# Patient Record
Sex: FEMALE | Race: BLACK OR AFRICAN AMERICAN | Employment: OTHER | URBAN - METROPOLITAN AREA
[De-identification: names, ages, dates, MRNs, and addresses within clinical notes are randomized per-mention and may not be internally consistent; named-entity substitution may affect disease eponyms.]

---

## 2017-02-20 ENCOUNTER — APPOINTMENT (OUTPATIENT)
Dept: GENERAL RADIOLOGY | Age: 77
End: 2017-02-20
Attending: EMERGENCY MEDICINE
Payer: MEDICARE

## 2017-02-20 ENCOUNTER — HOSPITAL ENCOUNTER (EMERGENCY)
Age: 77
Discharge: HOME | End: 2017-02-20
Attending: EMERGENCY MEDICINE
Payer: MEDICARE

## 2017-02-20 VITALS
WEIGHT: 293 LBS | RESPIRATION RATE: 20 BRPM | TEMPERATURE: 97.3 F | BODY MASS INDEX: 43.4 KG/M2 | SYSTOLIC BLOOD PRESSURE: 114 MMHG | OXYGEN SATURATION: 99 % | HEIGHT: 69 IN | HEART RATE: 88 BPM | DIASTOLIC BLOOD PRESSURE: 70 MMHG

## 2017-02-20 DIAGNOSIS — R06.02 SOB (SHORTNESS OF BREATH): Primary | ICD-10-CM

## 2017-02-20 LAB
ALBUMIN SERPL BCP-MCNC: 3.3 G/DL (ref 3.5–5)
ALBUMIN/GLOB SERPL: 0.8 {RATIO} (ref 1.1–2.2)
ALP SERPL-CCNC: 86 U/L (ref 45–117)
ALT SERPL-CCNC: 12 U/L (ref 12–78)
ANION GAP BLD CALC-SCNC: 9 MMOL/L (ref 5–15)
AST SERPL W P-5'-P-CCNC: 10 U/L (ref 15–37)
BASOPHILS # BLD AUTO: 0 K/UL (ref 0–0.1)
BASOPHILS # BLD: 0 % (ref 0–1)
BILIRUB SERPL-MCNC: 1 MG/DL (ref 0.2–1)
BNP SERPL-MCNC: 158 PG/ML (ref 0–100)
BUN SERPL-MCNC: 15 MG/DL (ref 6–20)
BUN/CREAT SERPL: 15 (ref 12–20)
CALCIUM SERPL-MCNC: 8.8 MG/DL (ref 8.5–10.1)
CHLORIDE SERPL-SCNC: 105 MMOL/L (ref 97–108)
CK MB CFR SERPL CALC: 1.4 % (ref 0–2.5)
CK MB SERPL-MCNC: 1.6 NG/ML (ref 5–25)
CK SERPL-CCNC: 116 U/L (ref 26–192)
CO2 SERPL-SCNC: 27 MMOL/L (ref 21–32)
CREAT SERPL-MCNC: 1.03 MG/DL (ref 0.55–1.02)
EOSINOPHIL # BLD: 0.1 K/UL (ref 0–0.4)
EOSINOPHIL NFR BLD: 1 % (ref 0–7)
ERYTHROCYTE [DISTWIDTH] IN BLOOD BY AUTOMATED COUNT: 14.9 % (ref 11.5–14.5)
GLOBULIN SER CALC-MCNC: 4 G/DL (ref 2–4)
GLUCOSE SERPL-MCNC: 89 MG/DL (ref 65–100)
HCT VFR BLD AUTO: 42.7 % (ref 35–47)
HGB BLD-MCNC: 14 G/DL (ref 11.5–16)
LYMPHOCYTES # BLD AUTO: 29 % (ref 12–49)
LYMPHOCYTES # BLD: 1.6 K/UL (ref 0.8–3.5)
MCH RBC QN AUTO: 27.3 PG (ref 26–34)
MCHC RBC AUTO-ENTMCNC: 32.8 G/DL (ref 30–36.5)
MCV RBC AUTO: 83.4 FL (ref 80–99)
MONOCYTES # BLD: 0.7 K/UL (ref 0–1)
MONOCYTES NFR BLD AUTO: 12 % (ref 5–13)
NEUTS SEG # BLD: 3.3 K/UL (ref 1.8–8)
NEUTS SEG NFR BLD AUTO: 58 % (ref 32–75)
PLATELET # BLD AUTO: 221 K/UL (ref 150–400)
POTASSIUM SERPL-SCNC: 4.2 MMOL/L (ref 3.5–5.1)
PROT SERPL-MCNC: 7.3 G/DL (ref 6.4–8.2)
RBC # BLD AUTO: 5.12 M/UL (ref 3.8–5.2)
SODIUM SERPL-SCNC: 141 MMOL/L (ref 136–145)
TROPONIN I SERPL-MCNC: <0.04 NG/ML
WBC # BLD AUTO: 5.7 K/UL (ref 3.6–11)

## 2017-02-20 PROCEDURE — 71020 XR CHEST PA LAT: CPT

## 2017-02-20 PROCEDURE — 99283 EMERGENCY DEPT VISIT LOW MDM: CPT

## 2017-02-20 PROCEDURE — 36415 COLL VENOUS BLD VENIPUNCTURE: CPT | Performed by: EMERGENCY MEDICINE

## 2017-02-20 PROCEDURE — 82550 ASSAY OF CK (CPK): CPT | Performed by: EMERGENCY MEDICINE

## 2017-02-20 PROCEDURE — 80053 COMPREHEN METABOLIC PANEL: CPT | Performed by: EMERGENCY MEDICINE

## 2017-02-20 PROCEDURE — 84484 ASSAY OF TROPONIN QUANT: CPT | Performed by: EMERGENCY MEDICINE

## 2017-02-20 PROCEDURE — 93005 ELECTROCARDIOGRAM TRACING: CPT

## 2017-02-20 PROCEDURE — 85025 COMPLETE CBC W/AUTO DIFF WBC: CPT | Performed by: EMERGENCY MEDICINE

## 2017-02-20 PROCEDURE — 83880 ASSAY OF NATRIURETIC PEPTIDE: CPT | Performed by: EMERGENCY MEDICINE

## 2017-02-20 RX ORDER — FUROSEMIDE 20 MG/1
TABLET ORAL
Qty: 30 TAB | Refills: 0 | Status: SHIPPED | OUTPATIENT
Start: 2017-02-20 | End: 2018-03-27

## 2017-02-20 NOTE — ED TRIAGE NOTES
Two weeks of swelling in her feet and ankles. She has shortness of breath but it has gotten worse in the last 2 weeks. She called her doctor and was told to come to ED.

## 2017-02-21 LAB
ATRIAL RATE: 375 BPM
CALCULATED T AXIS, ECG11: 42 DEGREES
DIAGNOSIS, 93000: NORMAL
Q-T INTERVAL, ECG07: 356 MS
QRS DURATION, ECG06: 84 MS
QTC CALCULATION (BEZET), ECG08: 428 MS
VENTRICULAR RATE, ECG03: 87 BPM

## 2017-02-21 NOTE — DISCHARGE INSTRUCTIONS
Shortness of Breath: Care Instructions  Your Care Instructions  Shortness of breath has many causes. Sometimes conditions such as anxiety can lead to shortness of breath. Some people get mild shortness of breath when they exercise. Trouble breathing also can be a symptom of a serious problem, such as asthma, lung disease, emphysema, heart problems, and pneumonia. If your shortness of breath continues, you may need tests and treatment. Watch for any changes in your breathing and other symptoms. Follow-up care is a key part of your treatment and safety. Be sure to make and go to all appointments, and call your doctor if you are having problems. Its also a good idea to know your test results and keep a list of the medicines you take. How can you care for yourself at home? · Do not smoke or allow others to smoke around you. If you need help quitting, talk to your doctor about stop-smoking programs and medicines. These can increase your chances of quitting for good. · Get plenty of rest and sleep. · Take your medicines exactly as prescribed. Call your doctor if you think you are having a problem with your medicine. · Find healthy ways to deal with stress. ¨ Exercise daily. ¨ Get plenty of sleep. ¨ Eat regularly and well. When should you call for help? Call 911 anytime you think you may need emergency care. For example, call if:  · You have severe shortness of breath. · You have symptoms of a heart attack. These may include:  ¨ Chest pain or pressure, or a strange feeling in the chest.  ¨ Sweating. ¨ Shortness of breath. ¨ Nausea or vomiting. ¨ Pain, pressure, or a strange feeling in the back, neck, jaw, or upper belly or in one or both shoulders or arms. ¨ Lightheadedness or sudden weakness. ¨ A fast or irregular heartbeat. After you call 911, the  may tell you to chew 1 adult-strength or 2 to 4 low-dose aspirin. Wait for an ambulance. Do not try to drive yourself.   Call your doctor now or seek immediate medical care if:  · Your shortness of breath gets worse or you start to wheeze. Wheezing is a high-pitched sound when you breathe. · You wake up at night out of breath or have to prop your head up on several pillows to breathe. · You are short of breath after only light activity or while at rest.  Watch closely for changes in your health, and be sure to contact your doctor if:  · You do not get better over the next 1 to 2 days. Where can you learn more? Go to http://chela-montse.info/. Enter S780 in the search box to learn more about \"Shortness of Breath: Care Instructions. \"  Current as of: May 23, 2016  Content Version: 11.1  © 1048-5515 [employer]. Care instructions adapted under license by [employer] (which disclaims liability or warranty for this information). If you have questions about a medical condition or this instruction, always ask your healthcare professional. [place] [address] any warranty or liability for your use of this information. We hope that we have addressed all of your medical concerns. The examination and treatment you received in the Emergency Department were for an emergent problem and were not intended as complete care. It is important that you follow up with your healthcare provider(s) for ongoing care. If your symptoms worsen or do not improve as expected, and you are unable to reach your usual health care provider(s), you should return to the Emergency Department. Today's healthcare is undergoing tremendous change, and patient satisfaction surveys are one of the many tools to assess the quality of medical care. You may receive a survey from the [employer] organization regarding your experience in the Emergency Department. I hope that your experience has been completely positive, particularly the medical care that I provided.   As such, please participate in the survey; anything less than excellent does not meet my expectations or intentions. [facility] and [address] participate in nationally recognized quality of care measures. If your blood pressure is greater than 120/80, as reported below, we urge that you seek medical care to address the potential of high blood pressure, commonly known as hypertension. Hypertension can be hereditary or can be caused by certain medical conditions, pain, stress, or \"white coat syndrome. \"       Please make an appointment with your health care provider(s) for follow up of your Emergency Department visit. VITALS:   Patient Vitals for the past 8 hrs:   Temp Pulse Resp BP SpO2   02/20/17 2029 - - 20 131/49 98 %   02/20/17 1806 97.3 °F (36.3 °C) 88 22 118/69 95 %          Thank you for allowing us to provide you with medical care today. We realize that you have many choices for your emergency care needs. Please choose us in the future for any continued health care needs. Jose Granado MD    [facility].   Office: [phone]            Recent Results (from the past 24 hour(s))   EKG, 12 LEAD, INITIAL    Collection Time: 02/20/17  6:23 PM   Result Value Ref Range    Ventricular Rate 87 BPM    Atrial Rate 375 BPM    QRS Duration 84 ms    Q-T Interval 356 ms    QTC Calculation (Bezet) 428 ms    Calculated T Axis 42 degrees    Diagnosis       Atrial fibrillation with premature ventricular or aberrantly conducted   complexes  When compared with ECG of 31-JUL-2016 11:36,  Atrial fibrillation has replaced Sinus rhythm  Vent.  rate has increased BY  42 BPM  QT has lengthened     CBC WITH AUTOMATED DIFF    Collection Time: 02/20/17  7:14 PM   Result Value Ref Range    WBC 5.7 3.6 - 11.0 K/uL    RBC 5.12 3.80 - 5.20 M/uL    HGB 14.0 11.5 - 16.0 g/dL    HCT 42.7 35.0 - 47.0 %    MCV 83.4 80.0 - 99.0 FL    MCH 27.3 26.0 - 34.0 PG    MCHC 32.8 30.0 - 36.5 g/dL    RDW 14.9 (H) 11.5 - 14.5 %    PLATELET 811 383 - 602 K/uL    NEUTROPHILS 58 32 - 75 %    LYMPHOCYTES 29 12 - 49 %    MONOCYTES 12 5 - 13 %    EOSINOPHILS 1 0 - 7 %    BASOPHILS 0 0 - 1 %    ABS. NEUTROPHILS 3.3 1.8 - 8.0 K/UL    ABS. LYMPHOCYTES 1.6 0.8 - 3.5 K/UL    ABS. MONOCYTES 0.7 0.0 - 1.0 K/UL    ABS. EOSINOPHILS 0.1 0.0 - 0.4 K/UL    ABS. BASOPHILS 0.0 0.0 - 0.1 K/UL   METABOLIC PANEL, COMPREHENSIVE    Collection Time: 02/20/17  7:14 PM   Result Value Ref Range    Sodium 141 136 - 145 mmol/L    Potassium 4.2 3.5 - 5.1 mmol/L    Chloride 105 97 - 108 mmol/L    CO2 27 21 - 32 mmol/L    Anion gap 9 5 - 15 mmol/L    Glucose 89 65 - 100 mg/dL    BUN 15 6 - 20 MG/DL    Creatinine 1.03 (H) 0.55 - 1.02 MG/DL    BUN/Creatinine ratio 15 12 - 20      GFR est AA >60 >60 ml/min/1.73m2    GFR est non-AA 52 (L) >60 ml/min/1.73m2    Calcium 8.8 8.5 - 10.1 MG/DL    Bilirubin, total 1.0 0.2 - 1.0 MG/DL    ALT (SGPT) 12 12 - 78 U/L    AST (SGOT) 10 (L) 15 - 37 U/L    Alk. phosphatase 86 45 - 117 U/L    Protein, total 7.3 6.4 - 8.2 g/dL    Albumin 3.3 (L) 3.5 - 5.0 g/dL    Globulin 4.0 2.0 - 4.0 g/dL    A-G Ratio 0.8 (L) 1.1 - 2.2     BNP    Collection Time: 02/20/17  7:14 PM   Result Value Ref Range     (H) 0 - 100 pg/mL   TROPONIN I    Collection Time: 02/20/17  7:14 PM   Result Value Ref Range    Troponin-I, Qt. <0.04 <0.05 ng/mL   CK W/ CKMB & INDEX    Collection Time: 02/20/17  7:14 PM   Result Value Ref Range     26 - 192 U/L    CK - MB 1.6 <3.6 NG/ML    CK-MB Index 1.4 0 - 2.5         Xr Chest Pa Lat    Result Date: 2/20/2017  Indication:  increasing shortness of breath Exam: AP upright and lateral views of the chest. Direct comparison is made to prior CXR dated July 31, 2016. Findings: Cardiomediastinal silhouette is stable. Examination is limited due to patient body habitus and underpenetration. Lungs are grossly clear. No pleural fluid is visualized. There is no pneumothorax.      IMPRESSION: Limited exam. Grossly clear lungs.

## 2017-02-21 NOTE — ED PROVIDER NOTES
HPI Comments: 69 y.o. female with past medical history significant for arthritis, GERD, atrial fibrillation, obesity, dyspnea on exertion who presents from home with chief complaint of leg swelling. Patient reports worsening bilateral leg swelling over the past 2 weeks with accompanying toe tingling. Patient also complains of worsening shortness of breath over the past week. Patient mentions she is typically short of breath upon exertion and it only able to walk 1 block. Patient admits she is now unable to walk \"anywhere\" without getting short of breath. Patient admits she has sleep apnea, but does not use her cPAP machine at night. Patient reports she stays up at night because of her sleep apnea. Patient also complains of having a running nose suspected from having allergies. Patient reports having a cough every night with accompanying phlegm \"every night. \" Patient denies being on any diuretics. Patient denies having fever. There are no other acute medical concerns at this time. Social hx: Tobacco Use: No, Alcohol Use: No    PCP: Sam Smith MD    Note written by India Domínguez, as dictated by Tavon Smiley MD 8:59 PM      The history is provided by the patient. Past Medical History:   Diagnosis Date    Arthritis     Atrial fibrillation (HCC)     WHITING (dyspnea on exertion)     GERD (gastroesophageal reflux disease)     Obesity     Other ill-defined conditions(929.89)      \"fluid in the lung\"       Past Surgical History:   Procedure Laterality Date    Hx hernia repair      Hx cholecystectomy      Hx other surgical       kidney stone surgery    Hx other surgical       both eyes cataract    Hx orthopaedic       right and left total knee replacement    Hx orthopaedic       right shoulder         No family history on file.     Social History     Social History    Marital status: SINGLE     Spouse name: N/A    Number of children: N/A    Years of education: N/A     Occupational History    Not on file. Social History Main Topics    Smoking status: Former Smoker     Packs/day: 1.00    Smokeless tobacco: Never Used      Comment: quit at age 42,smoked for 16 years    Alcohol use No    Drug use: No    Sexual activity: Not on file     Other Topics Concern    Not on file     Social History Narrative         ALLERGIES: Penicillins    Review of Systems   Constitutional: Negative for appetite change, chills and fever. HENT: Positive for congestion. Negative for rhinorrhea, sore throat and trouble swallowing. Eyes: Negative for photophobia. Respiratory: Positive for cough and shortness of breath. Cardiovascular: Positive for leg swelling. Negative for chest pain and palpitations. Gastrointestinal: Negative for abdominal pain, nausea and vomiting. Genitourinary: Negative for dysuria, frequency and hematuria. Musculoskeletal: Negative for arthralgias. Neurological: Negative for dizziness, syncope and weakness. Psychiatric/Behavioral: Negative for behavioral problems. The patient is not nervous/anxious. All other systems reviewed and are negative. Vitals:    02/20/17 1806 02/20/17 2029   BP: 118/69 131/49   Pulse: 88    Resp: 22 20   Temp: 97.3 °F (36.3 °C)    SpO2: 95% 98%   Weight: 154.2 kg (340 lb)    Height: 5' 9\" (1.753 m)             Physical Exam   Constitutional: She appears well-developed and well-nourished. HENT:   Head: Normocephalic and atraumatic. Mouth/Throat: Oropharynx is clear and moist.   Eyes: EOM are normal. Pupils are equal, round, and reactive to light. Neck: Normal range of motion. Neck supple. Cardiovascular: Normal rate, normal heart sounds and intact distal pulses. An irregular rhythm present. Exam reveals no gallop and no friction rub. No murmur heard. Patient has a pulse of 80 that is irregular   Pulmonary/Chest: Effort normal. No respiratory distress. She has no wheezes. She has no rales. Abdominal: Soft.  There is no tenderness. There is no rebound. Musculoskeletal: Normal range of motion. She exhibits edema. She exhibits no tenderness. Patient has 2/4 pitting edema   Neurological: She is alert. No cranial nerve deficit. Motor; symmetric   Skin: No erythema. Psychiatric: She has a normal mood and affect. Her behavior is normal.   Nursing note and vitals reviewed.        [facility]  ED Course       Procedures

## 2017-02-21 NOTE — ED NOTES
Discharge instructions given to pt by MD.  All questions answered and pt verbalized understanding. V/S stable @ time of discharge.

## 2017-02-23 NOTE — CALL BACK NOTE
[facility] Emergency Department Follow Up Call Record    Discharged to : Home/Family Home/Home Health/Skilled Facility/Rehab/Assisted Living/Other____home___  1) Did you receive your discharge instructions? Yes        2) Do you understand them? Yes         3) Are you able to follow them? No  Carla Patton is reluctant  to follow up with her current PCP as she stated \"he doesn't do anything for me\". Offered to send other PCP providers list to her address. Encouraged that she contact Dr. Dennie Rising in the interim as she does need immediate follow up. If NO, what can I clarify for you? 4) Do you understand your diagnosis? Yes Discussed her DX and problem of sleep apnea as well and need to use the CPAP machine at night. She stated she \"does not like the mask\". She wondered if there is another type of nasal apparatus she could use instead of the mask. This writer encouraged her to call her PCP and make appointment to also discuss her concerns in this matter. 5) Do you know which symptoms should prompt you to call the doctor? Yes      6) Were you able to fill and  any medications that were prescribed? Yes . She is currently taking the furosemide and reports improvement in her legs. She is trying to walk with her walker and had a [employer] member come to her house yesterday to walk with her. Carla states she still experiences SOB with ambulation. 7) You were prescribed __furosemide_________for __fluid retention in legs. __________________. Common side effects of this medication are_ rash___________________. This is not a complete list so please review the forms given from the pharmacy for a complete list.      8) Are there any questions about your medications? No             This writer also discussed that after lasix prescription used she may need further medication treatment. Emphasized need for PCP follow up . Parisa Elba cannot remember who treated her for her sleep apnea.  She denies any other doctors that she has seen lately. Have you scheduled any recommended doctors appointments (specialty, PCP) NO  If NO, what barriers are you encountering (transportation/lost contact info/cost/  didnt think necessary/no PCP   (does not feel her current PCP is attentive to her) see note above. 9) If discharged with Home Health, has the agency contacted you to schedule visit? Not applicable  10) Is there anyone available to help you at home (meals, errands, transportation    monitoring) (adult children, neighbors, private duty companions) Yes Indicates she has a son living in the area. Is in contact with him. 11) Are you on a special diet? Not applicable         If YES, do you understand the requirements for this diet? Education provided? 12) If presented with cough, bronchitis, COPD, asthma, is it ok to ask that the   respiratory disease management educator call you? Not applicable  Hx of sleep apnea. 13)  A) If presented with fall, were you issued an assistive device in the ED    Are you using? Yes   Uses own walker. B) If given RX for device, have you obtained? Not applicable       If NO, barriers? C) Therapist recommended: NO   Are you able to implement the suggestions? Not applicable        If NO, barriers to implementation? D) Are you having any difficulties with mobility inside your home?     (steps, bed, tub)YES. Is able to ambulate with walker. Ambulates outside with person to assist.   If YES, ask if the [facility] PT can contact patient and good time and number?  14)  At the end of your discharge instructions, there is information about accessing [facility], have you had a chance to review those? Refused         Do you have any questions about signing up for this service? NO   We encourage our patients to be active participants in their healthcare and this site is one of the ways to do that.   It will allow you to access parts of your medical record, email your doctors office, schedule appointments, and request medications refills . 15) Are there any other questions that I can answer for you regarding    your Emergency department visit?   YES (see notes above)            Estimated Call Time:___11:46 AM  ________________ Date/Time:_______________

## 2017-03-30 ENCOUNTER — ANESTHESIA EVENT (OUTPATIENT)
Dept: ENDOSCOPY | Age: 77
End: 2017-03-30
Payer: MEDICARE

## 2017-03-30 ENCOUNTER — ANESTHESIA (OUTPATIENT)
Dept: ENDOSCOPY | Age: 77
End: 2017-03-30
Payer: MEDICARE

## 2017-03-30 ENCOUNTER — HOSPITAL ENCOUNTER (OUTPATIENT)
Age: 77
Setting detail: OUTPATIENT SURGERY
Discharge: HOME | End: 2017-03-30
Attending: INTERNAL MEDICINE | Admitting: INTERNAL MEDICINE
Payer: MEDICARE

## 2017-03-30 VITALS
DIASTOLIC BLOOD PRESSURE: 69 MMHG | HEART RATE: 84 BPM | OXYGEN SATURATION: 99 % | SYSTOLIC BLOOD PRESSURE: 134 MMHG | RESPIRATION RATE: 24 BRPM | TEMPERATURE: 97.7 F

## 2017-03-30 PROCEDURE — 76040000019: Performed by: INTERNAL MEDICINE

## 2017-03-30 PROCEDURE — 74011250636 HC RX REV CODE- 250/636

## 2017-03-30 PROCEDURE — 77030027957 HC TBNG IRR ENDOGTR BUSS -B: Performed by: INTERNAL MEDICINE

## 2017-03-30 PROCEDURE — 74011000250 HC RX REV CODE- 250

## 2017-03-30 PROCEDURE — 76060000031 HC ANESTHESIA FIRST 0.5 HR: Performed by: INTERNAL MEDICINE

## 2017-03-30 RX ORDER — LIDOCAINE HYDROCHLORIDE 20 MG/ML
INJECTION, SOLUTION EPIDURAL; INFILTRATION; INTRACAUDAL; PERINEURAL AS NEEDED
Status: DISCONTINUED | OUTPATIENT
Start: 2017-03-30 | End: 2017-03-30 | Stop reason: HOSPADM

## 2017-03-30 RX ORDER — SODIUM CHLORIDE 0.9 % (FLUSH) 0.9 %
5-10 SYRINGE (ML) INJECTION AS NEEDED
Status: DISCONTINUED | OUTPATIENT
Start: 2017-03-30 | End: 2017-03-30 | Stop reason: HOSPADM

## 2017-03-30 RX ORDER — NALOXONE HYDROCHLORIDE 0.4 MG/ML
0.4 INJECTION, SOLUTION INTRAMUSCULAR; INTRAVENOUS; SUBCUTANEOUS
Status: DISCONTINUED | OUTPATIENT
Start: 2017-03-30 | End: 2017-03-30 | Stop reason: HOSPADM

## 2017-03-30 RX ORDER — EPINEPHRINE 0.1 MG/ML
1 INJECTION INTRACARDIAC; INTRAVENOUS
Status: DISCONTINUED | OUTPATIENT
Start: 2017-03-30 | End: 2017-03-30 | Stop reason: HOSPADM

## 2017-03-30 RX ORDER — PROPOFOL 10 MG/ML
INJECTION, EMULSION INTRAVENOUS AS NEEDED
Status: DISCONTINUED | OUTPATIENT
Start: 2017-03-30 | End: 2017-03-30 | Stop reason: HOSPADM

## 2017-03-30 RX ORDER — FLUMAZENIL 0.1 MG/ML
0.2 INJECTION INTRAVENOUS
Status: DISCONTINUED | OUTPATIENT
Start: 2017-03-30 | End: 2017-03-30 | Stop reason: HOSPADM

## 2017-03-30 RX ORDER — MIDAZOLAM HYDROCHLORIDE 1 MG/ML
.25-1 INJECTION, SOLUTION INTRAMUSCULAR; INTRAVENOUS
Status: DISCONTINUED | OUTPATIENT
Start: 2017-03-30 | End: 2017-03-30 | Stop reason: HOSPADM

## 2017-03-30 RX ORDER — FENTANYL CITRATE 50 UG/ML
200 INJECTION, SOLUTION INTRAMUSCULAR; INTRAVENOUS
Status: DISCONTINUED | OUTPATIENT
Start: 2017-03-30 | End: 2017-03-30 | Stop reason: HOSPADM

## 2017-03-30 RX ORDER — ATROPINE SULFATE 0.1 MG/ML
0.5 INJECTION INTRAVENOUS
Status: DISCONTINUED | OUTPATIENT
Start: 2017-03-30 | End: 2017-03-30 | Stop reason: HOSPADM

## 2017-03-30 RX ORDER — SODIUM CHLORIDE 0.9 % (FLUSH) 0.9 %
5-10 SYRINGE (ML) INJECTION EVERY 8 HOURS
Status: DISCONTINUED | OUTPATIENT
Start: 2017-03-30 | End: 2017-03-30 | Stop reason: HOSPADM

## 2017-03-30 RX ORDER — DEXTROMETHORPHAN/PSEUDOEPHED 2.5-7.5/.8
1.2 DROPS ORAL
Status: DISCONTINUED | OUTPATIENT
Start: 2017-03-30 | End: 2017-03-30 | Stop reason: HOSPADM

## 2017-03-30 RX ORDER — SODIUM CHLORIDE 9 MG/ML
INJECTION, SOLUTION INTRAVENOUS
Status: DISCONTINUED | OUTPATIENT
Start: 2017-03-30 | End: 2017-03-30 | Stop reason: HOSPADM

## 2017-03-30 RX ORDER — SODIUM CHLORIDE 9 MG/ML
50 INJECTION, SOLUTION INTRAVENOUS CONTINUOUS
Status: DISCONTINUED | OUTPATIENT
Start: 2017-03-30 | End: 2017-03-30 | Stop reason: HOSPADM

## 2017-03-30 RX ADMIN — PROPOFOL 40 MG: 10 INJECTION, EMULSION INTRAVENOUS at 10:47

## 2017-03-30 RX ADMIN — LIDOCAINE HYDROCHLORIDE 40 MG: 20 INJECTION, SOLUTION EPIDURAL; INFILTRATION; INTRACAUDAL; PERINEURAL at 10:47

## 2017-03-30 RX ADMIN — PROPOFOL 30 MG: 10 INJECTION, EMULSION INTRAVENOUS at 10:49

## 2017-03-30 RX ADMIN — PROPOFOL 10 MG: 10 INJECTION, EMULSION INTRAVENOUS at 10:52

## 2017-03-30 RX ADMIN — PROPOFOL 20 MG: 10 INJECTION, EMULSION INTRAVENOUS at 10:57

## 2017-03-30 RX ADMIN — PROPOFOL 20 MG: 10 INJECTION, EMULSION INTRAVENOUS at 10:51

## 2017-03-30 RX ADMIN — PROPOFOL 30 MG: 10 INJECTION, EMULSION INTRAVENOUS at 11:01

## 2017-03-30 RX ADMIN — SODIUM CHLORIDE: 9 INJECTION, SOLUTION INTRAVENOUS at 10:44

## 2017-03-30 RX ADMIN — PROPOFOL 20 MG: 10 INJECTION, EMULSION INTRAVENOUS at 10:55

## 2017-03-30 RX ADMIN — PROPOFOL 20 MG: 10 INJECTION, EMULSION INTRAVENOUS at 10:56

## 2017-03-30 RX ADMIN — PROPOFOL 20 MG: 10 INJECTION, EMULSION INTRAVENOUS at 10:53

## 2017-03-30 NOTE — ANESTHESIA PREPROCEDURE EVALUATION
Anesthetic History   No history of anesthetic complications            Review of Systems / Medical History  Patient summary reviewed, nursing notes reviewed and pertinent labs reviewed    Pulmonary  Within defined limits                 Neuro/Psych   Within defined limits           Cardiovascular          CHF  Dysrhythmias : atrial fibrillation      Exercise tolerance: <4 METS  Comments: Left ventricle: The ventricle was mildly dilated. Systolic function was  normal. No obvious wall motion abnormalities identified in the views  obtained. Wall thickness was mildly increased. Atrial fibrillation with frequent premature ventricular complexes     When compared with ECG of 31-JUL-2016 11:36,   Atrial fibrillation has replaced Sinus rhythm   Vent.  rate has increased BY  42 BPM          GI/Hepatic/Renal     GERD           Endo/Other        Morbid obesity and arthritis     Other Findings            Physical Exam    Airway  Mallampati: II  TM Distance: > 6 cm  Neck ROM: normal range of motion   Mouth opening: Normal     Cardiovascular    Rhythm: regular  Rate: normal         Dental    Dentition: Full upper dentures     Pulmonary  Breath sounds clear to auscultation               Abdominal  Abdominal exam normal       Other Findings            Anesthetic Plan    ASA: 3  Anesthesia type: MAC          Induction: Intravenous  Anesthetic plan and risks discussed with: Patient

## 2017-03-30 NOTE — DISCHARGE INSTRUCTIONS
Dr Howard Azevedo of [address] [place] [address]. [address], [address] [facility]  [phone]    Kristina Monk  [id number]  1940    COLONOSCOPY DISCHARGE INSTRUCTIONS  Discomfort:  Redness at IV site- apply warm compress to area; if redness or soreness persist- contact your physician  There may be a slight amount of blood passed from the rectum  Gaseous discomfort- walking, belching will help relieve any discomfort  You may not operate a vehicle for 12 hours  You may not engage in an occupation involving machinery or appliances for rest of today  You may not drink alcoholic beverages for at least 12 hours  Avoid making any critical decisions for at least 24 hour  DIET:   Regular diet. - however -  remember your colon is empty and a heavy meal will produce gas. Avoid these foods:  vegetables, fried / greasy foods, carbonated drinks for today         ACTIVITY:  You may resume your normal daily activities it is recommended that you spend the remainder of the day resting -  avoid any strenuous activity. CALL M.D. ANY SIGN OF:   Increasing pain, nausea, vomiting  Abdominal distension (swelling)  New increased bleeding (oral or rectal)  Fever (chills)  Pain in chest area  Bloody discharge from nose or mouth  Shortness of breath     Follow-up Instructions:   Call Dr. Chasity Calderon for any questions or problems. Telephone # [phone]  Biopsy results will be available in  5 to 7 days  Should have a repeat colonoscopy in 10 years    Impression:  Diverticulosis,pan             Diverticulosis: Care Instructions  Your Care Instructions  In diverticulosis, pouches called diverticula form in the wall of the large intestine (colon). The pouches do not cause any pain or other symptoms. Most people who have diverticulosis do not know they have it. But the pouches sometimes bleed, and if they become infected, they can cause pain and other symptoms.  When this happens, it is called diverticulitis. Diverticula form when pressure pushes the wall of the colon outward at certain weak points. A diet that is too low in fiber can cause diverticula. Follow-up care is a key part of your treatment and safety. Be sure to make and go to all appointments, and call your doctor if you are having problems. It's also a good idea to know your test results and keep a list of the medicines you take. How can you care for yourself at home? · Include fruits, leafy green vegetables, beans, and whole grains in your diet each day. These foods are high in fiber. · Take a fiber supplement, such as Citrucel or Metamucil, every day if needed. Read and follow all instructions on the label. · Drink plenty of fluids, enough so that your urine is light yellow or clear like water. If you have kidney, heart, or liver disease and have to limit fluids, talk with your doctor before you increase the amount of fluids you drink. · Get at least 30 minutes of exercise on most days of the week. Walking is a good choice. You also may want to do other activities, such as running, swimming, cycling, or playing tennis or team sports. · Cut out foods that cause gas, pain, or other symptoms. When should you call for help? Call your doctor now or seek immediate medical care if:  · You have belly pain. · You pass maroon or very bloody stools. · You have a fever. · You have nausea and vomiting. · You have unusual changes in your bowel movements or abdominal swelling. · You have burning pain when you urinate. · You have abnormal vaginal discharge. · You have shoulder pain. · You have cramping pain that does not get better when you have a bowel movement or pass gas. · You pass gas or stool from your urethra while urinating. Watch closely for changes in your health, and be sure to contact your doctor if you have any problems. Where can you learn more? Go to http://chela-montse.info/.   Enter W613 in the search box to learn more about \"Diverticulosis: Care Instructions. \"  Current as of: August 9, 2016  Content Version: 11.2  © 8769-9518 [employer]. Care instructions adapted under license by [employer] (which disclaims liability or warranty for this information). If you have questions about a medical condition or this instruction, always ask your healthcare professional. [place] [address] any warranty or liability for your use of this information.

## 2017-03-30 NOTE — PERIOP NOTES
Patient has been evaluated by anesthesia pre-procedure. Patient alert and oriented. Vital signs will not be charted by the Endoscopy nurse. All vitals, airway, and loc are monitored by anesthesia staff throughout procedure. Pt's son wheeled her to our endo area pre procedure, and was with us for part of the assessment.

## 2017-03-30 NOTE — ANESTHESIA POSTPROCEDURE EVALUATION
Post-Anesthesia Evaluation and Assessment    Patient: Quinn Littlejohn MRN: [id number]  SSN: [id number]    YOB: 1940  Age: 69 y.o. Sex: female       Cardiovascular Function/Vital Signs  Visit Vitals    /69    Pulse 84    Temp 36.5 °C (97.7 °F)    Resp 24    SpO2 99%       Patient is status post MAC anesthesia for Procedure(s):  COLONOSCOPY. Nausea/Vomiting: None    Postoperative hydration reviewed and adequate. Pain:  Pain Scale 1: Numeric (0 - 10) (03/30/17 1128)  Pain Intensity 1: 0 (03/30/17 1128)   Managed    Neurological Status: At baseline    Mental Status and Level of Consciousness: Arousable    Pulmonary Status:   O2 Device: Room air (03/30/17 1128)   Adequate oxygenation and airway patent    Complications related to anesthesia: None    Post-anesthesia assessment completed.  No concerns    Signed By: Isaac Cerda MD     March 30, 2017

## 2017-03-30 NOTE — IP AVS SNAPSHOT
[address] 
[phone] Patient: Melchor Harrison MRN: [id number] NSE:3/34/1198 You are allergic to the following Allergen Reactions Penicillins Hives Recent Documentation OB Status Smoking Status Postmenopausal Former Smoker Emergency Contacts Name Discharge Info Relation Home Work Mobile Marcel Daugherty N/A  AT THIS TIME [6] Child [2] [phone] Quincy Sue CAREGIVER [3] Child [2] [phone] About your hospitalization You were admitted on:  March 30, 2017 You last received care in the:  [facility] ENDOSCOPY You were discharged on:  March 30, 2017 Unit phone number:  [phone] Why you were hospitalized Your primary diagnosis was:  Not on File Providers Seen During Your Hospitalizations Provider Role Specialty Primary office phone Leila Madrigal MD Attending Provider Gastroenterology [phone] Your Primary Care Physician (PCP) Primary Care Physician Office Phone Office Fax Sugey Martinez, [address] [place] [address] Drive [phone] Follow-up Information Follow up With Details Comments Contact Info Jose Marte MD   [address] [facility] [address] Jose Marte MD 
[facility] 7 [address] [phone] Current Discharge Medication List  
  
CONTINUE these medications which have NOT CHANGED Dose & Instructions Dispensing Information Comments Morning Noon Evening Bedtime  
 aspirin 81 mg chewable tablet Your last dose was: Your next dose is:    
   
   
 Dose:  81 mg Take 1 Tab by mouth daily. Quantity:  30 Tab Refills:  6  
     
   
   
   
  
 furosemide 20 mg tablet Commonly known as:  LASIX Your last dose was: Your next dose is: One po q day Quantity:  30 Tab Refills:  0  
     
   
   
   
  
 lidocaine 5 % Commonly known as:  Krista Wagner Your last dose was: Your next dose is:    
   
   
 Apply patch to lower back over pain as needed for pain Quantity:  1 Package Refills:  0  
     
   
   
   
  
 ondansetron 8 mg disintegrating tablet Commonly known as:  ZOFRAN ODT Your last dose was: Your next dose is:    
   
   
 Dose:  8 mg Take 1 Tab by mouth every eight (8) hours as needed for Nausea. Quantity:  15 Tab Refills:  0  
     
   
   
   
  
 polyethylene glycol 17 gram/dose powder Commonly known as:  Laneyleobardo Darling Your last dose was: Your next dose is:    
   
   
 Dose:  17 g Take 17 g by mouth two (2) times a day. Quantity:  289 g Refills:  0  
     
   
   
   
  
 sotalol 80 mg tablet Commonly known as:  Loral Maw Your last dose was: Your next dose is:    
   
   
 Dose:  40 mg Take 0.5 tablets by mouth two (2) times a day. Quantity:  15 tablet Refills:  0  
     
   
   
   
  
 traMADol 50 mg tablet Commonly known as:  ULTRAM  
   
Your last dose was: Your next dose is:    
   
   
 Dose:  50 mg Take 1 tablet by mouth every eight (8) hours as needed for Pain. Quantity:  10 tablet Refills:  0 Discharge Instructions Dr Mohini Sanchez [facility] [address] 
[phone] Anita Gaffney [phone] 
1940 COLONOSCOPY DISCHARGE INSTRUCTIONS Discomfort: 
Redness at IV site- apply warm compress to area; if redness or soreness persist- contact your physician There may be a slight amount of blood passed from the rectum Gaseous discomfort- walking, belching will help relieve any discomfort You may not operate a vehicle for 12 hours You may not engage in an occupation involving machinery or appliances for rest of today You may not drink alcoholic beverages for at least 12 hours Avoid making any critical decisions for at least 24 hour DIET: 
 Regular diet.  however -  remember your colon is empty and a heavy meal will produce gas. Avoid these foods:  vegetables, fried / greasy foods, carbonated drinks for today ACTIVITY: 
You may resume your normal daily activities it is recommended that you spend the remainder of the day resting -  avoid any strenuous activity. CALL M.D. ANY SIGN OF: Increasing pain, nausea, vomiting Abdominal distension (swelling) New increased bleeding (oral or rectal) Fever (chills) Pain in chest area Bloody discharge from nose or mouth Shortness of breath Follow-up Instructions: 
 Call Dr. Juan Manuel Cam for any questions or problems. Telephone # [phone] Biopsy results will be available in  5 to 7 days Should have a repeat colonoscopy in 10 years Impression:  Toni Zhang Diverticulosis: Care Instructions Your Care Instructions In diverticulosis, pouches called diverticula form in the wall of the large intestine (colon). The pouches do not cause any pain or other symptoms. Most people who have diverticulosis do not know they have it. But the pouches sometimes bleed, and if they become infected, they can cause pain and other symptoms. When this happens, it is called diverticulitis. Diverticula form when pressure pushes the wall of the colon outward at certain weak points. A diet that is too low in fiber can cause diverticula. Follow-up care is a key part of your treatment and safety. Be sure to make and go to all appointments, and call your doctor if you are having problems. It's also a good idea to know your test results and keep a list of the medicines you take. How can you care for yourself at home? · Include fruits, leafy green vegetables, beans, and whole grains in your diet each day. These foods are high in fiber.  
· Take a fiber supplement, such as Citrucel or Metamucil, every day if needed. Read and follow all instructions on the label. · Drink plenty of fluids, enough so that your urine is light yellow or clear like water. If you have kidney, heart, or liver disease and have to limit fluids, talk with your doctor before you increase the amount of fluids you drink. · Get at least 30 minutes of exercise on most days of the week. Walking is a good choice. You also may want to do other activities, such as running, swimming, cycling, or playing tennis or team sports. · Cut out foods that cause gas, pain, or other symptoms. When should you call for help? Call your doctor now or seek immediate medical care if: 
· You have belly pain. · You pass maroon or very bloody stools. · You have a fever. · You have nausea and vomiting. · You have unusual changes in your bowel movements or abdominal swelling. · You have burning pain when you urinate. · You have abnormal vaginal discharge. · You have shoulder pain. · You have cramping pain that does not get better when you have a bowel movement or pass gas. · You pass gas or stool from your urethra while urinating. Watch closely for changes in your health, and be sure to contact your doctor if you have any problems. Where can you learn more? Go to http://chela-montse.info/. Enter C892 in the search box to learn more about \"Diverticulosis: Care Instructions. \" Current as of: August 9, 2016 Content Version: 11.2 © 9452-1486 [employer]. Care instructions adapted under license by [employer] (which disclaims liability or warranty for this information). If you have questions about a medical condition or this instruction, always ask your healthcare professional. [place] [address] any warranty or liability for your use of this information. Discharge Orders None Introducing [facility]!    
 [employer] introduces [employer] patient portal. Now you can access parts of your medical record, email your doctor's office, and request medication refills online. 1. In your internet browser, go to https://[employer] 2. Click on the First Time User? Click Here link in the Sign In box. You will see the New Member Sign Up page. 3. Enter your [facility] Access Code exactly as it appears below. You will not need to use this code after youve completed the sign-up process. If you do not sign up before the expiration date, you must request a new code. · [facility] Access Code: [email or web address] Expires: 5/21/2017  7:29 PM 
 
4. Enter the last four digits of your Social Security Number (xxxx) and Date of Birth (mm/dd/yyyy) as indicated and click Submit. You will be taken to the next sign-up page. 5. Create a [facility] ID. This will be your [facility] login ID and cannot be changed, so think of one that is secure and easy to remember. 6. Create a [facility] password. You can change your password at any time. 7. Enter your Password Reset Question and Answer. This can be used at a later time if you forget your password. 8. Enter your e-mail address. You will receive e-mail notification when new information is available in [address] 9. Click Sign Up. You can now view and download portions of your medical record. 10. Click the Download Summary menu link to download a portable copy of your medical information. If you have questions, please visit the Frequently Asked Questions section of the [facility] website. Remember, [facility] is NOT to be used for urgent needs. For medical emergencies, dial 911. Now available from your iPhone and Android! General Information Please provide this summary of care documentation to your next provider. Patient Signature:  ____________________________________________________________ Date:  ____________________________________________________________  
  
Bo End  Provider Signature: ____________________________________________________________ Date:  ____________________________________________________________

## 2017-03-30 NOTE — PROGRESS NOTES
Blake Zellwood  1940  [id number]    Situation:  Verbal report received from: [facility] RN  Procedure: Procedure(s):  COLONOSCOPY    Background:    Preoperative diagnosis: PREVIOUS POLYPECTOMY  Postoperative diagnosis: Diverticulosis,pan    :  Dr. Mavis Nettles  Assistant(s): Endoscopy Technician-1: Manjula Dawson  Endoscopy RN-1: Ena Jordan RN    Specimens: * No specimens in log *  H. Pylori  no    Assessment:  Intra-procedure medications     Anesthesia gave intra-procedure sedation and medications, see anesthesia flow sheet yes    Intravenous fluids: NS@ KVO     Vital signs stable YES    Abdominal assessment: round and soft YES    Recommendation:  Discharge patient per MD order YES.   Return to floor N/A  Family or Friend YES  Permission to share finding with family or friend yes

## 2017-03-30 NOTE — PROCEDURES
Dr Uri Nino [address]. [address] [place], [address] [facility]  [phone]      Yareli Stanford  [id number]  1940    Colonoscopy Operative Report    Procedure Type:   Colonoscopy --screening     Indications:  Screening     Pre-operative Diagnosis: see indication above    Post-operative Diagnosis:  See findings below    :  Robert Weiner MD    Referring Provider: Megan To MD      Sedation:  MAC anesthesia Propofol    Procedure Details:  After informed consent was obtained with all risks and benefits of procedure explained and preoperative exam completed, the patient was taken to the endoscopy suite and placed in the left lateral decubitus position. Upon sequential sedation as per above, a digital rectal exam was performed demonstrating no hemorrhoids. The Olympus videocolonoscope  was inserted in the rectum and carefully advanced to the cecum, which was identified by the ileocecal valve. The cecum was identified by the ileocecal valve and appendiceal orifice. The quality of preparation was excellent. The colonoscope was slowly withdrawn with careful evaluation between folds. Retroflexion in the rectum was completed demonstrating no hemorrhoids. Findings:   Rectum: normal  Sigmoid: Pandiverticulosis  Descending Colon: normal  Transverse Colon: normal  Ascending Colon: normal  Cecum: normal  Terminal Ileum: not seen      Specimen Removed:  none    Complications: None. EBL:  None. Impression:    Diverticulosis of the colon    Recommendations: --Follow up with me. Regular diet. Resume normal medication(s). Discharge Disposition:  Home in the company of a  when able to ambulate.

## 2018-03-27 ENCOUNTER — HOSPITAL ENCOUNTER (INPATIENT)
Age: 78
LOS: 3 days | Discharge: HOME | DRG: 308 | End: 2018-03-30
Attending: EMERGENCY MEDICINE | Admitting: FAMILY MEDICINE
Payer: MEDICARE

## 2018-03-27 ENCOUNTER — APPOINTMENT (OUTPATIENT)
Dept: CT IMAGING | Age: 78
DRG: 308 | End: 2018-03-27
Attending: FAMILY MEDICINE
Payer: MEDICARE

## 2018-03-27 ENCOUNTER — APPOINTMENT (OUTPATIENT)
Dept: GENERAL RADIOLOGY | Age: 78
DRG: 308 | End: 2018-03-27
Attending: PHYSICIAN ASSISTANT
Payer: MEDICARE

## 2018-03-27 DIAGNOSIS — I50.9 ACUTE DECOMPENSATED HEART FAILURE (HCC): Primary | ICD-10-CM

## 2018-03-27 LAB
ALBUMIN SERPL-MCNC: 2.9 G/DL (ref 3.5–5)
ALBUMIN/GLOB SERPL: 0.7 {RATIO} (ref 1.1–2.2)
ALP SERPL-CCNC: 80 U/L (ref 45–117)
ALT SERPL-CCNC: 7 U/L (ref 12–78)
ANION GAP SERPL CALC-SCNC: 9 MMOL/L (ref 5–15)
APPEARANCE UR: ABNORMAL
AST SERPL-CCNC: 10 U/L (ref 15–37)
ATRIAL RATE: 192 BPM
BACTERIA URNS QL MICRO: ABNORMAL /HPF
BASOPHILS # BLD: 0 K/UL (ref 0–0.1)
BASOPHILS NFR BLD: 0 % (ref 0–1)
BILIRUB SERPL-MCNC: 1.1 MG/DL (ref 0.2–1)
BILIRUB UR QL: NEGATIVE
BNP SERPL-MCNC: 1183 PG/ML (ref 0–450)
BUN SERPL-MCNC: 5 MG/DL (ref 6–20)
BUN/CREAT SERPL: 5 (ref 12–20)
CALCIUM SERPL-MCNC: 8.8 MG/DL (ref 8.5–10.1)
CALCULATED R AXIS, ECG10: 16 DEGREES
CALCULATED T AXIS, ECG11: 38 DEGREES
CHLORIDE SERPL-SCNC: 108 MMOL/L (ref 97–108)
CHOLEST SERPL-MCNC: 168 MG/DL
CO2 SERPL-SCNC: 28 MMOL/L (ref 21–32)
COLOR UR: ABNORMAL
CREAT SERPL-MCNC: 0.92 MG/DL (ref 0.55–1.02)
DIAGNOSIS, 93000: NORMAL
DIFFERENTIAL METHOD BLD: ABNORMAL
EOSINOPHIL # BLD: 0 K/UL (ref 0–0.4)
EOSINOPHIL NFR BLD: 0 % (ref 0–7)
EPITH CASTS URNS QL MICRO: ABNORMAL /LPF
ERYTHROCYTE [DISTWIDTH] IN BLOOD BY AUTOMATED COUNT: 15.2 % (ref 11.5–14.5)
GLOBULIN SER CALC-MCNC: 4.1 G/DL (ref 2–4)
GLUCOSE SERPL-MCNC: 89 MG/DL (ref 65–100)
GLUCOSE UR STRIP.AUTO-MCNC: NEGATIVE MG/DL
HCT VFR BLD AUTO: 39.9 % (ref 35–47)
HDLC SERPL-MCNC: 61 MG/DL
HDLC SERPL: 2.8 {RATIO} (ref 0–5)
HGB BLD-MCNC: 12.7 G/DL (ref 11.5–16)
HGB UR QL STRIP: NEGATIVE
HYALINE CASTS URNS QL MICRO: ABNORMAL /LPF (ref 0–5)
IMM GRANULOCYTES # BLD: 0.1 K/UL (ref 0–0.04)
IMM GRANULOCYTES NFR BLD AUTO: 1 % (ref 0–0.5)
KETONES UR QL STRIP.AUTO: NEGATIVE MG/DL
LDLC SERPL CALC-MCNC: 85.8 MG/DL (ref 0–100)
LEUKOCYTE ESTERASE UR QL STRIP.AUTO: NEGATIVE
LIPID PROFILE,FLP: NORMAL
LYMPHOCYTES # BLD: 1.3 K/UL (ref 0.8–3.5)
LYMPHOCYTES NFR BLD: 18 % (ref 12–49)
MAGNESIUM SERPL-MCNC: 1.7 MG/DL (ref 1.6–2.4)
MCH RBC QN AUTO: 28 PG (ref 26–34)
MCHC RBC AUTO-ENTMCNC: 31.8 G/DL (ref 30–36.5)
MCV RBC AUTO: 87.9 FL (ref 80–99)
MONOCYTES # BLD: 1 K/UL (ref 0–1)
MONOCYTES NFR BLD: 14 % (ref 5–13)
NEUTS SEG # BLD: 4.7 K/UL (ref 1.8–8)
NEUTS SEG NFR BLD: 67 % (ref 32–75)
NITRITE UR QL STRIP.AUTO: NEGATIVE
NRBC # BLD: 0 K/UL (ref 0–0.01)
NRBC BLD-RTO: 0 PER 100 WBC
PH UR STRIP: 6 [PH] (ref 5–8)
PLATELET # BLD AUTO: 195 K/UL (ref 150–400)
PMV BLD AUTO: 10.4 FL (ref 8.9–12.9)
POTASSIUM SERPL-SCNC: 3.3 MMOL/L (ref 3.5–5.1)
PROT SERPL-MCNC: 7 G/DL (ref 6.4–8.2)
PROT UR STRIP-MCNC: NEGATIVE MG/DL
Q-T INTERVAL, ECG07: 296 MS
QRS DURATION, ECG06: 82 MS
QTC CALCULATION (BEZET), ECG08: 400 MS
RBC # BLD AUTO: 4.54 M/UL (ref 3.8–5.2)
RBC #/AREA URNS HPF: ABNORMAL /HPF (ref 0–5)
SODIUM SERPL-SCNC: 145 MMOL/L (ref 136–145)
SP GR UR REFRACTOMETRY: 1.01 (ref 1–1.03)
TRIGL SERPL-MCNC: 106 MG/DL (ref ?–150)
TROPONIN I SERPL-MCNC: <0.04 NG/ML
TROPONIN I SERPL-MCNC: <0.04 NG/ML
TSH SERPL DL<=0.05 MIU/L-ACNC: 0.67 UIU/ML (ref 0.36–3.74)
UR CULT HOLD, URHOLD: NORMAL
UROBILINOGEN UR QL STRIP.AUTO: 0.2 EU/DL (ref 0.2–1)
VENTRICULAR RATE, ECG03: 110 BPM
VLDLC SERPL CALC-MCNC: 21.2 MG/DL
WBC # BLD AUTO: 7.1 K/UL (ref 3.6–11)
WBC URNS QL MICRO: ABNORMAL /HPF (ref 0–4)

## 2018-03-27 PROCEDURE — 65660000000 HC RM CCU STEPDOWN

## 2018-03-27 PROCEDURE — 74011000250 HC RX REV CODE- 250: Performed by: FAMILY MEDICINE

## 2018-03-27 PROCEDURE — 81001 URINALYSIS AUTO W/SCOPE: CPT | Performed by: PHYSICIAN ASSISTANT

## 2018-03-27 PROCEDURE — 74011250636 HC RX REV CODE- 250/636: Performed by: EMERGENCY MEDICINE

## 2018-03-27 PROCEDURE — 94761 N-INVAS EAR/PLS OXIMETRY MLT: CPT

## 2018-03-27 PROCEDURE — 93005 ELECTROCARDIOGRAM TRACING: CPT

## 2018-03-27 PROCEDURE — 36415 COLL VENOUS BLD VENIPUNCTURE: CPT | Performed by: FAMILY MEDICINE

## 2018-03-27 PROCEDURE — 84443 ASSAY THYROID STIM HORMONE: CPT | Performed by: EMERGENCY MEDICINE

## 2018-03-27 PROCEDURE — 80061 LIPID PANEL: CPT | Performed by: EMERGENCY MEDICINE

## 2018-03-27 PROCEDURE — 77030029684 HC NEB SM VOL KT MONA -A

## 2018-03-27 PROCEDURE — 74011250637 HC RX REV CODE- 250/637: Performed by: EMERGENCY MEDICINE

## 2018-03-27 PROCEDURE — 83880 ASSAY OF NATRIURETIC PEPTIDE: CPT | Performed by: PHYSICIAN ASSISTANT

## 2018-03-27 PROCEDURE — 80053 COMPREHEN METABOLIC PANEL: CPT | Performed by: PHYSICIAN ASSISTANT

## 2018-03-27 PROCEDURE — 71275 CT ANGIOGRAPHY CHEST: CPT

## 2018-03-27 PROCEDURE — 83735 ASSAY OF MAGNESIUM: CPT | Performed by: EMERGENCY MEDICINE

## 2018-03-27 PROCEDURE — 85025 COMPLETE CBC W/AUTO DIFF WBC: CPT | Performed by: PHYSICIAN ASSISTANT

## 2018-03-27 PROCEDURE — 84484 ASSAY OF TROPONIN QUANT: CPT | Performed by: PHYSICIAN ASSISTANT

## 2018-03-27 PROCEDURE — 71046 X-RAY EXAM CHEST 2 VIEWS: CPT

## 2018-03-27 PROCEDURE — 99285 EMERGENCY DEPT VISIT HI MDM: CPT

## 2018-03-27 PROCEDURE — 74011250637 HC RX REV CODE- 250/637: Performed by: FAMILY MEDICINE

## 2018-03-27 PROCEDURE — 96374 THER/PROPH/DIAG INJ IV PUSH: CPT

## 2018-03-27 PROCEDURE — 74011000258 HC RX REV CODE- 258: Performed by: EMERGENCY MEDICINE

## 2018-03-27 PROCEDURE — 74011636320 HC RX REV CODE- 636/320: Performed by: EMERGENCY MEDICINE

## 2018-03-27 PROCEDURE — 94640 AIRWAY INHALATION TREATMENT: CPT

## 2018-03-27 RX ORDER — ASPIRIN 81 MG/1
81 TABLET ORAL DAILY
Status: DISCONTINUED | OUTPATIENT
Start: 2018-03-28 | End: 2018-03-30 | Stop reason: HOSPADM

## 2018-03-27 RX ORDER — DILTIAZEM HYDROCHLORIDE 30 MG/1
60 TABLET, FILM COATED ORAL
Status: COMPLETED | OUTPATIENT
Start: 2018-03-27 | End: 2018-03-27

## 2018-03-27 RX ORDER — DILTIAZEM HYDROCHLORIDE 30 MG/1
30 TABLET, FILM COATED ORAL EVERY 6 HOURS
Status: DISCONTINUED | OUTPATIENT
Start: 2018-03-27 | End: 2018-03-30

## 2018-03-27 RX ORDER — IPRATROPIUM BROMIDE AND ALBUTEROL SULFATE 2.5; .5 MG/3ML; MG/3ML
3 SOLUTION RESPIRATORY (INHALATION)
Status: DISCONTINUED | OUTPATIENT
Start: 2018-03-27 | End: 2018-03-29

## 2018-03-27 RX ORDER — FUROSEMIDE 10 MG/ML
40 INJECTION INTRAMUSCULAR; INTRAVENOUS 2 TIMES DAILY
Status: DISCONTINUED | OUTPATIENT
Start: 2018-03-27 | End: 2018-03-30

## 2018-03-27 RX ORDER — FUROSEMIDE 10 MG/ML
80 INJECTION INTRAMUSCULAR; INTRAVENOUS
Status: COMPLETED | OUTPATIENT
Start: 2018-03-27 | End: 2018-03-27

## 2018-03-27 RX ORDER — ACETAMINOPHEN 500 MG
1000 TABLET ORAL
COMMUNITY
End: 2019-07-02 | Stop reason: SDUPTHER

## 2018-03-27 RX ORDER — POTASSIUM CHLORIDE 750 MG/1
20 TABLET, FILM COATED, EXTENDED RELEASE ORAL
Status: COMPLETED | OUTPATIENT
Start: 2018-03-27 | End: 2018-03-27

## 2018-03-27 RX ORDER — NITROGLYCERIN 0.4 MG/1
0.4 TABLET SUBLINGUAL
Status: DISCONTINUED | OUTPATIENT
Start: 2018-03-27 | End: 2018-03-30 | Stop reason: HOSPADM

## 2018-03-27 RX ORDER — ONDANSETRON 2 MG/ML
4 INJECTION INTRAMUSCULAR; INTRAVENOUS
Status: DISCONTINUED | OUTPATIENT
Start: 2018-03-27 | End: 2018-03-30 | Stop reason: HOSPADM

## 2018-03-27 RX ORDER — SODIUM CHLORIDE 0.9 % (FLUSH) 0.9 %
10 SYRINGE (ML) INJECTION
Status: COMPLETED | OUTPATIENT
Start: 2018-03-27 | End: 2018-03-27

## 2018-03-27 RX ORDER — SODIUM CHLORIDE 0.9 % (FLUSH) 0.9 %
5-10 SYRINGE (ML) INJECTION AS NEEDED
Status: DISCONTINUED | OUTPATIENT
Start: 2018-03-27 | End: 2018-03-30 | Stop reason: HOSPADM

## 2018-03-27 RX ORDER — ACETAMINOPHEN 325 MG/1
650 TABLET ORAL
Status: DISCONTINUED | OUTPATIENT
Start: 2018-03-27 | End: 2018-03-30 | Stop reason: HOSPADM

## 2018-03-27 RX ADMIN — DILTIAZEM HYDROCHLORIDE 30 MG: 30 TABLET, FILM COATED ORAL at 23:34

## 2018-03-27 RX ADMIN — POTASSIUM CHLORIDE 20 MEQ: 750 TABLET, EXTENDED RELEASE ORAL at 21:00

## 2018-03-27 RX ADMIN — DILTIAZEM HYDROCHLORIDE 60 MG: 30 TABLET, FILM COATED ORAL at 17:18

## 2018-03-27 RX ADMIN — Medication 10 ML: at 19:54

## 2018-03-27 RX ADMIN — SODIUM CHLORIDE 100 ML: 900 INJECTION, SOLUTION INTRAVENOUS at 19:55

## 2018-03-27 RX ADMIN — IOPAMIDOL 80 ML: 755 INJECTION, SOLUTION INTRAVENOUS at 19:54

## 2018-03-27 RX ADMIN — IPRATROPIUM BROMIDE AND ALBUTEROL SULFATE 3 ML: .5; 3 SOLUTION RESPIRATORY (INHALATION) at 20:05

## 2018-03-27 RX ADMIN — FUROSEMIDE 80 MG: 10 INJECTION, SOLUTION INTRAMUSCULAR; INTRAVENOUS at 17:18

## 2018-03-27 NOTE — ED TRIAGE NOTES
C/o SOB x weeks, worsened over fast few days. +WHITING. +bilateral leg swelling x 1 week. Denies CP. \"I haven't been taking my heart medication. \"

## 2018-03-27 NOTE — IP AVS SNAPSHOT
[address] 
[phone] Patient: Dolores Wilkes MRN: [id number] PLW:3/87/7867 A check lakisha indicates which time of day the medication should be taken. My Medications START taking these medications Instructions Each Dose to Equal  
 Morning Noon Evening Bedtime  
 aspirin delayed-release 81 mg tablet Start taking on:  3/31/2018 Your last dose was: Your next dose is: Take 1 Tab by mouth daily. 81 mg  
    
   
   
   
  
 dilTIAZem  mg ER capsule Commonly known as:  CARDIZEM CD Your last dose was: Your next dose is: Take 1 Cap by mouth daily. 120 mg  
    
   
   
   
  
 furosemide 40 mg tablet Commonly known as:  LASIX Your last dose was: Your next dose is: Take 1 Tab by mouth daily. 40 mg  
    
   
   
   
  
 lisinopril 10 mg tablet Commonly known as:  Jan Gonzales Your last dose was: Your next dose is: Take 1 Tab by mouth nightly. 10 mg  
    
   
   
   
  
 potassium chloride SR 20 mEq tablet Commonly known as:  K-TAB Your last dose was: Your next dose is: Take 1 Tab by mouth two (2) times a day. 20 mEq  
    
   
   
   
  
 rivaroxaban 20 mg Tab tablet Commonly known as:  Beto Rolon Your last dose was: Your next dose is: Take 1 Tab by mouth daily (with lunch). Indications: PREVENT THROMBOEMBOLISM IN CHRONIC ATRIAL FIBRILLATION  
 20 mg CONTINUE taking these medications Instructions Each Dose to Equal  
 Morning Noon Evening Bedtime  
 acetaminophen 500 mg tablet Commonly known as:  TYLENOL Your last dose was: Your next dose is: Take 1,000 mg by mouth daily as needed for Pain. 1000 mg Where to Get Your Medications Information on where to get these meds will be given to you by the nurse or doctor. ! Ask your nurse or doctor about these medications  
  aspirin delayed-release 81 mg tablet  
 dilTIAZem  mg ER capsule  
 furosemide 40 mg tablet  
 lisinopril 10 mg tablet  
 potassium chloride SR 20 mEq tablet  
 rivaroxaban 20 mg Tab tablet

## 2018-03-27 NOTE — ED NOTES

## 2018-03-27 NOTE — ED PROVIDER NOTES
HPI Comments: 69 y.o. female with past medical history significant for arthritis, GERD, a-fib, and obesity who presents from home with chief complaint of SOB. Pt states for the past two days she has had SOB exacerbated with exertion and lying flat. Pt reports having chronic bilateral leg swelling which has worsened over the past week. Pt also has a productive cough (yellow sputum) which she states is not new. Pt notes she has been noncompliant with her medications because she ran out of them about one month ago. Pt states her son called her PCP today and he recommended she visit the ED. Pt notes her cardiologist is Dr. Betsy Hamilton, but she has not seen Dr. Meliza Romeo in months. Pt denies having a fever or chest pain. There are no other acute medical concerns at this time. 4:53 PM  Radha Ontiveros DO reviewed electronic medical record system for any past medical records that were available that may contribute to the patients current condition. Last echo was done April 8159 - systolic function normal; no EF given. Pt was admitted to [facility] from 9/26/14 to 10/4/14 for back pain and asymptomatic bradycardia. Pt's cardiologist at that time was Dr. Evy Calvert. Social hx: Former Smoker    PCP: Joel Weaver MD    Cardiologist: Dr. Cabrera Delgado    Note written by Tyesha Bhatt, as dictated by Radha Ontiveros DO 4:53 PM    The history is provided by the patient.         Past Medical History:   Diagnosis Date    Arthritis     Atrial fibrillation (HCC)     WHITING (dyspnea on exertion)     GERD (gastroesophageal reflux disease)     Obesity     Other ill-defined conditions(799.89)     \"fluid in the lung\"       Past Surgical History:   Procedure Laterality Date    COLONOSCOPY N/A 3/30/2017    COLONOSCOPY performed by Silver Armstrong MD at [facility] ENDOSCOPY    HX CHOLECYSTECTOMY      HX HERNIA REPAIR      HX ORTHOPAEDIC      right and left total knee replacement    HX ORTHOPAEDIC      right shoulder  HX OTHER SURGICAL      kidney stone surgery    HX OTHER SURGICAL      both eyes cataract         History reviewed. No pertinent family history. Social History     Social History    Marital status: SINGLE     Spouse name: N/A    Number of children: N/A    Years of education: N/A     Occupational History    Not on file. Social History Main Topics    Smoking status: Former Smoker     Packs/day: 1.00    Smokeless tobacco: Never Used      Comment: quit at age 42,smoked for 16 years    Alcohol use No    Drug use: No    Sexual activity: Not on file     Other Topics Concern    Not on file     Social History Narrative         ALLERGIES: Penicillins    Review of Systems   Constitutional: Negative for fever. Respiratory: Positive for cough (productive) and shortness of breath. Cardiovascular: Positive for leg swelling. Negative for chest pain. All other systems reviewed and are negative. Vitals:    03/27/18 1249 03/27/18 1638   BP: 128/85 133/63   Pulse: (!) 104 (!) 106   Resp: 18 22   Temp: 98.4 °F (36.9 °C) 97.5 °F (36.4 °C)   SpO2: 95% 96%            Physical Exam   Nursing note and vitals reviewed. Constitutional: Pt is awake and alert. Pt appears well-developed and well-nourished. HENT:   Head: Normocephalic and atraumatic. Nose: Nose normal.   Mouth/Throat: Oropharynx is clear and moist. No oropharyngeal exudate. Eyes: Conjunctivae and extraocular motions are normal. Pupils are equal, round, and reactive to light. Right eye exhibits no discharge. Left eye exhibits no discharge. No scleral icterus. Neck: No tracheal deviation present. Supple neck. Cardiovascular: Tachycardic, irregularly irregular, normal heart sounds and intact distal pulses. Exam reveals no gallop and no friction rub. No murmur heard. Pulmonary/Chest: Mild dyspnea. Pt  has no wheezes. Bibasilar crackles   Abdominal: Soft. Pt  exhibits no distension and no mass. No tenderness.   Pt  has no rebound and no guarding. Musculoskeletal:  Pt  exhibits no edema and no tenderness. Ext: Normal ROM in all four extremities; not tender to palpation; distal pulses are normal, pitting edema to knees. Neurological:  Pt is alert. nonfocal neuro exam.  Skin: Skin is warm and dry. Pt  is not diaphoretic. Psychiatric:  Pt  has a normal mood and affect. Behavior is normal.     Note written by Ann Negron. Rosalinda Hernandez, as dictated by Bernice Gama DO 4:53 PM     MDM  Number of Diagnoses or Management Options  Acute decompensated heart failure [facility]):   Critical Care  Total time providing critical care: 30-74 minutes    30 minutes    ED Course       Procedures    PROGRESS NOTE:  4:59 PM  Pt got up to bedside commode and was very dyspneic with minimal exertion. ED EKG interpretation:  Rhythm: atrial fibrillation; and irregular. Rate (approx.): 110 bpm; Axis: normal; Normal Intervals. No acute ST/T wave changes noted. Note written by Ann Negron. Rosalinda Hernandez, as dictated by Bernice Gama DO 5:16 PM    CONSULT NOTE:  5:19 PM Bernice Gama DO spoke with Dr. Lynda Mock, Consult for Hospitalist.  Discussed available diagnostic tests and clinical findings. He will see and admit. Needs a little better a fib rate control  Reviewed cxr  Gets very dyspneic with a small amount of exertion  Iv diuretics  Admit.

## 2018-03-27 NOTE — IP AVS SNAPSHOT
67 [facility] [address] [place] 
[phone] Patient: Maggy Cooper MRN: [id number] About your hospitalization You were admitted on:  2018 You last received care in the:  [facility] 4 SURG/BARIATRICS You were discharged on:  2018 Why you were hospitalized Your primary diagnosis was:  Chf Exacerbation (Hcc) Your diagnoses also included:  A-Fib (Hcc), Hypokalemia, Hypertension Follow-up Information Follow up With Details Comments Contact [facility] Office on [facility] [place] [address] 
[phone] Zay Diane MD  PCP requesting patient to schedule f/u appointment. 59 [facility] [address] Zay Diane MD 
[facility] 7 [address] 
[phone] César Aguilar MD  follow in one week [address] [place] [address] [address] 
[phone] Discharge Orders None A check lakisha indicates which time of day the medication should be taken. My Medications START taking these medications Instructions Each Dose to Equal  
 Morning Noon Evening Bedtime  
 aspirin delayed-release 81 mg tablet Start taking on:  3/31/2018 Your last dose was: Your next dose is: Take 1 Tab by mouth daily. 81 mg  
    
   
   
   
  
 dilTIAZem  mg ER capsule Commonly known as:  CARDIZEM CD Your last dose was: Your next dose is: Take 1 Cap by mouth daily. 120 mg  
    
   
   
   
  
 furosemide 40 mg tablet Commonly known as:  LASIX Your last dose was: Your next dose is: Take 1 Tab by mouth daily. 40 mg  
    
   
   
   
  
 lisinopril 10 mg tablet Commonly known as:  Dipak Little Your last dose was: Your next dose is: Take 1 Tab by mouth nightly.   
 10 mg  
    
   
   
   
  
 potassium chloride SR 20 mEq tablet Commonly known as:  K-TAB Your last dose was: Your next dose is: Take 1 Tab by mouth two (2) times a day. 20 mEq  
    
   
   
   
  
 rivaroxaban 20 mg Tab tablet Commonly known as:  Francisca Benes Your last dose was: Your next dose is: Take 1 Tab by mouth daily (with lunch). Indications: PREVENT THROMBOEMBOLISM IN CHRONIC ATRIAL FIBRILLATION  
 20 mg CONTINUE taking these medications Instructions Each Dose to Equal  
 Morning Noon Evening Bedtime  
 acetaminophen 500 mg tablet Commonly known as:  TYLENOL Your last dose was: Your next dose is: Take 1,000 mg by mouth daily as needed for Pain. 1000 mg Where to Get Your Medications Information on where to get these meds will be given to you by the nurse or doctor. ! Ask your nurse or doctor about these medications  
  aspirin delayed-release 81 mg tablet  
 dilTIAZem  mg ER capsule  
 furosemide 40 mg tablet  
 lisinopril 10 mg tablet  
 potassium chloride SR 20 mEq tablet  
 rivaroxaban 20 mg Tab tablet Discharge Instructions Discharge Instructions PATIENT ID: Richad Mortimer MRN: [id number] YOB: 1940 DATE OF ADMISSION: 3/27/2018  4:21 PM   
DATE OF DISCHARGE: 3/30/2018 PRIMARY CARE PROVIDER: Dwaine Tim MD  
 
ATTENDING PHYSICIAN: Sai Barber MD 
DISCHARGING PROVIDER: Ellen Gomez NP To contact this individual call [phone] and ask the  to page. If unavailable ask to be transferred the Adult Hospitalist Department. DISCHARGE DIAGNOSES: Heart Failure, Atrial Fibrillation, Hypertension CONSULTATIONS: IP CONSULT TO CARDIOLOGY PROCEDURES/SURGERIES: * No surgery found * PENDING TEST RESULTS:  
At the time of discharge the following test results are still pending: none FOLLOW UP APPOINTMENTS:  
Follow-up Information Follow up With Details Comments Contact [facility] Office on [place] [address] 
[phone] Orion Montalvo MD  PCP requesting patient to schedule f/u appointment. 59 [facility] [address] Orion Montalvo MD 
[place] [address] [address] 
[phone] Guillermina Funes MD  follow in one week [facility] [address] [facility] [address] 
[phone] ADDITIONAL CARE RECOMMENDATIONS:  
You were admitted with heart failure which was triggered by your atrial fibrillation (uneven heartbeat) and high blood pressure. MEDICATIONS Lisinopril is for your blood pressure Xarelto is a blood thinner to prevent stroke from your uneven heartbeat. Furosemide is water pill, this will pull fluid off and help you with your breathing Potassium replacement, this is to replace your potassium as the water pill will make it go down Diltiazem is to control your heart rate and will also control your blood pressure. You will need to follow up with Dr. Tani Lau in one week to adjust your medications. It is really important that you take your medications and do not stop them. Weigh yourself daily, if you gain 3-5 lbs in a day call Dr. Tani Lau so she can adjust your medication. DIET: 2 gram sodium diet ACTIVITY: Activity as tolerated WOUND CARE: none EQUIPMENT needed: none DISCHARGE MEDICATIONS: 
 See Medication Reconciliation Form · It is important that you take the medication exactly as they are prescribed. · Keep your medication in the bottles provided by the pharmacist and keep a list of the medication names, dosages, and times to be taken in your wallet. · Do not take other medications without consulting your doctor. NOTIFY YOUR PHYSICIAN FOR ANY OF THE FOLLOWING:  
Fever over 101 degrees for 24 hours.   
Chest pain, shortness of breath, fever, chills, nausea, vomiting, diarrhea, change in mentation, falling, weakness, bleeding. Severe pain or pain not relieved by medications. Or, any other signs or symptoms that you may have questions about. DISPOSITION: 
xx  Home With: 
 OT  PT  [facility]  RN  
  
 SNF/Inpatient Rehab/LTAC Independent/assisted living Hospice Other: CDMP Checked:  
Yes xx PROBLEM LIST Updated: 
Yes xx Signed:  
Therese Linda NP 
3/30/2018 
8:43 AM 
 
 
Rivaroxaban (By mouth) Rivaroxaban (lar-i-TRS-a-ban) Treats and prevents blood clots, which lowers the risk of stroke, deep vein thrombosis (DVT), pulmonary embolism (PE), and similar conditions. This medicine is a blood thinner. Brand Name(s): Xarelto, Xarelto Starter Pack There may be other brand names for this medicine. When This Medicine Should Not Be Used: This medicine is not right for everyone. Do not use it if you had an allergic reaction to rivaroxaban, or you have severe bleeding. How to Use This Medicine:  
Tablet · Take this medicine as directed, and take it at the same time each day. · 10-milligram (mg) tablet: Take with or without food. · 15-mg or 20-mg tablet: Take with food. · If you cannot swallow the tablets, you may crush the tablet and mix it with applesauce. Eat some food after you swallow the mixture. · Tube feeding: You may crush the tablet and mix the medicine in 50 milliliters (mL) of water before giving it via the tube. This must be followed by a feeding. · This medicine should come with a Medication Guide. Ask your pharmacist for a copy if you do not have one. · Missed dose: ¨ Ask your doctor or pharmacist if you are not sure what to do if you miss a dose. ¨ Once-daily dose: If you miss a dose or forget to use your medicine, use it as soon as you can on the same day. Do not use extra medicine to make up for a missed dose. ¨ Twice-daily dose to treat a blood clot (15-mg tablet):  If you miss a dose or forget to use your medicine, use it as soon as you can on the same day. You may take 2 doses at the same time to make up for the missed dose. This is only for people who take a total of 30 mg per day. · Store the medicine in a closed container at room temperature, away from heat, moisture, and direct light. Drugs and Foods to Avoid: Ask your doctor or pharmacist before using any other medicine, including over-the-counter medicines, vitamins, and herbal products. · Some foods and medicines can affect how rivaroxaban works. Tell your doctor if you are using any of the following: ¨ NSAID medicine (including aspirin, celecoxib, diclofenac, ibuprofen, naproxen) ¨ Ketoconazole, itraconazole, lopinavir, ritonavir, indinavir, conivaptan, carbamazepine, phenytoin, rifampin, Ramiro's wort ¨ Another blood thinner (including clopidogrel, enoxaparin, heparin, warfarin) Warnings While Using This Medicine: · Tell your doctor if you are pregnant or breastfeeding, or if you have kidney disease, liver disease, bleeding problems, or an artificial heart valve. · This medicine may increase your risk of bleeding. Be careful to avoid injuries that could cause bleeding. Stay away from rough sports or other situations where you could be bruised, cut, or hurt. Brush and floss your teeth gently. Be careful when using sharp objects, including razors and fingernail clippers. Avoid picking your nose. If you need to blow your nose, blow it gently. · This medicine may cause nerve damage if you have a medical procedure done to your back, including anesthesia or a spinal puncture. This is more likely to happen if you have a history of back injury, back surgery, problems with your spine, or procedures or punctures to your back. Tell your doctor if you are also taking another blood thinner, because this also increases the risk. · Do not stop using this medicine suddenly without asking your doctor.  You might have a higher risk of stroke for a short time after you stop using this medicine. · Tell any doctor or dentist who treats you that you are using this medicine. · Your doctor will do lab tests at regular visits to check on the effects of this medicine. Keep all appointments. · Keep all medicine out of the reach of children. Never share your medicine with anyone. Possible Side Effects While Using This Medicine:  
Call your doctor right away if you notice any of these side effects: · Allergic reaction: Itching or hives, swelling in your face or hands, swelling or tingling in your mouth or throat, chest tightness, trouble breathing · Blistering, peeling, or red skin rash · Decrease in how much or how often you urinate · Heavy menstrual bleeding, or vaginal bleeding · Red or brown urine, bloody or black, tarry stools · Unusual bleeding or bruising, including frequent nosebleeds · Vomiting blood or material that looks like coffee grounds If you notice other side effects that you think are caused by this medicine, tell your doctor. Call your doctor for medical advice about side effects. You may report side effects to FDA at 2-017-FDA-1207 © 2017 [address] Information is for End User's use only and may not be sold, redistributed or otherwise used for commercial purposes. The above information is an  only. It is not intended as medical advice for individual conditions or treatments. Talk to your doctor, nurse or pharmacist before following any medical regimen to see if it is safe and effective for you. Furosemide (By mouth) Furosemide (ppjk-VF-rh-mide) Treats fluid retention (edema) and high blood pressure. This medicine is a diuretic (water pill). Brand Name(s): Active-Medicated Specimen Collection Kit, Diuscreen Multi-Drug Medicated Test Kit, Lasix, RX-Specimen Collection Kit, Specimen Collection Kit There may be other brand names for this medicine. When This Medicine Should Not Be Used: This medicine is not right for everyone. Do not use it if you had an allergic reaction to furosemide. How to Use This Medicine:  
Liquid, Tablet · Take your medicine as directed. Your dose may need to be changed several times to find what works best for you. · You may take this medicine with food if it upsets your stomach. · Oral liquid: Measure the oral liquid medicine with a marked measuring spoon, oral syringe, or medicine cup. · Tablet: Swallow the tablet whole. Do not crush, break, or chew it. · Missed dose: Take a dose as soon as you remember. If it is almost time for your next dose, wait until then and take a regular dose. Do not take extra medicine to make up for a missed dose. · Store the medicine in a closed container at room temperature, away from heat, moisture, and direct light. Drugs and Foods to Avoid: Ask your doctor or pharmacist before using any other medicine, including over-the-counter medicines, vitamins, and herbal products. · Some medicines can affect how furosemide works. Tell your doctor if you are also using any of the following: ¨ Cisplatin, cyclosporine, digoxin, ethacrynic acid, licorice, lithium, methotrexate, or phenytoin ¨ Adrenocorticotropic hormone (ACTH) ¨ Laxative ¨ Medicine to treat an infection ¨ NSAID pain or arthritis medicine (including aspirin, diclofenac, ibuprofen, indomethacin, naproxen) ¨ Other blood pressure medicines ¨ Steroid medicine (including dexamethasone, hydrocortisone, methylprednisolone, prednisolone, prednisone) ¨ Thyroid medicine · If you also take sucralfate, allow at least 2 hours between the time you take furosemide and the time you take sucralfate. · Alcohol, narcotic pain medicine, or sleeping pills may cause you to feel more lightheaded, dizzy, or faint when used with this medicine. Warnings While Using This Medicine: · Tell your doctor if you are pregnant or breastfeeding, or if you have kidney disease, liver disease (including cirrhosis), diabetes, gout, low blood pressure, lupus, an enlarged prostate, trouble urinating, or an allergy to sulfa drugs. Tell your doctor if you are on a low-salt diet. · This medicine may cause the following problems:  
¨ Low levels of minerals in your blood, such as potassium and sodium ¨ Blood sugar level changes ¨ Hearing problems · Make sure any doctor or dentist who treats you knows that you are using this medicine. · This medicine could lower your blood pressure too much, especially when you first use it or if you are dehydrated. Stand or sit up slowly if you feel lightheaded or dizzy. · This medicine may make your skin more sensitive to sunlight. Wear sunscreen. Do not use sunlamps or tanning beds. · Your doctor will do lab tests at regular visits to check on the effects of this medicine. Keep all appointments. · Keep all medicine out of the reach of children. Never share your medicine with anyone. Possible Side Effects While Using This Medicine:  
Call your doctor right away if you notice any of these side effects: · Allergic reaction: Itching or hives, swelling in your face or hands, swelling or tingling in your mouth or throat, chest tightness, trouble breathing · Blistering, peeling, red skin rash · Confusion, weakness, muscle twitching · Dry mouth, increased thirst, muscle cramps, uneven heartbeat · Sudden and severe stomach pain, nausea, vomiting, fever, lightheadedness · Hearing loss, ringing in the ears · Lightheadedness, dizziness, fainting · Severe diarrhea · Unusual bleeding or bruising · Yellow skin or eyes If you notice these less serious side effects, talk with your doctor: · Loss of appetite, stomach cramps If you notice other side effects that you think are caused by this medicine, tell your doctor. Call your doctor for medical advice about side effects. You may report side effects to FDA at 9-906-QIP-1508 © 2017 [address]  Information is for End User's use only and may not be sold, redistributed or otherwise used for commercial purposes. The above information is an  only. It is not intended as medical advice for individual conditions or treatments. Talk to your doctor, nurse or pharmacist before following any medical regimen to see if it is safe and effective for you. Lisinopril (By mouth) Lisinopril (lye-SIN-oh-pril) Treats high blood pressure and heart failure. Also given to reduce the risk of death after a heart attack. This medicine is an ACE inhibitor. Brand Name(s): Prinivil, Qbrelis, Zestril There may be other brand names for this medicine. When This Medicine Should Not Be Used: This medicine is not right for everyone. Do not use it if you had an allergic reaction to lisinopril or another ACE inhibitor, or if you are pregnant. How to Use This Medicine:  
Liquid, Tablet · Take your medicine as directed. Your dose may need to be changed several times to find what works best for you. · Oral liquid: Measure the oral liquid medicine with a marked measuring spoon, oral syringe, or medicine cup. · Missed dose: Take a dose as soon as you remember. If it is almost time for your next dose, wait until then and take a regular dose. Do not take extra medicine to make up for a missed dose. · Store the medicine in a closed container at room temperature, away from heat, moisture, and direct light. Drugs and Foods to Avoid: Ask your doctor or pharmacist before using any other medicine, including over-the-counter medicines, vitamins, and herbal products. · Do not use this medicine together with aliskiren if you have diabetes. · Some foods and medicines may affect how lisinopril works. Tell your doctor if you are using any of the following: ¨ Aliskiren, everolimus, lithium, sirolimus, temsirolimus ¨ Another blood pressure medicine, including an angiotensin receptor blocker (ARB) ¨ Diuretic (water pill, including amiloride, spironolactone, triamterene) ¨ Insulin or diabetes medicine ¨ NSAID pain or arthritis medicine (including aspirin, celecoxib, diclofenac, ibuprofen, naproxen) · Ask your doctor before you use any medicine, supplement, or salt substitute that contains potassium. Warnings While Using This Medicine: · It is not safe to take this medicine during pregnancy. It could harm an unborn baby. Tell your doctor right away if you become pregnant. · Tell your doctor if you are breastfeeding, or if you have kidney disease, liver disease, diabetes, or heart or blood vessel disease. · This medicine may cause the following problems: ¨ Angioedema (severe swelling) ¨ Kidney problems ¨ Serious liver problems · This medicine could lower your blood pressure too much, especially when you first use it or if you are dehydrated. Stand or sit up slowly if you feel lightheaded or dizzy. · Do not stop using this medicine without asking your doctor, even if you feel well. This medicine will not cure your high blood pressure, but it will help keep it in a normal range. You may have to take blood pressure medicine for the rest of your life. · Tell any doctor or dentist who treats you that you are using this medicine. · Your doctor will do lab tests at regular visits to check on the effects of this medicine. Keep all appointments. · Keep all medicine out of the reach of children. Never share your medicine with anyone. Possible Side Effects While Using This Medicine:  
Call your doctor right away if you notice any of these side effects: · Allergic reaction: Itching or hives, swelling in your face or hands, swelling or tingling in your mouth or throat, chest tightness, trouble breathing · Blistering, peeling, or red skin rash · Change in how much or how often you urinate · Confusion, weakness, uneven heartbeat, trouble breathing, numbness or tingling in your hands, feet, or lips · Dark urine or pale stools, nausea, vomiting, loss of appetite, stomach pain, yellow skin or eyes · Fever, chills, sore throat, body aches · Lightheadedness, dizziness, fainting · Severe stomach pain (with or without nausea or vomiting) If you notice these less serious side effects, talk with your doctor: · Dry cough If you notice other side effects that you think are caused by this medicine, tell your doctor. Call your doctor for medical advice about side effects. You may report side effects to FDA at 9-489-AOM-5908 © 2017 [facility] Information is for End User's use only and may not be sold, redistributed or otherwise used for commercial purposes. The above information is an  only. It is not intended as medical advice for individual conditions or treatments. Talk to your doctor, nurse or pharmacist before following any medical regimen to see if it is safe and effective for you. Aspirin (By mouth) Aspirin (AS-pir-in) Treats pain, fever, and inflammation. May lower risk of heart attack and stroke. Brand Name(s): Ascriptin Regular Strength, Aspergum, Aspir Low, Aspirin Adult Low Dose, Aspirin Low Dose, Thi Aspirin Children's, Thi Aspirin Regimen, Thi Extra Strength, Thi Genuine Aspirin, Thi Low Dose, Bufferin, Bufferin Low Dose, Durlaza, Ecotrin, Ecpirin There may be other brand names for this medicine. When This Medicine Should Not Be Used: This medicine is not right for everyone. Do not use it if you had an allergic reaction to aspirin or other NSAIDs, or if you have a history of asthma with nasal polyps and rhinitis. How to Use This Medicine:  
Delayed Release Capsule, Long Acting Capsule, Gum, Tablet, Chewable Tablet, Fizzy Tablet, Coated Tablet, Long Acting Tablet, 24 Hour Capsule · Your doctor will tell you how much medicine to use. Do not use more than directed. · It is best to take this medicine with food or milk. · Capsule, tablet, or coated tablet: Swallow whole. Do not crush, break, or chew it. · Chewable tablet: You may chew it completely or swallow it whole. · Gum: Chew completely to make sure you get as much medicine as possible. Drink a full glass (8 ounces) of water after chewing the gum. · Swallow the extended-release capsule whole. Do not crush, break, or chew it. Take the capsule with a full glass of water at the same time each day. · Follow the instructions on the medicine label if you are using this medicine without a prescription. · Missed dose: If you miss a dose of Durlaza, skip the missed dose and go back to your regular dosing schedule. Do not take extra medicine to make up for a missed dose. · Store the medicine in a closed container at room temperature, away from heat, moisture, and direct light. Drugs and Foods to Avoid: Ask your doctor or pharmacist before using any other medicine, including over-the-counter medicines, vitamins, and herbal products. · Some foods and medicines can affect how aspirin works. Tell your doctor if you are using any of the following: ¨ Dipyridamole, methotrexate, probenecid, sulfinpyrazone, ticlopidine ¨ Blood thinner (including clopidogrel, prasugrel, ticagrelor, warfarin) ¨ Blood pressure medicine ¨ Medicine to treat seizures (including phenytoin, valproic acid) ¨ NSAID pain or arthritis medicine (including celecoxib, diclofenac, ibuprofen, naproxen) ¨ Steroid medicine (including dexamethasone, hydrocortisone, methylprednisolone, prednisolone, prednisone) · Do not take Durlaza 2 hours before or 1 hour after you drink alcohol or take medicines that contain alcohol. Warnings While Using This Medicine: · Tell your doctor if you are pregnant or breastfeeding. Do not use this medicine during the later part of a pregnancy unless your doctor tells you to. · Tell your doctor if you have kidney disease, liver disease, high blood pressure, heart disease, or a history of stomach bleeding or ulcers. · This medicine may increase your risk for bleeding, including stomach ulcers. · Do not give aspirin to a child or teenager who has chickenpox or flu symptoms, unless the doctor says it is okay. Aspirin can cause a life-threatening reaction called Reye syndrome. · Tell any doctor or dentist who treats you that you are using this medicine. This medicine may affect certain medical test results. · Keep all medicine out of the reach of children. Never share your medicine with anyone. Possible Side Effects While Using This Medicine:  
Call your doctor right away if you notice any of these side effects: · Allergic reaction: Itching or hives, swelling in your face or hands, swelling or tingling in your mouth or throat, chest tightness, trouble breathing · Bloody or black stools, bloody vomit or vomit that looks like coffee grounds · Chest tightness, wheezing · Ringing in the ears · Severe stomach pain · Unusual bleeding, bruising, or weakness If you notice other side effects that you think are caused by this medicine, tell your doctor. Call your doctor for medical advice about side effects. You may report side effects to FDA at 4-788-FDA-2240 © 2017 [facility] Information is for End User's use only and may not be sold, redistributed or otherwise used for commercial purposes. The above information is an  only. It is not intended as medical advice for individual conditions or treatments. Talk to your doctor, nurse or pharmacist before following any medical regimen to see if it is safe and effective for you. Diltiazem (By mouth) Diltiazem (duo-JNA-l-zem) Treats high blood pressure and angina (chest pain). This medicine is a calcium channel blocker.   
Brand Name(s): Cardizem, Cardizem CD, Cardizem LA, Cartia XT, Dilt-XR, Matzim LA, Taztia XT, Tiazac There may be other brand names for this medicine. When This Medicine Should Not Be Used: This medicine is not right for everyone. Do not use it if you had an allergic reaction to diltiazem or similar medicines. How to Use This Medicine:  
Long Acting Capsule, 12 Hour Capsule, 24 Hour Capsule, Tablet, Long Acting Tablet · Take your medicine as directed. Your dose may need to be changed several times to find what works best for you. · It is best to take this medicine on an empty stomach. · Swallow this medicine whole. Do not crush, break, chew, or open the capsule or tablet. · Missed dose: Take a dose as soon as you remember. If it is almost time for your next dose, wait until then and take a regular dose. Do not take extra medicine to make up for a missed dose. · Store the medicine in a closed container at room temperature, away from heat, moisture, and direct light. Drugs and Foods to Avoid: Ask your doctor or pharmacist before using any other medicine, including over-the-counter medicines, vitamins, and herbal products. · Some medicines can affect how diltiazem works. Tell your doctor if you are using the following: 
¨ Buspirone, carbamazepine, cimetidine, clonidine, cyclosporine, digoxin, ivabradine, lovastatin, midazolam, quinidine, rifampin, simvastatin, triazolam 
¨ Other blood pressure medicine, including a beta-blocker Lavada Saucer Warnings While Using This Medicine: · Tell your doctor if you are pregnant or breastfeeding, or if you have kidney disease, liver disease, or digestion problems. Tell your doctor about all heart problems that you have, including heart failure or rhythm problems. · This medicine may cause the following problems: ¨ Slow heartbeat ¨ Worsening of heart failure ¨ Serious skin reactions · This medicine could lower your blood pressure too much, especially when you first use it or if you are dehydrated.  Stand or sit up slowly if you feel lightheaded or dizzy. Do not drive or do anything else that could be dangerous until you know how this medicine affects you. · Do not stop using this medicine without asking your doctor, even if you feel well. This medicine will not cure high blood pressure, but it will help keep it in normal range. You may have to take blood pressure medicine for the rest of your life. · Your doctor will do lab tests at regular visits to check on the effects of this medicine. Keep all appointments. · Keep all medicine out of the reach of children. Never share your medicine with anyone. Possible Side Effects While Using This Medicine:  
Call your doctor right away if you notice any of these side effects: · Allergic reaction: Itching or hives, swelling in your face or hands, swelling or tingling in your mouth or throat, chest tightness, trouble breathing · Blistering, peeling, red skin rash · Dark urine or pale stools, nausea, vomiting, loss of appetite, stomach pain, yellow skin or eyes · Fast, slow, uneven, or pounding heartbeat · Rapid weight gain, swelling in your hands, ankles, or feet If you notice other side effects that you think are caused by this medicine, tell your doctor. Call your doctor for medical advice about side effects. You may report side effects to FDA at 7-501-FDA-0110 © 2017 [address] Information is for End User's use only and may not be sold, redistributed or otherwise used for commercial purposes. The above information is an  only. It is not intended as medical advice for individual conditions or treatments. Talk to your doctor, nurse or pharmacist before following any medical regimen to see if it is safe and effective for you. Potassium Chloride (K-Dur, K-Sol, K-Tab, Rocael Mur) - (By mouth) Why this medicine is used:  
Prevents and treats low potassium levels in the blood. Contact a nurse or doctor right away if you have: · Uneven heartbeat, trouble breathing · Confusion, weakness, numbness in your hands, feet, or lips · Bloody or black, tarry stools Common side effects: · Mild nausea, diarrhea, gas © 2017 [facility] Information is for End User's use only and may not be sold, redistributed or otherwise used for commercial purposes. Mapori Announcement We are excited to announce that we are making your provider's discharge notes available to you in Mapori. You will see these notes when they are completed and signed by the physician that discharged you from your recent hospital stay. If you have any questions or concerns about any information you see in Mapori, please call the Health Information Department where you were seen or reach out to your Primary Care Provider for more information about your plan of care. Introducing [facility]! [facility] introduces Mapori patient portal. Now you can access parts of your medical record, email your doctor's office, and request medication refills online. 1. In your internet browser, go to [email or web address] 2. Click on the First Time User? Click Here link in the Sign In box. You will see the New Member Sign Up page. 3. Enter your Mapori Access Code exactly as it appears below. You will not need to use this code after youve completed the sign-up process. If you do not sign up before the expiration date, you must request a new code. · Mapori Access Code: [email or web address] Expires: 6/26/2018  2:43 PM 
 
4. Enter the last four digits of your Social Security Number (xxxx) and Date of Birth (mm/dd/yyyy) as indicated and click Submit. You will be taken to the next sign-up page. 5. Create a Mapori ID. This will be your Mapori login ID and cannot be changed, so think of one that is secure and easy to remember. 6. Create a [employer] password. You can change your password at any time. 7. Enter your Password Reset Question and Answer. This can be used at a later time if you forget your password. 8. Enter your e-mail address. You will receive e-mail notification when new information is available in [address] 9. Click Sign Up. You can now view and download portions of your medical record. 10. Click the Download Summary menu link to download a portable copy of your medical information. If you have questions, please visit the Frequently Asked Questions section of the Vitasol website. Remember, Vitasol is NOT to be used for urgent needs. For medical emergencies, dial 911. Now available from your iPhone and Android! Introducing Ji Serna As a [employer] patient, I wanted to make you aware of our electronic visit tool called Ji Serna. [employer] 24/7 allows you to connect within minutes with a medical provider 24 hours a day, seven days a week via a mobile device or tablet or logging into a secure website from your computer. You can access Ji Serna from anywhere in the [place]. A virtual visit might be right for you when you have a simple condition and feel like you just dont want to get out of bed, or cant get away from work for an appointment, when your regular [employer] provider is not available (evenings, weekends or holidays), or when youre out of town and need minor care. Electronic visits cost only $49 and if the [employer] 24/7 provider determines a prescription is needed to treat your condition, one can be electronically transmitted to a nearby pharmacy*. Please take a moment to enroll today if you have not already done so. The enrollment process is free and takes just a few minutes. To enroll, please download the [employer] 24/7 jessa to your tablet or phone, or visit [email or web address] to enroll on your computer.    
And, as an [address] [place] [address] patient with a [employer] account, the results of your visits will be scanned into your electronic medical record and your primary care provider will be able to view the scanned results. We urge you to continue to see your regular [facility] provider for your ongoing medical care. And while your primary care provider may not be the one available when you seek a Ji Serna virtual visit, the peace of mind you get from getting a real diagnosis real time can be priceless. For more information on Ji Krishnamurthyfin, view our Frequently Asked Questions (FAQs) at [email or web address]. Sincerely, 
 
Donna Spencer MD 
Chief Medical Officer [facility] Moya *:  certain medications cannot be prescribed via [employer] Providers Seen During Your Hospitalization Provider Specialty Primary office phone Dee Link DO Emergency Medicine [phone] Anai Fleming MD Hospitalist [phone] Opal Archer MD Internal Medicine [phone] Your Primary Care Physician (PCP) Primary Care Physician Office Phone Office Fax Liam Hu, [address] [phone] You are allergic to the following Allergen Reactions Penicillins Hives Recent Documentation Weight BMI OB Status Smoking Status  
  
  
 149.9 kg 48.8 kg/m2 Postmenopausal Former Smoker Emergency Contacts Name Discharge Info Relation Home Work Mobile Marcel Daugherty N/A  AT THIS TIME [6] Child [2] [phone] Zechariah Garcia CAREGIVER [3] Child [2] [phone] Patient Belongings The following personal items are in your possession at time of discharge: 
  Dental Appliances: None  Visual Aid: None      Home Medications: None   Jewelry: Ring  Clothing: At bedside    Other Valuables: Cell Phone Discharge Instructions Attachments/References HEART FAILURE: AVOIDING TRIGGERS (ENGLISH) ATRIAL FIBRILLATION (ENGLISH) HEART FAILURE: SELF-CARE : GENERAL INFO (ENGLISH) HEART FAILURE : GENERAL INFO (ENGLISH) HYPERTENSION (ENGLISH) LOW SODIUM DIET (ENGLISH) LOW SODIUM: READING A FOOD LABEL (ENGLISH) ASPIRIN (BY MOUTH) (ENGLISH) MEFS - DILTIAZEM (CARDIZEM, CARDIZEM CD, CARDIZEM LA, CARDIZEM SR) - (BY MOUTH) (ENGLISH) MEFS - FUROSEMIDE (LASIX) - (BY MOUTH) (ENGLISH) MEFS - LISINOPRIL (PRINIVIL, ZESTRIL) - (BY MOUTH) (ENGLISH) MEFS - POTASSIUM CHLORIDE (K-DUR, K-SOL, K-TAB, GALE MUR) - (BY MOUTH) (ENGLISH) MEFS - RIVAROXABAN (XARELTO, XARELTO STARTER PACK) - (BY MOUTH) (ENGLISH) Patient Handouts Avoiding Triggers With Heart Failure: Care Instructions Your Care Instructions Triggers are anything that make your heart failure flare up. A flare-up is also called \"sudden heart failure\" or \"acute heart failure. \" When you have a flare-up, fluid builds up in your lungs, and you have problems breathing. You might need to go to the hospital. By watching for changes in your condition and avoiding triggers, you can prevent heart failure flare-ups. Follow-up care is a key part of your treatment and safety. Be sure to make and go to all appointments, and call your doctor if you are having problems. It's also a good idea to know your test results and keep a list of the medicines you take. How can you care for yourself at home? Watch for changes in your weight and condition · Weigh yourself without clothing at the same time each day. Record your weight. Call your doctor if you have sudden weight gain, such as more than 2 to 3 pounds in a day or 5 pounds in a week. (Your doctor may suggest a different range of weight gain.) A sudden weight gain may mean that your heart failure is getting worse. · Keep a daily record of your symptoms. Write down any changes in how you feel, such as new shortness of breath, cough, or problems eating.  Also record if your ankles are more swollen than usual and if you feel more tired than usual. Note anything that you ate or did that could have triggered these changes. Limit sodium Sodium causes your body to hold on to extra water. This may cause your heart failure symptoms to get worse. People get most of their sodium from processed foods. Fast food and restaurant meals also tend to be very high in sodium. · Your doctor may suggest that you limit sodium to 2,000 milligrams (mg) a day or less. That is less than 1 teaspoon of salt a day, including all the salt you eat in cooking or in packaged foods. · Read food labels on cans and food packages. They tell you how much sodium you get in one serving. Check the serving size. If you eat more than one serving, you are getting more sodium. · Be aware that sodium can come in forms other than salt, including monosodium glutamate (MSG), sodium citrate, and sodium bicarbonate (baking soda). MSG is often added to Asian food. You can sometimes ask for food without MSG or salt. · Slowly reducing salt will help you adjust to the taste. Take the salt shaker off the table. · Flavor your food with garlic, lemon juice, onion, vinegar, herbs, and spices instead of salt. Do not use soy sauce, steak sauce, onion salt, garlic salt, mustard, or ketchup on your food, unless it is labeled \"low-sodium\" or \"low-salt. \" 
· Make your own salad dressings, sauces, and ketchup without adding salt. · Use fresh or frozen ingredients, instead of canned ones, whenever you can. Choose low-sodium canned goods. · Eat less processed food and food from restaurants, including fast food. Exercise as directed Moderate, regular exercise is very good for your heart. It improves your blood flow and helps control your weight. But too much exercise can stress your heart and cause a heart failure flare-up. · Check with your doctor before you start an exercise program. 
· Walking is an easy way to get exercise. Start out slowly.  Gradually increase the length and pace of your walk. Swimming, riding a bike, and using a treadmill are also good forms of exercise. · When you exercise, watch for signs that your heart is working too hard. You are pushing yourself too hard if you cannot talk while you are exercising. If you become short of breath or dizzy or have chest pain, stop, sit down, and rest. 
· Do not exercise when you do not feel well. Take medicines correctly · Take your medicines exactly as prescribed. Call your doctor if you think you are having a problem with your medicine. · Make a list of all the medicines you take. Include those prescribed to you by other doctors and any over-the-counter medicines, vitamins, or supplements you take. Take this list with you when you go to any doctor. · Take your medicines at the same time every day. It may help you to post a list of all the medicines you take every day and what time of day you take them. · Make taking your medicine as simple as you can. Plan times to take your medicines when you are doing other things, such as eating a meal or getting ready for bed. This will make it easier to remember to take your medicines. · Get organized. Use helpful tools, such as daily or weekly pill containers. When should you call for help? Call 911 if you have symptoms of sudden heart failure such as: 
? · You have severe trouble breathing. ? · You cough up pink, foamy mucus. ? · You have a new irregular or rapid heartbeat. ?Call your doctor now or seek immediate medical care if: 
? · You have new or increased shortness of breath. ? · You are dizzy or lightheaded, or you feel like you may faint. ? · You have sudden weight gain, such as more than 2 to 3 pounds in a day or 5 pounds in a week. (Your doctor may suggest a different range of weight gain.) ? · You have increased swelling in your legs, ankles, or feet. ? · You are suddenly so tired or weak that you cannot do your usual activities. ?Watch closely for changes in your health, and be sure to contact your doctor if you develop new symptoms. Where can you learn more? Go to http://chela-montse.info/. Enter W369 in the search box to learn more about \"Avoiding Triggers With Heart Failure: Care Instructions. \" Current as of: September 21, 2016 Content Version: 11.4 © 9102-7555 [employer]. Care instructions adapted under license by [employer] (which disclaims liability or warranty for this information). If you have questions about a medical condition or this instruction, always ask your healthcare professional. [place] [address] any warranty or liability for your use of this information. Atrial Fibrillation: Care Instructions Your Care Instructions Atrial fibrillation is an irregular and often fast heartbeat. Treating this condition is important for several reasons. It can cause blood clots, which can travel from your heart to your brain and cause a stroke. If you have a fast heartbeat, you may feel lightheaded, dizzy, and weak. An irregular heartbeat can also increase your risk for heart failure. Atrial fibrillation is often the result of another heart condition, such as high blood pressure or coronary artery disease. Making changes to improve your heart condition will help you stay healthy and active. Follow-up care is a key part of your treatment and safety. Be sure to make and go to all appointments, and call your doctor if you are having problems. It's also a good idea to know your test results and keep a list of the medicines you take. How can you care for yourself at home? Medicines ? · Take your medicines exactly as prescribed. Call your doctor if you think you are having a problem with your medicine. You will get more details on the specific medicines your doctor prescribes.   
? · If your doctor has given you a blood thinner to prevent a stroke, be sure you get instructions about how to take your medicine safely. Blood thinners can cause serious bleeding problems. ? · Do not take any vitamins, over-the-counter drugs, or herbal products without talking to your doctor first. ? Lifestyle changes ? · Do not smoke. Smoking can increase your chance of a stroke and heart attack. If you need help quitting, talk to your doctor about stop-smoking programs and medicines. These can increase your chances of quitting for good. ? · Eat a heart-healthy diet. ? · Stay at a healthy weight. Lose weight if you need to.  
? · Limit alcohol to 2 drinks a day for men and 1 drink a day for women. Too much alcohol can cause health problems. ? · Avoid colds and flu. Get a pneumococcal vaccine shot. If you have had one before, ask your doctor whether you need another dose. Get a flu shot every year. If you must be around people with colds or flu, wash your hands often. Activity ? · If your doctor recommends it, get more exercise. Walking is a good choice. Bit by bit, increase the amount you walk every day. Try for at least 30 minutes on most days of the week. You also may want to swim, bike, or do other activities. Your doctor may suggest that you join a cardiac rehabilitation program so that you can have help increasing your physical activity safely. ? · Start light exercise if your doctor says it is okay. Even a small amount will help you get stronger, have more energy, and manage stress. Walking is an easy way to get exercise. Start out by walking a little more than you did in the hospital. Gradually increase the amount you walk. ? · When you exercise, watch for signs that your heart is working too hard. You are pushing too hard if you cannot talk while you are exercising. If you become short of breath or dizzy or have chest pain, sit down and rest immediately. ? · Check your pulse regularly.  Place two fingers on the artery at the palm side of your wrist, in line with your thumb. If your heartbeat seems uneven or fast, talk to your doctor. When should you call for help? Call 911 anytime you think you may need emergency care. For example, call if: 
? · You have symptoms of a heart attack. These may include: ¨ Chest pain or pressure, or a strange feeling in the chest. 
¨ Sweating. ¨ Shortness of breath. ¨ Nausea or vomiting. ¨ Pain, pressure, or a strange feeling in the back, neck, jaw, or upper belly or in one or both shoulders or arms. ¨ Lightheadedness or sudden weakness. ¨ A fast or irregular heartbeat. After you call 911, the  may tell you to chew 1 adult-strength or 2 to 4 low-dose aspirin. Wait for an ambulance. Do not try to drive yourself. ? · You have symptoms of a stroke. These may include: 
¨ Sudden numbness, tingling, weakness, or loss of movement in your face, arm, or leg, especially on only one side of your body. ¨ Sudden vision changes. ¨ Sudden trouble speaking. ¨ Sudden confusion or trouble understanding simple statements. ¨ Sudden problems with walking or balance. ¨ A sudden, severe headache that is different from past headaches. ? · You passed out (lost consciousness). ?Call your doctor now or seek immediate medical care if: 
? · You have new or increased shortness of breath. ? · You feel dizzy or lightheaded, or you feel like you may faint. ? · Your heart rate becomes irregular. ? · You can feel your heart flutter in your chest or skip heartbeats. Tell your doctor if these symptoms are new or worse. ? Watch closely for changes in your health, and be sure to contact your doctor if you have any problems. Where can you learn more? Go to http://chela-montse.info/. Enter U020 in the search box to learn more about \"Atrial Fibrillation: Care Instructions. \" Current as of: September 21, 2016 Content Version: 11.4 © 3471-1329 [employer]wise, [employer]. Care instructions adapted under license by [employer] (which disclaims liability or warranty for this information). If you have questions about a medical condition or this instruction, always ask your healthcare professional. [address] any warranty or liability for your use of this information. Learning About Self-Care for Heart Failure What is self-care for heart failure? Heart failure usually gets worse over time. But there are many things you can do to feel better, stay healthy longer, and avoid the hospital. 
Self-care means managing your health by doing certain things every day, like weighing yourself. It's about knowing which symptoms to watch for so you can avoid getting worse. When you practice good self-care, you know when it's time to call your doctor and when your heart failure has turned into an emergency. The lists below can help. Top 5 self-care tips for every day 1. Take your medicines as prescribed. This gives them the best chance of helping you. 2. Watch for signs that you're getting worse. Weighing yourself every day is one of the best ways to do this. Weight gain may be a sign that your body is holding on to too much fluid. Weigh yourself at the same time each day, using the same scale, on a hard, flat surface. The best time is in the morning after you go to the bathroom and before you eat or drink anything. 3. Find out what your triggers are, and learn to avoid them. Triggers are things that make your heart failure worse, often suddenly. A trigger may be eating too much salt, missing a dose of your medicine, or exercising too hard. 4. Limit sodium. This helps keep fluid from building up and may help you feel better. Your doctor may suggest that you limit sodium to 2,000 milligrams (mg) a day or less. That's less than 1 teaspoon.  You can stay under this amount by limiting the salt you eat at home and by watching for \"hidden\" sodium when you eat out or shop for food. 5. Try to exercise throughout the week. Exercise makes your heart stronger and can help you avoid symptoms. Walking is a great way to get exercise. If your doctor says it's safe, start out with some short walks, and then slowly build up to longer ones. When should you act? Try to become familiar with signs that mean your heart failure is getting worse. If you need help, talk with your doctor about making a personal plan. Here are some things to watch for as you practice your daily self-care. Call your doctor if: 
· You have sudden weight gain, such as more than 2 to 3 pounds in a day or 5 pounds in a week. (Your doctor may suggest a different range of weight gain.) · You have new or worse swelling in your feet, ankles, or legs. · Your breathing gets worse. Activities that did not make you short of breath before are hard for you now. · Your breathing when you lie down is worse than usual, or you wake up at night needing to catch your breath. Be sure to make and go to all of your follow-up appointments. And it's always a good idea to call your doctor anytime you have a sudden change in symptoms. When is it an emergency? Sometimes the symptoms get worse very quickly. This is called sudden heart failure. It causes fluid to build up in your lungs. Sudden heart failure is an emergency. If you have any of these symptoms, you need care right away. Call 911 if: 
· You have severe shortness of breath. · You have an irregular or fast heartbeat. · You cough up foamy, pink mucus. What else can you do to stay healthy? There are other things you can do to take care of your body and your heart. These things will help you feel better. And they will also reduce your risk of heart attack and stroke. · Try to stay at a healthy weight.  Eat a healthy diet with lots of fresh fruit, vegetables, and whole grains. · If you smoke, quit. · Limit the amount of alcohol you drink. · Keep high blood pressure and diabetes under control. If you need help, talk with your doctor. If your doctor has not set you up with a cardiac rehabilitation (rehab) program, talk to him or her about whether that is right for you. Cardiac rehab includes exercise, help with diet and lifestyle changes, and emotional support. Also let your doctor know if: 
· You're having trouble sleeping. Sleep is important to your well-being. It also helps your heart work the way it's supposed to. Your doctor can help you decide if you need treatment for sleep problems. · You're feeling sad or hopeless much of the time, or you are worried and anxious. Heart failure can be hard on your emotions. Treatment with counseling and medicine can help. And when you feel better, you're more likely to take care of yourself. Follow-up care is a key part of your treatment and safety. Be sure to make and go to all appointments, and call your doctor if you are having problems. It's also a good idea to know your test results and keep a list of the medicines you take. Where can you learn more? Go to http://chela-montse.info/. Enter A870 in the search box to learn more about \"Learning About Self-Care for Heart Failure. \" Current as of: September 21, 2016 Content Version: 11.4 © 3210-4932 [employer]. Care instructions adapted under license by [employer] (which disclaims liability or warranty for this information). If you have questions about a medical condition or this instruction, always ask your healthcare professional. [address] any warranty or liability for your use of this information. Learning About Heart Failure What is heart failure?  
 
Heart failure means that your heart muscle does not pump as much blood as your body needs. Failure does not mean that your heart has stopped. It means that your heart is not pumping as well as it should. Your body has an amazing ability to make up for heart failure. It may do such a good job that you don't know you have a disease. But at some point, your heart and body will no longer be able to keep up. Then fluid starts to build up in your lungs and other parts of your body. What can you expect when you have heart failure? Heart failure is a lifelong (chronic) disease. Treatment may be able to slow the disease and help you feel better. But heart failure tends to get worse over time. Despite this, there are many steps you can take to feel better and stay healthy longer. Early on, your symptoms may not be too bad. As heart failure gets worse, symptoms typically get worse, and you may need to limit your activities. Heart failure can also get worse suddenly. If this happens, you need emergency care. Then, after treatment, your symptoms may go back to being stable (which means they stay the same) for a long time. Heart failure can lead to other health problems, such as heart rhythm problems. Over time, your treatment options may change, especially as your symptoms get worse. As heart failure gets worse, palliative care can help improve the quality of your life. You can do advance care planning todecide what kind of care you want at the end of your life. What are the symptoms? Symptoms of heart failure start to happen when your heart can't pump enough blood to the rest of your body. In the early stages of heart failure, you may: · Feel tired easily. · Be short of breath when you exert yourself. · Feel like your heart is pounding or racing (palpitations). · Feel weak or dizzy. As heart failure gets worse, fluid starts to build up in your lungs and other parts of your body. This may cause you to: · Feel short of breath even at rest. 
 · Have swelling (edema), especially in your legs, ankles, and feet. · Gain weight. This may happen over just a day or two, or more slowly. · Cough or wheeze, especially when you lie down. How is heart failure treated? · You'll probably take several medicines. · You might attend cardiac rehabilitation (rehab) to get education and support that help you make lifestyle changes and stay as healthy as possible. · You may get a heart device. A pacemaker helps your heart pump blood. An ICD can stop abnormal heart rhythms. How can you care for yourself? There are many steps you can take to feel better and stay healthy longer. These steps are an important part of treatment. They can help you stay active and enjoy life. · Take your medicine the right way. Avoid medicines that can make your symptoms worse. · Check your weight and symptoms every day. Know what to do if your symptoms get worse. · Limit sodium. This helps keep fluid from building up. It may help you feel better. · Be active. Exercise regularly, but don't exercise too hard. · Be heart-healthy. Eat healthy foods, stay at a healthy weight, limit alcohol, and don't smoke. · Stay as healthy as possible. Avoid colds and flu, get help for depression and anxiety, and manage stress. Follow-up care is a key part of your treatment and safety. Be sure to make and go to all appointments, and call your doctor if you are having problems. It's also a good idea to know your test results and keep a list of the medicines you take. Where can you learn more? Go to http://chela-montse.info/. Enter J918 in the search box to learn more about \"Learning About Heart Failure. \" Current as of: September 21, 2016 Content Version: 11.4 © 2626-4058 [employer]. Care instructions adapted under license by [employer] (which disclaims liability or warranty for this information).  If you have questions about a medical condition or this instruction, always ask your healthcare professional. [address] any warranty or liability for your use of this information. High Blood Pressure: Care Instructions Your Care Instructions If your blood pressure is usually above 140/90, you have high blood pressure, or hypertension. That means the top number is 140 or higher or the bottom number is 90 or higher, or both. Despite what a lot of people think, high blood pressure usually doesn't cause headaches or make you feel dizzy or lightheaded. It usually has no symptoms. But it does increase your risk for heart attack, stroke, and kidney or eye damage. The higher your blood pressure, the more your risk increases. Your doctor will give you a goal for your blood pressure. Your goal will be based on your health and your age. An example of a goal is to keep your blood pressure below 140/90. Lifestyle changes, such as eating healthy and being active, are always important to help lower blood pressure. You might also take medicine to reach your blood pressure goal. 
Follow-up care is a key part of your treatment and safety. Be sure to make and go to all appointments, and call your doctor if you are having problems. It's also a good idea to know your test results and keep a list of the medicines you take. How can you care for yourself at home? Medical treatment · If you stop taking your medicine, your blood pressure will go back up. You may take one or more types of medicine to lower your blood pressure. Be safe with medicines. Take your medicine exactly as prescribed. Call your doctor if you think you are having a problem with your medicine. · Talk to your doctor before you start taking aspirin every day. Aspirin can help certain people lower their risk of a heart attack or stroke. But taking aspirin isn't right for everyone, because it can cause serious bleeding. · See your doctor regularly. You may need to see the doctor more often at first or until your blood pressure comes down. · If you are taking blood pressure medicine, talk to your doctor before you take decongestants or anti-inflammatory medicine, such as ibuprofen. Some of these medicines can raise blood pressure. · Learn how to check your blood pressure at home. Lifestyle changes · Stay at a healthy weight. This is especially important if you put on weight around the waist. Losing even 10 pounds can help you lower your blood pressure. · If your doctor recommends it, get more exercise. Walking is a good choice. Bit by bit, increase the amount you walk every day. Try for at least 30 minutes on most days of the week. You also may want to swim, bike, or do other activities. · Avoid or limit alcohol. Talk to your doctor about whether you can drink any alcohol. · Try to limit how much sodium you eat to less than 2,300 milligrams (mg) a day. Your doctor may ask you to try to eat less than 1,500 mg a day. · Eat plenty of fruits (such as bananas and oranges), vegetables, legumes, whole grains, and low-fat dairy products. · Lower the amount of saturated fat in your diet. Saturated fat is found in animal products such as milk, cheese, and meat. Limiting these foods may help you lose weight and also lower your risk for heart disease. · Do not smoke. Smoking increases your risk for heart attack and stroke. If you need help quitting, talk to your doctor about stop-smoking programs and medicines. These can increase your chances of quitting for good. When should you call for help? Call 911 anytime you think you may need emergency care. This may mean having symptoms that suggest that your blood pressure is causing a serious heart or blood vessel problem. Your blood pressure may be over 180/110. ? For example, call 911 if: 
? · You have symptoms of a heart attack. These may include: ¨ Chest pain or pressure, or a strange feeling in the chest. 
¨ Sweating. ¨ Shortness of breath. ¨ Nausea or vomiting. ¨ Pain, pressure, or a strange feeling in the back, neck, jaw, or upper belly or in one or both shoulders or arms. ¨ Lightheadedness or sudden weakness. ¨ A fast or irregular heartbeat. ? · You have symptoms of a stroke. These may include: 
¨ Sudden numbness, tingling, weakness, or loss of movement in your face, arm, or leg, especially on only one side of your body. ¨ Sudden vision changes. ¨ Sudden trouble speaking. ¨ Sudden confusion or trouble understanding simple statements. ¨ Sudden problems with walking or balance. ¨ A sudden, severe headache that is different from past headaches. ? · You have severe back or belly pain. ?Do not wait until your blood pressure comes down on its own. Get help right away. ?Call your doctor now or seek immediate care if: 
? · Your blood pressure is much higher than normal (such as 180/110 or higher), but you don't have symptoms. ? · You think high blood pressure is causing symptoms, such as: ¨ Severe headache. ¨ Blurry vision. ? Watch closely for changes in your health, and be sure to contact your doctor if: 
? · Your blood pressure measures 140/90 or higher at least 2 times. That means the top number is 140 or higher or the bottom number is 90 or higher, or both. ? · You think you may be having side effects from your blood pressure medicine. ? · Your blood pressure is usually normal, but it goes above normal at least 2 times. Where can you learn more? Go to http://chela-montse.info/. Enter F723 in the search box to learn more about \"High Blood Pressure: Care Instructions. \" Current as of: September 21, 2016 Content Version: 11.4 © 5499-4302 [email or web address].  Care instructions adapted under license by [employer] (which disclaims liability or warranty for this information). If you have questions about a medical condition or this instruction, always ask your healthcare professional. [address] any warranty or liability for your use of this information. Low Sodium Diet (2,000 Milligram): Care Instructions Your Care Instructions Too much sodium causes your body to hold on to extra water. This can raise your blood pressure and force your heart and kidneys to work harder. In very serious cases, this could cause you to be put in the hospital. It might even be life-threatening. By limiting sodium, you will feel better and lower your risk of serious problems. The most common source of sodium is salt. People get most of the salt in their diet from canned, prepared, and packaged foods. Fast food and restaurant meals also are very high in sodium. Your doctor will probably limit your sodium to less than 2,000 milligrams (mg) a day. This limit counts all the sodium in prepared and packaged foods and any salt you add to your food. Follow-up care is a key part of your treatment and safety. Be sure to make and go to all appointments, and call your doctor if you are having problems. It's also a good idea to know your test results and keep a list of the medicines you take. How can you care for yourself at home? Read food labels · Read labels on cans and food packages. The labels tell you how much sodium is in each serving. Make sure that you look at the serving size. If you eat more than the serving size, you have eaten more sodium. · Food labels also tell you the Percent Daily Value for sodium. Choose products with low Percent Daily Values for sodium. · Be aware that sodium can come in forms other than salt, including monosodium glutamate (MSG), sodium citrate, and sodium bicarbonate (baking soda). MSG is often added to Asian food. When you eat out, you can sometimes ask for food without MSG or added salt. Buy low-sodium foods · Buy foods that are labeled \"unsalted\" (no salt added), \"sodium-free\" (less than 5 mg of sodium per serving), or \"low-sodium\" (less than 140 mg of sodium per serving). Foods labeled \"reduced-sodium\" and \"light sodium\" may still have too much sodium. Be sure to read the label to see how much sodium you are getting. · Buy fresh vegetables, or frozen vegetables without added sauces. Buy low-sodium versions of canned vegetables, soups, and other canned goods. Prepare low-sodium meals · Cut back on the amount of salt you use in cooking. This will help you adjust to the taste. Do not add salt after cooking. One teaspoon of salt has about 2,300 mg of sodium. · Take the salt shaker off the table. · Flavor your food with garlic, lemon juice, onion, vinegar, herbs, and spices. Do not use soy sauce, lite soy sauce, steak sauce, onion salt, garlic salt, celery salt, mustard, or ketchup on your food. · Use low-sodium salad dressings, sauces, and ketchup. Or make your own salad dressings and sauces without adding salt. · Use less salt (or none) when recipes call for it. You can often use half the salt a recipe calls for without losing flavor. Other foods such as rice, pasta, and grains do not need added salt. · Rinse canned vegetables, and cook them in fresh water. This removes some-but not all-of the salt. · Avoid water that is naturally high in sodium or that has been treated with water softeners, which add sodium. Call your local water company to find out the sodium content of your water supply. If you buy bottled water, read the label and choose a sodium-free brand. Avoid high-sodium foods · Avoid eating: ¨ Smoked, cured, salted, and canned meat, fish, and poultry. ¨ Ham, bowser, hot dogs, and luncheon meats. ¨ Regular, hard, and processed cheese and regular peanut butter. ¨ Crackers with salted tops, and other salted snack foods such as pretzels, chips, and salted popcorn. ¨ Frozen prepared meals, unless labeled low-sodium. ¨ Canned and dried soups, broths, and bouillon, unless labeled sodium-free or low-sodium. ¨ Canned vegetables, unless labeled sodium-free or low-sodium. ¨ [place] fries, pizza, tacos, and other fast foods. ¨ Pickles, olives, ketchup, and other condiments, especially soy sauce, unless labeled sodium-free or low-sodium. Where can you learn more? Go to http://chela-montse.info/. Enter V103 in the search box to learn more about \"Low Sodium Diet (2,000 Milligram): Care Instructions. \" Current as of: May 12, 2017 Content Version: 11.4 © 3898-5522 [employer]. Care instructions adapted under license by [employer] (which disclaims liability or warranty for this information). If you have questions about a medical condition or this instruction, always ask your healthcare professional. [place] [address] any warranty or liability for your use of this information. How to Read a Food Label to Limit Sodium: Care Instructions Your Care Instructions Sodium causes your body to hold on to extra water. This can raise your blood pressure and force your heart and kidneys to work harder. In very serious cases, this could cause you to be put in the hospital. It might even be life-threatening. By limiting sodium, you will feel better and lower your risk of serious problems. Processed foods, fast food, and restaurant foods are the major sources of dietary sodium. The most common name for sodium is salt. Try to limit how much sodium you eat to less than 2,300 milligrams (mg) a day. If you limit your sodium to 1,500 mg a day, you can lower your blood pressure even more. This limit counts all the salt that you eat in foods you cook or in packaged foods. Keep a list of everything you eat and drink. Follow-up care is a key part of your treatment and safety.  Be sure to make and go to all appointments, and call your doctor if you are having problems. It's also a good idea to know your test results and keep a list of the medicines you take. How can you care for yourself at home? Read ingredient lists on food labels · Read the list of ingredients on food labels to help you find how much sodium is in a food. The label lists the ingredients in a food in descending order (from the most to the least). If salt or sodium is high on the list, there may be a lot of sodium in the food. · Know that sodium has different names. Sodium is also called monosodium glutamate (MSG, common in [facility] food), sodium citrate, sodium alginate, sodium hydroxide, and sodium phosphate. Read Nutrition Facts labels · On most foods, there is a Nutrition Facts label. This will tell you how much sodium is in one serving of food. Look at both the serving size and the sodium amount. The serving size is located at the top of the label, usually right under the \"Nutrition Facts\" title. The amount of sodium is given in the list under the title. It is given in milligrams (mg). ¨ Check the serving size carefully. A single serving is often very small, and you may eat more than one serving. If this is the case, you will eat more sodium than listed on the label. For example, if the serving size for a canned soup is 1 cup and the sodium amount is 470 mg, if you have 2 cups you will eat 940 mg of sodium. · The nutrition facts for fresh fruits and vegetables are not listed on the food. They may be listed somewhere in the store. These foods usually have no sodium or low sodium. · The Nutrition Facts label also gives you the Percent Daily Value for sodium. This is how much of the recommended amount of sodium a serving contains. The daily value for sodium is less than 2,300 mg. So if the Percent Daily Value says 50%, this means one serving is giving you half of this, or 1,150 mg. Buy low-sodium foods · Look for foods that are made with less sodium. Watch for the following words on the label. ¨ \"Unsalted\" means there is no sodium added to the food. But there may be sodium already in the food naturally. ¨ \"Sodium-free\" means a serving has less than 5 mg of sodium. ¨ \"Very low sodium\" means a serving has 35 mg or less of sodium. ¨ \"Low-sodium\" means a serving has 140 mg or less of sodium. · \"Reduced-sodium\" means that there is 25% less sodium than what the food normally has. This is still usually too much sodium. Try not to buy foods with this on the label. · Buy fresh vegetables, or frozen vegetables without added sauces. Buy low-sodium versions of canned vegetables, soups, and other canned goods. Where can you learn more? Go to http://chelaGRAVIDImontse.info/. Enter 26 330268 in the search box to learn more about \"How to Read a Food Label to Limit Sodium: Care Instructions. \" Current as of: May 12, 2017 Content Version: 11.4 © 0231-5409 [employer]. Care instructions adapted under license by [employer] (which disclaims liability or warranty for this information). If you have questions about a medical condition or this instruction, always ask your healthcare professional. [place] [address] any warranty or liability for your use of this information. Aspirin (By mouth) Aspirin (AS-pir-in) Treats pain, fever, and inflammation. May lower risk of heart attack and stroke. Brand Name(s): Ascriptin Regular Strength, Aspergum, Aspir Low, Aspirin Adult Low Dose, Aspirin Low Dose, Thi Aspirin Children's, Thi Aspirin Regimen, Thi Extra Strength, Thi Genuine Aspirin, Thi Low Dose, Bufferin, Bufferin Low Dose, Durlaza, Ecotrin, Ecpirin There may be other brand names for this medicine. When This Medicine Should Not Be Used: This medicine is not right for everyone.  Do not use it if you had an allergic reaction to aspirin or other NSAIDs, or if you have a history of asthma with nasal polyps and rhinitis. How to Use This Medicine:  
Delayed Release Capsule, Long Acting Capsule, Gum, Tablet, Chewable Tablet, Fizzy Tablet, Coated Tablet, Long Acting Tablet, 24 Hour Capsule · Your doctor will tell you how much medicine to use. Do not use more than directed. · It is best to take this medicine with food or milk. · Capsule, tablet, or coated tablet: Swallow whole. Do not crush, break, or chew it. · Chewable tablet: You may chew it completely or swallow it whole. · Gum: Chew completely to make sure you get as much medicine as possible. Drink a full glass (8 ounces) of water after chewing the gum. · Swallow the extended-release capsule whole. Do not crush, break, or chew it. Take the capsule with a full glass of water at the same time each day. · Follow the instructions on the medicine label if you are using this medicine without a prescription. · Missed dose: If you miss a dose of Durlaza, skip the missed dose and go back to your regular dosing schedule. Do not take extra medicine to make up for a missed dose. · Store the medicine in a closed container at room temperature, away from heat, moisture, and direct light. Drugs and Foods to Avoid: Ask your doctor or pharmacist before using any other medicine, including over-the-counter medicines, vitamins, and herbal products. · Some foods and medicines can affect how aspirin works. Tell your doctor if you are using any of the following: ¨ Dipyridamole, methotrexate, probenecid, sulfinpyrazone, ticlopidine ¨ Blood thinner (including clopidogrel, prasugrel, ticagrelor, warfarin) ¨ Blood pressure medicine ¨ Medicine to treat seizures (including phenytoin, valproic acid) ¨ NSAID pain or arthritis medicine (including celecoxib, diclofenac, ibuprofen, naproxen) ¨ Steroid medicine (including dexamethasone, hydrocortisone, methylprednisolone, prednisolone, prednisone) · Do not take Durlaza 2 hours before or 1 hour after you drink alcohol or take medicines that contain alcohol. Warnings While Using This Medicine: · Tell your doctor if you are pregnant or breastfeeding. Do not use this medicine during the later part of a pregnancy unless your doctor tells you to. · Tell your doctor if you have kidney disease, liver disease, high blood pressure, heart disease, or a history of stomach bleeding or ulcers. · This medicine may increase your risk for bleeding, including stomach ulcers. · Do not give aspirin to a child or teenager who has chickenpox or flu symptoms, unless the doctor says it is okay. Aspirin can cause a life-threatening reaction called Reye syndrome. · Tell any doctor or dentist who treats you that you are using this medicine. This medicine may affect certain medical test results. · Keep all medicine out of the reach of children. Never share your medicine with anyone. Possible Side Effects While Using This Medicine:  
Call your doctor right away if you notice any of these side effects: · Allergic reaction: Itching or hives, swelling in your face or hands, swelling or tingling in your mouth or throat, chest tightness, trouble breathing · Bloody or black stools, bloody vomit or vomit that looks like coffee grounds · Chest tightness, wheezing · Ringing in the ears · Severe stomach pain · Unusual bleeding, bruising, or weakness If you notice other side effects that you think are caused by this medicine, tell your doctor. Call your doctor for medical advice about side effects. You may report side effects to FDA at 4-059-UWB-6277 © 2017 [facility] Information is for End User's use only and may not be sold, redistributed or otherwise used for commercial purposes. The above information is an  only.  It is not intended as medical advice for individual conditions or treatments. Talk to your doctor, nurse or pharmacist before following any medical regimen to see if it is safe and effective for you. Diltiazem (Cardizem, Cardizem CD, Cardizem LA, Cardizem SR) - (By mouth) Why this medicine is used:  
Treats high blood pressure and angina (chest pain). Contact a nurse or doctor right away if you have: 
· Fast, slow, uneven, or pounding heartbeat · Rapid weight gain; swelling in your hands, ankles, or feet · Dark urine or pale stools · Nausea, vomiting, loss of appetite, stomach pain, · Yellow skin or eyes Common side effects: 
· Dizziness · Tiredness © 2017 [employer] [facility] Information is for End User's use only and may not be sold, redistributed or otherwise used for commercial purposes. Furosemide (Lasix) - (By mouth) Why this medicine is used:  
Treats fluid retention and high blood pressure. Contact a nurse or doctor right away if you have: · Blistering, peeling, red skin rash · Lightheadedness, dizziness, fainting · Dry mouth, increased thirst, muscle cramps, nausea or vomiting · Uneven heartbeat · Hearing loss, ringing in the ears Common side effects: 
· Loss of appetite, stomach cramps © 2017 [employer] [facility] Information is for End User's use only and may not be sold, redistributed or otherwise used for commercial purposes. Lisinopril (Prinivil, Zestril) - (By mouth) Why this medicine is used:  
Treats high blood pressure and heart failure. Contact a nurse or doctor right away if you have: · Blistering, peeling, red skin rash · Change in how much or how often you urinate · Confusion, weakness, uneven heartbeat, trouble breathing · Lightheadedness, dizziness, fainting · Numbness or tingling in your hands, feet, or lips Common side effects: · Dry cough © 2017 [facility] [facility] Information is for End User's use only and may not be sold, redistributed or otherwise used for commercial purposes. Potassium Chloride (K-Dur, K-Sol, K-Tab, Rocael Mur) - (By mouth) Why this medicine is used:  
Prevents and treats low potassium levels in the blood. Contact a nurse or doctor right away if you have: · Uneven heartbeat, trouble breathing · Confusion, weakness, numbness in your hands, feet, or lips · Bloody or black, tarry stools Common side effects: · Mild nausea, diarrhea, gas © 2017 [address] Information is for End User's use only and may not be sold, redistributed or otherwise used for commercial purposes. Rivaroxaban (Xarelto, Xarelto Starter Pack) - (By mouth) Why this medicine is used:  
Treats and prevents blood clots. Contact a nurse or doctor right away if you have: 
· Sudden or severe headache · Back pain, numbness, tingling, weakness in your legs or feet · Loss of bladder or bowel control · Bloody vomit or vomit that looks like coffee grounds; bloody or black, tarry stools · Bleeding that does not stop or bruises that do not heal  
 
Common side effects: · Minor bleeding or bruising © 2017 [address] Information is for End User's use only and may not be sold, redistributed or otherwise used for commercial purposes. Please provide this summary of care documentation to your next provider. Signatures-by signing, you are acknowledging that this After Visit Summary has been reviewed with you and you have received a copy. Patient Signature:  ____________________________________________________________ Date:  ____________________________________________________________  
  
Elana Lee Provider Signature:  ____________________________________________________________ Date:  ____________________________________________________________

## 2018-03-27 NOTE — H&P
[address] [facility]  ACUTE CARE HISTORY AND PHYSICAL    Richy Morgan  MR#: [id number]  : 1940  ACCOUNT #: [de-identified]   DATE OF SERVICE: 2018    CHIEF COMPLAINT:  Shortness of breath. HISTORY OF PRESENT ILLNESS:  The patient is a 31-year-old female with past medical history of atrial fibrillation, history of asymptomatic bradycardia, chronic low back pain, morbid obesity and history of GI bleed, who presented to the hospital with the above-mentioned symptoms. The patient reports she stopped taking her medications \"many months back as they were not doing anything. \"  The patient reports that for about a week or so, she has been having some shortness of breath, especially when she lies down flat, has been having some wheezing associated with that. She reports that her shortness of breath got worse today and she got concerned and decided to come to the hospital.  Patient reports that she is having increased exertional dyspnea for the past few days. Even walking a short amount of distance is causing her to be short of breath and she has to sit down or has to take take rests. She reports that she has been getting increased swelling in her feet and also feels that her belly is swollen. The patient reports that she also has some yellow sputum off and on associated with cough and reports that has been going on for many months. Patient reports that she called her PCP today and she was told to come to the hospital for further management and evaluation. Patient reports that she follows up with Dr. Vinicius Friedman but has been seeing Dr. Vinicius Friedman for a while. The patient denies any fever or chills associated with her symptoms. Denies any headache, blurry vision, sore throat, trouble swallowing, trouble with speech. Does admit to mild chest pain, but denies any exertional component, denies any palpitations, diaphoresis.   Denies any lightheadedness, dizziness, abdominal pain, constipation, diarrhea, urinary symptoms, focal or generalized neurological weakness, recent travel, sick contacts, falls, injuries, hematemesis, melena, hemoptysis or any concerns or problems. PAST MEDICAL HISTORY:  See above. HOME MEDICATIONS:  Currently, the patient is on Tylenol as needed. SOCIAL HISTORY:  Patient is a former smoker. Denies alcohol use. Denies IV drug abuse. Lives at home. ALLERGIES:  PENICILLIN. FAMILY HISTORY:  Was discussed, was found to be noncontributory. REVIEW OF SYSTEMS:  All systems are reviewed and were found to be essentially negative except for the symptoms mentioned above. PHYSICAL EXAMINATION:  VITAL SIGNS:  Temperature 97.5, pulse 128, respiratory rate 26, blood pressure, pulse ox 100% on room air. GENERAL:  Alert x3, awake, mildly distressed, pleasant female, appears to be stated age. HEENT:  Pupils equal and reactive to light. Dry mucous membranes. Tympanic membranes clear. NECK:  Supple. CHEST:  Decreased basal breath sounds with scattered crackles. CORONARY:  Irregularly, irregular. Tachycardic. ABDOMEN:  Soft, nontender, nondistended. Bowel sounds are physiological.  EXTREMITIES:  No clubbing, no cyanosis, 1+ pitting edema bilateral lower extremities. NEUROPSYCHIATRIC:  Pleasant mood and affect. Cranial nerves II-XII grossly intact. Sensory grossly within normal limits. DTR 2+. Strength 5/5. SKIN:  Warm. LABORATORY DATA:  White count 7.1, hemoglobin 12.7, hematocrit 39.9, platelets 465. Urine shows no signs of infection. Sodium 145, potassium 3.3, chloride 108, bicarb 28, anion gap 9, glucose 89, BUN 5, creatinine 0.92, calcium 8.8, bilirubin total is 1.1, ALT 7, AST 10, alk phos 80. Troponin less than 0.04. BNP 1183. X-ray of the chest shows finding of mild pulmonary edema. EKG shows AFib with PVC. ASSESSMENT AND PLAN:  1. Congestive heart failure exacerbation.   The patient appears to be congestive heart failure exacerbation New [place] Heart Association  class IV. Will admit the patient on a telemetry bed. Start patient on aggressive IV diuresis, strict I's and O's, daily weights. Will get troponins, echocardiogram, Cardiology consult and closely monitor. Further intervention will be per hospital course. Will continue to monitor. Reassess as needed. Await Cardiology input. Start patient on aspirin and get a lipid panel. 2.  Atrial fibrillation with rapid ventricular response. Heart rate is around one-teens to 120s. I spoke with Pharmacy. There is no IV diltiazem available. Start patient on p.o. diltiazem and give one dose of Lovenox. Closely monitor heart rate. Echocardiogram has been ordered. Cardiology consult has been requested. Aspirin has been initiated and further intervention will be per hospital course. Continue to monitor. Await Cardiology input. Reassess as needed. 3.  Hypokalemia. Will replace potassium. 4.  Gastrointestinal and deep venous thrombosis prophylaxis. Patient is on Lovenox.       MD Abby Alba / Gay  D: 03/27/2018 19:05     T: 03/27/2018 19:39  JOB #: [id number]

## 2018-03-27 NOTE — PROGRESS NOTES
Admission Medication Reconciliation:    Information obtained from: Patient    Significant PMH/Disease States:   Past Medical History:   Diagnosis Date    Arthritis     Atrial fibrillation ([address]     WHITING (dyspnea on exertion)     GERD (gastroesophageal reflux disease)     Obesity     Other ill-defined conditions(799.89)     \"fluid in the lung\"       Chief Complaint for this Admission:  Shortness of breath, ankle swelling    Allergies:  Penicillins    Prior to Admission Medications:   Prior to Admission Medications   Prescriptions Last Dose Informant Patient Reported? Taking?   acetaminophen (TYLENOL) 500 mg tablet 3/26/2018  Yes Yes   Sig: Take 1,000 mg by mouth daily as needed for Pain. Facility-Administered Medications: None         Comments/Recommendations: Completed medication reconciliation interview with patient. She states that she has not been taking any medications for over a month, with the exception of acetaminophen PRN pain the past few days. Discontinued the following medications from her PTA med list:    Aspirin 81 mg PO daily  Furosemide 20 mg PO daily  Lidocaine 5% patch - Apply to lower back as needed for pain. Ondansetron ODT 8 mg Q8H PRN nausea  Polyethylene glycol 17 gm PO BID  Sotalol 40 mg PO BID  Tramadol 50 mg PO Q8H PRN pain    The patient also stated she was supposed to be taking another medication for her blood pressure, but she hasn't been taking it for over a month. She was unsure of the name.

## 2018-03-28 ENCOUNTER — HOME HEALTH ADMISSION (OUTPATIENT)
Dept: HOME HEALTH SERVICES | Facility: HOME HEALTH | Age: 78
End: 2018-03-28

## 2018-03-28 LAB
ALBUMIN SERPL-MCNC: 2.7 G/DL (ref 3.5–5)
ALBUMIN/GLOB SERPL: 0.7 {RATIO} (ref 1.1–2.2)
ALP SERPL-CCNC: 75 U/L (ref 45–117)
ALT SERPL-CCNC: <6 U/L (ref 12–78)
ANION GAP SERPL CALC-SCNC: 4 MMOL/L (ref 5–15)
AST SERPL-CCNC: 7 U/L (ref 15–37)
BASOPHILS # BLD: 0 K/UL (ref 0–0.1)
BASOPHILS NFR BLD: 0 % (ref 0–1)
BILIRUB SERPL-MCNC: 0.9 MG/DL (ref 0.2–1)
BNP SERPL-MCNC: 830 PG/ML (ref 0–450)
BUN SERPL-MCNC: 6 MG/DL (ref 6–20)
BUN/CREAT SERPL: 7 (ref 12–20)
CALCIUM SERPL-MCNC: 8.6 MG/DL (ref 8.5–10.1)
CHLORIDE SERPL-SCNC: 106 MMOL/L (ref 97–108)
CO2 SERPL-SCNC: 33 MMOL/L (ref 21–32)
CREAT SERPL-MCNC: 0.92 MG/DL (ref 0.55–1.02)
DIFFERENTIAL METHOD BLD: ABNORMAL
EOSINOPHIL # BLD: 0 K/UL (ref 0–0.4)
EOSINOPHIL NFR BLD: 0 % (ref 0–7)
ERYTHROCYTE [DISTWIDTH] IN BLOOD BY AUTOMATED COUNT: 15.2 % (ref 11.5–14.5)
GLOBULIN SER CALC-MCNC: 3.9 G/DL (ref 2–4)
GLUCOSE SERPL-MCNC: 112 MG/DL (ref 65–100)
HCT VFR BLD AUTO: 36.8 % (ref 35–47)
HGB BLD-MCNC: 11.7 G/DL (ref 11.5–16)
IMM GRANULOCYTES # BLD: 0 K/UL (ref 0–0.04)
IMM GRANULOCYTES NFR BLD AUTO: 1 % (ref 0–0.5)
LYMPHOCYTES # BLD: 1.3 K/UL (ref 0.8–3.5)
LYMPHOCYTES NFR BLD: 19 % (ref 12–49)
MCH RBC QN AUTO: 27.7 PG (ref 26–34)
MCHC RBC AUTO-ENTMCNC: 31.8 G/DL (ref 30–36.5)
MCV RBC AUTO: 87 FL (ref 80–99)
MONOCYTES # BLD: 0.9 K/UL (ref 0–1)
MONOCYTES NFR BLD: 13 % (ref 5–13)
NEUTS SEG # BLD: 4.6 K/UL (ref 1.8–8)
NEUTS SEG NFR BLD: 67 % (ref 32–75)
NRBC # BLD: 0 K/UL (ref 0–0.01)
NRBC BLD-RTO: 0 PER 100 WBC
PLATELET # BLD AUTO: 193 K/UL (ref 150–400)
PMV BLD AUTO: 10.7 FL (ref 8.9–12.9)
POTASSIUM SERPL-SCNC: 3.4 MMOL/L (ref 3.5–5.1)
PROT SERPL-MCNC: 6.6 G/DL (ref 6.4–8.2)
RBC # BLD AUTO: 4.23 M/UL (ref 3.8–5.2)
SODIUM SERPL-SCNC: 143 MMOL/L (ref 136–145)
TROPONIN I SERPL-MCNC: <0.04 NG/ML
WBC # BLD AUTO: 7 K/UL (ref 3.6–11)

## 2018-03-28 PROCEDURE — 94640 AIRWAY INHALATION TREATMENT: CPT

## 2018-03-28 PROCEDURE — 74011250636 HC RX REV CODE- 250/636: Performed by: NURSE PRACTITIONER

## 2018-03-28 PROCEDURE — 84484 ASSAY OF TROPONIN QUANT: CPT | Performed by: FAMILY MEDICINE

## 2018-03-28 PROCEDURE — 74011000250 HC RX REV CODE- 250: Performed by: FAMILY MEDICINE

## 2018-03-28 PROCEDURE — 65660000000 HC RM CCU STEPDOWN

## 2018-03-28 PROCEDURE — 36415 COLL VENOUS BLD VENIPUNCTURE: CPT | Performed by: FAMILY MEDICINE

## 2018-03-28 PROCEDURE — 74011250636 HC RX REV CODE- 250/636: Performed by: FAMILY MEDICINE

## 2018-03-28 PROCEDURE — 74011250636 HC RX REV CODE- 250/636

## 2018-03-28 PROCEDURE — 85025 COMPLETE CBC W/AUTO DIFF WBC: CPT | Performed by: FAMILY MEDICINE

## 2018-03-28 PROCEDURE — 83880 ASSAY OF NATRIURETIC PEPTIDE: CPT | Performed by: HOSPITALIST

## 2018-03-28 PROCEDURE — 74011250637 HC RX REV CODE- 250/637: Performed by: FAMILY MEDICINE

## 2018-03-28 PROCEDURE — C8929 TTE W OR WO FOL WCON,DOPPLER: HCPCS

## 2018-03-28 PROCEDURE — 80053 COMPREHEN METABOLIC PANEL: CPT | Performed by: FAMILY MEDICINE

## 2018-03-28 PROCEDURE — 74011250637 HC RX REV CODE- 250/637: Performed by: NURSE PRACTITIONER

## 2018-03-28 PROCEDURE — 74011250637 HC RX REV CODE- 250/637: Performed by: INTERNAL MEDICINE

## 2018-03-28 RX ORDER — DIGOXIN 250 MCG
0.25 TABLET ORAL 2 TIMES DAILY
Status: COMPLETED | OUTPATIENT
Start: 2018-03-28 | End: 2018-03-29

## 2018-03-28 RX ORDER — SODIUM CHLORIDE 0.9 % (FLUSH) 0.9 %
SYRINGE (ML) INJECTION
Status: DISPENSED
Start: 2018-03-28 | End: 2018-03-28

## 2018-03-28 RX ORDER — POTASSIUM CHLORIDE 750 MG/1
40 TABLET, FILM COATED, EXTENDED RELEASE ORAL
Status: COMPLETED | OUTPATIENT
Start: 2018-03-28 | End: 2018-03-28

## 2018-03-28 RX ORDER — SODIUM CHLORIDE 0.9 % (FLUSH) 0.9 %
20 SYRINGE (ML) INJECTION
Status: ACTIVE | OUTPATIENT
Start: 2018-03-28 | End: 2018-03-28

## 2018-03-28 RX ORDER — ENOXAPARIN SODIUM 100 MG/ML
40 INJECTION SUBCUTANEOUS EVERY 24 HOURS
Status: DISCONTINUED | OUTPATIENT
Start: 2018-03-28 | End: 2018-03-28

## 2018-03-28 RX ORDER — LISINOPRIL 10 MG/1
10 TABLET ORAL DAILY
Status: DISCONTINUED | OUTPATIENT
Start: 2018-03-28 | End: 2018-03-30

## 2018-03-28 RX ADMIN — LISINOPRIL 10 MG: 10 TABLET ORAL at 13:06

## 2018-03-28 RX ADMIN — FUROSEMIDE 40 MG: 10 INJECTION, SOLUTION INTRAMUSCULAR; INTRAVENOUS at 19:19

## 2018-03-28 RX ADMIN — ENOXAPARIN SODIUM 40 MG: 40 INJECTION SUBCUTANEOUS at 11:43

## 2018-03-28 RX ADMIN — Medication 10 ML: at 07:02

## 2018-03-28 RX ADMIN — ASPIRIN 81 MG: 81 TABLET, COATED ORAL at 09:03

## 2018-03-28 RX ADMIN — IPRATROPIUM BROMIDE AND ALBUTEROL SULFATE 3 ML: .5; 3 SOLUTION RESPIRATORY (INHALATION) at 19:50

## 2018-03-28 RX ADMIN — POTASSIUM CHLORIDE 40 MEQ: 750 TABLET, EXTENDED RELEASE ORAL at 11:43

## 2018-03-28 RX ADMIN — IPRATROPIUM BROMIDE AND ALBUTEROL SULFATE 3 ML: .5; 3 SOLUTION RESPIRATORY (INHALATION) at 12:45

## 2018-03-28 RX ADMIN — DILTIAZEM HYDROCHLORIDE 30 MG: 30 TABLET, FILM COATED ORAL at 07:02

## 2018-03-28 RX ADMIN — IPRATROPIUM BROMIDE AND ALBUTEROL SULFATE 3 ML: .5; 3 SOLUTION RESPIRATORY (INHALATION) at 00:33

## 2018-03-28 RX ADMIN — DIGOXIN 0.25 MG: 0.25 TABLET ORAL at 22:08

## 2018-03-28 RX ADMIN — DILTIAZEM HYDROCHLORIDE 30 MG: 30 TABLET, FILM COATED ORAL at 11:43

## 2018-03-28 RX ADMIN — IPRATROPIUM BROMIDE AND ALBUTEROL SULFATE 3 ML: .5; 3 SOLUTION RESPIRATORY (INHALATION) at 15:29

## 2018-03-28 RX ADMIN — FUROSEMIDE 40 MG: 10 INJECTION, SOLUTION INTRAMUSCULAR; INTRAVENOUS at 07:02

## 2018-03-28 RX ADMIN — IPRATROPIUM BROMIDE AND ALBUTEROL SULFATE 3 ML: .5; 3 SOLUTION RESPIRATORY (INHALATION) at 08:18

## 2018-03-28 RX ADMIN — PERFLUTREN 1.5 ML: 6.52 INJECTION, SUSPENSION INTRAVENOUS at 09:39

## 2018-03-28 RX ADMIN — DIGOXIN 0.25 MG: 0.25 TABLET ORAL at 13:06

## 2018-03-28 RX ADMIN — DILTIAZEM HYDROCHLORIDE 30 MG: 30 TABLET, FILM COATED ORAL at 19:19

## 2018-03-28 RX ADMIN — RIVAROXABAN 20 MG: 20 TABLET, FILM COATED ORAL at 13:06

## 2018-03-28 NOTE — PROGRESS NOTES
TRANSFER - OUT REPORT:    Verbal report given to Tejal(name) on Skylar Flores  being transferred to [facility](unit) for routine progression of care       Report consisted of patients Situation, Background, Assessment and   Recommendations(SBAR). Information from the following report(s) SBAR, Kardex, ED Summary, Intake/Output, MAR, Accordion, Recent Results, Med Rec Status and Cardiac Rhythm Afib was reviewed with the receiving nurse. Lines:   Peripheral IV 03/27/18 Left; Inner Antecubital (Active)   Site Assessment Clean, dry, & intact 3/27/2018  8:15 PM   Phlebitis Assessment 0 3/27/2018  8:15 PM   Infiltration Assessment 0 3/27/2018  8:15 PM   Dressing Status Clean, dry, & intact 3/27/2018  8:15 PM   Dressing Type Transparent 3/27/2018  8:15 PM   Hub Color/Line Status Pink;Flushed;Capped 3/27/2018  8:15 PM   Action Taken Open ports on tubing capped 3/27/2018  8:15 PM   Alcohol Cap Used Yes 3/27/2018  8:15 PM        Opportunity for questions and clarification was provided.       Patient transported with:   Monitor  O2 @ 2 liters  Registered Nurse  [facility]

## 2018-03-28 NOTE — ED NOTES
Verbal shift change report given to Amara Segovia (oncoming nurse) by Hayley Sherwood (offgoing nurse). Report included the following information ED Summary and Recent Results.

## 2018-03-28 NOTE — PROGRESS NOTES
Bedside shift change report given to Sandra (oncoming nurse) by Deidra Toledo (offgoing nurse). Report included the following information SBAR, ED Summary, Intake/Output, Recent Results and Cardiac Rhythm A flutter.

## 2018-03-28 NOTE — PROGRESS NOTES
03/28/18 0317   Vitals   Temp 97.9 °F (36.6 °C)   Temp Source Oral   Pulse (Heart Rate) (!) 106   Heart Rate Source Monitor   Resp Rate 22   O2 Sat (%) 100 %   Level of Consciousness Alert   /48   MAP (Calculated) 71   BP 1 Location Right arm   BP 1 Method Automatic   BP Patient Position Post activity   Cardiac Rhythm A Fib   MEWS Score 3   Alarms Set and Audible Cardiac alarms   Box Number Flex 27   Electrodes Replaced Yes     Just received patient from ED. Mews score as been a three. Pt has a hx of afib and is in a -fib. Will continue to monitor.

## 2018-03-28 NOTE — PROGRESS NOTES
Hospitalist Progress Note  Lokesh Bennett, NP  Answering service: [phone] from in house phone  Cell: [phone]      Date of Service:  3/28/2018  NAME:  Maggy Cooper  :  1940  MRN:  [id number]      Admission Summary:   Ms. Elizabeth Carr is a 70 y/o AA female who presented to the ED on 3/27 with sob. Has been non compliant with her medications for months now because she felt they were not helping her. She reports her sob has been ongoing for a couple of months now but worse over the last week, worse when flat, has noted some wheezing with sob. Sob has become worse with exertion. + pedal/abd swelling per patient. + cough with intermittent yellow sputum. PMH of a-fib, asymptomatic bradycardia, chronic low back pain, morbid obesity and history of GI bleed. Interval history / Subjective:      Pt is lethargic, didn't sleep well. Reports she has sleep apnea but is non compliant with this as her mask is too bothersome. Pt appears comfortable on RA, no resp distress. Reports her breathing is better. Pt diuresed well, UOP 4150. Assessment & Plan:     Congestive heart failure exacerbation, NYHA IV on admission  - pending echo  - cxr => mild pulm edema  - CTA chest = > no PE or consolidation, small pleural effusions   - c/w diuretics, strict I/o;s, daily weight  - troponin's negative  - cardiology consulted, follows with Dr. Aditya Garber  - lipid panel reviewed  - will need H2H on discharge and 6 MWT    A-fib with RVR (POA)  - no cardizem drip in hospital, started on po cardizem  - pending echo  - cardiology consulted  - c/w asa  - Ggays4Gtvr score of 4 (female, age, htn).   Pt has a previous hx of GIB which is probably why she is not on any OAC    Hypokalemia  - repleted, monitor    Hx of HTN      Code status: Full  DVT prophylaxis: Lovenox  Care Plan discussed with: patient, attending MD  Disposition: TBD, lives at home has two sons that live her Hospital Problems  Date Reviewed: 3/27/2018          Codes Class Noted POA    * (Principal)CHF exacerbation ([facility] [address] ICD-10-CM: I50.9  ICD-9-CM: 428.0  3/27/2018 Unknown                Review of Systems:   Denied  any fever or chills associated with her symptoms. Denies any headache, blurry vision, sore throat, trouble swallowing, trouble with speech. Denied chest pain, denies any palpitations, diaphoresis. Denies any lightheadedness, dizziness, abdominal pain, constipation, diarrhea, urinary symptoms, focal or generalized neurological weakness, recent travel, sick contacts, falls, injuries, hematemesis, melena, hemoptysis or any concerns or problems. Vital Signs:    Last 24hrs VS reviewed since prior progress note. Most recent are:  Visit Vitals    /84 (BP 1 Location: Right arm, BP Patient Position: Sitting)    Pulse (!) 124    Temp 97.7 °F (36.5 °C)    Resp 24    Wt 150.9 kg (332 lb 10.8 oz)    SpO2 97%    BMI 49.13 kg/m2         Intake/Output Summary (Last 24 hours) at 03/28/18 0946  Last data filed at 03/28/18 9611   Gross per 24 hour   Intake              700 ml   Output             4875 ml   Net            -4175 ml        Physical Examination:             Constitutional:  No acute distress, cooperative, pleasant    ENT:  Oral mucous moist, oropharynx benign. Neck supple   Resp:  CTA bilaterally. Bibasilar crackles posteriorly. No accessory muscle use   CV:  Irregular rhythm, tachy rate, no murmurs, gallops, rubs. A-fib on tele    GI:  Soft, non distended, non tender. normoactive bowel sounds, no hepatosplenomegaly     Musculoskeletal:  +1ble edema, warm, 2+ pulses throughout    Neurologic:  Moves all extremities. AAOx3, CN II-XII reviewed     Psych:  Good insight, Not anxious nor agitated.                            Skin:  Good turgor, no rashes or ulcers       Data Review:    Review and/or order of clinical lab test  Review and/or order of tests in the radiology section of CPT  Review and/or order of tests in the medicine section of [facility]      Labs:     Recent Labs      03/28/18   0341  03/27/18   1303   WBC  7.0  7.1   HGB  11.7  12.7   HCT  36.8  39.9   PLT  193  195     Recent Labs      03/28/18   0341  03/27/18   1303   NA  143  145   K  3.4*  3.3*   CL  106  108   CO2  33*  28   BUN  6  5*   CREA  0.92  0.92   GLU  112*  89   CA  8.6  8.8   MG   --   1.7     Recent Labs      03/28/18 0341 03/27/18   1303   SGOT  7*  10*   ALT  <6*  7*   AP  75  80   TBILI  0.9  1.1*   TP  6.6  7.0   ALB  2.7*  2.9*   GLOB  3.9  4.1*     No results for input(s): INR, PTP, APTT in the last 72 hours. No lab exists for component: INREXT   No results for input(s): FE, TIBC, PSAT, FERR in the last 72 hours. No results found for: FOL, RBCF   No results for input(s): PH, PCO2, PO2 in the last 72 hours.   Recent Labs      03/28/18 0341 03/27/18 2017 03/27/18   1303   TROIQ  <0.04  <0.04  <0.04     Lab Results   Component Value Date/Time    Cholesterol, total 168 03/27/2018 01:03 PM    HDL Cholesterol 61 03/27/2018 01:03 PM    LDL, calculated 85.8 03/27/2018 01:03 PM    Triglyceride 106 03/27/2018 01:03 PM    CHOL/HDL Ratio 2.8 03/27/2018 01:03 PM     Lab Results   Component Value Date/Time    Glucose (POC) 108 (H) 10/03/2014 06:32 AM     Lab Results   Component Value Date/Time    Color YELLOW/STRAW 03/27/2018 04:57 PM    Appearance CLOUDY (A) 03/27/2018 04:57 PM    Specific gravity 1.014 03/27/2018 04:57 PM    pH (UA) 6.0 03/27/2018 04:57 PM    Protein NEGATIVE  03/27/2018 04:57 PM    Glucose NEGATIVE  03/27/2018 04:57 PM    Ketone NEGATIVE  03/27/2018 04:57 PM    Bilirubin NEGATIVE  03/27/2018 04:57 PM    Urobilinogen 0.2 03/27/2018 04:57 PM    Nitrites NEGATIVE  03/27/2018 04:57 PM    Leukocyte Esterase NEGATIVE  03/27/2018 04:57 PM    Epithelial cells FEW 03/27/2018 04:57 PM    Bacteria 1+ (A) 03/27/2018 04:57 PM    WBC 0-4 03/27/2018 04:57 PM    RBC 0-5 03/27/2018 04:57 PM         Medications Reviewed: Current Facility-Administered Medications   Medication Dose Route Frequency    perflutren lipid microspheres (DEFINITY) in NS bolus IV  1.5 mL IntraVENous RAD ONCE    saline peripheral flush soln 20 mL  20 mL InterCATHeter RAD ONCE    sodium chloride (NS) flush        sodium chloride (NS) flush 5-10 mL  5-10 mL IntraVENous PRN    furosemide (LASIX) injection 40 mg  40 mg IntraVENous BID    nitroglycerin (NITROSTAT) tablet 0.4 mg  0.4 mg SubLINGual Q5MIN PRN    aspirin delayed-release tablet 81 mg  81 mg Oral DAILY    acetaminophen (TYLENOL) tablet 650 mg  650 mg Oral Q4H PRN    ondansetron (ZOFRAN) injection 4 mg  4 mg IntraVENous Q4H PRN    dilTIAZem (CARDIZEM) IR tablet 30 mg  30 mg Oral Q6H    albuterol-ipratropium (DUO-NEB) 2.5 MG-0.5 MG/3 ML  3 mL Nebulization Q4H RT     ______________________________________________________________________  EXPECTED LENGTH OF STAY: - - -  ACTUAL LENGTH OF STAY:          1                 Josefa Renteria V NP

## 2018-03-28 NOTE — CONSULTS
Cardiology Consult Note      Pt seen and examined and chart reviewed. Imp:  Heart failure likely related to recurrent afib. Last EF was normal 55% (1/14)  Afib-had been controlled in NSR on sotalol as of last time she was seen ( 5/17/16!)  HTN  Sleep apnea  Morbid obesity  DJD  Hx GI bleed remote-nothing recent    Rec:  Lisinopril for HTN  Lasix   Rate control with cardizem and dig pending EF  Anticoagulation for short term unless PT thinks she is too unstable. History:  Last seen in 2016, maintaining sinus rhythm on sotalol. BP well controlled on lisinopril. She decided to stop her meds. Began with edema and increased shortness of breath a few weeks ago. She denies palpitations or chest pain. She has had no recent GI bleeds, falls and recently had a screening colonoscopy which was normal. Previous cardiolite stress tests have been normal.     Meds PTA: none  All: PCN  SH: lives with 2 sons; walks with walker; no tob or etoh  FH: NC  ROS: negative except as above and: still awakens short of breath (apnea)    Exam:  Blood pressure (!) 156/111, pulse (!) 109, temperature 98.2 °F (36.8 °C), resp. rate 23, weight 150.9 kg (332 lb 10.8 oz), SpO2 96 %.   Lungs clear  No JVD  Cor irreg with soft DERRICK  Abd soft; slightly tender over bennie scar; NABS  Ext with mild bilateral edema; distal pulses are intact  Neuro-nonfocal; alert an orientedX3    EKG: afib with rapid rate  Labs reviewed: K 3.4; BNP 1183; troponin neg; Cr 0.92  Echo: low normal EF 50%; mild pulm HTN    Gerhardt Hess, MD

## 2018-03-28 NOTE — CDMP QUERY
Based on the need for increased specificity in documentation for the new ICD 10 coding system, please include the following components in your documentation regarding:  CHF     Documentation for heart failure must:   Specify Acuity :   Acute, Chronic, acute on chronic  Identify Type:    Systolic, Diastolic, Combined systolic and diastolic failure   List the relationship of HTN to Heart Failure  Identify the underlying cause     Please clarify and document your clinical opinion in the progress notes and discharge summary including the definitive and/or presumptive diagnosis, (suspected or probable), related to the above clinical findings. Please include clinical findings supporting your diagnosis.     Thank you  Emili Kelly  [facility]  [phone]

## 2018-03-28 NOTE — PROGRESS NOTES
Pt unsure of the names and dosages of her medications from home.  Pt will call her son to give her an updated list.

## 2018-03-28 NOTE — NURSE NAVIGATOR
Chart reviewed by Heart Failure Nurse Navigator. Heart Failure database completed. EF:  Pending     ACEi/ARB: not currently on. Echo Pending    BB: not currently on. Echo Pending    Aldosterone Antagonist: not currently on. Echo Pending    CRT not indicated at this time. NYHA Functional Class IV on admission. Heart Failure Teach Back in Patient Education. Heart Failure Avoiding Triggers on Discharge Instructions.       Cardiologist: Dr. Shira Gomez

## 2018-03-28 NOTE — PROGRESS NOTES
CM reviewed chart. Patient admitted from the ER with CHF exacerbation. Patient has a past medical history of atrial fibrillation, history of asymptomatic bradycardia, chronic low back pain, morbid obesity and history of GI bleed. CM met with patient, explained role and discussed discharge planning. Patient lives with 2 of her 6 sons in her private residence. She is independent without any assistive devices. CM asked patient why she stopped taking her BP and heart medications, patient stated that she did not stop, she just did not refill them. Patient also said \"I will not do that again\" CM confirmed that she did not stop refilling her medications due to a financial issue. Patient had not seen her PCP in 9 months and uses her local [facility]arm[facility] for prescriptions. Patient's son will provide transportation home and she did not voice any discharge barriers. CM discuss the [facility] visit with patient, she agreed to the 1 x visit. Referral sent via [facility] to [facility] IN[facility]. Huber Armando MSA, RN, CRM. Care Management Interventions  PCP Verified by CM: Yes  Mode of Transport at Discharge: Other (see comment) (Private car)  Transition of Care Consult (CM Consult): Discharge Planning, [address]: Yes  MyChart Signup: No  Discharge Durable Medical Equipment: No  Health Maintenance Reviewed: Yes  Physical Therapy Consult: Yes  Occupational Therapy Consult: No  Speech Therapy Consult: No  Current Support Network:  Other (Two of her sons live with her.)  Confirm Follow Up Transport: Family  Plan discussed with Pt/Family/Caregiver: Yes  Freedom of Choice Offered: Yes  Discharge Location  Discharge Placement: Home with home health

## 2018-03-28 NOTE — CDMP QUERY
Thank you for documenting the diagnosis of A-fib for this patient. Please clarify if this diagnosis could be further specified as:     =>Paroxysmal  =>Persistent  =>Permanent  =>Other explanations of clinical findings  =>Unable to Determine    Please document your response indicating your clinical opinion in your progress notes and discharge summary. -------------------------------------------------------------------------------------------------  Paroxysmal:  begins suddenly and stops on its own. Symptoms can be mild or severe. They stop within about a week, but usually in less than 24 hours. Persistent: continues for more than a week. It may stop on its own, or it can be stopped with treatment.     Permanent: cannot be restored with treatment    Thank you  Nayana Burrell  [facility]  [phone]

## 2018-03-28 NOTE — PROGRESS NOTES
TRANSFER - IN REPORT:    Verbal report received from Jake Gomez (name) on Phil Padilla  being received from Emergency Department(unit) for routine progression of care      Report consisted of patients Situation, Background, Assessment and   Recommendations(SBAR). Information from the following report(s) SBAR, ED Summary, Intake/Output, MAR, Recent Results and Cardiac Rhythm A fib was reviewed with the receiving nurse. Opportunity for questions and clarification was provided. Assessment completed upon patients arrival to unit and care assumed. Spoke with MALCOLM Gomez about Cardiology consult.

## 2018-03-29 LAB
ANION GAP SERPL CALC-SCNC: 6 MMOL/L (ref 5–15)
BUN SERPL-MCNC: 5 MG/DL (ref 6–20)
BUN/CREAT SERPL: 6 (ref 12–20)
CALCIUM SERPL-MCNC: 8.5 MG/DL (ref 8.5–10.1)
CHLORIDE SERPL-SCNC: 104 MMOL/L (ref 97–108)
CO2 SERPL-SCNC: 32 MMOL/L (ref 21–32)
CREAT SERPL-MCNC: 0.88 MG/DL (ref 0.55–1.02)
GLUCOSE SERPL-MCNC: 98 MG/DL (ref 65–100)
POTASSIUM SERPL-SCNC: 3.2 MMOL/L (ref 3.5–5.1)
SODIUM SERPL-SCNC: 142 MMOL/L (ref 136–145)

## 2018-03-29 PROCEDURE — 74011250637 HC RX REV CODE- 250/637: Performed by: INTERNAL MEDICINE

## 2018-03-29 PROCEDURE — 74011250636 HC RX REV CODE- 250/636: Performed by: FAMILY MEDICINE

## 2018-03-29 PROCEDURE — 74011250637 HC RX REV CODE- 250/637: Performed by: FAMILY MEDICINE

## 2018-03-29 PROCEDURE — 65270000029 HC RM PRIVATE

## 2018-03-29 PROCEDURE — G8979 MOBILITY GOAL STATUS: HCPCS

## 2018-03-29 PROCEDURE — 36415 COLL VENOUS BLD VENIPUNCTURE: CPT

## 2018-03-29 PROCEDURE — 74011250637 HC RX REV CODE- 250/637: Performed by: NURSE PRACTITIONER

## 2018-03-29 PROCEDURE — 80048 BASIC METABOLIC PNL TOTAL CA: CPT

## 2018-03-29 PROCEDURE — G8978 MOBILITY CURRENT STATUS: HCPCS

## 2018-03-29 PROCEDURE — 97161 PT EVAL LOW COMPLEX 20 MIN: CPT

## 2018-03-29 RX ORDER — POTASSIUM CHLORIDE 750 MG/1
20 TABLET, FILM COATED, EXTENDED RELEASE ORAL 2 TIMES DAILY
Status: DISCONTINUED | OUTPATIENT
Start: 2018-03-29 | End: 2018-03-30 | Stop reason: HOSPADM

## 2018-03-29 RX ORDER — IPRATROPIUM BROMIDE AND ALBUTEROL SULFATE 2.5; .5 MG/3ML; MG/3ML
3 SOLUTION RESPIRATORY (INHALATION)
Status: DISCONTINUED | OUTPATIENT
Start: 2018-03-29 | End: 2018-03-30 | Stop reason: HOSPADM

## 2018-03-29 RX ORDER — POTASSIUM CHLORIDE 750 MG/1
40 TABLET, FILM COATED, EXTENDED RELEASE ORAL
Status: COMPLETED | OUTPATIENT
Start: 2018-03-29 | End: 2018-03-29

## 2018-03-29 RX ADMIN — POTASSIUM CHLORIDE 40 MEQ: 750 TABLET, EXTENDED RELEASE ORAL at 09:41

## 2018-03-29 RX ADMIN — DILTIAZEM HYDROCHLORIDE 30 MG: 30 TABLET, FILM COATED ORAL at 17:13

## 2018-03-29 RX ADMIN — FUROSEMIDE 40 MG: 10 INJECTION, SOLUTION INTRAMUSCULAR; INTRAVENOUS at 06:35

## 2018-03-29 RX ADMIN — POTASSIUM CHLORIDE 20 MEQ: 750 TABLET, EXTENDED RELEASE ORAL at 12:16

## 2018-03-29 RX ADMIN — LISINOPRIL 10 MG: 10 TABLET ORAL at 10:13

## 2018-03-29 RX ADMIN — POTASSIUM CHLORIDE 20 MEQ: 750 TABLET, EXTENDED RELEASE ORAL at 17:13

## 2018-03-29 RX ADMIN — ASPIRIN 81 MG: 81 TABLET, COATED ORAL at 09:41

## 2018-03-29 RX ADMIN — DILTIAZEM HYDROCHLORIDE 30 MG: 30 TABLET, FILM COATED ORAL at 06:35

## 2018-03-29 RX ADMIN — FUROSEMIDE 40 MG: 10 INJECTION, SOLUTION INTRAMUSCULAR; INTRAVENOUS at 18:56

## 2018-03-29 RX ADMIN — DIGOXIN 0.25 MG: 0.25 TABLET ORAL at 09:41

## 2018-03-29 RX ADMIN — DILTIAZEM HYDROCHLORIDE 30 MG: 30 TABLET, FILM COATED ORAL at 12:16

## 2018-03-29 RX ADMIN — RIVAROXABAN 20 MG: 20 TABLET, FILM COATED ORAL at 12:16

## 2018-03-29 RX ADMIN — DILTIAZEM HYDROCHLORIDE 30 MG: 30 TABLET, FILM COATED ORAL at 01:19

## 2018-03-29 RX ADMIN — DIGOXIN 0.25 MG: 0.25 TABLET ORAL at 20:41

## 2018-03-29 NOTE — PROGRESS NOTES
Patient unable to complete six minute walk; Could not walk more than 49 seconds before needing to sit down. Results below. 03/29/18 1400   Six Minute Walk Report (PRE)   Heart Rate 76   O2 Saturation 96   Six Minute Walk Report (POST)   Heart Rate 88   O2 Saturation 93   Distance Walked in Feet (ft) 36 ft.    Distance in Meters 10.97 meters

## 2018-03-29 NOTE — PROGRESS NOTES
Problem: Heart Failure: Day 2  Goal: *Demonstrates progressive activity  Outcome: Progressing Towards Goal  Pt walks to the bathroom with walker

## 2018-03-29 NOTE — PROGRESS NOTES
Hospitalist Progress Note  Sav Earl NP  Answering service: [phone] from in house phone  Cell: [phone]      Date of Service:  3/29/2018  NAME:  Shilo Schuler  :  1940  MRN:  [id number]      Admission Summary:   Ms. Melissa Ramirez is a 70 y/o AA female who presented to the ED on 3/27 with sob. Has been non compliant with her medications for months now because she felt they were not helping her. She reports her sob has been ongoing for a couple of months now but worse over the last week, worse when flat, has noted some wheezing with sob. Sob has become worse with exertion. + pedal/abd swelling per patient. + cough with intermittent yellow sputum. PMH of a-fib, asymptomatic bradycardia, chronic low back pain, morbid obesity and history of GI bleed. Interval history / Subjective:      Pt is more awake today sitting in chair just finished walking for her 6 mwt  No home oxygen needed  99% on RA  UOP 2150 in last 24 hours, c/w diuresis  Probable d/c tomorrow     Assessment & Plan:     Diastolic Congestive heart failure exacerbation, NYHA IV on admission  - pending echo  - cxr => mild pulm edema  - CTA chest = > no PE or consolidation, small pleural effusions   - c/w diuretics, strict I/o;s, daily weight  - troponin's negative  - cardiology consulted, follows with Dr. Jose Antonio Razo  - lipid panel reviewed  - will need H2H on discharge and 6 MWT    A-fib with RVR (POA)  - no cardizem drip in hospital, started on po cardizem  - pending echo  - cardiology consulted  - c/w asa  - Skldv3Gwbh score of 4 (female, age, htn).   Pt has a previous hx of GIB which is probably why she is not on any OAC  - c/w digoxin, ditiazem    Hypokalemia  - repleted, monitor    Hx of HTN  - better controlled, c/w lisinopril    Code status: Full  DVT prophylaxis: Lovenox  Care Plan discussed with: patient, attending MD  Disposition: TBD, lives at home has two sons that live her     Hospital Problems  Date Reviewed: 3/27/2018          Codes Class Noted POA    * (Principal)CHF exacerbation ([address] ICD-10-CM: I50.9  ICD-9-CM: 428.0  3/27/2018 Unknown                Review of Systems:   Denied  any fever or chills associated with her symptoms. Denies any headache, blurry vision, sore throat, trouble swallowing, trouble with speech. Denied chest pain, denies any palpitations, diaphoresis. Denies any lightheadedness, dizziness, abdominal pain, constipation, diarrhea, urinary symptoms, focal or generalized neurological weakness, recent travel, sick contacts, falls, injuries, hematemesis, melena, hemoptysis or any concerns or problems. Vital Signs:    Last 24hrs VS reviewed since prior progress note. Most recent are:  Visit Vitals    BP (!) 136/92 (BP 1 Location: Right arm, BP Patient Position: Sitting)    Pulse (!) 114    Temp 97.9 °F (36.6 °C)    Resp 18    Wt 149.9 kg (330 lb 7.5 oz)    SpO2 99%    BMI 48.8 kg/m2         Intake/Output Summary (Last 24 hours) at 03/29/18 1419  Last data filed at 03/29/18 1015   Gross per 24 hour   Intake              720 ml   Output             1675 ml   Net             -955 ml        Physical Examination:             Constitutional:  No acute distress, cooperative, pleasant    ENT:  Oral mucous moist, oropharynx benign. Neck supple   Resp:  CTA bilaterally. No accessory muscle use   CV:  Irregular rhythm, tachy rate, no murmurs, gallops, rubs. A-fib on tele    GI:  Soft, non distended, non tender. normoactive bowel sounds, no hepatosplenomegaly     Musculoskeletal:  +1ble edema, L >R, warm, 2+ pulses throughout    Neurologic:  Moves all extremities. AAOx3, CN II-XII reviewed     Psych:  Good insight, Not anxious nor agitated.                            Skin:  Good turgor, no rashes or ulcers       Data Review:    Review and/or order of clinical lab test  Review and/or order of tests in the radiology section of CPT  Review and/or order of tests in the medicine section of [facility]      Labs:     Recent Labs      03/28/18   0341  03/27/18   1303   WBC  7.0  7.1   HGB  11.7  12.7   HCT  36.8  39.9   PLT  193  195     Recent Labs      03/29/18   0336  03/28/18   0341  03/27/18   1303   NA  142  143  145   K  3.2*  3.4*  3.3*   CL  104  106  108   CO2  32  33*  28   BUN  5*  6  5*   CREA  0.88  0.92  0.92   GLU  98  112*  89   CA  8.5  8.6  8.8   MG   --    --   1.7     Recent Labs      03/28/18   0341  03/27/18   1303   SGOT  7*  10*   ALT  <6*  7*   AP  75  80   TBILI  0.9  1.1*   TP  6.6  7.0   ALB  2.7*  2.9*   GLOB  3.9  4.1*     No results for input(s): INR, PTP, APTT in the last 72 hours. No lab exists for component: INREXT, INREXT   No results for input(s): FE, TIBC, PSAT, FERR in the last 72 hours. No results found for: FOL, RBCF   No results for input(s): PH, PCO2, PO2 in the last 72 hours.   Recent Labs      03/28/18   0341 03/27/18 2017 03/27/18   1303   TROIQ  <0.04  <0.04  <0.04     Lab Results   Component Value Date/Time    Cholesterol, total 168 03/27/2018 01:03 PM    HDL Cholesterol 61 03/27/2018 01:03 PM    LDL, calculated 85.8 03/27/2018 01:03 PM    Triglyceride 106 03/27/2018 01:03 PM    CHOL/HDL Ratio 2.8 03/27/2018 01:03 PM     Lab Results   Component Value Date/Time    Glucose (POC) 108 (H) 10/03/2014 06:32 AM     Lab Results   Component Value Date/Time    Color YELLOW/STRAW 03/27/2018 04:57 PM    Appearance CLOUDY (A) 03/27/2018 04:57 PM    Specific gravity 1.014 03/27/2018 04:57 PM    pH (UA) 6.0 03/27/2018 04:57 PM    Protein NEGATIVE  03/27/2018 04:57 PM    Glucose NEGATIVE  03/27/2018 04:57 PM    Ketone NEGATIVE  03/27/2018 04:57 PM    Bilirubin NEGATIVE  03/27/2018 04:57 PM    Urobilinogen 0.2 03/27/2018 04:57 PM    Nitrites NEGATIVE  03/27/2018 04:57 PM    Leukocyte Esterase NEGATIVE  03/27/2018 04:57 PM    Epithelial cells FEW 03/27/2018 04:57 PM    Bacteria 1+ (A) 03/27/2018 04:57 PM    WBC 0-4 03/27/2018 04:57 PM    RBC 0-5 03/27/2018 04:57 PM         Medications Reviewed:     Current Facility-Administered Medications   Medication Dose Route Frequency    albuterol-ipratropium (DUO-NEB) 2.5 MG-0.5 MG/3 ML  3 mL Nebulization Q4H PRN    potassium chloride SR (KLOR-CON 10) tablet 20 mEq  20 mEq Oral BID    digoxin (LANOXIN) tablet 0.25 mg  0.25 mg Oral BID    rivaroxaban (XARELTO) tablet 20 mg  20 mg Oral DAILY WITH LUNCH    lisinopril (PRINIVIL, ZESTRIL) tablet 10 mg  10 mg Oral DAILY    sodium chloride (NS) flush 5-10 mL  5-10 mL IntraVENous PRN    furosemide (LASIX) injection 40 mg  40 mg IntraVENous BID    nitroglycerin (NITROSTAT) tablet 0.4 mg  0.4 mg SubLINGual Q5MIN PRN    aspirin delayed-release tablet 81 mg  81 mg Oral DAILY    acetaminophen (TYLENOL) tablet 650 mg  650 mg Oral Q4H PRN    ondansetron (ZOFRAN) injection 4 mg  4 mg IntraVENous Q4H PRN    dilTIAZem (CARDIZEM) IR tablet 30 mg  30 mg Oral Q6H     ______________________________________________________________________  EXPECTED LENGTH OF STAY: 2d 12h  ACTUAL LENGTH OF STAY:          2                 Josefa Renteria V NP

## 2018-03-29 NOTE — PROGRESS NOTES
Attempted to schedule primary care transitional care appointment. [facility] is requesting patients to contact the office to schedule their appointments due to numerous no shows. Pending patient discharge.   Shahla Edwarsd, Care Management Specialist.

## 2018-03-29 NOTE — PROGRESS NOTES
Spiritual Care Partner Volunteer visited patient in Rm 439 on 3/29/18.   Documented by:  Chaplain Salinas MDiv, MS, [employer]  [address] [place] [address] [phone]

## 2018-03-29 NOTE — PROGRESS NOTES
Bedside shift change report given to Theirry Sethi RN and [facility], RN (oncoming nurse) by Kyung Carr RN (offgoing nurse). Report included the following information SBAR, Kardex, Intake/Output, MAR, Recent Results and Cardiac Rhythm A fib.

## 2018-03-29 NOTE — PROGRESS NOTES
Cardiology Progresst Note    She reports she is breathing better    Imp:  Heart failure likely related to recurrent afib and HTN-rate improved and BP lower  Afib-had been controlled in NSR on sotalol as of last time she was seen ( 5/17/16!)  HTN  Sleep apnea  Morbid obesity  DJD  Hx GI bleed remote-nothing recent    Rec:  Con't diuresis  PT to evaluate today  Will continue low dose cardizem and convert to long acting tomorrow if stable. Exam:  Blood pressure 126/82, pulse 100, temperature 98.7 °F (37.1 °C), resp. rate 16, weight 149.9 kg (330 lb 7.5 oz), SpO2 100 %.   Lungs clear  Cor irreg   Ext 2+edema    Labs reviewed: K 3.2    Lena Lopez MD

## 2018-03-29 NOTE — PROGRESS NOTES
Problem: Falls - Risk of  Goal: *Absence of Falls  Document No Fall Risk and appropriate interventions in the flowsheet.    Outcome: Progressing Towards Goal  Fall Risk Interventions:  Mobility Interventions: Patient to call before getting OOB         Medication Interventions: Patient to call before getting OOB

## 2018-03-29 NOTE — ROUTINE PROCESS
Patient has been on continuous pulse ox all night and her oxygen saturation has been 95% - 100% on room air. No patient complaints at this time.

## 2018-03-29 NOTE — NURSE NAVIGATOR
Post discharge followup appt scheduled with Dr. Harley/ATA 4/2/18 @ 1015AM.  Appt placed on AVS.  Roberto Carlos Cho RN-CHFN/HFNN

## 2018-03-29 NOTE — PROGRESS NOTES
Problem: Mobility Impaired (Adult and Pediatric)  Goal: *Acute Goals and Plan of Care (Insert Text)  Physical Therapy Goals  Initiated 03/29/18   1. Patient will move from supine to sit and sit to supine , scoot up and down and roll side to side in bed with modified independence within 7 day(s). 2.  Patient will transfer from bed to chair and chair to bed with modified independence using the least restrictive device within 7 day(s). 3.  Patient will perform sit to stand with modified independence within 7 day(s). 4.  Patient will ambulate with modified independence for 75 feet with the least restrictive device within 7 day(s). 5.  Patient will ascend/descend 3 stairs with one handrail(s) with modified independence within 7 day(s). physical Therapy EVALUATION  Patient: Richad Mortimer (89 y.o. female)  Date: 3/29/2018  Primary Diagnosis: CHF exacerbation ([facility] [address]        Precautions: Fall       ASSESSMENT :  Based on the objective data described below, the patient presents with decreased functional independence compared to baseline with associated decreased strength, impaired standing balance, decreased tolerance to activity, and gait deviations. RN cleared patient for mobility and was received sitting at EOB with son present. Patient stood CGA and ambulated with significant forward trunk lean, shuffled gait, and decreased pace. Per patient and son this is near baseline. Patient transferred to chair at end of session with all needs placed within reach and RN notified of patient's status. PT educated patient on the importance of mobility and ambulating in order to progress enough to return home. Patient is near baseline however may benefit from skilled services in order to be successful at home. .    Patient will benefit from skilled intervention to address the above impairments.   Patients rehabilitation potential is considered to be Good  Factors which may influence rehabilitation potential include:   [x] None noted  []         Mental ability/status  []         Medical condition  []         Home/family situation and support systems  []         Safety awareness  []         Pain tolerance/management  []         Other:      PLAN :  Recommendations and Planned Interventions:  [x]           Bed Mobility Training             [x]    Neuromuscular Re-Education  [x]           Transfer Training                   []    Orthotic/Prosthetic Training  [x]           Gait Training                         []    Modalities  [x]           Therapeutic Exercises           []    Edema Management/Control  [x]           Therapeutic Activities            [x]    Patient and Family Training/Education  []           Other (comment):    Frequency/Duration: Patient will be followed by physical therapy  5 times a week to address goals. Discharge Recommendations: Home Health  Further Equipment Recommendations for Discharge: TBD     SUBJECTIVE:   Patient stated I usually walk like that, and all I need to do is go down three steps and the walkway to get out of the house.     OBJECTIVE DATA SUMMARY:   HISTORY:    Past Medical History:   Diagnosis Date    Arthritis     Atrial fibrillation ([address]     WHITING (dyspnea on exertion)     GERD (gastroesophageal reflux disease)     Obesity     Other ill-defined conditions(799.89)     \"fluid in the lung\"     Past Surgical History:   Procedure Laterality Date    COLONOSCOPY N/A 3/30/2017    [facility] Nik [facility] ENDOSCOPY    HX CHOLECYSTECTOMY      HX HERNIA REPAIR      HX ORTHOPAEDIC      right and left total knee replacement    HX ORTHOPAEDIC      right shoulder    HX OTHER SURGICAL      kidney stone surgery    HX OTHER SURGICAL      both eyes cataract     Prior Level of Function/Home Situation: independent  Personal factors and/or comorbidities impacting plan of care:     Home Situation  Home Environment: Private residence  One/Two Story Residence: One story  Living Alone: No  Support Systems: Child(hansel), Family member(s)  Patient Expects to be Discharged to[de-identified] Private residence  Current DME Used/Available at Home: None, CPAP (pt should have CPAP but never bought it)    EXAMINATION/PRESENTATION/DECISION MAKING:   Critical Behavior:  Neurologic State: Alert  Orientation Level: Oriented X4  Cognition: Follows commands     Hearing: Auditory  Auditory Impairment: Hearing aid(s)  Skin:    Edema:   Range Of Motion:  AROM: Generally decreased, functional                       Strength:    Strength: Generally decreased, functional                    Tone & Sensation:                                  Coordination:  Coordination: Within functional limits  Vision:      Functional Mobility:  Bed Mobility:     Supine to Sit: Contact guard assistance  Sit to Supine: Contact guard assistance     Transfers:  Sit to Stand: Contact guard assistance  Stand to Sit: Contact guard assistance                       Balance:   Sitting: Intact  Standing: Intact; With support  Ambulation/Gait Training:  Distance (ft): 30 Feet (ft)  Assistive Device: Gait belt;Walker, rolling  Ambulation - Level of Assistance: Contact guard assistance        Gait Abnormalities: Decreased step clearance;Trunk sway increased (forward bend)        Base of Support: Widened     Speed/Samia: Pace decreased (<100 feet/min); Slow;Shuffled  Step Length: Left shortened;Right shortened                     Stairs:               Therapeutic Exercises:       Functional Measure:  Tinetti test:    Sitting Balance: 1  Arises: 1  Attempts to Rise: 1  Immediate Standing Balance: 1  Standing Balance: 1  Nudged: 1  Eyes Closed: 1  Turn 360 Degrees - Continuous/Discontinuous: 1  Turn 360 Degrees - Steady/Unsteady: 1  Sitting Down: 1  Balance Score: 10  Indication of Gait: 1  R Step Length/Height: 1  L Step Length/Height: 1  R Foot Clearance: 1  L Foot Clearance: 1  Step Symmetry: 1  Step Continuity: 1  Path: 1  Trunk: 0  Walking Time: 0  Gait Score: 8  Total Score: 18       Tinetti Test and G-code impairment scale:  Percentage of Impairment CH    0%   CI    1-19% CJ    20-39% CK    40-59% CL    60-79% CM    80-99% CN     100%   Tinetti  Score 0-28 28 23-27 17-22 12-16 6-11 1-5 0       Tinetti Tool Score Risk of Falls  <19 = High Fall Risk  19-24 = Moderate Fall Risk  25-28 = Low Fall Risk  Tinetti ME. Performance-Oriented Assessment of Mobility Problems in Elderly Patients. [facility]; [id number]. (Scoring Description: PT Bulletin Feb. 10, 1993)    Older adults: Jannette Banks et al, 2009; n = 1000 [facility] elderly evaluated with ABC, BUCK, ADL, and IADL)  · Mean BUCK score for males aged 69-68 years = 26.21(3.40)  · Mean BUCK score for females age 69-68 years = 25.16(4.30)  · Mean BUCK score for males over 80 years = 23.29(6.02)  · Mean BUCK score for females over 80 years = 17.20(8.32)         G codes: In compliance with CMSs Claims Based Outcome Reporting, the following G-code set was chosen for this patient based on their primary functional limitation being treated: The outcome measure chosen to determine the severity of the functional limitation was the Tinetti with a score of 18/28 which was correlated with the impairment scale.     ? Mobility - Walking and Moving Around:     - CURRENT STATUS: CJ - 20%-39% impaired, limited or restricted    - GOAL STATUS: CI - 1%-19% impaired, limited or restricted    - D/C STATUS:  ---------------To be determined---------------      Physical Therapy Evaluation Charge Determination   History Examination Presentation Decision-Making   MEDIUM  Complexity : 1-2 comorbidities / personal factors will impact the outcome/ POC  LOW Complexity : 1-2 Standardized tests and measures addressing body structure, function, activity limitation and / or participation in recreation  LOW Complexity : Stable, uncomplicated  Other outcome measures Tinetti  LOW       Based on the above components, the patient evaluation is determined to be of the following complexity level: LOW     Pain:  Pain Scale 1: Numeric (0 - 10)  Pain Intensity 1: 0              Activity Tolerance:   Good, limited by activity tolerance  Please refer to the flowsheet for vital signs taken during this treatment. After treatment:   [x]         Patient left in no apparent distress sitting up in chair  []         Patient left in no apparent distress in bed  [x]         Call bell left within reach  [x]         Nursing notified  [x]         Caregiver present  []         Bed alarm activated    COMMUNICATION/EDUCATION:   The patients plan of care was discussed with: Registered Nurse. [x]         Fall prevention education was provided and the patient/caregiver indicated understanding. [x]         Patient/family have participated as able in goal setting and plan of care. [x]         Patient/family agree to work toward stated goals and plan of care. []         Patient understands intent and goals of therapy, but is neutral about his/her participation. []         Patient is unable to participate in goal setting and plan of care.     Thank you for this referral.  Justino Eriwn PT, DPT   Time Calculation: 15 mins

## 2018-03-30 VITALS
SYSTOLIC BLOOD PRESSURE: 120 MMHG | DIASTOLIC BLOOD PRESSURE: 39 MMHG | BODY MASS INDEX: 48.8 KG/M2 | HEART RATE: 80 BPM | OXYGEN SATURATION: 96 % | WEIGHT: 293 LBS | TEMPERATURE: 98.1 F | RESPIRATION RATE: 18 BRPM

## 2018-03-30 PROBLEM — I10 HYPERTENSION: Status: ACTIVE | Noted: 2018-03-30

## 2018-03-30 PROBLEM — I48.91 A-FIB (HCC): Status: ACTIVE | Noted: 2018-03-30

## 2018-03-30 PROBLEM — I50.9 CHF EXACERBATION (HCC): Status: RESOLVED | Noted: 2018-03-27 | Resolved: 2018-03-30

## 2018-03-30 PROBLEM — E87.6 HYPOKALEMIA: Status: ACTIVE | Noted: 2018-03-30

## 2018-03-30 PROBLEM — E87.6 HYPOKALEMIA: Status: RESOLVED | Noted: 2018-03-30 | Resolved: 2018-03-30

## 2018-03-30 LAB
ANION GAP SERPL CALC-SCNC: 8 MMOL/L (ref 5–15)
BUN SERPL-MCNC: 7 MG/DL (ref 6–20)
BUN/CREAT SERPL: 6 (ref 12–20)
CALCIUM SERPL-MCNC: 8.6 MG/DL (ref 8.5–10.1)
CHLORIDE SERPL-SCNC: 104 MMOL/L (ref 97–108)
CO2 SERPL-SCNC: 30 MMOL/L (ref 21–32)
CREAT SERPL-MCNC: 1.16 MG/DL (ref 0.55–1.02)
GLUCOSE SERPL-MCNC: 133 MG/DL (ref 65–100)
POTASSIUM SERPL-SCNC: 3.6 MMOL/L (ref 3.5–5.1)
SODIUM SERPL-SCNC: 142 MMOL/L (ref 136–145)

## 2018-03-30 PROCEDURE — 74011250636 HC RX REV CODE- 250/636: Performed by: FAMILY MEDICINE

## 2018-03-30 PROCEDURE — 36415 COLL VENOUS BLD VENIPUNCTURE: CPT | Performed by: INTERNAL MEDICINE

## 2018-03-30 PROCEDURE — 80048 BASIC METABOLIC PNL TOTAL CA: CPT | Performed by: INTERNAL MEDICINE

## 2018-03-30 PROCEDURE — 74011250637 HC RX REV CODE- 250/637: Performed by: INTERNAL MEDICINE

## 2018-03-30 PROCEDURE — 74011250637 HC RX REV CODE- 250/637: Performed by: FAMILY MEDICINE

## 2018-03-30 PROCEDURE — 74011250637 HC RX REV CODE- 250/637: Performed by: NURSE PRACTITIONER

## 2018-03-30 RX ORDER — LISINOPRIL 10 MG/1
10 TABLET ORAL
Status: DISCONTINUED | OUTPATIENT
Start: 2018-03-30 | End: 2018-03-30 | Stop reason: HOSPADM

## 2018-03-30 RX ORDER — FUROSEMIDE 40 MG/1
40 TABLET ORAL
Status: DISCONTINUED | OUTPATIENT
Start: 2018-03-30 | End: 2018-03-30 | Stop reason: HOSPADM

## 2018-03-30 RX ORDER — LISINOPRIL 10 MG/1
10 TABLET ORAL
Qty: 30 TAB | Refills: 1 | Status: SHIPPED | OUTPATIENT
Start: 2018-03-30 | End: 2018-06-11

## 2018-03-30 RX ORDER — DILTIAZEM HYDROCHLORIDE 120 MG/1
120 CAPSULE, COATED, EXTENDED RELEASE ORAL DAILY
Status: DISCONTINUED | OUTPATIENT
Start: 2018-03-30 | End: 2018-03-30 | Stop reason: HOSPADM

## 2018-03-30 RX ORDER — FUROSEMIDE 40 MG/1
40 TABLET ORAL DAILY
Qty: 60 TAB | Refills: 0 | Status: ON HOLD | OUTPATIENT
Start: 2018-03-30 | End: 2019-10-09

## 2018-03-30 RX ORDER — DILTIAZEM HYDROCHLORIDE 120 MG/1
120 CAPSULE, COATED, EXTENDED RELEASE ORAL DAILY
Qty: 30 CAP | Refills: 2 | Status: SHIPPED | OUTPATIENT
Start: 2018-03-30 | End: 2020-02-18 | Stop reason: DRUGHIGH

## 2018-03-30 RX ORDER — ASPIRIN 81 MG/1
81 TABLET ORAL DAILY
Qty: 30 TAB | Refills: 0 | Status: SHIPPED | OUTPATIENT
Start: 2018-03-31

## 2018-03-30 RX ORDER — POTASSIUM CHLORIDE 1500 MG/1
20 TABLET, FILM COATED, EXTENDED RELEASE ORAL 2 TIMES DAILY
Qty: 60 TAB | Refills: 0 | Status: SHIPPED | OUTPATIENT
Start: 2018-03-30 | End: 2020-02-18 | Stop reason: DRUGHIGH

## 2018-03-30 RX ADMIN — POTASSIUM CHLORIDE 20 MEQ: 750 TABLET, EXTENDED RELEASE ORAL at 08:38

## 2018-03-30 RX ADMIN — ASPIRIN 81 MG: 81 TABLET, COATED ORAL at 08:37

## 2018-03-30 RX ADMIN — RIVAROXABAN 20 MG: 20 TABLET, FILM COATED ORAL at 11:15

## 2018-03-30 RX ADMIN — Medication 10 ML: at 06:55

## 2018-03-30 RX ADMIN — DILTIAZEM HYDROCHLORIDE 30 MG: 30 TABLET, FILM COATED ORAL at 00:01

## 2018-03-30 RX ADMIN — DILTIAZEM HYDROCHLORIDE 120 MG: 120 CAPSULE, COATED, EXTENDED RELEASE ORAL at 09:59

## 2018-03-30 RX ADMIN — DILTIAZEM HYDROCHLORIDE 30 MG: 30 TABLET, FILM COATED ORAL at 06:20

## 2018-03-30 RX ADMIN — FUROSEMIDE 40 MG: 10 INJECTION, SOLUTION INTRAMUSCULAR; INTRAVENOUS at 06:55

## 2018-03-30 NOTE — DISCHARGE INSTRUCTIONS
Discharge Instructions       PATIENT ID: Venessa Sargent  MRN: [id number]   YOB: 1940    DATE OF ADMISSION: 3/27/2018  4:21 PM    DATE OF DISCHARGE: 3/30/2018    PRIMARY CARE PROVIDER: Tian Gamble MD     ATTENDING PHYSICIAN: Liz Ortega MD  DISCHARGING PROVIDER: Pérez Acevedo NP    To contact this individual call [phone] and ask the  to page. If unavailable ask to be transferred the Adult Hospitalist Department. DISCHARGE DIAGNOSES: Heart Failure, Atrial Fibrillation, Hypertension    CONSULTATIONS: IP CONSULT TO CARDIOLOGY    PROCEDURES/SURGERIES: * No surgery found *    PENDING TEST RESULTS:   At the time of discharge the following test results are still pending: none    FOLLOW UP APPOINTMENTS:   Follow-up Information     Follow up With Details Comments Contact [facility]    [employer] [facility] Area Office on [address] [place]    Tian Gamble MD  PCP requesting patient to schedule f/u appointment. [address] [facility]  [phone]      Lyndon Kaminski MD  follow in one week [place] [address]  [phone]             ADDITIONAL CARE RECOMMENDATIONS:   You were admitted with heart failure which was triggered by your atrial fibrillation (uneven heartbeat) and high blood pressure. MEDICATIONS  Lisinopril is for your blood pressure  Xarelto is a blood thinner to prevent stroke from your uneven heartbeat. Furosemide is water pill, this will pull fluid off and help you with your breathing  Potassium replacement, this is to replace your potassium as the water pill will make it go down  Diltiazem is to control your heart rate and will also control your blood pressure. You will need to follow up with Dr. Kelly Greenberg in one week to adjust your medications. It is really important that you take your medications and do not stop them.      Weigh yourself daily, if you gain 3-5 lbs in a day call Dr. Aaron Lagos so she can adjust your medication. DIET: 2 gram sodium diet      ACTIVITY: Activity as tolerated    WOUND CARE: none    EQUIPMENT needed: none      DISCHARGE MEDICATIONS:   See Medication Reconciliation Form    · It is important that you take the medication exactly as they are prescribed. · Keep your medication in the bottles provided by the pharmacist and keep a list of the medication names, dosages, and times to be taken in your wallet. · Do not take other medications without consulting your doctor. NOTIFY YOUR PHYSICIAN FOR ANY OF THE FOLLOWING:   Fever over 101 degrees for 24 hours. Chest pain, shortness of breath, fever, chills, nausea, vomiting, diarrhea, change in mentation, falling, weakness, bleeding. Severe pain or pain not relieved by medications. Or, any other signs or symptoms that you may have questions about. DISPOSITION:  xx  Home With:   OT  PT  HH  RN       SNF/Inpatient Rehab/LTAC    Independent/assisted living    Hospice    Other:     CDMP Checked:   Yes xx     PROBLEM LIST Updated:  Yes xx       Signed:   Maki MCKEON NP  3/30/2018  8:43 AM      Rivaroxaban (By mouth)   Rivaroxaban (cbf-s-PSG-a-ban)  Treats and prevents blood clots, which lowers the risk of stroke, deep vein thrombosis (DVT), pulmonary embolism (PE), and similar conditions. This medicine is a blood thinner. Brand Name(s): Xarelto, Xarelto Starter Pack   There may be other brand names for this medicine. When This Medicine Should Not Be Used: This medicine is not right for everyone. Do not use it if you had an allergic reaction to rivaroxaban, or you have severe bleeding. How to Use This Medicine:   Tablet  · Take this medicine as directed, and take it at the same time each day. · 10-milligram (mg) tablet: Take with or without food. · 15-mg or 20-mg tablet: Take with food.   · If you cannot swallow the tablets, you may crush the tablet and mix it with applesauce. Eat some food after you swallow the mixture. · Tube feeding: You may crush the tablet and mix the medicine in 50 milliliters (mL) of water before giving it via the tube. This must be followed by a feeding. · This medicine should come with a Medication Guide. Ask your pharmacist for a copy if you do not have one. · Missed dose:   ¨ Ask your doctor or pharmacist if you are not sure what to do if you miss a dose. ¨ Once-daily dose: If you miss a dose or forget to use your medicine, use it as soon as you can on the same day. Do not use extra medicine to make up for a missed dose. ¨ Twice-daily dose to treat a blood clot (15-mg tablet): If you miss a dose or forget to use your medicine, use it as soon as you can on the same day. You may take 2 doses at the same time to make up for the missed dose. This is only for people who take a total of 30 mg per day. · Store the medicine in a closed container at room temperature, away from heat, moisture, and direct light. Drugs and Foods to Avoid:   Ask your doctor or pharmacist before using any other medicine, including over-the-counter medicines, vitamins, and herbal products. · Some foods and medicines can affect how rivaroxaban works. Tell your doctor if you are using any of the following:  ¨ NSAID medicine (including aspirin, celecoxib, diclofenac, ibuprofen, naproxen)  ¨ Ketoconazole, itraconazole, lopinavir, ritonavir, indinavir, conivaptan, carbamazepine, phenytoin, rifampin, Ramiro's wort  ¨ Another blood thinner (including clopidogrel, enoxaparin, heparin, warfarin)  Warnings While Using This Medicine:   · Tell your doctor if you are pregnant or breastfeeding, or if you have kidney disease, liver disease, bleeding problems, or an artificial heart valve. · This medicine may increase your risk of bleeding. Be careful to avoid injuries that could cause bleeding. Stay away from rough sports or other situations where you could be bruised, cut, or hurt. Brush and floss your teeth gently. Be careful when using sharp objects, including razors and fingernail clippers. Avoid picking your nose. If you need to blow your nose, blow it gently. · This medicine may cause nerve damage if you have a medical procedure done to your back, including anesthesia or a spinal puncture. This is more likely to happen if you have a history of back injury, back surgery, problems with your spine, or procedures or punctures to your back. Tell your doctor if you are also taking another blood thinner, because this also increases the risk. · Do not stop using this medicine suddenly without asking your doctor. You might have a higher risk of stroke for a short time after you stop using this medicine. · Tell any doctor or dentist who treats you that you are using this medicine. · Your doctor will do lab tests at regular visits to check on the effects of this medicine. Keep all appointments. · Keep all medicine out of the reach of children. Never share your medicine with anyone. Possible Side Effects While Using This Medicine:   Call your doctor right away if you notice any of these side effects:  · Allergic reaction: Itching or hives, swelling in your face or hands, swelling or tingling in your mouth or throat, chest tightness, trouble breathing  · Blistering, peeling, or red skin rash  · Decrease in how much or how often you urinate  · Heavy menstrual bleeding, or vaginal bleeding  · Red or brown urine, bloody or black, tarry stools  · Unusual bleeding or bruising, including frequent nosebleeds  · Vomiting blood or material that looks like coffee grounds  If you notice other side effects that you think are caused by this medicine, tell your doctor. Call your doctor for medical advice about side effects.  You may report side effects to FDA at 9-357-FDA-7963  © 2017 [address] Information is for End User's use only and may not be sold, redistributed or otherwise used for commercial purposes. The above information is an  only. It is not intended as medical advice for individual conditions or treatments. Talk to your doctor, nurse or pharmacist before following any medical regimen to see if it is safe and effective for you. Furosemide (By mouth)   Furosemide (yfxa-LK-rq-mide)  Treats fluid retention (edema) and high blood pressure. This medicine is a diuretic (water pill). Brand Name(s): Active-Medicated Specimen Collection Kit, Diuscreen Multi-Drug Medicated Test Kit, Lasix, RX-Specimen Collection Kit, Specimen Collection Kit   There may be other brand names for this medicine. When This Medicine Should Not Be Used: This medicine is not right for everyone. Do not use it if you had an allergic reaction to furosemide. How to Use This Medicine:   Liquid, Tablet  · Take your medicine as directed. Your dose may need to be changed several times to find what works best for you. · You may take this medicine with food if it upsets your stomach. · Oral liquid: Measure the oral liquid medicine with a marked measuring spoon, oral syringe, or medicine cup. · Tablet: Swallow the tablet whole. Do not crush, break, or chew it. · Missed dose: Take a dose as soon as you remember. If it is almost time for your next dose, wait until then and take a regular dose. Do not take extra medicine to make up for a missed dose. · Store the medicine in a closed container at room temperature, away from heat, moisture, and direct light. Drugs and Foods to Avoid:   Ask your doctor or pharmacist before using any other medicine, including over-the-counter medicines, vitamins, and herbal products. · Some medicines can affect how furosemide works.  Tell your doctor if you are also using any of the following:  ¨ Cisplatin, cyclosporine, digoxin, ethacrynic acid, licorice, lithium, methotrexate, or phenytoin  ¨ Adrenocorticotropic hormone (ACTH)  ¨ Laxative  ¨ Medicine to treat an infection  ¨ NSAID pain or arthritis medicine (including aspirin, diclofenac, ibuprofen, indomethacin, naproxen)  ¨ Other blood pressure medicines  ¨ Steroid medicine (including dexamethasone, hydrocortisone, methylprednisolone, prednisolone, prednisone)  ¨ Thyroid medicine  · If you also take sucralfate, allow at least 2 hours between the time you take furosemide and the time you take sucralfate. · Alcohol, narcotic pain medicine, or sleeping pills may cause you to feel more lightheaded, dizzy, or faint when used with this medicine. Warnings While Using This Medicine:   · Tell your doctor if you are pregnant or breastfeeding, or if you have kidney disease, liver disease (including cirrhosis), diabetes, gout, low blood pressure, lupus, an enlarged prostate, trouble urinating, or an allergy to sulfa drugs. Tell your doctor if you are on a low-salt diet. · This medicine may cause the following problems:   ¨ Low levels of minerals in your blood, such as potassium and sodium  ¨ Blood sugar level changes  ¨ Hearing problems  · Make sure any doctor or dentist who treats you knows that you are using this medicine. · This medicine could lower your blood pressure too much, especially when you first use it or if you are dehydrated. Stand or sit up slowly if you feel lightheaded or dizzy. · This medicine may make your skin more sensitive to sunlight. Wear sunscreen. Do not use sunlamps or tanning beds. · Your doctor will do lab tests at regular visits to check on the effects of this medicine. Keep all appointments. · Keep all medicine out of the reach of children. Never share your medicine with anyone.   Possible Side Effects While Using This Medicine:   Call your doctor right away if you notice any of these side effects:  · Allergic reaction: Itching or hives, swelling in your face or hands, swelling or tingling in your mouth or throat, chest tightness, trouble breathing  · Blistering, peeling, red skin rash  · Confusion, weakness, muscle twitching  · Dry mouth, increased thirst, muscle cramps, uneven heartbeat  · Sudden and severe stomach pain, nausea, vomiting, fever, lightheadedness  · Hearing loss, ringing in the ears  · Lightheadedness, dizziness, fainting  · Severe diarrhea  · Unusual bleeding or bruising  · Yellow skin or eyes  If you notice these less serious side effects, talk with your doctor:   · Loss of appetite, stomach cramps  If you notice other side effects that you think are caused by this medicine, tell your doctor. Call your doctor for medical advice about side effects. You may report side effects to FDA at 1-427-FDA-0517  © 2017 [address] Information is for End User's use only and may not be sold, redistributed or otherwise used for commercial purposes. The above information is an  only. It is not intended as medical advice for individual conditions or treatments. Talk to your doctor, nurse or pharmacist before following any medical regimen to see if it is safe and effective for you. Lisinopril (By mouth)   Lisinopril (lye-SIN-oh-pril)  Treats high blood pressure and heart failure. Also given to reduce the risk of death after a heart attack. This medicine is an ACE inhibitor. Brand Name(s): Prinivil, Qbrelis, Zestril   There may be other brand names for this medicine. When This Medicine Should Not Be Used: This medicine is not right for everyone. Do not use it if you had an allergic reaction to lisinopril or another ACE inhibitor, or if you are pregnant. How to Use This Medicine:   Liquid, Tablet  · Take your medicine as directed. Your dose may need to be changed several times to find what works best for you. · Oral liquid: Measure the oral liquid medicine with a marked measuring spoon, oral syringe, or medicine cup. · Missed dose: Take a dose as soon as you remember. If it is almost time for your next dose, wait until then and take a regular dose. Do not take extra medicine to make up for a missed dose. · Store the medicine in a closed container at room temperature, away from heat, moisture, and direct light. Drugs and Foods to Avoid:   Ask your doctor or pharmacist before using any other medicine, including over-the-counter medicines, vitamins, and herbal products. · Do not use this medicine together with aliskiren if you have diabetes. · Some foods and medicines may affect how lisinopril works. Tell your doctor if you are using any of the following:   ¨ Aliskiren, everolimus, lithium, sirolimus, temsirolimus  ¨ Another blood pressure medicine, including an angiotensin receptor blocker (ARB)  ¨ Diuretic (water pill, including amiloride, spironolactone, triamterene)  ¨ Insulin or diabetes medicine  ¨ NSAID pain or arthritis medicine (including aspirin, celecoxib, diclofenac, ibuprofen, naproxen)  · Ask your doctor before you use any medicine, supplement, or salt substitute that contains potassium. Warnings While Using This Medicine:   · It is not safe to take this medicine during pregnancy. It could harm an unborn baby. Tell your doctor right away if you become pregnant. · Tell your doctor if you are breastfeeding, or if you have kidney disease, liver disease, diabetes, or heart or blood vessel disease. · This medicine may cause the following problems:  ¨ Angioedema (severe swelling)  ¨ Kidney problems  ¨ Serious liver problems  · This medicine could lower your blood pressure too much, especially when you first use it or if you are dehydrated. Stand or sit up slowly if you feel lightheaded or dizzy. · Do not stop using this medicine without asking your doctor, even if you feel well. This medicine will not cure your high blood pressure, but it will help keep it in a normal range. You may have to take blood pressure medicine for the rest of your life. · Tell any doctor or dentist who treats you that you are using this medicine.   · Your doctor will do lab tests at regular visits to check on the effects of this medicine. Keep all appointments. · Keep all medicine out of the reach of children. Never share your medicine with anyone. Possible Side Effects While Using This Medicine:   Call your doctor right away if you notice any of these side effects:  · Allergic reaction: Itching or hives, swelling in your face or hands, swelling or tingling in your mouth or throat, chest tightness, trouble breathing  · Blistering, peeling, or red skin rash  · Change in how much or how often you urinate  · Confusion, weakness, uneven heartbeat, trouble breathing, numbness or tingling in your hands, feet, or lips  · Dark urine or pale stools, nausea, vomiting, loss of appetite, stomach pain, yellow skin or eyes  · Fever, chills, sore throat, body aches  · Lightheadedness, dizziness, fainting  · Severe stomach pain (with or without nausea or vomiting)  If you notice these less serious side effects, talk with your doctor:   · Dry cough  If you notice other side effects that you think are caused by this medicine, tell your doctor. Call your doctor for medical advice about side effects. You may report side effects to FDA at 3-156-FDA-7943  © 2017 [facility] [place] Information is for End User's use only and may not be sold, redistributed or otherwise used for commercial purposes. The above information is an  only. It is not intended as medical advice for individual conditions or treatments. Talk to your doctor, nurse or pharmacist before following any medical regimen to see if it is safe and effective for you. Aspirin (By mouth)   Aspirin (AS-pir-in)  Treats pain, fever, and inflammation. May lower risk of heart attack and stroke.    Brand Name(s): Ascriptin Regular Strength, Aspergum, Aspir Low, Aspirin Adult Low Dose, Aspirin Low Dose, Thi Aspirin Children's, Thi Aspirin Regimen, Thi Extra Strength, Thi Genuine Aspirin, Thi Low Dose, Bufferin, Bufferin Low Dose, Durlaza, Ecotrin, Ecpirin   There may be other brand names for this medicine. When This Medicine Should Not Be Used: This medicine is not right for everyone. Do not use it if you had an allergic reaction to aspirin or other NSAIDs, or if you have a history of asthma with nasal polyps and rhinitis. How to Use This Medicine:   Delayed Release Capsule, Long Acting Capsule, Gum, Tablet, Chewable Tablet, Fizzy Tablet, Coated Tablet, Long Acting Tablet, 24 Hour Capsule  · Your doctor will tell you how much medicine to use. Do not use more than directed. · It is best to take this medicine with food or milk. · Capsule, tablet, or coated tablet: Swallow whole. Do not crush, break, or chew it. · Chewable tablet: You may chew it completely or swallow it whole. · Gum: Chew completely to make sure you get as much medicine as possible. Drink a full glass (8 ounces) of water after chewing the gum. · Swallow the extended-release capsule whole. Do not crush, break, or chew it. Take the capsule with a full glass of water at the same time each day. · Follow the instructions on the medicine label if you are using this medicine without a prescription. · Missed dose: If you miss a dose of Durlaza, skip the missed dose and go back to your regular dosing schedule. Do not take extra medicine to make up for a missed dose. · Store the medicine in a closed container at room temperature, away from heat, moisture, and direct light. Drugs and Foods to Avoid:   Ask your doctor or pharmacist before using any other medicine, including over-the-counter medicines, vitamins, and herbal products. · Some foods and medicines can affect how aspirin works.  Tell your doctor if you are using any of the following:  ¨ Dipyridamole, methotrexate, probenecid, sulfinpyrazone, ticlopidine  ¨ Blood thinner (including clopidogrel, prasugrel, ticagrelor, warfarin)  ¨ Blood pressure medicine  ¨ Medicine to treat seizures (including phenytoin, valproic acid)  ¨ NSAID pain or arthritis medicine (including celecoxib, diclofenac, ibuprofen, naproxen)  ¨ Steroid medicine (including dexamethasone, hydrocortisone, methylprednisolone, prednisolone, prednisone)  · Do not take Durlaza 2 hours before or 1 hour after you drink alcohol or take medicines that contain alcohol. Warnings While Using This Medicine:   · Tell your doctor if you are pregnant or breastfeeding. Do not use this medicine during the later part of a pregnancy unless your doctor tells you to. · Tell your doctor if you have kidney disease, liver disease, high blood pressure, heart disease, or a history of stomach bleeding or ulcers. · This medicine may increase your risk for bleeding, including stomach ulcers. · Do not give aspirin to a child or teenager who has chickenpox or flu symptoms, unless the doctor says it is okay. Aspirin can cause a life-threatening reaction called Reye syndrome. · Tell any doctor or dentist who treats you that you are using this medicine. This medicine may affect certain medical test results. · Keep all medicine out of the reach of children. Never share your medicine with anyone. Possible Side Effects While Using This Medicine:   Call your doctor right away if you notice any of these side effects:  · Allergic reaction: Itching or hives, swelling in your face or hands, swelling or tingling in your mouth or throat, chest tightness, trouble breathing  · Bloody or black stools, bloody vomit or vomit that looks like coffee grounds  · Chest tightness, wheezing  · Ringing in the ears  · Severe stomach pain  · Unusual bleeding, bruising, or weakness  If you notice other side effects that you think are caused by this medicine, tell your doctor. Call your doctor for medical advice about side effects.  You may report side effects to FDA at 3-675-FDA-6283  © 2017 [facility] Information is for End User's use only and may not be sold, redistributed or otherwise used for commercial purposes. The above information is an  only. It is not intended as medical advice for individual conditions or treatments. Talk to your doctor, nurse or pharmacist before following any medical regimen to see if it is safe and effective for you. Diltiazem (By mouth)   Diltiazem (bdi-PNC-i-zem)  Treats high blood pressure and angina (chest pain). This medicine is a calcium channel blocker. Brand Name(s): Cardizem, Cardizem CD, Cardizem LA, Cartia XT, Dilt-XR, Matzim LA, Taztia XT, Tiazac   There may be other brand names for this medicine. When This Medicine Should Not Be Used: This medicine is not right for everyone. Do not use it if you had an allergic reaction to diltiazem or similar medicines. How to Use This Medicine:   Long Acting Capsule, 12 Hour Capsule, 24 Hour Capsule, Tablet, Long Acting Tablet  · Take your medicine as directed. Your dose may need to be changed several times to find what works best for you. · It is best to take this medicine on an empty stomach. · Swallow this medicine whole. Do not crush, break, chew, or open the capsule or tablet. · Missed dose: Take a dose as soon as you remember. If it is almost time for your next dose, wait until then and take a regular dose. Do not take extra medicine to make up for a missed dose. · Store the medicine in a closed container at room temperature, away from heat, moisture, and direct light. Drugs and Foods to Avoid:   Ask your doctor or pharmacist before using any other medicine, including over-the-counter medicines, vitamins, and herbal products. · Some medicines can affect how diltiazem works. Tell your doctor if you are using the following:  ¨ Buspirone, carbamazepine, cimetidine, clonidine, cyclosporine, digoxin, ivabradine, lovastatin, midazolam, quinidine, rifampin, simvastatin, triazolam  ¨ Other blood pressure medicine, including a beta-blocker  .   Warnings While Using This Medicine: · Tell your doctor if you are pregnant or breastfeeding, or if you have kidney disease, liver disease, or digestion problems. Tell your doctor about all heart problems that you have, including heart failure or rhythm problems. · This medicine may cause the following problems:  ¨ Slow heartbeat  ¨ Worsening of heart failure  ¨ Serious skin reactions  · This medicine could lower your blood pressure too much, especially when you first use it or if you are dehydrated. Stand or sit up slowly if you feel lightheaded or dizzy. Do not drive or do anything else that could be dangerous until you know how this medicine affects you. · Do not stop using this medicine without asking your doctor, even if you feel well. This medicine will not cure high blood pressure, but it will help keep it in normal range. You may have to take blood pressure medicine for the rest of your life. · Your doctor will do lab tests at regular visits to check on the effects of this medicine. Keep all appointments. · Keep all medicine out of the reach of children. Never share your medicine with anyone. Possible Side Effects While Using This Medicine:   Call your doctor right away if you notice any of these side effects:  · Allergic reaction: Itching or hives, swelling in your face or hands, swelling or tingling in your mouth or throat, chest tightness, trouble breathing  · Blistering, peeling, red skin rash  · Dark urine or pale stools, nausea, vomiting, loss of appetite, stomach pain, yellow skin or eyes  · Fast, slow, uneven, or pounding heartbeat  · Rapid weight gain, swelling in your hands, ankles, or feet  If you notice other side effects that you think are caused by this medicine, tell your doctor. Call your doctor for medical advice about side effects.  You may report side effects to FDA at 6-252-FDA-3578  © 2017 [address] [employer] Information is for End User's use only and may not be sold, redistributed or otherwise used for commercial purposes. The above information is an  only. It is not intended as medical advice for individual conditions or treatments. Talk to your doctor, nurse or pharmacist before following any medical regimen to see if it is safe and effective for you. Potassium Chloride (K-Dur, K-Sol, K-Tab, Rocael Mur) - (By mouth)   Why this medicine is used:   Prevents and treats low potassium levels in the blood. Contact a nurse or doctor right away if you have:  · Uneven heartbeat, trouble breathing  · Confusion, weakness, numbness in your hands, feet, or lips  · Bloody or black, tarry stools     Common side effects:  · Mild nausea, diarrhea, gas  © 2017 26[address] Ryne Garcia Information is for End User's use only and may not be sold, redistributed or otherwise used for commercial purposes.

## 2018-03-30 NOTE — PROGRESS NOTES
Temp: 97.8 °F (36.6 °C) (03/30/18 0320) Pulse (Heart Rate): 86 (03/30/18 0320) Resp Rate: 18 (03/30/18 0320) BP: 107/60 (03/30/18 0320) O2 Sat (%): 98 % (03/30/18 0320) Weight: 149.9 kg (330 lb 7.5 oz) (03/30/18 0320)     Patient A&O, VSS, voiding, and walking around room. Patient becomes a little SOB with exertion but vitals remain stable on room air. She is calling out for help appropriately. Patient family stated that she needs further education regarding diet and lifestyle modification for CHF diagnosis (which they said that they were unaware of until yesterday). Patient had her family bring her \"salty chinese food\" yesterday for lunch instead of eating the hospital food. RN provided teaching regarding food choices and lifestyle habits.

## 2018-03-30 NOTE — PROGRESS NOTES
Cardiology Progress Note    Pt going to be discharged today. She did pretty well on her walk test yesterday. She states her major concern is having a stroke. Exam:  Blood pressure (!) 120/39, pulse 80, temperature 98.1 °F (36.7 °C), resp. rate 18, weight 149.9 kg (330 lb 7.5 oz), SpO2 96 %. Lungs clear  Cor irreg with DERRICK  No sig edema    Imp:  Heart failure likely related to recurrent afib and HTN-rate improved and BP lower  Afib-rate control improved  HTN-improved  Sleep apnea  Morbid obesity  DJD  Hx GI bleed remote-nothing recent    Rec:  Dose lisinopril at night  Recheck K+ prior to discharge  Will see next week in office to make plans for possible cardioversion.      Michel Brody MD

## 2018-03-30 NOTE — ROUTINE PROCESS
Bedside shift change report given to Sandra (oncoming nurse) by Merlin Cr (offgoing nurse). Report included the following information SBAR, Procedure Summary, Intake/Output, Med Rec Status and Cardiac Rhythm A-fib A-flutter.

## 2018-03-30 NOTE — PROGRESS NOTES
Problem: Patient Education: Go to Patient Education Activity  Goal: Patient/Family Education  Outcome: Resolved/Met Date Met: 03/30/18  NUTRITION     Chart reviewed. Low sodium diet instruction completed; provided pt with handout and reviewed. Will gladly follow up for additional questions as needed. Thank you.      Simi Rai RD

## 2018-03-30 NOTE — DISCHARGE SUMMARY
Discharge Summary       PATIENT ID: Evans Gao  MRN: [id number]   YOB: 1940    DATE OF ADMISSION: 3/27/2018  4:21 PM    DATE OF DISCHARGE: 3/30/2018   PRIMARY CARE PROVIDER: Tim Lee MD     ATTENDING PHYSICIAN: Dr. Harshad Ward  DISCHARGING PROVIDER: Bouchra Fajardo NP    To contact this individual call [phone] and ask the  to page. If unavailable ask to be transferred the Adult Hospitalist Department. CONSULTATIONS: IP CONSULT TO CARDIOLOGY    PROCEDURES/SURGERIES: * No surgery found *    ADMITTING DIAGNOSES & HOSPITAL COURSE:   Ms. Dorita Lopez is a 68 y/o AA female who presented to the ED on 3/27 with sob. Has been non compliant with her medications for months now because she felt they were not helping her. She reports her sob has been ongoing for a couple of months now but worse over the last week, worse when flat, has noted some wheezing with sob. Sob has become worse with exertion. + pedal/abd swelling per patient. + cough with intermittent yellow sputum. PMH of a-fib, asymptomatic bradycardia, chronic low back pain, morbid obesity and history of GI bleed    3/30/2018  Pt admitted with heart failure r/t likely to a-fib and hypertension. Pt had stop taking her medications because she felt they were not working. She has been educated on the importance of taking her medication. Her afib is now controlled with cardizem, her bp is better and her breathing is better. She has been educated on her medications and and a low salt diet. She will have a H2H visit and will f/u with Dr. Rickey Clements in one week and discuss cardioversion.        DISCHARGE DIAGNOSES / PLAN:      Diastolic Congestive heart failure exacerbation, NYHA IV on admission  - pending echo  - cxr => mild pulm edema  - CTA chest = > no PE or consolidation, small pleural effusions   - c/w diuretics, strict I/o;s, daily weight  - troponin's negative  - cardiology consulted, follows with Dr. Tashi Kan  - lipid panel reviewed  - will need H2H on discharge and 6 MWT     A-fib with RVR (POA)  - no cardizem drip in hospital, started on po cardizem  - pending echo  - cardiology consulted  - c/w asa  - [id number] score of 4 (female, age, htn). Pt has a previous hx of GIB which is probably why she is not on any OAC  - c/w digoxin, ditiazem     Hypokalemia  - repleted, monitor     Hx of HTN  - better controlled, c/w lisinopril     Code status: Full  DVT prophylaxis: Lovenox  Care Plan discussed with: patient, attending MD  Disposition: TBD, lives at home has two sons that live her       PENDING TEST RESULTS:   At the time of discharge the following test results are still pending: none    FOLLOW UP APPOINTMENTS:    Follow-up Information     Follow up With Details Comments [facility]   [address]    Stephen Cox MD  PCP requesting patient to schedule f/u appointment. [facility]  [phone]      Wilber Lowry MD  follow in one week [address]  [phone]             ADDITIONAL CARE RECOMMENDATIONS:   You were admitted with heart failure which was triggered by your atrial fibrillation (uneven heartbeat) and high blood pressure.     MEDICATIONS  Lisinopril is for your blood pressure  Xarelto is a blood thinner to prevent stroke from your uneven heartbeat. Furosemide is water pill, this will pull fluid off and help you with your breathing  Potassium replacement, this is to replace your potassium as the water pill will make it go down  Diltiazem is to control your heart rate and will also control your blood pressure.     You will need to follow up with Dr. Elaine Flanagan in one week to adjust your medications.   It is really important that you take your medications and do not stop them.      Weigh yourself daily, if you gain 3-5 lbs in a day call Dr. Elaine Flanagan so she can adjust your medication.      DIET: 2 gram sodium diet       ACTIVITY: Activity as tolerated     WOUND CARE: none     EQUIPMENT needed: none      DISCHARGE MEDICATIONS:  Current Discharge Medication List      START taking these medications    Details   dilTIAZem CD (CARDIZEM CD) 120 mg ER capsule Take 1 Cap by mouth daily. Qty: 30 Cap, Refills: 2      lisinopril (PRINIVIL, ZESTRIL) 10 mg tablet Take 1 Tab by mouth nightly. Qty: 30 Tab, Refills: 1      furosemide (LASIX) 40 mg tablet Take 1 Tab by mouth daily. Qty: 60 Tab, Refills: 0      potassium chloride SR (K-TAB) 20 mEq tablet Take 1 Tab by mouth two (2) times a day. Qty: 60 Tab, Refills: 0      rivaroxaban (XARELTO) 20 mg tab tablet Take 1 Tab by mouth daily (with lunch). Indications: PREVENT THROMBOEMBOLISM IN CHRONIC ATRIAL FIBRILLATION  Qty: 30 Tab, Refills: 1      aspirin delayed-release 81 mg tablet Take 1 Tab by mouth daily. Qty: 30 Tab, Refills: 0         CONTINUE these medications which have NOT CHANGED    Details   acetaminophen (TYLENOL) 500 mg tablet Take 1,000 mg by mouth daily as needed for Pain. NOTIFY YOUR PHYSICIAN FOR ANY OF THE FOLLOWING:   Fever over 101 degrees for 24 hours. Chest pain, shortness of breath, fever, chills, nausea, vomiting, diarrhea, change in mentation, falling, weakness, bleeding. Severe pain or pain not relieved by medications. Or, any other signs or symptoms that you may have questions about.     DISPOSITION:  xx Home With:   OT  PT  HH  RN       Long term SNF/Inpatient Rehab    Independent/assisted living    Hospice    Other:       PATIENT CONDITION AT DISCHARGE:     Functional status    Poor     Deconditioned    xx Independent      Cognition    xx Lucid     Forgetful     Dementia      Catheters/lines (plus indication)    Garcia     PICC     PEG    xx None      Code status   xx  Full code     DNR      PHYSICAL EXAMINATION AT DISCHARGE:  Constitutional:  No acute distress, cooperative, pleasant    ENT: Oral mucous moist, oropharynx benign. Neck supple   Resp:  CTA bilaterally. No accessory muscle use   CV:  Irregular rhythm, normal rate, no murmurs, gallops, rubs. A-fib on tele    GI:  Soft, non distended, non tender. normoactive bowel sounds, no hepatosplenomegaly     Musculoskeletal:  +1ble edema, L >R, warm, 2+ pulses throughout    Neurologic:  Moves all extremities. AAOx3, CN II-XII reviewed                                             Psych:  Good insight, Not anxious nor agitated.                            Skin:  Good turgor, no rashes or ulcers      CHRONIC MEDICAL DIAGNOSES:  Problem List as of 3/30/2018  Date Reviewed: 3/30/2018          Codes Class Noted - Resolved    A-fib [facility]) ICD-10-CM: I48.91  ICD-9-CM: 427.31  3/30/2018 - Present        Hypertension ICD-10-CM: I10  ICD-9-CM: 401.9  3/30/2018 - Present        RESOLVED: Hypokalemia ICD-10-CM: E87.6  ICD-9-CM: 276.8  3/30/2018 - 3/30/2018        * (Principal)RESOLVED: CHF exacerbation ([facility] [address] ICD-10-CM: I50.9  ICD-9-CM: 428.0  3/27/2018 - 3/30/2018        RESOLVED: Back pain ICD-10-CM: M54.9  ICD-9-CM: 724.5  9/28/2014 - 10/4/2014        RESOLVED: Low back pain ICD-10-CM: M54.5  ICD-9-CM: 724.2  9/26/2014 - 10/4/2014        RESOLVED: Shortness of breath ICD-10-CM: R06.02  ICD-9-CM: 786.05  12/17/2011 - 12/20/2011        RESOLVED: Acute bronchitis ICD-10-CM: J20.9  ICD-9-CM: 466.0  12/17/2011 - 12/20/2011              Greater than 30 minutes were spent with the patient on counseling and coordination of care    Signed:   Levon Gray NP  3/30/2018  9:04 AM

## 2018-03-30 NOTE — PROGRESS NOTES
I have reviewed discharge instructions with the patient and the patient's son. The patient and her son verbalized understanding.

## 2018-03-30 NOTE — PROGRESS NOTES
Bedside shift change report given to Joby Andrade RN (oncoming nurse) by Deepali El RN (offgoing nurse). Report included the following information SBAR, Kardex, Intake/Output, MAR and Cardiac Rhythm A fib.

## 2018-03-30 NOTE — PROGRESS NOTES
Problem: Heart Failure: Day 1  Goal: Activity/Safety  Outcome: Progressing Towards Goal  Patient up and moving; she calls out appropriately for help and uses a walker for support. Patient needs encouragement with diet changes and other lifestyle changes.

## 2018-04-02 ENCOUNTER — HOME CARE VISIT (OUTPATIENT)
Dept: HOME HEALTH SERVICES | Facility: HOME HEALTH | Age: 78
End: 2018-04-02

## 2018-04-02 ENCOUNTER — TELEPHONE (OUTPATIENT)
Dept: CASE MANAGEMENT | Age: 78
End: 2018-04-02

## 2018-04-02 NOTE — TELEPHONE ENCOUNTER
Spoke with . Henry Mosquera. Reviewed new medication. She states she has all of her medications. She states she is doing fine. She has not heard from home health. The scheduled follow up appointment made by the Heart Failure Nurse Navigator for today at 10;15 am was not on the printed After Visit Summary. She does not know when she needs to see Dr. Geraldine France. Told patient HFNN will call cardiology office and ask them to schedule appointment. Called [facility] office and [facility] for follow up and left messages. Attempted to reach homecare liaisons.

## 2018-04-03 ENCOUNTER — HOME CARE VISIT (OUTPATIENT)
Dept: SCHEDULING | Facility: HOME HEALTH | Age: 78
End: 2018-04-03

## 2018-04-03 PROCEDURE — G0299 HHS/HOSPICE OF RN EA 15 MIN: HCPCS

## 2018-04-10 ENCOUNTER — HOME CARE VISIT (OUTPATIENT)
Dept: HOME HEALTH SERVICES | Facility: HOME HEALTH | Age: 78
End: 2018-04-10

## 2018-04-25 ENCOUNTER — HOSPITAL ENCOUNTER (OUTPATIENT)
Dept: CARDIAC CATH/INVASIVE PROCEDURES | Age: 78
Discharge: HOME | End: 2018-04-25
Attending: INTERNAL MEDICINE | Admitting: INTERNAL MEDICINE
Payer: MEDICARE

## 2018-04-25 ENCOUNTER — ANESTHESIA EVENT (OUTPATIENT)
Dept: CARDIAC CATH/INVASIVE PROCEDURES | Age: 78
End: 2018-04-25
Payer: MEDICARE

## 2018-04-25 ENCOUNTER — ANESTHESIA (OUTPATIENT)
Dept: CARDIAC CATH/INVASIVE PROCEDURES | Age: 78
End: 2018-04-25
Payer: MEDICARE

## 2018-04-25 VITALS
OXYGEN SATURATION: 98 % | TEMPERATURE: 98.4 F | SYSTOLIC BLOOD PRESSURE: 100 MMHG | DIASTOLIC BLOOD PRESSURE: 27 MMHG | WEIGHT: 293 LBS | HEIGHT: 68 IN | HEART RATE: 68 BPM | RESPIRATION RATE: 20 BRPM | BODY MASS INDEX: 44.41 KG/M2

## 2018-04-25 LAB
ATRIAL RATE: 69 BPM
CALCULATED P AXIS, ECG09: 41 DEGREES
CALCULATED R AXIS, ECG10: 6 DEGREES
CALCULATED T AXIS, ECG11: 22 DEGREES
DIAGNOSIS, 93000: NORMAL
P-R INTERVAL, ECG05: 162 MS
Q-T INTERVAL, ECG07: 356 MS
QRS DURATION, ECG06: 76 MS
QTC CALCULATION (BEZET), ECG08: 381 MS
VENTRICULAR RATE, ECG03: 69 BPM

## 2018-04-25 PROCEDURE — 77030039046 HC PAD DEFIB RADIOTRNSPNT CNMD -B

## 2018-04-25 PROCEDURE — 74011250636 HC RX REV CODE- 250/636

## 2018-04-25 PROCEDURE — 74011250637 HC RX REV CODE- 250/637: Performed by: INTERNAL MEDICINE

## 2018-04-25 PROCEDURE — 76060000031 HC ANESTHESIA FIRST 0.5 HR

## 2018-04-25 PROCEDURE — 93005 ELECTROCARDIOGRAM TRACING: CPT

## 2018-04-25 PROCEDURE — 92960 CARDIOVERSION ELECTRIC EXT: CPT

## 2018-04-25 RX ORDER — SOTALOL HYDROCHLORIDE 80 MG/1
80 TABLET ORAL EVERY 12 HOURS
Status: DISCONTINUED | OUTPATIENT
Start: 2018-04-25 | End: 2018-04-25 | Stop reason: HOSPADM

## 2018-04-25 RX ORDER — PROPOFOL 10 MG/ML
INJECTION, EMULSION INTRAVENOUS AS NEEDED
Status: DISCONTINUED | OUTPATIENT
Start: 2018-04-25 | End: 2018-04-25 | Stop reason: HOSPADM

## 2018-04-25 RX ORDER — SOTALOL HYDROCHLORIDE 80 MG/1
80 TABLET ORAL EVERY 12 HOURS
Qty: 60 TAB | Refills: 12 | Status: SHIPPED | OUTPATIENT
Start: 2018-04-25 | End: 2019-11-07

## 2018-04-25 RX ORDER — ATROPINE SULFATE 0.1 MG/ML
.5-1 INJECTION INTRAVENOUS AS NEEDED
Status: DISCONTINUED | OUTPATIENT
Start: 2018-04-25 | End: 2018-04-25

## 2018-04-25 RX ORDER — SODIUM CHLORIDE 9 MG/ML
INJECTION, SOLUTION INTRAVENOUS
Status: DISCONTINUED | OUTPATIENT
Start: 2018-04-25 | End: 2018-04-25 | Stop reason: HOSPADM

## 2018-04-25 RX ORDER — SODIUM CHLORIDE 0.9 % (FLUSH) 0.9 %
5-10 SYRINGE (ML) INJECTION AS NEEDED
Status: DISCONTINUED | OUTPATIENT
Start: 2018-04-25 | End: 2018-04-25

## 2018-04-25 RX ADMIN — RIVAROXABAN 20 MG: 20 TABLET, FILM COATED ORAL at 11:29

## 2018-04-25 RX ADMIN — PROPOFOL 40 MG: 10 INJECTION, EMULSION INTRAVENOUS at 10:57

## 2018-04-25 RX ADMIN — SODIUM CHLORIDE: 9 INJECTION, SOLUTION INTRAVENOUS at 10:56

## 2018-04-25 RX ADMIN — SOTALOL HYDROCHLORIDE 80 MG: 80 TABLET ORAL at 11:29

## 2018-04-25 RX ADMIN — PROPOFOL 20 MG: 10 INJECTION, EMULSION INTRAVENOUS at 11:00

## 2018-04-25 NOTE — PROGRESS NOTES
TRANSFER - OUT REPORT:    Verbal report given to Krissy(name) on [place] Annalisa  being transferred to Cath lab recovery(unit) for routine progression of care       Report consisted of patients Situation, Background, Assessment and   Recommendations(SBAR). Information from the following report(s) Procedure Summary and MAR was reviewed with the receiving nurse. Lines:   Peripheral IV 04/25/18 Left; Lower Arm (Active)   Site Assessment Clean, dry, & intact 4/25/2018 10:43 AM   Phlebitis Assessment 0 4/25/2018 10:43 AM   Infiltration Assessment 0 4/25/2018 10:43 AM   Dressing Status Clean, dry, & intact 4/25/2018 10:43 AM   Dressing Type Tape;Transparent 4/25/2018 10:43 AM   Hub Color/Line Status Flushed 4/25/2018 10:43 AM        Opportunity for questions and clarification was provided.       Patient transported with:   Registered Nurse

## 2018-04-25 NOTE — DISCHARGE INSTRUCTIONS
Learning About Cardioversion  What is cardioversion? Cardioversion helps your heart return to a normal rhythm. It treats problems like atrial fibrillation. It is also sometimes used in emergencies. It can correct a fast heartbeat that causes low blood pressure, chest pain, or heart failure. There are two types:  · The electrical type uses an electric current. The current enters your body through patches on your chest or back. · The chemical type uses medicines. The medicine is usually put into your arm through a tube called an IV. How is cardioversion done? Your doctor may ask you to take medicines before the treatment. These help prevent blood clots. Your doctor will watch you closely to make sure that there are no problems. Electrical cardioversion  The electrical procedure is done in a hospital. You will get medicine to help you relax and control the pain. Your doctor will put patches on your chest or back. The patches send an electric current to your heart. This resets your heart rhythm. The electrical part takes about 5 minutes. But you will probably be in the hospital for 1 to 2 hours. You will need to recover from the effects of the sedative medicine. Chemical cardioversion  The chemical procedure is most often done in a hospital. In most cases, the medicine is put into your arm through a tube called an IV. But you may get medicines to take by mouth. You may feel a quick sting or pinch when the IV starts. The procedure usually takes about 4 to 8 hours. What can you expect after cardioversion? · You can usually go home the same day. You will need someone to drive you home. · Your doctor may have you take medicines daily. These help your heart beat normally and prevent blood clots. · After electrical cardioversion, you may have redness where the patches were. This looks and feels like a sunburn. · Abnormal heart rhythms sometimes come back after cardioversion.   Follow-up care is a key part of your treatment and safety. Be sure to make and go to all appointments, and call your doctor if you are having problems. It's also a good idea to know your test results and keep a list of the medicines you take. Where can you learn more? Go to http://chela-montse.info/. Enter M099 in the search box to learn more about \"Learning About Cardioversion. \"  Current as of: September 21, 2016  Content Version: 11.4  © 7902-2881 [employer]. Care instructions adapted under license by [employer] (which disclaims liability or warranty for this information). If you have questions about a medical condition or this instruction, always ask your healthcare professional. [address] any warranty or liability for your use of this information.

## 2018-04-25 NOTE — ANESTHESIA PREPROCEDURE EVALUATION
Anesthetic History   No history of anesthetic complications            Review of Systems / Medical History  Patient summary reviewed, nursing notes reviewed and pertinent labs reviewed    Pulmonary        Sleep apnea           Neuro/Psych   Within defined limits           Cardiovascular    Hypertension: well controlled      CHF: NYHA Classification I  Dysrhythmias : atrial fibrillation      Exercise tolerance: >4 METS     GI/Hepatic/Renal  Within defined limits              Endo/Other        Obesity and arthritis     Other Findings   Comments: Heart failure likely related to recurrent afib and HTN-rate improved and BP lower  Afib-rate control improved  HTN-improved  Sleep apnea  Morbid obesity  DJD         Physical Exam    Airway  Mallampati: II  TM Distance: > 6 cm  Neck ROM: normal range of motion   Mouth opening: Normal     Cardiovascular    Rhythm: irregular      Murmur: Grade 2, Mitral area     Dental  No notable dental hx       Pulmonary  Breath sounds clear to auscultation               Abdominal  GI exam deferred       Other Findings            Anesthetic Plan    ASA: 3  Anesthesia type: general          Induction: Intravenous  Anesthetic plan and risks discussed with: Patient

## 2018-04-25 NOTE — PROGRESS NOTES
Cardiac Cath Lab Procedure Area Arrival Note:    Chapis Casas arrived to Cardiac Cath Lab, Procedure Area. Patient identifiers verified with NAME and DATE OF BIRTH. Procedure verified with patient. Consent forms verified. Allergies verified. Patient informed of procedure and plan of care. Questions answered with review. Patient voiced understanding of procedure and plan of care. Patient on cardiac monitor, non-invasive blood pressure, SPO2 monitor. On room air and placed on O2 @ 2 lpm via NC.  IV of normal saline on pump at 25 ml/hr. Patient status doing well without problems. Patient is A&Ox 4. Patient reports no pain. Patient medicated during procedure with orders obtained and verified by Dr. Mia Olea. Refer to patients Cardiac Cath Lab PROCEDURE REPORT for vital signs, assessment, status, and response during procedure, printed at end of case. Printed report on chart or scanned into chart.

## 2018-04-25 NOTE — PROGRESS NOTES
Cardiac Cath Lab Recovery Arrival Note:      Chapis Casas arrived to Cardiac Cath Lab, Recovery Area. Staff introduced to patient. Patient identifiers verified with NAME and DATE OF BIRTH. Procedure verified with patient. Consent forms reviewed and signed by patient or authorized representative and verified. Allergies verified. Patient informed of procedure and plan of care. Questions answered with review. Patient prepped for procedure, per orders from physician, prior to arrival.    Patient on cardiac monitor, non-invasive blood pressure, SPO2 monitor. Patient is A&Ox 4. Patient reports no pain, no chest pain, no n/v. Patient in stretcher, in low position, with side rails up, call bell within reach, patient instructed to call of assistance as needed. Patient prep in: [facility] Recovery Area, Bed# 2. Family in: Merle Short [phone].    Prep by: Savita Livingston, RN

## 2018-04-25 NOTE — PROGRESS NOTES
TRANSFER - IN REPORT:    Verbal report received from HOSSEIN Moctezuma Rn on Maurice, Procedure Cardioversion , from the Cardiac Cath lab, for routine progression of care. Report consisted of patients Situation, Background, Assessment and Recommendations(SBAR). Information from the following report(s) Procedure Summary, MAR and Recent Results was reviewed with the receiving clinician. Opportunity for questions and clarification was provided. Assessment completed upon patients arrival to [address] [place] [address] and care assumed. Cardiac Cath Lab Recovery Arrival Note:     Maurice arrived to [facility] recovery area. Patient procedure= Cardioversion. Patient on cardiac monitor, non-invasive blood pressure, Patient status doing well without problems. Patient is A&Ox 4.  Patient reports no pain, no chest pain, no n/v.

## 2018-04-25 NOTE — IP AVS SNAPSHOT
[address] [facility] [address] [place] [address] 
[phone] Patient: Chapis Casas MRN: [id number] BAR:1[birth year] A check lakisha indicates which time of day the medication should be taken. My Medications START taking these medications Instructions Each Dose to Equal  
 Morning Noon Evening Bedtime  
 sotalol 80 mg tablet Commonly known as:  Dante Peon Your last dose was: Your next dose is: Take 1 Tab by mouth every twelve (12) hours. Indications: PREVENTION OF RECURRENT ATRIAL FIBRILLATION 80 mg CONTINUE taking these medications Instructions Each Dose to Equal  
 Morning Noon Evening Bedtime  
 acetaminophen 500 mg tablet Commonly known as:  TYLENOL Your last dose was: Your next dose is: Take 1,000 mg by mouth daily as needed for Pain. 1000 mg  
    
   
   
   
  
 aspirin delayed-release 81 mg tablet Your last dose was: Your next dose is: Take 1 Tab by mouth daily. 81 mg  
    
   
   
   
  
 dilTIAZem  mg ER capsule Commonly known as:  CARDIZEM CD Your last dose was: Your next dose is: Take 1 Cap by mouth daily. 120 mg  
    
   
   
   
  
 furosemide 40 mg tablet Commonly known as:  LASIX Your last dose was: Your next dose is: Take 1 Tab by mouth daily. 40 mg  
    
   
   
   
  
 lisinopril 10 mg tablet Commonly known as:  Ruffin Sibley Your last dose was: Your next dose is: Take 1 Tab by mouth nightly. 10 mg  
    
   
   
   
  
 potassium chloride SR 20 mEq tablet Commonly known as:  K-TAB Your last dose was: Your next dose is: Take 1 Tab by mouth two (2) times a day. 20 mEq  
    
   
   
   
  
 rivaroxaban 20 mg Tab tablet Commonly known as:  Hernesto Celis Your last dose was: Your next dose is: Take 1 Tab by mouth daily (with lunch). Indications: PREVENT THROMBOEMBOLISM IN CHRONIC ATRIAL FIBRILLATION  
 20 mg Where to Get Your Medications Information on where to get these meds will be given to you by the nurse or doctor. ! Ask your nurse or doctor about these medications  
  sotalol 80 mg tablet

## 2018-04-25 NOTE — PROCEDURES
Elective DC Cardioversion    Pt was NPO and gave informed consent prior to the procedure. Sedation with 60mg Propofol done by anesthesia. First attempt at 200 J was successful at restoring NSR but patient promptly returned to atrial flutter. Second attempt at 200J was successful at restoring NSR. Imp:  Successful DC cardioversion of afib to NSR. Will need to start sotalol now as she did not start it yesterday.      Caty Fleming MD

## 2018-04-25 NOTE — IP AVS SNAPSHOT
[address]00 [facility] [address] [place] [address] 
[phone] Patient: Maggy Cooper MRN: [id number] GDG:[birth year] About your hospitalization You were admitted on:  April 25, 2018 You last received care in the:  [facility]/s  CATH LAB You were discharged on:  April 25, 2018 Why you were hospitalized Your primary diagnosis was:  Not on File Your diagnoses also included:  A-Fib (Hcc) Follow-up Information Follow up With Details Comments Contact Info Zay Diane MD   59 [facility] [place] [place] [address] [id number] 
[phone] César Aguilar MD On 4/26/2018 EKG tomorrow one hour after you take your morning Sotalol dose 200 [facility] 505 [address] [place] [address] 
[phone] Discharge Orders None A check lakisha indicates which time of day the medication should be taken. My Medications START taking these medications Instructions Each Dose to Equal  
 Morning Noon Evening Bedtime  
 sotalol 80 mg tablet Commonly known as:  Adonis Jaz Your last dose was: Your next dose is: Take 1 Tab by mouth every twelve (12) hours. Indications: PREVENTION OF RECURRENT ATRIAL FIBRILLATION 80 mg CONTINUE taking these medications Instructions Each Dose to Equal  
 Morning Noon Evening Bedtime  
 acetaminophen 500 mg tablet Commonly known as:  TYLENOL Your last dose was: Your next dose is: Take 1,000 mg by mouth daily as needed for Pain. 1000 mg  
    
   
   
   
  
 aspirin delayed-release 81 mg tablet Your last dose was: Your next dose is: Take 1 Tab by mouth daily. 81 mg  
    
   
   
   
  
 dilTIAZem  mg ER capsule Commonly known as:  CARDIZEM CD Your last dose was: Your next dose is: Take 1 Cap by mouth daily.   
 120 mg  
    
   
 furosemide 40 mg tablet Commonly known as:  LASIX Your last dose was: Your next dose is: Take 1 Tab by mouth daily. 40 mg  
    
   
   
   
  
 lisinopril 10 mg tablet Commonly known as:  Sammie Fess Your last dose was: Your next dose is: Take 1 Tab by mouth nightly. 10 mg  
    
   
   
   
  
 potassium chloride SR 20 mEq tablet Commonly known as:  K-TAB Your last dose was: Your next dose is: Take 1 Tab by mouth two (2) times a day. 20 mEq  
    
   
   
   
  
 rivaroxaban 20 mg Tab tablet Commonly known as:  Lourena White Water Your last dose was: Your next dose is: Take 1 Tab by mouth daily (with lunch). Indications: PREVENT THROMBOEMBOLISM IN CHRONIC ATRIAL FIBRILLATION  
 20 mg Where to Get Your Medications Information on where to get these meds will be given to you by the nurse or doctor. ! Ask your nurse or doctor about these medications  
  sotalol 80 mg tablet Discharge Instructions Learning About Cardioversion What is cardioversion? Cardioversion helps your heart return to a normal rhythm. It treats problems like atrial fibrillation. It is also sometimes used in emergencies. It can correct a fast heartbeat that causes low blood pressure, chest pain, or heart failure. There are two types: · The electrical type uses an electric current. The current enters your body through patches on your chest or back. · The chemical type uses medicines. The medicine is usually put into your arm through a tube called an IV. How is cardioversion done? Your doctor may ask you to take medicines before the treatment. These help prevent blood clots. Your doctor will watch you closely to make sure that there are no problems. Electrical cardioversion The electrical procedure is done in a hospital. You will get medicine to help you relax and control the pain. Your doctor will put patches on your chest or back. The patches send an electric current to your heart. This resets your heart rhythm. The electrical part takes about 5 minutes. But you will probably be in the hospital for 1 to 2 hours. You will need to recover from the effects of the sedative medicine. Chemical cardioversion The chemical procedure is most often done in a hospital. In most cases, the medicine is put into your arm through a tube called an IV. But you may get medicines to take by mouth. You may feel a quick sting or pinch when the IV starts. The procedure usually takes about 4 to 8 hours. What can you expect after cardioversion? · You can usually go home the same day. You will need someone to drive you home. · Your doctor may have you take medicines daily. These help your heart beat normally and prevent blood clots. · After electrical cardioversion, you may have redness where the patches were. This looks and feels like a sunburn. · Abnormal heart rhythms sometimes come back after cardioversion. Follow-up care is a key part of your treatment and safety. Be sure to make and go to all appointments, and call your doctor if you are having problems. It's also a good idea to know your test results and keep a list of the medicines you take. Where can you learn more? Go to http://chela-montse.info/. Enter Y010 in the search box to learn more about \"Learning About Cardioversion. \" Current as of: September 21, 2016 Content Version: 11.4 © 6328-7882 Heysan. Care instructions adapted under license by [employer] (which disclaims liability or warranty for this information). If you have questions about a medical condition or this instruction, always ask your healthcare professional. [place] [address] any warranty or liability for your use of this information. Introducing [facility] SERVICES! Mikel Browne introduces iSpeciment patient portal. Now you can access parts of your medical record, email your doctor's office, and request medication refills online. 1. In your internet browser, go to [email or web address] [email or web address] 2. Click on the First Time User? Click Here link in the Sign In box. You will see the New Member Sign Up page. 3. Enter your [employer] Access Code exactly as it appears below. You will not need to use this code after youve completed the sign-up process. If you do not sign up before the expiration date, you must request a new code. · [employer] Access Code: [email or web address] Expires: 6/26/2018  2:43 PM 
 
4. Enter the last four digits of your Social Security Number (xxxx) and Date of Birth (mm/dd/yyyy) as indicated and click Submit. You will be taken to the next sign-up page. 5. Create a [employer] ID. This will be your [employer] login ID and cannot be changed, so think of one that is secure and easy to remember. 6. Create a [employer] password. You can change your password at any time. 7. Enter your Password Reset Question and Answer. This can be used at a later time if you forget your password. 8. Enter your e-mail address. You will receive e-mail notification when new information is available in [address] 9. Click Sign Up. You can now view and download portions of your medical record. 10. Click the Download Summary menu link to download a portable copy of your medical information. If you have questions, please visit the Frequently Asked Questions section of the [employer] website. Remember, [employer] is NOT to be used for urgent needs. For medical emergencies, dial 911. Now available from your iPhone and Android! Introducing Ji Serna As a [facility] patient, I wanted to make you aware of our electronic visit tool called Ji Serna. [facility] Hollie 24/7 allows you to connect within minutes with a medical provider 24 hours a day, seven days a week via a mobile device or tablet or logging into a secure website from your computer. You can access SmartEquip from anywhere in the [place]. A virtual visit might be right for you when you have a simple condition and feel like you just dont want to get out of bed, or cant get away from work for an appointment, when your regular [facility] provider is not available (evenings, weekends or holidays), or when youre out of town and need minor care. Electronic visits cost only $49 and if the [employer] 24/7 provider determines a prescription is needed to treat your condition, one can be electronically transmitted to a nearby pharmacy*. Please take a moment to enroll today if you have not already done so. The enrollment process is free and takes just a few minutes. To enroll, please download the Populis[employer] jessa to your tablet or phone, or visit [email or web address] to enroll on your computer. And, as an [address] [place] [address] patient with a [employer]Drummer account, the results of your visits will be scanned into your electronic medical record and your primary care provider will be able to view the scanned results. We urge you to continue to see your regular [facility] provider for your ongoing medical care. And while your primary care provider may not be the one available when you seek a [employer] virtual visit, the peace of mind you get from getting a real diagnosis real time can be priceless. For more information on SmartEquip, view our Frequently Asked Questions (FAQs) at [email or web address]. Sincerely, 
 
Dianah Sicard, MD 
Chief Medical Officer Quincy Moya *:  certain medications cannot be prescribed via SmartEquip Unresulted Labs-Please follow up with your PCP about these lab tests Order Current Status EKG, 12 LEAD, INITIAL Preliminary result Providers Seen During Your Hospitalization Provider Specialty Primary office phone Sarika Junior MD Cardiology [phone] Your Primary Care Physician (PCP) Primary Care Physician Office Phone Office Fax Duy Moses, [address] [phone] You are allergic to the following Allergen Reactions Penicillins Hives Recent Documentation Height Weight Breastfeeding? BMI OB Status Smoking Status 1.727 m 136.7 kg No 45.83 kg/m2 Postmenopausal Former Smoker Emergency Contacts Name Discharge Info Relation Home Work Mobile Marcel Daugherty N/A  AT THIS TIME [6] Child [2] [phone] Martine Black CAREGIVER [3] Child [2] [phone] Patient Belongings The following personal items are in your possession at time of discharge: 
     Visual Aid: Glasses   Hearing Aids/Status: Bilateral, Not in hospital                   
 
  
  
 Please provide this summary of care documentation to your next provider. Signatures-by signing, you are acknowledging that this After Visit Summary has been reviewed with you and you have received a copy. Patient Signature:  ____________________________________________________________ Date:  ____________________________________________________________  
  
Dora Finger Provider Signature:  ____________________________________________________________ Date:  ____________________________________________________________

## 2018-04-25 NOTE — PROGRESS NOTES
I have reviewed discharge instructions with the patient and patient's son. The patient and patient's son verbalized understanding. Copy of discharge instructions given to patient.

## 2018-04-25 NOTE — ANESTHESIA POSTPROCEDURE EVALUATION
Post-Anesthesia Evaluation and Assessment    Patient: Ramy Clinton MRN: [id number]  SSN: [id number]    YOB: 1940  Age: 69 y.o. Sex: female       Cardiovascular Function/Vital Signs  Visit Vitals    BP (!) 100/27 (BP 1 Location: Right arm, BP Patient Position: At rest)    Pulse 68    Temp 36.9 °C (98.4 °F)    Resp 20    Ht 5' 8\" (1.727 m)    Wt 136.7 kg (301 lb 6.4 oz)    SpO2 98%    Breastfeeding No    BMI 45.83 kg/m2       Patient is status post general anesthesia for * No procedures listed *. Nausea/Vomiting: None    Postoperative hydration reviewed and adequate. Pain:  Pain Scale 1: Numeric (0 - 10) (04/25/18 1130)  Pain Intensity 1: 0 (04/25/18 1130)   Managed    Neurological Status: At baseline    Mental Status and Level of Consciousness: Arousable    Pulmonary Status:   O2 Device: Room air (04/25/18 1130)   Adequate oxygenation and airway patent    Complications related to anesthesia: None    Post-anesthesia assessment completed.  No concerns    Signed By: Benjamin Andrews MD     April 25, 2018

## 2018-05-25 ENCOUNTER — HOSPITAL ENCOUNTER (EMERGENCY)
Age: 78
Discharge: HOME | End: 2018-05-25
Attending: EMERGENCY MEDICINE
Payer: MEDICARE

## 2018-05-25 ENCOUNTER — APPOINTMENT (OUTPATIENT)
Dept: GENERAL RADIOLOGY | Age: 78
End: 2018-05-25
Attending: EMERGENCY MEDICINE
Payer: MEDICARE

## 2018-05-25 VITALS
HEART RATE: 96 BPM | TEMPERATURE: 98.2 F | HEIGHT: 69 IN | RESPIRATION RATE: 18 BRPM | OXYGEN SATURATION: 96 % | BODY MASS INDEX: 43.4 KG/M2 | SYSTOLIC BLOOD PRESSURE: 117 MMHG | DIASTOLIC BLOOD PRESSURE: 76 MMHG | WEIGHT: 293 LBS

## 2018-05-25 DIAGNOSIS — M79.89 FOOT SWELLING: ICD-10-CM

## 2018-05-25 DIAGNOSIS — M79.672 LEFT FOOT PAIN: ICD-10-CM

## 2018-05-25 LAB
ALBUMIN SERPL-MCNC: 2.9 G/DL (ref 3.5–5)
ALBUMIN/GLOB SERPL: 0.6 {RATIO} (ref 1.1–2.2)
ALP SERPL-CCNC: 96 U/L (ref 45–117)
ALT SERPL-CCNC: 12 U/L (ref 12–78)
ANION GAP SERPL CALC-SCNC: 7 MMOL/L (ref 5–15)
AST SERPL-CCNC: 11 U/L (ref 15–37)
BASOPHILS # BLD: 0 K/UL (ref 0–0.1)
BASOPHILS NFR BLD: 0 % (ref 0–1)
BILIRUB SERPL-MCNC: 0.9 MG/DL (ref 0.2–1)
BUN SERPL-MCNC: 13 MG/DL (ref 6–20)
BUN/CREAT SERPL: 12 (ref 12–20)
CALCIUM SERPL-MCNC: 9.1 MG/DL (ref 8.5–10.1)
CHLORIDE SERPL-SCNC: 103 MMOL/L (ref 97–108)
CO2 SERPL-SCNC: 29 MMOL/L (ref 21–32)
CREAT SERPL-MCNC: 1.09 MG/DL (ref 0.55–1.02)
DIFFERENTIAL METHOD BLD: ABNORMAL
EOSINOPHIL # BLD: 0 K/UL (ref 0–0.4)
EOSINOPHIL NFR BLD: 0 % (ref 0–7)
ERYTHROCYTE [DISTWIDTH] IN BLOOD BY AUTOMATED COUNT: 15.1 % (ref 11.5–14.5)
GLOBULIN SER CALC-MCNC: 4.9 G/DL (ref 2–4)
GLUCOSE SERPL-MCNC: 90 MG/DL (ref 65–100)
HCT VFR BLD AUTO: 42.5 % (ref 35–47)
HGB BLD-MCNC: 13.8 G/DL (ref 11.5–16)
IMM GRANULOCYTES # BLD: 0.1 K/UL (ref 0–0.04)
IMM GRANULOCYTES NFR BLD AUTO: 1 % (ref 0–0.5)
LYMPHOCYTES # BLD: 1.4 K/UL (ref 0.8–3.5)
LYMPHOCYTES NFR BLD: 15 % (ref 12–49)
MCH RBC QN AUTO: 27.8 PG (ref 26–34)
MCHC RBC AUTO-ENTMCNC: 32.5 G/DL (ref 30–36.5)
MCV RBC AUTO: 85.5 FL (ref 80–99)
MONOCYTES # BLD: 1.5 K/UL (ref 0–1)
MONOCYTES NFR BLD: 15 % (ref 5–13)
NEUTS SEG # BLD: 6.5 K/UL (ref 1.8–8)
NEUTS SEG NFR BLD: 68 % (ref 32–75)
NRBC # BLD: 0 K/UL (ref 0–0.01)
NRBC BLD-RTO: 0 PER 100 WBC
PLATELET # BLD AUTO: 226 K/UL (ref 150–400)
PMV BLD AUTO: 10.5 FL (ref 8.9–12.9)
POTASSIUM SERPL-SCNC: 4.2 MMOL/L (ref 3.5–5.1)
PROT SERPL-MCNC: 7.8 G/DL (ref 6.4–8.2)
RBC # BLD AUTO: 4.97 M/UL (ref 3.8–5.2)
SODIUM SERPL-SCNC: 139 MMOL/L (ref 136–145)
URATE SERPL-MCNC: 8.7 MG/DL (ref 2.6–6)
WBC # BLD AUTO: 9.6 K/UL (ref 3.6–11)

## 2018-05-25 PROCEDURE — 74011250636 HC RX REV CODE- 250/636: Performed by: EMERGENCY MEDICINE

## 2018-05-25 PROCEDURE — 85025 COMPLETE CBC W/AUTO DIFF WBC: CPT | Performed by: EMERGENCY MEDICINE

## 2018-05-25 PROCEDURE — 36415 COLL VENOUS BLD VENIPUNCTURE: CPT | Performed by: EMERGENCY MEDICINE

## 2018-05-25 PROCEDURE — 80053 COMPREHEN METABOLIC PANEL: CPT | Performed by: EMERGENCY MEDICINE

## 2018-05-25 PROCEDURE — 84550 ASSAY OF BLOOD/URIC ACID: CPT | Performed by: EMERGENCY MEDICINE

## 2018-05-25 PROCEDURE — 99285 EMERGENCY DEPT VISIT HI MDM: CPT

## 2018-05-25 PROCEDURE — A9270 NON-COVERED ITEM OR SERVICE: HCPCS | Performed by: EMERGENCY MEDICINE

## 2018-05-25 PROCEDURE — 74011636637 HC RX REV CODE- 636/637: Performed by: EMERGENCY MEDICINE

## 2018-05-25 PROCEDURE — 73620 X-RAY EXAM OF FOOT: CPT

## 2018-05-25 PROCEDURE — 96374 THER/PROPH/DIAG INJ IV PUSH: CPT

## 2018-05-25 PROCEDURE — 74011250637 HC RX REV CODE- 250/637: Performed by: EMERGENCY MEDICINE

## 2018-05-25 RX ORDER — HYDROCODONE BITARTRATE AND ACETAMINOPHEN 5; 325 MG/1; MG/1
1 TABLET ORAL
Qty: 20 TAB | Refills: 0 | Status: SHIPPED | OUTPATIENT
Start: 2018-05-25 | End: 2018-06-07

## 2018-05-25 RX ORDER — FENTANYL CITRATE 50 UG/ML
50 INJECTION, SOLUTION INTRAMUSCULAR; INTRAVENOUS
Status: COMPLETED | OUTPATIENT
Start: 2018-05-25 | End: 2018-05-25

## 2018-05-25 RX ORDER — METHYLPREDNISOLONE 4 MG/1
TABLET ORAL
Qty: 1 DOSE PACK | Refills: 0 | Status: SHIPPED | OUTPATIENT
Start: 2018-05-25 | End: 2018-06-07

## 2018-05-25 RX ORDER — PREDNISONE 20 MG/1
60 TABLET ORAL
Status: COMPLETED | OUTPATIENT
Start: 2018-05-25 | End: 2018-05-25

## 2018-05-25 RX ORDER — HYDROCODONE BITARTRATE AND ACETAMINOPHEN 5; 325 MG/1; MG/1
1 TABLET ORAL ONCE
Status: COMPLETED | OUTPATIENT
Start: 2018-05-25 | End: 2018-05-25

## 2018-05-25 RX ADMIN — HYDROCODONE BITARTRATE AND ACETAMINOPHEN 1 TABLET: 5; 325 TABLET ORAL at 18:52

## 2018-05-25 RX ADMIN — FENTANYL CITRATE 50 MCG: 50 INJECTION, SOLUTION INTRAMUSCULAR; INTRAVENOUS at 16:51

## 2018-05-25 RX ADMIN — PREDNISONE 60 MG: 20 TABLET ORAL at 18:52

## 2018-05-25 NOTE — ED PROVIDER NOTES
HPI Comments: 69 y.o. female with past medical history significant for arthritis, GERD, atrial fibrillation, obesity, dyspnea on exertion, and fluid in the lungs who presents via EMS with chief complaint of foot swelling. Patient reports left first toe pain that radiates to her mid foot and foot swelling with onset ~3 days ago. Patient denies any trauma to her foot. Patient denies a history of DM and gout. Patient reports walking with a walker at baseline. Patient reports difficulty walking even with her walker today due to her pain. There are no other acute medical concerns at this time. PCP: Stephen Cox MD    Note written by India Avilez, as dictated by Geraldine Jose MD 4:02 PM     The history is provided by the patient. Past Medical History:   Diagnosis Date    Arthritis     Atrial fibrillation (HCC)     WHITING (dyspnea on exertion)     GERD (gastroesophageal reflux disease)     Obesity     Other ill-defined conditions(799.89)     \"fluid in the lung\"       Past Surgical History:   Procedure Laterality Date    COLONOSCOPY N/A 3/30/2017    COLONOSCOPY performed by [facility]uyita Jennings MD at [facility] ENDOSCOPY    HX CHOLECYSTECTOMY      HX HERNIA REPAIR      HX ORTHOPAEDIC      right and left total knee replacement    HX ORTHOPAEDIC      right shoulder    HX OTHER SURGICAL      kidney stone surgery    HX OTHER SURGICAL      both eyes cataract         History reviewed. No pertinent family history. Social History     Social History    Marital status: SINGLE     Spouse name: N/A    Number of children: N/A    Years of education: N/A     Occupational History    Not on file.      Social History Main Topics    Smoking status: Former Smoker     Packs/day: 1.00    Smokeless tobacco: Never Used      Comment: quit at age 42,smoked for 16 years    Alcohol use No    Drug use: No    Sexual activity: Not on file     Other Topics Concern    Not on file     Social History Narrative ALLERGIES: Penicillins    Review of Systems   Constitutional: Negative for chills and fever. HENT: Negative for rhinorrhea and sore throat. Respiratory: Negative for cough and shortness of breath. Cardiovascular: Negative for chest pain. Gastrointestinal: Negative for abdominal pain, diarrhea, nausea and vomiting. Genitourinary: Negative for dysuria and urgency. Musculoskeletal: Positive for arthralgias and joint swelling. Negative for back pain. Skin: Negative for rash. Neurological: Negative for dizziness, weakness and light-headedness. All other systems reviewed and are negative. Vitals:    05/25/18 1548   BP: 131/78   Pulse: (!) 55   Resp: 18   Temp: 98.5 °F (36.9 °C)   SpO2: 98%   Weight: 137 kg (302 lb)   Height: 5' 9\" (1.753 m)            Physical Exam     Vital signs reviewed. Nursing notes reviewed. Const:  No acute distress, well developed, well nourished  Head:  Atraumatic, normocephalic  Eyes:  PERRL, conjunctiva normal, no scleral icterus  Neck:  Supple, trachea midline  Cardiovascular:  RRR, no murmurs, no gallops, no rubs  Resp:  No resp distress, no increased work of breathing, no wheezes, no rhonchi, no rales,  Abd:  Soft, non-tender, non-distended, no rebound, no guarding, no CVA tenderness  :  Deferred  MSK:  Swelling to the left mid foot; tenderness in the distal medial mid foot; moderate pain with ROM of the left ankle; no pain with ROM of the toes; no erythema or warmth.   Neuro:  Alert and oriented x3, no cranial nerve defect  Skin:  Warm, dry, intact  Psych: normal mood and affect, behavior is normal, judgement and thought content is normal  Note written by India Gomez, as dictated by Ana Auguste MD 4:07 PM    MDM  Number of Diagnoses or Management Options  Foot swelling:   Left foot pain:      Amount and/or Complexity of Data Reviewed  Clinical lab tests: reviewed and ordered  Tests in the radiology section of CPT®: ordered and reviewed  Review and summarize past medical records: yes    Patient Progress  Patient progress: stable    ED Course     Pt. Presents tot he ER with complaints of foot pain. Pt. Is well appearing in the ER. No fx on xray. Normal WBC. No signs of infection on exam.  Pt. With an elevated uric acid. I am suspicious for gout. Pt. Is not a diabetic. Exam is not consistent with septic arthritis. I will start her on norco and prednisone. Pt. Expressed that she has had some difficulty walking with her walker since her foot started hurting. I offered her admission to the hospital, but she refused. Pt. Says that her son lives with her and can help her at out at home. Pt. Given strict instructions to return to the ER with worsening sx. Otherwise, pt. To f/u with her PCP.     Procedures

## 2018-05-25 NOTE — ED TRIAGE NOTES
Triage note: Pt arrives via EMS from home for left foot swelling for the past 3 days. Pt denies injury or hx of gout.

## 2018-05-25 NOTE — DISCHARGE INSTRUCTIONS
Foot Pain: Care Instructions  Your Care Instructions  Foot injuries that cause pain and swelling are fairly common. Almost all sports or home repair projects can cause a misstep that ends up as foot pain. Normal wear and tear, especially as you get older, also can cause foot pain. Most minor foot injuries will heal on their own, and home treatment is usually all you need to do. If you have a severe injury, you may need tests and treatment. Follow-up care is a key part of your treatment and safety. Be sure to make and go to all appointments, and call your doctor if you are having problems. It's also a good idea to know your test results and keep a list of the medicines you take. How can you care for yourself at home? · Take pain medicines exactly as directed. ¨ If the doctor gave you a prescription medicine for pain, take it as prescribed. ¨ If you are not taking a prescription pain medicine, ask your doctor if you can take an over-the-counter medicine. · Rest and protect your foot. Take a break from any activity that may cause pain. · Put ice or a cold pack on your foot for 10 to 20 minutes at a time. Put a thin cloth between the ice and your skin. · Prop up the sore foot on a pillow when you ice it or anytime you sit or lie down during the next 3 days. Try to keep it above the level of your heart. This will help reduce swelling. · Your doctor may recommend that you wrap your foot with an elastic bandage. Keep your foot wrapped for as long as your doctor advises. · If your doctor recommends crutches, use them as directed. · Wear roomy footwear. · As soon as pain and swelling end, begin gentle exercises of your foot. Your doctor can tell you which exercises will help. When should you call for help? Call 911 anytime you think you may need emergency care. For example, call if:  ? · Your foot turns pale, white, blue, or cold.    ?Call your doctor now or seek immediate medical care if:  ? · You cannot move or stand on your foot. ? · Your foot looks twisted or out of its normal position. ? · Your foot is not stable when you step down. ? · You have signs of infection, such as:  ¨ Increased pain, swelling, warmth, or redness. ¨ Red streaks leading from the sore area. ¨ Pus draining from a place on your foot. ¨ A fever. ? · Your foot is numb or tingly. ? Watch closely for changes in your health, and be sure to contact your doctor if:  ? · You do not get better as expected. ? · You have bruises from an injury that last longer than 2 weeks. Where can you learn more? Go to http://chela-montse.info/. Enter W565 in the search box to learn more about \"Foot Pain: Care Instructions. \"  Current as of: March 21, 2017  Content Version: 11.4  © 2313-8585 [employer]. Care instructions adapted under license by [employer] (which disclaims liability or warranty for this information). If you have questions about a medical condition or this instruction, always ask your healthcare professional. [address] any warranty or liability for your use of this information.

## 2018-05-28 NOTE — CALL BACK NOTE
[facility] Emergency Department Follow Up Call Record    Discharged to : Home/Family Home/Home Health/Skilled Facility/Rehab/Assisted Living/Other__Home_____  1) Did you receive your discharge instructions? Yes        2) Do you understand them? Yes         3) Are you able to follow them? Yes          If NO, what can I clarify for you? 4) Do you understand your diagnosis? Yes         5) Do you know which symptoms should prompt you to call the doctor? Yes     6) Were you able to fill and  any medications that were prescribed? Yes     7) You were prescribed _hydrocodone, methylprednisolone__________for ____foot inflammation________________. Common side effects of this medication are____________________. This is not a complete list so please review the forms given from the pharmacy for a complete list.      8) Are there any questions about your medications? No            Have you scheduled any recommended doctors appointments (specialty, PCP) Encouraged follow up with PCP  If NO, what barriers are you encountering (transportation/lost contact info/cost/  didnt think necessary/no PCP  9) If discharged with Home Health, has the agency contacted you to schedule visit? Not applicable  10) Is there anyone available to help you at home (meals, errands, transportation    monitoring) (adult children, neighbors, private duty companions) Yes    11) Are you on a special diet? No         If YES, do you understand the requirements for this diet? Education provided? 12) If presented with cough, bronchitis, COPD, asthma, is it ok to ask that the   respiratory disease management educator call you? Not applicable      13)  A) If presented with fall, were you issued an assistive device in the ED    Are you using? Yes  Uses won walker  B) If given RX for device, have you obtained? Not applicable       If NO, barriers? C) Therapist recommended:NO   Are you able to implement the suggestions?  Not applicable        If NO, barriers to implementation? D) Are you having any difficulties with mobility inside your home?     (steps, bed, tub)Yes. Patient does report improvement with pain of foot and able to walk better today. If YES, ask if the [facility] PT can contact patient and good time and number?  14)  At the end of your discharge instructions, there is information about accessing \[facility] [place] [facility]\"" & HEALTH SERVICES, have you had a chance to review those? Yes         Do you have any questions about signing up for this service? No   We encourage our patients to be active participants in their healthcare and this site is one of the ways to do that. It will allow you to access parts of your medical record, email your doctors office, schedule appointments, and request medications refills . 15) Are there any other questions that I can answer for you regarding    your Emergency department visit?  NO             Estimated Call Time:____1:12 PM  _______________ Date/Time:_______________

## 2018-06-07 ENCOUNTER — APPOINTMENT (OUTPATIENT)
Dept: ULTRASOUND IMAGING | Age: 78
DRG: 202 | End: 2018-06-07
Attending: HOSPITALIST
Payer: MEDICARE

## 2018-06-07 ENCOUNTER — APPOINTMENT (OUTPATIENT)
Dept: GENERAL RADIOLOGY | Age: 78
DRG: 202 | End: 2018-06-07
Attending: EMERGENCY MEDICINE
Payer: MEDICARE

## 2018-06-07 ENCOUNTER — HOSPITAL ENCOUNTER (INPATIENT)
Age: 78
LOS: 4 days | Discharge: HOME HEALTH | DRG: 202 | End: 2018-06-11
Attending: EMERGENCY MEDICINE | Admitting: HOSPITALIST
Payer: MEDICARE

## 2018-06-07 DIAGNOSIS — J20.9 ACUTE BRONCHITIS, UNSPECIFIED ORGANISM: Primary | ICD-10-CM

## 2018-06-07 DIAGNOSIS — I50.9 CONGESTIVE HEART FAILURE, UNSPECIFIED HF CHRONICITY, UNSPECIFIED HEART FAILURE TYPE (HCC): ICD-10-CM

## 2018-06-07 DIAGNOSIS — R06.03 RESPIRATORY DISTRESS: ICD-10-CM

## 2018-06-07 PROBLEM — D72.829 LEUKOCYTOSIS: Status: ACTIVE | Noted: 2018-06-07

## 2018-06-07 PROBLEM — R06.2 WHEEZING: Status: ACTIVE | Noted: 2018-06-07

## 2018-06-07 PROBLEM — J18.9 PNEUMONIA: Status: ACTIVE | Noted: 2018-06-07

## 2018-06-07 LAB
ALBUMIN SERPL-MCNC: 1.9 G/DL (ref 3.5–5)
ALBUMIN/GLOB SERPL: 0.4 {RATIO} (ref 1.1–2.2)
ALP SERPL-CCNC: 81 U/L (ref 45–117)
ALT SERPL-CCNC: 22 U/L (ref 12–78)
ANION GAP SERPL CALC-SCNC: 11 MMOL/L (ref 5–15)
APPEARANCE UR: ABNORMAL
ARTERIAL PATENCY WRIST A: YES
AST SERPL-CCNC: 25 U/L (ref 15–37)
BACTERIA URNS QL MICRO: NEGATIVE /HPF
BASE EXCESS BLD CALC-SCNC: 6 MMOL/L
BASOPHILS # BLD: 0 K/UL (ref 0–0.1)
BASOPHILS NFR BLD: 0 % (ref 0–1)
BDY SITE: ABNORMAL
BILIRUB SERPL-MCNC: 1.2 MG/DL (ref 0.2–1)
BILIRUB UR QL CFM: NEGATIVE
BNP SERPL-MCNC: 1924 PG/ML (ref 0–450)
BUN SERPL-MCNC: 17 MG/DL (ref 6–20)
BUN/CREAT SERPL: 13 (ref 12–20)
CALCIUM SERPL-MCNC: 8.4 MG/DL (ref 8.5–10.1)
CHLORIDE SERPL-SCNC: 103 MMOL/L (ref 97–108)
CK MB CFR SERPL CALC: NORMAL % (ref 0–2.5)
CK MB SERPL-MCNC: <1 NG/ML (ref 5–25)
CK SERPL-CCNC: 93 U/L (ref 26–192)
CO2 SERPL-SCNC: 25 MMOL/L (ref 21–32)
COLOR UR: ABNORMAL
CREAT SERPL-MCNC: 1.35 MG/DL (ref 0.55–1.02)
DIFFERENTIAL METHOD BLD: ABNORMAL
EOSINOPHIL # BLD: 0 K/UL (ref 0–0.4)
EOSINOPHIL NFR BLD: 0 % (ref 0–7)
EPITH CASTS URNS QL MICRO: ABNORMAL /LPF
ERYTHROCYTE [DISTWIDTH] IN BLOOD BY AUTOMATED COUNT: 15.8 % (ref 11.5–14.5)
GAS FLOW.O2 O2 DELIVERY SYS: ABNORMAL L/MIN
GAS FLOW.O2 SETTING OXYMISER: 2 L/M
GLOBULIN SER CALC-MCNC: 5.2 G/DL (ref 2–4)
GLUCOSE SERPL-MCNC: 102 MG/DL (ref 65–100)
GLUCOSE UR STRIP.AUTO-MCNC: NEGATIVE MG/DL
HCO3 BLD-SCNC: 29.8 MMOL/L (ref 22–26)
HCT VFR BLD AUTO: 37.2 % (ref 35–47)
HGB BLD-MCNC: 12.3 G/DL (ref 11.5–16)
HGB UR QL STRIP: NEGATIVE
IMM GRANULOCYTES # BLD: 0 K/UL
IMM GRANULOCYTES NFR BLD AUTO: 0 %
KETONES UR QL STRIP.AUTO: NEGATIVE MG/DL
LACTATE SERPL-SCNC: 1.8 MMOL/L (ref 0.4–2)
LEUKOCYTE ESTERASE UR QL STRIP.AUTO: NEGATIVE
LYMPHOCYTES # BLD: 1.9 K/UL (ref 0.8–3.5)
LYMPHOCYTES NFR BLD: 11 % (ref 12–49)
MAGNESIUM SERPL-MCNC: 1.9 MG/DL (ref 1.6–2.4)
MCH RBC QN AUTO: 27.7 PG (ref 26–34)
MCHC RBC AUTO-ENTMCNC: 33.1 G/DL (ref 30–36.5)
MCV RBC AUTO: 83.8 FL (ref 80–99)
MONOCYTES # BLD: 1.2 K/UL (ref 0–1)
MONOCYTES NFR BLD: 7 % (ref 5–13)
NEUTS BAND NFR BLD MANUAL: 10 % (ref 0–6)
NEUTS SEG # BLD: 14.5 K/UL (ref 1.8–8)
NEUTS SEG NFR BLD: 72 % (ref 32–75)
NITRITE UR QL STRIP.AUTO: NEGATIVE
NRBC # BLD: 0 K/UL (ref 0–0.01)
NRBC BLD-RTO: 0 PER 100 WBC
PCO2 BLD: 41.1 MMHG (ref 35–45)
PH BLD: 7.47 [PH] (ref 7.35–7.45)
PH UR STRIP: 5.5 [PH] (ref 5–8)
PLATELET # BLD AUTO: 217 K/UL (ref 150–400)
PMV BLD AUTO: 11.2 FL (ref 8.9–12.9)
PO2 BLD: 70 MMHG (ref 80–100)
POTASSIUM SERPL-SCNC: 4.1 MMOL/L (ref 3.5–5.1)
PROT SERPL-MCNC: 7.1 G/DL (ref 6.4–8.2)
PROT UR STRIP-MCNC: 30 MG/DL
RBC # BLD AUTO: 4.44 M/UL (ref 3.8–5.2)
RBC #/AREA URNS HPF: ABNORMAL /HPF (ref 0–5)
RBC MORPH BLD: ABNORMAL
SAO2 % BLD: 95 % (ref 92–97)
SODIUM SERPL-SCNC: 139 MMOL/L (ref 136–145)
SP GR UR REFRACTOMETRY: 1.01 (ref 1–1.03)
SPECIMEN TYPE: ABNORMAL
TOTAL RESP. RATE, ITRR: 20
TROPONIN I SERPL-MCNC: <0.04 NG/ML
UR CULT HOLD, URHOLD: NORMAL
UROBILINOGEN UR QL STRIP.AUTO: 2 EU/DL (ref 0.2–1)
WBC # BLD AUTO: 17.6 K/UL (ref 3.6–11)
WBC MORPH BLD: ABNORMAL
WBC URNS QL MICRO: ABNORMAL /HPF (ref 0–4)

## 2018-06-07 PROCEDURE — 99285 EMERGENCY DEPT VISIT HI MDM: CPT

## 2018-06-07 PROCEDURE — 80053 COMPREHEN METABOLIC PANEL: CPT | Performed by: EMERGENCY MEDICINE

## 2018-06-07 PROCEDURE — 74011000250 HC RX REV CODE- 250: Performed by: HOSPITALIST

## 2018-06-07 PROCEDURE — 74011250636 HC RX REV CODE- 250/636: Performed by: HOSPITALIST

## 2018-06-07 PROCEDURE — 65660000000 HC RM CCU STEPDOWN

## 2018-06-07 PROCEDURE — 83735 ASSAY OF MAGNESIUM: CPT | Performed by: HOSPITALIST

## 2018-06-07 PROCEDURE — 36600 WITHDRAWAL OF ARTERIAL BLOOD: CPT

## 2018-06-07 PROCEDURE — 82550 ASSAY OF CK (CPK): CPT | Performed by: EMERGENCY MEDICINE

## 2018-06-07 PROCEDURE — 81001 URINALYSIS AUTO W/SCOPE: CPT | Performed by: EMERGENCY MEDICINE

## 2018-06-07 PROCEDURE — 76705 ECHO EXAM OF ABDOMEN: CPT

## 2018-06-07 PROCEDURE — 83605 ASSAY OF LACTIC ACID: CPT | Performed by: EMERGENCY MEDICINE

## 2018-06-07 PROCEDURE — 82803 BLOOD GASES ANY COMBINATION: CPT

## 2018-06-07 PROCEDURE — 84484 ASSAY OF TROPONIN QUANT: CPT | Performed by: EMERGENCY MEDICINE

## 2018-06-07 PROCEDURE — 83880 ASSAY OF NATRIURETIC PEPTIDE: CPT | Performed by: NURSE PRACTITIONER

## 2018-06-07 PROCEDURE — 94640 AIRWAY INHALATION TREATMENT: CPT

## 2018-06-07 PROCEDURE — 77030011943

## 2018-06-07 PROCEDURE — 74011000250 HC RX REV CODE- 250: Performed by: EMERGENCY MEDICINE

## 2018-06-07 PROCEDURE — 96360 HYDRATION IV INFUSION INIT: CPT

## 2018-06-07 PROCEDURE — 87040 BLOOD CULTURE FOR BACTERIA: CPT | Performed by: EMERGENCY MEDICINE

## 2018-06-07 PROCEDURE — 74011250636 HC RX REV CODE- 250/636: Performed by: EMERGENCY MEDICINE

## 2018-06-07 PROCEDURE — 36415 COLL VENOUS BLD VENIPUNCTURE: CPT | Performed by: EMERGENCY MEDICINE

## 2018-06-07 PROCEDURE — 93005 ELECTROCARDIOGRAM TRACING: CPT

## 2018-06-07 PROCEDURE — 74011250637 HC RX REV CODE- 250/637: Performed by: HOSPITALIST

## 2018-06-07 PROCEDURE — 74011000258 HC RX REV CODE- 258: Performed by: HOSPITALIST

## 2018-06-07 PROCEDURE — 71045 X-RAY EXAM CHEST 1 VIEW: CPT

## 2018-06-07 PROCEDURE — 85025 COMPLETE CBC W/AUTO DIFF WBC: CPT | Performed by: EMERGENCY MEDICINE

## 2018-06-07 RX ORDER — FUROSEMIDE 10 MG/ML
20 INJECTION INTRAMUSCULAR; INTRAVENOUS ONCE
Status: COMPLETED | OUTPATIENT
Start: 2018-06-07 | End: 2018-06-07

## 2018-06-07 RX ORDER — IPRATROPIUM BROMIDE AND ALBUTEROL SULFATE 2.5; .5 MG/3ML; MG/3ML
3 SOLUTION RESPIRATORY (INHALATION)
Status: COMPLETED | OUTPATIENT
Start: 2018-06-07 | End: 2018-06-07

## 2018-06-07 RX ORDER — FUROSEMIDE 40 MG/1
40 TABLET ORAL DAILY
Status: DISCONTINUED | OUTPATIENT
Start: 2018-06-08 | End: 2018-06-11

## 2018-06-07 RX ORDER — DILTIAZEM HYDROCHLORIDE 120 MG/1
120 CAPSULE, COATED, EXTENDED RELEASE ORAL DAILY
Status: DISCONTINUED | OUTPATIENT
Start: 2018-06-08 | End: 2018-06-11 | Stop reason: HOSPADM

## 2018-06-07 RX ORDER — ALBUTEROL SULFATE 0.83 MG/ML
2.5 SOLUTION RESPIRATORY (INHALATION) EVERY 6 HOURS
Status: DISCONTINUED | OUTPATIENT
Start: 2018-06-07 | End: 2018-06-07

## 2018-06-07 RX ORDER — ALBUTEROL SULFATE 0.83 MG/ML
2.5 SOLUTION RESPIRATORY (INHALATION)
Status: DISCONTINUED | OUTPATIENT
Start: 2018-06-07 | End: 2018-06-08

## 2018-06-07 RX ORDER — ASPIRIN 81 MG/1
81 TABLET ORAL DAILY
Status: DISCONTINUED | OUTPATIENT
Start: 2018-06-08 | End: 2018-06-11 | Stop reason: HOSPADM

## 2018-06-07 RX ORDER — HYDROCODONE BITARTRATE AND ACETAMINOPHEN 5; 325 MG/1; MG/1
1 TABLET ORAL
Status: DISCONTINUED | OUTPATIENT
Start: 2018-06-07 | End: 2018-06-11 | Stop reason: HOSPADM

## 2018-06-07 RX ORDER — SODIUM CHLORIDE 0.9 % (FLUSH) 0.9 %
5-10 SYRINGE (ML) INJECTION AS NEEDED
Status: DISCONTINUED | OUTPATIENT
Start: 2018-06-07 | End: 2018-06-11 | Stop reason: HOSPADM

## 2018-06-07 RX ORDER — NALOXONE HYDROCHLORIDE 0.4 MG/ML
0.4 INJECTION, SOLUTION INTRAMUSCULAR; INTRAVENOUS; SUBCUTANEOUS AS NEEDED
Status: DISCONTINUED | OUTPATIENT
Start: 2018-06-07 | End: 2018-06-11 | Stop reason: HOSPADM

## 2018-06-07 RX ORDER — ALBUMIN HUMAN 250 G/1000ML
12.5 SOLUTION INTRAVENOUS AS NEEDED
Status: DISCONTINUED | OUTPATIENT
Start: 2018-06-07 | End: 2018-06-11 | Stop reason: HOSPADM

## 2018-06-07 RX ORDER — SODIUM CHLORIDE 0.9 % (FLUSH) 0.9 %
5-10 SYRINGE (ML) INJECTION EVERY 8 HOURS
Status: DISCONTINUED | OUTPATIENT
Start: 2018-06-07 | End: 2018-06-11 | Stop reason: HOSPADM

## 2018-06-07 RX ORDER — LISINOPRIL 10 MG/1
10 TABLET ORAL
Status: DISCONTINUED | OUTPATIENT
Start: 2018-06-07 | End: 2018-06-07

## 2018-06-07 RX ORDER — SOTALOL HYDROCHLORIDE 80 MG/1
80 TABLET ORAL EVERY 12 HOURS
Status: DISCONTINUED | OUTPATIENT
Start: 2018-06-07 | End: 2018-06-11 | Stop reason: HOSPADM

## 2018-06-07 RX ORDER — FUROSEMIDE 10 MG/ML
40 INJECTION INTRAMUSCULAR; INTRAVENOUS
Status: DISCONTINUED | OUTPATIENT
Start: 2018-06-07 | End: 2018-06-07

## 2018-06-07 RX ORDER — ACETAMINOPHEN 325 MG/1
650 TABLET ORAL
Status: DISCONTINUED | OUTPATIENT
Start: 2018-06-07 | End: 2018-06-11 | Stop reason: HOSPADM

## 2018-06-07 RX ADMIN — ALBUTEROL SULFATE 2.5 MG: 2.5 SOLUTION RESPIRATORY (INHALATION) at 19:50

## 2018-06-07 RX ADMIN — SODIUM CHLORIDE 250 ML: 900 INJECTION, SOLUTION INTRAVENOUS at 18:39

## 2018-06-07 RX ADMIN — SODIUM CHLORIDE 250 ML: 900 INJECTION, SOLUTION INTRAVENOUS at 19:31

## 2018-06-07 RX ADMIN — SODIUM CHLORIDE 500 ML: 900 INJECTION, SOLUTION INTRAVENOUS at 14:30

## 2018-06-07 RX ADMIN — FUROSEMIDE 20 MG: 10 INJECTION, SOLUTION INTRAMUSCULAR; INTRAVENOUS at 16:59

## 2018-06-07 RX ADMIN — Medication 10 ML: at 22:09

## 2018-06-07 RX ADMIN — CEFTRIAXONE 1 G: 1 INJECTION, POWDER, FOR SOLUTION INTRAMUSCULAR; INTRAVENOUS at 17:43

## 2018-06-07 RX ADMIN — SOTALOL HYDROCHLORIDE 80 MG: 80 TABLET ORAL at 22:07

## 2018-06-07 RX ADMIN — IPRATROPIUM BROMIDE AND ALBUTEROL SULFATE 3 ML: .5; 3 SOLUTION RESPIRATORY (INHALATION) at 17:08

## 2018-06-07 NOTE — ED NOTES
Dr. Evonne Blankenship said pt could have ice chips. Talked with Evonne Blankenship about doing septic workup and he is ok with it.

## 2018-06-07 NOTE — H&P
[address]   HISTORY AND PHYSICAL      Edgard Wakefield  MR#: [id number]  : 1940  ACCOUNT #: [de-identified]   ADMIT DATE: 2018    CHIEF COMPLAINT:  Cough, shortness of breath about 4 days' duration. HISTORY OF PRESENT ILLNESS:  This is a 27-year-old -American female who has a significant past medical history of atrial fibrillation, diastolic congestive heart failure, obstructive sleep apnea, hypertension, morbid obesity, history of treatment noncompliance, and gout who presented today mainly for cough and shortness of breath. The patient started having cough productive of greenish sputum with fever and chills starting about 4-5 days ago with chest pain and shortness of breath. She has some nausea, no vomiting. She has chronic right upper quadrant pain. No diarrhea. No dysuria, urgency, or frequency. She thinks her urine output has diminished. She claims that she has been taking her medications as prescribed. She was recently in the ED on 2018, for a possible gout flare, and she was discharged from the emergency room on  pack. Patient was recently hospitalized from  through  for diastolic congestive heart failure, and she was discharged on diltiazem, lisinopril, Lasix 40 mg, Xarelto 20 mg, and aspirin. Since then, it appears she was started on sotalol by her cardiologist.  In the ED, she was worked up. Complete blood count showed leukocytosis 17.6 with 10% bands. Urine examination is negative for evidence of urinary tract infection. BUN 17, creatinine 1.35, total bilirubin slightly elevated at 1.2. Lactic acid is normal at 1.8. ProBNP is more than 1900. Cardiac enzymes are negative. Portable chest x-ray showed cardiomegaly with mild interstitial edema. PAST MEDICAL HISTORY:  Atrial fibrillation, diastolic congestive heart failure, hypertension, moderate obesity, obstructive sleep apnea, gout.     CURRENT MEDICATIONS:  As verified by pharmacist include potassium chloride 20 milliequivalent, Tylenol 1000 mg daily as needed, sotalol 80 mg b.i.d., Cardizem 120 mg extended release daily, lisinopril 10 mg nightly, Lasix 40 mg daily, Xarelto 20 mg daily, aspirin 81 mg daily. ALLERGIES:  PENICILLIN, REACTION HIVES. SHE HAS TOLERATED CEPHALOSPORINS SINCE THEN. REVIEW OF SYSTEMS:  Positive for shortness of breath, cough productive, chest pressure. No significant edema. She has at baseline 2-3 pillow orthopnea that has not changed. The rest of the review of systems is negative. FAMILY MEDICAL HISTORY:  Not pertinent. Heart related issues in the family. PERSONAL HISTORY:  She is single, lives with her son. She is a former smoker. Denies alcohol, drugs. PHYSICAL EXAMINATION:  GENERAL:  Patient is lying in the examination bed. Her son at the bedside. She is alert, oriented x 4, fairly good historian. She has mild audible wheezing, not overtly in respiratory distress. VITAL SIGNS:  Most current vital signs when I was at the bedside are blood pressure 108/65, heart rate 95, temperature 99.1, oxygen saturation 95% on 2 liters. Her oxygen saturation was down to 83% at one point. HEENT:  Head atraumatic. Mild pallor, no jaundice. LUNGS:  She has diffuse wheezing. No significant rales. No accessory muscle use. CARDIOVASCULAR:  S1, S2 irregular, mildly tachycardic. No gallop or murmur. ABDOMEN:  Obese. She has a right upper quadrant scar, slightly tender in the right upper quadrant without rebound. GENITOURINARY:  No CVA or suprapubic tenderness. EXTREMITIES:  She has trace edema. Otherwise, no joint swelling, erythema, tenderness, or deformity. CENTRAL NERVOUS SYSTEM:  Alert, oriented x 3. Moves extremities symmetrically. Sensation is intact. ASSESSMENT AND PLAN:    1. Clinically, she has pneumonia/acute bacterial bronchitis. She has leukocytosis with a band and she has wheezing. She is a former smoker.   I wonder if she has undiagnosed chronic obstructive pulmonary disease. She will be admitted to a cardiac monitor unit as she has mild rapid ventricular response. Administer ceftriaxone. The patient is on sotalol and she has lengthening of the QT since last hospitalization. Will avoid Zithromax and fluoroquinolone  We will clinically monitor. If there is clinical worsening, we may expand antibiotics. Blood cultures are requested. Urine is negative for urinary tract infection. DuoNeb scheduled and p.r.n. ordered. ABG is requested. 2.  Acute hypoxia due to above. Patient currently on 2 liters of oxygen nasal cannula. This is requested as she is wheezing. I wonder if she has undiagnosed chronic obstructive pulmonary disease. We will get ABG done. She is a former smoker, quit several years ago. 3.  Mild diastolic congestive heart failure, NYHA class 3. I have ordered 20 mg Lasix IV. She will continue p.o. Lasix from tomorrow. Strict I and O, daily weight. 4.  Mild atrial fibrillation with mild rapid ventricular response. Her sotalol and Cardizem will be continued. 5.  Hypertension. Blood pressure is on the low side. Lisinopril will be on hold. 6.  Mild azotemia. Lisinopril will be on hold. We will refrain from IV fluids given evidence of congestive heart failure for now. Check labs tomorrow. 7.  Mild hyperbilirubinemia. She has right upper quadrant pain. She has gallbladder removed. Will obtain right upper quadrant ultrasound to rule out CBD stone. 8.  Morbidly obese. 9.  Obstructive sleep apnea. The patient has a CPAP. She has not been wearing it for a while. She says the mask is not fitting for her. I have counseled her extensively with her son present that this is important. She continues her CPAP or if she has any problem with the mask or pressure that she needs to return to her pulmonary doctor as she may have to be adjusted or she may need an up to date sleep study.   I counseled her on the consequence of untreated sleep apnea including worsening of cardiovascular problems, congestive heart failure, and such. She is on Xarelto for secondary prophylaxis  stroke. 10.  Gastrointestinal prophylaxis. She is on omeprazole. Advance care planning was discussed with the patient in the presence of her son. She wished to be FULL CODE. She does not specifically have a written advanced directive or medical power of .       MD KYM Gayle/  D: 06/07/2018 17:08     T: 06/07/2018 18:21  JOB #: [id number]

## 2018-06-07 NOTE — ED NOTES
1:32 PM  I have evaluated the patient as the Provider in Triage. I have reviewed Her vital signs and the triage nurse assessment. I have talked with the patient and any available family and advised that I am the provider in triage and have ordered the appropriate study to initiate their work up based on the clinical presentation during my assessment. I have advised that the patient will be accommodated in the Main ED as soon as possible. I have also requested to contact the triage nurse or myself immediately if the patient experiences any changes in their condition during this brief waiting period. Worsening cough and chest congestion x 1 week. Hypotensive in triage. Labs ordered.      Bravo Schofield, SUE

## 2018-06-07 NOTE — IP AVS SNAPSHOT
[address] 
[phone] Patient: Richad Mortimer MRN: [id number] SK A check lakisha indicates which time of day the medication should be taken. My Medications START taking these medications Instructions Each Dose to Equal  
 Morning Noon Evening Bedtime  
 albuterol-ipratropium 2.5 mg-0.5 mg/3 ml Nebu Commonly known as:  Urban Emmanuel Your last dose was: Your next dose is:    
   
   
 3 mL by Nebulization route every four (4) hours as needed. 3 mL  
    
   
   
   
  
 levoFLOXacin 250 mg tablet Commonly known as:  Carol Ground Your last dose was: Your next dose is: Take 1 Tab by mouth daily. 250 mg Nebulizer & Compressor machine Your last dose was: Your next dose is:    
   
   
 Use as directed  
     
   
   
   
  
 predniSONE 20 mg tablet Commonly known as:  Hermelindo Dumont Your last dose was: Your next dose is: Take 2 Tabs by mouth daily (with breakfast) for 5 days. 40 mg  
    
   
   
   
  
 tiotropium 18 mcg inhalation capsule Commonly known as:  [address] Your last dose was: Your next dose is: Take 1 Cap by inhalation daily. 1 Cap CONTINUE taking these medications Instructions Each Dose to Equal  
 Morning Noon Evening Bedtime  
 acetaminophen 500 mg tablet Commonly known as:  TYLENOL Your last dose was: Your next dose is: Take 1,000 mg by mouth daily as needed for Pain. 1000 mg  
    
   
   
   
  
 aspirin delayed-release 81 mg tablet Your last dose was: Your next dose is: Take 1 Tab by mouth daily. 81 mg  
    
   
   
   
  
 dilTIAZem  mg ER capsule Commonly known as:  CARDIZEM CD Your last dose was: Your next dose is: Take 1 Cap by mouth daily. 120 mg  
    
   
   
   
  
 furosemide 40 mg tablet Commonly known as:  LASIX Your last dose was: Your next dose is: Take 1 Tab by mouth daily. 40 mg  
    
   
   
   
  
 potassium chloride SR 20 mEq tablet Commonly known as:  K-TAB Your last dose was: Your next dose is: Take 1 Tab by mouth two (2) times a day. 20 mEq  
    
   
   
   
  
 rivaroxaban 20 mg Tab tablet Commonly known as:  Nelthierno Dozierard Your last dose was: Your next dose is: Take 1 Tab by mouth daily (with lunch). Indications: PREVENT THROMBOEMBOLISM IN CHRONIC ATRIAL FIBRILLATION  
 20 mg  
    
   
   
   
  
 sotalol 80 mg tablet Commonly known as:  Shawn Stoll Your last dose was: Your next dose is: Take 1 Tab by mouth every twelve (12) hours. Indications: PREVENTION OF RECURRENT ATRIAL FIBRILLATION 80 mg  
    
   
   
   
  
  
STOP taking these medications   
 lisinopril 10 mg tablet Commonly known as:  Ciaran Boehringer Where to Get Your Medications Information on where to get these meds will be given to you by the nurse or doctor. ! Ask your nurse or doctor about these medications  
  albuterol-ipratropium 2.5 mg-0.5 mg/3 ml Nebu  
 levoFLOXacin 250 mg tablet Nebulizer & Compressor machine  
 predniSONE 20 mg tablet  
 tiotropium 18 mcg inhalation capsule

## 2018-06-07 NOTE — H&P
H&P dictated:[id number]    A/P    Clinically pneumonia with mild DCHF  AFib with mild RVR  TAM,non complaint with CPAP  Full code    Efrain Painting MD

## 2018-06-07 NOTE — ED NOTES
Paged Dr. Ann-Marie Crisostomo over concern for pt's bp. It is in the low 90's. He ordered a fluid challenge of 250ml sodium chloride with a repeat if her pressure is still in the low 90's.

## 2018-06-07 NOTE — ED PROVIDER NOTES
HPI Comments: 69 y.o. female with past medical history significant for atrial fibrillation who presents, accompanied by family, with chief complaint of shortness of breath. Patient complains of wheezing, nasal congestion/clear nasal discharge, cough productive of yellow/brown sputum, shortness of breath/WHITING and feeling \"hot and cold. \"  Patient states she is having \"a little bit of chest pain, but it's not really bothering me. \"  Patient had DC cardioversion ~1 month ago by Dr. João Pandey but was not successfully converted from atrial fibrillation. Patient denies vomiting, fever. There are no other acute medical concerns at this time. Social hx: Former smoker  PCP: Shila Littlejohn MD  Cardiologist: Dr. Puja Mark    Note written by Mata Merino. Fam Oakley, as dictated by Shannan Koroma MD 2:27 PM      The history is provided by the patient. Past Medical History:   Diagnosis Date    Arthritis     Atrial fibrillation (HCC)     WHITING (dyspnea on exertion)     GERD (gastroesophageal reflux disease)     Obesity     Other ill-defined conditions(799.89)     \"fluid in the lung\"       Past Surgical History:   Procedure Laterality Date    COLONOSCOPY N/A 3/30/2017    COLONOSCOPY performed by Rachel [facility] at [facility] ENDOSCOPY    HX CHOLECYSTECTOMY      HX HERNIA REPAIR      HX ORTHOPAEDIC      right and left total knee replacement    HX ORTHOPAEDIC      right shoulder    HX OTHER SURGICAL      kidney stone surgery    HX OTHER SURGICAL      both eyes cataract         History reviewed. No pertinent family history. Social History     Social History    Marital status: SINGLE     Spouse name: N/A    Number of children: N/A    Years of education: N/A     Occupational History    Not on file.      Social History Main Topics    Smoking status: Former Smoker     Packs/day: 1.00    Smokeless tobacco: Never Used      Comment: quit at age 42,smoked for 16 years    Alcohol use No    Drug use: No    Sexual activity: Not on file     Other Topics Concern    Not on file     Social History Narrative         ALLERGIES: Penicillins    Review of Systems   Constitutional: Negative for activity change, appetite change and fatigue. HENT: Positive for congestion. Negative for ear pain, facial swelling, sore throat and trouble swallowing. Eyes: Negative for pain, discharge and visual disturbance. Respiratory: Positive for cough and shortness of breath. Negative for chest tightness and wheezing. Cardiovascular: Positive for chest pain. Negative for palpitations. Gastrointestinal: Negative for abdominal pain, blood in stool, nausea and vomiting. Genitourinary: Negative for difficulty urinating, flank pain and hematuria. Musculoskeletal: Negative for arthralgias, joint swelling, myalgias and neck pain. Skin: Negative for color change and rash. Neurological: Negative for dizziness, weakness, numbness and headaches. Hematological: Negative for adenopathy. Does not bruise/bleed easily. Psychiatric/Behavioral: Negative for behavioral problems, confusion and sleep disturbance. All other systems reviewed and are negative. Vitals:    06/07/18 1329   BP: (!) 84/53   Pulse: 82   Resp: 18   Temp: 99.1 °F (37.3 °C)   SpO2: 96%   Height: 5' 9\" (1.753 m)            Physical Exam   Constitutional: She is oriented to person, place, and time. She appears well-developed and well-nourished. No distress. HENT:   Head: Normocephalic and atraumatic. Nose: Nose normal.   Mouth/Throat: Oropharynx is clear and moist.   Nasal congestion. Eyes: Conjunctivae and EOM are normal. Pupils are equal, round, and reactive to light. No scleral icterus. Neck: Normal range of motion. Neck supple. No JVD present. No tracheal deviation present. No thyromegaly present. No carotid bruits noted. Cardiovascular: Normal rate, normal heart sounds and intact distal pulses. An irregularly irregular rhythm present.  Exam reveals no gallop and no friction rub. No murmur heard. Pulmonary/Chest: Effort normal and breath sounds normal. No respiratory distress. She has no wheezes. She exhibits no tenderness. Upper airway rattling, no wheezing. Abdominal: Soft. Bowel sounds are normal. She exhibits no distension and no mass. There is no tenderness. There is no rebound and no guarding. Musculoskeletal: Normal range of motion. She exhibits edema (trace lower extremity edema). She exhibits no tenderness. Lymphadenopathy:     She has no cervical adenopathy. Neurological: She is alert and oriented to person, place, and time. She has normal reflexes. No cranial nerve deficit. Coordination normal.   Skin: Skin is warm and dry. No rash noted. No erythema. Psychiatric: She has a normal mood and affect. Her behavior is normal. Judgment and thought content normal.   Nursing note and vitals reviewed. Note written by Tejas Wesley, as dictated by Marcel Smith MD 2:27 PM       MDM  Number of Diagnoses or Management Options     Amount and/or Complexity of Data Reviewed  Clinical lab tests: ordered and reviewed  Tests in the radiology section of CPT®: ordered and reviewed  Decide to obtain previous medical records or to obtain history from someone other than the patient: yes  Obtain history from someone other than the patient: yes  Review and summarize past medical records: yes  Discuss the patient with other providers: yes  Independent visualization of images, tracings, or specimens: yes    Risk of Complications, Morbidity, and/or Mortality  Presenting problems: high  Diagnostic procedures: high  Management options: high    Patient Progress  Patient progress: stable        ED Course       Procedures      3:52 PM  Patient hypotensive, slightly hypoxic, elevated WBC with productive cough, mild interstitial edema on chest x-ray.   TigerText sent to hospitalist.

## 2018-06-07 NOTE — PROGRESS NOTES
Admission Medication Reconciliation:    Information obtained from: Patient, RX Query, chart    Significant PMH/Disease States:   Past Medical History:   Diagnosis Date    Arthritis     Atrial fibrillation ([address]     WHITING (dyspnea on exertion)     GERD (gastroesophageal reflux disease)     Obesity     Other ill-defined conditions(799.89)     \"fluid in the lung\"       Chief Complaint for this Admission:  SOB    Allergies:  Penicillins    Prior to Admission Medications:   Prior to Admission Medications   Prescriptions Last Dose Informant Patient Reported? Taking?   acetaminophen (TYLENOL) 500 mg tablet 5/31/2018 at Unknown time  Yes Yes   Sig: Take 1,000 mg by mouth daily as needed for Pain. aspirin delayed-release 81 mg tablet 6/7/2018 at Unknown time  No Yes   Sig: Take 1 Tab by mouth daily. dilTIAZem CD (CARDIZEM CD) 120 mg ER capsule 6/7/2018 at Unknown time  No Yes   Sig: Take 1 Cap by mouth daily. furosemide (LASIX) 40 mg tablet 6/6/2018 at Unknown time  No Yes   Sig: Take 1 Tab by mouth daily. lisinopril (PRINIVIL, ZESTRIL) 10 mg tablet 6/6/2018 at Unknown time  No Yes   Sig: Take 1 Tab by mouth nightly. potassium chloride SR (K-TAB) 20 mEq tablet 5/31/2018 at Unknown time  No Yes   Sig: Take 1 Tab by mouth two (2) times a day. rivaroxaban (XARELTO) 20 mg tab tablet 6/7/2018 at Unknown time  No Yes   Sig: Take 1 Tab by mouth daily (with lunch). Indications: PREVENT THROMBOEMBOLISM IN CHRONIC ATRIAL FIBRILLATION   sotalol (BETAPACE) 80 mg tablet 6/7/2018 at Unknown time  No Yes   Sig: Take 1 Tab by mouth every twelve (12) hours. Indications: PREVENTION OF RECURRENT ATRIAL FIBRILLATION      Facility-Administered Medications: None         Comments/Recommendations: Patient was fairly breathless when speaking, kept interview short. Allergies confirmed. Unable to determine whether patient is reliable historian, but, RX Query supports her history (a;though she didn't know doses). Please note:  1. COMPLIANCE: please see Home Health Visit 4/3/2018, especially in light of clinical presentation / BNP. Could benefit from additional teaching / oversight (lives with son but still stopped taking meds \"for no reason\" earlier this year)    Thank you for allowing me to participate in the care of your patient.     Earl Norton PharmD, RN #[id number]

## 2018-06-07 NOTE — IP AVS SNAPSHOT
1111 [facility] [address] [place] [address] 
[phone] Patient: Miguel Angel Galdamez MRN: [id number] OWN:[birth year] About your hospitalization You were admitted on:  June 7, 2018 You last received care in the:  [facility] 2N MED SURG You were discharged on:  June 11, 2018 Why you were hospitalized Your primary diagnosis was:  Bronchitis, Acute Your diagnoses also included:  Wheezing, Leukocytosis, Pneumonia Follow-up Information Follow up With Details Comments Contact Info Stephen Cox MD Call Please call to schedule PCP appointment. 59 [facility] [address] Stephen Tello [address] [address] [phone] Dr. Wandra Lanes on 6/25/2019 Appointment time 10:30 [facility] 101 [facility] # [facility], 40 [facility]. : [phone]. Wilber Lowry MD On 6/14/2018 at 10:30 on 6/14/18 200 [facility] 505 [address] [place] [address] 
[phone] [address] [place] [address]   call agency if you have not been contacted by noon the day after discharge to inquire as to the day and time of  initial visit. [address] 
1st Floor [facility] [address] 
[phone] Discharge Orders None A check laksiha indicates which time of day the medication should be taken. My Medications START taking these medications Instructions Each Dose to Equal  
 Morning Noon Evening Bedtime  
 albuterol-ipratropium 2.5 mg-0.5 mg/3 ml Nebu Commonly known as:  Lior Charles Your last dose was: Your next dose is:    
   
   
 3 mL by Nebulization route every four (4) hours as needed. 3 mL  
    
   
   
   
  
 levoFLOXacin 250 mg tablet Commonly known as:  Romayne Potters Your last dose was: Your next dose is: Take 1 Tab by mouth daily. 250 mg Nebulizer & Compressor machine Your last dose was: Your next dose is:    
   
   
 Use as directed  
     
   
   
   
  
 predniSONE 20 mg tablet Commonly known as:  Bijan Atwood Your last dose was: Your next dose is: Take 2 Tabs by mouth daily (with breakfast) for 5 days. 40 mg  
    
   
   
   
  
 tiotropium 18 mcg inhalation capsule Commonly known as:  [address] Your last dose was: Your next dose is: Take 1 Cap by inhalation daily. 1 Cap CONTINUE taking these medications Instructions Each Dose to Equal  
 Morning Noon Evening Bedtime  
 acetaminophen 500 mg tablet Commonly known as:  TYLENOL Your last dose was: Your next dose is: Take 1,000 mg by mouth daily as needed for Pain. 1000 mg  
    
   
   
   
  
 aspirin delayed-release 81 mg tablet Your last dose was: Your next dose is: Take 1 Tab by mouth daily. 81 mg  
    
   
   
   
  
 dilTIAZem  mg ER capsule Commonly known as:  CARDIZEM CD Your last dose was: Your next dose is: Take 1 Cap by mouth daily. 120 mg  
    
   
   
   
  
 furosemide 40 mg tablet Commonly known as:  LASIX Your last dose was: Your next dose is: Take 1 Tab by mouth daily. 40 mg  
    
   
   
   
  
 potassium chloride SR 20 mEq tablet Commonly known as:  K-TAB Your last dose was: Your next dose is: Take 1 Tab by mouth two (2) times a day. 20 mEq  
    
   
   
   
  
 rivaroxaban 20 mg Tab tablet Commonly known as:  Will Barker Your last dose was: Your next dose is: Take 1 Tab by mouth daily (with lunch). Indications: PREVENT THROMBOEMBOLISM IN CHRONIC ATRIAL FIBRILLATION  
 20 mg  
    
   
   
   
  
 sotalol 80 mg tablet Commonly known as:  Joseph Siddiqui Your last dose was: Your next dose is: Take 1 Tab by mouth every twelve (12) hours. Indications: PREVENTION OF RECURRENT ATRIAL FIBRILLATION 80 mg  
    
   
   
   
  
  
STOP taking these medications   
 lisinopril 10 mg tablet Commonly known as:  Hans Oliveira Where to Get Your Medications Information on where to get these meds will be given to you by the nurse or doctor. ! Ask your nurse or doctor about these medications  
  albuterol-ipratropium 2.5 mg-0.5 mg/3 ml Nebu  
 levoFLOXacin 250 mg tablet Nebulizer & Compressor machine  
 predniSONE 20 mg tablet  
 tiotropium 18 mcg inhalation capsule Discharge Instructions Please bring this form with you to show your primary care provider at your follow-up appointment. Primary care provider:  Dr. Ruddy Solares MD 
 
Discharging provider:  Karolina De La Rosa MD 
 
You have been admitted to the hospital with the following diagnoses: - Acute Bronchitis - Reactive Airway Disease - Hx of Smoking FOLLOW-UP CARE RECOMMENDATIONS: 
 
APPOINTMENTS: 
Follow-up Information Follow up With Details Comments Contact Info Ruddy Solares MD In 2 weeks Discharge follow up  59 [facility] [address] Ruddy Solares MD 
[place] [address] [phone] [facility].  call to make appointment  [address] [place] [address] FOLLOW-UP TESTS recommended: NONE PENDING TEST RESULTS: 
At the time of your discharge the following test results are still pending: NONE Please make sure you review these results with your outpatient follow-up provider(s). SYMPTOMS to watch for: chest pain, shortness of breath, fever, chills, nausea, vomiting, diarrhea, change in mentation, falling, weakness, bleeding. DIET/what to eat:  Cardiac Diet ACTIVITY:  Activity as tolerated WOUND CARE: NONE 
 
EQUIPMENT needed:  NONE What to do if new or unexpected symptoms occur? If you experience any of the above symptoms (or should other concerns or questions arise after discharge) please call your primary care physician. Return to the emergency room if you cannot get hold of your doctor. · It is very important that you keep your follow-up appointment(s). · Please bring discharge papers, medication list (and/or medication bottles) to your follow-up appointments for review by your outpatient provider(s). · Please check the list of medications and be sure it includes every medication (even non-prescription medications) that your provider wants you to take. · It is important that you take the medication exactly as they are prescribed. · Keep your medication in the bottles provided by the pharmacist and keep a list of the medication names, dosages, and times to be taken in your wallet. · Do not take other medications without consulting your doctor. · If you have any questions about your medications or other instructions, please talk to your nurse or care provider before you leave the hospital. 
 
I understand that if any problems occur once I am at home I am to contact my physician. These instructions were explained to me and I had the opportunity to ask questions. Reactive Airway Disease: Care Instructions Your Care Instructions Reactive airway disease is a breathing problem that appears as wheezing, a whistling noise in your airways. It may be caused by a viral or bacterial infection, allergies, tobacco smoke, or something else in the environment. When you are around these triggers, your body releases chemicals that make the airways get tight. Reactive airway disease is a lot like asthma. Both can cause wheezing. But asthma is ongoing, while reactive airway disease may occur only now and then. Tests can be done to tell whether you have asthma. You may take the same medicines used to treat asthma.  Good home care and follow-up care with your doctor can help you recover. Follow-up care is a key part of your treatment and safety. Be sure to make and go to all appointments, and call your doctor if you are having problems. It's also a good idea to know your test results and keep a list of the medicines you take. How can you care for yourself at home? · Take your medicines exactly as prescribed. Call your doctor if you think you are having a problem with your medicine. · Do not smoke or allow others to smoke around you. If you need help quitting, talk to your doctor about stop-smoking programs and medicines. These can increase your chances of quitting for good. · If you know what caused your wheezing (such as perfume or the odor of household chemicals), try to avoid it in the future. · Wash your hands several times a day, and consider using hand gels or wipes that contain alcohol. This can prevent colds and other infections. When should you call for help? Call 911 anytime you think you may need emergency care. For example, call if: 
? · You have severe trouble breathing. ? Watch closely for changes in your health, and be sure to contact your doctor if: 
? · You cough up yellow, dark brown, or bloody mucus. ? · You have a fever. ? · Your wheezing gets worse. Where can you learn more? Go to http://chela-montse.info/. Enter I209 in the search box to learn more about \"Reactive Airway Disease: Care Instructions. \" Current as of: May 12, 2017 Content Version: 11.4 © 9670-3291 Healthwise, Incorporated. Care instructions adapted under license by [employer] (which disclaims liability or warranty for this information). If you have questions about a medical condition or this instruction, always ask your healthcare professional. [place] [address] any warranty or liability for your use of this information. ToutApp Announcement We are excited to announce that we are making your provider's discharge notes available to you in gDecide. You will see these notes when they are completed and signed by the physician that discharged you from your recent hospital stay. If you have any questions or concerns about any information you see in gDecide, please call the Health Information Department where you were seen or reach out to your Primary Care Provider for more information about your plan of care. Introducing [facility]! [employer] introduces gDecide patient portal. Now you can access parts of your medical record, email your doctor's office, and request medication refills online. 1. In your internet browser, go to [email or web address] 2. Click on the First Time User? Click Here link in the Sign In box. You will see the New Member Sign Up page. 3. Enter your gDecide Access Code exactly as it appears below. You will not need to use this code after youve completed the sign-up process. If you do not sign up before the expiration date, you must request a new code. · gDecide Access Code: [email or web address] Expires: 6/26/2018  2:43 PM 
 
4. Enter the last four digits of your Social Security Number (xxxx) and Date of Birth (mm/dd/yyyy) as indicated and click Submit. You will be taken to the next sign-up page. 5. Create a gDecide ID. This will be your gDecide login ID and cannot be changed, so think of one that is secure and easy to remember. 6. Create a gDecide password. You can change your password at any time. 7. Enter your Password Reset Question and Answer. This can be used at a later time if you forget your password. 8. Enter your e-mail address. You will receive e-mail notification when new information is available in [address] 9. Click Sign Up. You can now view and download portions of your medical record.  
10. Click the Download Summary menu link to download a portable copy of your medical information. If you have questions, please visit the Frequently Asked Questions section of the Maizhuot website. Remember, Wymsee is NOT to be used for urgent needs. For medical emergencies, dial 911. Now available from your iPhone and Android! Introducing Ji Serna As a Jyl Oka patient, I wanted to make you aware of our electronic visit tool called Ji Serna. [employer] 24/7 allows you to connect within minutes with a medical provider 24 hours a day, seven days a week via a mobile device or tablet or logging into a secure website from your computer. You can access Ji Serna from anywhere in the [place]. A virtual visit might be right for you when you have a simple condition and feel like you just dont want to get out of bed, or cant get away from work for an appointment, when your regular yl Ok provider is not available (evenings, weekends or holidays), or when youre out of town and need minor care. Electronic visits cost only $49 and if the Looxiiyl appsplit 24/7 provider determines a prescription is needed to treat your condition, one can be electronically transmitted to a nearby pharmacy*. Please take a moment to enroll today if you have not already done so. The enrollment process is free and takes just a few minutes. To enroll, please download the Frank & Oak 24/CloudAptitude jessa to your tablet or phone, or visit [email or web address] to enroll on your computer. And, as an [address] [place] [address] patient with a [employer] account, the results of your visits will be scanned into your electronic medical record and your primary care provider will be able to view the scanned results. We urge you to continue to see your regular [facility] provider for your ongoing medical care.   And while your primary care provider may not be the one available when you seek a Ji Serna virtual visit, the peace of mind you get from getting a real diagnosis real time can be priceless. For more information on Ji Serna, view our Frequently Asked Questions (FAQs) at [email or web address]. Sincerely, 
 
Dee Taylor MD 
Chief Medical Officer [employer] *:  certain medications cannot be prescribed via Ji Serna Unresulted Labs-Please follow up with your PCP about these lab tests Order Current Status CULTURE, BLOOD, PAIRED Preliminary result Providers Seen During Your Hospitalization Provider Specialty Primary office phone Renuka Agosto MD Emergency Medicine [phone] [address] [facility] MD Radha Internal Medicine [phone] Alisia Farooq MD Internal Medicine [phone] Your Primary Care Physician (PCP) Primary Care Physician Office Phone Office Fax Isaura Silverio, [address] [place] [address] Drive [phone] You are allergic to the following Allergen Reactions Penicillins Hives Recent Documentation Height Weight BMI OB Status Smoking Status 1.753 m 139.2 kg 45.32 kg/m2 Postmenopausal Former Smoker Emergency Contacts Name Discharge Info Relation Home Work Mobile Artice Running CAREGIVER [3] Child [2]   [phone] Steve Daugherty DISCHARGE CAREGIVER [3] Son [22]   [phone] Marcel Daugherty N/A  AT THIS TIME [6] Child [2] [phone] Patient Belongings The following personal items are in your possession at time of discharge: 
  Dental Appliances: Uppers, With patient  Visual Aid: Glasses      Home Medications: None   Jewelry: Ring, With patient  Clothing: At bedside, Footwear, Pants, Undergarments, Shirt Discharge Instructions Attachments/References HEART FAILURE: AVOIDING TRIGGERS (ENGLISH) Patient Handouts Avoiding Triggers With Heart Failure: Care Instructions Your Care Instructions Triggers are anything that make your heart failure flare up. A flare-up is also called \"sudden heart failure\" or \"acute heart failure. \" When you have a flare-up, fluid builds up in your lungs, and you have problems breathing. You might need to go to the hospital. By watching for changes in your condition and avoiding triggers, you can prevent heart failure flare-ups. Follow-up care is a key part of your treatment and safety. Be sure to make and go to all appointments, and call your doctor if you are having problems. It's also a good idea to know your test results and keep a list of the medicines you take. How can you care for yourself at home? Watch for changes in your weight and condition · Weigh yourself without clothing at the same time each day. Record your weight. Call your doctor if you have sudden weight gain, such as more than 2 to 3 pounds in a day or 5 pounds in a week. (Your doctor may suggest a different range of weight gain.) A sudden weight gain may mean that your heart failure is getting worse. · Keep a daily record of your symptoms. Write down any changes in how you feel, such as new shortness of breath, cough, or problems eating. Also record if your ankles are more swollen than usual and if you feel more tired than usual. Note anything that you ate or did that could have triggered these changes. Limit sodium Sodium causes your body to hold on to extra water. This may cause your heart failure symptoms to get worse. People get most of their sodium from processed foods. Fast food and restaurant meals also tend to be very high in sodium. · Your doctor may suggest that you limit sodium to 2,000 milligrams (mg) a day or less. That is less than 1 teaspoon of salt a day, including all the salt you eat in cooking or in packaged foods. · Read food labels on cans and food packages. They tell you how much sodium you get in one serving. Check the serving size.  If you eat more than one serving, you are getting more sodium. · Be aware that sodium can come in forms other than salt, including monosodium glutamate (MSG), sodium citrate, and sodium bicarbonate (baking soda). MSG is often added to Asian food. You can sometimes ask for food without MSG or salt. · Slowly reducing salt will help you adjust to the taste. Take the salt shaker off the table. · Flavor your food with garlic, lemon juice, onion, vinegar, herbs, and spices instead of salt. Do not use soy sauce, steak sauce, onion salt, garlic salt, mustard, or ketchup on your food, unless it is labeled \"low-sodium\" or \"low-salt. \" 
· Make your own salad dressings, sauces, and ketchup without adding salt. · Use fresh or frozen ingredients, instead of canned ones, whenever you can. Choose low-sodium canned goods. · Eat less processed food and food from restaurants, including fast food. Exercise as directed Moderate, regular exercise is very good for your heart. It improves your blood flow and helps control your weight. But too much exercise can stress your heart and cause a heart failure flare-up. · Check with your doctor before you start an exercise program. 
· Walking is an easy way to get exercise. Start out slowly. Gradually increase the length and pace of your walk. Swimming, riding a bike, and using a treadmill are also good forms of exercise. · When you exercise, watch for signs that your heart is working too hard. You are pushing yourself too hard if you cannot talk while you are exercising. If you become short of breath or dizzy or have chest pain, stop, sit down, and rest. 
· Do not exercise when you do not feel well. Take medicines correctly · Take your medicines exactly as prescribed. Call your doctor if you think you are having a problem with your medicine. · Make a list of all the medicines you take.  Include those prescribed to you by other doctors and any over-the-counter medicines, vitamins, or supplements you take. Take this list with you when you go to any doctor. · Take your medicines at the same time every day. It may help you to post a list of all the medicines you take every day and what time of day you take them. · Make taking your medicine as simple as you can. Plan times to take your medicines when you are doing other things, such as eating a meal or getting ready for bed. This will make it easier to remember to take your medicines. · Get organized. Use helpful tools, such as daily or weekly pill containers. When should you call for help? Call 911 if you have symptoms of sudden heart failure such as: 
? · You have severe trouble breathing. ? · You cough up pink, foamy mucus. ? · You have a new irregular or rapid heartbeat. ?Call your doctor now or seek immediate medical care if: 
? · You have new or increased shortness of breath. ? · You are dizzy or lightheaded, or you feel like you may faint. ? · You have sudden weight gain, such as more than 2 to 3 pounds in a day or 5 pounds in a week. (Your doctor may suggest a different range of weight gain.) ? · You have increased swelling in your legs, ankles, or feet. ? · You are suddenly so tired or weak that you cannot do your usual activities. ? Watch closely for changes in your health, and be sure to contact your doctor if you develop new symptoms. Where can you learn more? Go to http://chela-montse.info/. Enter T212 in the search box to learn more about \"Avoiding Triggers With Heart Failure: Care Instructions. \" Current as of: September 21, 2016 Content Version: 11.4 © 3118-6619 [employer]. Care instructions adapted under license by [employer] (which disclaims liability or warranty for this information).  If you have questions about a medical condition or this instruction, always ask your healthcare professional. [employer] disclaims any warranty or liability for your use of this information. Please provide this summary of care documentation to your next provider. Signatures-by signing, you are acknowledging that this After Visit Summary has been reviewed with you and you have received a copy. Patient Signature:  ____________________________________________________________ Date:  ____________________________________________________________  
  
Bhupendra Koochiching Provider Signature:  ____________________________________________________________ Date:  ____________________________________________________________

## 2018-06-07 NOTE — ED TRIAGE NOTES
Pt arrives with cough, some SOB, head and chest congestion x 2 weeks. BP in triage 84/53, HR 95. Denies CP. + brown sputum.

## 2018-06-08 LAB
ALBUMIN SERPL-MCNC: 1.6 G/DL (ref 3.5–5)
ALBUMIN/GLOB SERPL: 0.3 {RATIO} (ref 1.1–2.2)
ALP SERPL-CCNC: 76 U/L (ref 45–117)
ALT SERPL-CCNC: 21 U/L (ref 12–78)
ANION GAP SERPL CALC-SCNC: 10 MMOL/L (ref 5–15)
AST SERPL-CCNC: 25 U/L (ref 15–37)
ATRIAL RATE: 81 BPM
BASOPHILS # BLD: 0 K/UL (ref 0–0.1)
BASOPHILS NFR BLD: 0 % (ref 0–1)
BILIRUB SERPL-MCNC: 0.8 MG/DL (ref 0.2–1)
BNP SERPL-MCNC: 847 PG/ML (ref 0–450)
BUN SERPL-MCNC: 17 MG/DL (ref 6–20)
BUN/CREAT SERPL: 16 (ref 12–20)
CALCIUM SERPL-MCNC: 8.2 MG/DL (ref 8.5–10.1)
CALCULATED R AXIS, ECG10: 2 DEGREES
CALCULATED T AXIS, ECG11: 23 DEGREES
CHLORIDE SERPL-SCNC: 104 MMOL/L (ref 97–108)
CO2 SERPL-SCNC: 26 MMOL/L (ref 21–32)
CREAT SERPL-MCNC: 1.07 MG/DL (ref 0.55–1.02)
DIAGNOSIS, 93000: NORMAL
DIFFERENTIAL METHOD BLD: ABNORMAL
EOSINOPHIL # BLD: 0 K/UL (ref 0–0.4)
EOSINOPHIL NFR BLD: 0 % (ref 0–7)
ERYTHROCYTE [DISTWIDTH] IN BLOOD BY AUTOMATED COUNT: 15.7 % (ref 11.5–14.5)
GLOBULIN SER CALC-MCNC: 4.7 G/DL (ref 2–4)
GLUCOSE SERPL-MCNC: 100 MG/DL (ref 65–100)
HCT VFR BLD AUTO: 34.2 % (ref 35–47)
HGB BLD-MCNC: 11 G/DL (ref 11.5–16)
IMM GRANULOCYTES # BLD: 0.2 K/UL (ref 0–0.04)
IMM GRANULOCYTES NFR BLD AUTO: 1 % (ref 0–0.5)
LYMPHOCYTES # BLD: 1.6 K/UL (ref 0.8–3.5)
LYMPHOCYTES NFR BLD: 12 % (ref 12–49)
MCH RBC QN AUTO: 27.2 PG (ref 26–34)
MCHC RBC AUTO-ENTMCNC: 32.2 G/DL (ref 30–36.5)
MCV RBC AUTO: 84.4 FL (ref 80–99)
MONOCYTES # BLD: 1.8 K/UL (ref 0–1)
MONOCYTES NFR BLD: 13 % (ref 5–13)
NEUTS SEG # BLD: 9.8 K/UL (ref 1.8–8)
NEUTS SEG NFR BLD: 73 % (ref 32–75)
NRBC # BLD: 0 K/UL (ref 0–0.01)
NRBC BLD-RTO: 0 PER 100 WBC
PLATELET # BLD AUTO: 185 K/UL (ref 150–400)
PMV BLD AUTO: 11.1 FL (ref 8.9–12.9)
POTASSIUM SERPL-SCNC: 3.5 MMOL/L (ref 3.5–5.1)
PROT SERPL-MCNC: 6.3 G/DL (ref 6.4–8.2)
Q-T INTERVAL, ECG07: 338 MS
QRS DURATION, ECG06: 84 MS
QTC CALCULATION (BEZET), ECG08: 438 MS
RBC # BLD AUTO: 4.05 M/UL (ref 3.8–5.2)
SODIUM SERPL-SCNC: 140 MMOL/L (ref 136–145)
VENTRICULAR RATE, ECG03: 101 BPM
WBC # BLD AUTO: 13.4 K/UL (ref 3.6–11)

## 2018-06-08 PROCEDURE — 74011250636 HC RX REV CODE- 250/636: Performed by: HOSPITALIST

## 2018-06-08 PROCEDURE — 74011000258 HC RX REV CODE- 258: Performed by: HOSPITALIST

## 2018-06-08 PROCEDURE — 80053 COMPREHEN METABOLIC PANEL: CPT | Performed by: HOSPITALIST

## 2018-06-08 PROCEDURE — 77030029684 HC NEB SM VOL KT MONA -A

## 2018-06-08 PROCEDURE — 94640 AIRWAY INHALATION TREATMENT: CPT

## 2018-06-08 PROCEDURE — 85025 COMPLETE CBC W/AUTO DIFF WBC: CPT | Performed by: HOSPITALIST

## 2018-06-08 PROCEDURE — 65660000000 HC RM CCU STEPDOWN

## 2018-06-08 PROCEDURE — 51798 US URINE CAPACITY MEASURE: CPT

## 2018-06-08 PROCEDURE — 74011250637 HC RX REV CODE- 250/637: Performed by: HOSPITALIST

## 2018-06-08 PROCEDURE — 83880 ASSAY OF NATRIURETIC PEPTIDE: CPT | Performed by: HOSPITALIST

## 2018-06-08 PROCEDURE — 74011250637 HC RX REV CODE- 250/637: Performed by: NURSE PRACTITIONER

## 2018-06-08 PROCEDURE — 36415 COLL VENOUS BLD VENIPUNCTURE: CPT | Performed by: HOSPITALIST

## 2018-06-08 PROCEDURE — 77030027138 HC INCENT SPIROMETER -A

## 2018-06-08 PROCEDURE — 74011000250 HC RX REV CODE- 250: Performed by: EMERGENCY MEDICINE

## 2018-06-08 PROCEDURE — 74011000250 HC RX REV CODE- 250: Performed by: NURSE PRACTITIONER

## 2018-06-08 RX ORDER — IPRATROPIUM BROMIDE AND ALBUTEROL SULFATE 2.5; .5 MG/3ML; MG/3ML
3 SOLUTION RESPIRATORY (INHALATION)
Status: DISCONTINUED | OUTPATIENT
Start: 2018-06-08 | End: 2018-06-11

## 2018-06-08 RX ORDER — GUAIFENESIN 600 MG/1
600 TABLET, EXTENDED RELEASE ORAL EVERY 12 HOURS
Status: DISCONTINUED | OUTPATIENT
Start: 2018-06-08 | End: 2018-06-11 | Stop reason: HOSPADM

## 2018-06-08 RX ORDER — POLYETHYLENE GLYCOL 3350 17 G/17G
17 POWDER, FOR SOLUTION ORAL DAILY
Status: DISCONTINUED | OUTPATIENT
Start: 2018-06-08 | End: 2018-06-11 | Stop reason: HOSPADM

## 2018-06-08 RX ADMIN — ALBUTEROL SULFATE 2.5 MG: 2.5 SOLUTION RESPIRATORY (INHALATION) at 07:53

## 2018-06-08 RX ADMIN — Medication 10 ML: at 14:51

## 2018-06-08 RX ADMIN — IPRATROPIUM BROMIDE AND ALBUTEROL SULFATE 3 ML: .5; 3 SOLUTION RESPIRATORY (INHALATION) at 13:16

## 2018-06-08 RX ADMIN — SOTALOL HYDROCHLORIDE 80 MG: 80 TABLET ORAL at 08:31

## 2018-06-08 RX ADMIN — GUAIFENESIN 600 MG: 600 TABLET, EXTENDED RELEASE ORAL at 21:39

## 2018-06-08 RX ADMIN — GUAIFENESIN 600 MG: 600 TABLET, EXTENDED RELEASE ORAL at 09:52

## 2018-06-08 RX ADMIN — IPRATROPIUM BROMIDE AND ALBUTEROL SULFATE 3 ML: .5; 3 SOLUTION RESPIRATORY (INHALATION) at 21:31

## 2018-06-08 RX ADMIN — FUROSEMIDE 40 MG: 40 TABLET ORAL at 08:31

## 2018-06-08 RX ADMIN — POLYETHYLENE GLYCOL 3350 17 G: 17 POWDER, FOR SOLUTION ORAL at 09:52

## 2018-06-08 RX ADMIN — RIVAROXABAN 20 MG: 20 TABLET, FILM COATED ORAL at 12:08

## 2018-06-08 RX ADMIN — ALBUTEROL SULFATE 2.5 MG: 2.5 SOLUTION RESPIRATORY (INHALATION) at 03:46

## 2018-06-08 RX ADMIN — SODIUM CHLORIDE 250 ML: 900 INJECTION, SOLUTION INTRAVENOUS at 19:00

## 2018-06-08 RX ADMIN — SOTALOL HYDROCHLORIDE 80 MG: 80 TABLET ORAL at 21:39

## 2018-06-08 RX ADMIN — Medication 10 ML: at 06:31

## 2018-06-08 RX ADMIN — DILTIAZEM HYDROCHLORIDE 120 MG: 120 CAPSULE, COATED, EXTENDED RELEASE ORAL at 08:31

## 2018-06-08 RX ADMIN — Medication 10 ML: at 21:44

## 2018-06-08 RX ADMIN — ASPIRIN 81 MG: 81 TABLET, COATED ORAL at 08:31

## 2018-06-08 RX ADMIN — CEFTRIAXONE 1 G: 1 INJECTION, POWDER, FOR SOLUTION INTRAMUSCULAR; INTRAVENOUS at 17:20

## 2018-06-08 NOTE — PROGRESS NOTES
1200: BP 89/45, pt asymptomatic. NP Joaquin Pereira notified. Will continue to monitor. 1810: Pt unable to void after getting to [facility] twice. Bladder scan performed, 371ml. NP Joaquin Pereira notified. Orders received for 250ml bolus, it pt unable to void two hours post bolus and bladder scan is >400, straight cath. Problem: Pneumonia: Day 2  Goal: Respiratory  Outcome: Progressing Towards Goal  Pt O2 removed, pt on room air, O2 remains above 90%. Pt encouraged to ambulate to chair for all meals, pt declined at this time, states she is tired. Pt given incentive spirometer and educated on use, pt demonstrated understanding. PT/OT ordered. Will continue to monitor and educate. Bedside shift change report given to Sohail Raines RN (oncoming nurse) by Oswaldo Collins (offgoing nurse). Report included the following information SBAR, Kardex, ED Summary, Procedure Summary, Intake/Output, MAR, Accordion, Recent Results, Med Rec Status, Cardiac Rhythm Afib and Alarm Parameters .

## 2018-06-08 NOTE — CDMP QUERY
Thank you for documenting the diagnosis of \"Mild atrial fibrillation\"  for this patient. Please clarify if this diagnosis could be further specified as:    =>Persistent Atrial Fibrillation  =>Paroxysmal Atrial Fibrillation  =>Permanent Atrial Fibrillation  =>Other explanations of clinical findings  =>Clinically Undetermined (no explanation for clinical findings)    Please document your response indicating your clinical opinion in your progress notes and discharge summary. -------------------------------------------------------------------------------------------------  Persistent: continues for more than a week. It may stop on its own, or it can be stopped with treatment. Paroxysmal:  begins suddenly and stops on its own. Symptoms can be mild or severe. They stop within about a week, but usually in less than 24 hours.     Permanent: cannot be restored with treatment    Thank you,  Che Nava, RN  [phone]

## 2018-06-08 NOTE — PROGRESS NOTES
NUTRITION COMPLETE ASSESSMENT    RECOMMENDATIONS:   1. Continue current diet, Supplements (specify)  2. Please document po intake in flow sheets  3. Daily weights     Interventions/Plan:   Food/Nutrient Delivery:           RD to add supplements    Assessment:   Reason for Assessment:   [x]BPA/MST Referral     Diet: Cardiac  Supplements: None  Nutritionally Significant Medications: [x] Reviewed & Includes: Lasix, Miralax,     Pre-Hospitalization:  Usual Appetite: Fair    Current Hospitalization:   Fluid Restriction:  none  Appetite: Variable  PO Ability: Independent Average po intake:Less than 25%  Average supplements intake:  n/a      Subjective:  Spoke with son; pt sleeping    Objective:  Pt seen for MST. Pt admitted with pneumonia, leukocytosis. PMHx: atrial fibrillation, diastolic congestive heart failure, obstructive sleep apnea, hypertension, morbid obesity, treatment noncompliance, and gout. Reviewed chart, spoke with son and RN. Son states pt with fair to poor po intake for past 3 months due to abdominal pain; pt left lunch tray untouched. Son expressed that he would like pt to be seen by GI during hospitalization (relayed message to RN). Pt with ~35 lb (11%) wt loss over past 3 months which is severe in nature. Son states pt drinking Boost from time to time. Will add Ensure BID which provides 700 kcal, 40g protein meeting 31% caloric, 35% protein needs if 100% consumed. Patient meets criteria for Moderate Protein Calorie Malnutrition as evidenced by:   ASPEN Malnutrition Criteria  Acute Illness, Chronic Illness, or Social/Enviornmental: Acute illness  Energy Intake: Less than/equal to 50% est energy req for greater than/equal to 5 days  Weight Loss: Greater than 7.5% x 3 mos  ASPEN Malnutrition Score - Acute Illness: 12  Acute Illness - Malnutrition Diagnosis: Moderate malnutrition. RD to follow po intake, wt status.      Estimated Nutrition Needs:   Kcals/day: 9749 Kcals/day (6055-5650 kcal/day (MSJ x 1.2-1.3))  Protein: 113 g (113-134g/day (20% calories; 1g/kg))  Fluid: 2300 ml (1mL/kcal)  Based On: [place]  Weight Used: Actual wt    Pt expected to meet estimated nutrient needs:  []   Yes     []  No [x] Unable to predict at this time    Nutrition Diagnosis:   1. Unintended weight loss related to poor appetite, abdominal pain as evidenced by pt with >30 lb wt loss over past 3 months    2. Inadequate protein-energy intake related to poor appetite as evidenced by pt cnosuming <25% meals    Goals:      Pt will consume at least 50% meals, supplements over next 5-7 days     Monitoring & Evaluation:    - Total energy intake, Liquid meal replacement   - Weight/weight change   -      Previous Nutrition Goals Met:   N/A  Previous Recommendations:    N/A    Education & Discharge Needs:   [x] None Identified   [] Identified and addressed    [] Participated in care plan, discharge planning, and/or interdisciplinary rounds        Cultural, Congregation and ethnic food preferences identified: None    Skin Integrity: [x]Intact  []Other  Edema: []None [x]Other - LLE, RLE trace  Last BM: No value  Food Allergies: [x]None []Other  Diet Restrictions: Cultural/Bahai Preference(s): None     Anthropometrics:    Weight Loss Metrics 6/8/2018 5/25/2018 4/25/2018 3/30/2018 2/20/2017 7/31/2016 12/27/2015   Today's Wt 294 lb 5 oz 302 lb 301 lb 6.4 oz 330 lb 7.5 oz 340 lb 336 lb 340 lb   BMI 43.46 kg/m2 44.6 kg/m2 45.83 kg/m2 48.8 kg/m2 50.21 kg/m2 54.26 kg/m2 50.19 kg/m2      Weight Source: Bed  Height: 5' 9\" (175.3 cm),    Body mass index is 43.46 kg/(m^2).    ,     ,      Labs:    Lab Results   Component Value Date/Time    Sodium 140 06/08/2018 03:38 AM    Potassium 3.5 06/08/2018 03:38 AM    Chloride 104 06/08/2018 03:38 AM    CO2 26 06/08/2018 03:38 AM    Glucose 100 06/08/2018 03:38 AM    BUN 17 06/08/2018 03:38 AM    Creatinine 1.07 (H) 06/08/2018 03:38 AM    Calcium 8.2 (L) 06/08/2018 03:38 AM    Magnesium 1.9 06/07/2018 01:47 PM    Albumin 1.6 (L) 06/08/2018 03:38 AM     No results found for: HBA1C, HGBE8, MVI3NZVZ, QFX1QJSX, JTV9ZESC      Carrington Cadena RD

## 2018-06-08 NOTE — PROGRESS NOTES
Spiritual Care Assessment/Progress Note  [facility]      NAME: Anu Goode      MRN: [id number]  AGE: 69 y.o.  SEX: female  Worship Affiliation: Zoroastrianism   Language: English     6/8/2018           Spiritual Assessment begun in [facility] [place] [facility] IMCU 2 through conversation with:         []Patient        [x] Family    [] Friend(s)        Reason for Consult: Advance medical directive consult     Spiritual beliefs: (Please include comment if needed)     [x] Identifies with a arlyn tradition:     [] Supported by a arlyn community:      [] Claims no spiritual orientation:      [] Seeking spiritual identity:           [] Adheres to an individual form of spirituality:      [] Not able to assess:                     Identified resources for coping:      [x] Prayer                               [] Music                  [] Guided Imagery     [x] Family/friends                 [] Pet visits     [] Devotional reading                         [] Unknown     [] Other:                                              Interventions offered during this visit: (See comments for more details)    Patient Interventions: Initial/Spiritual assessment, patient floor     Family/Friend(s): Advance medical directive consult, Affirmation of emotions/emotional suffering, Catharsis/review of pertinent events in supportive environment, Initial Assessment, Prayer (assurance of)     Plan of Care:     [x] Support spiritual and/or cultural needs    [x] Support AMD and/or advance care planning process      [] Support grieving process   [] Coordinate Rites and/or Rituals    [] Coordination with community clergy   [] No spiritual needs identified at this time   [] Detailed Plan of Care below (See Comments)  [] Make referral to Music Therapy  [] Make referral to Pet Therapy     [] Make referral to Addiction services  [] Make referral to [facility]  [] Make referral to Spiritual Care Partner  [] No future visits requested        [] Follow up visits as needed Comments: Visited Ms Kapil Evans in room 416 regarding AMD consult. Patient was resting with eyes closed and did not arouse when  arrived. Introduced self and purpose of visit to her son who was at the bedside. Son stated that he was not familiar with what an AMD was. Briefly reviewed a blank AMD with him and explained the purpose of each section of the document. Provided him with a blank AMD and a copy of the booklet, \"Your Right to Decide. \" Encouraged son to review it with patient and have  paged if she had questions/concerns and/or was interested in completing an AMD.    Son shared that Ms Kapil Evans was a member of [employer] and she and family would appreciate prayers on her behalf; assured him of prayers on their behalf and of 24-hour  availability for support. : Rev. Bismark Bautista.  Lynn Oliver; [facility], to contact [phone] [address] call: [phone]

## 2018-06-08 NOTE — PROGRESS NOTES
Patient declined her 1000 tx. Last tx was at [phone]. Nurse Christopher Cross was informed who is watching over [facility] patients while she's off the floor.

## 2018-06-08 NOTE — ED NOTES
TRANSFER - OUT REPORT:    Verbal report given to MALCOLM Jones(name) on Anibal Miramontes  being transferred to [facility](unit) for routine progression of care       Report consisted of patients Situation, Background, Assessment and   Recommendations(SBAR). Information from the following report(s) SBAR, ED Summary, Procedure Summary, MAR, Recent Results and Cardiac Rhythm nsr was reviewed with the receiving nurse. Lines:   Peripheral IV 06/07/18 Left Hand (Active)   Site Assessment Clean, dry, & intact 6/7/2018  1:54 PM   Phlebitis Assessment 0 6/7/2018  1:54 PM   Infiltration Assessment 0 6/7/2018  1:54 PM   Dressing Status Clean, dry, & intact 6/7/2018  1:54 PM   Dressing Type Transparent 6/7/2018  1:54 PM   Hub Color/Line Status Blue;Flushed;Patent 6/7/2018  1:54 PM   Action Taken Blood drawn 6/7/2018  1:54 PM        Opportunity for questions and clarification was provided.       Patient transported with:   Monitor  Registered Nurse

## 2018-06-08 NOTE — ACP (ADVANCE CARE PLANNING)
Visited Ms Avila in room 416 regarding AMD consult. Patient was resting with eyes closed and did not arouse when  arrived. Introduced self and purpose of visit to her son who was at the bedside. Son stated that he was not familiar with what an AMD was. Briefly reviewed a blank AMD with him and explained the purpose of each section of the document. Provided him with a blank AMD and a copy of the booklet, \"Your Right to Decide. \" Encouraged son to review it with patient and have  paged if she had questions/concerns and/or was interested in completing an AMD. : Rev. Sergio Bennett.  Mayur Mccoy; [facility], to contact [phone] [address] call: [phone]

## 2018-06-08 NOTE — WOUND CARE
Wound Care Note:     New consult placed by nurse request for redness non -blanchable on mid sacrum    Chart shows:  Admitted for wheezing, leukocytosis, pneumonia   Past Medical History:   Diagnosis Date    Arthritis     Atrial fibrillation ([address]     WHITING (dyspnea on exertion)     GERD (gastroesophageal reflux disease)     Obesity     Other ill-defined conditions(799.89)     \"fluid in the lung\"     WBC = 13.4 on 6/8/17  Admitted from home    Assessment:   Patient is A&O x 4, communicative, continent with no assistance needed in repositioning. Bed: Total Care  Diet: Cardiac regular  Patient reports no pain    Bilateral heels, buttocks, and sacral skin intact and without erythema. 1.  No redness noted on sacrum, buttocks or heels. 2.  POA thin area in gluteal cleft at risk for breakdown, area is approximately 2 cm x 0.1 cm x 0.1 cm, no drainage, anabell-wound intact, wound edges are currently closed. Daviess applied. Spoke with Rafita Landers NP, wound care orders obtained. Patient repositioned on left side      Recommendations:    Gluteal cleft- as needed apply Daviess. Make sure Daviess is dry before releasing buttocks. Skin Care & Pressure Prevention:  Minimize layers of linen/pads under patient to optimize support surface. Turn/reposition approximately every 2 hours and offload heels.   Promote continence     Discussed above plan with patient & Brian Interiano RN    Transition of Care: Plan to follow as needed while admitted to hospital.    YARIEL Hill, RN, [facility], [facility].  office [phone]  pager [phone] or call  to page

## 2018-06-08 NOTE — PROGRESS NOTES
PT Note:    Orders received and acknowledged. Chart reviewed and spoke with nursing. RN requesting to keep systolic BP 90 or higher. Completed social history with patient sitting propped up in bed. Patient typically lives with sons but is alone during the day. She uses a RW for ambulation. Also has access to a rollator and wheelchair. She states she ambulates household distances primarily. Patient's BP 84/74 prior to any mobilization. Aborted session as RN had requested. Will follow up tomorrow for PT evaluation. Thank you.

## 2018-06-08 NOTE — PROGRESS NOTES
TRANSFER - IN REPORT:    Verbal report received from MALCOLM Acevedo(name) on Mark Suh  being received from ED(unit) for routine progression of care      Report consisted of patients Situation, Background, Assessment and   Recommendations(SBAR). Information from the following report(s) SBAR, Kardex, ED Summary, Procedure Summary, Intake/Output, MAR, Recent Results and Cardiac Rhythm AFIB was reviewed with the receiving nurse. Opportunity for questions and clarification was provided. Assessment completed upon patients arrival to unit and care assumed. 2100 - Duo skin assessment completed with MALCOLM Cheng. Skin intact, dry, flaky mostly on BLE, and some old small scabs long lateral right back area from scratching and surgical scars on bilateral knees. Also noted some redness, nonblanchable on mid sacrum area. Wound care consult ordered.

## 2018-06-08 NOTE — PROGRESS NOTES
Hospitalist Progress Note  Jayme Gross NP  Answering service: [phone] OR [phone] from in house phone  Cell: [phone]      Date of Service:  2018  NAME:  Evans Gao  :  1940  MRN:  [id number]      Admission Summary:   73-yof who with pmh of afib, diastolic CHF, TAM, HTN, morbid obesity, history of treatment noncompliance, and gout who presented with productive cough and shortness of breath that started about 4-5 days ago associated with fevers and chills. Pt was recently in the ED on 2018, for a possible gout flare, and she was discharged from ED on steroid pack. Last hospitalization was from - for diastolic CHF, and she was discharged on diltiazem, lisinopril, Lasix 40 mg, Xarelto 20 mg, and aspirin. Since then, it appears she was started on sotalol by her cardiologist.  In the ED, labs showed leukocytosis 17.6 with 10% bands, UA negative, BUN 17, creatinine 1.35, total bilirubin slightly elevated at 1.2. Lactic acid is normal at 1.8. ProBNP is more than 1900. Cardiac enzymes are negative. Portable chest x-ray showed cardiomegaly with mild interstitial edema. Interval history / Subjective:     Patient reports she is feeling better today. Not as short of breath. Continues to have productive cough with rhonchi and wheezing on exam. No chest pain. Assessment & Plan:     Acute Bacterial Bronchitis-POA   -CXR negative for pna, pt past heavy smoker however has never been w/u for COPD  -discussed need for OP PFTs with pcp   -continue IV Rocephin  -add Mucinex, change nebs to duo nebs every 6 hours.  Encourage IS and OOB   -consult PT/OT    Acute on Chronic Diastolic Heart Failure Exacerbation (unable to determine NYHA class on admission d/t acute respiratory illness)  -CXR showing mild interstitial edema with mildly elevated BNP on admission   -given dose of IV lasix in ED, resume home PO lasix today  -I&Os, daily wt   -3/2018 echo showing EF of 50%    Acute Respiratory Failure with Hypoxia   - O2 sat down to 83% on RA in ED   -likely 2/2 to the above   -currently on RA     Persistent Afib with RVR  -now rate controlled, HR in low 100s on admission   -ok to transfer to remote tele  -continue Sotalol, Cardizem and Xarelto  -follows Dr Annabelle Triana with [facility]    H/o HTN with Hypotension on admission   -BP improved today however still on lower side   -continue to hold Lisinopril   -continue to monitor with Sotalol and Cardizem     Morbid Obesity    TAM-noncompliant   -educated on importance of compliance    Chronic RUQ abd pain   -abd US negative for acute findings  -will need to f/u OP with GI    Code status: Full  DVT prophylaxis: [address], [place] discussed with: Patient/Family and Nurse Dr Keith Faith  Disposition: Home w/Family and TBD, hopefully tomorrow     Hospital Problems  Date Reviewed: 4/25/2018          Codes Class Noted POA    Wheezing ICD-10-CM: R06.2  ICD-9-CM: 786.07  6/7/2018 Unknown        Leukocytosis ICD-10-CM: D72.829  ICD-9-CM: 288.60  6/7/2018 Unknown        Pneumonia ICD-10-CM: J18.9  ICD-9-CM: 718  6/7/2018 Unknown                Review of Systems:   A comprehensive review of systems was negative except for: Respiratory: positive for cough, sputum, wheezing or dyspnea on exertion       Vital Signs:    Last 24hrs VS reviewed since prior progress note. Most recent are:  Visit Vitals    /68 (BP Patient Position: Sitting)    Pulse 100    Temp 97.7 °F (36.5 °C)    Resp 19    Ht 5' 9\" (1.753 m)    Wt 133.5 kg (294 lb 5 oz)    SpO2 98%    BMI 43.46 kg/m2       No intake or output data in the 24 hours ending 06/08/18 0927     Physical Examination:             Constitutional:  No acute distress, cooperative, pleasant    ENT:  Oral mucous moist, oropharynx benign. Neck supple,    Resp:  Exp wheezing with Rhonchi throughout. No accessory muscle use.  RA   CV:  Regular rhythm, normal rate, no murmurs, gallops, rubs    GI:  Soft, non distended, non tender. normoactive bowel sounds     Musculoskeletal:  No edema, warm, 2+ pulses throughout    Neurologic:  Moves all extremities. AAOx3, Follows commands     Psych:  Good insight, Not anxious nor agitated. Data Review:    Review and/or order of clinical lab test  Review and/or order of tests in the radiology section of CPT  Review and/or order of tests in the medicine section of CPT      Labs:     Recent Labs      06/08/18   0338  06/07/18   1347   WBC  13.4*  17.6*   HGB  11.0*  12.3   HCT  34.2*  37.2   PLT  185  217     Recent Labs      06/08/18   0338  06/07/18   1347   NA  140  139   K  3.5  4.1   CL  104  103   CO2  26  25   BUN  17  17   CREA  1.07*  1.35*   GLU  100  102*   CA  8.2*  8.4*   MG   --   1.9     Recent Labs      06/08/18   0338  06/07/18   1347   SGOT  25  25   ALT  21  22   AP  76  81   TBILI  0.8  1.2*   TP  6.3*  7.1   ALB  1.6*  1.9*   GLOB  4.7*  5.2*     No results for input(s): INR, PTP, APTT in the last 72 hours. No lab exists for component: INREXT   No results for input(s): FE, TIBC, PSAT, FERR in the last 72 hours. No results found for: FOL, RBCF   No results for input(s): PH, PCO2, PO2 in the last 72 hours.   Recent Labs      06/07/18   1347   CPK  93   CKNDX  Cannot be calculated   TROIQ  <0.04     Lab Results   Component Value Date/Time    Cholesterol, total 168 03/27/2018 01:03 PM    HDL Cholesterol 61 03/27/2018 01:03 PM    LDL, calculated 85.8 03/27/2018 01:03 PM    Triglyceride 106 03/27/2018 01:03 PM    CHOL/HDL Ratio 2.8 03/27/2018 01:03 PM     Lab Results   Component Value Date/Time    Glucose (POC) 108 (H) 10/03/2014 06:32 AM     Lab Results   Component Value Date/Time    Color DARK YELLOW 06/07/2018 03:43 PM    Appearance CLOUDY (A) 06/07/2018 03:43 PM    Specific gravity 1.015 06/07/2018 03:43 PM    pH (UA) 5.5 06/07/2018 03:43 PM    Protein 30 (A) 06/07/2018 03:43 PM    Glucose NEGATIVE  06/07/2018 03:43 PM Ketone NEGATIVE  06/07/2018 03:43 PM    Bilirubin NEGATIVE  03/27/2018 04:57 PM    Urobilinogen 2.0 (H) 06/07/2018 03:43 PM    Nitrites NEGATIVE  06/07/2018 03:43 PM    Leukocyte Esterase NEGATIVE  06/07/2018 03:43 PM    Epithelial cells FEW 06/07/2018 03:43 PM    Bacteria NEGATIVE  06/07/2018 03:43 PM    WBC 0-4 06/07/2018 03:43 PM    RBC 0-5 06/07/2018 03:43 PM         Medications Reviewed:     Current Facility-Administered Medications   Medication Dose Route Frequency    guaiFENesin ER (MUCINEX) tablet 600 mg  600 mg Oral Q12H    polyethylene glycol (MIRALAX) packet 17 g  17 g Oral DAILY    albuterol-ipratropium (DUO-NEB) 2.5 MG-0.5 MG/3 ML  3 mL Nebulization Q6H RT    sodium chloride (NS) flush 5-10 mL  5-10 mL IntraVENous PRN    sodium chloride (NS) flush 5-10 mL  5-10 mL IntraVENous Q8H    sodium chloride (NS) flush 5-10 mL  5-10 mL IntraVENous PRN    acetaminophen (TYLENOL) tablet 650 mg  650 mg Oral Q4H PRN    HYDROcodone-acetaminophen (NORCO) 5-325 mg per tablet 1 Tab  1 Tab Oral Q4H PRN    naloxone (NARCAN) injection 0.4 mg  0.4 mg IntraVENous PRN    aspirin delayed-release tablet 81 mg  81 mg Oral DAILY    dilTIAZem CD (CARDIZEM CD) capsule 120 mg  120 mg Oral DAILY    furosemide (LASIX) tablet 40 mg  40 mg Oral DAILY    rivaroxaban (XARELTO) tablet 20 mg  20 mg Oral DAILY WITH LUNCH    cefTRIAXone (ROCEPHIN) 1 g in 0.9% sodium chloride (MBP/ADV) 50 mL  1 g IntraVENous Q24H    sotalol (BETAPACE) tablet 80 mg  80 mg Oral Q12H    albumin human 25% (BUMINATE) solution 12.5 g  12.5 g IntraVENous PRN     ______________________________________________________________________  EXPECTED LENGTH OF STAY: 4d 12h  ACTUAL LENGTH OF STAY:          [address] Avelina Baca, NP

## 2018-06-08 NOTE — PROGRESS NOTES
Reason for Admission:   PNA                   RRAT Score:          12/green           Plan for utilizing home health:      TBD                    Likelihood of Readmission:  Moderate                         Transition of Care Plan:        IV ABX, cultures, ABGs for possible undiagnosed COPD, Lasix for mild CHF, Patient has a cpap that she does not use much saying mask does not fit her. MD counseled patient and son on importance of getting mask that fits and using cpap. Patient has hx of atrial fibrillation, diastolic congestive heart failure, hypertension, moderate obesity, obstructive sleep apnea, gout.   CM to follow.

## 2018-06-08 NOTE — PROGRESS NOTES
Occupational Therapy Note 1525    Orders acknowledged, chart reviewed. Patient received in recliner with son, girlfriend and her son present. Per RN, BPs have been much better, able to mobilize in room to bathroom, complete bathing, and tolerate sitting up. Patient reporting minimal fatigue but declining need to return to bed. Patient declining use of bathroom, stating she would like to visit at this time. Declines any concerns other than activity tolerance and endurance. Patient lives at home with supportive sons. Will follow up on Monday for OT evaluation as needed and appropriate, however anticipate she is progressing well towards home. Thank you.     Veronica Prado, OT

## 2018-06-09 LAB
ANION GAP SERPL CALC-SCNC: 9 MMOL/L (ref 5–15)
BASOPHILS # BLD: 0 K/UL (ref 0–0.1)
BASOPHILS NFR BLD: 0 % (ref 0–1)
BUN SERPL-MCNC: 16 MG/DL (ref 6–20)
BUN/CREAT SERPL: 17 (ref 12–20)
CALCIUM SERPL-MCNC: 8 MG/DL (ref 8.5–10.1)
CHLORIDE SERPL-SCNC: 104 MMOL/L (ref 97–108)
CO2 SERPL-SCNC: 23 MMOL/L (ref 21–32)
CREAT SERPL-MCNC: 0.95 MG/DL (ref 0.55–1.02)
DIFFERENTIAL METHOD BLD: ABNORMAL
EOSINOPHIL # BLD: 0.1 K/UL (ref 0–0.4)
EOSINOPHIL NFR BLD: 1 % (ref 0–7)
ERYTHROCYTE [DISTWIDTH] IN BLOOD BY AUTOMATED COUNT: 15.7 % (ref 11.5–14.5)
GLUCOSE SERPL-MCNC: 94 MG/DL (ref 65–100)
HCT VFR BLD AUTO: 35.3 % (ref 35–47)
HGB BLD-MCNC: 11.5 G/DL (ref 11.5–16)
IMM GRANULOCYTES # BLD: 0.2 K/UL (ref 0–0.04)
IMM GRANULOCYTES NFR BLD AUTO: 2 % (ref 0–0.5)
LYMPHOCYTES # BLD: 2 K/UL (ref 0.8–3.5)
LYMPHOCYTES NFR BLD: 19 % (ref 12–49)
MCH RBC QN AUTO: 27.3 PG (ref 26–34)
MCHC RBC AUTO-ENTMCNC: 32.6 G/DL (ref 30–36.5)
MCV RBC AUTO: 83.8 FL (ref 80–99)
MONOCYTES # BLD: 1.1 K/UL (ref 0–1)
MONOCYTES NFR BLD: 10 % (ref 5–13)
NEUTS SEG # BLD: 7.2 K/UL (ref 1.8–8)
NEUTS SEG NFR BLD: 69 % (ref 32–75)
NRBC # BLD: 0 K/UL (ref 0–0.01)
NRBC BLD-RTO: 0 PER 100 WBC
PLATELET # BLD AUTO: 185 K/UL (ref 150–400)
PMV BLD AUTO: 11.1 FL (ref 8.9–12.9)
POTASSIUM SERPL-SCNC: 3.9 MMOL/L (ref 3.5–5.1)
RBC # BLD AUTO: 4.21 M/UL (ref 3.8–5.2)
SODIUM SERPL-SCNC: 136 MMOL/L (ref 136–145)
WBC # BLD AUTO: 10.5 K/UL (ref 3.6–11)

## 2018-06-09 PROCEDURE — 97161 PT EVAL LOW COMPLEX 20 MIN: CPT

## 2018-06-09 PROCEDURE — 36415 COLL VENOUS BLD VENIPUNCTURE: CPT | Performed by: NURSE PRACTITIONER

## 2018-06-09 PROCEDURE — 77030029684 HC NEB SM VOL KT MONA -A

## 2018-06-09 PROCEDURE — 94640 AIRWAY INHALATION TREATMENT: CPT

## 2018-06-09 PROCEDURE — 97116 GAIT TRAINING THERAPY: CPT

## 2018-06-09 PROCEDURE — 74011250636 HC RX REV CODE- 250/636: Performed by: HOSPITALIST

## 2018-06-09 PROCEDURE — 74011000258 HC RX REV CODE- 258: Performed by: HOSPITALIST

## 2018-06-09 PROCEDURE — 74011000250 HC RX REV CODE- 250: Performed by: NURSE PRACTITIONER

## 2018-06-09 PROCEDURE — 65660000000 HC RM CCU STEPDOWN

## 2018-06-09 PROCEDURE — 74011250637 HC RX REV CODE- 250/637: Performed by: NURSE PRACTITIONER

## 2018-06-09 PROCEDURE — 80048 BASIC METABOLIC PNL TOTAL CA: CPT | Performed by: NURSE PRACTITIONER

## 2018-06-09 PROCEDURE — G8978 MOBILITY CURRENT STATUS: HCPCS

## 2018-06-09 PROCEDURE — 85025 COMPLETE CBC W/AUTO DIFF WBC: CPT | Performed by: NURSE PRACTITIONER

## 2018-06-09 PROCEDURE — 74011250637 HC RX REV CODE- 250/637: Performed by: HOSPITALIST

## 2018-06-09 PROCEDURE — G8979 MOBILITY GOAL STATUS: HCPCS

## 2018-06-09 PROCEDURE — 74011000250 HC RX REV CODE- 250: Performed by: HOSPITALIST

## 2018-06-09 RX ORDER — ARFORMOTEROL TARTRATE 15 UG/2ML
15 SOLUTION RESPIRATORY (INHALATION)
Status: DISCONTINUED | OUTPATIENT
Start: 2018-06-09 | End: 2018-06-11 | Stop reason: HOSPADM

## 2018-06-09 RX ORDER — GUAIFENESIN 100 MG/5ML
100 SOLUTION ORAL 2 TIMES DAILY
Status: DISCONTINUED | OUTPATIENT
Start: 2018-06-09 | End: 2018-06-11 | Stop reason: HOSPADM

## 2018-06-09 RX ORDER — BUDESONIDE 0.5 MG/2ML
500 INHALANT ORAL
Status: DISCONTINUED | OUTPATIENT
Start: 2018-06-09 | End: 2018-06-11 | Stop reason: HOSPADM

## 2018-06-09 RX ADMIN — IPRATROPIUM BROMIDE AND ALBUTEROL SULFATE 3 ML: .5; 3 SOLUTION RESPIRATORY (INHALATION) at 19:22

## 2018-06-09 RX ADMIN — IPRATROPIUM BROMIDE AND ALBUTEROL SULFATE 3 ML: .5; 3 SOLUTION RESPIRATORY (INHALATION) at 14:03

## 2018-06-09 RX ADMIN — Medication 10 ML: at 22:13

## 2018-06-09 RX ADMIN — GUAIFENESIN 100 MG: 200 SOLUTION ORAL at 17:01

## 2018-06-09 RX ADMIN — ACETAMINOPHEN 650 MG: 325 TABLET ORAL at 10:32

## 2018-06-09 RX ADMIN — METHYLPREDNISOLONE SODIUM SUCCINATE 60 MG: 125 INJECTION, POWDER, FOR SOLUTION INTRAMUSCULAR; INTRAVENOUS at 22:12

## 2018-06-09 RX ADMIN — SOTALOL HYDROCHLORIDE 80 MG: 80 TABLET ORAL at 22:12

## 2018-06-09 RX ADMIN — RIVAROXABAN 20 MG: 20 TABLET, FILM COATED ORAL at 13:45

## 2018-06-09 RX ADMIN — SOTALOL HYDROCHLORIDE 80 MG: 80 TABLET ORAL at 10:32

## 2018-06-09 RX ADMIN — BUDESONIDE 500 MCG: 0.5 INHALANT RESPIRATORY (INHALATION) at 19:22

## 2018-06-09 RX ADMIN — Medication 10 ML: at 06:00

## 2018-06-09 RX ADMIN — FUROSEMIDE 40 MG: 40 TABLET ORAL at 10:32

## 2018-06-09 RX ADMIN — METHYLPREDNISOLONE SODIUM SUCCINATE 60 MG: 125 INJECTION, POWDER, FOR SOLUTION INTRAMUSCULAR; INTRAVENOUS at 13:45

## 2018-06-09 RX ADMIN — DILTIAZEM HYDROCHLORIDE 120 MG: 120 CAPSULE, COATED, EXTENDED RELEASE ORAL at 10:32

## 2018-06-09 RX ADMIN — IPRATROPIUM BROMIDE AND ALBUTEROL SULFATE 3 ML: .5; 3 SOLUTION RESPIRATORY (INHALATION) at 07:41

## 2018-06-09 RX ADMIN — CEFTRIAXONE 1 G: 1 INJECTION, POWDER, FOR SOLUTION INTRAMUSCULAR; INTRAVENOUS at 16:56

## 2018-06-09 RX ADMIN — GUAIFENESIN 600 MG: 600 TABLET, EXTENDED RELEASE ORAL at 22:12

## 2018-06-09 RX ADMIN — POLYETHYLENE GLYCOL 3350 17 G: 17 POWDER, FOR SOLUTION ORAL at 10:32

## 2018-06-09 RX ADMIN — ASPIRIN 81 MG: 81 TABLET, COATED ORAL at 10:32

## 2018-06-09 RX ADMIN — GUAIFENESIN 600 MG: 600 TABLET, EXTENDED RELEASE ORAL at 10:32

## 2018-06-09 NOTE — PROGRESS NOTES
Hospitalist Progress Note  Silver Zamudio MD  Answering service: [phone] from in house phone  Cell: [phone]      Date of Service:  2018  NAME:  Ramy Clinton  :  1940  MRN:  [id number]      Admission Summary:   73-yof who with pmh of afib, diastolic CHF, TAM, HTN, morbid obesity, history of treatment noncompliance, and gout who presented with productive cough and shortness of breath that started about 4-5 days ago associated with fevers and chills. Pt was recently in the ED on 2018, for a possible gout flare, and she was discharged from ED on steroid pack. Last hospitalization was from - for diastolic CHF, and she was discharged on diltiazem, lisinopril, Lasix 40 mg, Xarelto 20 mg, and aspirin. Since then, it appears she was started on sotalol by her cardiologist.  In the ED, labs showed leukocytosis 17.6 with 10% bands, UA negative, BUN 17, creatinine 1.35, total bilirubin slightly elevated at 1.2. Lactic acid is normal at 1.8. ProBNP is more than 1900. Cardiac enzymes are negative. Portable chest x-ray showed cardiomegaly with mild interstitial edema.     Interval history / Subjective:     Pt seen and examined  On RA  Do not appear in respiratory distress but states not feeling well  Has been up to commode      Assessment & Plan:     Acute Bacterial Bronchitis-POA   -CXR negative for pna, pt past heavy smoker however has never been w/u for COPD  -discussed need for OP PFTs with pcp   -continue IV Rocephin  -add Mucinex, nebs q6,   -consult PT/OT  - patient with moderate wheezing, will add brovana/pulmicort and steroids    Acute on Chronic Diastolic Heart Failure Exacerbation (unable to determine NYHA class on admission d/t acute respiratory illness)  -CXR showing mild interstitial edema with mildly elevated BNP on admission   -given dose of IV lasix in ED, resume home PO lasix today  -I&Os, daily wt -3/2018 echo showing EF of 50%    Acute Respiratory Failure with Hypoxia   - O2 sat down to 83% on RA in ED   -likely 2/2 to the above   -currently on RA   - resolved     Persistent Afib with RVR  -now rate controlled, HR in low 100s on admission   -ok to transfer to remote tele  -continue Sotalol, Cardizem and Xarelto  -follows Dr Laureano Hoskins with [facility]    H/o HTN with Hypotension on admission   -BP improved today however still on lower side   -continue to hold Lisinopril   -continue to monitor with Sotalol and Cardizem     Morbid Obesity    TAM-noncompliant   -educated on importance of compliance    Chronic RUQ abd pain   -abd US negative for acute findings  -will need to f/u OP with GI    Code status: Full  DVT prophylaxis: [address], [place] discussed with: Patient/Family and Nurse   Disposition: Home w/Family and TBD, hopefully tomorrow, PT to see her     Hospital Problems  Date Reviewed: 4/25/2018          Codes Class Noted POA    Wheezing ICD-10-CM: R06.2  ICD-9-CM: 786.07  6/7/2018 Unknown        Leukocytosis ICD-10-CM: D72.829  ICD-9-CM: 288.60  6/7/2018 Unknown        Pneumonia ICD-10-CM: J18.9  ICD-9-CM: 786  6/7/2018 Unknown        * (Principal)Bronchitis, acute ICD-10-CM: J20.9  ICD-9-CM: 466.0  12/17/2011 Yes                Review of Systems:   A comprehensive review of systems was negative except for: Respiratory: positive for cough, sputum, wheezing or dyspnea on exertion       Vital Signs:    Last 24hrs VS reviewed since prior progress note.  Most recent are:  Visit Vitals    BP 96/49 (BP 1 Location: Left arm, BP Patient Position: At rest;Sitting)    Pulse (!) 103    Temp 97.7 °F (36.5 °C)    Resp 25    Ht 5' 9\" (1.753 m)    Wt 134.7 kg (296 lb 15.4 oz)    SpO2 95%    BMI 43.85 kg/m2         Intake/Output Summary (Last 24 hours) at 06/09/18 1154  Last data filed at 06/08/18 2148   Gross per 24 hour   Intake              480 ml   Output                0 ml   Net              480 ml Physical Examination:             Constitutional:  No acute distress, cooperative, pleasant    ENT:  Oral mucous moist, oropharynx benign. Neck supple,    Resp:  Exp wheezing bilaterally    CV:  Regular rhythm, normal rate,    GI:  Soft, non distended, non tender. normoactive bowel sounds     Musculoskeletal:  No edema, warm, 2+ pulses throughout    Neurologic:  Moves all extremities. AAOx3, Follows commands     Psych:  Good insight, Not anxious nor agitated. Data Review:    Review and/or order of clinical lab test  Review and/or order of tests in the radiology section of CPT  Review and/or order of tests in the medicine section of [facility]      Labs:     Recent Labs      06/09/18 0443 06/08/18   0338   WBC  10.5  13.4*   HGB  11.5  11.0*   HCT  35.3  34.2*   PLT  185  185     Recent Labs      06/09/18 0443 06/08/18 0338  06/07/18   1347   NA  136  140  139   K  3.9  3.5  4.1   CL  104  104  103   CO2  23  26  25   BUN  16  17  17   CREA  0.95  1.07*  1.35*   GLU  94  100  102*   CA  8.0*  8.2*  8.4*   MG   --    --   1.9     Recent Labs      06/08/18 0338  06/07/18   1347   SGOT  25  25   ALT  21  22   AP  76  81   TBILI  0.8  1.2*   TP  6.3*  7.1   ALB  1.6*  1.9*   GLOB  4.7*  5.2*     No results for input(s): INR, PTP, APTT in the last 72 hours. No lab exists for component: INREXT, INREXT   No results for input(s): FE, TIBC, PSAT, FERR in the last 72 hours. No results found for: FOL, RBCF   No results for input(s): PH, PCO2, PO2 in the last 72 hours.   Recent Labs      06/07/18   1347   CPK  93   CKNDX  Cannot be calculated   TROIQ  <0.04     Lab Results   Component Value Date/Time    Cholesterol, total 168 03/27/2018 01:03 PM    HDL Cholesterol 61 03/27/2018 01:03 PM    LDL, calculated 85.8 03/27/2018 01:03 PM    Triglyceride 106 03/27/2018 01:03 PM    CHOL/HDL Ratio 2.8 03/27/2018 01:03 PM     Lab Results   Component Value Date/Time    Glucose (POC) 108 (H) 10/03/2014 06:32 AM     Lab Results   Component Value Date/Time    Color DARK YELLOW 06/07/2018 03:43 PM    Appearance CLOUDY (A) 06/07/2018 03:43 PM    Specific gravity 1.015 06/07/2018 03:43 PM    pH (UA) 5.5 06/07/2018 03:43 PM    Protein 30 (A) 06/07/2018 03:43 PM    Glucose NEGATIVE  06/07/2018 03:43 PM    Ketone NEGATIVE  06/07/2018 03:43 PM    Bilirubin NEGATIVE  03/27/2018 04:57 PM    Urobilinogen 2.0 (H) 06/07/2018 03:43 PM    Nitrites NEGATIVE  06/07/2018 03:43 PM    Leukocyte Esterase NEGATIVE  06/07/2018 03:43 PM    Epithelial cells FEW 06/07/2018 03:43 PM    Bacteria NEGATIVE  06/07/2018 03:43 PM    WBC 0-4 06/07/2018 03:43 PM    RBC 0-5 06/07/2018 03:43 PM         Medications Reviewed:     Current Facility-Administered Medications   Medication Dose Route Frequency    arformoterol (BROVANA) neb solution 15 mcg  15 mcg Nebulization BID RT    And    budesonide (PULMICORT) 500 mcg/2 ml nebulizer suspension  500 mcg Nebulization BID RT    methylPREDNISolone (PF) (SOLU-MEDROL) injection 60 mg  60 mg IntraVENous Q8H    guaiFENesin ER (MUCINEX) tablet 600 mg  600 mg Oral Q12H    polyethylene glycol (MIRALAX) packet 17 g  17 g Oral DAILY    albuterol-ipratropium (DUO-NEB) 2.5 MG-0.5 MG/3 ML  3 mL Nebulization Q6H RT    sodium chloride (NS) flush 5-10 mL  5-10 mL IntraVENous PRN    sodium chloride (NS) flush 5-10 mL  5-10 mL IntraVENous Q8H    sodium chloride (NS) flush 5-10 mL  5-10 mL IntraVENous PRN    acetaminophen (TYLENOL) tablet 650 mg  650 mg Oral Q4H PRN    HYDROcodone-acetaminophen (NORCO) 5-325 mg per tablet 1 Tab  1 Tab Oral Q4H PRN    naloxone (NARCAN) injection 0.4 mg  0.4 mg IntraVENous PRN    aspirin delayed-release tablet 81 mg  81 mg Oral DAILY    dilTIAZem CD (CARDIZEM CD) capsule 120 mg  120 mg Oral DAILY    furosemide (LASIX) tablet 40 mg  40 mg Oral DAILY    rivaroxaban (XARELTO) tablet 20 mg  20 mg Oral DAILY WITH LUNCH    cefTRIAXone (ROCEPHIN) 1 g in 0.9% sodium chloride (MBP/ADV) 50 mL  1 g IntraVENous Q24H    sotalol (BETAPACE) tablet 80 mg  80 mg Oral Q12H    albumin human 25% (BUMINATE) solution 12.5 g  12.5 g IntraVENous PRN     ______________________________________________________________________  EXPECTED LENGTH OF STAY: 5d 16h  ACTUAL LENGTH OF STAY:          2                 Jyotsna Gomez MD

## 2018-06-09 NOTE — PROGRESS NOTES
Problem: Pressure Injury - Risk of  Goal: *Prevention of pressure injury  Document Marc Scale and appropriate interventions in the flowsheet. Turn every 2 hours  Offload heels   Outcome: Progressing Towards Goal  Pressure Injury Interventions:       Moisture Interventions: Absorbent underpads, Maintain skin hydration (lotion/cream), Minimize layers    Activity Interventions: Increase time out of bed, Pressure redistribution bed/mattress(bed type)    Mobility Interventions: Float heels, Pressure redistribution bed/mattress (bed type)    Nutrition Interventions: Document food/fluid/supplement intake    Friction and Shear Interventions: Lift sheet, Minimize layers               Problem: Falls - Risk of  Goal: *Absence of Falls  Document No Fall Risk and appropriate interventions in the flowsheet.    Outcome: Progressing Towards Goal  Fall Risk Interventions:  Mobility Interventions: Strengthening exercises (ROM-active/passive), Patient to call before getting OOB         Medication Interventions: Assess postural VS orthostatic hypotension, Patient to call before getting OOB, Teach patient to arise slowly, Evaluate medications/consider consulting pharmacy    Elimination Interventions: Call light in reach, Patient to call for help with toileting needs

## 2018-06-09 NOTE — PROGRESS NOTES
0800: Bedside shift change report given to Vinita Astorga RN (oncoming nurse) by Perla Sebastian RN (offgoing nurse). Report included the following information SBAR, MAR, Accordion, Recent Results and Cardiac Rhythm Afib.     1340: TRANSFER - OUT REPORT:    Verbal report given to Cheli Benitez RN (name) on Maurice  being transferred to [employer] (unit) for routine progression of care       Report consisted of patients Situation, Background, Assessment and   Recommendations(SBAR). Information from the following report(s) SBAR, ED Summary, MAR, Accordion, Recent Results and Cardiac Rhythm Afib was reviewed with the receiving nurse. Opportunity for questions and clarification was provided.

## 2018-06-09 NOTE — PROGRESS NOTES
Problem: Pressure Injury - Risk of  Goal: *Prevention of pressure injury  Document Marc Scale and appropriate interventions in the flowsheet. Turn every 2 hours  Offload heels   Outcome: Progressing Towards Goal  Pressure Injury Interventions:  Sensory Interventions: Assess changes in LOC, Discuss PT/OT consult with provider    Moisture Interventions: Absorbent underpads    Activity Interventions: Increase time out of bed    Mobility Interventions: Pressure redistribution bed/mattress (bed type), PT/OT evaluation    Nutrition Interventions: Document food/fluid/supplement intake    Friction and Shear Interventions: Lift sheet, HOB 30 degrees or less     Pt worked with PT today. Was able to move around in the room with a walker and sit in the chair for a while. Educated the pt and family on the importance of continuing to move and not stay in one position for too long.

## 2018-06-09 NOTE — PROGRESS NOTES
Problem: Mobility Impaired (Adult and Pediatric)  Goal: *Acute Goals and Plan of Care (Insert Text)  Physical Therapy Goals  Initiated 6/9/2018  1. Patient will move from supine to sit and sit to supine  and scoot up and down in bed with modified independence within 7 day(s). 2.  Patient will transfer from bed to chair and chair to bed with modified independence using the least restrictive device within 7 day(s). 3.  Patient will perform sit to stand with modified independence within 7 day(s). 4.  Patient will ambulate with supervision/set-up for 150 feet with the least restrictive device within 7 day(s). 5.  Patient will ascend/descend 3 stairs with handrail(s) with minimal assistance/contact guard assist within 7 day(s). physical Therapy EVALUATION  Patient: Reggie Bass (51 y.o. female)  Date: 6/9/2018  Primary Diagnosis: Pneumonia  Leukocytosis  Wheezing        Precautions:        ASSESSMENT :  Based on the objective data described below, the patient presents with impaired functional mobility as compared to baseline level 2* decreased cardiopulmonary tolerance to activity, impaired gait, impaired balance, and generalized weakness following admission for SOB and CHF exacerbation. Prior to admission, pt reports that she lived at home with her adult sons who are available to assist as needed, however was indep with BADLs and household ambulation distances using a RW vs rollator. Pt was cleared for mobility by RN, vitals assessed and stable. Reports generally \"just not feeling well\". She was able to mobilize to EOB with SBA (HOB elevated) and increased time, +SOB with minimal activity requring frequent rest breaks. Completed sit<>stand x2 reps to RW with CGA - demos very fwd flexed posture (reports baseline). Gait training advanced in room for approx 30ft with RW and CGA for safety - quick fatigue and SOB noted, mild instabilities however no major LOB.  Cues for safe RW management once approaching chair 2* impulsivity. Anticipate that pt is near but below her baseline functional level - discussed rehab options as pt admits to recent functional decline, however declining at this time. Appears open to HHPT. Encouraged OOB to chair for meals and ambulating at least into bathroom with RN staff assist to assist in mobility progression. Will continue to follow. Patient will benefit from skilled intervention to address the above impairments. Patients rehabilitation potential is considered to be Good  Factors which may influence rehabilitation potential include:   [x]         None noted  []         Mental ability/status  []         Medical condition  []         Home/family situation and support systems  []         Safety awareness  []         Pain tolerance/management  []         Other:      PLAN :  Recommendations and Planned Interventions:  [x]           Bed Mobility Training             [x]    Neuromuscular Re-Education  [x]           Transfer Training                   []    Orthotic/Prosthetic Training  [x]           Gait Training                         []    Modalities  [x]           Therapeutic Exercises           []    Edema Management/Control  [x]           Therapeutic Activities            [x]    Patient and Family Training/Education  []           Other (comment):    Frequency/Duration: Patient will be followed by physical therapy  5 times a week to address goals. Discharge Recommendations:  Home Health with increased family assist vs rehab (pt does not appear agreeable to rehab)  Further Equipment Recommendations for Discharge: has RW, rollator, and w/c     SUBJECTIVE:   Patient stated I'm just weak, it is normally easier to walk.     OBJECTIVE DATA SUMMARY:   HISTORY:    Past Medical History:   Diagnosis Date    Arthritis     Atrial fibrillation ([address]     WHITING (dyspnea on exertion)     GERD (gastroesophageal reflux disease)     Obesity     Other ill-defined conditions(719.89)     \"fluid in the lung\" Past Surgical History:   Procedure Laterality Date    COLONOSCOPY N/A 3/30/2017    COLONOSCOPY performed by Mark Clements MD at [address] [place] [address] ENDOSCOPY    HX CHOLECYSTECTOMY      HX HERNIA REPAIR      HX ORTHOPAEDIC      right and left total knee replacement    HX ORTHOPAEDIC      right shoulder    HX OTHER SURGICAL      kidney stone surgery    HX OTHER SURGICAL      both eyes cataract     Prior Level of Function/Home Situation: lives at home with family; mod I with BADLs and mobility using RW at baseline  Personal factors and/or comorbidities impacting plan of care:     Home Situation  Home Environment: Private residence  # Steps to Enter: 3  Rails to Enter: Yes  Hand Rails : Bilateral  One/Two Story Residence: Two story, live on 1st floor  Living Alone: No  Support Systems: Family member(s)  Patient Expects to be Discharged to[de-identified] Private residence  Current DME Used/Available at Home: Kathyleen Reveal, rollator, Walker, rolling    EXAMINATION/PRESENTATION/DECISION MAKING:   Critical Behavior:  Neurologic State: Alert  Orientation Level: Oriented X4  Cognition: Follows commands  Safety/Judgement: Awareness of environment  Hearing: Auditory  Auditory Impairment: None  Skin:  Intact where exposed   Edema: none noted  Range Of Motion:  AROM: Generally decreased, functional     Strength:    Strength: Generally decreased, functional     Tone & Sensation:   Tone: Normal  Sensation: Intact      Coordination:  Coordination: Within functional limits  Functional Mobility:  Bed Mobility:     Supine to Sit: Additional time;Stand-by assistance     Transfers:  Sit to Stand: Contact guard assistance  Stand to Sit: Contact guard assistance     Balance:   Sitting: Impaired  Sitting - Static: Good (unsupported)  Sitting - Dynamic: Fair (occasional)  Standing: Impaired; With support  Standing - Static: Fair;Constant support  Standing - Dynamic : Fair  Ambulation/Gait Training:  Distance (ft): 30 Feet (ft)  Assistive Device: Gait belt;Walker, rolling  Ambulation - Level of Assistance: Contact guard assistance   Gait Description (WDL): Exceptions to WDL  Gait Abnormalities: Decreased step clearance;Shuffling gait;Trunk sway increased (fwd flexed)  Base of Support: Widened;Center of gravity altered  Speed/Samia: Pace decreased (<100 feet/min); Shuffled  Step Length: Left shortened;Right shortened    Functional Measure:  Tinetti test:    Sitting Balance: 1  Arises: 1  Attempts to Rise: 1  Immediate Standing Balance: 1  Standing Balance: 1  Nudged: 0  Eyes Closed: 0  Turn 360 Degrees - Continuous/Discontinuous: 0  Turn 360 Degrees - Steady/Unsteady: 1  Sitting Down: 1  Balance Score: 7  Indication of Gait: 1  R Step Length/Height: 1  L Step Length/Height: 1  R Foot Clearance: 1  L Foot Clearance: 1  Step Symmetry: 1  Step Continuity: 1  Path: 1  Trunk: 0  Walking Time: 0  Gait Score: 8  Total Score: 15       Tinetti Test and G-code impairment scale:  Percentage of Impairment CH    0%   CI    1-19% CJ    20-39% CK    40-59% CL    60-79% CM    80-99% CN     100%   Tinetti  Score 0-28 28 23-27 17-22 12-16 6-11 1-5 0       Tinetti Tool Score Risk of Falls  <19 = High Fall Risk  19-24 = Moderate Fall Risk  25-28 = Low Fall Risk  Tinetti ME. Performance-Oriented Assessment of Mobility Problems in Elderly Patients. Ruiz 67; [id number]. (Scoring Description: PT Bulletin Feb. 10, 1993)    Older adults: Gissell Gan et al, 2009; n = 1000 [facility] elderly evaluated with ABC, BUCK, ADL, and IADL)  · Mean BUCK score for males aged 69-68 years = 26.21(3.40)  · Mean BUCK score for females age 69-68 years = 25.16(4.30)  · Mean BUCK score for males over 80 years = 23.29(6.02)  · Mean BUCK score for females over 80 years = 17.20(8.32)       G codes: In compliance with CMSs Claims Based Outcome Reporting, the following G-code set was chosen for this patient based on their primary functional limitation being treated:     The outcome measure chosen to determine the severity of the functional limitation was the Tinetti with a score of 15/28 which was correlated with the impairment scale. ? Mobility - Walking and Moving Around:     - CURRENT STATUS: CK - 40%-59% impaired, limited or restricted    - GOAL STATUS: CJ - 20%-39% impaired, limited or restricted    - D/C STATUS:  ---------------To be determined---------------      Physical Therapy Evaluation Charge Determination   History Examination Presentation Decision-Making   HIGH Complexity :3+ comorbidities / personal factors will impact the outcome/ POC  MEDIUM Complexity : 3 Standardized tests and measures addressing body structure, function, activity limitation and / or participation in recreation  LOW Complexity : Stable, uncomplicated  MEDIUM Complexity : FOTO score of 26-74      Based on the above components, the patient evaluation is determined to be of the following complexity level: LOW     Pain:  Pain Scale 1: Numeric (0 - 10)  Pain Intensity 1: 0     Activity Tolerance:   VSS    Please refer to the flowsheet for vital signs taken during this treatment. After treatment:   [x]         Patient left in no apparent distress sitting up in chair  []         Patient left in no apparent distress in bed  [x]         Call bell left within reach  [x]         Nursing notified  []         Caregiver present  []         Bed alarm activated    COMMUNICATION/EDUCATION:   The patients plan of care was discussed with: Registered Nurse and Physician. [x]         Fall prevention education was provided and the patient/caregiver indicated understanding. [x]         Patient/family have participated as able in goal setting and plan of care. [x]         Patient/family agree to work toward stated goals and plan of care. []         Patient understands intent and goals of therapy, but is neutral about his/her participation. []         Patient is unable to participate in goal setting and plan of care.     Thank you for this referral.  Rosa Hobson, PT, DPT   Time Calculation: 18 mins

## 2018-06-09 NOTE — PROGRESS NOTES
Bedside shift change report given to [address] (oncoming nurse) by Faiza Pa (offgoing nurse). Report included the following information SBAR, Kardex, MAR and Recent Results.

## 2018-06-09 NOTE — PROGRESS NOTES
Bedside shift change report given to Christine Peña RN (oncoming nurse) by Jennifer Gross RN (offgoing nurse). Report included the following information SBAR, Intake/Output and Cardiac Rhythm AFIB.

## 2018-06-09 NOTE — PROGRESS NOTES
Primary Nurse Matthew Ojeda RN and Sri Maher RN performed a dual skin assessment on this patient Impairment noted- see wound doc flow sheet  Marc score is 18    Bilateral heels, buttocks, and sacral skin intact and without erythema.     1. No redness noted on sacrum, buttocks or heels.     2. POA thin area in gluteal cleft at risk for breakdown, area is approximately 2 cm x 0.1 cm x 0.1 cm, no drainage, anabell-wound intact, wound edges are currently closed.   [place] applied.

## 2018-06-10 PROCEDURE — 94640 AIRWAY INHALATION TREATMENT: CPT

## 2018-06-10 PROCEDURE — 74011250636 HC RX REV CODE- 250/636: Performed by: HOSPITALIST

## 2018-06-10 PROCEDURE — 74011000250 HC RX REV CODE- 250: Performed by: HOSPITALIST

## 2018-06-10 PROCEDURE — 74011250637 HC RX REV CODE- 250/637: Performed by: HOSPITALIST

## 2018-06-10 PROCEDURE — 65660000000 HC RM CCU STEPDOWN

## 2018-06-10 PROCEDURE — 74011250637 HC RX REV CODE- 250/637: Performed by: NURSE PRACTITIONER

## 2018-06-10 PROCEDURE — 74011000250 HC RX REV CODE- 250: Performed by: NURSE PRACTITIONER

## 2018-06-10 RX ORDER — LEVOFLOXACIN 500 MG/1
500 TABLET, FILM COATED ORAL EVERY 24 HOURS
Status: DISCONTINUED | OUTPATIENT
Start: 2018-06-10 | End: 2018-06-11 | Stop reason: HOSPADM

## 2018-06-10 RX ADMIN — GUAIFENESIN 600 MG: 600 TABLET, EXTENDED RELEASE ORAL at 09:08

## 2018-06-10 RX ADMIN — METHYLPREDNISOLONE SODIUM SUCCINATE 60 MG: 125 INJECTION, POWDER, FOR SOLUTION INTRAMUSCULAR; INTRAVENOUS at 06:28

## 2018-06-10 RX ADMIN — Medication 10 ML: at 16:15

## 2018-06-10 RX ADMIN — GUAIFENESIN 600 MG: 600 TABLET, EXTENDED RELEASE ORAL at 21:10

## 2018-06-10 RX ADMIN — Medication 10 ML: at 21:11

## 2018-06-10 RX ADMIN — BUDESONIDE 500 MCG: 0.5 INHALANT RESPIRATORY (INHALATION) at 09:46

## 2018-06-10 RX ADMIN — RIVAROXABAN 20 MG: 20 TABLET, FILM COATED ORAL at 11:27

## 2018-06-10 RX ADMIN — GUAIFENESIN 100 MG: 200 SOLUTION ORAL at 09:09

## 2018-06-10 RX ADMIN — SOTALOL HYDROCHLORIDE 80 MG: 80 TABLET ORAL at 21:10

## 2018-06-10 RX ADMIN — METHYLPREDNISOLONE SODIUM SUCCINATE 60 MG: 125 INJECTION, POWDER, FOR SOLUTION INTRAMUSCULAR; INTRAVENOUS at 16:14

## 2018-06-10 RX ADMIN — IPRATROPIUM BROMIDE AND ALBUTEROL SULFATE 3 ML: .5; 3 SOLUTION RESPIRATORY (INHALATION) at 22:43

## 2018-06-10 RX ADMIN — LEVOFLOXACIN 500 MG: 500 TABLET, FILM COATED ORAL at 07:33

## 2018-06-10 RX ADMIN — Medication 10 ML: at 06:28

## 2018-06-10 RX ADMIN — SOTALOL HYDROCHLORIDE 80 MG: 80 TABLET ORAL at 09:08

## 2018-06-10 RX ADMIN — ASPIRIN 81 MG: 81 TABLET, COATED ORAL at 09:08

## 2018-06-10 RX ADMIN — DILTIAZEM HYDROCHLORIDE 120 MG: 120 CAPSULE, COATED, EXTENDED RELEASE ORAL at 09:08

## 2018-06-10 RX ADMIN — METHYLPREDNISOLONE SODIUM SUCCINATE 60 MG: 125 INJECTION, POWDER, FOR SOLUTION INTRAMUSCULAR; INTRAVENOUS at 21:10

## 2018-06-10 RX ADMIN — BUDESONIDE 500 MCG: 0.5 INHALANT RESPIRATORY (INHALATION) at 22:43

## 2018-06-10 RX ADMIN — GUAIFENESIN 100 MG: 200 SOLUTION ORAL at 17:05

## 2018-06-10 RX ADMIN — IPRATROPIUM BROMIDE AND ALBUTEROL SULFATE 3 ML: .5; 3 SOLUTION RESPIRATORY (INHALATION) at 09:46

## 2018-06-10 RX ADMIN — FUROSEMIDE 40 MG: 40 TABLET ORAL at 09:08

## 2018-06-10 RX ADMIN — IPRATROPIUM BROMIDE AND ALBUTEROL SULFATE 3 ML: .5; 3 SOLUTION RESPIRATORY (INHALATION) at 02:12

## 2018-06-10 NOTE — PROGRESS NOTES
Writer met with patient to introduce self and role. Freedom of choice offered and no preference indicated. Patient is scheduled to be discharged tomorrow and will need home health PT. Referral sent to [facility] to see if they can accept this case. Writer will await a response and follow up. Patients son will plan to transport her at discharge.  Jassi Charles, TERRELL

## 2018-06-10 NOTE — PROGRESS NOTES
Problem: Falls - Risk of  Goal: *Absence of Falls  Document On Fall Risk and appropriate interventions in the flowsheet.    Fall Risk Interventions:  Mobility Interventions: Communicate number of staff needed for ambulation/transfer, Patient to call before getting OOB, Utilize walker, cane, or other assitive device         Medication Interventions: Assess postural VS orthostatic hypotension, Patient to call before getting OOB    Elimination Interventions: Patient to call for help with toileting needs, Call light in reach

## 2018-06-10 NOTE — PROGRESS NOTES
Hospitalist Progress Note  Cory Tian MD  Answering service: [phone] from in house phone  Cell: [phone]      Date of Service:  6/10/2018  NAME:  Shilo Schuler  :  1940  MRN:  [id number]      Admission Summary:   73-yof who with pmh of afib, diastolic CHF, TAM, HTN, morbid obesity, history of treatment noncompliance, and gout who presented with productive cough and shortness of breath that started about 4-5 days ago associated with fevers and chills. Pt was recently in the ED on 2018, for a possible gout flare, and she was discharged from ED on steroid pack. Last hospitalization was from - for diastolic CHF, and she was discharged on diltiazem, lisinopril, Lasix 40 mg, Xarelto 20 mg, and aspirin. Since then, it appears she was started on sotalol by her cardiologist.  In the ED, labs showed leukocytosis 17.6 with 10% bands, UA negative, BUN 17, creatinine 1.35, total bilirubin slightly elevated at 1.2. Lactic acid is normal at 1.8. ProBNP is more than 1900. Cardiac enzymes are negative. Portable chest x-ray showed cardiomegaly with mild interstitial edema.     Interval history / Subjective:     Pt seen and examined  On RA  Having a cough  Breathing seems to be better today     Assessment & Plan:     Acute Bacterial Bronchitis-POA   -CXR negative for pna, pt past heavy smoker however has never been w/u for COPD  -discussed need for OP PFTs with pcp   - changed rocephin to levaquin on  for 3 days  -add Mucinex, nebs q6,   -consult PT/OT  - patient with moderate wheezing, will add brovana/pulmicort and steroids  - much better today, minimal wheezing    Acute on Chronic Diastolic Heart Failure Exacerbation (unable to determine NYHA class on admission d/t acute respiratory illness)  -CXR showing mild interstitial edema with mildly elevated BNP on admission   -given dose of IV lasix in ED, resume home PO lasix today  -I&Os, daily wt   -3/2018 echo showing EF of 50%    Acute Respiratory Failure with Hypoxia   - O2 sat down to 83% on RA in ED   -likely 2/2 to the above   -currently on RA   - resolved     Persistent Afib with RVR  -now rate controlled, HR in low 100s on admission   -ok to transfer to remote tele  -continue Sotalol, Cardizem and Xarelto  -follows Dr Jonathan Walker with [facility]    H/o HTN with Hypotension on admission   -BP improved today however still on lower side   -continue to hold Lisinopril   -continue to monitor with Sotalol and Cardizem     Morbid Obesity    TAM-noncompliant   -educated on importance of compliance    Chronic RUQ abd pain   -abd US negative for acute findings  -will need to f/u OP with GI    Code status: Full  DVT prophylaxis: [address], [place] discussed with: Patient/Family and Nurse   Disposition: Home w/Family and TBD, hopefully tomorrow, home health PT     Hospital Problems  Date Reviewed: 4/25/2018          Codes Class Noted POA    Wheezing ICD-10-CM: R06.2  ICD-9-CM: 786.07  6/7/2018 Unknown        Leukocytosis ICD-10-CM: D72.829  ICD-9-CM: 288.60  6/7/2018 Unknown        Pneumonia ICD-10-CM: J18.9  ICD-9-CM: 039  6/7/2018 Unknown        * (Principal)Bronchitis, acute ICD-10-CM: J20.9  ICD-9-CM: 466.0  12/17/2011 Yes                Review of Systems:   A comprehensive review of systems was negative except for: Respiratory: positive for cough, sputum, wheezing or dyspnea on exertion       Vital Signs:    Last 24hrs VS reviewed since prior progress note.  Most recent are:  Visit Vitals    /73 (BP 1 Location: Left arm, BP Patient Position: At rest)    Pulse 100    Temp 97.6 °F (36.4 °C)    Resp 17    Ht 5' 9\" (1.753 m)    Wt 140.7 kg (310 lb 3 oz)    SpO2 95%    BMI 45.81 kg/m2       No intake or output data in the 24 hours ending 06/10/18 1048     Physical Examination:             Constitutional:  No acute distress, cooperative, pleasant    ENT:  Oral mucous moist, oropharynx benign. Neck supple,    Resp: No wheezing, fair BS bilaterally   CV:  Regular rhythm, normal rate,    GI:  Soft, non distended, non tender. normoactive bowel sounds     Musculoskeletal:  No edema, warm, 2+ pulses throughout    Neurologic:  Moves all extremities. AAOx3, Follows commands     Psych:  Good insight, Not anxious nor agitated. Data Review:    Review and/or order of clinical lab test  Review and/or order of tests in the radiology section of CPT  Review and/or order of tests in the medicine section of CPT      Labs:     Recent Labs      06/09/18 0443 06/08/18   0338   WBC  10.5  13.4*   HGB  11.5  11.0*   HCT  35.3  34.2*   PLT  185  185     Recent Labs      06/09/18 0443 06/08/18 0338 06/07/18   1347   NA  136  140  139   K  3.9  3.5  4.1   CL  104  104  103   CO2  23  26  25   BUN  16  17  17   CREA  0.95  1.07*  1.35*   GLU  94  100  102*   CA  8.0*  8.2*  8.4*   MG   --    --   1.9     Recent Labs      06/08/18 0338  06/07/18   1347   SGOT  25  25   ALT  21  22   AP  76  81   TBILI  0.8  1.2*   TP  6.3*  7.1   ALB  1.6*  1.9*   GLOB  4.7*  5.2*     No results for input(s): INR, PTP, APTT in the last 72 hours. No lab exists for component: INREXT, INREXT   No results for input(s): FE, TIBC, PSAT, FERR in the last 72 hours. No results found for: FOL, RBCF   No results for input(s): PH, PCO2, PO2 in the last 72 hours.   Recent Labs      06/07/18   1347   CPK  93   CKNDX  Cannot be calculated   TROIQ  <0.04     Lab Results   Component Value Date/Time    Cholesterol, total 168 03/27/2018 01:03 PM    HDL Cholesterol 61 03/27/2018 01:03 PM    LDL, calculated 85.8 03/27/2018 01:03 PM    Triglyceride 106 03/27/2018 01:03 PM    CHOL/HDL Ratio 2.8 03/27/2018 01:03 PM     Lab Results   Component Value Date/Time    Glucose (POC) 108 (H) 10/03/2014 06:32 AM     Lab Results   Component Value Date/Time    Color DARK YELLOW 06/07/2018 03:43 PM    Appearance CLOUDY (A) 06/07/2018 03:43 PM Specific gravity 1.015 06/07/2018 03:43 PM    pH (UA) 5.5 06/07/2018 03:43 PM    Protein 30 (A) 06/07/2018 03:43 PM    Glucose NEGATIVE  06/07/2018 03:43 PM    Ketone NEGATIVE  06/07/2018 03:43 PM    Bilirubin NEGATIVE  03/27/2018 04:57 PM    Urobilinogen 2.0 (H) 06/07/2018 03:43 PM    Nitrites NEGATIVE  06/07/2018 03:43 PM    Leukocyte Esterase NEGATIVE  06/07/2018 03:43 PM    Epithelial cells FEW 06/07/2018 03:43 PM    Bacteria NEGATIVE  06/07/2018 03:43 PM    WBC 0-4 06/07/2018 03:43 PM    RBC 0-5 06/07/2018 03:43 PM         Medications Reviewed:     Current Facility-Administered Medications   Medication Dose Route Frequency    levoFLOXacin (LEVAQUIN) tablet 500 mg  500 mg Oral Q24H    arformoterol (BROVANA) neb solution 15 mcg  15 mcg Nebulization BID RT    And    budesonide (PULMICORT) 500 mcg/2 ml nebulizer suspension  500 mcg Nebulization BID RT    methylPREDNISolone (PF) (SOLU-MEDROL) injection 60 mg  60 mg IntraVENous Q8H    guaiFENesin (ROBITUSSIN) 100 mg/5 mL oral liquid 100 mg  100 mg Oral BID    guaiFENesin ER (MUCINEX) tablet 600 mg  600 mg Oral Q12H    polyethylene glycol (MIRALAX) packet 17 g  17 g Oral DAILY    albuterol-ipratropium (DUO-NEB) 2.5 MG-0.5 MG/3 ML  3 mL Nebulization Q6H RT    sodium chloride (NS) flush 5-10 mL  5-10 mL IntraVENous PRN    sodium chloride (NS) flush 5-10 mL  5-10 mL IntraVENous Q8H    sodium chloride (NS) flush 5-10 mL  5-10 mL IntraVENous PRN    acetaminophen (TYLENOL) tablet 650 mg  650 mg Oral Q4H PRN    HYDROcodone-acetaminophen (NORCO) 5-325 mg per tablet 1 Tab  1 Tab Oral Q4H PRN    naloxone (NARCAN) injection 0.4 mg  0.4 mg IntraVENous PRN    aspirin delayed-release tablet 81 mg  81 mg Oral DAILY    dilTIAZem CD (CARDIZEM CD) capsule 120 mg  120 mg Oral DAILY    furosemide (LASIX) tablet 40 mg  40 mg Oral DAILY    rivaroxaban (XARELTO) tablet 20 mg  20 mg Oral DAILY WITH LUNCH    sotalol (BETAPACE) tablet 80 mg  80 mg Oral Q12H    albumin human 25% (BUMINATE) solution 12.5 g  12.5 g IntraVENous PRN     ______________________________________________________________________  EXPECTED LENGTH OF STAY: 5d 16h  ACTUAL LENGTH OF STAY:          [address] Elliott MD

## 2018-06-10 NOTE — PROGRESS NOTES
Problem: Pressure Injury - Risk of  Goal: *Prevention of pressure injury  Document Marc Scale and appropriate interventions in the flowsheet. Turn every 2 hours  Offload heels   Outcome: Progressing Towards Goal  Pressure Injury Interventions:  Sensory Interventions: Keep linens dry and wrinkle-free, Maintain/enhance activity level, Minimize linen layers    Moisture Interventions: Absorbent underpads, Apply protective barrier, creams and emollients    Activity Interventions: Increase time out of bed, Pressure redistribution bed/mattress(bed type)    Mobility Interventions: HOB 30 degrees or less, Pressure redistribution bed/mattress (bed type)    Nutrition Interventions: Document food/fluid/supplement intake    Friction and Shear Interventions: Lift sheet, HOB 30 degrees or less               Problem: Falls - Risk of  Goal: *Absence of Falls  Document No Fall Risk and appropriate interventions in the flowsheet. Outcome: Progressing Towards Goal  Fall Risk Interventions:  Mobility Interventions: Communicate number of staff needed for ambulation/transfer         Medication Interventions: Evaluate medications/consider consulting pharmacy, Patient to call before getting OOB, Teach patient to arise slowly    Elimination Interventions: Toileting schedule/hourly rounds             Problem: Pneumonia: Day 3  Goal: *Demonstrates progressive activity  Outcome: Progressing Towards Goal  Patient sitting up in chair today at bedside. No distress, wheezing has decreased, feels she is breathing easier.

## 2018-06-10 NOTE — PROGRESS NOTES
Patient reports feeling a little dizzy, lightheaded. Vitals are stable, no complaints of pain, offered to put patient back in bed but she prefers to sit up in chair. Offered a drink but she would like to just rest in chair and sip her water. Will continue to monitor.

## 2018-06-11 ENCOUNTER — HOME HEALTH ADMISSION (OUTPATIENT)
Dept: HOME HEALTH SERVICES | Facility: HOME HEALTH | Age: 78
End: 2018-06-11
Payer: MEDICARE

## 2018-06-11 ENCOUNTER — DOCUMENTATION ONLY (OUTPATIENT)
Dept: CASE MANAGEMENT | Age: 78
End: 2018-06-11

## 2018-06-11 ENCOUNTER — PATIENT OUTREACH (OUTPATIENT)
Dept: FAMILY MEDICINE CLINIC | Age: 78
End: 2018-06-11

## 2018-06-11 VITALS
TEMPERATURE: 97.5 F | BODY MASS INDEX: 43.4 KG/M2 | DIASTOLIC BLOOD PRESSURE: 70 MMHG | HEIGHT: 69 IN | OXYGEN SATURATION: 98 % | HEART RATE: 86 BPM | RESPIRATION RATE: 16 BRPM | SYSTOLIC BLOOD PRESSURE: 103 MMHG | WEIGHT: 293 LBS

## 2018-06-11 LAB
ANION GAP SERPL CALC-SCNC: 11 MMOL/L (ref 5–15)
BUN SERPL-MCNC: 29 MG/DL (ref 6–20)
BUN/CREAT SERPL: 20 (ref 12–20)
CALCIUM SERPL-MCNC: 8.9 MG/DL (ref 8.5–10.1)
CHLORIDE SERPL-SCNC: 100 MMOL/L (ref 97–108)
CO2 SERPL-SCNC: 26 MMOL/L (ref 21–32)
CREAT SERPL-MCNC: 1.45 MG/DL (ref 0.55–1.02)
GLUCOSE SERPL-MCNC: 115 MG/DL (ref 65–100)
POTASSIUM SERPL-SCNC: 4.4 MMOL/L (ref 3.5–5.1)
SODIUM SERPL-SCNC: 137 MMOL/L (ref 136–145)

## 2018-06-11 PROCEDURE — 74011250637 HC RX REV CODE- 250/637: Performed by: NURSE PRACTITIONER

## 2018-06-11 PROCEDURE — 74011250637 HC RX REV CODE- 250/637: Performed by: HOSPITALIST

## 2018-06-11 PROCEDURE — 94760 N-INVAS EAR/PLS OXIMETRY 1: CPT

## 2018-06-11 PROCEDURE — 74011250636 HC RX REV CODE- 250/636: Performed by: HOSPITALIST

## 2018-06-11 PROCEDURE — 74011000250 HC RX REV CODE- 250: Performed by: NURSE PRACTITIONER

## 2018-06-11 PROCEDURE — 94640 AIRWAY INHALATION TREATMENT: CPT

## 2018-06-11 PROCEDURE — 74011000250 HC RX REV CODE- 250: Performed by: HOSPITALIST

## 2018-06-11 PROCEDURE — 36415 COLL VENOUS BLD VENIPUNCTURE: CPT | Performed by: HOSPITALIST

## 2018-06-11 PROCEDURE — 97165 OT EVAL LOW COMPLEX 30 MIN: CPT

## 2018-06-11 PROCEDURE — 80048 BASIC METABOLIC PNL TOTAL CA: CPT | Performed by: HOSPITALIST

## 2018-06-11 RX ORDER — LEVOFLOXACIN 250 MG/1
250 TABLET ORAL DAILY
Qty: 4 TAB | Refills: 0 | Status: SHIPPED | OUTPATIENT
Start: 2018-06-11 | End: 2019-03-03

## 2018-06-11 RX ORDER — IPRATROPIUM BROMIDE AND ALBUTEROL SULFATE 2.5; .5 MG/3ML; MG/3ML
3 SOLUTION RESPIRATORY (INHALATION)
Qty: 30 NEBULE | Refills: 2 | Status: SHIPPED | OUTPATIENT
Start: 2018-06-11 | End: 2018-06-11

## 2018-06-11 RX ORDER — NEBULIZER AND COMPRESSOR
EACH MISCELLANEOUS
Qty: 1 EACH | Refills: 0 | Status: SHIPPED | OUTPATIENT
Start: 2018-06-11 | End: 2020-02-18

## 2018-06-11 RX ORDER — PREDNISONE 20 MG/1
40 TABLET ORAL
Qty: 10 TAB | Refills: 0 | Status: SHIPPED | OUTPATIENT
Start: 2018-06-11 | End: 2018-06-16

## 2018-06-11 RX ORDER — IPRATROPIUM BROMIDE AND ALBUTEROL SULFATE 2.5; .5 MG/3ML; MG/3ML
3 SOLUTION RESPIRATORY (INHALATION) 2 TIMES DAILY
Status: DISCONTINUED | OUTPATIENT
Start: 2018-06-11 | End: 2018-06-11 | Stop reason: HOSPADM

## 2018-06-11 RX ORDER — IPRATROPIUM BROMIDE AND ALBUTEROL SULFATE 2.5; .5 MG/3ML; MG/3ML
3 SOLUTION RESPIRATORY (INHALATION)
Qty: 30 NEBULE | Refills: 2 | Status: SHIPPED | OUTPATIENT
Start: 2018-06-11 | End: 2020-02-18

## 2018-06-11 RX ADMIN — BUDESONIDE 500 MCG: 0.5 INHALANT RESPIRATORY (INHALATION) at 07:42

## 2018-06-11 RX ADMIN — DILTIAZEM HYDROCHLORIDE 120 MG: 120 CAPSULE, COATED, EXTENDED RELEASE ORAL at 08:48

## 2018-06-11 RX ADMIN — GUAIFENESIN 100 MG: 200 SOLUTION ORAL at 08:48

## 2018-06-11 RX ADMIN — SOTALOL HYDROCHLORIDE 80 MG: 80 TABLET ORAL at 08:48

## 2018-06-11 RX ADMIN — IPRATROPIUM BROMIDE AND ALBUTEROL SULFATE 3 ML: .5; 3 SOLUTION RESPIRATORY (INHALATION) at 07:41

## 2018-06-11 RX ADMIN — METHYLPREDNISOLONE SODIUM SUCCINATE 60 MG: 125 INJECTION, POWDER, FOR SOLUTION INTRAMUSCULAR; INTRAVENOUS at 06:25

## 2018-06-11 RX ADMIN — LEVOFLOXACIN 500 MG: 500 TABLET, FILM COATED ORAL at 06:29

## 2018-06-11 RX ADMIN — ASPIRIN 81 MG: 81 TABLET, COATED ORAL at 08:49

## 2018-06-11 RX ADMIN — Medication 10 ML: at 06:31

## 2018-06-11 RX ADMIN — GUAIFENESIN 600 MG: 600 TABLET, EXTENDED RELEASE ORAL at 08:48

## 2018-06-11 RX ADMIN — IPRATROPIUM BROMIDE AND ALBUTEROL SULFATE 3 ML: .5; 3 SOLUTION RESPIRATORY (INHALATION) at 02:49

## 2018-06-11 NOTE — PROGRESS NOTES
Spoke with Dr. Mrajan Hartmann, and he stated the patient needs to call and schedule PCP appointment.      Nilo Richardson CM Specialist

## 2018-06-11 NOTE — DISCHARGE INSTRUCTIONS
Please bring this form with you to show your primary care provider at your follow-up appointment. Primary care provider:  Dr. Rosalee Cortes MD    Discharging provider:  Mackenzie Blancas MD    You have been admitted to the hospital with the following diagnoses:    - Acute Bronchitis  - Reactive Airway Disease  - Hx of Smoking    FOLLOW-UP CARE RECOMMENDATIONS:    APPOINTMENTS:  Follow-up Information     Follow up With Details Comments [address], [place] In 2 weeks Discharge follow up  [address] [facility]  Dr Rosalee Cortes MD  [place] [id number] [phone]      [facility] [place] [address].  call to make appointment  [address]  [address] recommended: NONE    PENDING TEST RESULTS:  At the time of your discharge the following test results are still pending: NONE  Please make sure you review these results with your outpatient follow-up provider(s). SYMPTOMS to watch for: chest pain, shortness of breath, fever, chills, nausea, vomiting, diarrhea, change in mentation, falling, weakness, bleeding. DIET/what to eat:  Cardiac Diet    ACTIVITY:  Activity as tolerated    WOUND CARE: NONE    EQUIPMENT needed:  NONE        What to do if new or unexpected symptoms occur? If you experience any of the above symptoms (or should other concerns or questions arise after discharge) please call your primary care physician. Return to the emergency room if you cannot get hold of your doctor. · It is very important that you keep your follow-up appointment(s). · Please bring discharge papers, medication list (and/or medication bottles) to your follow-up appointments for review by your outpatient provider(s). · Please check the list of medications and be sure it includes every medication (even non-prescription medications) that your provider wants you to take.     · It is important that you take the medication exactly as they are prescribed. · Keep your medication in the bottles provided by the pharmacist and keep a list of the medication names, dosages, and times to be taken in your wallet. · Do not take other medications without consulting your doctor. · If you have any questions about your medications or other instructions, please talk to your nurse or care provider before you leave the hospital.    I understand that if any problems occur once I am at home I am to contact my physician. These instructions were explained to me and I had the opportunity to ask questions. Reactive Airway Disease: Care Instructions  Your Care Instructions    Reactive airway disease is a breathing problem that appears as wheezing, a whistling noise in your airways. It may be caused by a viral or bacterial infection, allergies, tobacco smoke, or something else in the environment. When you are around these triggers, your body releases chemicals that make the airways get tight. Reactive airway disease is a lot like asthma. Both can cause wheezing. But asthma is ongoing, while reactive airway disease may occur only now and then. Tests can be done to tell whether you have asthma. You may take the same medicines used to treat asthma. Good home care and follow-up care with your doctor can help you recover. Follow-up care is a key part of your treatment and safety. Be sure to make and go to all appointments, and call your doctor if you are having problems. It's also a good idea to know your test results and keep a list of the medicines you take. How can you care for yourself at home? · Take your medicines exactly as prescribed. Call your doctor if you think you are having a problem with your medicine. · Do not smoke or allow others to smoke around you. If you need help quitting, talk to your doctor about stop-smoking programs and medicines. These can increase your chances of quitting for good.   · If you know what caused your wheezing (such as perfume or the odor of household chemicals), try to avoid it in the future. · Wash your hands several times a day, and consider using hand gels or wipes that contain alcohol. This can prevent colds and other infections. When should you call for help? Call 911 anytime you think you may need emergency care. For example, call if:  ? · You have severe trouble breathing. ? Watch closely for changes in your health, and be sure to contact your doctor if:  ? · You cough up yellow, dark brown, or bloody mucus. ? · You have a fever. ? · Your wheezing gets worse. Where can you learn more? Go to http://chela-montse.info/. Enter F718 in the search box to learn more about \"Reactive Airway Disease: Care Instructions. \"  Current as of: May 12, 2017  Content Version: 11.4  © 8708-0780 [employer]. Care instructions adapted under license by [employer] (which disclaims liability or warranty for this information). If you have questions about a medical condition or this instruction, always ask your healthcare professional. [address] any warranty or liability for your use of this information.

## 2018-06-11 NOTE — PROGRESS NOTES
Problem: Self Care Deficits Care Plan (Adult)  Goal: *Acute Goals and Plan of Care (Insert Text)  Occupational Therapy Goals  Initiated 6/11/2018  1. Patient will perform all aspects of toileting with modified independence within 7 day(s). 2.  Patient will perform lower body ADL with modified independence within 7 day(s). 3.  Patient will perform upper body ADL with independence within 7 day(s). 4.  Patient will participate in upper extremity therapeutic exercise/activities with supervision/set-up for 10 minutes within 7 day(s). 5.  Patient will utilize energy conservation techniques during functional activities with verbal and visual cues within 7 day(s). 6.  Patient will complete functional transfers modified independent level for improved ADL performance within 7 days. Occupational Therapy EVALUATION  Patient: Chapis Casas (48 y.o. female)  Date: 6/11/2018  Primary Diagnosis: Pneumonia  Leukocytosis  Wheezing        Precautions:   Fall    ASSESSMENT :  Pt was cleared for OT by MALCOLM Gibbons. Pt was received seated EOB with friend visiting who was intermittently present during session. Son arrived during session. Pt agreeable to OT. Based on the objective data described below, the patient presents with impaired functional reach to feet (but son steps in to assist pt with LB ADL as OT providing strategies to improve pt access to LB for ADL), impaired activity tolerance (requiring seated rest break after ambulating bed <> door which is distance of pt bedroom-bathroom at home), and flexed posture while standing with walker which pt did not correct with cuing from OT as pt focused on returning to sitting EOB. Pt overall ADL performance minimum A to independent. Pt overall functional transfer performance set up assist. Son is very present with pt stepping in to assist with ADL and stating he and his brother will continue to assist pt with her needs at home.  Reviewed energy conservation and fall prevention strategies with pt/son. Pt verbalized having no falls in the past year as she implements a seated rest break as her legs fatigue to prevent falling. Family to complete IADL tasks. Patient will benefit from skilled intervention to address the above impairments. Patients rehabilitation potential is considered to be Good  Factors which may influence rehabilitation potential include:   []             None noted  []             Mental ability/status  []             Medical condition  []             Home/family situation and support systems  []             Safety awareness  []             Pain tolerance/management  [x]             Other: Family completing tasks for pt vs pt self completing     PLAN :  Recommendations and Planned Interventions:  [x]               Self Care Training                  [x]        Therapeutic Activities  [x]               Functional Mobility Training    [x]        Cognitive Retraining  [x]               Therapeutic Exercises           [x]        Endurance Activities  [x]               Balance Training                   []        Neuromuscular Re-Education  []               Visual/Perceptual Training     [x]   Home Safety Training  [x]               Patient Education                 []        Family Training/Education  []               Other (comment):    Frequency/Duration: Patient will be followed by occupational therapy 5 times a week to address goals. Discharge Recommendations: Home Health  Further Equipment Recommendations for Discharge: TBD with home therapist (pt has extensive DME in home already)     SUBJECTIVE:   Patient stated I take my time. I'm ready to get out of here!     OBJECTIVE DATA SUMMARY:   HISTORY:   Past Medical History:   Diagnosis Date    Arthritis     Atrial fibrillation ([facility] [address]     WHITING (dyspnea on exertion)     GERD (gastroesophageal reflux disease)     Obesity     Other ill-defined conditions(799.89)     \"fluid in the lung\"     Past Surgical History: Procedure Laterality Date    COLONOSCOPY N/A 3/30/2017    COLON[facility]COPY performed [facility] Deyvn Michelle [facility] at [facility] ENDOSCOPY    HX CHOLECYSTECTOMY      HX HERNIA REPAIR      HX ORTHOPAEDIC      right and left total knee replacement    HX ORTHOPAEDIC      right shoulder    HX OTHER SURGICAL      kidney stone surgery    HX OTHER SURGICAL      both eyes cataract       Prior Level of Function/Environment/Context: Pt resides at home with family. Pt has several friends who visit her. Pt cooks at home seated chair level. Occupations in which the patient is/was successful, what are the barriers preventing that success:   Performance Patterns (routines, roles, habits, and rituals):   Personal Interests and/or values:   Expanded or extensive additional review of patient history:     Home Situation  Home Environment: Private residence  # Steps to Enter: 3  Rails to Enter: Yes  Hand Rails : Bilateral  One/Two Story Residence: One story  Living Alone: No  Support Systems: Family member(s)  Patient Expects to be Discharged to[de-identified] Private residence  Current DME Used/Available at Home: Raised toilet seat, Grab bars, Walker, rollator, Walker, rolling (Seated tub)  Tub or Shower Type: Tub (Walk in, seated tub)    Hand dominance: Right    EXAMINATION OF PERFORMANCE DEFICITS:  Cognitive/Behavioral Status:  Neurologic State: Alert  Orientation Level: Oriented X4  Cognition: Follows commands  Perception: Appears intact  Perseveration: No perseveration noted  Safety/Judgement: Awareness of environment; Insight into deficits    Skin: Appears intact BUE    Edema: No edema noted BUE    Hearing:   Auditory  Auditory Impairment: None    Vision/Perceptual:    Tracking: Able to track stimulus in all quadrants w/o difficulty              Visual Fields:  (Appears intact)       Acuity: Within Defined Limits         Range of Motion:    AROM: Within functional limits                         Strength:    Strength: Generally decreased, functional Coordination:  Coordination: Within functional limits  Fine Motor Skills-Upper:  (WFL)    Gross Motor Skills-Upper:  (WFL)    Tone & Sensation:    Tone: Normal                         Balance:  Sitting: Impaired  Sitting - Static: Good (unsupported)  Sitting - Dynamic: Fair (occasional)  Standing: Impaired  Standing - Static: Constant support;Good  Standing - Dynamic : Fair    Functional Mobility and Transfers for ADLs:  Bed Mobility:  Rolling:  (Not tested, received seated EOB)    Transfers:  Sit to Stand: Setup  Stand to Sit: Setup  Bed to Chair: Setup  Toilet Transfer : Setup (Inferred from bed transfer as pt declined toileting)    ADL Assessment:  Feeding: Independent    Oral Facial Hygiene/Grooming: Setup; Additional time (Pt completes seated d/t fatigue in standing)    Bathing: Minimum assistance; Additional time    Upper Body Dressing: Setup    Lower Body Dressing: Minimum assistance (Distal LE dressing; Son assists)    Toileting: Setup                ADL Intervention and task modifications:          ADL chair level, 1 nursing assist for toileting with walker                           Cognitive Retraining  Safety/Judgement: Awareness of environment; Insight into deficits         Functional Measure:  Barthel Index:    Bathin  Bladder: 5  Bowels: 10  Groomin  Dressin  Feeding: 10  Mobility: 0  Stairs: 5  Toilet Use: 10  Transfer (Bed to Chair and Back): 10  Total: 65       Barthel and G-code impairment scale:  Percentage of impairment CH  0% CI  1-19% CJ  20-39% CK  40-59% CL  60-79% CM  80-99% CN  100%   Barthel Score 0-100 100 99-80 79-60 59-40 20-39 1-19   0   Barthel Score 0-20 20 17-19 13-16 9-12 5-8 1-4 0      The Barthel ADL Index: Guidelines  1. The index should be used as a record of what a patient does, not as a record of what a patient could do. 2. The main aim is to establish degree of independence from any help, physical or verbal, however minor and for whatever reason.   3. The need for supervision renders the patient not independent. 4. A patient's performance should be established using the best available evidence. Asking the patient, friends/relatives and nurses are the usual sources, but direct observation and common sense are also important. However direct testing is not needed. 5. Usually the patient's performance over the preceding 24-48 hours is important, but occasionally longer periods will be relevant. 6. Middle categories imply that the patient supplies over 50 per cent of the effort. 7. Use of aids to be independent is allowed. Leona Mathur., Barthel, D.W. (0973). Functional evaluation: the Barthel Index. [address] [facility] (14)2. Richard Bernal marin ABRIL Díaz, Mariella Jaeger., Dutch Hernandez., Manohar, [address] [place] [address] Ave (1999). Measuring the change indisability after inpatient rehabilitation; comparison of the responsiveness of the Barthel Index and Functional [place] Measure. Journal of Neurology, Neurosurgery, and Psychiatry, 66(4), 908-522. Raul Saleh, N.J.A, RICARDO Granda, & Marge Banks MHairA. (2004.) Assessment of post-stroke quality of life in cost-effectiveness studies: The usefulness of the Barthel Index and the EuroQoL-5D. Quality of Life Research, 13, 419-84       G codes: In compliance with CMSs Claims Based Outcome Reporting, the following G-code set was chosen for this patient based on their primary functional limitation being treated: The outcome measure chosen to determine the severity of the functional limitation was the Barthel Index with a score of 65/100 which was correlated with the impairment scale. ?  Self Care:     - CURRENT STATUS: CJ - 20%-39% impaired, limited or restricted    - GOAL STATUS: CI - 1%-19% impaired, limited or restricted    - D/C STATUS:  CJ - 20%-39% impaired, limited or restricted     Occupational Therapy Evaluation Charge Determination   History Examination Decision-Making   LOW Complexity : Brief history review MEDIUM Complexity : 3-5 performance deficits relating to physical, cognitive , or psychosocial skils that result in activity limitations and / or participation restrictions MEDIUM Complexity : Patient may present with comorbidities that affect occupational performnce. Miniml to moderate modification of tasks or assistance (eg, physical or verbal ) with assesment(s) is necessary to enable patient to complete evaluation       Based on the above components, the patient evaluation is determined to be of the following complexity level: LOW   Pain:  Pain Scale 1: Numeric (0 - 10)  Pain Intensity 1: 0              Activity Tolerance:   Pt required seated rest break for fatigue after ambulation in room. After treatment:   [] Patient left in no apparent distress sitting up in chair  [x] Patient left in no apparent distress seated EOB with son  [x] Call bell left within reach  [x] Nursing notified  [x] Caregiver present  [] Bed alarm activated    COMMUNICATION/EDUCATION:   The patients plan of care was discussed with: Registered Nurse. [x] Home safety education was provided and the patient/caregiver indicated understanding. [x] Patient/family have participated as able in goal setting and plan of care. [x] Patient/family agree to work toward stated goals and plan of care. [] Patient understands intent and goals of therapy, but is neutral about his/her participation. [] Patient is unable to participate in goal setting and plan of care. This patients plan of care is appropriate for delegation to [facility].     Thank you for this referral.  Dwayne Hernández  Time Calculation: 15 mins

## 2018-06-11 NOTE — PROGRESS NOTES
.  Hospital Discharge Follow-Up      Date/Time:  2018 11:55 AM    Patient was admitted to [address] on 18 and discharged on 18 for Acute Bacterial Bronchitis. The physician discharge summary was available at the time of outreach. Patient was contacted within 1 business days of discharge. Top Challenges reviewed with the provider   -Consider repeat labs- BNP on admission 1900, history of heavy smoker with no COPD w/u in past, BNP remained 847 on 18. Ua- cloudy with + protein. WBC- 17.6 on admission.  -Recent Ed visit on 18 for recent gout flare-discharged on steroid pack         Method of communication with provider :staff message, phone, routed chart    Inpatient RRAT score: 12  Was this a readmission? no   Patient stated reason for the readmission: n/a    Nurse Navigator (NN) contacted the patient by telephone to perform post hospital discharge assessment. Verified name and  with patient as identifiers. Provided introduction to self, and explanation of the Nurse Navigator role. Reviewed discharge instructions and red flags with patient who verbalized understanding. Patient given an opportunity to ask questions and does not have any further questions or concerns at this time. The patient agrees to contact the PCP office for questions related to their healthcare. NN provided contact information for future reference. NN spoke to patient who suggested NN talk to son Miri Huerta) regarding appointments, Patient is scheduled to see Dr. Abby Lopez, pulmonologist on 18, confirmed at [phone]., Will be followed at [facility] by [facility], and Patient has appointment to follow-up with cardiology, Dr. Alonzo Garcia of 18. Cheko Ríos stated he will call Dr Angel Layton for a PCP follow-up appointment. NN offered to make call to set up appointment. Message left at [phone]. NN will attempt call again later. 4:17 PM-NN still unable to reach office of PCP, VM left.  Several attempts to call patient at numbers listed to offer [facility] visit. NN unable to reach patient or son at this time. Contact information left on VM. Disease Specific:   COPD, CHF, pneumonia    Summary of patient's top problems:  1. Acute Bacterial Bronchitis-, home on Levaquin X4 days ends on 6/15/18. Consult with pulmonology- follow-up on 7/25/18, home with Duo-nebs, Levaquin, prednisone, Spirva, machine nebulizer  6/7/2018  2:37 PM) CXR-IMPRESSION : Cardiomegaly with mild interstitial edema. Home with [facility]  2. CHF- History of CHF, CXR showed cardiomegaly with mild interstitial edema. Referral to Cardiology- appointment for 6/4/18  3. COPD- elevated BNP to 1900 on admission, continuous smoker. CXR negative for PNA, appointment for pulmonology-7/25/18    Home Health orders at discharge: Physical Therapy, [address]: [facility]  Date of initial visit: Outbound call to [facility] who states patient will be seen in next 48 hours, received referral on today. Durable Medical Equipment ordered/company: Nebulizer machine  Durable Medical Equipment received: none    Barriers to care? Decreased level of motivation, Patient continues to smoke, history of treatment noncompliance    Advance Care Planning:   Does patient have an Advance Directive:  not on file     Medication(s):     New Medications at Discharge: Duo-nebs, Levaquin (x4 more days-end on 6/15/18), prednisone, Spirva, machine nebulizer  Changed Medications at Discharge: none  Discontinued Medications at Discharge: none    Medication reconciliation was performed with patient, who verbalizes understanding of administration of home medications. There were no barriers to obtaining medications identified at this time. Referral to Pharm D needed: no     Current Outpatient Prescriptions   Medication Sig    albuterol-ipratropium (DUO-NEB) 2.5 mg-0.5 mg/3 ml nebu 3 mL by Nebulization route every four (4) hours as needed.     levoFLOXacin (LEVAQUIN) 250 mg tablet Take 1 Tab by mouth daily.  predniSONE (DELTASONE) 20 mg tablet Take 2 Tabs by mouth daily (with breakfast) for 5 days.  tiotropium (SPIRIVA WITH HANDIHALER) 18 mcg inhalation capsule Take 1 Cap by inhalation daily.  Nebulizer & Compressor machine Use as directed    sotalol (BETAPACE) 80 mg tablet Take 1 Tab by mouth every twelve (12) hours. Indications: PREVENTION OF RECURRENT ATRIAL FIBRILLATION    dilTIAZem CD (CARDIZEM CD) 120 mg ER capsule Take 1 Cap by mouth daily.  furosemide (LASIX) 40 mg tablet Take 1 Tab by mouth daily.  potassium chloride SR (K-TAB) 20 mEq tablet Take 1 Tab by mouth two (2) times a day.  rivaroxaban (XARELTO) 20 mg tab tablet Take 1 Tab by mouth daily (with lunch). Indications: PREVENT THROMBOEMBOLISM IN CHRONIC ATRIAL FIBRILLATION    aspirin delayed-release 81 mg tablet Take 1 Tab by mouth daily.  acetaminophen (TYLENOL) 500 mg tablet Take 1,000 mg by mouth daily as needed for Pain. No current facility-administered medications for this visit.       Facility-Administered Medications Ordered in Other Visits   Medication Dose Route Frequency    albuterol-ipratropium (DUO-NEB) 2.5 MG-0.5 MG/3 ML  3 mL Nebulization BID    levoFLOXacin (LEVAQUIN) tablet 500 mg  500 mg Oral Q24H    arformoterol (BROVANA) neb solution 15 mcg  15 mcg Nebulization BID RT    And    budesonide (PULMICORT) 500 mcg/2 ml nebulizer suspension  500 mcg Nebulization BID RT    methylPREDNISolone (PF) (SOLU-MEDROL) injection 60 mg  60 mg IntraVENous Q8H    guaiFENesin (ROBITUSSIN) 100 mg/5 mL oral liquid 100 mg  100 mg Oral BID    guaiFENesin ER (MUCINEX) tablet 600 mg  600 mg Oral Q12H    polyethylene glycol (MIRALAX) packet 17 g  17 g Oral DAILY    sodium chloride (NS) flush 5-10 mL  5-10 mL IntraVENous PRN    sodium chloride (NS) flush 5-10 mL  5-10 mL IntraVENous Q8H    sodium chloride (NS) flush 5-10 mL  5-10 mL IntraVENous PRN    acetaminophen (TYLENOL) tablet 650 mg  650 mg Oral Q4H PRN  HYDROcodone-acetaminophen (NORCO) 5-325 mg per tablet 1 Tab  1 Tab Oral Q4H PRN    naloxone (NARCAN) injection 0.4 mg  0.4 mg IntraVENous PRN    aspirin delayed-release tablet 81 mg  81 mg Oral DAILY    dilTIAZem CD (CARDIZEM CD) capsule 120 mg  120 mg Oral DAILY    rivaroxaban (XARELTO) tablet 20 mg  20 mg Oral DAILY WITH LUNCH    sotalol (BETAPACE) tablet 80 mg  80 mg Oral Q12H    albumin human 25% (BUMINATE) solution 12.5 g  12.5 g IntraVENous PRN       There are no discontinued medications. PCP/Specialist follow up: Pulmonology- 7/25/18, Cardiology-6/14/18, NN made several attempts to reach PCP and patient to return call regarding appointment. VM left. NN will continue to attempt to reach patient for scheduling of F/U visit with PCP.         Goals     None

## 2018-06-11 NOTE — PATIENT INSTRUCTIONS
Learning About Benefits From Quitting Smoking  How does quitting smoking make you healthier? If you're thinking about quitting smoking, you may have a few reasons to be smoke-free. Your health may be one of them. · When you quit smoking, you lower your risks for cancer, lung disease, heart attack, stroke, blood vessel disease, and blindness from macular degeneration. · When you're smoke-free, you get sick less often, and you heal faster. You are less likely to get colds, flu, bronchitis, and pneumonia. · As a nonsmoker, you may find that your mood is better and you are less stressed. When and how will you feel healthier? Quitting has real health benefits that start from day 1 of being smoke-free. And the longer you stay smoke-free, the healthier you get and the better you feel. The first hours  · After just 20 minutes, your blood pressure and heart rate go down. That means there's less stress on your heart and blood vessels. · Within 12 hours, the level of carbon monoxide in your blood drops back to normal. That makes room for more oxygen. With more oxygen in your body, you may notice that you have more energy than when you smoked. After 2 weeks  · Your lungs start to work better. · Your risk of heart attack starts to drop. After 1 month  · When your lungs are clear, you cough less and breathe deeper, so it's easier to be active. · Your sense of taste and smell return. That means you can enjoy food more than you have since you started smoking. Over the years  · After 1 year, your risk of heart disease is half what it would be if you kept smoking. · After 5 years, your risk of stroke starts to shrink. Within a few years after that, it's about the same as if you'd never smoked. · After 10 years, your risk of dying from lung cancer is cut by about half. And your risk for many other types of cancer is lower too. How would quitting help others in your life?   When you quit smoking, you improve the health of everyone who now breathes in your smoke. · Their heart, lung, and cancer risks drop, much like yours. · They are sick less. For babies and small children, living smoke-free means they're less likely to have ear infections, pneumonia, and bronchitis. · If you're a woman who is or will be pregnant someday, quitting smoking means a healthier . · Children who are close to you are less likely to become adult smokers. Where can you learn more? Go to http://chela-montse.info/. Enter 052 [id number] 72 11 in the search box to learn more about \"Learning About Benefits From Quitting Smoking. \"  Current as of: 2017  Content Version: 11.4  © 1641-8677 [employer]. Care instructions adapted under license by [employer] (which disclaims liability or warranty for this information). If you have questions about a medical condition or this instruction, always ask your healthcare professional. [place] [address] any warranty or liability for your use of this information. Stopping Smoking: Care Instructions  Your Care Instructions    Cigarette smokers crave the nicotine in cigarettes. Giving it up is much harder than simply changing a habit. Your body has to stop craving the nicotine. It is hard to quit, but you can do it. There are many tools that people use to quit smoking. You may find that combining tools works best for you. There are several steps to quitting. First you get ready to quit. Then you get support to help you. After that, you learn new skills and behaviors to become a nonsmoker. For many people, a necessary step is getting and using medicine. Your doctor will help you set up the plan that best meets your needs. You may want to attend a smoking cessation program to help you quit smoking. When you choose a program, look for one that has proven success. Ask your doctor for ideas.  You will greatly increase your chances of success if you take medicine as well as get counseling or join a cessation program.  Some of the changes you feel when you first quit tobacco are uncomfortable. Your body will miss the nicotine at first, and you may feel short-tempered and grumpy. You may have trouble sleeping or concentrating. Medicine can help you deal with these symptoms. You may struggle with changing your smoking habits and rituals. The last step is the tricky one: Be prepared for the smoking urge to continue for a time. This is a lot to deal with, but keep at it. You will feel better. Follow-up care is a key part of your treatment and safety. Be sure to make and go to all appointments, and call your doctor if you are having problems. It's also a good idea to know your test results and keep a list of the medicines you take. How can you care for yourself at home? · Ask your family, friends, and coworkers for support. You have a better chance of quitting if you have help and support. · Join a support group, such as Nicotine Anonymous, for people who are trying to quit smoking. · Consider signing up for a smoking cessation program, such as the American Lung Association's Freedom from Smoking program.  · Set a quit date. Pick your date carefully so that it is not right in the middle of a big deadline or stressful time. Once you quit, do not even take a puff. Get rid of all ashtrays and lighters after your last cigarette. Clean your house and your clothes so that they do not smell of smoke. · Learn how to be a nonsmoker. Think about ways you can avoid those things that make you reach for a cigarette. ¨ Avoid situations that put you at greatest risk for smoking. For some people, it is hard to have a drink with friends without smoking. For others, they might skip a coffee break with coworkers who smoke. ¨ Change your daily routine. Take a different route to work or eat a meal in a different place. · Cut down on stress.  Calm yourself or release tension by doing an activity you enjoy, such as reading a book, taking a hot bath, or gardening. · Talk to your doctor or pharmacist about nicotine replacement therapy, which replaces the nicotine in your body. You still get nicotine but you do not use tobacco. Nicotine replacement products help you slowly reduce the amount of nicotine you need. These products come in several forms, many of them available over-the-counter:  ¨ Nicotine patches  ¨ Nicotine gum and lozenges  ¨ Nicotine inhaler  · Ask your doctor about bupropion (Wellbutrin) or varenicline (Chantix), which are prescription medicines. They do not contain nicotine. They help you by reducing withdrawal symptoms, such as stress and anxiety. · Some people find hypnosis, acupuncture, and massage helpful for ending the smoking habit. · Eat a healthy diet and get regular exercise. Having healthy habits will help your body move past its craving for nicotine. · Be prepared to keep trying. Most people are not successful the first few times they try to quit. Do not get mad at yourself if you smoke again. Make a list of things you learned and think about when you want to try again, such as next week, next month, or next year. Where can you learn more? Go to http://chela-montse.info/. Enter E068 in the search box to learn more about \"Stopping Smoking: Care Instructions. \"  Current as of: March 20, 2017  Content Version: 11.4  © 7383-3682 [employer]. Care instructions adapted under license by [employer] (which disclaims liability or warranty for this information). If you have questions about a medical condition or this instruction, always ask your healthcare professional. [place] [address] any warranty or liability for your use of this information.

## 2018-06-11 NOTE — PROGRESS NOTES
Problem: Discharge Planning  Goal: *Discharge to safe environment  Outcome: Resolved/Met Date Met: 06/11/18  Home with family and home health

## 2018-06-11 NOTE — PROGRESS NOTES
The objective of todays visit was to review the transitional care plan with the patient. We discussed the importance of transitional care as it relates to reducing the likelihood of readmission. We reviewed the goals for the first 30 days following hospital discharge. The patient and I discussed wrap around services including nurse navigation, care coordination, home health, transitional care appointments with their primary care provider and specialist team and the role of dispatch health. Patient education focused on readmission zones as described as  The Red Zone: High risk for readmission, days 1-21  The Yellow Zone: Moderate risk for readmission, days 22-29  The Green Zone: Lower risk for readmission, days 30 and after    The patient and her son Bettie Rider were instructed on worrisome symptoms for worsening of their medical conditions and sepsis. Patient has Heart Failure and COPD. If there is fever >101 or <97 call your physician. Do not smoke, Get new piece from [facility] for CPAP. Learning was assessed using teach back where they could tell me in their own words when they were suppose to see the physician, specialist, have physician help her fill out AMD. [facility]. The patient identified appropriate wrap around services during this interaction. Patient or son will call PCP to schedule appointment within 2 days of discharge and 10 days after that. We tried to call the physician and they stated patient had to make appointment. She will see her Cardiologist 6/14/18 @ 10:30. Pulmonology on 7/25/182 10:30. [facility]. The patient expressed the following specific concerns regarding their discharge    Transportation problems: patient depends on her son for transportation. Lack of understanding of their condition: Patient has HF and COPD but continues to smoke.     We are waiting to get outpatient NN.    Faye Bhatt

## 2018-06-11 NOTE — PROGRESS NOTES
Called [facility] and spoke with TK. Patient will see Dr. Carlos Samuel 7/25/18 @ 10.30. [facility] # [id number], [place], [address] [place] [address]. : [phone]. Patient will be going home with son and [facility]. Patient has appointment with [facility] Dr. Ramirez Cavazos 6/14/18    Patient and son met with pastoral care concerning AMD but they did not fill it out. Presented patient with [facility] [place] program,    Patient has to call PCP to make appointment, per office.     Yisel Zamora RN CRM

## 2018-06-11 NOTE — NURSE NAVIGATOR
Heart failure follow up scheduled through [facility] with Dr. Alonzo Garcia for 6/14/18 at 10:30 am.   Information on After Visit Summary.

## 2018-06-11 NOTE — DISCHARGE SUMMARY
Discharge Summary     Patient:  Jeanette Sinclair       MRN: [id number]       YOB: 1940       Age: 69 y.o. Date of admission:  6/7/2018    Date of discharge:  6/11/2018    Primary care provider: Dr. Trixie Perez MD    Admitting provider:  Venice Goldmann, MD    Discharging provider:  Riri Pang MD - [phone]  If unavailable, call [phone] and ask the  to page the triage hospitalist.    Consultations  · None    Procedures  · * No surgery found *    Discharge destination: HOME with [facility]. The patient is stable for discharge. Admission diagnosis  · Pneumonia  · Leukocytosis  · Wheezing    Current Discharge Medication List      START taking these medications    Details   albuterol-ipratropium (DUO-NEB) 2.5 mg-0.5 mg/3 ml nebu 3 mL by Nebulization route every four (4) hours as needed. Qty: 30 Nebule, Refills: 2      levoFLOXacin (LEVAQUIN) 250 mg tablet Take 1 Tab by mouth daily. Qty: 4 Tab, Refills: 0      predniSONE (DELTASONE) 20 mg tablet Take 2 Tabs by mouth daily (with breakfast) for 5 days. Qty: 10 Tab, Refills: 0      tiotropium (SPIRIVA WITH HANDIHALER) 18 mcg inhalation capsule Take 1 Cap by inhalation daily. Qty: 30 Cap, Refills: 1      Nebulizer & Compressor machine Use as directed  Qty: 1 Each, Refills: 0         CONTINUE these medications which have NOT CHANGED    Details   sotalol (BETAPACE) 80 mg tablet Take 1 Tab by mouth every twelve (12) hours. Indications: PREVENTION OF RECURRENT ATRIAL FIBRILLATION  Qty: 60 Tab, Refills: 12      dilTIAZem CD (CARDIZEM CD) 120 mg ER capsule Take 1 Cap by mouth daily. Qty: 30 Cap, Refills: 2      lisinopril (PRINIVIL, ZESTRIL) 10 mg tablet Take 1 Tab by mouth nightly. Qty: 30 Tab, Refills: 1      furosemide (LASIX) 40 mg tablet Take 1 Tab by mouth daily.   Qty: 60 Tab, Refills: 0      potassium chloride SR (K-TAB) 20 mEq tablet Take 1 Tab by mouth two (2) times a day. Qty: 60 Tab, Refills: 0      rivaroxaban (XARELTO) 20 mg tab tablet Take 1 Tab by mouth daily (with lunch). Indications: PREVENT THROMBOEMBOLISM IN CHRONIC ATRIAL FIBRILLATION  Qty: 30 Tab, Refills: 1      aspirin delayed-release 81 mg tablet Take 1 Tab by mouth daily. Qty: 30 Tab, Refills: 0      acetaminophen (TYLENOL) 500 mg tablet Take 1,000 mg by mouth daily as needed for Pain. Follow-up Information     Follow up With Details Comments [address], [place] In 2 weeks Discharge follow up  [address] [facility]  Dr Alfonso Tello [address] [id number]  [phone]      [facility] [place] [address].  call to make appointment  [facility] [address] [facility].            Final discharge diagnoses and brief hospital course  Please also refer to the admission H&P for details on the presenting problem. 73-yof who with pmh of afib, diastolic CHF, TAM, HTN, morbid obesity, history of treatment noncompliance, and gout who presented with productive cough and shortness of breath that started about 4-5 days ago associated with fevers and chills. Pt was recently in the ED on 05/22/2018, for a possible gout flare, and she was discharged from ED on steroid pack.  Last hospitalization was from 47/49-70/81 for diastolic CHF, and she was discharged on diltiazem, lisinopril, Lasix 40 mg, Xarelto 20 mg, and aspirin.  Since then, it appears she was started on sotalol by her cardiologist. Carmelita Colon the ED, labs showed leukocytosis 17.6 with 10% bands, UA negative, BUN 17, creatinine 1.35, total bilirubin slightly elevated at 1.2.  Lactic acid is normal at 1.8.  ProBNP is more than 1900.  Cardiac enzymes are negative.  Portable chest x-ray showed cardiomegaly with mild interstitial edema.       Acute Bacterial Bronchitis-POA   -CXR negative for pna, pt past heavy smoker however has never been w/u for COPD  -discussed need for OP PFTs with pcp   - changed rocephin to levaquin  - Nebs q4-6h PRN, Spiriva daily  - improved with Steroids and Brovana/Pulmicort  - will benefit from outpatient PFTs  - outpatient pulmonary follow up     Acute on Chronic Diastolic Heart Failure Exacerbation (unable to determine NYHA class on admission d/t acute respiratory illness)  -CXR showing mild interstitial edema with mildly elevated BNP on admission   -c/w lasix  -I&Os, daily wt   -3/2018 echo showing EF of 50%     Acute Respiratory Failure with Hypoxia   - O2 sat down to 83% on RA in ED   -likely 2/2 to the above   -currently on RA   - resolved      Persistent Afib with RVR  -now rate controlled, HR in low 100s on admission   -ok to transfer to remote tele  -continue Sotalol, Cardizem and Xarelto  -follows Dr Aguirre Angry with [facility] OP     H/o HTN with Hypotension on admission   -BP improved today however still on lower side   - hold Lisinopril on discharge, follow up with Cardiology   -continue to monitor with Sotalol and Cardizem      Morbid Obesity     TAM-noncompliant   -educated on importance of compliance     Chronic RUQ abd pain   -abd US negative for acute findings  -will need to f/u OP with GI    FOLLOW-UP TESTS recommended: NONE     PENDING TEST RESULTS:  At the time of your discharge the following test results are still pending: NONE  Please make sure you review these results with your outpatient follow-up provider(s).     SYMPTOMS to watch for: chest pain, shortness of breath, fever, chills, nausea, vomiting, diarrhea, change in mentation, falling, weakness, bleeding.     DIET/what to eat:  Cardiac Diet     ACTIVITY:  Activity as tolerated     WOUND CARE: NONE     EQUIPMENT needed:  NONE       Physical examination at discharge  Visit Vitals    /74 (BP 1 Location: Right arm, BP Patient Position: At rest)    Pulse 99    Temp 97.3 °F (36.3 °C)    Resp 16    Ht 5' 9\" (1.753 m)    Wt 139.2 kg (306 lb 14.1 oz)    SpO2 96%    BMI 45.32 kg/m2 AO3  PERRLA  EOMI  Lung: Clear, minimal wheezing  CVS: RRR  ABd: soft NT ND    Pertinent imaging studies:        Recent Labs      06/09/18   0443   WBC  10.5   HGB  11.5   HCT  35.3   PLT  185     Recent Labs      06/11/18   0120  06/09/18   0443   NA  137  136   K  4.4  3.9   CL  100  104   CO2  26  23   BUN  29*  16   CREA  1.45*  0.95   GLU  115*  94   CA  8.9  8.0*     No results for input(s): SGOT, GPT, AP, TBIL, TP, ALB, GLOB, GGT, AML, LPSE in the last 72 hours. No lab exists for component: AMYP, HLPSE  No results for input(s): INR, PTP, APTT in the last 72 hours. No lab exists for component: INREXT   No results for input(s): FE, TIBC, PSAT, FERR in the last 72 hours. No results for input(s): PH, PCO2, PO2 in the last 72 hours. No results for input(s): CPK, CKMB in the last 72 hours.     No lab exists for component: TROPONINI  No components found for: [place]    Chronic Diagnoses:    Problem List as of 6/11/2018  Date Reviewed: 4/25/2018          Codes Class Noted - Resolved    Wheezing ICD-10-CM: R06.2  ICD-9-CM: 786.07  6/7/2018 - Present        Leukocytosis ICD-10-CM: D72.829  ICD-9-CM: 288.60  6/7/2018 - Present        Pneumonia ICD-10-CM: J18.9  ICD-9-CM: 704  6/7/2018 - Present        A-fib ([facility] ICD-10-CM: I48.91  ICD-9-CM: 427.31  3/30/2018 - Present        Hypertension ICD-10-CM: I10  ICD-9-CM: 401.9  3/30/2018 - Present        * (Principal)Bronchitis, acute ICD-10-CM: J20.9  ICD-9-CM: 466.0  12/17/2011 - Present        RESOLVED: Hypokalemia ICD-10-CM: E87.6  ICD-9-CM: 276.8  3/30/2018 - 3/30/2018        RESOLVED: CHF exacerbation ([address] ICD-10-CM: I50.9  ICD-9-CM: 428.0  3/27/2018 - 3/30/2018        RESOLVED: Back pain ICD-10-CM: M54.9  ICD-9-CM: 724.5  9/28/2014 - 10/4/2014        RESOLVED: Low back pain ICD-10-CM: M54.5  ICD-9-CM: 724.2  9/26/2014 - 10/4/2014        RESOLVED: Shortness of breath ICD-10-CM: R06.02  ICD-9-CM: 786.05  12/17/2011 - 12/20/2011              Time spent on discharge related activities today greater than 30 minutes.       Signed:  Silver Zamudio MD                 Hospitalist, Internal Medicine      Cc: Leonarda Mahmood MD

## 2018-06-11 NOTE — PROGRESS NOTES
Renal Dosing/Monitoring  Medication: Levaquin   Current regimen:  500 every 24 hr  Recent Labs      06/11/18   0120  06/09/18   0443   CREA  1.45*  0.95   BUN  29*  16     Estimated CrCl:  48 ml/min  Plan: Change to 500 mg q 48 h per [address] [place] [address] P&T Committee Protocol with respect to renal function. Pharmacy will continue to monitor patient daily and will make dosage adjustments based upon changing renal function.

## 2018-06-11 NOTE — PROGRESS NOTES
CM noted order for home health-- it has been sent to [facility] [phone] and pending. CM talked with Melecio Robles, liaison,  [phone], and she said it is being reviewed. CM waiting for response as patient is being discharged today. Patient said her son, Sebastian Hutton [phone] who lives with her, will transport her home today. Sebastian Hutton works nights. Patient has 2 other sons who are supportive. CM to follow for acceptance by Nik Christensen 10:15 am  Patient accepted by [facility]. Name and number of agency on the discharge instructions and patient advised to call agency if not contacted by noon tomorrow to inquire as to the day and time of initial visit.

## 2018-06-12 ENCOUNTER — PATIENT OUTREACH (OUTPATIENT)
Dept: FAMILY MEDICINE CLINIC | Age: 78
End: 2018-06-12

## 2018-06-12 LAB
BACTERIA SPEC CULT: NORMAL
SERVICE CMNT-IMP: NORMAL

## 2018-06-12 NOTE — PROGRESS NOTES
Patient was seen by Jarrett Shafer and called and spoke to [facility] NP to say that they have given patient a prescription for Albuterol Neb. To hold her until the am. Then we called our outpatient pharmacy and they said they had 2 adult nebulizers' left. I asked them to hold one for patients son to bring the prescription. The price is less than $26. And he said he can pay for that.  He will be here before Valerie Rodrigues RN CRM

## 2018-06-12 NOTE — PROGRESS NOTES
Received a call from patients cecilia Deras [phone] stating that he had been to two drugstores this morning and one prescription was $60 which they gave him $30 discount and the second one charged him $58. He stated that they will not take his prescription for the nebulizer. I tried to explain that he has to take that to a [employer] company. I tried to call patient PCP and you could leave a message for tomorrow. So I called patients Outpatient Nurse Navigator and Dereje told me that his NN is Francia or Beatriz. She is sending message to them to assist son with hs needs. They will call the son at home. I also, called Zain Post with [facility] to see if she could assist and she said they could not help with this. They were not even sure if [facility] was going out later today or tomorrow. Spoke with Kavon Lu NP and she stated that calling the NN was what she would have done.     Brandon Galvez RN CRM

## 2018-06-12 NOTE — PROGRESS NOTES
Several calls placed to Dr. Oren Holguin office again today. NN unable to reach. VM stated to call back during office hours. NN has tried call at hourly intervals today with same message. NN called Jarrett Shafer who will be sending team out to follow-up with patient today. Son has called A MANOHAR Michelle, RN for help with obtaining  Nebulizer. NN returned call to son Marquise West), who was very upset to explain that the nebulizer would need to be ordered by his pulmonologist or his PCP. Since patient is new to pulmonologist the order would need to be done by his PCP or Cardiologist. Patient has appointment to see cardiology on 6/14/18. NN encouraged son to continue to make attempts to reach her PCP. NN also will continue to try reaching PCP. VM left on phone for a call to schedule appointment for patient. NN spoke to son to try to understand what he needed, he was very upset about his mother being sent home and is not doing well, sick of all the people calling and not doing anything for mom. Yelled at this NN, stating, \"nothing better happen to my mom. \" This NN spoke to mom, who stated she had all medications. Did not care to follow-up with Dr. Alex Renae today. No follow-up appointment was made for her with her PCP at discharge. Son finally stated he needs a Nebulizer machine. This is a Non-BS provider, so we can not put the order in. I have tried many times to reach Dr. Oren kam to complete the forms, need a script  to order the nebulizer. .  for Dr. Oren Holguin office, says call back during office hours? ???     Patient is being seen by [facility] this evening for follow up appointment. Patient is in approval of Jarrett Shafer coming out.

## 2018-06-13 ENCOUNTER — DOCUMENTATION ONLY (OUTPATIENT)
Dept: FAMILY MEDICINE CLINIC | Age: 78
End: 2018-06-13

## 2018-06-13 ENCOUNTER — HOME CARE VISIT (OUTPATIENT)
Dept: SCHEDULING | Facility: HOME HEALTH | Age: 78
End: 2018-06-13
Payer: MEDICARE

## 2018-06-13 VITALS
WEIGHT: 293 LBS | HEIGHT: 69 IN | SYSTOLIC BLOOD PRESSURE: 102 MMHG | OXYGEN SATURATION: 95 % | DIASTOLIC BLOOD PRESSURE: 62 MMHG | TEMPERATURE: 97.8 F | RESPIRATION RATE: 22 BRPM | BODY MASS INDEX: 43.4 KG/M2 | HEART RATE: 74 BPM

## 2018-06-13 PROCEDURE — 400013 HH SOC

## 2018-06-13 PROCEDURE — G0299 HHS/HOSPICE OF RN EA 15 MIN: HCPCS

## 2018-06-13 PROCEDURE — 3331090002 HH PPS REVENUE DEBIT

## 2018-06-13 PROCEDURE — 3331090001 HH PPS REVENUE CREDIT

## 2018-06-14 ENCOUNTER — PATIENT OUTREACH (OUTPATIENT)
Dept: FAMILY MEDICINE CLINIC | Age: 78
End: 2018-06-14

## 2018-06-14 PROCEDURE — 3331090001 HH PPS REVENUE CREDIT

## 2018-06-14 PROCEDURE — 3331090002 HH PPS REVENUE DEBIT

## 2018-06-15 PROCEDURE — 3331090002 HH PPS REVENUE DEBIT

## 2018-06-15 PROCEDURE — 3331090001 HH PPS REVENUE CREDIT

## 2018-06-15 NOTE — PROGRESS NOTES
HISTORY OF PRESENT ILLNESS  Valerie Whitten is a 69 y.o. female.   HPI    ROS    Physical Exam    ASSESSMENT and PLAN

## 2018-06-16 ENCOUNTER — HOME CARE VISIT (OUTPATIENT)
Dept: HOME HEALTH SERVICES | Facility: HOME HEALTH | Age: 78
End: 2018-06-16
Payer: MEDICARE

## 2018-06-16 ENCOUNTER — HOME CARE VISIT (OUTPATIENT)
Dept: SCHEDULING | Facility: HOME HEALTH | Age: 78
End: 2018-06-16
Payer: MEDICARE

## 2018-06-16 VITALS
DIASTOLIC BLOOD PRESSURE: 62 MMHG | HEART RATE: 62 BPM | RESPIRATION RATE: 16 BRPM | OXYGEN SATURATION: 98 % | TEMPERATURE: 97.8 F | SYSTOLIC BLOOD PRESSURE: 115 MMHG

## 2018-06-16 PROCEDURE — 3331090001 HH PPS REVENUE CREDIT

## 2018-06-16 PROCEDURE — 3331090002 HH PPS REVENUE DEBIT

## 2018-06-16 PROCEDURE — G0151 HHCP-SERV OF PT,EA 15 MIN: HCPCS

## 2018-06-17 VITALS
TEMPERATURE: 98 F | DIASTOLIC BLOOD PRESSURE: 80 MMHG | SYSTOLIC BLOOD PRESSURE: 122 MMHG | RESPIRATION RATE: 19 BRPM | HEART RATE: 83 BPM | OXYGEN SATURATION: 99 %

## 2018-06-17 PROCEDURE — 3331090002 HH PPS REVENUE DEBIT

## 2018-06-17 PROCEDURE — 3331090001 HH PPS REVENUE CREDIT

## 2018-06-18 ENCOUNTER — HOME CARE VISIT (OUTPATIENT)
Dept: SCHEDULING | Facility: HOME HEALTH | Age: 78
End: 2018-06-18
Payer: MEDICARE

## 2018-06-18 PROCEDURE — 3331090001 HH PPS REVENUE CREDIT

## 2018-06-18 PROCEDURE — 3331090002 HH PPS REVENUE DEBIT

## 2018-06-18 PROCEDURE — G0151 HHCP-SERV OF PT,EA 15 MIN: HCPCS

## 2018-06-18 PROCEDURE — G0300 HHS/HOSPICE OF LPN EA 15 MIN: HCPCS

## 2018-06-19 VITALS
OXYGEN SATURATION: 97 % | DIASTOLIC BLOOD PRESSURE: 79 MMHG | SYSTOLIC BLOOD PRESSURE: 131 MMHG | RESPIRATION RATE: 18 BRPM | TEMPERATURE: 98.3 F | HEART RATE: 82 BPM

## 2018-06-19 PROCEDURE — 3331090002 HH PPS REVENUE DEBIT

## 2018-06-19 PROCEDURE — 3331090001 HH PPS REVENUE CREDIT

## 2018-06-20 ENCOUNTER — HOME CARE VISIT (OUTPATIENT)
Dept: SCHEDULING | Facility: HOME HEALTH | Age: 78
End: 2018-06-20
Payer: MEDICARE

## 2018-06-20 VITALS
HEART RATE: 84 BPM | DIASTOLIC BLOOD PRESSURE: 60 MMHG | TEMPERATURE: 98.1 F | RESPIRATION RATE: 19 BRPM | OXYGEN SATURATION: 94 % | SYSTOLIC BLOOD PRESSURE: 100 MMHG

## 2018-06-20 VITALS
RESPIRATION RATE: 16 BRPM | SYSTOLIC BLOOD PRESSURE: 115 MMHG | HEART RATE: 87 BPM | OXYGEN SATURATION: 99 % | DIASTOLIC BLOOD PRESSURE: 68 MMHG | TEMPERATURE: 97.8 F

## 2018-06-20 VITALS
DIASTOLIC BLOOD PRESSURE: 80 MMHG | OXYGEN SATURATION: 97 % | RESPIRATION RATE: 18 BRPM | TEMPERATURE: 97.9 F | SYSTOLIC BLOOD PRESSURE: 126 MMHG | HEART RATE: 78 BPM

## 2018-06-20 PROCEDURE — G0151 HHCP-SERV OF PT,EA 15 MIN: HCPCS

## 2018-06-20 PROCEDURE — 3331090002 HH PPS REVENUE DEBIT

## 2018-06-20 PROCEDURE — 3331090001 HH PPS REVENUE CREDIT

## 2018-06-20 PROCEDURE — G0300 HHS/HOSPICE OF LPN EA 15 MIN: HCPCS

## 2018-06-21 ENCOUNTER — HOME CARE VISIT (OUTPATIENT)
Dept: SCHEDULING | Facility: HOME HEALTH | Age: 78
End: 2018-06-21
Payer: MEDICARE

## 2018-06-21 PROCEDURE — 3331090001 HH PPS REVENUE CREDIT

## 2018-06-21 PROCEDURE — 3331090002 HH PPS REVENUE DEBIT

## 2018-06-22 PROCEDURE — 3331090002 HH PPS REVENUE DEBIT

## 2018-06-22 PROCEDURE — 3331090001 HH PPS REVENUE CREDIT

## 2018-06-23 PROCEDURE — 3331090001 HH PPS REVENUE CREDIT

## 2018-06-23 PROCEDURE — 3331090002 HH PPS REVENUE DEBIT

## 2018-06-24 PROCEDURE — 3331090001 HH PPS REVENUE CREDIT

## 2018-06-24 PROCEDURE — 3331090002 HH PPS REVENUE DEBIT

## 2018-06-25 ENCOUNTER — HOME CARE VISIT (OUTPATIENT)
Dept: SCHEDULING | Facility: HOME HEALTH | Age: 78
End: 2018-06-25
Payer: MEDICARE

## 2018-06-25 PROCEDURE — G0151 HHCP-SERV OF PT,EA 15 MIN: HCPCS

## 2018-06-25 PROCEDURE — 3331090001 HH PPS REVENUE CREDIT

## 2018-06-25 PROCEDURE — 3331090002 HH PPS REVENUE DEBIT

## 2018-06-26 ENCOUNTER — HOME CARE VISIT (OUTPATIENT)
Dept: SCHEDULING | Facility: HOME HEALTH | Age: 78
End: 2018-06-26
Payer: MEDICARE

## 2018-06-26 VITALS
DIASTOLIC BLOOD PRESSURE: 65 MMHG | OXYGEN SATURATION: 98 % | RESPIRATION RATE: 18 BRPM | TEMPERATURE: 98.6 F | SYSTOLIC BLOOD PRESSURE: 131 MMHG | HEART RATE: 88 BPM

## 2018-06-26 PROCEDURE — G0300 HHS/HOSPICE OF LPN EA 15 MIN: HCPCS

## 2018-06-26 PROCEDURE — 3331090001 HH PPS REVENUE CREDIT

## 2018-06-26 PROCEDURE — 3331090002 HH PPS REVENUE DEBIT

## 2018-06-27 ENCOUNTER — HOME CARE VISIT (OUTPATIENT)
Dept: SCHEDULING | Facility: HOME HEALTH | Age: 78
End: 2018-06-27
Payer: MEDICARE

## 2018-06-27 VITALS
SYSTOLIC BLOOD PRESSURE: 126 MMHG | RESPIRATION RATE: 18 BRPM | OXYGEN SATURATION: 95 % | HEART RATE: 71 BPM | DIASTOLIC BLOOD PRESSURE: 70 MMHG | TEMPERATURE: 98.1 F

## 2018-06-27 PROCEDURE — 3331090002 HH PPS REVENUE DEBIT

## 2018-06-27 PROCEDURE — 3331090001 HH PPS REVENUE CREDIT

## 2018-06-27 PROCEDURE — G0151 HHCP-SERV OF PT,EA 15 MIN: HCPCS

## 2018-06-28 ENCOUNTER — HOME CARE VISIT (OUTPATIENT)
Dept: SCHEDULING | Facility: HOME HEALTH | Age: 78
End: 2018-06-28
Payer: MEDICARE

## 2018-06-28 VITALS
SYSTOLIC BLOOD PRESSURE: 128 MMHG | DIASTOLIC BLOOD PRESSURE: 80 MMHG | HEART RATE: 64 BPM | RESPIRATION RATE: 20 BRPM | TEMPERATURE: 98.1 F | OXYGEN SATURATION: 98 %

## 2018-06-28 VITALS
RESPIRATION RATE: 16 BRPM | TEMPERATURE: 98.6 F | OXYGEN SATURATION: 98 % | DIASTOLIC BLOOD PRESSURE: 68 MMHG | HEART RATE: 80 BPM | SYSTOLIC BLOOD PRESSURE: 128 MMHG

## 2018-06-28 PROCEDURE — 3331090001 HH PPS REVENUE CREDIT

## 2018-06-28 PROCEDURE — G0300 HHS/HOSPICE OF LPN EA 15 MIN: HCPCS

## 2018-06-28 PROCEDURE — 3331090002 HH PPS REVENUE DEBIT

## 2018-06-29 PROCEDURE — 3331090001 HH PPS REVENUE CREDIT

## 2018-06-29 PROCEDURE — 3331090002 HH PPS REVENUE DEBIT

## 2018-06-30 PROCEDURE — 3331090001 HH PPS REVENUE CREDIT

## 2018-06-30 PROCEDURE — 3331090002 HH PPS REVENUE DEBIT

## 2018-07-01 PROCEDURE — 3331090002 HH PPS REVENUE DEBIT

## 2018-07-01 PROCEDURE — 3331090001 HH PPS REVENUE CREDIT

## 2018-07-02 ENCOUNTER — HOME CARE VISIT (OUTPATIENT)
Dept: SCHEDULING | Facility: HOME HEALTH | Age: 78
End: 2018-07-02
Payer: MEDICARE

## 2018-07-02 PROCEDURE — G0151 HHCP-SERV OF PT,EA 15 MIN: HCPCS

## 2018-07-02 PROCEDURE — 3331090002 HH PPS REVENUE DEBIT

## 2018-07-02 PROCEDURE — 3331090001 HH PPS REVENUE CREDIT

## 2018-07-03 ENCOUNTER — HOME CARE VISIT (OUTPATIENT)
Dept: SCHEDULING | Facility: HOME HEALTH | Age: 78
End: 2018-07-03
Payer: MEDICARE

## 2018-07-03 VITALS
DIASTOLIC BLOOD PRESSURE: 70 MMHG | SYSTOLIC BLOOD PRESSURE: 138 MMHG | TEMPERATURE: 98.3 F | RESPIRATION RATE: 18 BRPM | OXYGEN SATURATION: 98 % | HEART RATE: 81 BPM

## 2018-07-03 VITALS
SYSTOLIC BLOOD PRESSURE: 98 MMHG | OXYGEN SATURATION: 97 % | RESPIRATION RATE: 16 BRPM | HEART RATE: 87 BPM | TEMPERATURE: 98.5 F | DIASTOLIC BLOOD PRESSURE: 65 MMHG

## 2018-07-03 PROCEDURE — 3331090001 HH PPS REVENUE CREDIT

## 2018-07-03 PROCEDURE — G0300 HHS/HOSPICE OF LPN EA 15 MIN: HCPCS

## 2018-07-03 PROCEDURE — 3331090002 HH PPS REVENUE DEBIT

## 2018-07-04 PROCEDURE — 3331090001 HH PPS REVENUE CREDIT

## 2018-07-04 PROCEDURE — 3331090002 HH PPS REVENUE DEBIT

## 2018-07-05 ENCOUNTER — HOME CARE VISIT (OUTPATIENT)
Dept: SCHEDULING | Facility: HOME HEALTH | Age: 78
End: 2018-07-05
Payer: MEDICARE

## 2018-07-05 PROCEDURE — G0300 HHS/HOSPICE OF LPN EA 15 MIN: HCPCS

## 2018-07-05 PROCEDURE — 3331090001 HH PPS REVENUE CREDIT

## 2018-07-05 PROCEDURE — 3331090002 HH PPS REVENUE DEBIT

## 2018-07-06 ENCOUNTER — HOME CARE VISIT (OUTPATIENT)
Dept: SCHEDULING | Facility: HOME HEALTH | Age: 78
End: 2018-07-06
Payer: MEDICARE

## 2018-07-06 PROCEDURE — 3331090002 HH PPS REVENUE DEBIT

## 2018-07-06 PROCEDURE — G0151 HHCP-SERV OF PT,EA 15 MIN: HCPCS

## 2018-07-06 PROCEDURE — 3331090001 HH PPS REVENUE CREDIT

## 2018-07-07 VITALS
DIASTOLIC BLOOD PRESSURE: 74 MMHG | HEART RATE: 78 BPM | TEMPERATURE: 97.9 F | RESPIRATION RATE: 18 BRPM | OXYGEN SATURATION: 99 % | SYSTOLIC BLOOD PRESSURE: 118 MMHG

## 2018-07-07 VITALS
SYSTOLIC BLOOD PRESSURE: 144 MMHG | TEMPERATURE: 97.8 F | OXYGEN SATURATION: 98 % | RESPIRATION RATE: 18 BRPM | HEART RATE: 82 BPM | DIASTOLIC BLOOD PRESSURE: 86 MMHG

## 2018-07-07 PROCEDURE — 3331090001 HH PPS REVENUE CREDIT

## 2018-07-07 PROCEDURE — 3331090002 HH PPS REVENUE DEBIT

## 2018-07-08 PROCEDURE — 3331090001 HH PPS REVENUE CREDIT

## 2018-07-08 PROCEDURE — 3331090002 HH PPS REVENUE DEBIT

## 2018-07-09 PROCEDURE — 3331090001 HH PPS REVENUE CREDIT

## 2018-07-09 PROCEDURE — 3331090002 HH PPS REVENUE DEBIT

## 2018-07-10 ENCOUNTER — HOME CARE VISIT (OUTPATIENT)
Dept: SCHEDULING | Facility: HOME HEALTH | Age: 78
End: 2018-07-10
Payer: MEDICARE

## 2018-07-10 PROCEDURE — G0299 HHS/HOSPICE OF RN EA 15 MIN: HCPCS

## 2018-07-10 PROCEDURE — 3331090001 HH PPS REVENUE CREDIT

## 2018-07-10 PROCEDURE — 3331090002 HH PPS REVENUE DEBIT

## 2018-07-11 ENCOUNTER — HOME CARE VISIT (OUTPATIENT)
Dept: SCHEDULING | Facility: HOME HEALTH | Age: 78
End: 2018-07-11
Payer: MEDICARE

## 2018-07-11 PROCEDURE — G0151 HHCP-SERV OF PT,EA 15 MIN: HCPCS

## 2018-07-11 PROCEDURE — 3331090001 HH PPS REVENUE CREDIT

## 2018-07-11 PROCEDURE — 3331090002 HH PPS REVENUE DEBIT

## 2018-07-12 VITALS
DIASTOLIC BLOOD PRESSURE: 60 MMHG | RESPIRATION RATE: 18 BRPM | OXYGEN SATURATION: 96 % | SYSTOLIC BLOOD PRESSURE: 102 MMHG | HEART RATE: 70 BPM | TEMPERATURE: 97.7 F

## 2018-07-12 PROCEDURE — 3331090001 HH PPS REVENUE CREDIT

## 2018-07-12 PROCEDURE — 3331090002 HH PPS REVENUE DEBIT

## 2018-07-13 ENCOUNTER — HOME CARE VISIT (OUTPATIENT)
Dept: SCHEDULING | Facility: HOME HEALTH | Age: 78
End: 2018-07-13
Payer: MEDICARE

## 2018-07-13 VITALS
TEMPERATURE: 98.4 F | HEART RATE: 82 BPM | SYSTOLIC BLOOD PRESSURE: 138 MMHG | OXYGEN SATURATION: 97 % | SYSTOLIC BLOOD PRESSURE: 138 MMHG | DIASTOLIC BLOOD PRESSURE: 68 MMHG | DIASTOLIC BLOOD PRESSURE: 78 MMHG | RESPIRATION RATE: 16 BRPM | OXYGEN SATURATION: 93 % | RESPIRATION RATE: 16 BRPM | TEMPERATURE: 98.6 F | HEART RATE: 87 BPM

## 2018-07-13 PROCEDURE — G0151 HHCP-SERV OF PT,EA 15 MIN: HCPCS

## 2018-07-13 PROCEDURE — 3331090001 HH PPS REVENUE CREDIT

## 2018-07-13 PROCEDURE — 3331090002 HH PPS REVENUE DEBIT

## 2018-07-14 PROCEDURE — 3331090002 HH PPS REVENUE DEBIT

## 2018-07-14 PROCEDURE — 3331090001 HH PPS REVENUE CREDIT

## 2018-07-15 PROCEDURE — 3331090001 HH PPS REVENUE CREDIT

## 2018-07-15 PROCEDURE — 3331090002 HH PPS REVENUE DEBIT

## 2018-07-16 ENCOUNTER — HOME CARE VISIT (OUTPATIENT)
Dept: HOME HEALTH SERVICES | Facility: HOME HEALTH | Age: 78
End: 2018-07-16
Payer: MEDICARE

## 2018-07-16 PROCEDURE — 3331090001 HH PPS REVENUE CREDIT

## 2018-07-16 PROCEDURE — G0151 HHCP-SERV OF PT,EA 15 MIN: HCPCS

## 2018-07-16 PROCEDURE — 3331090002 HH PPS REVENUE DEBIT

## 2018-07-17 ENCOUNTER — HOME CARE VISIT (OUTPATIENT)
Dept: SCHEDULING | Facility: HOME HEALTH | Age: 78
End: 2018-07-17
Payer: MEDICARE

## 2018-07-17 VITALS
RESPIRATION RATE: 16 BRPM | TEMPERATURE: 98.7 F | HEART RATE: 85 BPM | DIASTOLIC BLOOD PRESSURE: 78 MMHG | OXYGEN SATURATION: 98 % | SYSTOLIC BLOOD PRESSURE: 148 MMHG

## 2018-07-17 PROCEDURE — 3331090001 HH PPS REVENUE CREDIT

## 2018-07-17 PROCEDURE — 3331090002 HH PPS REVENUE DEBIT

## 2018-07-17 PROCEDURE — G0300 HHS/HOSPICE OF LPN EA 15 MIN: HCPCS

## 2018-07-18 PROCEDURE — 3331090002 HH PPS REVENUE DEBIT

## 2018-07-18 PROCEDURE — 3331090001 HH PPS REVENUE CREDIT

## 2018-07-19 ENCOUNTER — ANESTHESIA (OUTPATIENT)
Dept: ENDOSCOPY | Age: 78
End: 2018-07-19
Payer: MEDICARE

## 2018-07-19 ENCOUNTER — PATIENT OUTREACH (OUTPATIENT)
Dept: FAMILY MEDICINE CLINIC | Age: 78
End: 2018-07-19

## 2018-07-19 ENCOUNTER — ANESTHESIA EVENT (OUTPATIENT)
Dept: ENDOSCOPY | Age: 78
End: 2018-07-19
Payer: MEDICARE

## 2018-07-19 ENCOUNTER — HOSPITAL ENCOUNTER (OUTPATIENT)
Age: 78
Setting detail: OUTPATIENT SURGERY
Discharge: HOME | End: 2018-07-19
Attending: INTERNAL MEDICINE | Admitting: INTERNAL MEDICINE
Payer: MEDICARE

## 2018-07-19 VITALS
WEIGHT: 293 LBS | HEIGHT: 69 IN | TEMPERATURE: 98.2 F | HEART RATE: 107 BPM | OXYGEN SATURATION: 94 % | RESPIRATION RATE: 26 BRPM | BODY MASS INDEX: 43.4 KG/M2 | SYSTOLIC BLOOD PRESSURE: 124 MMHG | DIASTOLIC BLOOD PRESSURE: 69 MMHG

## 2018-07-19 PROCEDURE — 74011000250 HC RX REV CODE- 250

## 2018-07-19 PROCEDURE — 3331090001 HH PPS REVENUE CREDIT

## 2018-07-19 PROCEDURE — 76060000031 HC ANESTHESIA FIRST 0.5 HR: Performed by: INTERNAL MEDICINE

## 2018-07-19 PROCEDURE — 74011000258 HC RX REV CODE- 258

## 2018-07-19 PROCEDURE — 76040000019: Performed by: INTERNAL MEDICINE

## 2018-07-19 PROCEDURE — 3331090002 HH PPS REVENUE DEBIT

## 2018-07-19 PROCEDURE — 74011250636 HC RX REV CODE- 250/636

## 2018-07-19 RX ORDER — NALOXONE HYDROCHLORIDE 0.4 MG/ML
0.4 INJECTION, SOLUTION INTRAMUSCULAR; INTRAVENOUS; SUBCUTANEOUS
Status: DISCONTINUED | OUTPATIENT
Start: 2018-07-19 | End: 2018-07-19 | Stop reason: HOSPADM

## 2018-07-19 RX ORDER — SODIUM CHLORIDE 9 MG/ML
50 INJECTION, SOLUTION INTRAVENOUS CONTINUOUS
Status: DISCONTINUED | OUTPATIENT
Start: 2018-07-19 | End: 2018-07-19 | Stop reason: HOSPADM

## 2018-07-19 RX ORDER — FENTANYL CITRATE 50 UG/ML
100 INJECTION, SOLUTION INTRAMUSCULAR; INTRAVENOUS
Status: DISCONTINUED | OUTPATIENT
Start: 2018-07-19 | End: 2018-07-19 | Stop reason: HOSPADM

## 2018-07-19 RX ORDER — ATROPINE SULFATE 0.1 MG/ML
0.5 INJECTION INTRAVENOUS
Status: DISCONTINUED | OUTPATIENT
Start: 2018-07-19 | End: 2018-07-19 | Stop reason: HOSPADM

## 2018-07-19 RX ORDER — SODIUM CHLORIDE 9 MG/ML
INJECTION, SOLUTION INTRAVENOUS
Status: DISCONTINUED | OUTPATIENT
Start: 2018-07-19 | End: 2018-07-19 | Stop reason: HOSPADM

## 2018-07-19 RX ORDER — DEXTROMETHORPHAN/PSEUDOEPHED 2.5-7.5/.8
1.2 DROPS ORAL
Status: DISCONTINUED | OUTPATIENT
Start: 2018-07-19 | End: 2018-07-19 | Stop reason: HOSPADM

## 2018-07-19 RX ORDER — MIDAZOLAM HYDROCHLORIDE 1 MG/ML
.25-5 INJECTION, SOLUTION INTRAMUSCULAR; INTRAVENOUS
Status: DISCONTINUED | OUTPATIENT
Start: 2018-07-19 | End: 2018-07-19 | Stop reason: HOSPADM

## 2018-07-19 RX ORDER — SODIUM CHLORIDE 0.9 % (FLUSH) 0.9 %
5-10 SYRINGE (ML) INJECTION EVERY 8 HOURS
Status: DISCONTINUED | OUTPATIENT
Start: 2018-07-19 | End: 2018-07-19 | Stop reason: HOSPADM

## 2018-07-19 RX ORDER — FLUMAZENIL 0.1 MG/ML
0.2 INJECTION INTRAVENOUS
Status: DISCONTINUED | OUTPATIENT
Start: 2018-07-19 | End: 2018-07-19 | Stop reason: HOSPADM

## 2018-07-19 RX ORDER — PROPOFOL 10 MG/ML
INJECTION, EMULSION INTRAVENOUS AS NEEDED
Status: DISCONTINUED | OUTPATIENT
Start: 2018-07-19 | End: 2018-07-19 | Stop reason: HOSPADM

## 2018-07-19 RX ORDER — SODIUM CHLORIDE 0.9 % (FLUSH) 0.9 %
5-10 SYRINGE (ML) INJECTION AS NEEDED
Status: DISCONTINUED | OUTPATIENT
Start: 2018-07-19 | End: 2018-07-19 | Stop reason: HOSPADM

## 2018-07-19 RX ORDER — EPINEPHRINE 0.1 MG/ML
1 INJECTION INTRACARDIAC; INTRAVENOUS
Status: DISCONTINUED | OUTPATIENT
Start: 2018-07-19 | End: 2018-07-19 | Stop reason: HOSPADM

## 2018-07-19 RX ORDER — LIDOCAINE HYDROCHLORIDE 20 MG/ML
INJECTION, SOLUTION EPIDURAL; INFILTRATION; INTRACAUDAL; PERINEURAL AS NEEDED
Status: DISCONTINUED | OUTPATIENT
Start: 2018-07-19 | End: 2018-07-19 | Stop reason: HOSPADM

## 2018-07-19 RX ADMIN — PROPOFOL 40 MG: 10 INJECTION, EMULSION INTRAVENOUS at 14:43

## 2018-07-19 RX ADMIN — PROPOFOL 80 MG: 10 INJECTION, EMULSION INTRAVENOUS at 14:41

## 2018-07-19 RX ADMIN — LIDOCAINE HYDROCHLORIDE 100 MG: 20 INJECTION, SOLUTION EPIDURAL; INFILTRATION; INTRACAUDAL; PERINEURAL at 14:41

## 2018-07-19 RX ADMIN — SODIUM CHLORIDE: 9 INJECTION, SOLUTION INTRAVENOUS at 14:36

## 2018-07-19 NOTE — ANESTHESIA PREPROCEDURE EVALUATION
Anesthetic History   No history of anesthetic complications            Review of Systems / Medical History  Patient summary reviewed, nursing notes reviewed and pertinent labs reviewed    Pulmonary  Within defined limits                 Neuro/Psych   Within defined limits           Cardiovascular  Within defined limits  Hypertension        Dysrhythmias : atrial fibrillation           GI/Hepatic/Renal  Within defined limits   GERD           Endo/Other  Within defined limits      Morbid obesity     Other Findings              Physical Exam    Airway  Mallampati: II  TM Distance: > 6 cm  Neck ROM: normal range of motion   Mouth opening: Normal     Cardiovascular  Regular rate and rhythm,  S1 and S2 normal,  no murmur, click, rub, or gallop             Dental  No notable dental hx    Comments:  Only two lower canines   Pulmonary  Breath sounds clear to auscultation               Abdominal  GI exam deferred       Other Findings            Anesthetic Plan    ASA: 3  Anesthesia type: MAC          Induction: Intravenous  Anesthetic plan and risks discussed with: Patient

## 2018-07-19 NOTE — PROCEDURES
[address]  [address]                  :  Juanita Bentley MD    Referring Provider: Kain Garcia MD    Sedation:  MAC anesthesia Propofol      Prior to the procedure its objectives, risks, consequences and alternatives were discussed with the patient who then elected to proceed. The patient had the opportunity to ask questions and those questions were answered. A physical exam was performed. The heart, lungs, and mental status were examined prior to the procedure and found to be satisfactory for conscious sedation and for the procedure. Conscious sedation was initiated by the physician. Continuous pulse oximetry and blood pressure monitoring were used throughout the procedure. After appropriate pharyngeal anesthesia, the endoscope was passed into the esophagus without difficulty. The proximal esophagus is normal. In the distal esophagus there is grade 1 reflux esophagitis. The scope was then passed into the stomach without difficulty. The fundus is normal.  In the body and antrum there is moderate gastritis. The pylorus, bulb and postbulbar area are unremarkable. On slow withdrawal of the scope, retroflexion confirmed a normal cardia and fundus. She tolerated the procedure without complications and will be discharged later today. Drea test was not done because ,she was on eliquis. Specimen:  None    Complications: None. EBL:  None.     Juanita Bentley MD  7/19/2018 2:46 PM

## 2018-07-19 NOTE — DISCHARGE INSTRUCTIONS
[address]           Allen Prasad  [id number]  1940    EGD DISCHARGE INSTRUCTIONS    Discomfort:  Sore throat- throat lozenges or warm salt water gargle  redness at IV site- apply warm compress to area; if redness or soreness persist- contact your physician  Gaseous discomfort- walking, belching will help relieve any discomfort  You may not operate a vehicle for 12 hours  You may not engage in an occupation involving machinery or appliances for rest of today  You may not drink alcoholic beverages for at least 12 hours  Avoid making any critical decisions for at least 24 hour  DIET  You may have anything by mouth- do not eat or drink for two hours. You may eat and drink after you leave. You may resume your regular diet - however -  remember your colon is empty and a heavy meal will produce gas. Avoid these foods:  vegetables, fried / greasy foods, carbonated drinks        ACTIVITY  You may resume your normal daily activities   Spend the remainder of the day resting -  avoid any strenuous activity. CALL M.D. ANY SIGN OF   Increasing pain, nausea, vomiting  Abdominal distension (swelling)  New increased bleeding (oral or rectal)  Fever (chills)  Pain in chest area  Bloody discharge from nose or mouth  Shortness of breath    Follow-up Instructions:   Call Glorya Sacks for any questions or problems. Telephone # [phone]   Continue same medications.     Impression:  1.- Reflux  2.- Gastritis    Ashleigh Terry MD  7/19/2018  2:49 PM

## 2018-07-19 NOTE — PROGRESS NOTES

## 2018-07-19 NOTE — ROUTINE PROCESS
[place] Dahl  1940  [id number]    Situation:  Verbal report received from: Pacheco Jubilmeaghan  Procedure: Procedure(s):  ESOPHAGOGASTRODUODENOSCOPY (EGD)    Background:    Preoperative diagnosis: ABDOMNAL PAIN  Postoperative diagnosis: 1.- Reflux  2.- Gastritis    :  Dr. Prashanth Brunson  Assistant(s): Endoscopy Technician-1: Damon Blanchard  Endoscopy RN-1: Darcy Peterson RN    Specimens: * No specimens in log *  H. Pylori  no    Assessment:  Intra-procedure medications   Anesthesia gave intra-procedure sedation and medications, see anesthesia flow sheet yes    Intravenous fluids: NS@ KVO     Vital signs stable     Abdominal assessment: round and soft     Recommendation:  Discharge patient per MD order.   Family or Friend Son  Permission to share finding with family or friend yes

## 2018-07-19 NOTE — IP AVS SNAPSHOT
[address] 
[phone] Patient: Mariam Gracia MRN: [id number] LSX:[id number] About your hospitalization You were admitted on:  July 19, 2018 You last received care in the:  [facility] ENDOSCOPY You were discharged on:  July 19, 2018 Why you were hospitalized Your primary diagnosis was:  Not on File Follow-up Information None Your Scheduled Appointments Friday July 20, 2018 12:00 AM EDT  
PT ROUTINE with Unk Herrlich, PT  
[address] ([address]) [address] ([address]) Monday July 23, 2018 To Be Determined PT ROUTINE with Unk Herrlich, PT  
[address] ([address]) [address] ([address]) Tuesday July 24, 2018 To Be Determined ROUTINE with Smoothrufus Mayberry, AUDRAN  
[address] ([address]) [address] ([address]) Wednesday July 25, 2018 To Be Determined PT ROUTINE with Unk Herrlich, PT  
[address] ([address]) [address] ([address]) Monday July 30, 2018 To Be Determined PT ROUTINE with Unk Herrlich, PT  
[address] ([address]) [address] ([address]) Tuesday July 31, 2018 To Be Determined ROUTINE with Smooth Jefe, LPN  
[address] ([address]) [address] ([address]) Wednesday August 01, 2018 To Be Determined PT ROUTINE with Unk Herrlich, PT  
 [address] ([address]) [address] ([address]) Tuesday August 07, 2018 To Be Determined RECERTIFICATION with Aiyana [address] ([address]) [address] ([address]) Tuesday August 07, 2018 To Be Determined SKILLED NURSING SUPERVISORY VIST LPN with Aiyana [address] ([address]) [address] ([address]) Discharge Orders None A check lakisha indicates which time of day the medication should be taken. My Medications CHANGE how you take these medications Instructions Each Dose to Equal  
 Morning Noon Evening Bedtime  
 albuterol-ipratropium 2.5 mg-0.5 mg/3 ml Nebu Commonly known as:  Radha Chakraborty What changed:  reasons to take this Your last dose was: Your next dose is:    
   
   
 3 mL by Nebulization route every four (4) hours as needed. Indications: Reactive Airway Disease ICD: J45.909  
 3 mL CONTINUE taking these medications Instructions Each Dose to Equal  
 Morning Noon Evening Bedtime  
 acetaminophen 500 mg tablet Commonly known as:  TYLENOL Your last dose was: Your next dose is: Take 1,000 mg by mouth daily as needed for Pain. 1000 mg  
    
   
   
   
  
 aspirin delayed-release 81 mg tablet Your last dose was: Your next dose is: Take 1 Tab by mouth daily. 81 mg  
    
   
   
   
  
 dilTIAZem  mg ER capsule Commonly known as:  CARDIZEM CD Your last dose was: Your next dose is: Take 1 Cap by mouth daily. 120 mg  
    
   
   
   
  
 furosemide 40 mg tablet Commonly known as:  LASIX Your last dose was: Your next dose is: Take 1 Tab by mouth daily. 40 mg  
    
   
   
   
  
 levoFLOXacin 250 mg tablet Commonly known as:  Darwin Godinez Your last dose was: Your next dose is: Take 1 Tab by mouth daily. 250 mg Nebulizer & Compressor machine Your last dose was: Your next dose is:    
   
   
 Use as directed  
     
   
   
   
  
 potassium chloride SR 20 mEq tablet Commonly known as:  K-TAB Your last dose was: Your next dose is: Take 1 Tab by mouth two (2) times a day. 20 mEq  
    
   
   
   
  
 rivaroxaban 20 mg Tab tablet Commonly known as:  Jennifer Madden Your last dose was: Your next dose is: Take 1 Tab by mouth daily (with lunch). Indications: PREVENT THROMBOEMBOLISM IN CHRONIC ATRIAL FIBRILLATION  
 20 mg  
    
   
   
   
  
 sotalol 80 mg tablet Commonly known as:  Mark Johnson Your last dose was: Your next dose is: Take 1 Tab by mouth every twelve (12) hours. Indications: PREVENTION OF RECURRENT ATRIAL FIBRILLATION 80 mg  
    
   
   
   
  
 tiotropium 18 mcg inhalation capsule Commonly known as:  [place] Mr. Number Your last dose was: Your next dose is: Take 1 Cap by inhalation daily. 1 Cap Discharge Instructions [address] 
[address] Larence Heimlich [id number] 
1940 EGD DISCHARGE INSTRUCTIONS Discomfort: 
Sore throat- throat lozenges or warm salt water gargle 
redness at IV site- apply warm compress to area; if redness or soreness persist- contact your physician Gaseous discomfort- walking, belching will help relieve any discomfort You may not operate a vehicle for 12 hours You may not engage in an occupation involving machinery or appliances for rest of today You may not drink alcoholic beverages for at least 12 hours Avoid making any critical decisions for at least 24 hour DIET You may have anything by mouth- do not eat or drink for two hours. You may eat and drink after you leave. You may resume your regular diet  however -  remember your colon is empty and a heavy meal will produce gas. Avoid these foods:  vegetables, fried / greasy foods, carbonated drinks ACTIVITY You may resume your normal daily activities Spend the remainder of the day resting -  avoid any strenuous activity. CALL M.D. ANY SIGN OF Increasing pain, nausea, vomiting Abdominal distension (swelling) New increased bleeding (oral or rectal) Fever (chills) Pain in chest area Bloody discharge from nose or mouth Shortness of breath Follow-up Instructions: 
 Call Coni Jade for any questions or problems. Telephone # [phone] Continue same medications. Impression:  1.- Reflux 2.- Gastritis Nelli Rushing MD 
7/19/2018  2:49 PM 
 
 
  
  
  
Introducing [facility]! Avelina Knight introduces [facility] patient portal. Now you can access parts of your medical record, email your doctor's office, and request medication refills online. 1. In your internet browser, go to [email or web address] 2. Click on the First Time User? Click Here link in the Sign In box. You will see the New Member Sign Up page. 3. Enter your [facility] Access Code exactly as it appears below. You will not need to use this code after youve completed the sign-up process. If you do not sign up before the expiration date, you must request a new code. · [facility] Access Code: [email or web address] Expires: 9/25/2018  6:25 AM 
 
4. Enter the last four digits of your Social Security Number (xxxx) and Date of Birth (mm/dd/yyyy) as indicated and click Submit. You will be taken to the next sign-up page. 5. Create a Datam[employer]ot ID.  This will be your [facility] login ID and cannot be changed, so think of one that is secure and easy to remember. 6. Create a Back[employer]/O password. You can change your password at any time. 7. Enter your Password Reset Question and Answer. This can be used at a later time if you forget your password. 8. Enter your e-mail address. You will receive e-mail notification when new information is available in [address] 9. Click Sign Up. You can now view and download portions of your medical record. 10. Click the Download Summary menu link to download a portable copy of your medical information. If you have questions, please visit the Frequently Asked Questions section of the [employer] website. Remember, [employer] is NOT to be used for urgent needs. For medical emergencies, dial 911. Now available from your iPhone and Android! Introducing Ji Serna As a [facility] patient, I wanted to make you aware of our electronic visit tool called Ji Serna. [facility] allows you to connect within minutes with a medical provider 24 hours a day, seven days a week via a mobile device or tablet or logging into a secure website from your computer. You can access Ji Serna from anywhere in the [place]. A virtual visit might be right for you when you have a simple condition and feel like you just dont want to get out of bed, or cant get away from work for an appointment, when your regular [facility] provider is not available (evenings, weekends or holidays), or when youre out of town and need minor care. Electronic visits cost only $49 and if the [facility] provider determines a prescription is needed to treat your condition, one can be electronically transmitted to a nearby pharmacy*. Please take a moment to enroll today if you have not already done so. The enrollment process is free and takes just a few minutes.   To enroll, please download the Cle[employer] jessa to your tablet or phone, or visit [email or web address] to enroll on your computer. And, as an [address] [place] [address] patient with a [employer] account, the results of your visits will be scanned into your electronic medical record and your primary care provider will be able to view the scanned results. We urge you to continue to see your regular [facility] provider for your ongoing medical care. And while your primary care provider may not be the one available when you seek a [facility] virtual visit, the peace of mind you get from getting a real diagnosis real time can be priceless. For more information on [employer], view our Frequently Asked Questions (FAQs) at [email or web address]. Sincerely, 
 
Kierra Staples MD 
Chief Medical Officer [address] *:  certain medications cannot be prescribed via [employer] Providers Seen During Your Hospitalization Provider Specialty Primary office phone Royal Muriel MD Gastroenterology [phone] Your Primary Care Physician (PCP) Primary Care Physician Office Phone Office Fax [address] [phone] You are allergic to the following Allergen Reactions Penicillins Hives Recent Documentation Height Weight Breastfeeding? BMI OB Status Smoking Status 1.753 m 133.8 kg No 43.56 kg/m2 Postmenopausal Former Smoker Emergency Contacts Name Discharge Info Relation Home Work Mobile Nida Butler CAREGIVER [3] Child [2] [phone] Steve Daugherty DISCHARGE CAREGIVER [3] Son [22] [phone] Marcel Daugherty N/A  AT THIS TIME [6] Child [2] [phone] Patient Belongings The following personal items are in your possession at time of discharge: 
  Dental Appliances: Uppers, Lowers  Visual Aid: None Please provide this summary of care documentation to your next provider. Signatures-by signing, you are acknowledging that this After Visit Summary has been reviewed with you and you have received a copy. Patient Signature:  ____________________________________________________________ Date:  ____________________________________________________________  
  
Nay Hero Provider Signature:  ____________________________________________________________ Date:  ____________________________________________________________

## 2018-07-19 NOTE — PROGRESS NOTES
.  Patient has graduated from the Transitions of Care Coordination  program on 7/11/18. Patient's symptoms are stable at this time. Patient/family has the ability to self-manage. Care management goals have been completed at this time. No further nurse navigator follow up scheduled. Pt has nurse navigator's contact information for any further questions, concerns, or needs.   Patients upcoming visits:  Future Appointments  Date Time Provider Rachna Lugo   7/20/2018 12:00 AM SHIELA Chen   7/23/2018 To Be Determined [facility]   7/24/2018 To Be Determined Gaby Rico LPN [facility]   7/25/2018 To Be Determined Jenet Due [place] [address] [facility]   7/30/2018 To Be Determined [facility]   7/31/2018 To Be Determined Gaby Rico LPN [place] [address] [facility]   8/1/2018 To Be Determined Jenet Due [place] [address] [facility]   8/7/2018 To Be Determined Brianteresa Infantetan [facility]   8/7/2018 To Be Determined Brian Fontan [place] [address] [facility]

## 2018-07-19 NOTE — H&P
[address] 
[address] [facility] [address] Larence Heimlich is a  69 y.o.  female who presents with epigastric pain. .   
 
 
Past Medical History:  
Diagnosis Date  Arthritis  Atrial fibrillation ([address]  WHITING (dyspnea on exertion)  GERD (gastroesophageal reflux disease)  Obesity  Other ill-defined conditions(799.89) \"fluid in the lung\" Past Surgical History:  
Procedure Laterality Date  COLONOSCOPY N/A 3/30/2017 COLONOSCOPY performed by [facility]arlene Murdock MD at [facility]  HX HERNIA REPAIR    
 HX ORTHOPAEDIC    
 right and left total knee replacement  HX ORTHOPAEDIC    
 right shoulder  HX OTHER SURGICAL    
 kidney stone surgery  HX OTHER SURGICAL    
 both eyes cataract Allergies Allergen Reactions  Penicillins Hives Current Facility-Administered Medications Medication Dose Route Frequency Provider Last Rate Last Dose  
 0.9% sodium chloride infusion  50 mL/hr IntraVENous CONTINUOUS Blanche Ramirez MD      
 sodium chloride (NS) flush 5-10 mL  5-10 mL IntraVENous Q8H Blanche Ramirez MD      
 sodium chloride (NS) flush 5-10 mL  5-10 mL IntraVENous PRN Blanche Ramirez MD      
 midazolam (VERSED) injection 0.25-5 mg  0.25-5 mg IntraVENous Multiple Blanche Ramirez MD      
 fentaNYL citrate (PF) injection 100 mcg  100 mcg IntraVENous Multiple Blanche Ramirez MD      
 naloxone [facility]) injection 0.4 mg  0.4 mg IntraVENous Multiple Blanche Ramirez MD      
 flumazenil (ROMAZICON) 0.1 mg/mL injection 0.2 mg  0.2 mg IntraVENous Kaity Ramirez MD      
 [facility]) 03MM/1.4PQ oral drops 80 mg  1.2 mL Oral Kaity Ramirez MD      
 atropine injection 0.5 mg  0.5 mg IntraVENous ONCE PRN Blanche Ramirez MD      
 EPINEPHrine (ADRENALIN) 0.1 mg/mL syringe 1 mg  1 mg Endoscopically ONCE PRN Blanche Ramirez MD      
 
Facility-Administered Medications Ordered in Other Encounters Medication Dose Route Frequency Provider Last Rate Last Dose  
 0.9% sodium chloride infusion   IntraVENous CONTINUOUS Aida Varghese, CRNA Visit Vitals  BP (!) 114/95  Pulse (!) 103  Temp 98.2 °F (36.8 °C)  Resp 19  
 Ht 5' 9\" (1.753 m)  Wt 133.8 kg (295 lb)  SpO2 97%  Breastfeeding No  
 BMI 43.56 kg/m2 PHYSICAL EXAM: 
General: WD, WN. Alert, cooperative, no acute distress   
HEENT: NC, Atraumatic. PERRLA, EOMI. Anicteric sclerae. Mallampati score 2 Lungs:  CTA Bilaterally. No Wheezing/Rhonchi/Rales. Heart:  Regular  rhythm,  No murmur (), No Rubs, No Gallops Abdomen: Soft, Non distended, Non tender.  +Bowel sounds, no HSM Extremities: No c/c/e Neurologic:  CN 2-12 gi, Alert and oriented X 3. No acute neurological distress Psych:   Good insight. Not anxious nor agitated. Plan:  
Endoscopic procedure with MAC.  
 
Eliud Almaraz MD 
7/19/2018  2:39 PM

## 2018-07-20 ENCOUNTER — HOME CARE VISIT (OUTPATIENT)
Dept: SCHEDULING | Facility: HOME HEALTH | Age: 78
End: 2018-07-20
Payer: MEDICARE

## 2018-07-20 VITALS
SYSTOLIC BLOOD PRESSURE: 129 MMHG | RESPIRATION RATE: 16 BRPM | HEART RATE: 79 BPM | DIASTOLIC BLOOD PRESSURE: 83 MMHG | TEMPERATURE: 98.1 F | OXYGEN SATURATION: 98 %

## 2018-07-20 PROCEDURE — G0151 HHCP-SERV OF PT,EA 15 MIN: HCPCS

## 2018-07-20 PROCEDURE — 3331090001 HH PPS REVENUE CREDIT

## 2018-07-20 PROCEDURE — 3331090002 HH PPS REVENUE DEBIT

## 2018-07-21 PROCEDURE — 3331090001 HH PPS REVENUE CREDIT

## 2018-07-21 PROCEDURE — 3331090002 HH PPS REVENUE DEBIT

## 2018-07-22 VITALS
RESPIRATION RATE: 18 BRPM | SYSTOLIC BLOOD PRESSURE: 126 MMHG | HEART RATE: 80 BPM | TEMPERATURE: 97.9 F | OXYGEN SATURATION: 99 % | DIASTOLIC BLOOD PRESSURE: 78 MMHG

## 2018-07-22 PROCEDURE — 3331090001 HH PPS REVENUE CREDIT

## 2018-07-22 PROCEDURE — 3331090002 HH PPS REVENUE DEBIT

## 2018-07-23 ENCOUNTER — HOME CARE VISIT (OUTPATIENT)
Dept: SCHEDULING | Facility: HOME HEALTH | Age: 78
End: 2018-07-23
Payer: MEDICARE

## 2018-07-23 PROCEDURE — 3331090002 HH PPS REVENUE DEBIT

## 2018-07-23 PROCEDURE — G0151 HHCP-SERV OF PT,EA 15 MIN: HCPCS

## 2018-07-23 PROCEDURE — 3331090001 HH PPS REVENUE CREDIT

## 2018-07-24 ENCOUNTER — HOME CARE VISIT (OUTPATIENT)
Dept: SCHEDULING | Facility: HOME HEALTH | Age: 78
End: 2018-07-24
Payer: MEDICARE

## 2018-07-24 PROCEDURE — 3331090001 HH PPS REVENUE CREDIT

## 2018-07-24 PROCEDURE — G0299 HHS/HOSPICE OF RN EA 15 MIN: HCPCS

## 2018-07-24 PROCEDURE — 3331090002 HH PPS REVENUE DEBIT

## 2018-07-25 VITALS
RESPIRATION RATE: 16 BRPM | TEMPERATURE: 98.5 F | SYSTOLIC BLOOD PRESSURE: 131 MMHG | OXYGEN SATURATION: 97 % | DIASTOLIC BLOOD PRESSURE: 68 MMHG | HEART RATE: 62 BPM

## 2018-07-25 VITALS
SYSTOLIC BLOOD PRESSURE: 100 MMHG | DIASTOLIC BLOOD PRESSURE: 60 MMHG | OXYGEN SATURATION: 98 % | TEMPERATURE: 97.4 F | HEART RATE: 67 BPM | RESPIRATION RATE: 18 BRPM

## 2018-07-25 PROCEDURE — 3331090001 HH PPS REVENUE CREDIT

## 2018-07-25 PROCEDURE — 3331090002 HH PPS REVENUE DEBIT

## 2018-07-26 PROCEDURE — 3331090001 HH PPS REVENUE CREDIT

## 2018-07-26 PROCEDURE — 3331090002 HH PPS REVENUE DEBIT

## 2018-07-27 ENCOUNTER — HOME CARE VISIT (OUTPATIENT)
Dept: SCHEDULING | Facility: HOME HEALTH | Age: 78
End: 2018-07-27
Payer: MEDICARE

## 2018-07-27 PROCEDURE — 3331090001 HH PPS REVENUE CREDIT

## 2018-07-27 PROCEDURE — G0151 HHCP-SERV OF PT,EA 15 MIN: HCPCS

## 2018-07-27 PROCEDURE — 3331090002 HH PPS REVENUE DEBIT

## 2018-07-28 PROCEDURE — 3331090001 HH PPS REVENUE CREDIT

## 2018-07-28 PROCEDURE — 3331090002 HH PPS REVENUE DEBIT

## 2018-07-29 PROCEDURE — 3331090002 HH PPS REVENUE DEBIT

## 2018-07-29 PROCEDURE — 3331090001 HH PPS REVENUE CREDIT

## 2018-07-30 ENCOUNTER — HOME CARE VISIT (OUTPATIENT)
Dept: SCHEDULING | Facility: HOME HEALTH | Age: 78
End: 2018-07-30
Payer: MEDICARE

## 2018-07-30 VITALS
SYSTOLIC BLOOD PRESSURE: 135 MMHG | TEMPERATURE: 97.6 F | RESPIRATION RATE: 18 BRPM | DIASTOLIC BLOOD PRESSURE: 76 MMHG | OXYGEN SATURATION: 98 % | HEART RATE: 58 BPM

## 2018-07-30 PROCEDURE — 3331090001 HH PPS REVENUE CREDIT

## 2018-07-30 PROCEDURE — G0151 HHCP-SERV OF PT,EA 15 MIN: HCPCS

## 2018-07-30 PROCEDURE — 3331090002 HH PPS REVENUE DEBIT

## 2018-07-31 ENCOUNTER — HOSPITAL ENCOUNTER (EMERGENCY)
Age: 78
Discharge: HOME | End: 2018-07-31
Attending: EMERGENCY MEDICINE
Payer: MEDICARE

## 2018-07-31 ENCOUNTER — APPOINTMENT (OUTPATIENT)
Dept: GENERAL RADIOLOGY | Age: 78
End: 2018-07-31
Attending: EMERGENCY MEDICINE
Payer: MEDICARE

## 2018-07-31 ENCOUNTER — APPOINTMENT (OUTPATIENT)
Dept: CT IMAGING | Age: 78
End: 2018-07-31
Attending: EMERGENCY MEDICINE
Payer: MEDICARE

## 2018-07-31 VITALS
HEART RATE: 100 BPM | SYSTOLIC BLOOD PRESSURE: 122 MMHG | DIASTOLIC BLOOD PRESSURE: 82 MMHG | HEIGHT: 69 IN | BODY MASS INDEX: 43.4 KG/M2 | TEMPERATURE: 97.9 F | WEIGHT: 293 LBS | OXYGEN SATURATION: 99 % | RESPIRATION RATE: 18 BRPM

## 2018-07-31 VITALS
HEART RATE: 62 BPM | DIASTOLIC BLOOD PRESSURE: 69 MMHG | SYSTOLIC BLOOD PRESSURE: 137 MMHG | RESPIRATION RATE: 16 BRPM | TEMPERATURE: 98.7 F | OXYGEN SATURATION: 98 %

## 2018-07-31 DIAGNOSIS — R60.9 PERIPHERAL EDEMA: ICD-10-CM

## 2018-07-31 DIAGNOSIS — H73.92 ABNORMAL TYMPANIC MEMBRANE OF LEFT EAR: ICD-10-CM

## 2018-07-31 DIAGNOSIS — R10.11 ABDOMINAL PAIN, RIGHT UPPER QUADRANT: Primary | ICD-10-CM

## 2018-07-31 LAB
ALBUMIN SERPL-MCNC: 2.9 G/DL (ref 3.5–5)
ALBUMIN/GLOB SERPL: 0.8 {RATIO} (ref 1.1–2.2)
ALP SERPL-CCNC: 77 U/L (ref 45–117)
ALT SERPL-CCNC: 11 U/L (ref 12–78)
ANION GAP SERPL CALC-SCNC: 6 MMOL/L (ref 5–15)
AST SERPL-CCNC: 12 U/L (ref 15–37)
BASOPHILS # BLD: 0 K/UL (ref 0–0.1)
BASOPHILS NFR BLD: 1 % (ref 0–1)
BILIRUB SERPL-MCNC: 1.2 MG/DL (ref 0.2–1)
BUN SERPL-MCNC: 13 MG/DL (ref 6–20)
BUN/CREAT SERPL: 13 (ref 12–20)
CALCIUM SERPL-MCNC: 8.6 MG/DL (ref 8.5–10.1)
CHLORIDE SERPL-SCNC: 106 MMOL/L (ref 97–108)
CO2 SERPL-SCNC: 30 MMOL/L (ref 21–32)
CREAT SERPL-MCNC: 1.02 MG/DL (ref 0.55–1.02)
DIFFERENTIAL METHOD BLD: ABNORMAL
EOSINOPHIL # BLD: 0.1 K/UL (ref 0–0.4)
EOSINOPHIL NFR BLD: 2 % (ref 0–7)
ERYTHROCYTE [DISTWIDTH] IN BLOOD BY AUTOMATED COUNT: 16.2 % (ref 11.5–14.5)
GLOBULIN SER CALC-MCNC: 3.5 G/DL (ref 2–4)
GLUCOSE SERPL-MCNC: 90 MG/DL (ref 65–100)
HCT VFR BLD AUTO: 38.9 % (ref 35–47)
HGB BLD-MCNC: 12.1 G/DL (ref 11.5–16)
IMM GRANULOCYTES # BLD: 0.1 K/UL (ref 0–0.04)
IMM GRANULOCYTES NFR BLD AUTO: 1 % (ref 0–0.5)
LIPASE SERPL-CCNC: 102 U/L (ref 73–393)
LYMPHOCYTES # BLD: 2 K/UL (ref 0.8–3.5)
LYMPHOCYTES NFR BLD: 35 % (ref 12–49)
MCH RBC QN AUTO: 27.3 PG (ref 26–34)
MCHC RBC AUTO-ENTMCNC: 31.1 G/DL (ref 30–36.5)
MCV RBC AUTO: 87.6 FL (ref 80–99)
MONOCYTES # BLD: 0.8 K/UL (ref 0–1)
MONOCYTES NFR BLD: 14 % (ref 5–13)
NEUTS SEG # BLD: 2.7 K/UL (ref 1.8–8)
NEUTS SEG NFR BLD: 48 % (ref 32–75)
NRBC # BLD: 0 K/UL (ref 0–0.01)
NRBC BLD-RTO: 0 PER 100 WBC
PLATELET # BLD AUTO: 203 K/UL (ref 150–400)
PMV BLD AUTO: 10.7 FL (ref 8.9–12.9)
POTASSIUM SERPL-SCNC: 4.2 MMOL/L (ref 3.5–5.1)
PROT SERPL-MCNC: 6.4 G/DL (ref 6.4–8.2)
RBC # BLD AUTO: 4.44 M/UL (ref 3.8–5.2)
SODIUM SERPL-SCNC: 142 MMOL/L (ref 136–145)
WBC # BLD AUTO: 5.7 K/UL (ref 3.6–11)

## 2018-07-31 PROCEDURE — 85025 COMPLETE CBC W/AUTO DIFF WBC: CPT | Performed by: EMERGENCY MEDICINE

## 2018-07-31 PROCEDURE — 3331090002 HH PPS REVENUE DEBIT

## 2018-07-31 PROCEDURE — 93971 EXTREMITY STUDY: CPT

## 2018-07-31 PROCEDURE — 80053 COMPREHEN METABOLIC PANEL: CPT | Performed by: EMERGENCY MEDICINE

## 2018-07-31 PROCEDURE — 36415 COLL VENOUS BLD VENIPUNCTURE: CPT | Performed by: EMERGENCY MEDICINE

## 2018-07-31 PROCEDURE — 74011000258 HC RX REV CODE- 258: Performed by: EMERGENCY MEDICINE

## 2018-07-31 PROCEDURE — 74011636320 HC RX REV CODE- 636/320: Performed by: EMERGENCY MEDICINE

## 2018-07-31 PROCEDURE — 74177 CT ABD & PELVIS W/CONTRAST: CPT

## 2018-07-31 PROCEDURE — 83690 ASSAY OF LIPASE: CPT | Performed by: EMERGENCY MEDICINE

## 2018-07-31 PROCEDURE — 3331090001 HH PPS REVENUE CREDIT

## 2018-07-31 PROCEDURE — 99283 EMERGENCY DEPT VISIT LOW MDM: CPT

## 2018-07-31 PROCEDURE — 71046 X-RAY EXAM CHEST 2 VIEWS: CPT

## 2018-07-31 RX ORDER — SODIUM CHLORIDE 0.9 % (FLUSH) 0.9 %
10 SYRINGE (ML) INJECTION
Status: COMPLETED | OUTPATIENT
Start: 2018-07-31 | End: 2018-07-31

## 2018-07-31 RX ADMIN — SODIUM CHLORIDE 100 ML: 900 INJECTION, SOLUTION INTRAVENOUS at 18:17

## 2018-07-31 RX ADMIN — Medication 10 ML: at 18:17

## 2018-07-31 RX ADMIN — IOPAMIDOL 100 ML: 755 INJECTION, SOLUTION INTRAVENOUS at 18:17

## 2018-07-31 NOTE — ED TRIAGE NOTES
Pt complains of R flank/abd pain and radiates into her back. Pain worsens with cough. Denies hematuria or pain with urination. Pain \"has been for a long time\" but states it is now worse. Also states being seen at ear MD office and was told she has blood or wax in her L ear and was told to have ER look at it.

## 2018-07-31 NOTE — ED PROVIDER NOTES
HPI Comments: 69 y.o. female with past medical history significant for Arthritis, GERD, A. Fib, and Obesity who presents from home with chief complaint of flank pain. Patient was seen by gastroenterologist and \"someone for hearing aid\" prior to arrival, was found to have blood in her left ear, and was referred to [facility] ED for further evaluation. Patient presents to [facility] ED with intermittent, worsening right sided flank pain that radiates to her right back which onset \"a month ago. \" Patient reports aggravation of right flank and back pain with coughing. Upon examination, patient denies any current pain or discomfort. However, patient was found to have bleeding in her left ear. Patient reports accompanying abdominal pain. Patient reports taking Xarelto daily for A. Fib. Patient reports cholecystectomy and hernia repair. Pt denies ear pain, fever, chills, cough, congestion, shortness of breath, chest pain, nausea, vomiting, diarrhea, difficulty with urination or dysuria. There are no other acute medical concerns at this time. PCP: Cayetano Bella MD 
 
Note written by India Conte, as dictated by Samira Segundo MD 7:05 PM  
 
 
The history is provided by the patient. Past Medical History:  
Diagnosis Date  Arthritis  Atrial fibrillation ([address]  WHITING (dyspnea on exertion)  GERD (gastroesophageal reflux disease)  Obesity  Other ill-defined conditions(799.89) \"fluid in the lung\" Past Surgical History:  
Procedure Laterality Date  COLONOSCOPY N/A 3/30/2017 COLONOSCOPY performed by Milla Sandoval MD at [address] [facility]  HX HERNIA REPAIR    
 HX ORTHOPAEDIC    
 right and left total knee replacement  HX ORTHOPAEDIC    
 right shoulder  HX OTHER SURGICAL    
 kidney stone surgery  HX OTHER SURGICAL    
 both eyes cataract Family History:  
Problem Relation Age of Onset  Tuberculosis Mother  Lung Disease Father Social History Social History  Marital status: SINGLE Spouse name: N/A  
 Number of children: N/A  
 Years of education: N/A Occupational History  Not on file. Social History Main Topics  Smoking status: Former Smoker Packs/day: 1.00  Smokeless tobacco: Never Used Comment: quit at age 43,smoked for 16 years  Alcohol use No  
 Drug use: No  
 Sexual activity: Not on file Other Topics Concern  Not on file Social History Narrative ALLERGIES: Penicillins Review of Systems Constitutional: Negative for chills and fever. HENT: Negative for congestion and ear pain. Respiratory: Negative for cough and shortness of breath. Cardiovascular: Negative for chest pain. Gastrointestinal: Positive for abdominal pain. Negative for constipation, diarrhea, nausea and vomiting. Genitourinary: Positive for flank pain. Negative for difficulty urinating and dysuria. Musculoskeletal: Positive for back pain. Neurological: Negative for dizziness and light-headedness. All other systems reviewed and are negative. Vitals:  
 07/31/18 1621 BP: 129/68 Pulse: 74 Resp: 20 Temp: 98 °F (36.7 °C) SpO2: 95% Weight: 136.1 kg (300 lb) Height: 5' 9\" (1.753 m) Physical Exam  
Constitutional: She appears well-developed. No distress. HENT:  
Head: Normocephalic and atraumatic. Bleeding behind left ear drum with no perforation, no infection, no pain Eyes: Pupils are equal, round, and reactive to light. No scleral icterus. Neck: Normal range of motion. Neck supple. Cardiovascular: Normal rate and regular rhythm. Pulmonary/Chest: Effort normal and breath sounds normal.  
Abdominal: Soft. She exhibits no distension. There is no tenderness. There is no rebound and no guarding. Hard area on the right superior aspect of abdomen, feels like ventral hernia that is nonreducible and nontender Musculoskeletal: Normal range of motion. Neurological: She is alert. Skin: Skin is warm and dry. She is not diaphoretic. Psychiatric: She has a normal mood and affect. Her behavior is normal. Thought content normal.  
Nursing note and vitals reviewed. Note written by India Toney, as dictated by Jamaal Canada MD 7:16 PM 
 
 
MDM Number of Diagnoses or Management Options Abdominal pain, right upper quadrant: new and requires workup Abnormal tympanic membrane of left ear: minor Peripheral edema: established and worsening Diagnosis management comments: The patient is resting comfortably and feels better, is alert and in no distress. The repeat examination is unremarkable and benign; in particular, there is no discomfort at McBurney's point. The history, exam, diagnostic testing, and current condition do not suggest acute appendicitis, bowel obstruction, incarcerated hernia, acute cholecystitis, bowel perforation, major gastrointestinal bleeding, severe diverticulitis, sepsis, or other significant pathology to warrant further testing, continued ED treatment, admission, or surgical evaluation at this point. The vital signs have been stable and are within normal limits at this time. The patient does not have uncontrollable pain, intractable vomiting, or other significant symptoms. The patient's condition is stable and appropriate for discharge. The patient will pursue further outpatient evaluation with the primary care physician or other designated or consulting physician as indicated in the discharge instructions. ED Course Procedures XR CHEST PA LAT: Impression: 1. Enlarged cardiac silhouette, patchy airspace disease or atelectasis right base CT ABD PELV W CONT: Impression:  
1. 5.7 x 3.6 x 3.0 cm subcutaneous collection deep to the umbilicus either 
representing a seroma or hematoma or abscess 2. Diverticulosis without evidence of acute diverticulitis 3. Small dermoid left ovary 4. Probable uterine fibroids 5. Post cholecystectomy. Interval enlargement of the common duct 12 mm. Correlate clinically 6. 6 mm nodule right middle lobe. CONSULT NOTE: 
7:30 PM Shantelle Roberts MD spoke with Dr. Stephanie Baker for General Surgery. Discussed available diagnostic tests and clinical findings. Dr. Jose Rabago recommends outpatient follow up for fluid collection, and agrees it is likely unrelated to right sided flank pain. PROGRESS NOTE: 
7:32 PM Provider reviewed labs results and findings with patient. Patient expressed understanding. Patient states she is feeling better and would like to be discharged. PROGRESS NOTE: 
7:55 PM Chest XR shows atelectasis versus airspace disease; however, unlikely pneumonia because no cough, upper respiratory infection, fever, chills, or any symptoms indicative of pneumonia. Patient will follow up with PCP in one to two days to evaluate for any changes in condition 7:57 PM 
Patient's results have been reviewed with them. Patient and/or family have verbally conveyed their understanding and agreement of the patient's signs, symptoms, diagnosis, treatment and prognosis and additionally agree to follow up as recommended or return to the Emergency Room should their condition change prior to follow-up. Discharge instructions have also been provided to the patient with some educational information regarding their diagnosis as well a list of reasons why they would want to return to the ER prior to their follow-up appointment should their condition change.

## 2018-07-31 NOTE — ED NOTES
4:24 PM 
I have evaluated the patient as the Provider in Triage. I have reviewed Her vital signs and the triage nurse assessment. I have talked with the patient and any available family and advised that I am the provider in triage and have ordered the appropriate study to initiate their work up based on the clinical presentation during my assessment. I have advised that the patient will be accommodated in the Main ED as soon as possible. I have also requested to contact the triage nurse or myself immediately if the patient experiences any changes in their condition during this brief waiting period. Martín Mcbride, PA-C Right flank and abdominal pain x 1 month. Pain worsening. No fever. Hx of gallbladder removed. No pain presently

## 2018-07-31 NOTE — CONSULTS
Discussed patient with Dr. Shira Gutierrez. Pt has history of hernia repair and had incidental finding of subcutaneous fluid collection. There is no leukocytosis, fever and patient is asymptomatic. CT scan reviewed. Fluid collection most likely represent seroma. No surgical intervention indicated. Pt may be PO challenged and discharge home to follow-up with General Surgery clinic as needed.

## 2018-07-31 NOTE — PROCEDURES
[facility] 
*** PRELIMINARY REPORT *** Name: Roxanne Beltran 
MRN: [id number] : 1940 [employer] Order #: [id number] [facility] Visit #: [id number] Date: 2018 TYPE OF TEST: Peripheral Venous Testing REASON FOR TEST Pain in limb Left Leg:- 
Deep venous thrombosis:           No 
Superficial venous thrombosis:    No 
Deep venous insufficiency:        Not examined Superficial venous insufficiency: Not examined INTERPRETATION/FINDINGS 
PROCEDURE:  Color duplex ultrasound imaging of lower extremity veins. FINDINGS: 
     Right: The common femoral vein is patent and without evidence of 
thrombus. Phasic flow is observed. This extremity was not otherwise 
evaluated. Left:   The common femoral, deep femoral, femoral, popliteal, 
posterior tibial, peroneal, and great saphenous are patent and without 
 evidence of thrombus;  each is fully compressible and there is no 
narrowing of the flow channel on color Doppler imaging. There is 
limited visualization of the peroneal vein. Phasic flow is observed 
in the common femoral vein. IMPRESSION:  No evidence of left lower extremity vein thrombosis where 
 visualized. ADDITIONAL COMMENTS I have personally reviewed the data relevant to the interpretation of 
this study. TECHNOLOGIST: Lalo Kelley RVT Signed: 2018 05:41 PM

## 2018-08-01 PROCEDURE — 3331090001 HH PPS REVENUE CREDIT

## 2018-08-01 PROCEDURE — 3331090002 HH PPS REVENUE DEBIT

## 2018-08-01 NOTE — CALL BACK NOTE
[facility] Emergency Department Follow Up Call Record Discharged to : Home/Family Home/Home Health/Skilled Facility/Rehab/Assisted Living/Other_Home______ 1) Did you receive your discharge instructions? Yes Patient verified       
2) Do you understand them? Yes With clarificationby this writer. Patient reports no abdominal pain today, no pain in ear or bleeding from the ear. 3) Are you able to follow them? Yes. Also encouraged this patient to discuss follow up needs with designated doctors on AVS  with her son, because he provides transportation and lives with her as well. If NO, what can I clarify for you? Need for follow up with Dr. Frances Berg (surgeon) and PCP and ENT. 4) Do you understand your diagnosis? Yes        
5) Do you know which symptoms should prompt you to call the doctor? Yes    
6) Were you able to fill and  any medications that were prescribed? Not applicable 7) You were prescribed __none_________for ____________________. Common side effects of this medication are____________________. This is not a complete list so please review the forms given from the pharmacy for a complete list. 8) Are there any questions about your medications? No     
 
  
 Have you scheduled any recommended doctors appointments (specialty, PCP) Encouraged this patient to call physicians today for appointments. If NO, what barriers are you encountering (transportation/lost contact info/cost/ 
didnt think necessary/no PCP 
9) If discharged with Home Health, has the agency contacted you to schedule visit? Yes. Scheduled appointments 10) Is there anyone available to help you at home (meals, errands, transportation  [facility], open up to 2018. monitoring) (adult children, neighbors, private duty companions) Yes  Patient has son who assists her.   
6) Are you on a special diet?  No        
If YES, do you understand the requirements for this diet?      
Education provided? 12) If presented with cough, bronchitis, COPD, asthma, is it ok to ask that the 
 respiratory disease management educator call you? N/A     
13)  A) If presented with fall, were you issued an assistive device in the ED Are you using? This patient using assistive device to ambulate B) If given RX for device, have you obtained? Yes Has own device If NO, barriers? C) Therapist recommended: Anita Clifton PT Are you able to implement the suggestions? Yes If NO, barriers to implementation? D) Are you having any difficulties with mobility inside your home?   
 (steps, bed, tub)No 
 If YES, ask if the [facility] PT can contact patient and good time and number? 
14)  At the end of your discharge instructions, there is information about accessing [facility] SERVICES, have you had a chance to review those? No     
 
 Do you have any questions about signing up for this service? NO We encourage our patients to be active participants in their healthcare and this site is one of the ways to do that. It will allow you to access parts of your medical record, email your doctors office, schedule appointments, and request medications refills . 15) Are there any other questions that I can answer for you regarding  
 your Emergency department visit? YES Patient asked if it would be okay for her son to call this writer to discuss                           Discharge information with him as he was not with her in the ED. Estimated Call Time:_____1:14 PM 
______________ Date/Time:_______________

## 2018-08-01 NOTE — ROUTINE PROCESS
MD reviewed discharge instructions with the patient who verbalized understanding and denied having any further questions.

## 2018-08-02 PROCEDURE — 3331090001 HH PPS REVENUE CREDIT

## 2018-08-02 PROCEDURE — 3331090002 HH PPS REVENUE DEBIT

## 2018-08-03 ENCOUNTER — HOME CARE VISIT (OUTPATIENT)
Dept: SCHEDULING | Facility: HOME HEALTH | Age: 78
End: 2018-08-03
Payer: MEDICARE

## 2018-08-03 PROCEDURE — 3331090003 HH PPS REVENUE ADJ

## 2018-08-03 PROCEDURE — 3331090001 HH PPS REVENUE CREDIT

## 2018-08-03 PROCEDURE — G0151 HHCP-SERV OF PT,EA 15 MIN: HCPCS

## 2018-08-03 PROCEDURE — 3331090002 HH PPS REVENUE DEBIT

## 2018-08-04 PROCEDURE — 3331090001 HH PPS REVENUE CREDIT

## 2018-08-04 PROCEDURE — 3331090002 HH PPS REVENUE DEBIT

## 2018-08-05 PROCEDURE — 3331090002 HH PPS REVENUE DEBIT

## 2018-08-05 PROCEDURE — 3331090001 HH PPS REVENUE CREDIT

## 2018-08-06 VITALS
SYSTOLIC BLOOD PRESSURE: 136 MMHG | RESPIRATION RATE: 16 BRPM | DIASTOLIC BLOOD PRESSURE: 75 MMHG | OXYGEN SATURATION: 98 % | HEART RATE: 74 BPM | TEMPERATURE: 98.6 F

## 2018-08-06 PROCEDURE — 3331090001 HH PPS REVENUE CREDIT

## 2018-08-06 PROCEDURE — 3331090002 HH PPS REVENUE DEBIT

## 2018-08-07 ENCOUNTER — OFFICE VISIT (OUTPATIENT)
Dept: SURGERY | Age: 78
End: 2018-08-07

## 2018-08-07 VITALS
DIASTOLIC BLOOD PRESSURE: 80 MMHG | OXYGEN SATURATION: 96 % | SYSTOLIC BLOOD PRESSURE: 122 MMHG | HEART RATE: 72 BPM | TEMPERATURE: 97.5 F | RESPIRATION RATE: 18 BRPM

## 2018-08-07 DIAGNOSIS — S30.1XXS ABDOMINAL WALL SEROMA, SEQUELA: Primary | ICD-10-CM

## 2018-08-07 PROBLEM — E66.01 OBESITY, MORBID (HCC): Status: ACTIVE | Noted: 2018-08-07

## 2018-08-07 PROCEDURE — 3331090002 HH PPS REVENUE DEBIT

## 2018-08-07 PROCEDURE — 3331090001 HH PPS REVENUE CREDIT

## 2018-08-07 RX ORDER — PHENOL/SODIUM PHENOLATE
20 AEROSOL, SPRAY (ML) MUCOUS MEMBRANE DAILY
Qty: 30 TAB | Refills: 2 | Status: SHIPPED | OUTPATIENT
Start: 2018-08-07 | End: 2018-09-06

## 2018-08-07 NOTE — PROGRESS NOTES
1. Have you been to the ER, urgent care clinic since your last visit? Hospitalized since your last visit? Yes- [facility] ER 7/31/18 ear pain per patient and abdominal pain-in [facility] care    2. Have you seen or consulted any other health care providers outside of the [address] [place] [address] [facility] since your last visit? Include any pap smears or colon screening. No     Patient comes in today in a wheelchair and her son is with her.

## 2018-08-08 PROCEDURE — 3331090001 HH PPS REVENUE CREDIT

## 2018-08-08 PROCEDURE — 3331090002 HH PPS REVENUE DEBIT

## 2018-08-09 PROCEDURE — 3331090001 HH PPS REVENUE CREDIT

## 2018-08-09 PROCEDURE — 3331090002 HH PPS REVENUE DEBIT

## 2018-08-10 PROCEDURE — 3331090002 HH PPS REVENUE DEBIT

## 2018-08-10 PROCEDURE — 3331090001 HH PPS REVENUE CREDIT

## 2018-08-11 PROCEDURE — 3331090002 HH PPS REVENUE DEBIT

## 2018-08-11 PROCEDURE — 3331090003 HH PPS REVENUE ADJ

## 2018-08-11 PROCEDURE — 3331090001 HH PPS REVENUE CREDIT

## 2018-09-06 ENCOUNTER — PATIENT OUTREACH (OUTPATIENT)
Dept: FAMILY MEDICINE CLINIC | Age: 78
End: 2018-09-06

## 2018-09-06 NOTE — PROGRESS NOTES
. 
Patient has graduated from the Transitions of Care Coordination  program on 8/30/18. Patient's symptoms are stable at this time. Patient/family has the ability to self-manage. Care management goals have been completed at this time. No further nurse navigator follow up scheduled. Goals Addressed None Pt has nurse navigator's contact information for any further questions, concerns, or needs. Patients upcoming visits:  No future appointments.

## 2018-12-04 ENCOUNTER — APPOINTMENT (OUTPATIENT)
Dept: GENERAL RADIOLOGY | Age: 78
End: 2018-12-04
Attending: EMERGENCY MEDICINE
Payer: MEDICARE

## 2018-12-04 ENCOUNTER — HOSPITAL ENCOUNTER (EMERGENCY)
Age: 78
Discharge: HOME | End: 2018-12-04
Attending: EMERGENCY MEDICINE
Payer: MEDICARE

## 2018-12-04 VITALS
DIASTOLIC BLOOD PRESSURE: 66 MMHG | BODY MASS INDEX: 43.4 KG/M2 | OXYGEN SATURATION: 98 % | HEIGHT: 69 IN | SYSTOLIC BLOOD PRESSURE: 114 MMHG | HEART RATE: 75 BPM | TEMPERATURE: 98.5 F | RESPIRATION RATE: 18 BRPM | WEIGHT: 293 LBS

## 2018-12-04 DIAGNOSIS — R07.89 MUSCULOSKELETAL CHEST PAIN: Primary | ICD-10-CM

## 2018-12-04 LAB
ALBUMIN SERPL-MCNC: 3 G/DL (ref 3.5–5)
ALBUMIN/GLOB SERPL: 0.8 {RATIO} (ref 1.1–2.2)
ALP SERPL-CCNC: 76 U/L (ref 45–117)
ALT SERPL-CCNC: 11 U/L (ref 12–78)
ANION GAP SERPL CALC-SCNC: 7 MMOL/L (ref 5–15)
AST SERPL-CCNC: 13 U/L (ref 15–37)
BASOPHILS # BLD: 0 K/UL (ref 0–0.1)
BASOPHILS NFR BLD: 1 % (ref 0–1)
BILIRUB SERPL-MCNC: 0.9 MG/DL (ref 0.2–1)
BUN SERPL-MCNC: 14 MG/DL (ref 6–20)
BUN/CREAT SERPL: 13 (ref 12–20)
CALCIUM SERPL-MCNC: 8.6 MG/DL (ref 8.5–10.1)
CHLORIDE SERPL-SCNC: 105 MMOL/L (ref 97–108)
CO2 SERPL-SCNC: 30 MMOL/L (ref 21–32)
COMMENT, HOLDF: NORMAL
CREAT SERPL-MCNC: 1.12 MG/DL (ref 0.55–1.02)
DIFFERENTIAL METHOD BLD: ABNORMAL
EOSINOPHIL # BLD: 0.1 K/UL (ref 0–0.4)
EOSINOPHIL NFR BLD: 2 % (ref 0–7)
ERYTHROCYTE [DISTWIDTH] IN BLOOD BY AUTOMATED COUNT: 15.9 % (ref 11.5–14.5)
GLOBULIN SER CALC-MCNC: 3.7 G/DL (ref 2–4)
GLUCOSE SERPL-MCNC: 86 MG/DL (ref 65–100)
HCT VFR BLD AUTO: 42.8 % (ref 35–47)
HGB BLD-MCNC: 13.3 G/DL (ref 11.5–16)
IMM GRANULOCYTES # BLD: 0.1 K/UL (ref 0–0.04)
IMM GRANULOCYTES NFR BLD AUTO: 1 % (ref 0–0.5)
LIPASE SERPL-CCNC: 90 U/L (ref 73–393)
LYMPHOCYTES # BLD: 2 K/UL (ref 0.8–3.5)
LYMPHOCYTES NFR BLD: 33 % (ref 12–49)
MCH RBC QN AUTO: 26.2 PG (ref 26–34)
MCHC RBC AUTO-ENTMCNC: 31.1 G/DL (ref 30–36.5)
MCV RBC AUTO: 84.3 FL (ref 80–99)
MONOCYTES # BLD: 0.8 K/UL (ref 0–1)
MONOCYTES NFR BLD: 13 % (ref 5–13)
NEUTS SEG # BLD: 3 K/UL (ref 1.8–8)
NEUTS SEG NFR BLD: 51 % (ref 32–75)
NRBC # BLD: 0 K/UL (ref 0–0.01)
NRBC BLD-RTO: 0 PER 100 WBC
PLATELET # BLD AUTO: 190 K/UL (ref 150–400)
PMV BLD AUTO: 10.6 FL (ref 8.9–12.9)
POTASSIUM SERPL-SCNC: 3.7 MMOL/L (ref 3.5–5.1)
PROT SERPL-MCNC: 6.7 G/DL (ref 6.4–8.2)
RBC # BLD AUTO: 5.08 M/UL (ref 3.8–5.2)
SAMPLES BEING HELD,HOLD: NORMAL
SODIUM SERPL-SCNC: 142 MMOL/L (ref 136–145)
TROPONIN I SERPL-MCNC: <0.05 NG/ML
WBC # BLD AUTO: 6 K/UL (ref 3.6–11)

## 2018-12-04 PROCEDURE — 85025 COMPLETE CBC W/AUTO DIFF WBC: CPT

## 2018-12-04 PROCEDURE — 83690 ASSAY OF LIPASE: CPT

## 2018-12-04 PROCEDURE — 74011250637 HC RX REV CODE- 250/637: Performed by: EMERGENCY MEDICINE

## 2018-12-04 PROCEDURE — 80053 COMPREHEN METABOLIC PANEL: CPT

## 2018-12-04 PROCEDURE — 36415 COLL VENOUS BLD VENIPUNCTURE: CPT

## 2018-12-04 PROCEDURE — 71046 X-RAY EXAM CHEST 2 VIEWS: CPT

## 2018-12-04 PROCEDURE — 99283 EMERGENCY DEPT VISIT LOW MDM: CPT

## 2018-12-04 PROCEDURE — 84484 ASSAY OF TROPONIN QUANT: CPT

## 2018-12-04 PROCEDURE — 93005 ELECTROCARDIOGRAM TRACING: CPT

## 2018-12-04 RX ORDER — ACETAMINOPHEN 500 MG
1000 TABLET ORAL
Status: COMPLETED | OUTPATIENT
Start: 2018-12-04 | End: 2018-12-04

## 2018-12-04 RX ADMIN — ACETAMINOPHEN 1000 MG: 500 TABLET ORAL at 21:47

## 2018-12-04 NOTE — ED TRIAGE NOTES
[facility] Pt visited for chest pain substernal. Pt has hx of afib but [facility] saw \"aflutter in v1\" . [facility] gave 325 aspirin. Pt denies N/V. Pt [facility], but denies pain at this time

## 2018-12-05 LAB
ATRIAL RATE: 394 BPM
CALCULATED T AXIS, ECG11: 17 DEGREES
DIAGNOSIS, 93000: NORMAL
Q-T INTERVAL, ECG07: 376 MS
QRS DURATION, ECG06: 84 MS
QTC CALCULATION (BEZET), ECG08: 406 MS
VENTRICULAR RATE, ECG03: 70 BPM

## 2018-12-05 NOTE — ED NOTES
MD reviewed discharge instructions with the patient. The patient verbalized understanding. Patient was wheeled out of the emergency department by family. Patient is free of pain. Patient is in no apparent distress.

## 2018-12-05 NOTE — ED PROVIDER NOTES
69 y.o. female with past medical history significant for GERD, obesity, and atrial fibrillation who presents from [facility] via EMS with chief complaint of chest pain. Pt reports onset of left sided chest pain this morning at 0300 with some associate fluttering. However, pt reports this episode resolved and has only had some brief episodes of sharp left substernal pain when she moves. For example, pt reports having a brief episode when reaching down to tie her shoes earlier today. Pt reports she went to [facility] and was referred here because her EKG showed \"affluter in V1.\" Pt states she was given 325 mg of aspirin prior to leaving. Of note, pt reports she is currently on Xarelto for her history of afib. Pt notes that she has chronic shortness of breath and it is not worse today. Pt denies any recent heavy lifting. Pt denies any history of MI, stents, or diabetes. Pt denies any nausea, vomiting, diaphoresis, or lightheadedness. There are no other acute medical concerns at this time. Social hx - Tobacco use: former smoker, Alcohol Use: none PCP: Bill Sloan MD 
 
Note written by India Hurley, as dictated by Gasper Bach DO 6:45 PM. The history is provided by the patient. No  was used. Past Medical History:  
Diagnosis Date  Arthritis  Atrial fibrillation ([address]  WHITNIG (dyspnea on exertion)  GERD (gastroesophageal reflux disease)  Obesity  Other ill-defined conditions(799.89) \"fluid in the lung\" Past Surgical History:  
Procedure Laterality Date  COLONOSCOPY N/A 3/30/2017 COLONOSCOPY performed by Juan Crawford MD at [facility]  HX HERNIA REPAIR    
 HX ORTHOPAEDIC    
 right and left total knee replacement  HX ORTHOPAEDIC    
 right shoulder  HX OTHER SURGICAL    
 kidney stone surgery  HX OTHER SURGICAL    
 both eyes cataract Family History: Problem Relation Age of Onset  Tuberculosis Mother  Lung Disease Father Social History Socioeconomic History  Marital status: SINGLE Spouse name: Not on file  Number of children: Not on file  Years of education: Not on file  Highest education level: Not on file Social Needs  Financial resource strain: Not on file  Food insecurity - worry: Not on file  Food insecurity - inability: Not on file  Transportation needs - medical: Not on file  Transportation needs - non-medical: Not on file Occupational History  Not on file Tobacco Use  Smoking status: Former Smoker Packs/day: 1.00  Smokeless tobacco: Never Used  Tobacco comment: quit at age 43,smoked for 17 years Substance and Sexual Activity  Alcohol use: No  
 Drug use: No  
 Sexual activity: Not on file Other Topics Concern  Not on file Social History Narrative  Not on file ALLERGIES: Penicillins Review of Systems Constitutional: Negative for activity change, appetite change, chills and fever. HENT: Negative for congestion, rhinorrhea, sinus pressure, sneezing and sore throat. Eyes: Negative for photophobia and visual disturbance. Respiratory: Positive for shortness of breath. Negative for cough. Cardiovascular: Positive for chest pain. Gastrointestinal: Negative for abdominal pain, blood in stool, constipation, diarrhea, nausea and vomiting. Genitourinary: Negative for difficulty urinating, dysuria, flank pain, frequency, hematuria, menstrual problem, urgency, vaginal bleeding and vaginal discharge. Musculoskeletal: Negative for arthralgias, back pain, myalgias and neck pain. Skin: Negative for rash and wound. Neurological: Negative for syncope, weakness, numbness and headaches. Psychiatric/Behavioral: Negative for self-injury and suicidal ideas. All other systems reviewed and are negative. Vitals:  
 12/04/18 1824 BP: (!) 133/95 Pulse: 82 Resp: 18 Temp: 98.7 °F (37.1 °C) SpO2: 96% Weight: 147.6 kg (325 lb 6.4 oz) Height: 5' 9\" (1.753 m) Physical Exam  
Constitutional: She is oriented to person, place, and time. She appears well-developed and well-nourished. No distress. Well-appearing female who appears younger than stated age and is quite pleasant. NAD. HENT:  
Head: Normocephalic and atraumatic. Eyes: Conjunctivae and EOM are normal. Pupils are equal, round, and reactive to light. Neck: Neck supple. Cardiovascular: Normal rate and normal heart sounds. An irregularly irregular rhythm present. Pulmonary/Chest: Effort normal and breath sounds normal.  
Tenderness to palpation in anterior chest.  
Abdominal: Soft. She exhibits no distension. There is no tenderness. Musculoskeletal: She exhibits edema. 1+ pitting edema bilaterally. No tenderness. Neurological: She is alert and oriented to person, place, and time. Skin: Skin is warm and dry. She is not diaphoretic. Nursing note and vitals reviewed. Note written by India Baugh, as dictated by Alexis Johnson, DO 6:45 PM. MDM 
  69 y.o. female presents with chest tenderness x 1 day. Exam as above suggestive of MSK chest pain. Known a. Fib but rate controlled and on appropriate anticoagulation. She was given a dose of tylenol and had improvement in her sx. CXR was viewed by myself and read by radiology showing no acute abnormalities. Labs were drawn and returned showing no significant abnormalities, neg troponin. Given reassuring exam, hx of constant pain all day worse with palpation and movement I have a low suspicion for ACS. The patient declines staying for repeat troponin. She was rec'd rest, stretching and OTC pain medications with close PCP follow up. Strict return precautions were given for worsening or concerns.  This was discussed with the patient and her family at the bedside and they stated both understanding and agreement. Procedures ED EKG interpretation: 
Rhythm: atrial fib; and irregular. Rate (approx.): 70 bpm; ST/T wave: No acute ST elevations or depressions; nonspecific T wave flattening anterolaterally Note written by India Fox, as dictated by Mckayla Doherty DO 8:50 PM.

## 2018-12-05 NOTE — CALL BACK NOTE
[facility] Emergency Department Follow Up Call Record Discharged to : Home/Family Home/Home Health/Skilled Facility/Rehab/Assisted Living/Other__Home_____ 1) Did you receive your discharge instructions? Yes       
2) Do you understand them? Yes        
3) Are you able to follow them? Yes If NO, what can I clarify for you? 4) Do you understand your diagnosis? Yes        
5) Do you know which symptoms should prompt you to call the doctor? Yes    
6) Were you able to fill and  any medications that were prescribed? Not applicable 7) You were prescribed ___________for ____________________. Common side effects of this medication are____________________. This is not a complete list so please review the forms given from the pharmacy for a complete list. 8) Are there any questions about your medications? No     
 
  
 Have you scheduled any recommended doctors appointments (specialty, PCP) No appointment date as of yet. This writer encouraged patient to follow up with PCP in one week. If NO, what barriers are you encountering (transportation/lost contact info/cost/ 
9) didnt think necessary/no PCP No barriers reported. If discharged with Home Health, has the agency contacted you to schedule visit? Not applicable 10) Is there anyone available to help you at home (meals, errands, transportation   
monitoring) (adult children, neighbors, private duty companions) Yes   
11) Are you on a special diet? No        
If YES, do you understand the requirements for this diet? Education provided? 12) If presented with cough, bronchitis, COPD, asthma, is it ok to ask that the 
 respiratory disease management educator call you? Not applicable 13)  A) If presented with fall, were you issued an assistive device in the ED Are you using? Not applicable B) If given RX for device, have you obtained? Not applicable If NO, barriers? C) Therapist recommended: Are you able to implement the suggestions? Not applicable If NO, barriers to implementation? D) Are you having any difficulties with mobility inside your home?   
 (steps, bed, tub)No 
 If YES, ask if the [facility] PT can contact patient and good time and number? 
14)  At the end of your discharge instructions, there is information about accessing [facility], have you had a chance to review those? No     
 
 Do you have any questions about signing up for this service? No  
We encourage our patients to be active participants in their healthcare and this site is one of the ways to do that. It will allow you to access parts of your medical record, email your doctors office, schedule appointments, and request medications refills . 15) Are there any other questions that I can answer for you regarding  
 your Emergency department visit? NO Estimated Call Time:___2:00 PM 
________________ Date/Time:_______________

## 2018-12-21 NOTE — CARDIO/PULMONARY
Cardiac Rehab: Heart Failure education folder,  given to Maurice. Met with the pt. and her son to provide HF education. She recently stopped her meds which led to uncontrolled HTN and atrial fib with RVR. Dr Yanna Andrews saw today. Educated using teach back method. Discussed diagnosis definition and assessed patient understanding. Reviewed importance of daily weight monitoring and Low Sodium diet (8401-9874 mg. Daily). Weights since admit were documented on her HF calender. Reviewed the use of the HF magnet and signs and symptoms to notify Dr Yanna Andrews for. Encouraged activity and rest periods within symptom limitations and as ordered by physician. Reviewed lasix and digoxin, purpose of medication, potential side effects, compliance, and what to do if dose is missed. Discussed importance of reporting signs and symptoms of exacerbation, and when to report them to the doctor, to prevent re-hospitalization. Maurice was encouraged to keep all appointments with doctor. Smoking history assessed. Patient is a former smoker. Maurice and her son both acknowledge that she cooks with too much salt. Advised to keep a log log after discharge to track daily sodium intake so changes can be made. She also admits to using salt at the table.       Sara Garber RN    Mauriec could benefit from further education on the following HF topics: medications [Restricted in physically strenuous activity but ambulatory and able to carry out work of a light or sedentary nature] : Status 1- Restricted in physically strenuous activity but ambulatory and able to carry out work of a light or sedentary nature, e.g., light house work, office work [Normal] : RRR, normal S1S2, no murmurs, rubs, gallops

## 2019-03-03 ENCOUNTER — HOSPITAL ENCOUNTER (EMERGENCY)
Age: 79
Discharge: HOME | End: 2019-03-03
Attending: EMERGENCY MEDICINE | Admitting: EMERGENCY MEDICINE
Payer: MEDICARE

## 2019-03-03 ENCOUNTER — APPOINTMENT (OUTPATIENT)
Dept: GENERAL RADIOLOGY | Age: 79
End: 2019-03-03
Attending: EMERGENCY MEDICINE
Payer: MEDICARE

## 2019-03-03 VITALS
OXYGEN SATURATION: 96 % | HEART RATE: 82 BPM | TEMPERATURE: 97.8 F | DIASTOLIC BLOOD PRESSURE: 89 MMHG | RESPIRATION RATE: 21 BRPM | SYSTOLIC BLOOD PRESSURE: 143 MMHG

## 2019-03-03 DIAGNOSIS — J20.9 ACUTE BRONCHITIS, UNSPECIFIED ORGANISM: Primary | ICD-10-CM

## 2019-03-03 LAB
ALBUMIN SERPL-MCNC: 3 G/DL (ref 3.5–5)
ALBUMIN/GLOB SERPL: 0.7 {RATIO} (ref 1.1–2.2)
ALP SERPL-CCNC: 98 U/L (ref 45–117)
ALT SERPL-CCNC: 17 U/L (ref 12–78)
ANION GAP SERPL CALC-SCNC: 8 MMOL/L (ref 5–15)
AST SERPL-CCNC: 30 U/L (ref 15–37)
BASOPHILS # BLD: 0 K/UL (ref 0–0.1)
BASOPHILS NFR BLD: 1 % (ref 0–1)
BILIRUB SERPL-MCNC: 0.9 MG/DL (ref 0.2–1)
BNP SERPL-MCNC: 637 PG/ML (ref 0–450)
BUN SERPL-MCNC: 14 MG/DL (ref 6–20)
BUN/CREAT SERPL: 13 (ref 12–20)
CALCIUM SERPL-MCNC: 8.4 MG/DL (ref 8.5–10.1)
CHLORIDE SERPL-SCNC: 106 MMOL/L (ref 97–108)
CO2 SERPL-SCNC: 27 MMOL/L (ref 21–32)
COMMENT, HOLDF: NORMAL
CREAT SERPL-MCNC: 1.05 MG/DL (ref 0.55–1.02)
DIFFERENTIAL METHOD BLD: ABNORMAL
EOSINOPHIL # BLD: 0.1 K/UL (ref 0–0.4)
EOSINOPHIL NFR BLD: 3 % (ref 0–7)
ERYTHROCYTE [DISTWIDTH] IN BLOOD BY AUTOMATED COUNT: 17.7 % (ref 11.5–14.5)
FLUAV AG NPH QL IA: NEGATIVE
FLUBV AG NOSE QL IA: NEGATIVE
GLOBULIN SER CALC-MCNC: 4.4 G/DL (ref 2–4)
GLUCOSE SERPL-MCNC: 81 MG/DL (ref 65–100)
HCT VFR BLD AUTO: 45.5 % (ref 35–47)
HGB BLD-MCNC: 14.1 G/DL (ref 11.5–16)
IMM GRANULOCYTES # BLD AUTO: 0.1 K/UL (ref 0–0.04)
IMM GRANULOCYTES NFR BLD AUTO: 2 % (ref 0–0.5)
LYMPHOCYTES # BLD: 2 K/UL (ref 0.8–3.5)
LYMPHOCYTES NFR BLD: 46 % (ref 12–49)
MCH RBC QN AUTO: 26.6 PG (ref 26–34)
MCHC RBC AUTO-ENTMCNC: 31 G/DL (ref 30–36.5)
MCV RBC AUTO: 85.8 FL (ref 80–99)
MONOCYTES # BLD: 0.8 K/UL (ref 0–1)
MONOCYTES NFR BLD: 18 % (ref 5–13)
NEUTS SEG # BLD: 1.4 K/UL (ref 1.8–8)
NEUTS SEG NFR BLD: 32 % (ref 32–75)
NRBC # BLD: 0 K/UL (ref 0–0.01)
NRBC BLD-RTO: 0 PER 100 WBC
PLATELET # BLD AUTO: 178 K/UL (ref 150–400)
PMV BLD AUTO: 10.7 FL (ref 8.9–12.9)
POTASSIUM SERPL-SCNC: 5.1 MMOL/L (ref 3.5–5.1)
PROT SERPL-MCNC: 7.4 G/DL (ref 6.4–8.2)
RBC # BLD AUTO: 5.3 M/UL (ref 3.8–5.2)
SAMPLES BEING HELD,HOLD: NORMAL
SODIUM SERPL-SCNC: 141 MMOL/L (ref 136–145)
TROPONIN I SERPL-MCNC: <0.05 NG/ML
WBC # BLD AUTO: 4.4 K/UL (ref 3.6–11)

## 2019-03-03 PROCEDURE — 74011250636 HC RX REV CODE- 250/636: Performed by: EMERGENCY MEDICINE

## 2019-03-03 PROCEDURE — 99284 EMERGENCY DEPT VISIT MOD MDM: CPT

## 2019-03-03 PROCEDURE — 74011000250 HC RX REV CODE- 250: Performed by: EMERGENCY MEDICINE

## 2019-03-03 PROCEDURE — 71046 X-RAY EXAM CHEST 2 VIEWS: CPT

## 2019-03-03 PROCEDURE — 83880 ASSAY OF NATRIURETIC PEPTIDE: CPT

## 2019-03-03 PROCEDURE — 84484 ASSAY OF TROPONIN QUANT: CPT

## 2019-03-03 PROCEDURE — 96374 THER/PROPH/DIAG INJ IV PUSH: CPT

## 2019-03-03 PROCEDURE — 93005 ELECTROCARDIOGRAM TRACING: CPT

## 2019-03-03 PROCEDURE — 80053 COMPREHEN METABOLIC PANEL: CPT

## 2019-03-03 PROCEDURE — 85025 COMPLETE CBC W/AUTO DIFF WBC: CPT

## 2019-03-03 PROCEDURE — 94640 AIRWAY INHALATION TREATMENT: CPT

## 2019-03-03 PROCEDURE — 36415 COLL VENOUS BLD VENIPUNCTURE: CPT

## 2019-03-03 PROCEDURE — 87804 INFLUENZA ASSAY W/OPTIC: CPT

## 2019-03-03 RX ORDER — ALBUTEROL SULFATE 0.83 MG/ML
SOLUTION RESPIRATORY (INHALATION)
Status: DISCONTINUED
Start: 2019-03-03 | End: 2019-03-03 | Stop reason: HOSPADM

## 2019-03-03 RX ORDER — IPRATROPIUM BROMIDE AND ALBUTEROL SULFATE 2.5; .5 MG/3ML; MG/3ML
SOLUTION RESPIRATORY (INHALATION)
Status: DISCONTINUED
Start: 2019-03-03 | End: 2019-03-03 | Stop reason: HOSPADM

## 2019-03-03 RX ORDER — METHYLPREDNISOLONE 4 MG/1
TABLET ORAL
Qty: 1 DOSE PACK | Refills: 0 | Status: SHIPPED | OUTPATIENT
Start: 2019-03-03 | End: 2019-10-09

## 2019-03-03 RX ORDER — AZITHROMYCIN 250 MG/1
TABLET, FILM COATED ORAL
Qty: 6 TAB | Refills: 0 | Status: ON HOLD | OUTPATIENT
Start: 2019-03-03 | End: 2019-10-09

## 2019-03-03 RX ADMIN — METHYLPREDNISOLONE SODIUM SUCCINATE 125 MG: 125 INJECTION, POWDER, FOR SOLUTION INTRAMUSCULAR; INTRAVENOUS at 17:28

## 2019-03-03 RX ADMIN — ALBUTEROL SULFATE 1 DOSE: 2.5 SOLUTION RESPIRATORY (INHALATION) at 17:10

## 2019-03-03 NOTE — ED TRIAGE NOTES
Triage: Pt arrives from home with c/o wheezing, SOB, and cough for three days. Patient states that she has some edema in legs and has not used her nebs at home. Patient denies fever, COPD, chest pain and asthma.

## 2019-03-03 NOTE — DISCHARGE INSTRUCTIONS
Patient Education        Bronchitis: Care Instructions  Your Care Instructions    Bronchitis is inflammation of the bronchial tubes, which carry air to the lungs. The tubes swell and produce mucus, or phlegm. The mucus and inflamed bronchial tubes make you cough. You may have trouble breathing. Most cases of bronchitis are caused by viruses like those that cause colds. Antibiotics usually do not help and they may be harmful. Bronchitis usually develops rapidly and lasts about 2 to 3 weeks in otherwise healthy people. Follow-up care is a key part of your treatment and safety. Be sure to make and go to all appointments, and call your doctor if you are having problems. It's also a good idea to know your test results and keep a list of the medicines you take. How can you care for yourself at home? · Take all medicines exactly as prescribed. Call your doctor if you think you are having a problem with your medicine. · Get some extra rest.  · Take an over-the-counter pain medicine, such as acetaminophen (Tylenol), ibuprofen (Advil, Motrin), or naproxen (Aleve) to reduce fever and relieve body aches. Read and follow all instructions on the label. · Do not take two or more pain medicines at the same time unless the doctor told you to. Many pain medicines have acetaminophen, which is Tylenol. Too much acetaminophen (Tylenol) can be harmful. · Take an over-the-counter cough medicine that contains dextromethorphan to help quiet a dry, hacking cough so that you can sleep. Avoid cough medicines that have more than one active ingredient. Read and follow all instructions on the label. · Breathe moist air from a humidifier, hot shower, or sink filled with hot water. The heat and moisture will thin mucus so you can cough it out. · Do not smoke. Smoking can make bronchitis worse. If you need help quitting, talk to your doctor about stop-smoking programs and medicines.  These can increase your chances of quitting for good.  When should you call for help? Call 911 anytime you think you may need emergency care. For example, call if:    · You have severe trouble breathing.    Call your doctor now or seek immediate medical care if:    · You have new or worse trouble breathing.     · You cough up dark brown or bloody mucus (sputum).     · You have a new or higher fever.     · You have a new rash.    Watch closely for changes in your health, and be sure to contact your doctor if:    · You cough more deeply or more often, especially if you notice more mucus or a change in the color of your mucus.     · You are not getting better as expected. Where can you learn more? Go to http://chela-montse.info/. Enter H333 in the search box to learn more about \"Bronchitis: Care Instructions. \"  Current as of: September 5, 2018  Content Version: 11.9  © 1234-1335 [employer]. Care instructions adapted under license by [employer] (which disclaims liability or warranty for this information). If you have questions about a medical condition or this instruction, always ask your healthcare professional. [address] any warranty or liability for your use of this information.

## 2019-03-03 NOTE — ED PROVIDER NOTES
67 y.o. female with past medical history significant for Arthritis, GERD, A-fib, Obesity, and WHITING who presents from home via private vehicle with chief complaint of SOB. Pt reports onset \"2-3 days ago\" of worsening SOB accompanied by wheezing, non productive cough, and leg swelling. Pt reports previous history of current symptoms stating, \"I had a fluid build-up in my lungs\". Pt reports reports recent ill contacts and states, \"my great grandson coughed in my face last week, and I got a cold\". Pt also notes occasional palpitations. Pt denies any fever, chills, abdominal pain or urinary and bowel changes. There are no other acute medical concerns at this time. Old Chart Review: Pt was last seen in ED on 12/4/18 for chest pain. Chest XRay showed no acute cardiopulmonary process and pt's troponin was negative. Pt was given acetaminophen, was discharged home and recommended to follow up with PCP within 1 week. Social hx: Former smoker (1 pck/day); No EtOH use PCP: Filiberto Roca MD 
 
Note written by India Fernando, as dictated by Richard Monzon MD 4:57 PM  
 
 
 
The history is provided by the patient and medical records. No  was used. Past Medical History:  
Diagnosis Date  Arthritis  Atrial fibrillation ([address]  WHITING (dyspnea on exertion)  GERD (gastroesophageal reflux disease)  Obesity  Other ill-defined conditions(799.89) \"fluid in the lung\" Past Surgical History:  
Procedure Laterality Date  COLONOSCOPY N/A 3/30/2017 COLONOSCOPY performed by Hamzah Lundberg MD at [address] [place] [address]  HX HERNIA REPAIR    
 HX ORTHOPAEDIC    
 right and left total knee replacement  HX ORTHOPAEDIC    
 right shoulder  HX OTHER SURGICAL    
 kidney stone surgery  HX OTHER SURGICAL    
 both eyes cataract Family History:  
Problem Relation Age of Onset  Tuberculosis Mother  Lung Disease Father Social History Socioeconomic History  Marital status: SINGLE Spouse name: Not on file  Number of children: Not on file  Years of education: Not on file  Highest education level: Not on file Social Needs  Financial resource strain: Not on file  Food insecurity - worry: Not on file  Food insecurity - inability: Not on file  Transportation needs - medical: Not on file  Transportation needs - non-medical: Not on file Occupational History  Not on file Tobacco Use  Smoking status: Former Smoker Packs/day: 1.00  Smokeless tobacco: Never Used  Tobacco comment: quit at age 43,smoked for 17 years Substance and Sexual Activity  Alcohol use: No  
 Drug use: No  
 Sexual activity: Not on file Other Topics Concern  Not on file Social History Narrative  Not on file ALLERGIES: Penicillins Review of Systems Constitutional: Negative for chills and fever. HENT: Negative for rhinorrhea and sore throat. Respiratory: Positive for cough, shortness of breath and wheezing. Cardiovascular: Positive for palpitations and leg swelling. Negative for chest pain. Gastrointestinal: Negative for abdominal pain, diarrhea, nausea and vomiting. Genitourinary: Negative for dysuria and urgency. Musculoskeletal: Negative for arthralgias and back pain. Skin: Negative for rash. Neurological: Negative for dizziness, weakness and light-headedness. All other systems reviewed and are negative. Vitals:  
 03/03/19 1552 Pulse: 63 Resp: 20 SpO2: 97% Physical Exam  
Vital signs reviewed. Nursing notes reviewed. Const:  No acute distress, well developed, well nourished Head:  Atraumatic, normocephalic Eyes:  PERRL, conjunctiva normal, no scleral icterus Neck:  Supple, trachea midline Cardiovascular:  RRR, no murmurs, no gallops, no rubs Resp:  No resp distress, mildly increased work of breathing, diffuse expiratory wheezes most prominent at larynx of neck, no rhonchi, no rales, Abd:  Soft, non-tender, non-distended, no rebound, no guarding, no CVA tenderness :  Deferred MSK:  No pedal edema, normal ROM Neuro:  Alert and oriented x3, no cranial nerve defect Skin:  Warm, dry, intact Psych: normal mood and affect, behavior is normal, judgement and thought content is normal 
 
Note written by India Monroy, as dictated by Melecio Abrams MD 4:57 PM  
 
MDM Number of Diagnoses or Management Options Acute bronchitis, unspecified organism:  
  
Amount and/or Complexity of Data Reviewed Clinical lab tests: ordered and reviewed Tests in the radiology section of CPT®: ordered and reviewed Review and summarize past medical records: yes Patient Progress Patient progress: stable Pt. Presents to the ER with complaints of cough and wheezing. Pt. Received a neb and says that she feels much better. Her sx have resolved. No pneumonia on xray. Negative flu. I will start her on a medrol dosepak and zpak. Pt. Has a nebulizer and albuterol at home. She had not yet taken it for this illness, but she will resume it at home. Pt. Says that she does not need any more albuterol as she already has enough at home. Pt. To f/u with her PCP or return to the ER with worsening sx. Procedures

## 2019-03-04 LAB
ATRIAL RATE: 81 BPM
CALCULATED R AXIS, ECG10: 8 DEGREES
CALCULATED T AXIS, ECG11: 36 DEGREES
DIAGNOSIS, 93000: NORMAL
Q-T INTERVAL, ECG07: 380 MS
QRS DURATION, ECG06: 76 MS
QTC CALCULATION (BEZET), ECG08: 416 MS
VENTRICULAR RATE, ECG03: 72 BPM

## 2019-03-04 NOTE — ED NOTES
Pt given discharge instructions, patient education, prescriptions, and follow up information. Pt states understanding. All questions answered. Pt discharged to home in private vehicle, wheeled out in wheel chair. Pt A/Ox4, RA, pain controlled.

## 2019-05-29 NOTE — PROGRESS NOTES
ason for Visit:  Follow-up abdominal wall seroma    Brief History:  68 yo woman with hx ventral hernia repair who was seen in ER for two fluid collections noted on CT scan. She reported mild discomfort but no significant pain. No nausea or vomiting. No fever or chills. Visit Vitals  /80 (BP 1 Location: Left arm, BP Patient Position: Sitting)   Pulse 72   Temp 97.5 °F (36.4 °C) (Oral)   Resp 18   SpO2 96%         Physical Exam:    Gen:  NAD  Pulm:  Unlabored  Abd:  S/ND/Mild TTP at midline  Wound:  Midline wound is well-healed without any erythema or drainage    AP: 68 yo woman with anterior abdominal wall fluid collection    - Abdominal wall fluid collection:  No intervention is indicated.   If pt develops fever/chills then will place and antibiotic and have IR percutaneously sample collection  - Diet as tolerated  - Follow-up prn

## 2019-07-02 ENCOUNTER — HOSPITAL ENCOUNTER (EMERGENCY)
Age: 79
Discharge: HOME | End: 2019-07-02
Attending: EMERGENCY MEDICINE
Payer: MEDICARE

## 2019-07-02 VITALS
HEART RATE: 87 BPM | OXYGEN SATURATION: 100 % | SYSTOLIC BLOOD PRESSURE: 125 MMHG | TEMPERATURE: 97.9 F | RESPIRATION RATE: 18 BRPM | DIASTOLIC BLOOD PRESSURE: 81 MMHG

## 2019-07-02 DIAGNOSIS — L89.892 PRESSURE ULCER OF LEFT LEG, STAGE 2 (HCC): Primary | ICD-10-CM

## 2019-07-02 PROCEDURE — 99282 EMERGENCY DEPT VISIT SF MDM: CPT

## 2019-07-02 RX ORDER — ACETAMINOPHEN 500 MG
1000 TABLET ORAL
Qty: 20 TAB | Refills: 0 | Status: SHIPPED | OUTPATIENT
Start: 2019-07-02 | End: 2021-11-30

## 2019-07-02 NOTE — ED TRIAGE NOTES
Patient presents from home with complaints of an \"open sore\" on her L buttocks area. Patient states that this sore has been in that area for about a year and recently \"opened up\" about 3 months ago.   Patient has no compalitns of pain

## 2019-07-02 NOTE — ED PROVIDER NOTES
67 y.o. female with past medical history significant for arthritis, gerd, afib, obesity, [place], who presents from home with chief complaint of skin problem. Pt has 1 year onset of a worsening pressure ulcer located on the posterior aspect of her upper left thigh that \"opened up ~3 months ago. \" Pt suspects this was resultant of being seated for long periods of time due to having difficulty standing or ambulating; secondary to morbid obesity. Denies pain. Anti-coagulated. There are no other acute medical concerns at this time. PCP: Sebastian Gordon MD    Note written by Claudean Gibbs, Scribe, as dictated by Marcello Nettles MD 9:35 AM    The history is provided by the patient. No  was used.         Past Medical History:   Diagnosis Date    Arthritis     Atrial fibrillation (HCC)     TOLEDO (dyspnea on exertion)     GERD (gastroesophageal reflux disease)     Obesity     Other ill-defined conditions(799.89)     \"fluid in the lung\"       Past Surgical History:   Procedure Laterality Date    COLONOSCOPY N/A 3/30/2017    [facility]COPY performed by [facility] at [facility] ENDOSCOPY    HX CHOLECYSTECTOMY      HX HERNIA REPAIR      HX ORTHOPAEDIC      right and left total knee replacement    HX ORTHOPAEDIC      right shoulder    HX OTHER SURGICAL      kidney stone surgery    HX OTHER SURGICAL      both eyes cataract         Family History:   Problem Relation Age of Onset    Tuberculosis Mother     Lung Disease Father        Social History     Socioeconomic History    Marital status: SINGLE     Spouse name: Not on file    Number of children: Not on file    Years of education: Not on file    Highest education level: Not on file   Occupational History    Not on file   Social Needs    Financial resource strain: Not on file    Food insecurity:     Worry: Not on file     Inability: Not on file    Transportation needs:     Medical: Not on file     Non-medical: Not on file   Tobacco Use    Smoking status: Former Smoker     Packs/day: 1.00    Smokeless tobacco: Never Used    Tobacco comment: quit at age 43,smoked for 17 years   Substance and Sexual Activity    Alcohol use: No    Drug use: No    Sexual activity: Not on file   Lifestyle    Physical activity:     Days per week: Not on file     Minutes per session: Not on file    Stress: Not on file   Relationships    Social connections:     Talks on phone: Not on file     Gets together: Not on file     Attends [employer] service: Not on file     Active member of club or organization: Not on file     Attends meetings of clubs or organizations: Not on file     Relationship status: Not on file    Intimate partner violence:     Fear of current or ex partner: Not on file     Emotionally abused: Not on file     Physically abused: Not on file     Forced sexual activity: Not on file   Other Topics Concern    Not on file   Social History Narrative    Not on file         ALLERGIES: Penicillins    Review of Systems   Skin: Positive for wound. All other systems reviewed and are negative. Vitals:    07/02/19 0922 07/02/19 0930 07/02/19 0933   BP:  125/81    Pulse: 87 87    Resp:  18    Temp:  97.9 °F (36.6 °C)    SpO2: 97% 98% 100%            Physical Exam   Constitutional: She appears well-developed and well-nourished. No distress. HENT:   Head: Normocephalic and atraumatic. Eyes: Conjunctivae are normal.   Neck: Neck supple. Cardiovascular: Normal rate and regular rhythm. Pulmonary/Chest: Effort normal. No respiratory distress. Abdominal: She exhibits no distension. Musculoskeletal: Normal range of motion. She exhibits no deformity. Neurological: She is alert. No cranial nerve deficit. Skin: Skin is warm and dry. 6 cm segmented stage 2 pressure ulcer on proximal posterior left thigh; linear, transverse. Psychiatric: Her behavior is normal.   Nursing note and vitals reviewed.      Note written by India Mancilla, as dictated by Christie Maciel MD 9:35 AM  MDM     67 y.o. female presents with pressure ulceration of left posterior thigh d/t prolonged hours per day of rolling around her house in an office chair. Wound base is clean, discussed a new chair and changing positions frequently. mepilex applied to wound and referral to wound center for OP f/u. Plan to follow up with PCP as needed and return precautions discussed for worsening or new concerning symptoms.      Procedures

## 2019-09-15 ENCOUNTER — APPOINTMENT (OUTPATIENT)
Dept: GENERAL RADIOLOGY | Age: 79
End: 2019-09-15
Attending: EMERGENCY MEDICINE
Payer: MEDICARE

## 2019-09-15 ENCOUNTER — HOSPITAL ENCOUNTER (EMERGENCY)
Age: 79
Discharge: HOME | End: 2019-09-15
Attending: EMERGENCY MEDICINE
Payer: MEDICARE

## 2019-09-15 VITALS
TEMPERATURE: 98 F | WEIGHT: 293 LBS | HEIGHT: 69 IN | HEART RATE: 90 BPM | OXYGEN SATURATION: 90 % | RESPIRATION RATE: 15 BRPM | DIASTOLIC BLOOD PRESSURE: 91 MMHG | BODY MASS INDEX: 43.4 KG/M2 | SYSTOLIC BLOOD PRESSURE: 139 MMHG

## 2019-09-15 DIAGNOSIS — J81.0 ACUTE PULMONARY EDEMA (HCC): Primary | ICD-10-CM

## 2019-09-15 LAB
ALBUMIN SERPL-MCNC: 3.1 G/DL (ref 3.5–5)
ALBUMIN/GLOB SERPL: 0.7 {RATIO} (ref 1.1–2.2)
ALP SERPL-CCNC: 93 U/L (ref 45–117)
ALT SERPL-CCNC: 12 U/L (ref 12–78)
ANION GAP SERPL CALC-SCNC: 7 MMOL/L (ref 5–15)
AST SERPL-CCNC: 25 U/L (ref 15–37)
BASOPHILS # BLD: 0 K/UL (ref 0–0.1)
BASOPHILS NFR BLD: 0 % (ref 0–1)
BILIRUB SERPL-MCNC: 0.9 MG/DL (ref 0.2–1)
BNP SERPL-MCNC: 1399 PG/ML
BUN SERPL-MCNC: 17 MG/DL (ref 6–20)
BUN/CREAT SERPL: 18 (ref 12–20)
CALCIUM SERPL-MCNC: 8.7 MG/DL (ref 8.5–10.1)
CHLORIDE SERPL-SCNC: 108 MMOL/L (ref 97–108)
CK MB CFR SERPL CALC: NORMAL % (ref 0–2.5)
CK MB SERPL-MCNC: <1 NG/ML (ref 5–25)
CK SERPL-CCNC: 103 U/L (ref 26–192)
CO2 SERPL-SCNC: 26 MMOL/L (ref 21–32)
COMMENT, HOLDF: NORMAL
CREAT SERPL-MCNC: 0.95 MG/DL (ref 0.55–1.02)
DIFFERENTIAL METHOD BLD: ABNORMAL
EOSINOPHIL # BLD: 0 K/UL (ref 0–0.4)
EOSINOPHIL NFR BLD: 0 % (ref 0–7)
ERYTHROCYTE [DISTWIDTH] IN BLOOD BY AUTOMATED COUNT: 14.9 % (ref 11.5–14.5)
GLOBULIN SER CALC-MCNC: 4.2 G/DL (ref 2–4)
GLUCOSE SERPL-MCNC: 80 MG/DL (ref 65–100)
HCT VFR BLD AUTO: 40.9 % (ref 35–47)
HGB BLD-MCNC: 13.1 G/DL (ref 11.5–16)
IMM GRANULOCYTES # BLD AUTO: 0 K/UL
IMM GRANULOCYTES NFR BLD AUTO: 0 %
LYMPHOCYTES # BLD: 1.9 K/UL (ref 0.8–3.5)
LYMPHOCYTES NFR BLD: 34 % (ref 12–49)
MCH RBC QN AUTO: 27.5 PG (ref 26–34)
MCHC RBC AUTO-ENTMCNC: 32 G/DL (ref 30–36.5)
MCV RBC AUTO: 85.7 FL (ref 80–99)
MONOCYTES # BLD: 0.5 K/UL (ref 0–1)
MONOCYTES NFR BLD: 9 % (ref 5–13)
NEUTS SEG # BLD: 3.3 K/UL (ref 1.8–8)
NEUTS SEG NFR BLD: 57 % (ref 32–75)
NRBC # BLD: 0 K/UL (ref 0–0.01)
NRBC BLD-RTO: 0 PER 100 WBC
PLATELET # BLD AUTO: 222 K/UL (ref 150–400)
PMV BLD AUTO: 11 FL (ref 8.9–12.9)
POTASSIUM SERPL-SCNC: 4.7 MMOL/L (ref 3.5–5.1)
PROT SERPL-MCNC: 7.3 G/DL (ref 6.4–8.2)
RBC # BLD AUTO: 4.77 M/UL (ref 3.8–5.2)
RBC MORPH BLD: ABNORMAL
SAMPLES BEING HELD,HOLD: NORMAL
SODIUM SERPL-SCNC: 141 MMOL/L (ref 136–145)
TROPONIN I SERPL-MCNC: <0.05 NG/ML
WBC # BLD AUTO: 5.7 K/UL (ref 3.6–11)

## 2019-09-15 PROCEDURE — 93005 ELECTROCARDIOGRAM TRACING: CPT

## 2019-09-15 PROCEDURE — 84484 ASSAY OF TROPONIN QUANT: CPT

## 2019-09-15 PROCEDURE — 99285 EMERGENCY DEPT VISIT HI MDM: CPT

## 2019-09-15 PROCEDURE — 85025 COMPLETE CBC W/AUTO DIFF WBC: CPT

## 2019-09-15 PROCEDURE — 80053 COMPREHEN METABOLIC PANEL: CPT

## 2019-09-15 PROCEDURE — 83880 ASSAY OF NATRIURETIC PEPTIDE: CPT

## 2019-09-15 PROCEDURE — 74011250637 HC RX REV CODE- 250/637: Performed by: EMERGENCY MEDICINE

## 2019-09-15 PROCEDURE — 71046 X-RAY EXAM CHEST 2 VIEWS: CPT

## 2019-09-15 PROCEDURE — 82550 ASSAY OF CK (CPK): CPT

## 2019-09-15 PROCEDURE — 36415 COLL VENOUS BLD VENIPUNCTURE: CPT

## 2019-09-15 RX ORDER — BUMETANIDE 1 MG/1
1 TABLET ORAL
Status: COMPLETED | OUTPATIENT
Start: 2019-09-15 | End: 2019-09-15

## 2019-09-15 RX ORDER — BUMETANIDE 1 MG/1
1 TABLET ORAL DAILY
Qty: 20 TAB | Refills: 0 | Status: ON HOLD | OUTPATIENT
Start: 2019-09-15 | End: 2019-11-07 | Stop reason: SDUPTHER

## 2019-09-15 RX ADMIN — BUMETANIDE 1 MG: 1 TABLET ORAL at 21:43

## 2019-09-15 NOTE — ED TRIAGE NOTES
Patient c/o midsternal CP intermittent with SOB x2 weeks with increased bilateral LE edema.   Denies cough/fever/chills, denies n/v.

## 2019-09-15 NOTE — ED PROVIDER NOTES
67 y.o. female with past medical history significant for GERD, A Fib, obesity, WHITING, CHF, Bronchitis, cardiomegaly who presents via private vehicle to the ED with cc of shortness of breath. Patient reports onset of shortness of breath for the past few weeks, that has worsened over the past one week. Patient states that shortness of breath is better when lying supine, and notes that she has dyspnea on exertion at baseline. Patient also reports two episodes of associated intermittent chest pain with radiation to the left arm and left shoulder, since last week. She states that her lower extremities are also more swollen than baseline, right worse than left. Patient notes that she was in A Fib in April of 2018 and had to be cardioverted. She endorses use of blood thinners. Patient states that her cardiologist is Dr. Carlos Mahan, that she missed a cardiology appointment in August and that her fluid pills have not been changed recently. Patient endorses wheezing. She denies fever, chills, diaphoresis, nausea, vomiting, cough or weight loss. Per chart, patient was seen is the ED for acute bronchitis and was given nebulizer and discharged without admission. Patient was admitted to the ED last year for A Fib, had two cardioversions, and successfully returned to [facility]. There are no other acute medical concerns at this time. Social Hx: former Tobacco use, No EtOH use, no Illicit Drug use  PCP: Jossy Arana MD  Cardiologist: Musa James    Note written by India Dunn, as dictated by Zia Mayers MD 6:59 PM      The history is provided by the patient. No  was used.         Past Medical History:   Diagnosis Date    Arthritis     Atrial fibrillation (HCC)     WHITING (dyspnea on exertion)     GERD (gastroesophageal reflux disease)     Obesity     Other ill-defined conditions(799.89)     \"fluid in the lung\"       Past Surgical History:   Procedure Laterality Date    COLONOSCOPY N/A 3/30/2017    COLONOSCOPY performed by Willow Roca [facility] at [facility] ENDOSCOPY    HX CHOLECYSTECTOMY      HX HERNIA REPAIR      HX ORTHOPAEDIC      right and left total knee replacement    HX ORTHOPAEDIC      right shoulder    HX OTHER SURGICAL      kidney stone surgery    HX OTHER SURGICAL      both eyes cataract         Family History:   Problem Relation Age of Onset    Tuberculosis Mother     Lung Disease Father        Social History     Socioeconomic History    Marital status: SINGLE     Spouse name: Not on file    Number of children: Not on file    Years of education: Not on file    Highest education level: Not on file   Occupational History    Not on file   Social Needs    Financial resource strain: Not on file    Food insecurity:     Worry: Not on file     Inability: Not on file    Transportation needs:     Medical: Not on file     Non-medical: Not on file   Tobacco Use    Smoking status: Former Smoker     Packs/day: 1.00    Smokeless tobacco: Never Used    Tobacco comment: quit at age 43,smoked for 17 years   Substance and Sexual Activity    Alcohol use: No    Drug use: No    Sexual activity: Not on file   Lifestyle    Physical activity:     Days per week: Not on file     Minutes per session: Not on file    Stress: Not on file   Relationships    Social connections:     Talks on phone: Not on file     Gets together: Not on file     Attends Buddhism service: Not on file     Active member of club or organization: Not on file     Attends meetings of clubs or organizations: Not on file     Relationship status: Not on file    Intimate partner violence:     Fear of current or ex partner: Not on file     Emotionally abused: Not on file     Physically abused: Not on file     Forced sexual activity: Not on file   Other Topics Concern    Not on file   Social History Narrative    Not on file         ALLERGIES: Penicillins    Review of Systems   Constitutional: Negative for activity change, appetite change, chills and fatigue. HENT: Negative for ear pain, facial swelling, sore throat and trouble swallowing. Eyes: Negative for pain, discharge and visual disturbance. Respiratory: Positive for shortness of breath and wheezing. Negative for chest tightness. Cardiovascular: Positive for chest pain and leg swelling. Negative for palpitations. Gastrointestinal: Negative for abdominal pain, blood in stool, nausea and vomiting. Genitourinary: Negative for difficulty urinating, flank pain and hematuria. Musculoskeletal: Negative for arthralgias, joint swelling, myalgias and neck pain. Skin: Negative for color change and rash. Neurological: Negative for dizziness, weakness, numbness and headaches. Hematological: Negative for adenopathy. Does not bruise/bleed easily. Psychiatric/Behavioral: Negative for behavioral problems, confusion and sleep disturbance. All other systems reviewed and are negative. Vitals:    09/15/19 1738 09/15/19 1838   BP:  109/59   Pulse: 77 (!) 52   Resp:  20   Temp:  98.5 °F (36.9 °C)   SpO2: 98% 94%   Weight:  147.4 kg (325 lb)   Height:  5' 9\" (1.753 m)            Physical Exam   Constitutional: She is oriented to person, place, and time. She appears well-developed and well-nourished. No distress. HENT:   Head: Normocephalic and atraumatic. Nose: Nose normal.   Mouth/Throat: Oropharynx is clear and moist.   Eyes: Pupils are equal, round, and reactive to light. Conjunctivae and EOM are normal. No scleral icterus. Neck: Normal range of motion. Neck supple. No JVD present. No tracheal deviation present. No thyromegaly present. No carotid bruits noted. Cardiovascular: Normal heart sounds and intact distal pulses. Exam reveals no gallop and no friction rub. No murmur heard. A fib flutter with occasional bigeminal beats, no hypertension   Pulmonary/Chest: Effort normal. Tachypnea (minor) noted. No respiratory distress. She has no wheezes. She has no rhonchi. She has no rales. She exhibits no tenderness. No acute distress, chest clear   Abdominal: Soft. Bowel sounds are normal. She exhibits no distension and no mass. There is no tenderness. There is no rebound and no guarding. Musculoskeletal: Normal range of motion. She exhibits edema. She exhibits no tenderness. +2 tense pitting edema bilaterally    Lymphadenopathy:     She has no cervical adenopathy. Neurological: She is alert and oriented to person, place, and time. She has normal reflexes. No cranial nerve deficit. Coordination normal.   Skin: Skin is warm and dry. No rash noted. No erythema. Psychiatric: She has a normal mood and affect. Her behavior is normal. Judgment and thought content normal.   Nursing note and vitals reviewed. Note written by India Stone, as dictated by Hema Rodriguez MD 6:59 PM    MDM  Number of Diagnoses or Management Options  Acute pulmonary edema [facility]): new and requires workup     Amount and/or Complexity of Data Reviewed  Clinical lab tests: reviewed and ordered  Tests in the radiology section of CPT®: ordered and reviewed  Decide to obtain previous medical records or to obtain history from someone other than the patient: yes  Review and summarize past medical records: yes  Discuss the patient with other providers: yes  Independent visualization of images, tracings, or specimens: yes    Risk of Complications, Morbidity, and/or Mortality  Presenting problems: high  Diagnostic procedures: high  Management options: high    Patient Progress  Patient progress: stable         Procedures    The patient appears to have chronic atrial fibrillation. Last EKG was from March of this year time. She is on no anticoagulants. Patient has been complaining of some increasing shortness of breath for the last couple of weeks. Chest x-ray demonstrates some central congestion. Patient's current oxygen saturation at 98% on room air. Hemodynamically stable.   We will consult her cardiologist Dr. Jennifer Soto. Patient does not appear to be in any significant distress. She has been on Lasix which apparently is not helping at the present time. We will consider changing her to Bumex and have her follow back up with cardiology in the next week for further evaluation 2 episodes of transient chest discomfort. CONSULT NOTE:  9:16 PM Araceli Arroyo MD spoke with Dr. Eulogio Marin, Consult for Cardiology. Discussed available diagnostic tests and clinical findings. Dr. Eulogio Marin agrees with giving patient Bumex. 9:20 PM  Patient's results have been reviewed with them. Patient and/or family have verbally conveyed their understanding and agreement of the patient's signs, symptoms, diagnosis, treatment and prognosis and additionally agree to follow up as recommended or return to the Emergency Room should their condition change prior to follow-up. Discharge instructions have also been provided to the patient with some educational information regarding their diagnosis as well a list of reasons why they would want to return to the ER prior to their follow-up appointment should their condition change.

## 2019-09-16 LAB
ATRIAL RATE: 277 BPM
CALCULATED R AXIS, ECG10: 5 DEGREES
CALCULATED T AXIS, ECG11: 36 DEGREES
DIAGNOSIS, 93000: NORMAL
Q-T INTERVAL, ECG07: 340 MS
QRS DURATION, ECG06: 86 MS
QTC CALCULATION (BEZET), ECG08: 387 MS
VENTRICULAR RATE, ECG03: 78 BPM

## 2019-10-09 ENCOUNTER — HOSPITAL ENCOUNTER (OUTPATIENT)
Age: 79
Setting detail: OUTPATIENT SURGERY
Discharge: HOME | End: 2019-10-09
Attending: INTERNAL MEDICINE | Admitting: INTERNAL MEDICINE
Payer: MEDICARE

## 2019-10-09 ENCOUNTER — ANESTHESIA EVENT (OUTPATIENT)
Dept: CARDIAC CATH/INVASIVE PROCEDURES | Age: 79
End: 2019-10-09
Payer: MEDICARE

## 2019-10-09 ENCOUNTER — ANESTHESIA (OUTPATIENT)
Dept: CARDIAC CATH/INVASIVE PROCEDURES | Age: 79
End: 2019-10-09
Payer: MEDICARE

## 2019-10-09 VITALS
WEIGHT: 293 LBS | RESPIRATION RATE: 16 BRPM | DIASTOLIC BLOOD PRESSURE: 60 MMHG | HEART RATE: 75 BPM | BODY MASS INDEX: 43.4 KG/M2 | OXYGEN SATURATION: 99 % | TEMPERATURE: 98.3 F | HEIGHT: 69 IN | SYSTOLIC BLOOD PRESSURE: 131 MMHG

## 2019-10-09 DIAGNOSIS — I48.0 PAROXYSMAL ATRIAL FIBRILLATION (HCC): ICD-10-CM

## 2019-10-09 PROCEDURE — 92960 CARDIOVERSION ELECTRIC EXT: CPT | Performed by: INTERNAL MEDICINE

## 2019-10-09 PROCEDURE — 76060000031 HC ANESTHESIA FIRST 0.5 HR: Performed by: INTERNAL MEDICINE

## 2019-10-09 PROCEDURE — 74011250636 HC RX REV CODE- 250/636: Performed by: NURSE ANESTHETIST, CERTIFIED REGISTERED

## 2019-10-09 PROCEDURE — 77030039046 HC PAD DEFIB RADIOTRNSPNT CNMD -B: Performed by: INTERNAL MEDICINE

## 2019-10-09 RX ORDER — MIDAZOLAM HYDROCHLORIDE 1 MG/ML
0.5 INJECTION, SOLUTION INTRAMUSCULAR; INTRAVENOUS
Status: CANCELLED | OUTPATIENT
Start: 2019-10-09

## 2019-10-09 RX ORDER — LIDOCAINE HYDROCHLORIDE 10 MG/ML
0.1 INJECTION, SOLUTION EPIDURAL; INFILTRATION; INTRACAUDAL; PERINEURAL AS NEEDED
Status: CANCELLED | OUTPATIENT
Start: 2019-10-09

## 2019-10-09 RX ORDER — SODIUM CHLORIDE 9 MG/ML
50 INJECTION, SOLUTION INTRAVENOUS CONTINUOUS
Status: CANCELLED | OUTPATIENT
Start: 2019-10-09

## 2019-10-09 RX ORDER — OXYCODONE AND ACETAMINOPHEN 5; 325 MG/1; MG/1
1 TABLET ORAL AS NEEDED
Status: CANCELLED | OUTPATIENT
Start: 2019-10-09

## 2019-10-09 RX ORDER — ONDANSETRON 2 MG/ML
4 INJECTION INTRAMUSCULAR; INTRAVENOUS AS NEEDED
Status: CANCELLED | OUTPATIENT
Start: 2019-10-09

## 2019-10-09 RX ORDER — SODIUM CHLORIDE 0.9 % (FLUSH) 0.9 %
5-40 SYRINGE (ML) INJECTION AS NEEDED
Status: CANCELLED | OUTPATIENT
Start: 2019-10-09

## 2019-10-09 RX ORDER — SODIUM CHLORIDE, SODIUM LACTATE, POTASSIUM CHLORIDE, CALCIUM CHLORIDE 600; 310; 30; 20 MG/100ML; MG/100ML; MG/100ML; MG/100ML
75 INJECTION, SOLUTION INTRAVENOUS CONTINUOUS
Status: CANCELLED | OUTPATIENT
Start: 2019-10-09 | End: 2019-10-10

## 2019-10-09 RX ORDER — SODIUM CHLORIDE 9 MG/ML
50 INJECTION, SOLUTION INTRAVENOUS CONTINUOUS
Status: CANCELLED | OUTPATIENT
Start: 2019-10-09 | End: 2019-10-10

## 2019-10-09 RX ORDER — MIDAZOLAM HYDROCHLORIDE 1 MG/ML
1 INJECTION, SOLUTION INTRAMUSCULAR; INTRAVENOUS AS NEEDED
Status: CANCELLED | OUTPATIENT
Start: 2019-10-09

## 2019-10-09 RX ORDER — SODIUM CHLORIDE, SODIUM LACTATE, POTASSIUM CHLORIDE, CALCIUM CHLORIDE 600; 310; 30; 20 MG/100ML; MG/100ML; MG/100ML; MG/100ML
INJECTION, SOLUTION INTRAVENOUS
Status: DISCONTINUED | OUTPATIENT
Start: 2019-10-09 | End: 2019-10-09 | Stop reason: HOSPADM

## 2019-10-09 RX ORDER — PROPOFOL 10 MG/ML
INJECTION, EMULSION INTRAVENOUS AS NEEDED
Status: DISCONTINUED | OUTPATIENT
Start: 2019-10-09 | End: 2019-10-09 | Stop reason: HOSPADM

## 2019-10-09 RX ORDER — SODIUM CHLORIDE 0.9 % (FLUSH) 0.9 %
5-40 SYRINGE (ML) INJECTION EVERY 8 HOURS
Status: CANCELLED | OUTPATIENT
Start: 2019-10-09

## 2019-10-09 RX ORDER — FENTANYL CITRATE 50 UG/ML
50 INJECTION, SOLUTION INTRAMUSCULAR; INTRAVENOUS AS NEEDED
Status: CANCELLED | OUTPATIENT
Start: 2019-10-09

## 2019-10-09 RX ORDER — FENTANYL CITRATE 50 UG/ML
25 INJECTION, SOLUTION INTRAMUSCULAR; INTRAVENOUS
Status: CANCELLED | OUTPATIENT
Start: 2019-10-09

## 2019-10-09 RX ORDER — SODIUM CHLORIDE, SODIUM LACTATE, POTASSIUM CHLORIDE, CALCIUM CHLORIDE 600; 310; 30; 20 MG/100ML; MG/100ML; MG/100ML; MG/100ML
75 INJECTION, SOLUTION INTRAVENOUS CONTINUOUS
Status: CANCELLED | OUTPATIENT
Start: 2019-10-09

## 2019-10-09 RX ORDER — HYDROMORPHONE HYDROCHLORIDE 1 MG/ML
0.2 INJECTION, SOLUTION INTRAMUSCULAR; INTRAVENOUS; SUBCUTANEOUS
Status: CANCELLED | OUTPATIENT
Start: 2019-10-09

## 2019-10-09 RX ORDER — DIPHENHYDRAMINE HYDROCHLORIDE 50 MG/ML
12.5 INJECTION, SOLUTION INTRAMUSCULAR; INTRAVENOUS AS NEEDED
Status: CANCELLED | OUTPATIENT
Start: 2019-10-09 | End: 2019-10-09

## 2019-10-09 RX ORDER — MORPHINE SULFATE 10 MG/ML
2 INJECTION, SOLUTION INTRAMUSCULAR; INTRAVENOUS
Status: CANCELLED | OUTPATIENT
Start: 2019-10-09

## 2019-10-09 RX ADMIN — PROPOFOL 60 MG: 10 INJECTION, EMULSION INTRAVENOUS at 10:50

## 2019-10-09 RX ADMIN — SODIUM CHLORIDE, POTASSIUM CHLORIDE, SODIUM LACTATE AND CALCIUM CHLORIDE: 600; 310; 30; 20 INJECTION, SOLUTION INTRAVENOUS at 10:47

## 2019-10-09 RX ADMIN — PROPOFOL 25 MG: 10 INJECTION, EMULSION INTRAVENOUS at 11:00

## 2019-10-09 NOTE — ANESTHESIA PREPROCEDURE EVALUATION
Anesthetic History   No history of anesthetic complications            Review of Systems / Medical History  Patient summary reviewed, nursing notes reviewed and pertinent labs reviewed    Pulmonary        Sleep apnea: No treatment  Shortness of breath         Neuro/Psych   Within defined limits           Cardiovascular  Within defined limits  Hypertension        Dysrhythmias : atrial fibrillation      Exercise tolerance: <4 METS     GI/Hepatic/Renal  Within defined limits   GERD: well controlled           Endo/Other        Morbid obesity and arthritis     Other Findings        Anesthetic History   No history of anesthetic complications            Review of Systems / Medical History  Patient summary reviewed, nursing notes reviewed and pertinent labs reviewed    Pulmonary  Within defined limits                 Neuro/Psych   Within defined limits           Cardiovascular  Within defined limits  Hypertension        Dysrhythmias : atrial fibrillation           GI/Hepatic/Renal  Within defined limits   GERD           Endo/Other  Within defined limits      Morbid obesity     Other Findings              Physical Exam    Airway  Mallampati: II  TM Distance: > 6 cm  Neck ROM: normal range of motion   Mouth opening: Normal     Cardiovascular  Regular rate and rhythm,  S1 and S2 normal,  no murmur, click, rub, or gallop             Dental  No notable dental hx    Comments:  Only two lower canines   Pulmonary  Breath sounds clear to auscultation               Abdominal  GI exam deferred       Other Findings            Anesthetic Plan    ASA: 3  Anesthesia type: MAC          Induction: Intravenous  Anesthetic plan and risks discussed with: Patient          Anesthetic History   No history of anesthetic complications            Review of Systems / Medical History  Patient summary reviewed, nursing notes reviewed and pertinent labs reviewed    Pulmonary        Sleep apnea           Neuro/Psych   Within defined limits Cardiovascular    Hypertension: well controlled      CHF: NYHA Classification I  Dysrhythmias : atrial fibrillation      Exercise tolerance: >4 METS     GI/Hepatic/Renal  Within defined limits              Endo/Other        Obesity and arthritis     Other Findings   Comments: Heart failure likely related to recurrent afib and HTN-rate improved and BP lower  Afib-rate control improved  HTN-improved  Sleep apnea  Morbid obesity  DJD         Physical Exam    Airway  Mallampati: II  TM Distance: > 6 cm  Neck ROM: normal range of motion   Mouth opening: Normal     Cardiovascular    Rhythm: irregular      Murmur: Grade 2, Mitral area     Dental  No notable dental hx       Pulmonary  Breath sounds clear to auscultation               Abdominal  GI exam deferred       Other Findings            Anesthetic Plan    ASA: 3  Anesthesia type: general          Induction: Intravenous  Anesthetic plan and risks discussed with: Patient              Physical Exam    Airway  Mallampati: III  TM Distance: > 6 cm  Neck ROM: normal range of motion   Mouth opening: Normal     Cardiovascular    Rhythm: irregular  Rate: normal         Dental    Dentition: Lower partial plate and Full upper dentures  Comments:  Only two lower canines   Pulmonary  Breath sounds clear to auscultation               Abdominal  GI exam deferred       Other Findings            Anesthetic Plan    ASA: 3  Anesthesia type: MAC          Induction: Intravenous  Anesthetic plan and risks discussed with: Patient

## 2019-10-09 NOTE — Clinical Note
TRANSFER - OUT REPORT: 
 
Verbal report given to Zeallem Fred on Maurice being transferred to  for routine progression of care Report consisted of patients Situation, Background, Assessment and  
Recommendations(SBAR). Information from the following report(s) SBAR and Procedure Summary was reviewed with the receiving nurse. Opportunity for questions and clarification was provided.

## 2019-10-09 NOTE — PROGRESS NOTES
Cardiac Cath Lab Procedure Area Arrival Note:    Elinor Pérez arrived to Cardiac Cath Lab, Procedure Area. Patient identifiers verified with NAME and DATE OF BIRTH. Procedure verified with patient. Consent forms verified. Allergies verified. Patient informed of procedure and plan of care. Questions answered with review. Patient voiced understanding of procedure and plan of care. Patient on cardiac monitor, non-invasive blood pressure, SPO2 monitor. On ra, placed on O2 @ 2 lpm via nc. IV of ns on pump at 25 ml/hr. Patient status doing well without problems. Patient is A&Ox 3. Patient reports no pain. In a fib. Patient medicated during procedure with orders obtained and verified by Dr. Josefina Brooks. Refer to patients Cardiac Cath Lab PROCEDURE REPORT for vital signs, assessment, status, and response during procedure, printed at end of case. Printed report on chart or scanned into chart.

## 2019-10-09 NOTE — ROUTINE PROCESS
Cardiac Cath Lab Recovery Arrival Note: 
 
 
Kristin Matute arrived to Cardiac Cath Lab, Recovery Area. Staff introduced to patient. Patient identifiers verified with NAME and DATE OF BIRTH. Procedure verified with patient. Consent forms reviewed and signed by patient or authorized representative and verified. Allergies verified. Patient and family oriented to department. Patient and family informed of procedure and plan of care. Questions answered with review. Patient prepped for procedure, per orders from physician, prior to arrival. 
 
Patient on cardiac monitor, non-invasive blood pressure, SPO2 monitor. On RA. Patient is A&Ox 4. Patient reports no pain. Patient in stretcher, in low position, with side rails up, call bell within reach, patient instructed to call if assistance as needed. Patient prep in: [address], [place]. Patient family has pager # Family in: . Prep by: ERIN Curiel RN

## 2019-10-10 NOTE — ANESTHESIA POSTPROCEDURE EVALUATION
Procedure(s):  EP CARDIOVERSION. MAC    Anesthesia Post Evaluation        Patient location during evaluation: PACU  Patient participation: complete - patient participated  Level of consciousness: awake and alert  Pain management: adequate  Airway patency: patent  Anesthetic complications: no  Cardiovascular status: acceptable  Respiratory status: acceptable  Hydration status: acceptable  Comments: I have seen and evaluated the patient and is ready for discharge. Larry Schultz MD    Post anesthesia nausea and vomiting:  none      No vitals data found for the desired time range.

## 2019-11-03 ENCOUNTER — APPOINTMENT (OUTPATIENT)
Dept: GENERAL RADIOLOGY | Age: 79
End: 2019-11-03
Attending: EMERGENCY MEDICINE
Payer: MEDICARE

## 2019-11-03 ENCOUNTER — HOSPITAL ENCOUNTER (OUTPATIENT)
Age: 79
Setting detail: OBSERVATION
Discharge: HOME HEALTH | End: 2019-11-07
Attending: STUDENT IN AN ORGANIZED HEALTH CARE EDUCATION/TRAINING PROGRAM | Admitting: HOSPITALIST
Payer: MEDICARE

## 2019-11-03 DIAGNOSIS — R06.2 WHEEZING: ICD-10-CM

## 2019-11-03 DIAGNOSIS — E87.70 HYPERVOLEMIA, UNSPECIFIED HYPERVOLEMIA TYPE: Primary | ICD-10-CM

## 2019-11-03 DIAGNOSIS — R06.02 SOB (SHORTNESS OF BREATH): ICD-10-CM

## 2019-11-03 LAB
ALBUMIN SERPL-MCNC: 3.1 G/DL (ref 3.5–5)
ALBUMIN/GLOB SERPL: 0.8 {RATIO} (ref 1.1–2.2)
ALP SERPL-CCNC: 89 U/L (ref 45–117)
ALT SERPL-CCNC: 11 U/L (ref 12–78)
ANION GAP SERPL CALC-SCNC: 6 MMOL/L (ref 5–15)
AST SERPL-CCNC: 16 U/L (ref 15–37)
BASOPHILS # BLD: 0 K/UL (ref 0–0.1)
BASOPHILS NFR BLD: 1 % (ref 0–1)
BILIRUB SERPL-MCNC: 1.2 MG/DL (ref 0.2–1)
BNP SERPL-MCNC: 595 PG/ML
BUN SERPL-MCNC: 11 MG/DL (ref 6–20)
BUN/CREAT SERPL: 11 (ref 12–20)
CALCIUM SERPL-MCNC: 8.8 MG/DL (ref 8.5–10.1)
CHLORIDE SERPL-SCNC: 106 MMOL/L (ref 97–108)
CO2 SERPL-SCNC: 31 MMOL/L (ref 21–32)
COMMENT, HOLDF: NORMAL
CREAT SERPL-MCNC: 0.98 MG/DL (ref 0.55–1.02)
DIFFERENTIAL METHOD BLD: ABNORMAL
EOSINOPHIL # BLD: 0.1 K/UL (ref 0–0.4)
EOSINOPHIL NFR BLD: 2 % (ref 0–7)
ERYTHROCYTE [DISTWIDTH] IN BLOOD BY AUTOMATED COUNT: 14.6 % (ref 11.5–14.5)
GLOBULIN SER CALC-MCNC: 3.9 G/DL (ref 2–4)
GLUCOSE SERPL-MCNC: 88 MG/DL (ref 65–100)
HCT VFR BLD AUTO: 43.2 % (ref 35–47)
HGB BLD-MCNC: 13.5 G/DL (ref 11.5–16)
IMM GRANULOCYTES # BLD AUTO: 0 K/UL (ref 0–0.04)
IMM GRANULOCYTES NFR BLD AUTO: 1 % (ref 0–0.5)
LYMPHOCYTES # BLD: 1.6 K/UL (ref 0.8–3.5)
LYMPHOCYTES NFR BLD: 28 % (ref 12–49)
MCH RBC QN AUTO: 26.9 PG (ref 26–34)
MCHC RBC AUTO-ENTMCNC: 31.3 G/DL (ref 30–36.5)
MCV RBC AUTO: 86.2 FL (ref 80–99)
MONOCYTES # BLD: 0.7 K/UL (ref 0–1)
MONOCYTES NFR BLD: 13 % (ref 5–13)
NEUTS SEG # BLD: 3.3 K/UL (ref 1.8–8)
NEUTS SEG NFR BLD: 55 % (ref 32–75)
NRBC # BLD: 0 K/UL (ref 0–0.01)
NRBC BLD-RTO: 0 PER 100 WBC
PLATELET # BLD AUTO: 191 K/UL (ref 150–400)
PMV BLD AUTO: 10.7 FL (ref 8.9–12.9)
POTASSIUM SERPL-SCNC: 3.8 MMOL/L (ref 3.5–5.1)
PROT SERPL-MCNC: 7 G/DL (ref 6.4–8.2)
RBC # BLD AUTO: 5.01 M/UL (ref 3.8–5.2)
SAMPLES BEING HELD,HOLD: NORMAL
SODIUM SERPL-SCNC: 143 MMOL/L (ref 136–145)
TROPONIN I SERPL-MCNC: <0.05 NG/ML
WBC # BLD AUTO: 5.8 K/UL (ref 3.6–11)

## 2019-11-03 PROCEDURE — 84484 ASSAY OF TROPONIN QUANT: CPT

## 2019-11-03 PROCEDURE — 80053 COMPREHEN METABOLIC PANEL: CPT

## 2019-11-03 PROCEDURE — 96374 THER/PROPH/DIAG INJ IV PUSH: CPT

## 2019-11-03 PROCEDURE — 96375 TX/PRO/DX INJ NEW DRUG ADDON: CPT

## 2019-11-03 PROCEDURE — 83880 ASSAY OF NATRIURETIC PEPTIDE: CPT

## 2019-11-03 PROCEDURE — 99283 EMERGENCY DEPT VISIT LOW MDM: CPT

## 2019-11-03 PROCEDURE — 36415 COLL VENOUS BLD VENIPUNCTURE: CPT

## 2019-11-03 PROCEDURE — 85025 COMPLETE CBC W/AUTO DIFF WBC: CPT

## 2019-11-03 PROCEDURE — 93005 ELECTROCARDIOGRAM TRACING: CPT

## 2019-11-03 PROCEDURE — 71046 X-RAY EXAM CHEST 2 VIEWS: CPT

## 2019-11-04 PROBLEM — I50.33 ACUTE ON CHRONIC DIASTOLIC (CONGESTIVE) HEART FAILURE (HCC): Status: ACTIVE | Noted: 2019-11-04

## 2019-11-04 PROBLEM — I50.9 CHF (CONGESTIVE HEART FAILURE) (HCC): Status: ACTIVE | Noted: 2019-11-04

## 2019-11-04 LAB
ATRIAL RATE: 267 BPM
CALCULATED R AXIS, ECG10: 3 DEGREES
CALCULATED T AXIS, ECG11: 7 DEGREES
DIAGNOSIS, 93000: NORMAL
Q-T INTERVAL, ECG07: 372 MS
QRS DURATION, ECG06: 74 MS
QTC CALCULATION (BEZET), ECG08: 424 MS
VENTRICULAR RATE, ECG03: 78 BPM

## 2019-11-04 PROCEDURE — 77030029684 HC NEB SM VOL KT MONA -A

## 2019-11-04 PROCEDURE — 96374 THER/PROPH/DIAG INJ IV PUSH: CPT

## 2019-11-04 PROCEDURE — 74011000250 HC RX REV CODE- 250: Performed by: HOSPITALIST

## 2019-11-04 PROCEDURE — 74011250636 HC RX REV CODE- 250/636: Performed by: HOSPITALIST

## 2019-11-04 PROCEDURE — 94640 AIRWAY INHALATION TREATMENT: CPT

## 2019-11-04 PROCEDURE — 96375 TX/PRO/DX INJ NEW DRUG ADDON: CPT

## 2019-11-04 PROCEDURE — 96376 TX/PRO/DX INJ SAME DRUG ADON: CPT

## 2019-11-04 PROCEDURE — 99218 HC RM OBSERVATION: CPT

## 2019-11-04 PROCEDURE — 74011000250 HC RX REV CODE- 250: Performed by: STUDENT IN AN ORGANIZED HEALTH CARE EDUCATION/TRAINING PROGRAM

## 2019-11-04 PROCEDURE — 94664 DEMO&/EVAL PT USE INHALER: CPT

## 2019-11-04 PROCEDURE — 74011250637 HC RX REV CODE- 250/637: Performed by: HOSPITALIST

## 2019-11-04 PROCEDURE — 65390000012 HC CONDITION CODE 44 OBSERVATION

## 2019-11-04 RX ORDER — BUMETANIDE 0.25 MG/ML
1 INJECTION INTRAMUSCULAR; INTRAVENOUS
Status: COMPLETED | OUTPATIENT
Start: 2019-11-04 | End: 2019-11-04

## 2019-11-04 RX ORDER — SOTALOL HYDROCHLORIDE 80 MG/1
80 TABLET ORAL EVERY 12 HOURS
Status: DISCONTINUED | OUTPATIENT
Start: 2019-11-04 | End: 2019-11-04

## 2019-11-04 RX ORDER — ALBUTEROL SULFATE 0.83 MG/ML
5 SOLUTION RESPIRATORY (INHALATION)
Status: COMPLETED | OUTPATIENT
Start: 2019-11-04 | End: 2019-11-04

## 2019-11-04 RX ORDER — ALBUTEROL SULFATE 0.83 MG/ML
2.5 SOLUTION RESPIRATORY (INHALATION)
Status: DISCONTINUED | OUTPATIENT
Start: 2019-11-04 | End: 2019-11-07 | Stop reason: HOSPADM

## 2019-11-04 RX ORDER — ASPIRIN 81 MG/1
81 TABLET ORAL DAILY
Status: DISCONTINUED | OUTPATIENT
Start: 2019-11-04 | End: 2019-11-07 | Stop reason: HOSPADM

## 2019-11-04 RX ORDER — ONDANSETRON 2 MG/ML
4 INJECTION INTRAMUSCULAR; INTRAVENOUS
Status: DISCONTINUED | OUTPATIENT
Start: 2019-11-04 | End: 2019-11-07 | Stop reason: HOSPADM

## 2019-11-04 RX ORDER — AMIODARONE HYDROCHLORIDE 200 MG/1
200 TABLET ORAL DAILY
Status: DISCONTINUED | OUTPATIENT
Start: 2019-11-05 | End: 2019-11-07 | Stop reason: HOSPADM

## 2019-11-04 RX ORDER — ACETAMINOPHEN 325 MG/1
TABLET ORAL
Status: DISPENSED
Start: 2019-11-04 | End: 2019-11-04

## 2019-11-04 RX ORDER — IPRATROPIUM BROMIDE AND ALBUTEROL SULFATE 2.5; .5 MG/3ML; MG/3ML
3 SOLUTION RESPIRATORY (INHALATION)
Status: DISCONTINUED | OUTPATIENT
Start: 2019-11-04 | End: 2019-11-06

## 2019-11-04 RX ORDER — DILTIAZEM HYDROCHLORIDE 120 MG/1
120 CAPSULE, COATED, EXTENDED RELEASE ORAL DAILY
Status: DISCONTINUED | OUTPATIENT
Start: 2019-11-04 | End: 2019-11-07 | Stop reason: HOSPADM

## 2019-11-04 RX ORDER — SODIUM CHLORIDE 0.9 % (FLUSH) 0.9 %
5-40 SYRINGE (ML) INJECTION EVERY 8 HOURS
Status: DISCONTINUED | OUTPATIENT
Start: 2019-11-04 | End: 2019-11-07 | Stop reason: HOSPADM

## 2019-11-04 RX ORDER — BUMETANIDE 0.25 MG/ML
1 INJECTION INTRAMUSCULAR; INTRAVENOUS DAILY
Status: DISCONTINUED | OUTPATIENT
Start: 2019-11-04 | End: 2019-11-04

## 2019-11-04 RX ORDER — BUMETANIDE 0.25 MG/ML
1 INJECTION INTRAMUSCULAR; INTRAVENOUS 2 TIMES DAILY
Status: DISCONTINUED | OUTPATIENT
Start: 2019-11-04 | End: 2019-11-07 | Stop reason: HOSPADM

## 2019-11-04 RX ORDER — SODIUM CHLORIDE 0.9 % (FLUSH) 0.9 %
5-40 SYRINGE (ML) INJECTION AS NEEDED
Status: DISCONTINUED | OUTPATIENT
Start: 2019-11-04 | End: 2019-11-07 | Stop reason: HOSPADM

## 2019-11-04 RX ORDER — ACETAMINOPHEN 325 MG/1
650 TABLET ORAL
Status: DISCONTINUED | OUTPATIENT
Start: 2019-11-04 | End: 2019-11-07 | Stop reason: HOSPADM

## 2019-11-04 RX ADMIN — Medication 10 ML: at 03:30

## 2019-11-04 RX ADMIN — IPRATROPIUM BROMIDE AND ALBUTEROL SULFATE 3 ML: .5; 3 SOLUTION RESPIRATORY (INHALATION) at 16:19

## 2019-11-04 RX ADMIN — ALBUTEROL SULFATE 5 MG: 2.5 SOLUTION RESPIRATORY (INHALATION) at 00:35

## 2019-11-04 RX ADMIN — DILTIAZEM HYDROCHLORIDE 120 MG: 120 CAPSULE, COATED, EXTENDED RELEASE ORAL at 08:58

## 2019-11-04 RX ADMIN — METHYLPREDNISOLONE SODIUM SUCCINATE 80 MG: 40 INJECTION, POWDER, FOR SOLUTION INTRAMUSCULAR; INTRAVENOUS at 01:19

## 2019-11-04 RX ADMIN — BUMETANIDE 1 MG: 0.25 INJECTION INTRAMUSCULAR; INTRAVENOUS at 17:11

## 2019-11-04 RX ADMIN — IPRATROPIUM BROMIDE AND ALBUTEROL SULFATE 3 ML: .5; 3 SOLUTION RESPIRATORY (INHALATION) at 20:29

## 2019-11-04 RX ADMIN — Medication 10 ML: at 21:42

## 2019-11-04 RX ADMIN — ASPIRIN 81 MG: 81 TABLET, COATED ORAL at 08:58

## 2019-11-04 RX ADMIN — Medication 10 ML: at 14:29

## 2019-11-04 RX ADMIN — RIVAROXABAN 20 MG: 20 TABLET, FILM COATED ORAL at 08:58

## 2019-11-04 RX ADMIN — BUMETANIDE 1 MG: 0.25 INJECTION INTRAMUSCULAR; INTRAVENOUS at 08:58

## 2019-11-04 RX ADMIN — Medication 10 ML: at 06:45

## 2019-11-04 RX ADMIN — IPRATROPIUM BROMIDE AND ALBUTEROL SULFATE 3 ML: .5; 3 SOLUTION RESPIRATORY (INHALATION) at 09:28

## 2019-11-04 RX ADMIN — ACETAMINOPHEN 650 MG: 325 TABLET ORAL at 01:19

## 2019-11-04 RX ADMIN — IPRATROPIUM BROMIDE AND ALBUTEROL SULFATE 3 ML: .5; 3 SOLUTION RESPIRATORY (INHALATION) at 13:47

## 2019-11-04 RX ADMIN — BUMETANIDE 1 MG: 0.25 INJECTION INTRAMUSCULAR; INTRAVENOUS at 01:06

## 2019-11-04 NOTE — ED PROVIDER NOTES
Patient is a 71-year-old female prior history of atrial fibrillation on anticoagulation presenting to the emergency department today with shortness of breath. She reports 2 to 3 days of worsening shortness of breath with exertion. She states that she has had swelling in her legs and weight gain. Reports that her legs are so heavy that she has trouble getting to the bathroom. Today she noticed that she was so short of breath that she was having trouble walking even 10 feet. She is compliant with her anticoagulation. No history of DVT or PE. Denies chest pain at this time. No cough or fever. States that she has had some wheezing but does not take albuterol for this. She reports that she is supposed be taking Bumex but has been off of this for the past 2 days. Denies any other acute concerns at this time.            Past Medical History:   Diagnosis Date    Arthritis     Atrial fibrillation (HCC)     WHITING (dyspnea on exertion)     GERD (gastroesophageal reflux disease)     Obesity     Other ill-defined conditions(799.89)     \"fluid in the lung\"       Past Surgical History:   Procedure Laterality Date    COLONOSCOPY N/A 3/30/2017    [facility] Trix[facility]ks[facility] at [facility]OSCOPY    HX CHOLECYSTECTOMY      HX HERNIA REPAIR      HX ORTHOPAEDIC      right and left total knee replacement    HX ORTHOPAEDIC      right shoulder    HX OTHER SURGICAL      kidney stone surgery    HX OTHER SURGICAL      both eyes cataract    MI CARDIOVERSION ELECTIVE ARRHYTHMIA EXTERNAL N/A 10/9/2019    EP CARDIOVERSION performed by Angie Griffith MD at [facility] [facility]         Family History:   Problem Relation Age of Onset    Tuberculosis Mother     Lung Disease Father        Social History     Socioeconomic History    Marital status: SINGLE     Spouse name: Not on file    Number of children: Not on file    Years of education: Not on file    Highest education level: Not on file   Occupational History  Not on file   Social Needs    Financial resource strain: Not on file    Food insecurity:     Worry: Not on file     Inability: Not on file    Transportation needs:     Medical: Not on file     Non-medical: Not on file   Tobacco Use    Smoking status: Former Smoker     Packs/day: 1.00    Smokeless tobacco: Never Used    Tobacco comment: quit at age 43,smoked for 17 years   Substance and Sexual Activity    Alcohol use: No    Drug use: No    Sexual activity: Not on file   Lifestyle    Physical activity:     Days per week: Not on file     Minutes per session: Not on file    Stress: Not on file   Relationships    Social connections:     Talks on phone: Not on file     Gets together: Not on file     Attends Christian service: Not on file     Active member of club or organization: Not on file     Attends meetings of clubs or organizations: Not on file     Relationship status: Not on file    Intimate partner violence:     Fear of current or ex partner: Not on file     Emotionally abused: Not on file     Physically abused: Not on file     Forced sexual activity: Not on file   Other Topics Concern    Not on file   Social History Narrative    Not on file         ALLERGIES: Penicillins    Review of Systems   Constitutional: Positive for unexpected weight change. Negative for chills and fever. HENT: Negative for congestion and rhinorrhea. Eyes: Negative for redness and visual disturbance. Respiratory: Positive for shortness of breath and wheezing. Negative for cough. Cardiovascular: Positive for leg swelling. Negative for chest pain. Gastrointestinal: Negative for abdominal pain, diarrhea, nausea and vomiting. Genitourinary: Negative for dysuria, flank pain, frequency, hematuria and urgency. Musculoskeletal: Negative for arthralgias, back pain, myalgias and neck pain. Skin: Negative for rash and wound. Allergic/Immunologic: Negative for immunocompromised state.    Neurological: Negative for dizziness and headaches. Vitals:    11/03/19 2034   BP: 131/68   Pulse: 71   Resp: 16   Temp: 98.5 °F (36.9 °C)   SpO2: 98%   Height: 5' 9\" (1.753 m)            Physical Exam   Constitutional: She is oriented to person, place, and time. She appears well-developed and well-nourished. No distress. HENT:   Head: Normocephalic. Mouth/Throat: Oropharynx is clear and moist. No oropharyngeal exudate. Eyes: Pupils are equal, round, and reactive to light. EOM are normal. Right eye exhibits no discharge. Left eye exhibits no discharge. Neck: Normal range of motion. Neck supple. Cardiovascular: Normal rate, regular rhythm, normal heart sounds and intact distal pulses. Exam reveals no gallop and no friction rub. No murmur heard. Pulmonary/Chest: Effort normal. No stridor. No respiratory distress. She has wheezes (diffuse). She has no rales. Abdominal: Soft. Bowel sounds are normal. She exhibits no distension. There is no tenderness. There is no rebound and no guarding. Musculoskeletal: Normal range of motion. She exhibits edema (pitting of bilateral lower extremities). She exhibits no tenderness or deformity. Neurological: She is alert and oriented to person, place, and time. Skin: Skin is warm and dry. Capillary refill takes less than 2 seconds. No rash noted. She is not diaphoretic. Psychiatric: She has a normal mood and affect. Her behavior is normal.   Nursing note and vitals reviewed. EKG Interpretation:   ED Physician interpretation  Atrial fibrillation rate of 78, no acute ST elevation or depression, narrow QRS    Labs Reviewed:   No leukocytosis or anemia  Normal renal function and electrolytes  Troponin normal  BNP mildly elevated      Imaging Reviewed:   Chest x-ray negative for acute process        Course:  Bumex 1mg IV given  Albuterol neb x1 given    Hospitalist Perfect Serve for Admission  12:26 AM    ED Room Number: NS16/08  Patient Name and age: David Banks 67 y.o. female  Working Diagnosis:   1. Hypervolemia, unspecified hypervolemia type    2. SOB (shortness of breath)    3. Wheezing      Readmission: no  Isolation Requirements:  no  Recommended Level of Care:  telemetry  Code Status:  Full Code  Department:[facility] Adult ED - 21   Other:  67 y.o. female weight gain, sob, orthopnea, wheezing, leg swelling, cannot get to bathroom due to sob/leg swelling. Suspect CHF. Getting Bumex (takes at home but has been out of) and neb. VS stable. MDM:  76-year-old female here with wheezing and shortness of breath progressing over the past few days in the setting of not taking her Bumex. On exam she looks fluid overloaded with significant pitting edema of the lower extremities. She is no history of COPD, asthma or smoking so I am wondering if her wheezing could be from fluid in the lungs although the chest x-ray did not show any significant pulmonary edema. BNP mildly elevated however troponin normal.  Low suspicion for PE given use of anticoagulation. Given her inability to ambulate to the bathroom due to swelling in the legs and shortness of breath feels appropriate to admit her for diuresis and further work-up. Clinical Impression:     ICD-10-CM ICD-9-CM    1. Hypervolemia, unspecified hypervolemia type E87.70 276.69    2. SOB (shortness of breath) R06.02 786.05    3.  Wheezing R06.2 786.07            Disposition: Admit  Franky Whitney DO

## 2019-11-04 NOTE — H&P
HISTORY AND PHYSICAL      PCP: Huan Castorena MD  History source: the patient      CC: shortness of breath      HPI: 67 y.o lady w/ afib, diastolic CHF, TAM, who presents with dyspnea. She describes chronic dyspnea on exertion that gradually worsened over the past week to the extent that she is short of breath at rest. No alleviating factors. She denies chest pain, cough, or fever. She does report chronic worsening bilateral lower-extremity edema as well wheezing. She recently ran out of her bumetanide but reports significant swelling even while taking this. PMH/PSH:  Past Medical History:   Diagnosis Date    Arthritis     Atrial fibrillation (HCC)     WHITING (dyspnea on exertion)     GERD (gastroesophageal reflux disease)     Obesity     Other ill-defined conditions(979.83)     \"fluid in the lung\"     Past Surgical History:   Procedure Laterality Date    COLONOSCOPY N/A 3/30/2017    COLONOSCOPY performed by Fabricio Escobar MD at [facility] ENDOSCOPY    HX CHOLECYSTECTOMY      HX HERNIA REPAIR      HX ORTHOPAEDIC      right and left total knee replacement    HX ORTHOPAEDIC      right shoulder    HX OTHER SURGICAL      kidney stone surgery    HX OTHER SURGICAL      both eyes cataract    MI CARDIOVERSION ELECTIVE ARRHYTHMIA EXTERNAL N/A 10/9/2019    EP CARDIOVERSION performed by Reggie Galvez MD at [place] [address], [address] CATH LAB       Home meds:   Prior to Admission medications    Medication Sig Start Date End Date Taking? Authorizing Provider   bumetanide (BUMEX) 1 mg tablet Take 1 Tab by mouth daily. 9/15/19   Liza Graham MD   acetaminophen (TYLENOL) 500 mg tablet Take 2 Tabs by mouth every six (6) hours as needed for Pain. 7/2/19   Constance Abraham MD   L. rhamnosus GG/inulin (CULTURELLE PROBIOTICS PO) Take  by mouth.     Provider, Historical   Nebulizer & Compressor machine Use as directed 6/11/18   Regla Smith MD   albuterol-ipratropium (DUO-NEB) 2.5 mg-0.5 mg/3 ml nebu 3 mL by Nebulization route every four (4) hours as needed. Indications: Reactive Airway Disease ICD: J45.909  Patient taking differently: 3 mL by Nebulization route every four (4) hours as needed (as needed for shortness of breath). Indications: Reactive Airway Disease ICD: J45.909 6/11/18   Arley Beard MD   sotalol (BETAPACE) 80 mg tablet Take 1 Tab by mouth every twelve (12) hours. Indications: PREVENTION OF RECURRENT ATRIAL FIBRILLATION 4/25/18   Jason Escobedo MD   dilTIAZem CD (CARDIZEM CD) 120 mg ER capsule Take 1 Cap by mouth daily. 3/30/18   Jane Renteria NP   potassium chloride SR (K-TAB) 20 mEq tablet Take 1 Tab by mouth two (2) times a day. 3/30/18   Jane Renteria NP   rivaroxaban (XARELTO) 20 mg tab tablet Take 1 Tab by mouth daily (with lunch). Indications: PREVENT THROMBOEMBOLISM IN CHRONIC ATRIAL FIBRILLATION 3/30/18   Jane Renteria NP   aspirin delayed-release 81 mg tablet Take 1 Tab by mouth daily. 3/31/18   Jane Renteria NP       Allergies: Allergies   Allergen Reactions    Penicillins Hives       FH:  Family History   Problem Relation Age of Onset   24 [facility] Tuberculosis Mother     Lung Disease Father        SH:  Social History     Tobacco Use    Smoking status: Former Smoker     Packs/day: 1.00    Smokeless tobacco: Never Used    Tobacco comment: quit at age 42,smoked for 17 years   Substance Use Topics    Alcohol use: No       ROS: A comprehensive review of systems was negative except for that written in the HPI.       PHYSICAL EXAM:  Visit Vitals  /68 (BP 1 Location: Left arm, BP Patient Position: At rest)   Pulse 71   Temp 98.5 °F (36.9 °C)   Resp 16   Ht 5' 9\" (1.753 m)   SpO2 97%   BMI 49.03 kg/m²       Gen: NAD, non-toxic, obese  HEENT: anicteric sclerae, normal conjunctiva, oropharynx clear, MM moist  Neck: supple, trachea midline, no adenopathy  Heart: irreg irreg, no MRG, no JVD, 2+ b/l LE edema  Lungs: diminished breath sounds globally, b/l wheezing, non-labored respirations  Abd: soft, NT, ND, BS+  Extr: warm  Skin: dry, no rash  Neuro: CN II-XII grossly intact, normal speech, moves all extremities  Psych: normal mood, appropriate affect      Labs/Imaging:  Recent Results (from the past 24 hour(s))   EKG, 12 LEAD, INITIAL    Collection Time: 11/03/19  9:24 PM   Result Value Ref Range    Ventricular Rate 78 BPM    Atrial Rate 267 BPM    QRS Duration 74 ms    Q-T Interval 372 ms    QTC Calculation (Bezet) 424 ms    Calculated R Axis 3 degrees    Calculated T Axis 7 degrees    Diagnosis       Atrial fibrillation with premature ventricular or aberrantly conducted   complexes  When compared with ECG of 15-SEP-2019 18:01,  No significant change was found     CBC WITH AUTOMATED DIFF    Collection Time: 11/03/19  9:29 PM   Result Value Ref Range    WBC 5.8 3.6 - 11.0 K/uL    RBC 5.01 3.80 - 5.20 M/uL    HGB 13.5 11.5 - 16.0 g/dL    HCT 43.2 35.0 - 47.0 %    MCV 86.2 80.0 - 99.0 FL    MCH 26.9 26.0 - 34.0 PG    MCHC 31.3 30.0 - 36.5 g/dL    RDW 14.6 (H) 11.5 - 14.5 %    PLATELET 163 045 - 974 K/uL    MPV 10.7 8.9 - 12.9 FL    NRBC 0.0 0  WBC    ABSOLUTE NRBC 0.00 0.00 - 0.01 K/uL    NEUTROPHILS 55 32 - 75 %    LYMPHOCYTES 28 12 - 49 %    MONOCYTES 13 5 - 13 %    EOSINOPHILS 2 0 - 7 %    BASOPHILS 1 0 - 1 %    IMMATURE GRANULOCYTES 1 (H) 0.0 - 0.5 %    ABS. NEUTROPHILS 3.3 1.8 - 8.0 K/UL    ABS. LYMPHOCYTES 1.6 0.8 - 3.5 K/UL    ABS. MONOCYTES 0.7 0.0 - 1.0 K/UL    ABS. EOSINOPHILS 0.1 0.0 - 0.4 K/UL    ABS. BASOPHILS 0.0 0.0 - 0.1 K/UL    ABS. IMM.  GRANS. 0.0 0.00 - 0.04 K/UL    DF AUTOMATED     METABOLIC PANEL, COMPREHENSIVE    Collection Time: 11/03/19  9:29 PM   Result Value Ref Range    Sodium 143 136 - 145 mmol/L    Potassium 3.8 3.5 - 5.1 mmol/L    Chloride 106 97 - 108 mmol/L    CO2 31 21 - 32 mmol/L    Anion gap 6 5 - 15 mmol/L    Glucose 88 65 - 100 mg/dL    BUN 11 6 - 20 MG/DL    Creatinine 0.98 0.55 - 1.02 MG/DL    BUN/Creatinine ratio 11 (L) 12 - 20      GFR est AA >60 >60 ml/min/1.73m2    GFR est non-AA 55 (L) >60 ml/min/1.73m2    Calcium 8.8 8.5 - 10.1 MG/DL    Bilirubin, total 1.2 (H) 0.2 - 1.0 MG/DL    ALT (SGPT) 11 (L) 12 - 78 U/L    AST (SGOT) 16 15 - 37 U/L    Alk. phosphatase 89 45 - 117 U/L    Protein, total 7.0 6.4 - 8.2 g/dL    Albumin 3.1 (L) 3.5 - 5.0 g/dL    Globulin 3.9 2.0 - 4.0 g/dL    A-G Ratio 0.8 (L) 1.1 - 2.2     TROPONIN I    Collection Time: 11/03/19  9:29 PM   Result Value Ref Range    Troponin-I, Qt. <0.05 <0.05 ng/mL   NT-PRO BNP    Collection Time: 11/03/19  9:29 PM   Result Value Ref Range    NT pro- (H) <450 PG/ML   SAMPLES BEING HELD    Collection Time: 11/03/19  9:29 PM   Result Value Ref Range    SAMPLES BEING HELD 1RED 1BLUE     COMMENT        Add-on orders for these samples will be processed based on acceptable specimen integrity and analyte stability, which may vary by analyte. Recent Labs     11/03/19 2129   WBC 5.8   HGB 13.5   HCT 43.2        Recent Labs     11/03/19 2129      K 3.8      CO2 31   BUN 11   CREA 0.98   GLU 88   CA 8.8     Recent Labs     11/03/19 2129   SGOT 16   ALT 11*   AP 89   TBILI 1.2*   TP 7.0   ALB 3.1*   GLOB 3.9       Recent Labs     11/03/19 2129   TROIQ <0.05       No results for input(s): INR, PTP, APTT, INREXT in the last 72 hours. No results for input(s): PH, PCO2, PO2 in the last 72 hours. Xr Chest Pa Lat    Result Date: 11/3/2019  IMPRESSION: Limited exam 1. Low lung volumes without definite acute process. Assessment & Plan:     Acute on chronic diastolic heart failure:  -IV bumetanide  -monitor I/O's and weights daily    Wheezing: she reports undergoing PFTs in 2018 and was told she does not have COPD (previous smoker)  -IV methylprednisolone once now  -schedule inhaled bronchodilators    Chronic afib:  -continue Xarelto, diltiazem.  Unclear if she is still taking sotalol    TAM: non-compliant with CPAP    GERD    DVT ppx: anticoagulated  Code status: full  Disposition: TBD    Signed By: Therese Dodge MD     November 4, 2019

## 2019-11-04 NOTE — NURSE NAVIGATOR
Chart reviewed by Heart Failure Nurse Navigator. Heart Failure database completed. EF:  50% 3/2018    ACEi/ARB/ARNi: Not indicated    BB: not indicated    Aldosterone Antagonist: not indicated    Obstructive Sleep Apnea Screening:No   STOP-BANG score:   Referred to Sleep Medicine:     CRT Not indicated    NYHA Functional Class requested via provider message on [employer]      Heart Failure Teach Back in Patient Education. Heart Failure Avoiding Triggers on Discharge Instructions. Cardiologist:       Post discharge follow up phone call to be made within 48-72 hours of discharge.

## 2019-11-04 NOTE — ED TRIAGE NOTES
Patient reports shortness of breath and wheezing that began today with ambulation or movement. Patient also reports bilateral arm pain and bilateral leg swelling. Reports she ran out of fluid pills Friday.

## 2019-11-04 NOTE — PROGRESS NOTES
Bedside shift change report given to Ena Meier RN (oncoming nurse) by Jason Rachel RN (offgoing nurse). Report included the following information SBAR, Kardex, Procedure Summary, Intake/Output, MAR, Recent Results and Cardiac Rhythm A-Fib, A-Flutter.

## 2019-11-04 NOTE — ED NOTES
TRANSFER - OUT REPORT:    Verbal report given to RN on Chon Epstein  being transferred to 362 (unit) for routine progression of care       Report consisted of patients Situation, Background, Assessment and   Recommendations(SBAR). Information from the following report(s) ED Summary was reviewed with the receiving nurse. Lines:   Peripheral IV 11/03/19 Left Forearm (Active)   Site Assessment Clean, dry, & intact 11/3/2019  9:32 PM   Phlebitis Assessment 0 11/3/2019  9:32 PM   Infiltration Assessment 0 11/3/2019  9:32 PM   Dressing Status Clean, dry, & intact 11/3/2019  9:32 PM        Opportunity for questions and clarification was provided.       Patient transported with:   Monitor  Registered Nurse

## 2019-11-04 NOTE — ROUTINE PROCESS
Bedside and Verbal shift change report given to MALCOLM JIANG (oncoming nurse) by Braydon Ren (offgoing nurse). Report included the following information SBAR, Kardex, ED Summary, Recent Results and Cardiac Rhythm afib.

## 2019-11-04 NOTE — PROGRESS NOTES
Reason for Admission:   CHF/ acute chronic diastolic failure                   RRAT Score:        9/ low             Plan for utilizing home health:      Per patient , she is not currently working with [place] services. CM will need to follow and arrange if [place] is needed. Current Advanced Directive/Advance Care Plan:                        Full code status/ no advanced care plan on file- offered information but declined  Transition of Care Plan:                    TBD/ Patient was alert and oriented at the time of this initial assessment. Patient stated that her son Marcus Katz. [phone] is supportive, and will transport her home at discharge. Confirmed patient's residence. CM will need to follow and address if needs arise prior to discharge. Per patient, she is also indpendent with ADLS and IADLS. Care Management Interventions  PCP Verified by CM:  Yes  Palliative Care Criteria Met (RRAT>21 & CHF Dx)?: No  Mode of Transport at Discharge: (son to transport at discharge)  Transition of Care Consult (CM Consult): (TBD/ CM will need to follow)  MyChart Signup: No  Discharge Durable Medical Equipment: No  Physical Therapy Consult: No  Occupational Therapy Consult: No  Speech Therapy Consult: No  Current Support Network: Lives Alone(Supportive family involved)  Plan discussed with Pt/Family/Caregiver: Yes  Discharge Location  Discharge Placement: (TBD/ CM will need to follow)     MICHELLE Sharp  11:53

## 2019-11-04 NOTE — CONSULTS
Cardiology Consult Note    Pt well known to me admitted with shortness of breath. Please see last office note of 9/25/2019 on chart. She had a failed attempt at cardioversion on 10/9/19 and had not followed up with me since. She ran out of bumex for about 4 days with increasing wheezing and shortness of breath. She had swelling at baseline but got much more pronounced. She had been on sotalol up until yesterday am. Her BNP was very mildly elevated on admission and her heart rate was controlled. She has had some mild sensation in her chest which she thinks is reflux but is not something she typically feels. She has not been wearing her cpap as she has difficulty getting it on. Past Medical History:   Diagnosis Date    Arthritis     Atrial fibrillation (HCC)     WHITING (dyspnea on exertion)     GERD (gastroesophageal reflux disease)     Heart failure (HCC)     Hypertension     Obesity     Other ill-defined conditions(799.89)     \"fluid in the lung\"    Sleep apnea      Allergies   Allergen Reactions    Penicillins Hives     No current facility-administered medications on file prior to encounter. Current Outpatient Medications on File Prior to Encounter   Medication Sig Dispense Refill    bumetanide (BUMEX) 1 mg tablet Take 1 Tab by mouth daily. 20 Tab 0    acetaminophen (TYLENOL) 500 mg tablet Take 2 Tabs by mouth every six (6) hours as needed for Pain. 20 Tab 0    L. rhamnosus GG/inulin (CULTURELLE PROBIOTICS PO) Take  by mouth.  Nebulizer & Compressor machine Use as directed 1 Each 0    albuterol-ipratropium (DUO-NEB) 2.5 mg-0.5 mg/3 ml nebu 3 mL by Nebulization route every four (4) hours as needed. Indications: Reactive Airway Disease ICD: J45.909 (Patient taking differently: 3 mL by Nebulization route every four (4) hours as needed (as needed for shortness of breath).  Indications: Reactive Airway Disease ICD: J45.909) 30 Nebule 2    sotalol (BETAPACE) 80 mg tablet Take 1 Tab by mouth every twelve (12) hours. Indications: PREVENTION OF RECURRENT ATRIAL FIBRILLATION 60 Tab 12    dilTIAZem CD (CARDIZEM CD) 120 mg ER capsule Take 1 Cap by mouth daily. 30 Cap 2    potassium chloride SR (K-TAB) 20 mEq tablet Take 1 Tab by mouth two (2) times a day. 60 Tab 0    rivaroxaban (XARELTO) 20 mg tab tablet Take 1 Tab by mouth daily (with lunch). Indications: PREVENT THROMBOEMBOLISM IN CHRONIC ATRIAL FIBRILLATION 30 Tab 1    aspirin delayed-release 81 mg tablet Take 1 Tab by mouth daily. 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 Can she be given some guidance on the cpap appliance while here?     Isaac Nj MD

## 2019-11-04 NOTE — PROGRESS NOTES
Hospitalist Progress Note  Tiana Barraza MD  Answering service: [phone] from in house phone        Date of Service:  2019  NAME:  Ken Richards  :  1940  MRN:  [id number]      Admission Summary:   67 y.o lady w/ afib, diastolic CHF, TAM, who presents with dyspnea. She describes chronic dyspnea on exertion that gradually worsened over the past week to the extent that she is short of breath at rest. No alleviating factors. She denies chest pain, cough, or fever. She does report chronic worsening bilateral lower-extremity edema as well wheezing. She recently ran out of her bumetanide but reports significant swelling even while taking this. Interval history / Subjective:     F/u SOB/Pedal edema   Feels much better  Assessment & Plan:     Acute on chronic diastolic heart failure:  -IV bumetanide  -monitor I/O's and weights daily  -Appreciate discussion with Dr Ran Spicer  -Last echo 2018 EF 50% with no RWMA  -Follow Echo     Wheezing: she reports undergoing PFTs in 2018 and was told she does not have COPD (previous smoker)  -IV methylprednisolone once given in ER, no need for more steroids  -schedule inhaled bronchodilators     Chronic afib:  -continue Xarelto, diltiazem. -Sotalol stopped and switched to amiodarone     TAM: non-compliant with CPAP  -Counseled     GERD    Morbid obesity  -Counseled    Cardiac diet    PT/OT awaited    Code status: FULL CODE  DVT prophylaxis: scd  PTA: home, lives with 2 sons  Baseline: independent with walker, has wheelchair but does not use it frequently.     Plan: Follow Cardiology    Care Plan discussed with: Patient/Family  Disposition: TBD     Hospital Problems  Date Reviewed: 2019          Codes Class Noted POA    * (Principal) Acute on chronic diastolic (congestive) heart failure ([facility] [address] ICD-10-CM: I50.33  ICD-9-CM: 428.33, 428.0  2019 Yes                Review of Systems:   A comprehensive review of systems was negative except for that written in the HPI. Vital Signs:    Last 24hrs VS reviewed since prior progress note. Most recent are:  Visit Vitals  /77 (BP 1 Location: Right arm, BP Patient Position: At rest)   Pulse 82   Temp 97.6 °F (36.4 °C)   Resp 17   Ht 5' 9\" (1.753 m)   Wt 153.8 kg (339 lb 1.6 oz)   SpO2 92%   BMI 50.08 kg/m²         Intake/Output Summary (Last 24 hours) at 11/4/2019 1451  Last data filed at 11/4/2019 3481  Gross per 24 hour   Intake 240 ml   Output 550 ml   Net -310 ml        Physical Examination:             Constitutional:  No acute distress, cooperative, pleasant    ENT:  Oral mucous moist, oropharynx benign. Resp:  CTA bilaterally. No wheezing/rhonchi/rales. No accessory muscle use   CV:  Regular rhythm, normal rate, no murmurs, gallops, rubs    GI:  Soft, non distended, non tender. normoactive bowel sounds, no hepatosplenomegaly     Musculoskeletal:  No edema, warm, 2+ pulses throughout    Neurologic:  Moves all extremities. AAOx3, CN II-XII reviewed     Skin:  Good turgor, no rashes or ulcers       Data Review:    Review and/or order of clinical lab test      Labs:     Recent Labs     11/03/19 2129   WBC 5.8   HGB 13.5   HCT 43.2        Recent Labs     11/03/19 2129      K 3.8      CO2 31   BUN 11   CREA 0.98   GLU 88   CA 8.8     Recent Labs     11/03/19 2129   SGOT 16   ALT 11*   AP 89   TBILI 1.2*   TP 7.0   ALB 3.1*   GLOB 3.9     No results for input(s): INR, PTP, APTT, INREXT in the last 72 hours. No results for input(s): FE, TIBC, PSAT, FERR in the last 72 hours. No results found for: FOL, RBCF   No results for input(s): PH, PCO2, PO2 in the last 72 hours.   Recent Labs     11/03/19 2129   TROIQ <0.05     Lab Results   Component Value Date/Time    Cholesterol, total 168 03/27/2018 01:03 PM    HDL Cholesterol 61 03/27/2018 01:03 PM    LDL, calculated 85.8 03/27/2018 01:03 PM    Triglyceride 106 03/27/2018 01:03 PM    CHOL/HDL Ratio 2.8 03/27/2018 01:03 PM     Lab Results   Component Value Date/Time    Glucose (POC) 108 (H) 10/03/2014 06:32 AM     Lab Results   Component Value Date/Time    Color DARK YELLOW 06/07/2018 03:43 PM    Appearance CLOUDY (A) 06/07/2018 03:43 PM    Specific gravity 1.015 06/07/2018 03:43 PM    pH (UA) 5.5 06/07/2018 03:43 PM    Protein 30 (A) 06/07/2018 03:43 PM    Glucose NEGATIVE  06/07/2018 03:43 PM    Ketone NEGATIVE  06/07/2018 03:43 PM    Bilirubin NEGATIVE  03/27/2018 04:57 PM    Urobilinogen 2.0 (H) 06/07/2018 03:43 PM    Nitrites NEGATIVE  06/07/2018 03:43 PM    Leukocyte Esterase NEGATIVE  06/07/2018 03:43 PM    Epithelial cells FEW 06/07/2018 03:43 PM    Bacteria NEGATIVE  06/07/2018 03:43 PM    WBC 0-4 06/07/2018 03:43 PM    RBC 0-5 06/07/2018 03:43 PM         Medications Reviewed:     Current Facility-Administered Medications   Medication Dose Route Frequency    sodium chloride (NS) flush 5-40 mL  5-40 mL IntraVENous Q8H    sodium chloride (NS) flush 5-40 mL  5-40 mL IntraVENous PRN    acetaminophen (TYLENOL) tablet 650 mg  650 mg Oral Q4H PRN    ondansetron (ZOFRAN) injection 4 mg  4 mg IntraVENous Q4H PRN    albuterol-ipratropium (DUO-NEB) 2.5 MG-0.5 MG/3 ML  3 mL Nebulization QID RT    albuterol (PROVENTIL VENTOLIN) nebulizer solution 2.5 mg  2.5 mg Nebulization Q4H PRN    aspirin delayed-release tablet 81 mg  81 mg Oral DAILY    dilTIAZem CD (CARDIZEM CD) capsule 120 mg  120 mg Oral DAILY    rivaroxaban (XARELTO) tablet 20 mg  20 mg Oral DAILY    bumetanide (BUMEX) injection 1 mg  1 mg IntraVENous BID     ______________________________________________________________________  EXPECTED LENGTH OF STAY: - - -  ACTUAL LENGTH OF STAY:          Dawna Le MD

## 2019-11-04 NOTE — PROGRESS NOTES
Physical Therapy Screening:    An [facility] screening referral was triggered for physical therapy based on results obtained during the nursing admission assessment. The patients chart was reviewed and the patient is appropriate for a skilled therapy evaluation if there is a decline in functional mobility from baseline. Please order a consult for physical therapy if you are in agreement and would like an evaluation to be completed. Thank you.     Nargis Cid, PT    6/9/18  Prior Level of Function/Home Situation: lives at home with family; mod I with BADLs and mobility using RW at baseline  Personal factors and/or comorbidities impacting plan of care:      Home Situation  Home Environment: Private residence  # Steps to Enter: 3  Rails to Enter: Yes  Hand Rails : Bilateral  One/Two Story Residence: Two story, live on 1st floor  Living Alone: No  Support Systems: Family member(s)  Patient Expects to be Discharged to[de-identified] Private residence  Current DME Used/Available at Home: [id number], elizabeth, [place], milli

## 2019-11-04 NOTE — PROGRESS NOTES
Bedside shift change report given to Rosalino Bare RN by Woody Arce. Report included the following information SBAR, ED Summary, Intake/Output, MAR and Cardiac Rhythm AFib.

## 2019-11-05 ENCOUNTER — APPOINTMENT (OUTPATIENT)
Dept: NON INVASIVE DIAGNOSTICS | Age: 79
End: 2019-11-05
Attending: INTERNAL MEDICINE
Payer: MEDICARE

## 2019-11-05 LAB
ANION GAP SERPL CALC-SCNC: 5 MMOL/L (ref 5–15)
AV PEAK GRADIENT: 36.4 MMHG
AV VELOCITY RATIO: 0.65
BASOPHILS # BLD: 0 K/UL (ref 0–0.1)
BASOPHILS NFR BLD: 0 % (ref 0–1)
BUN SERPL-MCNC: 12 MG/DL (ref 6–20)
BUN/CREAT SERPL: 13 (ref 12–20)
CALCIUM SERPL-MCNC: 8.6 MG/DL (ref 8.5–10.1)
CHLORIDE SERPL-SCNC: 102 MMOL/L (ref 97–108)
CO2 SERPL-SCNC: 32 MMOL/L (ref 21–32)
CREAT SERPL-MCNC: 0.9 MG/DL (ref 0.55–1.02)
DIFFERENTIAL METHOD BLD: ABNORMAL
ECHO AO ROOT DIAM: 3.31 CM
ECHO AV AREA PEAK VELOCITY: 1.8 CM2
ECHO AV CUSP MM: 1.93 CM
ECHO AV PEAK GRADIENT: 11.4 MMHG
ECHO AV PEAK VELOCITY: 168.53 CM/S
ECHO AV REGURGITANT PHT: 438.9 CM
ECHO LA AREA 4C: 20.7 CM2
ECHO LA MAJOR AXIS: 4.29 CM
ECHO LA TO AORTIC ROOT RATIO: 1.3
ECHO LA VOL 2C: 66.3 ML (ref 22–52)
ECHO LA VOL 4C: 53.44 ML (ref 22–52)
ECHO LA VOL BP: 66.03 ML (ref 22–52)
ECHO LA VOL/BSA BIPLANE: 25.67 ML/M2 (ref 16–28)
ECHO LA VOLUME INDEX A2C: 25.77 ML/M2 (ref 16–28)
ECHO LA VOLUME INDEX A4C: 20.78 ML/M2 (ref 16–28)
ECHO LV E' LATERAL VELOCITY: 9.23 CM/S
ECHO LV E' SEPTAL VELOCITY: 8.48 CM/S
ECHO LV INTERNAL DIMENSION DIASTOLIC: 5.06 CM (ref 3.9–5.3)
ECHO LV INTERNAL DIMENSION SYSTOLIC: 3.56 CM
ECHO LV IVSD: 0.86 CM (ref 0.6–0.9)
ECHO LV MASS 2D: 156.8 G (ref 67–162)
ECHO LV MASS INDEX 2D: 61 G/M2 (ref 43–95)
ECHO LV POSTERIOR WALL DIASTOLIC: 0.72 CM (ref 0.6–0.9)
ECHO LVOT DIAM: 1.88 CM
ECHO LVOT PEAK GRADIENT: 4.8 MMHG
ECHO LVOT PEAK VELOCITY: 108.98 CM/S
ECHO MV E VELOCITY: 72.18 CM/S
ECHO MV E/E' LATERAL: 7.82
ECHO MV E/E' RATIO (AVERAGED): 8.17
ECHO MV E/E' SEPTAL: 8.51
ECHO PV MAX VELOCITY: 85.27 CM/S
ECHO PV PEAK GRADIENT: 2.9 MMHG
ECHO RV TAPSE: 1.54 CM (ref 1.5–2)
ECHO TV REGURGITANT MAX VELOCITY: 344.27 CM/S
ECHO TV REGURGITANT PEAK GRADIENT: 47.4 MMHG
EOSINOPHIL # BLD: 0 K/UL (ref 0–0.4)
EOSINOPHIL NFR BLD: 0 % (ref 0–7)
ERYTHROCYTE [DISTWIDTH] IN BLOOD BY AUTOMATED COUNT: 14 % (ref 11.5–14.5)
GLUCOSE SERPL-MCNC: 111 MG/DL (ref 65–100)
HCT VFR BLD AUTO: 40.7 % (ref 35–47)
HGB BLD-MCNC: 13 G/DL (ref 11.5–16)
IMM GRANULOCYTES # BLD AUTO: 0.1 K/UL (ref 0–0.04)
IMM GRANULOCYTES NFR BLD AUTO: 1 % (ref 0–0.5)
LVFS 2D: 29.77 %
LYMPHOCYTES # BLD: 1.2 K/UL (ref 0.8–3.5)
LYMPHOCYTES NFR BLD: 15 % (ref 12–49)
MCH RBC QN AUTO: 27.3 PG (ref 26–34)
MCHC RBC AUTO-ENTMCNC: 31.9 G/DL (ref 30–36.5)
MCV RBC AUTO: 85.5 FL (ref 80–99)
MONOCYTES # BLD: 0.7 K/UL (ref 0–1)
MONOCYTES NFR BLD: 9 % (ref 5–13)
NEUTS SEG # BLD: 6.1 K/UL (ref 1.8–8)
NEUTS SEG NFR BLD: 75 % (ref 32–75)
NRBC # BLD: 0 K/UL (ref 0–0.01)
NRBC BLD-RTO: 0 PER 100 WBC
PISA AR MAX VEL: 301.65 CM/S
PLATELET # BLD AUTO: 207 K/UL (ref 150–400)
PMV BLD AUTO: 11.1 FL (ref 8.9–12.9)
POTASSIUM SERPL-SCNC: 3.1 MMOL/L (ref 3.5–5.1)
RBC # BLD AUTO: 4.76 M/UL (ref 3.8–5.2)
SODIUM SERPL-SCNC: 139 MMOL/L (ref 136–145)
WBC # BLD AUTO: 8.1 K/UL (ref 3.6–11)

## 2019-11-05 PROCEDURE — 94640 AIRWAY INHALATION TREATMENT: CPT

## 2019-11-05 PROCEDURE — 93306 TTE W/DOPPLER COMPLETE: CPT

## 2019-11-05 PROCEDURE — 97165 OT EVAL LOW COMPLEX 30 MIN: CPT

## 2019-11-05 PROCEDURE — 96376 TX/PRO/DX INJ SAME DRUG ADON: CPT

## 2019-11-05 PROCEDURE — 74011000250 HC RX REV CODE- 250: Performed by: HOSPITALIST

## 2019-11-05 PROCEDURE — 74011250637 HC RX REV CODE- 250/637: Performed by: HOSPITALIST

## 2019-11-05 PROCEDURE — 74011250637 HC RX REV CODE- 250/637: Performed by: INTERNAL MEDICINE

## 2019-11-05 PROCEDURE — 97530 THERAPEUTIC ACTIVITIES: CPT

## 2019-11-05 PROCEDURE — 97161 PT EVAL LOW COMPLEX 20 MIN: CPT

## 2019-11-05 PROCEDURE — 99218 HC RM OBSERVATION: CPT

## 2019-11-05 PROCEDURE — 97535 SELF CARE MNGMENT TRAINING: CPT

## 2019-11-05 PROCEDURE — 36415 COLL VENOUS BLD VENIPUNCTURE: CPT

## 2019-11-05 PROCEDURE — 80048 BASIC METABOLIC PNL TOTAL CA: CPT

## 2019-11-05 PROCEDURE — 85025 COMPLETE CBC W/AUTO DIFF WBC: CPT

## 2019-11-05 RX ORDER — POTASSIUM CHLORIDE 750 MG/1
40 TABLET, FILM COATED, EXTENDED RELEASE ORAL 2 TIMES DAILY
Status: COMPLETED | OUTPATIENT
Start: 2019-11-05 | End: 2019-11-05

## 2019-11-05 RX ORDER — POTASSIUM CHLORIDE 750 MG/1
40 TABLET, FILM COATED, EXTENDED RELEASE ORAL 2 TIMES DAILY
Status: DISCONTINUED | OUTPATIENT
Start: 2019-11-05 | End: 2019-11-05

## 2019-11-05 RX ADMIN — IPRATROPIUM BROMIDE AND ALBUTEROL SULFATE 3 ML: .5; 3 SOLUTION RESPIRATORY (INHALATION) at 12:59

## 2019-11-05 RX ADMIN — Medication 10 ML: at 07:19

## 2019-11-05 RX ADMIN — Medication 10 ML: at 21:59

## 2019-11-05 RX ADMIN — POTASSIUM CHLORIDE 40 MEQ: 750 TABLET, FILM COATED, EXTENDED RELEASE ORAL at 21:56

## 2019-11-05 RX ADMIN — AMIODARONE HYDROCHLORIDE 200 MG: 200 TABLET ORAL at 08:45

## 2019-11-05 RX ADMIN — DILTIAZEM HYDROCHLORIDE 120 MG: 120 CAPSULE, COATED, EXTENDED RELEASE ORAL at 08:45

## 2019-11-05 RX ADMIN — RIVAROXABAN 20 MG: 20 TABLET, FILM COATED ORAL at 08:45

## 2019-11-05 RX ADMIN — IPRATROPIUM BROMIDE AND ALBUTEROL SULFATE 3 ML: .5; 3 SOLUTION RESPIRATORY (INHALATION) at 20:00

## 2019-11-05 RX ADMIN — IPRATROPIUM BROMIDE AND ALBUTEROL SULFATE 3 ML: .5; 3 SOLUTION RESPIRATORY (INHALATION) at 07:45

## 2019-11-05 RX ADMIN — BUMETANIDE 1 MG: 0.25 INJECTION INTRAMUSCULAR; INTRAVENOUS at 08:45

## 2019-11-05 RX ADMIN — POTASSIUM CHLORIDE 40 MEQ: 750 TABLET, FILM COATED, EXTENDED RELEASE ORAL at 16:33

## 2019-11-05 RX ADMIN — BUMETANIDE 1 MG: 0.25 INJECTION INTRAMUSCULAR; INTRAVENOUS at 18:54

## 2019-11-05 RX ADMIN — ASPIRIN 81 MG: 81 TABLET, COATED ORAL at 08:45

## 2019-11-05 NOTE — ROUTINE PROCESS
Bedside and Verbal shift change report given to MALCOLM watson (oncoming nurse) by Rojelio Trujillo (offgoing nurse). Report included the following information SBAR, Kardex, ED Summary, Recent Results and Cardiac Rhythm afib.

## 2019-11-05 NOTE — PROGRESS NOTES
Hospitalist Progress Note  Wilber Vital MD  Answering service: [phone] from in house phone        Date of Service:  2019  NAME:  Marion Moore  :  1940  MRN:  [id number]      Admission Summary:   67 y.o lady w/ afib, diastolic CHF, TAM, who presents with dyspnea. She describes chronic dyspnea on exertion that gradually worsened over the past week to the extent that she is short of breath at rest. No alleviating factors. She denies chest pain, cough, or fever. She does report chronic worsening bilateral lower-extremity edema as well wheezing. She recently ran out of her bumetanide but reports significant swelling even while taking this. Interval history / Subjective:     F/u SOB/Pedal edema   Feels much better but still has significant leg swelling  Assessment & Plan:     Acute on chronic diastolic heart failure NYHA II/III  -IV bumetanide, continue  -monitor I/O's and weights daily  -Appreciate discussion with Dr Cade Fletcher  -Last echo 2018 EF 50% with no RWMA  -Follow Echo    Hypokalemia  -replace as needed     Wheezing: she reports undergoing PFTs in 2018 and was told she does not have COPD (previous smoker)  -IV methylprednisolone once given in ER, no need for more steroids  -schedule inhaled bronchodilators     Chronic afib:  -continue Xarelto, diltiazem. -Sotalol stopped and switched to amiodarone     TAM: non-compliant with CPAP  -Counseled     GERD    Morbid obesity  -Counseled    Cardiac diet    PT/OT awaited    Code status: FULL CODE  DVT prophylaxis: scd  PTA: home, lives with 2 sons  Baseline: independent with walker, has wheelchair but does not use it frequently.     Plan: Follow Cardiology    Care Plan discussed with: Patient/Family  Disposition: TBD     Hospital Problems  Date Reviewed: 2019          Codes Class Noted POA    * (Principal) Acute on chronic diastolic (congestive) heart failure ([facility] [address] ICD-10-CM: I50.33  ICD-9-CM: 428.33, 428.0  11/4/2019 Yes        CHF (congestive heart failure) ([facility] [address] ICD-10-CM: I50.9  ICD-9-CM: 428.0  11/4/2019 Unknown                Review of Systems:   A comprehensive review of systems was negative except for that written in the HPI. Vital Signs:    Last 24hrs VS reviewed since prior progress note. Most recent are:  Visit Vitals  /78 (BP 1 Location: Right arm, BP Patient Position: At rest)   Pulse 84   Temp 97.9 °F (36.6 °C)   Resp 16   Ht 5' 9\" (1.753 m)   Wt 152 kg (335 lb 1.6 oz)   SpO2 92%   BMI 49.49 kg/m²         Intake/Output Summary (Last 24 hours) at 11/5/2019 1410  Last data filed at 11/5/2019 1211  Gross per 24 hour   Intake 420 ml   Output 1600 ml   Net -1180 ml        Physical Examination:             Constitutional:  No acute distress, cooperative, pleasant    ENT:  Oral mucous moist, oropharynx benign. Resp:  CTA bilaterally. No wheezing/rhonchi/rales. No accessory muscle use   CV:  Regular rhythm, normal rate, no murmurs, gallops, rubs    GI:  Soft, non distended, non tender. normoactive bowel sounds, no hepatosplenomegaly     Musculoskeletal:  No edema, warm, 2+ pulses throughout    Neurologic:  Moves all extremities. AAOx3, CN II-XII reviewed     Skin:  Good turgor, no rashes or ulcers       Data Review:    Review and/or order of clinical lab test      Labs:     Recent Labs     11/05/19 0209 11/03/19 2129   WBC 8.1 5.8   HGB 13.0 13.5   HCT 40.7 43.2    191     Recent Labs     11/05/19 0209 11/03/19 2129    143   K 3.1* 3.8    106   CO2 32 31   BUN 12 11   CREA 0.90 0.98   * 88   CA 8.6 8.8     Recent Labs     11/03/19 2129   SGOT 16   ALT 11*   AP 89   TBILI 1.2*   TP 7.0   ALB 3.1*   GLOB 3.9     No results for input(s): INR, PTP, APTT, INREXT, INREXT in the last 72 hours. No results for input(s): FE, TIBC, PSAT, FERR in the last 72 hours.    No results found for: FOL, RBCF   No results for input(s): PH, PCO2, PO2 in the last 72 hours.   Recent Labs     11/03/19 2129   TROIQ <0.05     Lab Results   Component Value Date/Time    Cholesterol, total 168 03/27/2018 01:03 PM    HDL Cholesterol 61 03/27/2018 01:03 PM    LDL, calculated 85.8 03/27/2018 01:03 PM    Triglyceride 106 03/27/2018 01:03 PM    CHOL/HDL Ratio 2.8 03/27/2018 01:03 PM     Lab Results   Component Value Date/Time    Glucose (POC) 108 (H) 10/03/2014 06:32 AM     Lab Results   Component Value Date/Time    Color DARK YELLOW 06/07/2018 03:43 PM    Appearance CLOUDY (A) 06/07/2018 03:43 PM    Specific gravity 1.015 06/07/2018 03:43 PM    pH (UA) 5.5 06/07/2018 03:43 PM    Protein 30 (A) 06/07/2018 03:43 PM    Glucose NEGATIVE  06/07/2018 03:43 PM    Ketone NEGATIVE  06/07/2018 03:43 PM    Bilirubin NEGATIVE  03/27/2018 04:57 PM    Urobilinogen 2.0 (H) 06/07/2018 03:43 PM    Nitrites NEGATIVE  06/07/2018 03:43 PM    Leukocyte Esterase NEGATIVE  06/07/2018 03:43 PM    Epithelial cells FEW 06/07/2018 03:43 PM    Bacteria NEGATIVE  06/07/2018 03:43 PM    WBC 0-4 06/07/2018 03:43 PM    RBC 0-5 06/07/2018 03:43 PM         Medications Reviewed:     Current Facility-Administered Medications   Medication Dose Route Frequency    potassium chloride SR (KLOR-CON 10) tablet 40 mEq  40 mEq Oral BID    sodium chloride (NS) flush 5-40 mL  5-40 mL IntraVENous Q8H    sodium chloride (NS) flush 5-40 mL  5-40 mL IntraVENous PRN    acetaminophen (TYLENOL) tablet 650 mg  650 mg Oral Q4H PRN    ondansetron (ZOFRAN) injection 4 mg  4 mg IntraVENous Q4H PRN    albuterol-ipratropium (DUO-NEB) 2.5 MG-0.5 MG/3 ML  3 mL Nebulization QID RT    albuterol (PROVENTIL VENTOLIN) nebulizer solution 2.5 mg  2.5 mg Nebulization Q4H PRN    aspirin delayed-release tablet 81 mg  81 mg Oral DAILY    dilTIAZem CD (CARDIZEM CD) capsule 120 mg  120 mg Oral DAILY    rivaroxaban (XARELTO) tablet 20 mg  20 mg Oral DAILY    bumetanide (BUMEX) injection 1 mg  1 mg IntraVENous BID    amiodarone (CORDARONE) tablet 200 mg  200 mg Oral DAILY     ______________________________________________________________________  EXPECTED LENGTH OF STAY: - - -  ACTUAL LENGTH OF STAY:          Suha Hutchinson MD

## 2019-11-05 NOTE — PROGRESS NOTES
Orders received, chart reviewed and patient evaluated by occupational therapy. Pending progression with skilled acute occupational therapy, recommend:  No skilled occupational therapy/ follow up rehabilitation needs identified at this time. Recommend with nursing patient to complete as able in order to maintain strength, endurance and independence: OOB to chair 3x/day with 1 assist and functional mobility to the bathroom with 1 assist. Thank you for your assistance. Full evaluation to follow.

## 2019-11-05 NOTE — PROGRESS NOTES
Cardiology Progress Note]    Pt feeling better but legs still swollen. Imp:  Acute on chronic diastolic heart failure, with medication non-compliance; echo pending  Afib with failed CV on sotalol; now switched to amiodarone; rate well controlled  HTN  Sleep apnea  Morbid obesity    Rec:  PT and dietary consultations (she wants to lose weight)  Con't IV diuresis while here  Replace K    Exam  Blood pressure 121/72, pulse 87, temperature 98.2 °F (36.8 °C), resp. rate 18, height 5' 9\" (1.753 m), weight 152.3 kg (335 lb 12.8 oz), SpO2 94 %.   Lungs clear ant  Cor irreg  Abd soft   Ext with 2+edema     Labs:  K+ 3.1    Abida Callahan MD

## 2019-11-05 NOTE — PROGRESS NOTES
NUTRITION       Chart reviewed and consult received. Pt requests information on ways to reduce weight. Pt cooks for herself and two sons. Tends to cook with salt, garlic salt and fat- back in vegetables. Suggested to broil, grill and bake only, reduce sodium intake and limit additional fat added to vegetables. Provided simple information to reduce fat and sodium in diet. Continue to follow and assist as needed.        Lesvia Coley RD

## 2019-11-05 NOTE — PROGRESS NOTES
Code 44 Delivered. Observation notice provided in writing to patient and/or caregiver as well as verbal explanation of the policy. Patients who are in outpatient status also receive the Observation notice.       Lakia Connor, [address] [place] [address]   [phone]

## 2019-11-05 NOTE — PROGRESS NOTES
Problem: Mobility Impaired (Adult and Pediatric)  Goal: *Acute Goals and Plan of Care (Insert Text)  Description  FUNCTIONAL STATUS PRIOR TO ADMISSION: Patient was modified independent using a rolling walker for functional mobility - has ramp to enter home. Pt reports decreased amb distance secondary to leg weakness - requires frequent sitting. Pt reports 1 fall this year. HOME SUPPORT PRIOR TO ADMISSION: The patient lived with sons but did not require assist for basic ADL's. Physical Therapy Goals  Initiated 11/5/2019  1. Patient will move from supine to sit and sit to supine , scoot up and down and roll side to side in bed with modified independence within 7 day(s). 2.  Patient will transfer from bed to chair and chair to bed with modified independence using the least restrictive device within 7 day(s). 3.  Patient will perform sit to stand with modified independence within 7 day(s). 4.  Patient will ambulate with modified independence for 100 feet with the least restrictive device within 7 day(s). Outcome: Progressing Towards Goal  PHYSICAL THERAPY EVALUATION  Patient: Jo Hurtado (71 y.o. female)  Date: 11/5/2019  Primary Diagnosis: Acute on chronic diastolic (congestive) heart failure ([facility]) [I50.33]  CHF (congestive heart failure) ([facility] [address] [I50.9]        Precautions: fall       ASSESSMENT  Based on the objective data described below, the patient presents with generalized weakness, decreased cardiopulmonary tolerance, and decline in functional mobility associated with hospitalization for acute on chronic CHF. Today - pt tolerated up to [facility] and bedside chair - legs elevated - oxygen sats 94% room air and HR 84. Current Level of Function Impacting Discharge (mobility/balance): Contact guard assist for transfers and amb 3 feet    Functional Outcome Measure: The patient scored 19/28 on the Tinetti outcome measure which is indicative of high fall risk.       Other factors to consider for discharge:      Patient will benefit from skilled therapy intervention to address the above noted impairments. PLAN :  Recommendations and Planned Interventions: bed mobility training, transfer training, gait training, therapeutic exercises, patient and family training/education and therapeutic activities      Frequency/Duration: Patient will be followed by physical therapy:  5 times a week to address goals. Recommendation for discharge: (in order for the patient to meet his/her long term goals)  Physical therapy at least 2 days/week in the home     This discharge recommendation:  Has not yet been discussed the attending provider and/or case management    IF patient discharges home will need the following DME: patient owns DME required for discharge         SUBJECTIVE:   Patient stated I have to sit very often - I walk from chair to chair.     OBJECTIVE DATA SUMMARY:   HISTORY:    Past Medical History:   Diagnosis Date    Arthritis     Atrial fibrillation ([address]     WHITING (dyspnea on exertion)     GERD (gastroesophageal reflux disease)     Heart failure (HCC)     Hypertension     Obesity     Other ill-defined conditions(799.89)     \"fluid in the lung\"    Sleep apnea      Past Surgical History:   Procedure Laterality Date    COLONOSCOPY N/A 3/30/2017    COLONOSCOPY performed by Jm Sauer MD at [facility] ENDOSCOPY    HX CHOLECYSTECTOMY      [address]      right and left total knee replacement    HX ORTHOPAEDIC      right shoulder    HX OTHER SURGICAL      kidney stone surgery    HX OTHER SURGICAL      both eyes cataract    [place] CARDIOVERSION ELECTIVE ARRHYTHMIA EXTERNAL N/A 10/9/2019    EP CARDIOVERSION performed by Concha Gaines MD at [place] [address], [facility] CATH LAB       Personal factors and/or comorbidities impacting plan of care: obesity    Home Situation  Home Environment: Private residence  Wheelchair Ramp: Yes  One/Two Story Residence: Two story, live on 1st floor  Living Alone: No  Support Systems: Child(hansel)  Patient Expects to be Discharged to[de-identified] Private residence  Current DME Used/Available at Home: Suzi Brenner, rollator, Walker, rolling  Tub or Shower Type: Tub/Shower combination    EXAMINATION/PRESENTATION/DECISION MAKING:   Critical Behavior:  Neurologic State: Alert  Orientation Level: Oriented X4  Cognition: Appropriate for age attention/concentration, Follows commands  Safety/Judgement: Awareness of environment, Fall prevention  Hearing: Auditory  Auditory Impairment: None  Range Of Motion:  AROM: Generally decreased, functional                       Strength:    Strength: Generally decreased, functional                    Tone & Sensation:   Tone: Normal              Sensation: Intact               Coordination:  Coordination: Within functional limits  Vision:   Tracking: Able to track stimulus in all quadrants w/o difficulty  Diplopia: No  Acuity: Within Defined Limits  Corrective Lenses: Reading glasses  Functional Mobility:  Bed Mobility:  Rolling: Modified independent  Supine to Sit: Modified independent     Scooting: Modified independent  Transfers:  Sit to Stand: Contact guard assistance  Stand to Sit: Setup        Bed to Chair: Contact guard assistance              Balance:   Sitting: Intact; Without support  Standing: Impaired; With support  Standing - Static: Good;Constant support  Standing - Dynamic : Fair;Constant support  Ambulation/Gait Training:  Distance (ft): 3 Feet (ft)  Assistive Device: Walker, rolling;Gait belt  Ambulation - Level of Assistance: Contact guard assistance        Gait Abnormalities: Decreased step clearance        Base of Support: Widened     Speed/Samia: Slow  Step Length: Right shortened;Left shortened             Functional Measure:  Tinetti test:    Sitting Balance: 1  Arises: 1  Attempts to Rise: 1  Immediate Standing Balance: 1  Standing Balance: 1  Nudged: 2  Eyes Closed: 1  Turn 360 Degrees - Continuous/Discontinuous: 1  Turn 360 Degrees - Steady/Unsteady: 1  Sitting Down: 1  Balance Score: 11 Balance total score  Indication of Gait: 1  R Step Length/Height: 1  L Step Length/Height: 1  R Foot Clearance: 1  L Foot Clearance: 1  Step Symmetry: 1  Step Continuity: 1  Path: 1  Trunk: 0  Walking Time: 0  Gait Score: 8 Gait total score  Total Score: 19/28 Overall total score         Tinetti Tool Score Risk of Falls  <19 = High Fall Risk  19-24 = Moderate Fall Risk  25-28 = Low Fall Risk  Tinetti ME. Performance-Oriented Assessment of Mobility Problems in Elderly Patients. [facility] 66; [id number]. (Scoring Description: PT Bulletin Feb. 10, 1993)    Older adults: Eniomyra Alcocer et al, 2009; n = 1000 [facility] elderly evaluated with ABC, BUCK, ADL, and IADL)  · Mean BUCK score for males aged 69-68 years = 26.21(3.40)  · Mean BUCK score for females age 69-68 years = 25.16(4.30)  · Mean BUCK score for males over 80 years = 23.29(6.02)  · Mean BUCK score for females over 80 years = 17.20(8.32)           Physical Therapy Evaluation Charge Determination   History Examination Presentation Decision-Making   LOW Complexity : Zero comorbidities / personal factors that will impact the outcome / POC LOW Complexity : 1-2 Standardized tests and measures addressing body structure, function, activity limitation and / or participation in recreation  LOW Complexity : Stable, uncomplicated  LOW Complexity : FOTO score of       Based on the above components, the patient evaluation is determined to be of the following complexity level: LOW     Pain Rating:  Pt denied pain. Activity Tolerance:   Fair  Please refer to the flowsheet for vital signs taken during this treatment. After treatment patient left in no apparent distress:   Sitting in chair and Call bell within reach    COMMUNICATION/EDUCATION:   The patients plan of care was discussed with: Registered Nurse.     Fall prevention education was provided and the patient/caregiver indicated understanding., Patient/family have participated as able in goal setting and plan of care. and Patient understands intent and goals of therapy, but is neutral about his/her participation.     Thank you for this referral.  Rossana Chen, PT   Time Calculation: 25 mins

## 2019-11-05 NOTE — PROGRESS NOTES
OCCUPATIONAL THERAPY EVALUATION/DISCHARGE  Patient: Kathryn Wyatt (23 y.o. female)  Date: 11/5/2019  Primary Diagnosis: Acute on chronic diastolic (congestive) heart failure ([facility]) [I50.33]  CHF (congestive heart failure) ([facility]) [I50.9]       Precautions: falls       ASSESSMENT  Based on the objective data described below, the patient presents with limited ADL performance 2* mildly impaired balance, generalized weakness, decreased functional activity tolerance and WHITING s/p admission for acute on chronic diastolic heart failure. At baseline, patient reports being mod I with ADLs and lives with her 2 sons. Today, patient able to complete functional mobility with SBA using RW to/from bathroom. Patient able to complete anabell care after urinary incontinence with supervision sitting on toilet and ron a new gown with setup. Patient VSS throughout session, noted SOB with exertion, SPO 2 93% on RA after mobility. Patient returned to chair at end of session with call bell in reach, RN aware and all needs met. Patient appears to be functioning at her baseline with regards to ADLs and has no further acute OT needs - this was discussed with patient who is in agreement. Will sign off. Current Level of Function (ADLs/self-care): up to setup for ADLs     Functional Outcome Measure: The patient scored 70/100 on the Barthel Index outcome measure which is indicative of minimal deficits in ADLs and functional mobility - likely close to baseline - noted increased SOB with exertion. .      Other factors to consider for discharge: none     PLAN :  Recommend with staff: Recommend with nursing patient to complete as able in order to maintain strength, endurance and independence: ADLs with supervision/setup, OOB to chair 3x/day and mobilizing to the bathroom for toileting with 1 assist. Thank you for your assistance.      Recommendation for discharge: (in order for the patient to meet his/her long term goals)  No skilled occupational therapy/ follow up rehabilitation needs identified at this time. This discharge recommendation:  Has not yet been discussed the attending provider and/or case management    IF patient discharges home will need the following DME: patient owns DME required for discharge       SUBJECTIVE:   Patient stated I think beside the fact that I have this fluid on me, I'm doing fine.     OBJECTIVE DATA SUMMARY:   HISTORY:   Past Medical History:   Diagnosis Date    Arthritis     Atrial fibrillation ([address]     WHITING (dyspnea on exertion)     GERD (gastroesophageal reflux disease)     Heart failure (HCC)     Hypertension     Obesity     Other ill-defined conditions(799.89)     \"fluid in the lung\"    Sleep apnea      Past Surgical History:   Procedure Laterality Date    COLONOSCOPY N/A 3/30/2017    COL[facility] performed [facility]a [facility] at [facility] ENDOSCOPY    HX CHOLECYSTECTOMY      HX HERNIA REPAIR      HX ORTHOPAEDIC      right and left total knee replacement    HX ORTHOPAEDIC      right shoulder    HX OTHER SURGICAL      kidney stone surgery    HX OTHER SURGICAL      both eyes cataract    NV CARDIOVERSION ELECTIVE ARRHYTHMIA EXTERNAL N/A 10/9/2019    EP CARDIOVERSION performed by Sada Brown MD at [place] [address], [facility] [place] CATH LAB       Prior Level of Function/Environment/Context: lives in 2 level home with 2 sons (she lives on 1st floor). Patient is IND with ADLs and mod I with mobility.    Expanded or extensive additional review of patient history:   Home Situation  Home Environment: Private residence  Wheelchair Ramp: Yes  One/Two Story Residence: Two story, live on 1st floor  Living Alone: No  Support Systems: Child(hansel)  Patient Expects to be Discharged to[de-identified] Private residence  Current DME Used/Available at Home: Dalia Frederick, rollator, Walker, rolling  Tub or Shower Type: Tub/Shower combination    Hand dominance: Right    EXAMINATION OF PERFORMANCE DEFICITS:  Cognitive/Behavioral Status:  Neurologic State: Alert  Orientation Level: Oriented X4  Cognition: Appropriate for age attention/concentration; Follows commands  Perception: Appears intact  Perseveration: No perseveration noted  Safety/Judgement: Awareness of environment; Fall prevention    Skin: Appears grossly intact    Edema: none noted in BUEs    Hearing: Auditory  Auditory Impairment: None    Vision/Perceptual:    Tracking: Able to track stimulus in all quadrants w/o difficulty    Diplopia: No    Acuity: Within Defined Limits    Corrective Lenses: Reading glasses    Range of Motion:  In BUEs  AROM: Generally decreased, functional    Strength: In BUEs  Strength: Generally decreased, functional    Coordination:  Coordination: Within functional limits  Fine Motor Skills-Upper: Left Intact; Right Intact    Gross Motor Skills-Upper: Left Intact; Right Intact    Tone & Sensation:  In BUEs  Tone: Normal  Sensation: Intact    Balance:  Sitting: Intact; Without support  Standing: Impaired; With support  Standing - Static: Good;Constant support  Standing - Dynamic : Fair;Constant support    Functional Mobility and Transfers for ADLs:  Bed Mobility:  Rolling: Modified independent  Supine to Sit: Modified independent  Scooting: Modified independent    Transfers:  Sit to Stand: Contact guard assistance  Stand to Sit: Setup  Bed to Chair: Contact guard assistance  Toilet Transfer : Stand-by assistance  Assistive Device : Walker, rolling    ADL Assessment:  Feeding: Independent    Oral Facial Hygiene/Grooming: Independent;Stand-by assistance(Infer IND sitting, SBA standing 2* SOB)    Bathing: Stand-by assistance(patient completed anabell care sitting on toilet with SUP)    Upper Body Dressing: Setup    Lower Body Dressing: Stand-by assistance    Toileting: Stand by assistance    ADL Intervention and task modifications:  Feeding  Feeding Assistance: Independent  Container Management: Independent  Cutting Food: Independent  Utensil Management: Independent  Food to Mouth: Independent  Drink to Mouth: Independent    Lower Body Bathing  Perineal  : Set-up  Position Performed: Seated on toilet    Upper [address]: Set-up    Toileting  Bladder Hygiene: Set-up  Clothing Management: Set-up    Cognitive Retraining  Safety/Judgement: Awareness of environment; Fall prevention    Functional Measure:  Barthel Index:    Bathin  Bladder: 5  Bowels: 10  Groomin  Dressing: 10  Feeding: 10  Mobility: 5  Stairs: 0  Toilet Use: 10  Transfer (Bed to Chair and Back): 10  Total: 70/100        The Barthel ADL Index: Guidelines  1. The index should be used as a record of what a patient does, not as a record of what a patient could do. 2. The main aim is to establish degree of independence from any help, physical or verbal, however minor and for whatever reason. 3. The need for supervision renders the patient not independent. 4. A patient's performance should be established using the best available evidence. Asking the patient, friends/relatives and nurses are the usual sources, but direct observation and common sense are also important. However direct testing is not needed. 5. Usually the patient's performance over the preceding 24-48 hours is important, but occasionally longer periods will be relevant. 6. Middle categories imply that the patient supplies over 50 per cent of the effort. 7. Use of aids to be independent is allowed. Elicia Sanchez, Barthel, D.W. (7621). Functional evaluation: the Barthel Index. [facility] (14)2. ABRIL Worthington, Marcella Han., Vicky Braun.[facility], [address] [place] [address] (). Measuring the change indisability after inpatient rehabilitation; comparison of the responsiveness of the Barthel Index and Functional Seibert Measure. Journal of Neurology, Neurosurgery, and Psychiatry, 66(4), 707-641.   Summer Mariee, JOSE MARIA.J.A, RICARDO Granda, & Flory Sandra, M.A. (2004.) Assessment of post-stroke quality of life in cost-effectiveness studies: The usefulness of the Barthel Index and the EuroQoL-5D. Quality of Life Research, 15, 422-17       Occupational Therapy Evaluation Charge Determination   History Examination Decision-Making   LOW Complexity : Brief history review  LOW Complexity : 1-3 performance deficits relating to physical, cognitive , or psychosocial skils that result in activity limitations and / or participation restrictions  LOW Complexity : No comorbidities that affect functional and no verbal or physical assistance needed to complete eval tasks       Based on the above components, the patient evaluation is determined to be of the following complexity level: LOW   Pain Rating:  none    Activity Tolerance:   Good and requires rest breaks  Please refer to the flowsheet for vital signs taken during this treatment. After treatment patient left in no apparent distress:    Sitting in chair, Call bell within reach and RN aware    COMMUNICATION/EDUCATION:   The patients plan of care was discussed with: Physical Therapist and Registered Nurse.     Thank you for this referral.  Rea Mccarthy OT  Time Calculation: 23 mins

## 2019-11-05 NOTE — PROGRESS NOTES
Problem: Falls - Risk of  Goal: *Absence of Falls  Description  Document Cassius Vences Fall Risk and appropriate interventions in the flowsheet.   Outcome: Progressing Towards Goal  Note:   Fall Risk Interventions:  Mobility Interventions: Patient to call before getting OOB, Utilize walker, cane, or other assistive device, PT Consult for mobility concerns, OT consult for ADLs         Medication Interventions: Patient to call before getting OOB, Teach patient to arise slowly    Elimination Interventions: Call light in reach              Problem: Heart Failure: Day 2  Goal: Activity/Safety  Outcome: Progressing Towards Goal  Goal: Consults, if ordered  Outcome: Progressing Towards Goal  Goal: Diagnostic Test/Procedures  Outcome: Progressing Towards Goal  Goal: Nutrition/Diet  Outcome: Progressing Towards Goal  Goal: Medications  Outcome: Progressing Towards Goal  Goal: Respiratory  Outcome: Progressing Towards Goal  Goal: Treatments/Interventions/Procedures  Outcome: Progressing Towards Goal  Goal: Psychosocial  Outcome: Progressing Towards Goal  Goal: *Oxygen saturation within defined limits  Outcome: Progressing Towards Goal  Goal: *Hemodynamically stable  Outcome: Progressing Towards Goal  Goal: *Optimal pain control at patient's stated goal  Outcome: Progressing Towards Goal  Goal: *Anxiety reduced or absent  Outcome: Progressing Towards Goal  Goal: *Demonstrates progressive activity  Outcome: Progressing Towards Goal     Problem: Pain  Goal: *Control of Pain  Outcome: Progressing Towards Goal     Problem: Breathing Pattern - Ineffective  Goal: *Absence of hypoxia  Outcome: Progressing Towards Goal  Goal: *Use of effective breathing techniques  Outcome: Progressing Towards Goal

## 2019-11-06 LAB
ANION GAP SERPL CALC-SCNC: 5 MMOL/L (ref 5–15)
BASOPHILS # BLD: 0 K/UL (ref 0–0.1)
BASOPHILS NFR BLD: 0 % (ref 0–1)
BUN SERPL-MCNC: 16 MG/DL (ref 6–20)
BUN/CREAT SERPL: 16 (ref 12–20)
CALCIUM SERPL-MCNC: 8.3 MG/DL (ref 8.5–10.1)
CHLORIDE SERPL-SCNC: 101 MMOL/L (ref 97–108)
CO2 SERPL-SCNC: 33 MMOL/L (ref 21–32)
CREAT SERPL-MCNC: 0.99 MG/DL (ref 0.55–1.02)
DIFFERENTIAL METHOD BLD: ABNORMAL
EOSINOPHIL # BLD: 0 K/UL (ref 0–0.4)
EOSINOPHIL NFR BLD: 0 % (ref 0–7)
ERYTHROCYTE [DISTWIDTH] IN BLOOD BY AUTOMATED COUNT: 14.3 % (ref 11.5–14.5)
GLUCOSE SERPL-MCNC: 97 MG/DL (ref 65–100)
HCT VFR BLD AUTO: 40.5 % (ref 35–47)
HGB BLD-MCNC: 13 G/DL (ref 11.5–16)
IMM GRANULOCYTES # BLD AUTO: 0.1 K/UL (ref 0–0.04)
IMM GRANULOCYTES NFR BLD AUTO: 1 % (ref 0–0.5)
LYMPHOCYTES # BLD: 2.3 K/UL (ref 0.8–3.5)
LYMPHOCYTES NFR BLD: 29 % (ref 12–49)
MAGNESIUM SERPL-MCNC: 1.7 MG/DL (ref 1.6–2.4)
MCH RBC QN AUTO: 27.4 PG (ref 26–34)
MCHC RBC AUTO-ENTMCNC: 32.1 G/DL (ref 30–36.5)
MCV RBC AUTO: 85.3 FL (ref 80–99)
MONOCYTES # BLD: 1 K/UL (ref 0–1)
MONOCYTES NFR BLD: 12 % (ref 5–13)
NEUTS SEG # BLD: 4.5 K/UL (ref 1.8–8)
NEUTS SEG NFR BLD: 58 % (ref 32–75)
NRBC # BLD: 0 K/UL (ref 0–0.01)
NRBC BLD-RTO: 0 PER 100 WBC
PLATELET # BLD AUTO: 207 K/UL (ref 150–400)
PMV BLD AUTO: 10.8 FL (ref 8.9–12.9)
POTASSIUM SERPL-SCNC: 3.4 MMOL/L (ref 3.5–5.1)
RBC # BLD AUTO: 4.75 M/UL (ref 3.8–5.2)
SODIUM SERPL-SCNC: 139 MMOL/L (ref 136–145)
WBC # BLD AUTO: 7.9 K/UL (ref 3.6–11)

## 2019-11-06 PROCEDURE — 74011000250 HC RX REV CODE- 250: Performed by: HOSPITALIST

## 2019-11-06 PROCEDURE — 85025 COMPLETE CBC W/AUTO DIFF WBC: CPT

## 2019-11-06 PROCEDURE — 99218 HC RM OBSERVATION: CPT

## 2019-11-06 PROCEDURE — 97116 GAIT TRAINING THERAPY: CPT

## 2019-11-06 PROCEDURE — 36415 COLL VENOUS BLD VENIPUNCTURE: CPT

## 2019-11-06 PROCEDURE — 94760 N-INVAS EAR/PLS OXIMETRY 1: CPT

## 2019-11-06 PROCEDURE — 80048 BASIC METABOLIC PNL TOTAL CA: CPT

## 2019-11-06 PROCEDURE — 83735 ASSAY OF MAGNESIUM: CPT

## 2019-11-06 PROCEDURE — 74011250637 HC RX REV CODE- 250/637: Performed by: HOSPITALIST

## 2019-11-06 PROCEDURE — 96376 TX/PRO/DX INJ SAME DRUG ADON: CPT

## 2019-11-06 PROCEDURE — 74011250637 HC RX REV CODE- 250/637: Performed by: INTERNAL MEDICINE

## 2019-11-06 PROCEDURE — 74011000250 HC RX REV CODE- 250: Performed by: INTERNAL MEDICINE

## 2019-11-06 PROCEDURE — 77030038269 HC DRN EXT URIN PURWCK BARD -A

## 2019-11-06 PROCEDURE — 94640 AIRWAY INHALATION TREATMENT: CPT

## 2019-11-06 RX ORDER — POTASSIUM CHLORIDE 750 MG/1
20 TABLET, FILM COATED, EXTENDED RELEASE ORAL
Status: COMPLETED | OUTPATIENT
Start: 2019-11-06 | End: 2019-11-06

## 2019-11-06 RX ORDER — IPRATROPIUM BROMIDE AND ALBUTEROL SULFATE 2.5; .5 MG/3ML; MG/3ML
3 SOLUTION RESPIRATORY (INHALATION)
Status: DISCONTINUED | OUTPATIENT
Start: 2019-11-06 | End: 2019-11-06

## 2019-11-06 RX ORDER — POTASSIUM CHLORIDE 750 MG/1
40 TABLET, FILM COATED, EXTENDED RELEASE ORAL
Status: COMPLETED | OUTPATIENT
Start: 2019-11-06 | End: 2019-11-06

## 2019-11-06 RX ORDER — IPRATROPIUM BROMIDE AND ALBUTEROL SULFATE 2.5; .5 MG/3ML; MG/3ML
3 SOLUTION RESPIRATORY (INHALATION)
Status: DISCONTINUED | OUTPATIENT
Start: 2019-11-06 | End: 2019-11-07 | Stop reason: HOSPADM

## 2019-11-06 RX ADMIN — Medication 10 ML: at 14:46

## 2019-11-06 RX ADMIN — DILTIAZEM HYDROCHLORIDE 120 MG: 120 CAPSULE, COATED, EXTENDED RELEASE ORAL at 08:58

## 2019-11-06 RX ADMIN — BUMETANIDE 1 MG: 0.25 INJECTION INTRAMUSCULAR; INTRAVENOUS at 18:00

## 2019-11-06 RX ADMIN — IPRATROPIUM BROMIDE AND ALBUTEROL SULFATE 3 ML: .5; 3 SOLUTION RESPIRATORY (INHALATION) at 14:40

## 2019-11-06 RX ADMIN — AMIODARONE HYDROCHLORIDE 200 MG: 200 TABLET ORAL at 08:58

## 2019-11-06 RX ADMIN — RIVAROXABAN 20 MG: 20 TABLET, FILM COATED ORAL at 08:58

## 2019-11-06 RX ADMIN — POTASSIUM CHLORIDE 40 MEQ: 750 TABLET, FILM COATED, EXTENDED RELEASE ORAL at 16:12

## 2019-11-06 RX ADMIN — ASPIRIN 81 MG: 81 TABLET, COATED ORAL at 08:58

## 2019-11-06 RX ADMIN — POTASSIUM CHLORIDE 20 MEQ: 750 TABLET, FILM COATED, EXTENDED RELEASE ORAL at 13:10

## 2019-11-06 RX ADMIN — Medication 10 ML: at 07:24

## 2019-11-06 RX ADMIN — IPRATROPIUM BROMIDE AND ALBUTEROL SULFATE 3 ML: .5; 3 SOLUTION RESPIRATORY (INHALATION) at 20:13

## 2019-11-06 RX ADMIN — IPRATROPIUM BROMIDE AND ALBUTEROL SULFATE 3 ML: .5; 3 SOLUTION RESPIRATORY (INHALATION) at 07:37

## 2019-11-06 RX ADMIN — BUMETANIDE 1 MG: 0.25 INJECTION INTRAMUSCULAR; INTRAVENOUS at 08:58

## 2019-11-06 NOTE — PROGRESS NOTES
Spiritual Care Partner Volunteer visited patient in room 361/01 on 11.06.19. Documented by: : Rev. Ruma Inman.  Jhonatan Allison; [facility], to contact [phone] [address] call: [phone]

## 2019-11-06 NOTE — PROGRESS NOTES
Problem: Mobility Impaired (Adult and Pediatric)  Goal: *Acute Goals and Plan of Care (Insert Text)  Description  FUNCTIONAL STATUS PRIOR TO ADMISSION: Patient was modified independent using a rolling walker for functional mobility - has ramp to enter home. Pt reports decreased amb distance secondary to leg weakness - requires frequent sitting. Pt reports 1 fall this year. HOME SUPPORT PRIOR TO ADMISSION: The patient lived with sons but did not require assist for basic ADL's. Physical Therapy Goals  Initiated 11/5/2019  1. Patient will move from supine to sit and sit to supine , scoot up and down and roll side to side in bed with modified independence within 7 day(s). 2.  Patient will transfer from bed to chair and chair to bed with modified independence using the least restrictive device within 7 day(s). 3.  Patient will perform sit to stand with modified independence within 7 day(s). 4.  Patient will ambulate with modified independence for 100 feet with the least restrictive device within 7 day(s). Outcome: Progressing Towards Goal   PHYSICAL THERAPY TREATMENT  Patient: Marilou Epps (47 y.o. female)  Date: 11/6/2019  Diagnosis: Acute on chronic diastolic (congestive) heart failure (HCC) [I50.33]  CHF (congestive heart failure) (HCC) [I50.9] Acute on chronic diastolic (congestive) heart failure (HCC)       Precautions:    Chart, physical therapy assessment, plan of care and goals were reviewed. ASSESSMENT  Patient continues with skilled PT services and is progressing towards goals. Pt reported feeling drowsy due to poor sleep last night. Tolerated amb to/ from restroom with RW and assist. Noted SOB with activity, vitals stable. Pt presents with significantly flexed posture with amb which she states is baseline. Suspect pt is near baseline LOF with mobility.      Current Level of Function Impacting Discharge (mobility/balance): transfer CGA, short distance amb with RW CGA    Other factors to consider for discharge: pt with one level home set up, sons able to assist as needed         PLAN :  Patient continues to benefit from skilled intervention to address the above impairments. Continue treatment per established plan of care. to address goals. Recommendation for discharge: (in order for the patient to meet his/her long term goals)  Physical therapy at least 2 days/week in the home     This discharge recommendation:  Has not yet been discussed the attending provider and/or case management    IF patient discharges home will need the following DME: patient owns DME required for discharge       SUBJECTIVE:   Patient stated I'm sleepy.     OBJECTIVE DATA SUMMARY:   Critical Behavior:  Neurologic State: Alert  Orientation Level: Oriented X4  Cognition: Appropriate decision making, Appropriate for age attention/concentration, Appropriate safety awareness  Safety/Judgement: Awareness of environment, Fall prevention  Functional Mobility Training:  Bed Mobility:         Not observed; pt received in chair           Transfers:  Sit to Stand: Contact guard assistance; Additional time(from chair and toilet)  Stand to Sit: Contact guard assistance                             Balance:  Sitting: Intact  Standing: Impaired; With support  Standing - Static: Good;Constant support  Standing - Dynamic : Fair;Constant support  Ambulation/Gait Training:  Distance (ft): 15 Feet (ft)(15' x 2)  Assistive Device: Gait belt;Walker, rolling  Ambulation - Level of Assistance: Contact guard assistance        Gait Abnormalities: Decreased step clearance;Trunk sway increased(flexed posture)        Base of Support: Widened     Speed/Samia: Pace decreased (<100 feet/min)  Step Length: Left shortened;Right shortened     Pain Rating:  Pt denies c/o pain    Activity Tolerance:   Fair, requires rest breaks and observed SOB with activity  Please refer to the flowsheet for vital signs taken during this treatment.     After treatment patient left in no apparent distress:   Sitting in chair and Call bell within reach    COMMUNICATION/COLLABORATION:   The patients plan of care was discussed with: Registered Nurse    Corita Opitz, PT   Time Calculation: 20 mins

## 2019-11-06 NOTE — PROGRESS NOTES
Hospitalist Progress Note  Lashell Mcdaniel MD  Answering service: [phone] from in house phone        Date of Service:  2019  NAME:  Soledad Conway  :  1940  MRN:  [id number]      Admission Summary:   67 y.o lady w/ afib, diastolic CHF, TAM, who presents with dyspnea. She describes chronic dyspnea on exertion that gradually worsened over the past week to the extent that she is short of breath at rest. No alleviating factors. She denies chest pain, cough, or fever. She does report chronic worsening bilateral lower-extremity edema as well wheezing. She recently ran out of her bumetanide but reports significant swelling even while taking this. Interval history / Subjective:     F/u SOB/Pedal edema   Feels much better but still has significant leg swelling  Assessment & Plan:     Acute on chronic diastolic heart failure NYHA II/III  -IV bumetanide, continue  -monitor I/O's and weights daily  -Negative 3.4l  -8lbs weight loss since admission  -Last echo 2018 EF 50% with no RWMA  -Echo EF 51 - 55% normal wall motion  -Appreciate discussion with Dr Lindsey Villanueva, the patient would likely be discharged tomorrow    Hypokalemia  -replace as needed     Wheezing: she reports undergoing PFTs in 2018 and was told she does not have COPD (previous smoker)  -IV methylprednisolone once given in ER, no need for more steroids  -schedule inhaled bronchodilators     Chronic afib:  -continue Xarelto, diltiazem. -Sotalol stopped and switched to amiodarone     TAM: non-compliant with CPAP  -Counseled     GERD    Morbid obesity  -Counseled    Cardiac diet    PT/OT home health    Code status: FULL CODE  DVT prophylaxis: scd  PTA: home, lives with 2 sons  Baseline: independent with walker, has wheelchair but does not use it frequently.     Plan: Follow Cardiology, likely discharge tomorrow    Care Plan discussed with: Patient/Family  Disposition: as above Hospital Problems  Date Reviewed: 11/4/2019          Codes Class Noted POA    * (Principal) Acute on chronic diastolic (congestive) heart failure (HCC) ICD-10-CM: I50.33  ICD-9-CM: 428.33, 428.0  11/4/2019 Yes        CHF (congestive heart failure) ([facility] ICD-10-CM: I50.9  ICD-9-CM: 428.0  11/4/2019 Unknown                Review of Systems:   A comprehensive review of systems was negative except for that written in the HPI. Vital Signs:    Last 24hrs VS reviewed since prior progress note. Most recent are:  Visit Vitals  /60 (BP 1 Location: Left arm, BP Patient Position: Sitting)   Pulse 62   Temp 97.5 °F (36.4 °C)   Resp 18   Ht 5' 9\" (1.753 m)   Wt 150.2 kg (331 lb 1.6 oz)   SpO2 95%   BMI 48.89 kg/m²         Intake/Output Summary (Last 24 hours) at 11/6/2019 1450  Last data filed at 11/6/2019 0724  Gross per 24 hour   Intake    Output 1900 ml   Net -1900 ml        Physical Examination:             Constitutional:  No acute distress, cooperative, pleasant    ENT:  Oral mucous moist, oropharynx benign. Resp:  CTA bilaterally. No wheezing/rhonchi/rales. No accessory muscle use   CV:  Regular rhythm, normal rate, no murmurs, gallops, rubs    GI:  Soft, non distended, non tender. normoactive bowel sounds, no hepatosplenomegaly     Musculoskeletal:  No edema, warm, 2+ pulses throughout    Neurologic:  Moves all extremities.   AAOx3, CN II-XII reviewed     Skin:  Good turgor, no rashes or ulcers       Data Review:    Review and/or order of clinical lab test      Labs:     Recent Labs     11/06/19 0049 11/05/19 0209   WBC 7.9 8.1   HGB 13.0 13.0   HCT 40.5 40.7    207     Recent Labs     11/06/19 0049 11/05/19 0209 11/03/19 2129    139 143   K 3.4* 3.1* 3.8    102 106   CO2 33* 32 31   BUN 16 12 11   CREA 0.99 0.90 0.98   GLU 97 111* 88   CA 8.3* 8.6 8.8   MG 1.7  --   --      Recent Labs     11/03/19 2129   SGOT 16   ALT 11*   AP 89   TBILI 1.2*   TP 7.0   ALB 3.1*   GLOB 3.9 No results for input(s): INR, PTP, APTT, INREXT, INREXT in the last 72 hours. No results for input(s): FE, TIBC, PSAT, FERR in the last 72 hours. No results found for: FOL, RBCF   No results for input(s): PH, PCO2, PO2 in the last 72 hours.   Recent Labs     11/03/19  2129   TROIQ <0.05     Lab Results   Component Value Date/Time    Cholesterol, total 168 03/27/2018 01:03 PM    HDL Cholesterol 61 03/27/2018 01:03 PM    LDL, calculated 85.8 03/27/2018 01:03 PM    Triglyceride 106 03/27/2018 01:03 PM    CHOL/HDL Ratio 2.8 03/27/2018 01:03 PM     Lab Results   Component Value Date/Time    Glucose (POC) 108 (H) 10/03/2014 06:32 AM     Lab Results   Component Value Date/Time    Color DARK YELLOW 06/07/2018 03:43 PM    Appearance CLOUDY (A) 06/07/2018 03:43 PM    Specific gravity 1.015 06/07/2018 03:43 PM    pH (UA) 5.5 06/07/2018 03:43 PM    Protein 30 (A) 06/07/2018 03:43 PM    Glucose NEGATIVE  06/07/2018 03:43 PM    Ketone NEGATIVE  06/07/2018 03:43 PM    Bilirubin NEGATIVE  03/27/2018 04:57 PM    Urobilinogen 2.0 (H) 06/07/2018 03:43 PM    Nitrites NEGATIVE  06/07/2018 03:43 PM    Leukocyte Esterase NEGATIVE  06/07/2018 03:43 PM    Epithelial cells FEW 06/07/2018 03:43 PM    Bacteria NEGATIVE  06/07/2018 03:43 PM    WBC 0-4 06/07/2018 03:43 PM    RBC 0-5 06/07/2018 03:43 PM         Medications Reviewed:     Current Facility-Administered Medications   Medication Dose Route Frequency    albuterol-ipratropium (DUO-NEB) 2.5 MG-0.5 MG/3 ML  3 mL Nebulization Q6H RT    sodium chloride (NS) flush 5-40 mL  5-40 mL IntraVENous Q8H    sodium chloride (NS) flush 5-40 mL  5-40 mL IntraVENous PRN    acetaminophen (TYLENOL) tablet 650 mg  650 mg Oral Q4H PRN    ondansetron (ZOFRAN) injection 4 mg  4 mg IntraVENous Q4H PRN    albuterol (PROVENTIL VENTOLIN) nebulizer solution 2.5 mg  2.5 mg Nebulization Q4H PRN    aspirin delayed-release tablet 81 mg  81 mg Oral DAILY    dilTIAZem CD (CARDIZEM CD) capsule 120 mg  120 mg Oral DAILY    rivaroxaban (XARELTO) tablet 20 mg  20 mg Oral DAILY    bumetanide (BUMEX) injection 1 mg  1 mg IntraVENous BID    amiodarone (CORDARONE) tablet 200 mg  200 mg Oral DAILY     ______________________________________________________________________  EXPECTED LENGTH OF STAY: - - -  ACTUAL LENGTH OF STAY:          Javon Amaro MD

## 2019-11-06 NOTE — PROGRESS NOTES
Problem: Falls - Risk of  Goal: *Absence of Falls  Description  Document Daiana Vazquez Fall Risk and appropriate interventions in the flowsheet.   Outcome: Progressing Towards Goal  Note:   Fall Risk Interventions:  Mobility Interventions: Patient to call before getting OOB, Utilize walker, cane, or other assistive device         Medication Interventions: Patient to call before getting OOB, Teach patient to arise slowly    Elimination Interventions: Call light in reach              Problem: Heart Failure: Day 3  Goal: Activity/Safety  Outcome: Progressing Towards Goal  Goal: Diagnostic Test/Procedures  Outcome: Progressing Towards Goal  Goal: Nutrition/Diet  Outcome: Progressing Towards Goal  Goal: Medications  Outcome: Progressing Towards Goal  Goal: Respiratory  Outcome: Progressing Towards Goal  Goal: Treatments/Interventions/Procedures  Outcome: Progressing Towards Goal  Goal: Psychosocial  Outcome: Progressing Towards Goal  Goal: *Oxygen saturation within defined limits  Outcome: Progressing Towards Goal  Goal: *Hemodynamically stable  Outcome: Progressing Towards Goal  Goal: *Optimal pain control at patient's stated goal  Outcome: Progressing Towards Goal     Problem: Pain  Goal: *Control of Pain  Outcome: Progressing Towards Goal     Problem: Breathing Pattern - Ineffective  Goal: *Absence of hypoxia  Outcome: Progressing Towards Goal  Goal: *Use of effective breathing techniques  Outcome: Progressing Towards Goal     Problem: Patient Education: Go to Patient Education Activity  Goal: Patient/Family Education  Outcome: Progressing Towards Goal

## 2019-11-06 NOTE — PROGRESS NOTES
TRANSITION OF CARE  Home with home health PT. Referred to [facility] with response pending. CM received consult for home health services. CM offered patient choice of providers. Patient selected [facility] (previous [facility] patient in 2018) and signed [place] of Choice form which CM placed on chart. CM sent referral via [facility] to [facility], and response is pending.

## 2019-11-07 ENCOUNTER — HOME HEALTH ADMISSION (OUTPATIENT)
Dept: HOME HEALTH SERVICES | Facility: HOME HEALTH | Age: 79
End: 2019-11-07
Payer: MEDICARE

## 2019-11-07 VITALS
SYSTOLIC BLOOD PRESSURE: 126 MMHG | OXYGEN SATURATION: 98 % | TEMPERATURE: 97.2 F | WEIGHT: 293 LBS | RESPIRATION RATE: 20 BRPM | BODY MASS INDEX: 43.4 KG/M2 | HEART RATE: 88 BPM | HEIGHT: 69 IN | DIASTOLIC BLOOD PRESSURE: 63 MMHG

## 2019-11-07 PROBLEM — I50.33 ACUTE ON CHRONIC DIASTOLIC (CONGESTIVE) HEART FAILURE (HCC): Status: RESOLVED | Noted: 2019-11-04 | Resolved: 2019-11-07

## 2019-11-07 LAB
ANION GAP SERPL CALC-SCNC: 4 MMOL/L (ref 5–15)
BUN SERPL-MCNC: 16 MG/DL (ref 6–20)
BUN/CREAT SERPL: 17 (ref 12–20)
CALCIUM SERPL-MCNC: 8.4 MG/DL (ref 8.5–10.1)
CHLORIDE SERPL-SCNC: 105 MMOL/L (ref 97–108)
CO2 SERPL-SCNC: 33 MMOL/L (ref 21–32)
CREAT SERPL-MCNC: 0.94 MG/DL (ref 0.55–1.02)
GLUCOSE SERPL-MCNC: 96 MG/DL (ref 65–100)
POTASSIUM SERPL-SCNC: 3.8 MMOL/L (ref 3.5–5.1)
SODIUM SERPL-SCNC: 142 MMOL/L (ref 136–145)

## 2019-11-07 PROCEDURE — 74011250637 HC RX REV CODE- 250/637: Performed by: HOSPITALIST

## 2019-11-07 PROCEDURE — 94640 AIRWAY INHALATION TREATMENT: CPT

## 2019-11-07 PROCEDURE — 74011000250 HC RX REV CODE- 250: Performed by: HOSPITALIST

## 2019-11-07 PROCEDURE — 96376 TX/PRO/DX INJ SAME DRUG ADON: CPT

## 2019-11-07 PROCEDURE — 36415 COLL VENOUS BLD VENIPUNCTURE: CPT

## 2019-11-07 PROCEDURE — 74011250637 HC RX REV CODE- 250/637: Performed by: INTERNAL MEDICINE

## 2019-11-07 PROCEDURE — 74011000250 HC RX REV CODE- 250: Performed by: INTERNAL MEDICINE

## 2019-11-07 PROCEDURE — 99218 HC RM OBSERVATION: CPT

## 2019-11-07 PROCEDURE — 80048 BASIC METABOLIC PNL TOTAL CA: CPT

## 2019-11-07 RX ORDER — BUMETANIDE 1 MG/1
1 TABLET ORAL 2 TIMES DAILY
Qty: 60 TAB | Refills: 0 | Status: SHIPPED | OUTPATIENT
Start: 2019-11-07 | End: 2020-02-18 | Stop reason: DRUGHIGH

## 2019-11-07 RX ORDER — AMIODARONE HYDROCHLORIDE 200 MG/1
200 TABLET ORAL DAILY
Qty: 30 TAB | Refills: 0 | Status: SHIPPED | OUTPATIENT
Start: 2019-11-08 | End: 2020-02-21

## 2019-11-07 RX ADMIN — ASPIRIN 81 MG: 81 TABLET, COATED ORAL at 09:00

## 2019-11-07 RX ADMIN — IPRATROPIUM BROMIDE AND ALBUTEROL SULFATE 3 ML: .5; 3 SOLUTION RESPIRATORY (INHALATION) at 10:16

## 2019-11-07 RX ADMIN — AMIODARONE HYDROCHLORIDE 200 MG: 200 TABLET ORAL at 09:00

## 2019-11-07 RX ADMIN — BUMETANIDE 1 MG: 0.25 INJECTION INTRAMUSCULAR; INTRAVENOUS at 09:00

## 2019-11-07 RX ADMIN — DILTIAZEM HYDROCHLORIDE 120 MG: 120 CAPSULE, COATED, EXTENDED RELEASE ORAL at 09:00

## 2019-11-07 RX ADMIN — RIVAROXABAN 20 MG: 20 TABLET, FILM COATED ORAL at 09:00

## 2019-11-07 NOTE — PROGRESS NOTES
Cardiology Progress Note    Pt feels better and legs are down some. She is ready to go home    Imp:  Acute on chronic diastolic heart failure, with medication non-compliance; echo EF stable at 50-55%. Afib with failed CV on sotalol; now switched to amiodarone; rate well controlled  HTN  Sleep apnea  Morbid obesity    Rec:  OK with me to be discharged  She should go home on BID bumex until I see her back, along with some potassium  Will plan CV some time in the future    Blood pressure 126/63, pulse 88, temperature 97.2 °F (36.2 °C), resp. rate 20, height 5' 9\" (1.753 m), weight 151 kg (332 lb 14.3 oz), SpO2 94 %.   Lungs clear  Cor irreg  LE edema 1+    K+3.8    Elliot Goode MD

## 2019-11-07 NOTE — PROGRESS NOTES
[address] [facility] accepted patient per Karin Moffett . They will see patient before discharge. Update to 3N Nurse - Saulo Solorio RN. CM will continue to follow. 100 Claudia Campbell  MSN, [address], RN, [address] - [phone].    3732 - Patient has discharge orders. Reached out to Diego Ortiz RN [phone]. She will update us on status. CM will continue to follow. 100 Claudia Campbell  MSN, BSCS, RN, CRM - [phone].    0900 - CM request on update for discharge and [address]. [address] request pending, referral sent yesterday afternoon. This CM called [address] Liaison - Diego Ortiz RN [phone]. Awaiting call back. Patients son will take her home once we have a time for discharge. Waiting on discharge order. Update to 3N Nurse - Saulo Solorio RN. CM will continue to follow. 100 Claudia Adrian  MSN, [address], RN, [address] - [phone].

## 2019-11-07 NOTE — PROGRESS NOTES
Bedside and Verbal shift change report given to Charanjit Salas (oncoming nurse) by Sera Tran (offgoing nurse).  Report included the following information SBAR, Kardex, Intake/Output, MAR, Recent Results, Med Rec Status and Cardiac Rhythm afib

## 2019-11-07 NOTE — DISCHARGE SUMMARY
Discharge Summary       PATIENT ID: Sujit Santos  MRN: [id number]   YOB: 1940    DATE OF ADMISSION: 11/3/2019 11:49 PM    DATE OF DISCHARGE: 11/7/2019  PRIMARY CARE PROVIDER: Dani Dailey MD     ATTENDING PHYSICIAN: Dr. Perley Phoenix  DISCHARGING PROVIDER: Karlie Gross NP    To contact this individual call [phone] and ask the  to page. If unavailable ask to be transferred the Adult Hospitalist Department. CONSULTATIONS: IP CONSULT TO CARDIOLOGY    PROCEDURES/SURGERIES: * No surgery found *    ADMITTING DIAGNOSES & HOSPITAL COURSE:   From H&P:  \"71 y.o lady w/ afib, diastolic CHF, TAM, who presents with dyspnea. She describes chronic dyspnea on exertion that gradually worsened over the past week to the extent that she is short of breath at rest. No alleviating factors. She denies chest pain, cough, or fever. She does report chronic worsening bilateral lower-extremity edema as well wheezing. She recently ran out of her bumetanide but reports significant swelling even while taking this. \"    She was started on IV bumex and daily weights and cardiology was consulted. She was found to have significant wheezing and reported that she had PFTs in 2018 and was told she did not have COPD (previous smoker). She was treated with scheduled inhaled bronchodilators and one time dose of IV methylprednisolone. She has TAM but is reportedly non-compliant with her CPAP. For her chronic Afib, her diltiazem and xarelto were restarted but it was unclear if she was still taking sotalol. Per cardiology, she failed sotalol and so this was changed to amiodarone. Echo done which shows stable EF at 50-55%. Cardiology Note (Dr. Eduard Crystal) 11/7/2019:  \"Progress Note     Pt feels better and legs are down some.   She is ready to go home     Imp:  Acute on chronic diastolic heart failure, with medication non-compliance; echo EF stable at 50-55%.   Afib with failed CV on sotalol; now switched to amiodarone; rate well controlled  HTN  Sleep apnea  Morbid obesity     Rec:  OK with me to be discharged  She should go home on BID bumex until I see her back, along with some potassium  Will plan CV some time in the future\"    Munira Dewey / PLAN:      1. Acute on Chronic Diastolic Heart Failure  -Improved  -Bumex increased to BID, continue daily potassium  -Cont xarelto and diltiazem  -F/U scheduled for 11/13/2019 at 0930  -Pt instructed to weigh daily  2. Chronic Afib  -Now stable and rate controlled  -Sotalol stopped and amiodarone started  -Continue diltiazem and xarelto  -cardiology f/u as above  3. TAM  -Counseled regarding compliance as well as effect on non-compliance on heart function over time     ADDITIONAL CARE RECOMMENDATIONS:   Monitor weight daily. Please note bumex is now twice daily. Home health nurse for heart failure teaching      PENDING TEST RESULTS:  At the time of discharge the following test results are still pending: NA    FOLLOW UP APPOINTMENTS:    Follow-up Information     Follow up With Specialties Details Why Contact Balwinder Celis MD [facility] Schedule an appointment as soon as possible for a visit in 1 week Hospital follow up [place]  [phone]      Krysta Bennett MD Cardiology Schedule an appointment as soon as possible for a visit in 1 week medication follow up [address] [place] [address]  [phone]               DIET: Heart Healthy    ACTIVITY: As tolerated and per physical therapy recommendations    WOUND CARE: NA    EQUIPMENT needed: Patient's own walker      DISCHARGE MEDICATIONS:  Current Discharge Medication List      START taking these medications    Details   amiodarone (CORDARONE) 200 mg tablet Take 1 Tab by mouth daily.  Indications: Prevention of Recurrent Atrial Fibrillation  Qty: 30 Tab, Refills: 0         CONTINUE these medications which have CHANGED    Details bumetanide (BUMEX) 1 mg tablet Take 1 Tab by mouth two (2) times a day. Indications: Accumulation of Fluid Resulting from Chronic Heart Failure  Qty: 60 Tab, Refills: 0         STOP taking these medications       sotalol (BETAPACE) 80 mg tablet Comments:   Reason for Stopping:                 NOTIFY YOUR PHYSICIAN FOR ANY OF THE FOLLOWING:   Fever over 101 degrees for 24 hours. Chest pain, shortness of breath, fever, chills, nausea, vomiting, diarrhea, change in mentation, falling, weakness, bleeding. Severe pain or pain not relieved by medications. Or, any other signs or symptoms that you may have questions about. DISPOSITION:   X Home With:  X OT X PT X HH X RN       Long term SNF/Inpatient Rehab    Independent/assisted living    Hospice    Other:       PATIENT CONDITION AT DISCHARGE:     Functional status    Poor    X Deconditioned     Independent      Cognition   X  Lucid     Forgetful     Dementia      Catheters/lines (plus indication)    Garcia     PICC     PEG    X None      Code status   X  Full code     DNR      PHYSICAL EXAMINATION AT DISCHARGE:  Constitutional:  No acute distress, cooperative, pleasant    ENT:  Oral mucous moist, oropharynx benign. Resp:  CTA bilaterally. No wheezing/rhonchi/rales. No accessory muscle use   CV:  Regularly irregular, no murmurs, gallops, rubs    GI:  Soft, non distended, non tender. normoactive bowel sounds\     Musculoskeletal:  1+ BLE edema, warm, 2+ pulses throughout    Neurologic:  Moves all extremities.   AAOx3, CN II-XII reviewed                         Skin:  Good turgor, no rashes or ulcers    /63 (BP 1 Location: Left arm, BP Patient Position: At rest)   Pulse 88   Temp 97.2 °F (36.2 °C)   Resp 20   Ht 5' 9\" (1.753 m)   Wt 151 kg (332 lb 14.3 oz)   SpO2 98%   BMI 49.16 kg/m²     CHRONIC MEDICAL DIAGNOSES:  Problem List as of 11/7/2019 Date Reviewed: 11/4/2019          Codes Class Noted - Resolved    CHF (congestive heart failure) ([facility] ICD-10-CM: I50.9  ICD-9-CM: 428.0  11/4/2019 - Present        Obesity, morbid ([facility] ICD-10-CM: E66.01  ICD-9-CM: 278.01  8/7/2018 - Present        Wheezing ICD-10-CM: R06.2  ICD-9-CM: 786.07  6/7/2018 - Present        Leukocytosis ICD-10-CM: D72.829  ICD-9-CM: 288.60  6/7/2018 - Present        Pneumonia ICD-10-CM: J18.9  ICD-9-CM: 329  6/7/2018 - Present        A-fib ([facility] ICD-10-CM: I48.91  ICD-9-CM: 427.31  3/30/2018 - Present        Hypertension ICD-10-CM: I10  ICD-9-CM: 401.9  3/30/2018 - Present        Bronchitis, acute ICD-10-CM: J20.9  ICD-9-CM: 466.0  12/17/2011 - Present        * (Principal) RESOLVED: Acute on chronic diastolic (congestive) heart failure ([facility] ICD-10-CM: I50.33  ICD-9-CM: 428.33, 428.0  11/4/2019 - 11/7/2019        RESOLVED: Hypokalemia ICD-10-CM: E87.6  ICD-9-CM: 276.8  3/30/2018 - 3/30/2018        RESOLVED: CHF exacerbation ([facility] ICD-10-CM: I50.9  ICD-9-CM: 428.0  3/27/2018 - 3/30/2018        RESOLVED: Back pain ICD-10-CM: M54.9  ICD-9-CM: 724.5  9/28/2014 - 10/4/2014        RESOLVED: Low back pain ICD-10-CM: M54.5  ICD-9-CM: 724.2  9/26/2014 - 10/4/2014        RESOLVED: Shortness of breath ICD-10-CM: R06.02  ICD-9-CM: 786.05  12/17/2011 - 12/20/2011              Greater than 25 minutes were spent with the patient on counseling and coordination of care    Signed:   Deon Perry NP  11/7/2019  10:48 AM

## 2019-11-07 NOTE — PROGRESS NOTES
Patient discharged, reviewed all discharge instructions and upcoming appt. Patient given scripts. Patient is to be set up with [address] instructed that they will be calling to schedule a home visit for tomorrow. Patient verbalized understanding.

## 2019-11-07 NOTE — NURSE NAVIGATOR
Post discharge follow up appointment scheduled with Dr. Ellen Michel on 11/13/19 at 9:30 AM at [facility]. Details on AVS.  Shanna Hopkins RN notified.

## 2019-11-07 NOTE — DISCHARGE INSTRUCTIONS
Patient Education        Learning About ACE Inhibitors for Heart Failure  Introduction    ACE (angiotensin-converting enzyme) inhibitors block an enzyme that makes blood vessels narrow. As a result, the blood vessels relax and widen. This lowers blood pressure. These medicines also put more water and salt into the urine. This also lowers blood pressure. In heart failure, your heart does not pump as much blood as your body needs. These medicines:  · Make it easier for your heart to pump. · Help reduce symptoms. · Make a heart attack or stroke less likely. Examples  These medicines include:  · Benazepril (Lotensin). · Lisinopril (Prinivil, Zestril). · Ramipril (Altace). This is not a complete list.  Possible side effects  Side effects may include:  · Cough. · Low blood pressure. You may feel dizzy and weak. · High potassium levels. · An allergic reaction. You may have other side effects or reactions not listed here. Check the information that comes with your medicine. What to know about taking this medicine  · ACE inhibitors:  ? Are often used to treat heart failure. They may be the only medicine used if your only symptoms are feeling tired and mildly short of breath with no fluid buildup (edema). But in most other cases, they are used with other medicines. ? Can cause a dry cough. If the cough is bad, talk to your doctor. You may need to try a different medicine. ? Can relieve symptoms, improve how you feel, and make it less likely you will need to stay in a hospital. And they can reduce the risk of early death. ? Can cause an allergic reaction of the skin. Symptoms may range from mild swelling to painful welts. It is rare to have severe swelling that makes it hard to breathe. · Take your medicines exactly as prescribed. Call your doctor if you think you are having a problem with your medicine. · Check with your doctor or pharmacist before you use any other medicines.  This includes over-the-counter medicines. Make sure your doctor knows all of the medicines, vitamins, herbal products, and supplements you take. Taking some medicines together can cause problems. · You should not take an ACE inhibitor if you are pregnant or planning to become pregnant. · You may need regular blood tests. Where can you learn more? Go to http://chela-montse.info/. Enter R411 in the search box to learn more about \"Learning About ACE Inhibitors for Heart Failure. \"  Current as of: April 9, 2019  Content Version: 12.2  © 9535-5308 [employer]. Care instructions adapted under license by [employer] (which disclaims liability or warranty for this information). If you have questions about a medical condition or this instruction, always ask your healthcare professional. [place] [address] any warranty or liability for your use of this information. Discharge Instructions       PATIENT ID: Celeste Paul  MRN: [id number]   YOB: 1940    DATE OF ADMISSION: 11/3/2019 11:49 PM    DATE OF DISCHARGE: 11/7/2019    PRIMARY CARE PROVIDER: Allen Gibson MD     ATTENDING PHYSICIAN: Tre Clark DO  DISCHARGING PROVIDER: Tanika Llanos NP    To contact this individual call [phone] and ask the  to page. If unavailable ask to be transferred the Adult Hospitalist Department.     DISCHARGE DIAGNOSES Acute on chronic exacerbation of heart failure    CONSULTATIONS: IP CONSULT TO CARDIOLOGY    PROCEDURES/SURGERIES: * No surgery found *    PENDING TEST RESULTS:   At the time of discharge the following test results are still pending: None    FOLLOW UP APPOINTMENTS:   Follow-up Information     Follow up With Specialties Details Why Bella Cason MD [facility] Schedule an appointment as soon as possible for a visit in 1 week Hospital follow up [address] [facility]  [address] [address]  [phone]      Concha Gaines MD Cardiology Schedule an appointment as soon as possible for a visit in 1 week medication follow up [address]  [phone]             ADDITIONAL CARE RECOMMENDATIONS: Monitor weight daily. Please note bumex is now twice daily. Home health nurse for heart failure teaching    DIET: Heart Healthy    ACTIVITY: As tolerated and per physical therapy recommendations    WOUND CARE: NA    EQUIPMENT needed: Patient's own walker      DISCHARGE MEDICATIONS:   See Medication Reconciliation Form    · It is important that you take the medication exactly as they are prescribed. · Keep your medication in the bottles provided by the pharmacist and keep a list of the medication names, dosages, and times to be taken in your wallet. · Do not take other medications without consulting your doctor. NOTIFY YOUR PHYSICIAN FOR ANY OF THE FOLLOWING:   Fever over 101 degrees for 24 hours. Chest pain, shortness of breath, fever, chills, nausea, vomiting, diarrhea, change in mentation, falling, weakness, bleeding. Severe pain or pain not relieved by medications. Or, any other signs or symptoms that you may have questions about. DISPOSITION:   X Home With:  X OT X PT X HH X RN       SNF/Inpatient Rehab/LTAC    Independent/assisted living    Hospice    Other:     CDMP Checked: Yes X     PROBLEM LIST Updated:   Yes X       Signed:   Bettina Layne NP  11/7/2019  10:05 AM

## 2019-11-07 NOTE — PROGRESS NOTES
Bedside and Verbal shift change report given to MALCOLM Travis (oncoming nurse) by Macho De Anda RN (offgoing nurse). Report included the following information SBAR, Kardex, Intake/Output, MAR and Recent Results.

## 2019-11-09 ENCOUNTER — HOME CARE VISIT (OUTPATIENT)
Dept: SCHEDULING | Facility: HOME HEALTH | Age: 79
End: 2019-11-09
Payer: MEDICARE

## 2019-11-09 PROCEDURE — 3331090002 HH PPS REVENUE DEBIT

## 2019-11-09 PROCEDURE — 3331090001 HH PPS REVENUE CREDIT

## 2019-11-09 PROCEDURE — 400013 HH SOC

## 2019-11-09 PROCEDURE — G0299 HHS/HOSPICE OF RN EA 15 MIN: HCPCS

## 2019-11-10 PROCEDURE — 3331090001 HH PPS REVENUE CREDIT

## 2019-11-10 PROCEDURE — 3331090002 HH PPS REVENUE DEBIT

## 2019-11-11 ENCOUNTER — HOME CARE VISIT (OUTPATIENT)
Dept: SCHEDULING | Facility: HOME HEALTH | Age: 79
End: 2019-11-11
Payer: MEDICARE

## 2019-11-11 VITALS
DIASTOLIC BLOOD PRESSURE: 70 MMHG | TEMPERATURE: 97.7 F | RESPIRATION RATE: 18 BRPM | HEART RATE: 68 BPM | OXYGEN SATURATION: 98 % | SYSTOLIC BLOOD PRESSURE: 125 MMHG

## 2019-11-11 PROCEDURE — G0151 HHCP-SERV OF PT,EA 15 MIN: HCPCS

## 2019-11-11 PROCEDURE — 3331090001 HH PPS REVENUE CREDIT

## 2019-11-11 PROCEDURE — 3331090002 HH PPS REVENUE DEBIT

## 2019-11-12 ENCOUNTER — HOME CARE VISIT (OUTPATIENT)
Dept: SCHEDULING | Facility: HOME HEALTH | Age: 79
End: 2019-11-12
Payer: MEDICARE

## 2019-11-12 ENCOUNTER — HOME CARE VISIT (OUTPATIENT)
Dept: HOME HEALTH SERVICES | Facility: HOME HEALTH | Age: 79
End: 2019-11-12
Payer: MEDICARE

## 2019-11-12 VITALS
DIASTOLIC BLOOD PRESSURE: 70 MMHG | HEART RATE: 86 BPM | OXYGEN SATURATION: 93 % | TEMPERATURE: 98.4 F | SYSTOLIC BLOOD PRESSURE: 122 MMHG | BODY MASS INDEX: 43.4 KG/M2 | HEIGHT: 69 IN | WEIGHT: 293 LBS

## 2019-11-12 VITALS
RESPIRATION RATE: 20 BRPM | SYSTOLIC BLOOD PRESSURE: 110 MMHG | DIASTOLIC BLOOD PRESSURE: 70 MMHG | OXYGEN SATURATION: 98 % | HEART RATE: 65 BPM

## 2019-11-12 PROCEDURE — G0300 HHS/HOSPICE OF LPN EA 15 MIN: HCPCS

## 2019-11-12 PROCEDURE — 3331090001 HH PPS REVENUE CREDIT

## 2019-11-12 PROCEDURE — 3331090002 HH PPS REVENUE DEBIT

## 2019-11-13 PROCEDURE — 3331090002 HH PPS REVENUE DEBIT

## 2019-11-13 PROCEDURE — 3331090001 HH PPS REVENUE CREDIT

## 2019-11-14 ENCOUNTER — HOME CARE VISIT (OUTPATIENT)
Dept: SCHEDULING | Facility: HOME HEALTH | Age: 79
End: 2019-11-14
Payer: MEDICARE

## 2019-11-14 VITALS
OXYGEN SATURATION: 98 % | HEART RATE: 54 BPM | SYSTOLIC BLOOD PRESSURE: 130 MMHG | DIASTOLIC BLOOD PRESSURE: 70 MMHG | TEMPERATURE: 98.3 F | RESPIRATION RATE: 18 BRPM

## 2019-11-14 PROCEDURE — 3331090001 HH PPS REVENUE CREDIT

## 2019-11-14 PROCEDURE — G0151 HHCP-SERV OF PT,EA 15 MIN: HCPCS

## 2019-11-14 PROCEDURE — 3331090002 HH PPS REVENUE DEBIT

## 2019-11-15 ENCOUNTER — HOME CARE VISIT (OUTPATIENT)
Dept: SCHEDULING | Facility: HOME HEALTH | Age: 79
End: 2019-11-15
Payer: MEDICARE

## 2019-11-15 PROCEDURE — 3331090001 HH PPS REVENUE CREDIT

## 2019-11-15 PROCEDURE — G0299 HHS/HOSPICE OF RN EA 15 MIN: HCPCS

## 2019-11-15 PROCEDURE — 3331090002 HH PPS REVENUE DEBIT

## 2019-11-16 PROCEDURE — 3331090001 HH PPS REVENUE CREDIT

## 2019-11-16 PROCEDURE — 3331090002 HH PPS REVENUE DEBIT

## 2019-11-17 PROCEDURE — 3331090002 HH PPS REVENUE DEBIT

## 2019-11-17 PROCEDURE — 3331090001 HH PPS REVENUE CREDIT

## 2019-11-18 ENCOUNTER — HOME CARE VISIT (OUTPATIENT)
Dept: SCHEDULING | Facility: HOME HEALTH | Age: 79
End: 2019-11-18
Payer: MEDICARE

## 2019-11-18 VITALS
HEART RATE: 97 BPM | DIASTOLIC BLOOD PRESSURE: 70 MMHG | RESPIRATION RATE: 20 BRPM | SYSTOLIC BLOOD PRESSURE: 130 MMHG | OXYGEN SATURATION: 97 %

## 2019-11-18 VITALS
RESPIRATION RATE: 18 BRPM | TEMPERATURE: 98.5 F | DIASTOLIC BLOOD PRESSURE: 65 MMHG | SYSTOLIC BLOOD PRESSURE: 130 MMHG | OXYGEN SATURATION: 98 % | HEART RATE: 50 BPM

## 2019-11-18 PROCEDURE — 3331090001 HH PPS REVENUE CREDIT

## 2019-11-18 PROCEDURE — G0151 HHCP-SERV OF PT,EA 15 MIN: HCPCS

## 2019-11-18 PROCEDURE — 3331090002 HH PPS REVENUE DEBIT

## 2019-11-19 ENCOUNTER — HOME CARE VISIT (OUTPATIENT)
Dept: SCHEDULING | Facility: HOME HEALTH | Age: 79
End: 2019-11-19
Payer: MEDICARE

## 2019-11-19 VITALS
DIASTOLIC BLOOD PRESSURE: 80 MMHG | RESPIRATION RATE: 18 BRPM | OXYGEN SATURATION: 97 % | SYSTOLIC BLOOD PRESSURE: 110 MMHG | HEART RATE: 79 BPM

## 2019-11-19 PROCEDURE — G0299 HHS/HOSPICE OF RN EA 15 MIN: HCPCS

## 2019-11-19 PROCEDURE — 3331090001 HH PPS REVENUE CREDIT

## 2019-11-19 PROCEDURE — 3331090002 HH PPS REVENUE DEBIT

## 2019-11-20 PROCEDURE — 3331090001 HH PPS REVENUE CREDIT

## 2019-11-20 PROCEDURE — 3331090002 HH PPS REVENUE DEBIT

## 2019-11-21 ENCOUNTER — HOME CARE VISIT (OUTPATIENT)
Dept: SCHEDULING | Facility: HOME HEALTH | Age: 79
End: 2019-11-21
Payer: MEDICARE

## 2019-11-21 VITALS
RESPIRATION RATE: 18 BRPM | OXYGEN SATURATION: 98 % | TEMPERATURE: 98.4 F | HEART RATE: 50 BPM | DIASTOLIC BLOOD PRESSURE: 70 MMHG | SYSTOLIC BLOOD PRESSURE: 150 MMHG

## 2019-11-21 PROCEDURE — G0151 HHCP-SERV OF PT,EA 15 MIN: HCPCS

## 2019-11-21 PROCEDURE — 3331090001 HH PPS REVENUE CREDIT

## 2019-11-21 PROCEDURE — 3331090002 HH PPS REVENUE DEBIT

## 2019-11-22 ENCOUNTER — HOME CARE VISIT (OUTPATIENT)
Dept: SCHEDULING | Facility: HOME HEALTH | Age: 79
End: 2019-11-22
Payer: MEDICARE

## 2019-11-22 PROCEDURE — 3331090001 HH PPS REVENUE CREDIT

## 2019-11-22 PROCEDURE — G0300 HHS/HOSPICE OF LPN EA 15 MIN: HCPCS

## 2019-11-22 PROCEDURE — 3331090002 HH PPS REVENUE DEBIT

## 2019-11-23 PROCEDURE — 3331090001 HH PPS REVENUE CREDIT

## 2019-11-23 PROCEDURE — 3331090002 HH PPS REVENUE DEBIT

## 2019-11-24 PROCEDURE — 3331090001 HH PPS REVENUE CREDIT

## 2019-11-24 PROCEDURE — 3331090002 HH PPS REVENUE DEBIT

## 2019-11-25 ENCOUNTER — HOME CARE VISIT (OUTPATIENT)
Dept: SCHEDULING | Facility: HOME HEALTH | Age: 79
End: 2019-11-25
Payer: MEDICARE

## 2019-11-25 VITALS
SYSTOLIC BLOOD PRESSURE: 125 MMHG | TEMPERATURE: 97.7 F | DIASTOLIC BLOOD PRESSURE: 60 MMHG | OXYGEN SATURATION: 96 % | RESPIRATION RATE: 18 BRPM | HEART RATE: 68 BPM

## 2019-11-25 PROCEDURE — 3331090002 HH PPS REVENUE DEBIT

## 2019-11-25 PROCEDURE — G0151 HHCP-SERV OF PT,EA 15 MIN: HCPCS

## 2019-11-25 PROCEDURE — 3331090001 HH PPS REVENUE CREDIT

## 2019-11-26 ENCOUNTER — HOME CARE VISIT (OUTPATIENT)
Dept: SCHEDULING | Facility: HOME HEALTH | Age: 79
End: 2019-11-26
Payer: MEDICARE

## 2019-11-26 PROCEDURE — G0300 HHS/HOSPICE OF LPN EA 15 MIN: HCPCS

## 2019-11-26 PROCEDURE — 3331090002 HH PPS REVENUE DEBIT

## 2019-11-26 PROCEDURE — 3331090001 HH PPS REVENUE CREDIT

## 2019-11-27 ENCOUNTER — HOME CARE VISIT (OUTPATIENT)
Dept: SCHEDULING | Facility: HOME HEALTH | Age: 79
End: 2019-11-27
Payer: MEDICARE

## 2019-11-27 VITALS
OXYGEN SATURATION: 97 % | RESPIRATION RATE: 18 BRPM | TEMPERATURE: 98 F | DIASTOLIC BLOOD PRESSURE: 74 MMHG | HEART RATE: 77 BPM | SYSTOLIC BLOOD PRESSURE: 132 MMHG

## 2019-11-27 VITALS
OXYGEN SATURATION: 98 % | SYSTOLIC BLOOD PRESSURE: 140 MMHG | RESPIRATION RATE: 18 BRPM | DIASTOLIC BLOOD PRESSURE: 70 MMHG | TEMPERATURE: 97.6 F | HEART RATE: 68 BPM

## 2019-11-27 PROCEDURE — 3331090001 HH PPS REVENUE CREDIT

## 2019-11-27 PROCEDURE — 3331090002 HH PPS REVENUE DEBIT

## 2019-11-27 PROCEDURE — G0151 HHCP-SERV OF PT,EA 15 MIN: HCPCS

## 2019-11-28 PROCEDURE — 3331090001 HH PPS REVENUE CREDIT

## 2019-11-28 PROCEDURE — 3331090002 HH PPS REVENUE DEBIT

## 2019-11-29 PROCEDURE — 3331090002 HH PPS REVENUE DEBIT

## 2019-11-29 PROCEDURE — 3331090001 HH PPS REVENUE CREDIT

## 2019-11-30 PROCEDURE — 3331090002 HH PPS REVENUE DEBIT

## 2019-11-30 PROCEDURE — 3331090001 HH PPS REVENUE CREDIT

## 2019-12-01 ENCOUNTER — HOME CARE VISIT (OUTPATIENT)
Dept: HOME HEALTH SERVICES | Facility: HOME HEALTH | Age: 79
End: 2019-12-01
Payer: MEDICARE

## 2019-12-01 VITALS
DIASTOLIC BLOOD PRESSURE: 78 MMHG | SYSTOLIC BLOOD PRESSURE: 126 MMHG | HEART RATE: 72 BPM | TEMPERATURE: 98.5 F | OXYGEN SATURATION: 99 % | RESPIRATION RATE: 18 BRPM

## 2019-12-01 PROCEDURE — 3331090002 HH PPS REVENUE DEBIT

## 2019-12-01 PROCEDURE — 3331090001 HH PPS REVENUE CREDIT

## 2019-12-02 ENCOUNTER — HOME CARE VISIT (OUTPATIENT)
Dept: HOME HEALTH SERVICES | Facility: HOME HEALTH | Age: 79
End: 2019-12-02
Payer: MEDICARE

## 2019-12-02 ENCOUNTER — HOME CARE VISIT (OUTPATIENT)
Dept: SCHEDULING | Facility: HOME HEALTH | Age: 79
End: 2019-12-02
Payer: MEDICARE

## 2019-12-02 VITALS
DIASTOLIC BLOOD PRESSURE: 7 MMHG | SYSTOLIC BLOOD PRESSURE: 125 MMHG | RESPIRATION RATE: 18 BRPM | OXYGEN SATURATION: 98 % | HEART RATE: 68 BPM | TEMPERATURE: 98 F

## 2019-12-02 PROCEDURE — 3331090002 HH PPS REVENUE DEBIT

## 2019-12-02 PROCEDURE — 3331090001 HH PPS REVENUE CREDIT

## 2019-12-02 PROCEDURE — G0151 HHCP-SERV OF PT,EA 15 MIN: HCPCS

## 2019-12-03 PROCEDURE — 3331090001 HH PPS REVENUE CREDIT

## 2019-12-03 PROCEDURE — 3331090002 HH PPS REVENUE DEBIT

## 2019-12-04 PROCEDURE — 3331090001 HH PPS REVENUE CREDIT

## 2019-12-04 PROCEDURE — 3331090002 HH PPS REVENUE DEBIT

## 2019-12-05 ENCOUNTER — HOME CARE VISIT (OUTPATIENT)
Dept: SCHEDULING | Facility: HOME HEALTH | Age: 79
End: 2019-12-05
Payer: MEDICARE

## 2019-12-05 VITALS
RESPIRATION RATE: 18 BRPM | TEMPERATURE: 98.1 F | DIASTOLIC BLOOD PRESSURE: 70 MMHG | SYSTOLIC BLOOD PRESSURE: 140 MMHG | OXYGEN SATURATION: 96 % | HEART RATE: 80 BPM

## 2019-12-05 PROCEDURE — G0151 HHCP-SERV OF PT,EA 15 MIN: HCPCS

## 2019-12-05 PROCEDURE — 3331090002 HH PPS REVENUE DEBIT

## 2019-12-05 PROCEDURE — 3331090001 HH PPS REVENUE CREDIT

## 2020-02-18 ENCOUNTER — HOSPITAL ENCOUNTER (OUTPATIENT)
Age: 80
Setting detail: OBSERVATION
Discharge: HOME HEALTH | End: 2020-02-21
Attending: STUDENT IN AN ORGANIZED HEALTH CARE EDUCATION/TRAINING PROGRAM | Admitting: STUDENT IN AN ORGANIZED HEALTH CARE EDUCATION/TRAINING PROGRAM
Payer: MEDICARE

## 2020-02-18 ENCOUNTER — APPOINTMENT (OUTPATIENT)
Dept: GENERAL RADIOLOGY | Age: 80
End: 2020-02-18
Attending: STUDENT IN AN ORGANIZED HEALTH CARE EDUCATION/TRAINING PROGRAM
Payer: MEDICARE

## 2020-02-18 DIAGNOSIS — I50.33 ACUTE ON CHRONIC DIASTOLIC CONGESTIVE HEART FAILURE (HCC): Primary | ICD-10-CM

## 2020-02-18 PROBLEM — I50.9 CHF EXACERBATION (HCC): Status: ACTIVE | Noted: 2020-02-18

## 2020-02-18 LAB
ALBUMIN SERPL-MCNC: 3 G/DL (ref 3.5–5)
ALBUMIN/GLOB SERPL: 0.7 {RATIO} (ref 1.1–2.2)
ALP SERPL-CCNC: 80 U/L (ref 45–117)
ALT SERPL-CCNC: 14 U/L (ref 12–78)
ANION GAP SERPL CALC-SCNC: 3 MMOL/L (ref 5–15)
AST SERPL-CCNC: 43 U/L (ref 15–37)
BASOPHILS # BLD: 0 K/UL (ref 0–0.1)
BASOPHILS NFR BLD: 1 % (ref 0–1)
BILIRUB SERPL-MCNC: 1 MG/DL (ref 0.2–1)
BNP SERPL-MCNC: 706 PG/ML
BUN SERPL-MCNC: 22 MG/DL (ref 6–20)
BUN/CREAT SERPL: 15 (ref 12–20)
CALCIUM SERPL-MCNC: 8.8 MG/DL (ref 8.5–10.1)
CHLORIDE SERPL-SCNC: 108 MMOL/L (ref 97–108)
CO2 SERPL-SCNC: 25 MMOL/L (ref 21–32)
COMMENT, HOLDF: NORMAL
CREAT SERPL-MCNC: 1.5 MG/DL (ref 0.55–1.02)
DIFFERENTIAL METHOD BLD: ABNORMAL
EOSINOPHIL # BLD: 0.1 K/UL (ref 0–0.4)
EOSINOPHIL NFR BLD: 2 % (ref 0–7)
ERYTHROCYTE [DISTWIDTH] IN BLOOD BY AUTOMATED COUNT: 15.9 % (ref 11.5–14.5)
GLOBULIN SER CALC-MCNC: 4.5 G/DL (ref 2–4)
GLUCOSE SERPL-MCNC: 81 MG/DL (ref 65–100)
HCT VFR BLD AUTO: 41.5 % (ref 35–47)
HGB BLD-MCNC: 13.3 G/DL (ref 11.5–16)
IMM GRANULOCYTES # BLD AUTO: 0 K/UL (ref 0–0.04)
IMM GRANULOCYTES NFR BLD AUTO: 1 % (ref 0–0.5)
INR PPP: 1.5 (ref 0.9–1.1)
LYMPHOCYTES # BLD: 1.7 K/UL (ref 0.8–3.5)
LYMPHOCYTES NFR BLD: 32 % (ref 12–49)
MAGNESIUM SERPL-MCNC: 2.2 MG/DL (ref 1.6–2.4)
MCH RBC QN AUTO: 27.2 PG (ref 26–34)
MCHC RBC AUTO-ENTMCNC: 32 G/DL (ref 30–36.5)
MCV RBC AUTO: 84.9 FL (ref 80–99)
MONOCYTES # BLD: 0.7 K/UL (ref 0–1)
MONOCYTES NFR BLD: 14 % (ref 5–13)
NEUTS SEG # BLD: 2.7 K/UL (ref 1.8–8)
NEUTS SEG NFR BLD: 50 % (ref 32–75)
NRBC # BLD: 0 K/UL (ref 0–0.01)
NRBC BLD-RTO: 0 PER 100 WBC
PLATELET # BLD AUTO: 210 K/UL (ref 150–400)
PMV BLD AUTO: 11.3 FL (ref 8.9–12.9)
POTASSIUM SERPL-SCNC: 4 MMOL/L (ref 3.5–5.1)
POTASSIUM SERPL-SCNC: 5.8 MMOL/L (ref 3.5–5.1)
PROT SERPL-MCNC: 7.5 G/DL (ref 6.4–8.2)
PROTHROMBIN TIME: 15 SEC (ref 9–11.1)
RBC # BLD AUTO: 4.89 M/UL (ref 3.8–5.2)
SAMPLES BEING HELD,HOLD: NORMAL
SODIUM SERPL-SCNC: 136 MMOL/L (ref 136–145)
TROPONIN I SERPL-MCNC: <0.05 NG/ML
WBC # BLD AUTO: 5.3 K/UL (ref 3.6–11)

## 2020-02-18 PROCEDURE — 84439 ASSAY OF FREE THYROXINE: CPT

## 2020-02-18 PROCEDURE — 84484 ASSAY OF TROPONIN QUANT: CPT

## 2020-02-18 PROCEDURE — 74011000250 HC RX REV CODE- 250: Performed by: STUDENT IN AN ORGANIZED HEALTH CARE EDUCATION/TRAINING PROGRAM

## 2020-02-18 PROCEDURE — 85610 PROTHROMBIN TIME: CPT

## 2020-02-18 PROCEDURE — 83880 ASSAY OF NATRIURETIC PEPTIDE: CPT

## 2020-02-18 PROCEDURE — 36415 COLL VENOUS BLD VENIPUNCTURE: CPT

## 2020-02-18 PROCEDURE — 93005 ELECTROCARDIOGRAM TRACING: CPT

## 2020-02-18 PROCEDURE — 85025 COMPLETE CBC W/AUTO DIFF WBC: CPT

## 2020-02-18 PROCEDURE — 99218 HC RM OBSERVATION: CPT

## 2020-02-18 PROCEDURE — 96374 THER/PROPH/DIAG INJ IV PUSH: CPT

## 2020-02-18 PROCEDURE — 80053 COMPREHEN METABOLIC PANEL: CPT

## 2020-02-18 PROCEDURE — 83735 ASSAY OF MAGNESIUM: CPT

## 2020-02-18 PROCEDURE — 84132 ASSAY OF SERUM POTASSIUM: CPT

## 2020-02-18 PROCEDURE — 71046 X-RAY EXAM CHEST 2 VIEWS: CPT

## 2020-02-18 PROCEDURE — 99282 EMERGENCY DEPT VISIT SF MDM: CPT

## 2020-02-18 PROCEDURE — 84443 ASSAY THYROID STIM HORMONE: CPT

## 2020-02-18 RX ORDER — BUMETANIDE 1 MG/1
1 TABLET ORAL DAILY
Status: ON HOLD | COMMUNITY
End: 2020-02-21 | Stop reason: SDUPTHER

## 2020-02-18 RX ORDER — DILTIAZEM HYDROCHLORIDE EXTENDED-RELEASE TABLETS 240 MG/1
240 TABLET, EXTENDED RELEASE ORAL DAILY
COMMUNITY
End: 2021-03-03

## 2020-02-18 RX ORDER — MORPHINE SULFATE 2 MG/ML
1 INJECTION, SOLUTION INTRAMUSCULAR; INTRAVENOUS
Status: DISCONTINUED | OUTPATIENT
Start: 2020-02-18 | End: 2020-02-21 | Stop reason: HOSPADM

## 2020-02-18 RX ORDER — ONDANSETRON 2 MG/ML
4 INJECTION INTRAMUSCULAR; INTRAVENOUS
Status: DISCONTINUED | OUTPATIENT
Start: 2020-02-18 | End: 2020-02-21 | Stop reason: HOSPADM

## 2020-02-18 RX ORDER — POTASSIUM CHLORIDE 1500 MG/1
20 TABLET, FILM COATED, EXTENDED RELEASE ORAL DAILY
COMMUNITY
End: 2021-03-03

## 2020-02-18 RX ORDER — HYDRALAZINE HYDROCHLORIDE 20 MG/ML
20 INJECTION INTRAMUSCULAR; INTRAVENOUS
Status: DISCONTINUED | OUTPATIENT
Start: 2020-02-18 | End: 2020-02-21 | Stop reason: HOSPADM

## 2020-02-18 RX ORDER — BUMETANIDE 0.25 MG/ML
1 INJECTION INTRAMUSCULAR; INTRAVENOUS
Status: COMPLETED | OUTPATIENT
Start: 2020-02-18 | End: 2020-02-18

## 2020-02-18 RX ORDER — ACETAMINOPHEN 325 MG/1
650 TABLET ORAL
Status: DISCONTINUED | OUTPATIENT
Start: 2020-02-18 | End: 2020-02-21 | Stop reason: HOSPADM

## 2020-02-18 RX ADMIN — BUMETANIDE 1 MG: 0.25 INJECTION INTRAMUSCULAR; INTRAVENOUS at 22:25

## 2020-02-18 NOTE — ED TRIAGE NOTES
1821: Patient arrives for bilateral leg swelling that started Friday.  Patient also c/o wheezing.    + SOB    Denies dizziness, cough, recent travel or long car rides, chest pain    Hx: CHF, Afib

## 2020-02-19 ENCOUNTER — HOME HEALTH ADMISSION (OUTPATIENT)
Dept: HOME HEALTH SERVICES | Facility: HOME HEALTH | Age: 80
End: 2020-02-19
Payer: MEDICARE

## 2020-02-19 LAB
ANION GAP SERPL CALC-SCNC: 6 MMOL/L (ref 5–15)
ATRIAL RATE: 125 BPM
BUN SERPL-MCNC: 19 MG/DL (ref 6–20)
BUN/CREAT SERPL: 15 (ref 12–20)
CALCIUM SERPL-MCNC: 8.9 MG/DL (ref 8.5–10.1)
CALCULATED R AXIS, ECG10: 11 DEGREES
CALCULATED T AXIS, ECG11: 69 DEGREES
CHLORIDE SERPL-SCNC: 105 MMOL/L (ref 97–108)
CO2 SERPL-SCNC: 27 MMOL/L (ref 21–32)
CREAT SERPL-MCNC: 1.23 MG/DL (ref 0.55–1.02)
DIAGNOSIS, 93000: NORMAL
ERYTHROCYTE [DISTWIDTH] IN BLOOD BY AUTOMATED COUNT: 15.5 % (ref 11.5–14.5)
GLUCOSE SERPL-MCNC: 88 MG/DL (ref 65–100)
HCT VFR BLD AUTO: 43.8 % (ref 35–47)
HGB BLD-MCNC: 14.1 G/DL (ref 11.5–16)
MAGNESIUM SERPL-MCNC: 2.2 MG/DL (ref 1.6–2.4)
MCH RBC QN AUTO: 27.1 PG (ref 26–34)
MCHC RBC AUTO-ENTMCNC: 32.2 G/DL (ref 30–36.5)
MCV RBC AUTO: 84.1 FL (ref 80–99)
NRBC # BLD: 0 K/UL (ref 0–0.01)
NRBC BLD-RTO: 0 PER 100 WBC
PLATELET # BLD AUTO: 223 K/UL (ref 150–400)
PMV BLD AUTO: 10.4 FL (ref 8.9–12.9)
POTASSIUM SERPL-SCNC: 4.2 MMOL/L (ref 3.5–5.1)
Q-T INTERVAL, ECG07: 386 MS
QRS DURATION, ECG06: 90 MS
QTC CALCULATION (BEZET), ECG08: 425 MS
RBC # BLD AUTO: 5.21 M/UL (ref 3.8–5.2)
SODIUM SERPL-SCNC: 138 MMOL/L (ref 136–145)
T4 FREE SERPL-MCNC: 1.9 NG/DL (ref 0.8–1.5)
TSH SERPL DL<=0.05 MIU/L-ACNC: 1.07 UIU/ML (ref 0.36–3.74)
VENTRICULAR RATE, ECG03: 73 BPM
WBC # BLD AUTO: 4.8 K/UL (ref 3.6–11)

## 2020-02-19 PROCEDURE — 94660 CPAP INITIATION&MGMT: CPT

## 2020-02-19 PROCEDURE — 74011250637 HC RX REV CODE- 250/637: Performed by: STUDENT IN AN ORGANIZED HEALTH CARE EDUCATION/TRAINING PROGRAM

## 2020-02-19 PROCEDURE — 96376 TX/PRO/DX INJ SAME DRUG ADON: CPT

## 2020-02-19 PROCEDURE — 99218 HC RM OBSERVATION: CPT

## 2020-02-19 PROCEDURE — 74011000250 HC RX REV CODE- 250: Performed by: STUDENT IN AN ORGANIZED HEALTH CARE EDUCATION/TRAINING PROGRAM

## 2020-02-19 PROCEDURE — 80048 BASIC METABOLIC PNL TOTAL CA: CPT

## 2020-02-19 PROCEDURE — 36415 COLL VENOUS BLD VENIPUNCTURE: CPT

## 2020-02-19 PROCEDURE — 85027 COMPLETE CBC AUTOMATED: CPT

## 2020-02-19 PROCEDURE — 83735 ASSAY OF MAGNESIUM: CPT

## 2020-02-19 RX ORDER — POTASSIUM CHLORIDE 750 MG/1
20 TABLET, FILM COATED, EXTENDED RELEASE ORAL
Status: DISCONTINUED | OUTPATIENT
Start: 2020-02-19 | End: 2020-02-21 | Stop reason: HOSPADM

## 2020-02-19 RX ORDER — POTASSIUM CHLORIDE 750 MG/1
20 TABLET, FILM COATED, EXTENDED RELEASE ORAL 2 TIMES DAILY
Status: DISCONTINUED | OUTPATIENT
Start: 2020-02-19 | End: 2020-02-19

## 2020-02-19 RX ORDER — AMIODARONE HYDROCHLORIDE 200 MG/1
200 TABLET ORAL DAILY
Status: DISCONTINUED | OUTPATIENT
Start: 2020-02-19 | End: 2020-02-19

## 2020-02-19 RX ORDER — BUMETANIDE 0.25 MG/ML
1 INJECTION INTRAMUSCULAR; INTRAVENOUS 2 TIMES DAILY
Status: DISCONTINUED | OUTPATIENT
Start: 2020-02-19 | End: 2020-02-21 | Stop reason: HOSPADM

## 2020-02-19 RX ORDER — IPRATROPIUM BROMIDE AND ALBUTEROL SULFATE 2.5; .5 MG/3ML; MG/3ML
3 SOLUTION RESPIRATORY (INHALATION)
Status: DISCONTINUED | OUTPATIENT
Start: 2020-02-19 | End: 2020-02-21 | Stop reason: HOSPADM

## 2020-02-19 RX ORDER — ASPIRIN 81 MG/1
81 TABLET ORAL DAILY
Status: DISCONTINUED | OUTPATIENT
Start: 2020-02-19 | End: 2020-02-21 | Stop reason: HOSPADM

## 2020-02-19 RX ORDER — DILTIAZEM HYDROCHLORIDE 120 MG/1
120 CAPSULE, COATED, EXTENDED RELEASE ORAL DAILY
Status: DISCONTINUED | OUTPATIENT
Start: 2020-02-19 | End: 2020-02-19

## 2020-02-19 RX ORDER — DILTIAZEM HYDROCHLORIDE 240 MG/1
240 CAPSULE, COATED, EXTENDED RELEASE ORAL DAILY
Status: DISCONTINUED | OUTPATIENT
Start: 2020-02-19 | End: 2020-02-21 | Stop reason: HOSPADM

## 2020-02-19 RX ORDER — AMIODARONE HYDROCHLORIDE 200 MG/1
200 TABLET ORAL DAILY
Status: DISCONTINUED | OUTPATIENT
Start: 2020-02-20 | End: 2020-02-19

## 2020-02-19 RX ADMIN — DILTIAZEM HYDROCHLORIDE 240 MG: 240 CAPSULE, EXTENDED RELEASE ORAL at 08:54

## 2020-02-19 RX ADMIN — POTASSIUM CHLORIDE 20 MEQ: 750 TABLET, FILM COATED, EXTENDED RELEASE ORAL at 08:54

## 2020-02-19 RX ADMIN — BUMETANIDE 1 MG: 0.25 INJECTION INTRAMUSCULAR; INTRAVENOUS at 17:23

## 2020-02-19 RX ADMIN — ASPIRIN 81 MG: 81 TABLET, COATED ORAL at 08:54

## 2020-02-19 RX ADMIN — AMIODARONE HYDROCHLORIDE 200 MG: 200 TABLET ORAL at 08:54

## 2020-02-19 RX ADMIN — RIVAROXABAN 20 MG: 20 TABLET, FILM COATED ORAL at 08:54

## 2020-02-19 RX ADMIN — BUMETANIDE 1 MG: 0.25 INJECTION INTRAMUSCULAR; INTRAVENOUS at 08:54

## 2020-02-19 NOTE — PROGRESS NOTES
Reason for Admission: Patient presented with diastolic heart failure, increased SOB                     RUR Score: 14% risk of re-admission                  Do you (patient/family) have any concerns for transition/discharge? Plan for utilizing home health:  CM offered [facility] for CHF and patient is agreeable, referral sent to [facility] via cclink for [facility]. The patient denied any current home health services in place      Current Advanced Directive/Advance Care Plan:  Full code, not on file             Transition of Care Plan: Home, referral placed for [facility]    The CM met with the patient at bedside in order to introduce the role of CM and assess for patient needs. The patient lives in private two-story home with her two adult sons- Lesli Steele and Owen. The patient endorse Lesli Steele has brain damage, additional son Marcel lives locally in [place] [place]. The patient verified demographics and insurance information. The patient is typically independent with ADLs, mobilizes with a walker at baseline and also has wheelchair and CPAP machine at home. The patient's son transports to and from MD appointments, or she utilizes family friends if needed- endorses having support. The patient denied difficulty accessing medications- utilizes [facility] pharmacy. The patient endorses family will transport home. The CM offered [facility], patient is agreeable- referral sent via cclink. Observation notice provided in writing to patient and/or caregiver as well as verbal explanation of the policy. Patients who are in outpatient status also receive the Observation notice. The CM will follow for transitions of care. JOSH Carlin      Patient has been accepted for [facility] with [facility]. Information on AVS.     Care Management Interventions  PCP Verified by CM: Yes(Patient verified PCP as Dr. Harris Pfeiffer )  Mode of Transport at Discharge:  Other (see comment)(Family will transport home upon discharge)  Transition of Care Consult (CM Consult): Discharge Planning  MyChart Signup: No  Discharge Durable Medical Equipment: No  Health Maintenance Reviewed: Yes  Physical Therapy Consult: No  Occupational Therapy Consult: No  Speech Therapy Consult: No  Current Support Network: Own Home, Family Lives Nearby, Other(Patient lives at home with her two adult sons- additional son lives in [facility])  Confirm Follow Up Transport: Family  Discharge Location  Discharge Placement: Home(H2H for CHF )

## 2020-02-19 NOTE — NURSE NAVIGATOR
Chart reviewed by Heart Failure Nurse Navigator. Heart Failure database completed. EF:  51-55%  (echo dated 11/5/19)     ACEi/ARB/ARNi: currently not indicated    BB: currently not indicated    Aldosterone Antagonist: currently not indicated    Obstructive Sleep Apnea Screening:   STOP-BANG score:   Referred to Sleep Medicine:     CRT  Currently not indicated. NYHA Functional Class not assigned. Documentation requested     Heart Failure Teach Back in Patient Education. Heart Failure Avoiding Triggers on Discharge Instructions. Cardiologist: Dr. Sidra Stuart discharge follow up phone call to be made within 48-72 hours of discharge.

## 2020-02-19 NOTE — PROGRESS NOTES
0730- assumed care of pt this am. Pt is alert and oriented resting in bed at this time. Pt BLE are +4+ pitting edema, but have improved per night shift nurse.

## 2020-02-19 NOTE — PROGRESS NOTES
[address] [facility] Adult  Hospitalist Group                                                                                          Hospitalist Progress Note  Rosalino Fairbanks MD  Answering service: [phone] OR [phone] from in house phone        Date of Service:  2020  NAME:  Bertha Gonzalez  :  1940  MRN:  [id number]      Admission Summary:   Patient is a 79year old Female with medical history significant for diastolic heart failure,Hypertension , Afib,Sleep apnea and Obesity who presents to the Emergency Department with Chief complaint of  Worsening Shortness of breath. Patient reports , over the past 5 days she noted increased shortness of breath and currently she is feeling short of breath at rest and while trying to get out of bed. She reports associated increased bilateral leg swelling, wheezing, increased orthopnea and right pleuritic chest discomfort but denies any fever, chills, nausea, vomiting, palpitation, syncope or presyncope, PND, urinary or bowel complaints.     At [facility] emergency department, V/S were remarkable for /106, SPO2 93% RA. Lab work-ups: No leukocytosis or lactic acidosis, troponin WNL. Chest x-ray: Cardiomegaly and minimal interstitial edema. Interval history / Subjective: Follow up CHF exacerbation, SOB  Patient seen and examined at the bedside. Labs, images and notes reviewed  Discussed with nursing staff, orders reviewed. Plan discussed with patient/Family  Pt is resting in the bed. Breathing ok. Good UOP. Left leg swelling better but right leg still tight and edematous. No other concerns. Overnight events noted. Assessment & Plan:     Acute on chronic diastolic CHF  Presenting with increased shortness of breath/orthopnea/leg swelling  Chest x-ray: Stable cardiomegaly and minimal interstitial edema  Strict I/O's.  Daily weights  Fluid restriction to 2L/day  Bumex 1 g IV twice daily  Check electrolytes, renal function daily   Echo in : EF 51 to 55%  Repeat Echo cancelled per cardiology. Last 11/5/19 noted. Cardiology consult. Cont diuresis     SUJATHA, improving  Creatinine 1.5 (baseline 0.9) on admission  Likely cardiorenal  Expect improvement with diuresis  Consider further work-up if no improvement    Hyperkalemia, resolved  K5.8 on admission  No sign of hyperkalemia on EKG  No intervention at this time     Chronic A. Fib  Rate controlled  Continue home diltiazem, Xarelto  DC Amio per cardiology     Hypertension   Cont home meds   Hydralazin as needed      Sleep apnea   Continue CPAP nightly     Morbid obesity  Body mass index is 48.57 kg/m². Counseled on healthy diet and exercise     Patient's Baseline: Ambulates with walking  DVT ppx: On Xarelto  Code status: Full  Disposition: TBD  Care plan discussed with patient/family and nurse. Care Plan discussed with: Patient/Family  Anticipated Disposition: Home w/Family  Anticipated Discharge: 24 hours to 48 hours     Hospital Problems  Date Reviewed: 11/4/2019          Codes Class Noted POA    CHF exacerbation ([facility] [address] ICD-10-CM: I50.9  ICD-9-CM: 428.0  2/18/2020 Unknown                Review of Systems:   A comprehensive review of systems was negative except for that written in the HPI. Vital Signs:    Last 24hrs VS reviewed since prior progress note.  Most recent are:  Visit Vitals  /44 (BP 1 Location: Left arm, BP Patient Position: At rest)   Pulse 61   Temp 97.9 °F (36.6 °C)   Resp 16   Ht 5' 9.02\" (1.753 m)   Wt 149.2 kg (328 lb 14.8 oz)   SpO2 95%   BMI 48.55 kg/m²         Intake/Output Summary (Last 24 hours) at 2/19/2020 1649  Last data filed at 2/19/2020 1534  Gross per 24 hour   Intake 650 ml   Output 3200 ml   Net -2550 ml        Physical Examination:     General:          Alert, cooperative, no distress, appears stated age. Aliya Lacy  HEENT:           Atraumatic, anicteric sclerae, pink conjunctivae                          No oral ulcers, mucosa moist, throat clear, dentition fair  Neck:               Supple, symmetrical,  thyroid: non tender  Lungs:             Clear to auscultation bilaterally.  No Wheezing or Rhonchi. No rales. Chest wall:      No tenderness  No Accessory muscle use. Heart:              Regular  rhythm,  No  murmur   No edema  Abdomen:        Soft, non-tender. Not distended.  Bowel sounds normal  Extremities:     No cyanosis.  No clubbing,                            Skin turgor normal, Capillary refill normal, Radial dial pulse 2+  Skin:                Not pale.  Not Jaundiced  No rashes   Psych:             Not anxious or agitated. Neurologic:     Alert and oriented to PPT, CNII-XII intact. Motor and sensory exam grossly intact. Data Review:    Review and/or order of clinical lab test  Review and/or order of tests in the radiology section of CPT  Review and/or order of tests in the medicine section of CPT    Xr Chest Pa Lat    Result Date: 2/18/2020  IMPRESSION: Cardiomegaly and minimal interstitial edema. Labs:     Recent Labs     02/19/20  1250 02/18/20  1830   WBC 4.8 5.3   HGB 14.1 13.3   HCT 43.8 41.5    210     Recent Labs     02/19/20  1250 02/18/20  2303 02/18/20  1845 02/18/20  1830     --   --  136   K 4.2 4.0  --  5.8*     --   --  108   CO2 27  --   --  25   BUN 19  --   --  22*   CREA 1.23*  --   --  1.50*   GLU 88  --   --  81   CA 8.9  --   --  8.8   MG 2.2  --  2.2  --      Recent Labs     02/18/20  1830   SGOT 43*   ALT 14   AP 80   TBILI 1.0   TP 7.5   ALB 3.0*   GLOB 4.5*     Recent Labs     02/18/20  1845   INR 1.5*   PTP 15.0*      No results for input(s): FE, TIBC, PSAT, FERR in the last 72 hours. No results found for: FOL, RBCF   No results for input(s): PH, PCO2, PO2 in the last 72 hours.   Recent Labs     02/18/20  1845   TROIQ <0.05     Lab Results   Component Value Date/Time    Cholesterol, total 168 03/27/2018 01:03 PM    HDL Cholesterol 61 03/27/2018 01:03 PM    LDL, calculated 85.8 03/27/2018 01:03 PM Triglyceride 106 03/27/2018 01:03 PM    CHOL/HDL Ratio 2.8 03/27/2018 01:03 PM     Lab Results   Component Value Date/Time    Glucose (POC) 108 (H) 10/03/2014 06:32 AM     Lab Results   Component Value Date/Time    Color DARK YELLOW 06/07/2018 03:43 PM    Appearance CLOUDY (A) 06/07/2018 03:43 PM    Specific gravity 1.015 06/07/2018 03:43 PM    pH (UA) 5.5 06/07/2018 03:43 PM    Protein 30 (A) 06/07/2018 03:43 PM    Glucose NEGATIVE  06/07/2018 03:43 PM    Ketone NEGATIVE  06/07/2018 03:43 PM    Bilirubin NEGATIVE  03/27/2018 04:57 PM    Urobilinogen 2.0 (H) 06/07/2018 03:43 PM    Nitrites NEGATIVE  06/07/2018 03:43 PM    Leukocyte Esterase NEGATIVE  06/07/2018 03:43 PM    Epithelial cells FEW 06/07/2018 03:43 PM    Bacteria NEGATIVE  06/07/2018 03:43 PM    WBC 0-4 06/07/2018 03:43 PM    RBC 0-5 06/07/2018 03:43 PM         Medications Reviewed:     Current Facility-Administered Medications   Medication Dose Route Frequency    albuterol-ipratropium (DUO-NEB) 2.5 MG-0.5 MG/3 ML  3 mL Nebulization Q4H PRN    aspirin delayed-release tablet 81 mg  81 mg Oral DAILY    bumetanide (BUMEX) injection 1 mg  1 mg IntraVENous BID    dilTIAZem CD (CARDIZEM CD) capsule 240 mg  240 mg Oral DAILY    potassium chloride SR (KLOR-CON 10) tablet 20 mEq  20 mEq Oral DAILY WITH BREAKFAST    [START ON 2/20/2020] rivaroxaban (XARELTO) tablet 20 mg  20 mg Oral DAILY WITH LUNCH    hydrALAZINE (APRESOLINE) 20 mg/mL injection 20 mg  20 mg IntraVENous Q6H PRN    ondansetron (ZOFRAN) injection 4 mg  4 mg IntraVENous Q6H PRN    acetaminophen (TYLENOL) tablet 650 mg  650 mg Oral Q6H PRN    morphine injection 1 mg  1 mg IntraVENous Q4H PRN     ______________________________________________________________________  EXPECTED LENGTH OF STAY: - - -  ACTUAL LENGTH OF STAY:          Jean-Paul Finn MD

## 2020-02-19 NOTE — H&P
HISTORY AND PHYSICAL  Steph Calle MD        PCP: Jose Mcclellan MD    Please note that this dictation was completed with [employer], the computer voice recognition software. Quite often unanticipated grammatical, syntax, homophones, and other interpretive errors are inadvertently transcribed by the computer software. Please disregard these errors. Please excuse any errors that have escaped final proofreading. CHIEF COMPLAINTS   Shortness of breath       HISTORY OF PRESENT ILLNESS  Patient is a 79year old Female with medical history significant for diastolic heart failure,Hypertension , Afib,Sleep apnea and Obesity who presents to the Emergency Department with Chief complaint of  Worsening Shortness of breath. Patient reports , over the past 5 days she noted increased shortness of breath and currently she is feeling short of breath at rest and while trying to get out of bed. She reports associated increased bilateral leg swelling, wheezing, increased orthopnea and right pleuritic chest discomfort but denies any fever, chills, nausea, vomiting, palpitation, syncope or presyncope, PND, urinary or bowel complaints. At [facility] emergency department, V/S were remarkable for /106, SPO2 93% RA. Lab work-ups: No leukocytosis or lactic acidosis, troponin WNL. Chest x-ray: Cardiomegaly and minimal interstitial edema.          PMH/PSH:  Past Medical History:   Diagnosis Date    Arthritis     Atrial fibrillation (HCC)     WHITING (dyspnea on exertion)     GERD (gastroesophageal reflux disease)     Heart failure (HCC)     Hypertension     Obesity     Other ill-defined conditions(799.89)     \"fluid in the lung\"    Sleep apnea      Past Surgical History:   Procedure Laterality Date    COLONOSCOPY N/A 3/30/2017    COLONOSCOPY performed by Claudine Dunn MD at [address] [place] [address] ENDOSCOPY    HX CHOLECYSTECTOMY      HX HERNIA REPAIR      HX ORTHOPAEDIC      right and left total knee replacement    HX ORTHOPAEDIC      right shoulder    HX OTHER SURGICAL      kidney stone surgery    HX OTHER SURGICAL      both eyes cataract    CO CARDIOVERSION ELECTIVE ARRHYTHMIA EXTERNAL N/A 10/9/2019    EP CARDIOVERSION performed by Byron Coley MD at [facility] CATH LAB       Home meds:   Prior to Admission medications    Medication Sig Start Date End Date Taking? Authorizing Provider   amiodarone (CORDARONE) 200 mg tablet Take 1 Tab by mouth daily. Indications: Prevention of Recurrent Atrial Fibrillation 11/8/19   Amy Wills NP   bumetanide (BUMEX) 1 mg tablet Take 1 Tab by mouth two (2) times a day. Indications: Accumulation of Fluid Resulting from Chronic Heart Failure 11/7/19   Amy Wills NP   acetaminophen (TYLENOL) 500 mg tablet Take 2 Tabs by mouth every six (6) hours as needed for Pain. 7/2/19   La Diana MD   Nebulizer & Compressor machine Use as directed 6/11/18   Angie Gifford MD   albuterol-ipratropium (DUO-NEB) 2.5 mg-0.5 mg/3 ml nebu 3 mL by Nebulization route every four (4) hours as needed. Indications: Reactive Airway Disease ICD: J45.909  Patient taking differently: 3 mL by Nebulization route every four (4) hours as needed (as needed for shortness of breath). Indications: Reactive Airway Disease ICD: J45.909 6/11/18   Angie Gifford MD   dilTIAZem CD (CARDIZEM CD) 120 mg ER capsule Take 1 Cap by mouth daily. 3/30/18   Spencer Renteria NP   potassium chloride SR (K-TAB) 20 mEq tablet Take 1 Tab by mouth two (2) times a day. 3/30/18   Spencer Renteria NP   rivaroxaban (XARELTO) 20 mg tab tablet Take 1 Tab by mouth daily (with lunch). Indications: PREVENT THROMBOEMBOLISM IN CHRONIC ATRIAL FIBRILLATION 3/30/18   Spencer Renteria NP   aspirin delayed-release 81 mg tablet Take 1 Tab by mouth daily. 3/31/18   Spencer Renteria NP       Allergies:   Allergies   Allergen Reactions    Penicillins Hives       FH:  Family History   Problem Relation Age of Onset    Tuberculosis Mother     Lung Disease Father        SH:  Social History     Tobacco Use    Smoking status: Former Smoker     Packs/day: 1.00    Smokeless tobacco: Never Used    Tobacco comment: quit at age 42,smoked for 17 years   Substance Use Topics    Alcohol use: No       Review of Systems   Constitutional: Negative for chills and fever. HENT: Negative for ear pain, hearing loss and tinnitus. Eyes: Negative for blurred vision and double vision. Respiratory: Positive for cough, shortness of breath and wheezing. Negative for hemoptysis and sputum production. Cardiovascular: Positive for orthopnea and leg swelling. Negative for chest pain and palpitations. Gastrointestinal: Negative for heartburn and nausea. Genitourinary: Negative for dysuria and urgency. Musculoskeletal: Negative for myalgias and neck pain. Skin: Negative for itching and rash. Neurological: Negative for dizziness, tingling and headaches. Endo/Heme/Allergies: Negative for environmental allergies. Does not bruise/bleed easily. Psychiatric/Behavioral: Negative for depression and suicidal ideas. All other systems reviewed and are negative. PHYSICAL EXAM:  Visit Vitals  BP (!) 140/106 (BP 1 Location: Left arm, BP Patient Position: At rest;Sitting)   Pulse 65   Temp 98 °F (36.7 °C)   Resp 15   SpO2 93%       General:          Alert, cooperative, no distress, appears stated age. HEENT:           Atraumatic, anicteric sclerae, pink conjunctivae                          No oral ulcers, mucosa moist, throat clear, dentition fair  Neck:               Supple, symmetrical,  thyroid: non tender  Lungs:             Clear to auscultation bilaterally. No Wheezing or Rhonchi. No rales. Chest wall:      No tenderness  No Accessory muscle use. Heart:              Regular  rhythm,  No  murmur   No edema  Abdomen:        Soft, non-tender. Not distended. Bowel sounds normal  Extremities:     No cyanosis.   No clubbing, Skin turgor normal, Capillary refill normal, Radial dial pulse 2+  Skin:                Not pale. Not Jaundiced  No rashes   Psych:             Not anxious or agitated. Neurologic:     Alert and oriented to PPT, CNII-XII intact. Motor and sensory exam grossly intact. Labs/Imaging:  Recent Results (from the past 24 hour(s))   CBC WITH AUTOMATED DIFF    Collection Time: 02/18/20  6:30 PM   Result Value Ref Range    WBC 5.3 3.6 - 11.0 K/uL    RBC 4.89 3.80 - 5.20 M/uL    HGB 13.3 11.5 - 16.0 g/dL    HCT 41.5 35.0 - 47.0 %    MCV 84.9 80.0 - 99.0 FL    MCH 27.2 26.0 - 34.0 PG    MCHC 32.0 30.0 - 36.5 g/dL    RDW 15.9 (H) 11.5 - 14.5 %    PLATELET 874 988 - 828 K/uL    MPV 11.3 8.9 - 12.9 FL    NRBC 0.0 0  WBC    ABSOLUTE NRBC 0.00 0.00 - 0.01 K/uL    NEUTROPHILS 50 32 - 75 %    LYMPHOCYTES 32 12 - 49 %    MONOCYTES 14 (H) 5 - 13 %    EOSINOPHILS 2 0 - 7 %    BASOPHILS 1 0 - 1 %    IMMATURE GRANULOCYTES 1 (H) 0.0 - 0.5 %    ABS. NEUTROPHILS 2.7 1.8 - 8.0 K/UL    ABS. LYMPHOCYTES 1.7 0.8 - 3.5 K/UL    ABS. MONOCYTES 0.7 0.0 - 1.0 K/UL    ABS. EOSINOPHILS 0.1 0.0 - 0.4 K/UL    ABS. BASOPHILS 0.0 0.0 - 0.1 K/UL    ABS. IMM. GRANS. 0.0 0.00 - 0.04 K/UL    DF AUTOMATED     METABOLIC PANEL, COMPREHENSIVE    Collection Time: 02/18/20  6:30 PM   Result Value Ref Range    Sodium 136 136 - 145 mmol/L    Potassium 5.8 (H) 3.5 - 5.1 mmol/L    Chloride 108 97 - 108 mmol/L    CO2 25 21 - 32 mmol/L    Anion gap 3 (L) 5 - 15 mmol/L    Glucose 81 65 - 100 mg/dL    BUN 22 (H) 6 - 20 MG/DL    Creatinine 1.50 (H) 0.55 - 1.02 MG/DL    BUN/Creatinine ratio 15 12 - 20      GFR est AA 41 (L) >60 ml/min/1.73m2    GFR est non-AA 33 (L) >60 ml/min/1.73m2    Calcium 8.8 8.5 - 10.1 MG/DL    Bilirubin, total 1.0 0.2 - 1.0 MG/DL    ALT (SGPT) 14 12 - 78 U/L    AST (SGOT) 43 (H) 15 - 37 U/L    Alk.  phosphatase 80 45 - 117 U/L    Protein, total 7.5 6.4 - 8.2 g/dL    Albumin 3.0 (L) 3.5 - 5.0 g/dL    Globulin 4.5 (H) 2.0 - 4.0 g/dL A-G Ratio 0.7 (L) 1.1 - 2.2     SAMPLES BEING HELD    Collection Time: 02/18/20  6:30 PM   Result Value Ref Range    SAMPLES BEING HELD 1RED     COMMENT        Add-on orders for these samples will be processed based on acceptable specimen integrity and analyte stability, which may vary by analyte. MAGNESIUM    Collection Time: 02/18/20  6:45 PM   Result Value Ref Range    Magnesium 2.2 1.6 - 2.4 mg/dL   NT-PRO BNP    Collection Time: 02/18/20  6:45 PM   Result Value Ref Range    NT pro- (H) <450 PG/ML   PROTHROMBIN TIME + INR    Collection Time: 02/18/20  6:45 PM   Result Value Ref Range    INR 1.5 (H) 0.9 - 1.1      Prothrombin time 15.0 (H) 9.0 - 11.1 sec   TROPONIN I    Collection Time: 02/18/20  6:45 PM   Result Value Ref Range    Troponin-I, Qt. <0.05 <0.05 ng/mL       Recent Labs     02/18/20  1830   WBC 5.3   HGB 13.3   HCT 41.5        Recent Labs     02/18/20  1845 02/18/20  1830   NA  --  136   K  --  5.8*   CL  --  108   CO2  --  25   BUN  --  22*   CREA  --  1.50*   GLU  --  81   CA  --  8.8   MG 2.2  --      Recent Labs     02/18/20  1830   SGOT 43*   ALT 14   AP 80   TBILI 1.0   TP 7.5   ALB 3.0*   GLOB 4.5*       Recent Labs     02/18/20 1845   TROIQ <0.05       Recent Labs     02/18/20 1845   INR 1.5*   PTP 15.0*        No results for input(s): PH, PCO2, PO2 in the last 72 hours. XR CHEST PA LAT  Narrative: CLINICAL HISTORY: Chest pain   INDICATION: Chest pain    COMPARISON: 11/3/2019    FINDINGS:   PA and lateral views of the chest are obtained. The cardiopericardial silhouette is enlarged. Minimal interstitial edema. . There  is no pleural effusion, pneumothorax or focal consolidation present. Impression: IMPRESSION:    Cardiomegaly and minimal interstitial edema.           Assessment & Plan:  ====================  Acute on chronic diastolic CHF  Presenting with increased shortness of breath/orthopnea/leg swelling  Chest x-ray: Stable cardiomegaly and minimal interstitial edema  Strict I/O's. Daily weights  Fluid restriction to 2L/day  Bumex 1 g IV twice daily  Check electrolytes, renal function daily   Echo in 11/19: EF 51 to 55%  Repeat Echo in Am  Cardiology consult. SUJATHA  Creatinine 1.5 (baseline 0.9)  Likely cardiorenal  Expect improvement with diuresis  Consider further work-up if no improvement    Hyperkalemia   K5.8  No sign of hyperkalemia on EKG  recheck  No intervention at this time    Chronic A. Fib  Rate controlled  Continue home diltiazem, amiodarone and Xarelto    Hypertension   Cont home meds   Hydralazin as needed     Sleep apnea   Continue CPAP nightly    Morbid obesity  Body mass index is 48.57 kg/m². Counseled on healthy diet and exercise      Patient's Baseline: Ambulates with walking  DVT ppx: On Xarelto  Code status: Full  Disposition: TBD  Care plan discussed with patient/family and nurse.                 Signed By: Anu Gaston MD     February 18, 2020

## 2020-02-19 NOTE — CONSULTS
Cardiology Consult Note    Pt seen and examined  Son at bedside    Hx:  Patient well known to me but I have not seen her in follow up since her discharge 3 months ago. She states she was doing pretty well with home health but began to notice increased WHITING over the last 2-3 weeks, and worsened over the last 5 days with PND, orthopnea and edema with some wheezing. She has not used CPAP as she does not tolerate it. She has been in afib since her discharge 3 months ago and was supposed to come back for a repeat cardioversion on amiodarone. She denies any palpitations. She states she has probably been eating and drinking things she should not. She has not adjusted her bumex at all. She states she has been compliant with her meds. Allergies   Allergen Reactions    Penicillins Hives     Medication Documentation Review Audit     Reviewed by SARAH Schmidt (Pharmacist) on 02/18/20 at 2132    Medication Sig Documenting Provider Last Dose Status Taking?   acetaminophen (TYLENOL) 500 mg tablet Take 2 Tabs by mouth every six (6) hours as needed for Pain. Dante Banda MD  Active Yes   amiodarone (CORDARONE) 200 mg tablet Take 1 Tab by mouth daily. Indications: Prevention of Recurrent Atrial Fibrillation Christiano Nam NP 2/18/2020 Unknown time Active Yes   aspirin delayed-release 81 mg tablet Take 1 Tab by mouth daily. Minor, Ana Koroma NP 2/18/2020 Unknown time Active Yes   bumetanide (BUMEX) 1 mg tablet Take 1 mg by mouth daily. Provider, Historical 2/18/2020 Unknown time Active Yes   dilTIAZem ER (CARDIZEM LA) 240 mg Tb24 tablet Take 240 mg by mouth daily. Provider, Historical 2/18/2020 Unknown time Active Yes   potassium chloride SR (K-TAB) 20 mEq tablet Take 20 mEq by mouth daily. Provider, Historical 2/18/2020 Unknown time Active Yes   rivaroxaban (XARELTO) 20 mg tab tablet Take 1 Tab by mouth daily (with lunch).  Indications: PREVENT THROMBOEMBOLISM IN CHRONIC ATRIAL FIBRILLATION Jeffery Renteria, NP 2/18/2020 Unknown time Active Yes              Past Medical History:   Diagnosis Date    Arthritis     Atrial fibrillation ([address]     WHITING (dyspnea on exertion)     GERD (gastroesophageal reflux disease)     Heart failure (HCC)     Hypertension     Obesity     Other ill-defined conditions(799.89)     \"fluid in the lung\"    Sleep apnea      FH: [place]  SH: lives at home with sons  ROS: other than shortness of breath and edema, she has noted some very brief right sided chest pain. No fever, chills or cough. No neuro or GI symptoms. Exam:  Blood pressure 138/45, pulse 79, temperature 97.7 °F (36.5 °C), resp. rate 16, height 5' 9.02\" (1.753 m), weight 149.2 kg (328 lb 14.8 oz), SpO2 100 %. +JVD  No carotid bruits  Cor irreg with yary  Lungs clear with decreased effort  abd soft and NT  Ext with 3+ edema on the right and 2+ on the left  Neuro alert and oriented, non-focal  NM normal    Labs reviewed  This am labs pending due to diff blood draw    EKG: afib with controlled rate and NSSTW changes  CXR very mild interstitial edema    Imp:  1. Acute on chronic diastolic heart failure-I suspect dietary non-compliance. She has been in afib for the last three months with controlled rate so I do not think this is contributing though I had in the past with higher heart rates. I also think she would be much better off if sleep apnea treated. Needs education on altering her diuretic dose as needed and follow up! 2. Afib-persistent; I told her we should either cardiovert her this admit or stop the amiodarone and plan to leave her in afib. She would prefer the latter, so I will stop the amiodarone and will watch to see if her rate increases over time. Will need to stay on xarelto indefinitely. 3. HTN-improved today  4. Sleep apnea-as above  5. GERD    Rec:  Con't diuresis  D/C amiodarone  ? Use of bipap as outpatient?  Other options for sleep apnea  Check thyroid hormones since she has been on amiodarone  I cancelled the echo as she just had one three months back.     Mann Mccoy MD

## 2020-02-19 NOTE — PROGRESS NOTES
Admission Medication Reconciliation:    Information obtained from:  Patient  RxQuery data available¹:  YES    Comments/Recommendations: Spoke with patient regarding use of PTA medications including prescription/OTC, vitamins/supplements, inhaled, topical, nasal, injectable, otic and ophthalmic medications. Patient is a fair historian. Updated PTA meds/reviewed patient's allergies. 1) Of note, patient reports usually only taking bumetanide and potassium once daily, although she states she thinks she is supposed to take them twice daily. Patient is on rivaroxaban for A fib. Her most recent dose was earlier today. 2) Medication changes (since last review): Added  - None    Adjusted  - Bumetanide (from BID to daily)  - Diltiazem ER (from 120 mg to 240 mg daily)  - Potassium (from BID to daily)    Removed  - Duo-neb     ¹RxQuery pharmacy benefit data reflects medications filled and processed through the patient's insurance, however   this data does NOT capture whether the medication was picked up or is currently being taken by the patient. Allergies:  Penicillins    Significant PMH/Disease States:   Past Medical History:   Diagnosis Date    Arthritis     Atrial fibrillation (HCC)     WHITING (dyspnea on exertion)     GERD (gastroesophageal reflux disease)     Heart failure (HCC)     Hypertension     Obesity     Other ill-defined conditions(799.89)     \"fluid in the lung\"    Sleep apnea      Chief Complaint for this Admission:    Chief Complaint   Patient presents with    Leg Swelling    Wheezing     Prior to Admission Medications:   Prior to Admission Medications   Prescriptions Last Dose Informant Taking?   acetaminophen (TYLENOL) 500 mg tablet   Yes   Sig: Take 2 Tabs by mouth every six (6) hours as needed for Pain. amiodarone (CORDARONE) 200 mg tablet 2/18/2020 at Unknown time  Yes   Sig: Take 1 Tab by mouth daily.  Indications: Prevention of Recurrent Atrial Fibrillation   aspirin delayed-release 81 mg tablet 2/18/2020 at Unknown time  Yes   Sig: Take 1 Tab by mouth daily. bumetanide (BUMEX) 1 mg tablet 2/18/2020 at Unknown time  Yes   Sig: Take 1 mg by mouth daily. dilTIAZem ER (CARDIZEM LA) 240 mg Tb24 tablet 2/18/2020 at Unknown time  Yes   Sig: Take 240 mg by mouth daily. potassium chloride SR (K-TAB) 20 mEq tablet 2/18/2020 at Unknown time  Yes   Sig: Take 20 mEq by mouth daily. rivaroxaban (XARELTO) 20 mg tab tablet 2/18/2020 at Unknown time  Yes   Sig: Take 1 Tab by mouth daily (with lunch). Indications: PREVENT THROMBOEMBOLISM IN CHRONIC ATRIAL FIBRILLATION      Facility-Administered Medications: None       Please contact the main inpatient pharmacy with any questions or concerns at [phone] and we will direct you to the clinical pharmacist covering this patient's care while in-house.    SARAH Burris

## 2020-02-19 NOTE — ED PROVIDER NOTES
Raymond Saenz is a 79 y.o. female with past medical history notable for atrial fibrillation, diastolic heart failure, sleep apnea, obesity presenting with worsening dyspnea on exertion, gradually worsening over the past 5 days. Has had worsening orthopnea. Of the last day or 2 she has noticed rest dyspnea. She denies fevers or chills. She has noticed a large amount of swelling in her legs. This is despite being adherent to her medication regimen. She is on Bumex daily. Has had intermittent right-sided chest pain, denies associated nausea, diaphoresis.             Past Medical History:   Diagnosis Date    Arthritis     Atrial fibrillation (HCC)     WHITING (dyspnea on exertion)     GERD (gastroesophageal reflux disease)     Heart failure (HCC)     Hypertension     Obesity     Other ill-defined conditions(809.00)     \"fluid in the lung\"    Sleep apnea        Past Surgical History:   Procedure Laterality Date    COLONOSCOPY N/A 3/30/2017    COLONOSCOPY performed by Key Ling MD at [facility]    HX CHOLECYSTECTOMY      HX HERNIA REPAIR      HX ORTHOPAEDIC      right and left total knee replacement    HX ORTHOPAEDIC      right shoulder    HX OTHER SURGICAL      kidney stone surgery    HX OTHER SURGICAL      both eyes cataract    UT CARDIOVERSION ELECTIVE ARRHYTHMIA EXTERNAL N/A 10/9/2019    EP CARDIOVERSION performed by Priyanka Randle MD at [address] [facility]         Family History:   Problem Relation Age of Onset    Tuberculosis Mother     Lung Disease Father        Social History     Socioeconomic History    Marital status: SINGLE     Spouse name: Not on file    Number of children: Not on file    Years of education: Not on file    Highest education level: Not on file   Occupational History    Not on file   Social Needs    Financial resource strain: Not on file    Food insecurity:     Worry: Not on file     Inability: Not on file    Transportation needs:     Medical: Not on file Non-medical: Not on file   Tobacco Use    Smoking status: Former Smoker     Packs/day: 1.00    Smokeless tobacco: Never Used    Tobacco comment: quit at age 43,smoked for 17 years   Substance and Sexual Activity    Alcohol use: No    Drug use: No    Sexual activity: Not on file   Lifestyle    Physical activity:     Days per week: Not on file     Minutes per session: Not on file    Stress: Not on file   Relationships    Social connections:     Talks on phone: Not on file     Gets together: Not on file     Attends Confucianist service: Not on file     Active member of club or organization: Not on file     Attends meetings of clubs or organizations: Not on file     Relationship status: Not on file    Intimate partner violence:     Fear of current or ex partner: Not on file     Emotionally abused: Not on file     Physically abused: Not on file     Forced sexual activity: Not on file   Other Topics Concern     Service Not Asked    Blood Transfusions Not Asked    Caffeine Concern Not Asked    Occupational Exposure Not Asked   [place] Hazards Not Asked    Sleep Concern Not Asked    Stress Concern Not Asked    Weight Concern Not Asked    Special Diet Not Asked    Back Care Not Asked    Exercise Not Asked    Bike Helmet Not Asked   [address] Not Asked    Self-Exams Not Asked   Social History Narrative    Not on file         ALLERGIES: Penicillins    Review of Systems   Constitutional: Negative for chills and fever. Eyes: Negative for photophobia. Respiratory: Positive for shortness of breath and wheezing. Cardiovascular: Positive for chest pain and leg swelling. Negative for palpitations. Gastrointestinal: Negative for abdominal pain, nausea and vomiting. Genitourinary: Negative for dysuria. Musculoskeletal: Negative for back pain. Neurological: Negative for headaches. Psychiatric/Behavioral: Negative for confusion. All other systems reviewed and are negative.       Vitals: 02/18/20 1827   BP: (!) 140/106   Pulse: 65   Resp: 15   Temp: 98 °F (36.7 °C)   SpO2: 93%            Physical Exam  Vitals signs reviewed. Constitutional:       General: She is not in acute distress. Appearance: She is obese. HENT:      Head: Normocephalic and atraumatic. Mouth/Throat:      Mouth: Mucous membranes are moist.      Pharynx: Oropharynx is clear. Cardiovascular:      Rate and Rhythm: Normal rate and regular rhythm. Pulses: Normal pulses. Heart sounds: Normal heart sounds. Pulmonary:      Breath sounds: Wheezing present. Comments: Tachypnea, markedly dyspneic with minimal movement  Abdominal:      Tenderness: There is no abdominal tenderness. There is no guarding or rebound. Musculoskeletal: Normal range of motion. Right lower leg: Edema present. Left lower leg: Edema present. Skin:     General: Skin is warm and dry. Capillary Refill: Capillary refill takes less than 2 seconds. Neurological:      General: No focal deficit present. Mental Status: She is alert and oriented to person, place, and time. Psychiatric:         Mood and Affect: Mood normal.          MDM  Number of Diagnoses or Management Options  Acute on chronic diastolic congestive heart failure [facility]):   Diagnosis management comments: Hospitalist Perfect Serve for Admission  8:44 PM    ED Room Number: ERWT/WT  Patient Name and age: Allen Prasad 79 y.o.  female  Working Diagnosis: Acute on chronic diastolic congestive heart failure ([address]  (primary encounter diagnosis)  Readmission: no  Isolation Requirements:  no  Recommended Level of Care:  telemetry  Code Status:  Full Code  Department:[facility] Adult ED - 21   Other: 52-year-old woman with chronic diastolic heart failure presenting with acute exacerbation. Pulmonary edema noted on x-ray, patient's O2 saturation is borderline. She appears markedly dyspneic with minimal movement and has rest dyspnea.   Ordered a dose of IV Bumex, will likely benefit from admission given worsening debility at home, rest dyspnea.          Procedures

## 2020-02-20 LAB
ANION GAP SERPL CALC-SCNC: 3 MMOL/L (ref 5–15)
BUN SERPL-MCNC: 19 MG/DL (ref 6–20)
BUN/CREAT SERPL: 15 (ref 12–20)
CALCIUM SERPL-MCNC: 9.2 MG/DL (ref 8.5–10.1)
CHLORIDE SERPL-SCNC: 104 MMOL/L (ref 97–108)
CO2 SERPL-SCNC: 31 MMOL/L (ref 21–32)
CREAT SERPL-MCNC: 1.31 MG/DL (ref 0.55–1.02)
GLUCOSE SERPL-MCNC: 92 MG/DL (ref 65–100)
POTASSIUM SERPL-SCNC: 4.4 MMOL/L (ref 3.5–5.1)
SODIUM SERPL-SCNC: 138 MMOL/L (ref 136–145)

## 2020-02-20 PROCEDURE — 99218 HC RM OBSERVATION: CPT

## 2020-02-20 PROCEDURE — 36415 COLL VENOUS BLD VENIPUNCTURE: CPT

## 2020-02-20 PROCEDURE — 74011250637 HC RX REV CODE- 250/637: Performed by: STUDENT IN AN ORGANIZED HEALTH CARE EDUCATION/TRAINING PROGRAM

## 2020-02-20 PROCEDURE — 96376 TX/PRO/DX INJ SAME DRUG ADON: CPT

## 2020-02-20 PROCEDURE — 97161 PT EVAL LOW COMPLEX 20 MIN: CPT

## 2020-02-20 PROCEDURE — 80048 BASIC METABOLIC PNL TOTAL CA: CPT

## 2020-02-20 PROCEDURE — 97116 GAIT TRAINING THERAPY: CPT

## 2020-02-20 PROCEDURE — 94660 CPAP INITIATION&MGMT: CPT

## 2020-02-20 PROCEDURE — 74011000250 HC RX REV CODE- 250: Performed by: STUDENT IN AN ORGANIZED HEALTH CARE EDUCATION/TRAINING PROGRAM

## 2020-02-20 RX ADMIN — RIVAROXABAN 20 MG: 20 TABLET, FILM COATED ORAL at 13:16

## 2020-02-20 RX ADMIN — BUMETANIDE 1 MG: 0.25 INJECTION INTRAMUSCULAR; INTRAVENOUS at 17:21

## 2020-02-20 RX ADMIN — POTASSIUM CHLORIDE 20 MEQ: 750 TABLET, FILM COATED, EXTENDED RELEASE ORAL at 08:27

## 2020-02-20 RX ADMIN — IPRATROPIUM BROMIDE AND ALBUTEROL SULFATE 3 ML: .5; 3 SOLUTION RESPIRATORY (INHALATION) at 07:20

## 2020-02-20 RX ADMIN — ASPIRIN 81 MG: 81 TABLET, COATED ORAL at 08:27

## 2020-02-20 RX ADMIN — DILTIAZEM HYDROCHLORIDE 240 MG: 240 CAPSULE, EXTENDED RELEASE ORAL at 08:27

## 2020-02-20 RX ADMIN — BUMETANIDE 1 MG: 0.25 INJECTION INTRAMUSCULAR; INTRAVENOUS at 08:27

## 2020-02-20 NOTE — PROGRESS NOTES
Physical Therapy Screening:    [facility] based on results obtained during the nursing admission assessment. The patients chart was reviewed and the patient is appropriate for a skilled therapy evaluation if there is a decline in functional mobility from baseline. Please order a consult for physical therapy if you are in agreement and would like an evaluation to be completed. Thank you. Maycol Carr, PT    Per CM note 2/19: \"The patient is typically independent with ADLs, mobilizes with a walker at baseline and also has wheelchair and CPAP machine at home. The patient's son transports to and from MD appointments, or she utilizes family friends if needed- endorses having support.  \"

## 2020-02-20 NOTE — CARDIO/PULMONARY
Cardiac Rehab: Consult received and chart reviewed. Patient is not a candidate for cardiac rehab, at this time. Patient does not meet the following criteria for enrollment in cardiac rehab. 
? EF ? 35% at time of enrollment ? Stable class 2-4 NYHA heart failure ? Optimal medical therapy for > 6 weeks ? No major hospitalizations within the last 6 weeks ? No major hospitalizations within the next 6 weeks Luz Elena RN

## 2020-02-20 NOTE — PROGRESS NOTES
[facility] Adult  Hospitalist Group                                                                                          Hospitalist Progress Note  Severo Griffin, MD  Answering service: [phone] OR [phone] from in house phone        Date of Service:  2020  NAME:  Craven Cogan  :  1940  MRN:  [id number]      Admission Summary:     Patient is a 79year old Female with medical history significant for diastolic heart failure,Hypertension , Afib,Sleep apnea and Obesity who presents to the Emergency Department with Chief complaint of  Worsening Shortness of breath. Patient reports , over the past 5 days she noted increased shortness of breath and currently she is feeling short of breath at rest and while trying to get out of bed.  She reports associated increased bilateral leg swelling, wheezing, increased orthopnea and right pleuritic chest discomfort but denies any fever, chills, nausea, vomiting, palpitation, syncope or presyncope, PND, urinary or bowel complaints. Interval history / Subjective:       Her shortness of breath is improving. Planing of little more swelling of the right lower extremity compared to left. Assessment & Plan:     Chest of heart failure  Acute on chronic diastolic congestive heart failure. Last echo with EF of 51 to 55%. Continue IV Bumex, change to p.o. in a.m. Fluid restriction 2 L/day. Cardiology following. Acute kidney insufficiency  Likely cardiorenal.  Monitor daily renal function. Atrial fibrillation  Chronic atrial fibrillation. Rate control. On diltiazem. Continue Xarelto for anticoagulation. Amiodarone discontinued per cardiology due to abnormal TSH. Hypertension  Blood pressure stable. Continue current medications. Obstructive sleep apnea  On CPAP at bedtime. Morbid obesity  With BMI above 48. 57. Abnormal thyroid function  Elevated free T4. Need outpatient follow-up for reevaluation. Amiodarone discontinued.     Code status: FULL  DVT prophylaxis: re[facility] discussed with: Patient/Family  Anticipated Disposition: Home w/Family  Anticipated Discharge: 24 hours to 48 hours     Hospital Problems  Date Reviewed: 11/4/2019          Codes Class Noted POA    CHF exacerbation ([facility] [address] ICD-10-CM: I50.9  ICD-9-CM: 428.0  2/18/2020 Unknown                Review of Systems:   A comprehensive review of systems was negative except for that written in the HPI. Vital Signs:    Last 24hrs VS reviewed since prior progress note. Most recent are:  Visit Vitals  /47 (BP 1 Location: Left arm, BP Patient Position: At rest)   Pulse 77   Temp 97.7 °F (36.5 °C)   Resp 18   Ht 5' 9.02\" (1.753 m)   Wt 147.2 kg (324 lb 8.3 oz)   SpO2 93%   BMI 47.90 kg/m²         Intake/Output Summary (Last 24 hours) at 2/20/2020 1331  Last data filed at 2/20/2020 0756  Gross per 24 hour   Intake 1240 ml   Output 4550 ml   Net -3310 ml        Physical Examination:             Constitutional:  No acute distress, cooperative, pleasant    ENT:  Oral mucosa moist, oropharynx benign. Resp:  CTA bilaterally. No wheezing/rhonchi/rales. No accessory muscle use   CV:  Regular rhythm, normal rate, no murmurs, gallops, rubs    GI:  Soft, non distended, non tender. normoactive bowel sounds, no hepatosplenomegaly     Musculoskeletal:  Bilateral pitting edema of the legs, warm, 2+ pulses throughout    Neurologic:  Moves all extremities.   AAOx3, CN II-XII reviewed     Skin:  Good turgor, no rashes or ulcers       Data Review:    Review and/or order of clinical lab test      Labs:     Recent Labs     02/19/20  1250 02/18/20  1830   WBC 4.8 5.3   HGB 14.1 13.3   HCT 43.8 41.5    210     Recent Labs     02/20/20  1117 02/19/20  1250 02/18/20  2303 02/18/20  1845 02/18/20  1830    138  --   --  136   K 4.4 4.2 4.0  --  5.8*    105  --   --  108   CO2 31 27  --   --  25   BUN 19 19  --   --  22*   CREA 1.31* 1.23*  --   --  1.50*   GLU 92 88  --   -- 81   CA 9.2 8.9  --   --  8.8   MG  --  2.2  --  2.2  --      Recent Labs     02/18/20  1830   SGOT 43*   ALT 14   AP 80   TBILI 1.0   TP 7.5   ALB 3.0*   GLOB 4.5*     Recent Labs     02/18/20  1845   INR 1.5*   PTP 15.0*      No results for input(s): FE, TIBC, PSAT, FERR in the last 72 hours. No results found for: FOL, RBCF   No results for input(s): PH, PCO2, PO2 in the last 72 hours.   Recent Labs     02/18/20  1845   TROIQ <0.05     Lab Results   Component Value Date/Time    Cholesterol, total 168 03/27/2018 01:03 PM    HDL Cholesterol 61 03/27/2018 01:03 PM    LDL, calculated 85.8 03/27/2018 01:03 PM    Triglyceride 106 03/27/2018 01:03 PM    CHOL/HDL Ratio 2.8 03/27/2018 01:03 PM     Lab Results   Component Value Date/Time    Glucose (POC) 108 (H) 10/03/2014 06:32 AM     Lab Results   Component Value Date/Time    Color DARK YELLOW 06/07/2018 03:43 PM    Appearance CLOUDY (A) 06/07/2018 03:43 PM    Specific gravity 1.015 06/07/2018 03:43 PM    pH (UA) 5.5 06/07/2018 03:43 PM    Protein 30 (A) 06/07/2018 03:43 PM    Glucose NEGATIVE  06/07/2018 03:43 PM    Ketone NEGATIVE  06/07/2018 03:43 PM    Bilirubin NEGATIVE  03/27/2018 04:57 PM    Urobilinogen 2.0 (H) 06/07/2018 03:43 PM    Nitrites NEGATIVE  06/07/2018 03:43 PM    Leukocyte Esterase NEGATIVE  06/07/2018 03:43 PM    Epithelial cells FEW 06/07/2018 03:43 PM    Bacteria NEGATIVE  06/07/2018 03:43 PM    WBC 0-4 06/07/2018 03:43 PM    RBC 0-5 06/07/2018 03:43 PM         Medications Reviewed:     Current Facility-Administered Medications   Medication Dose Route Frequency    albuterol-ipratropium (DUO-NEB) 2.5 MG-0.5 MG/3 ML  3 mL Nebulization Q4H PRN    aspirin delayed-release tablet 81 mg  81 mg Oral DAILY    bumetanide (BUMEX) injection 1 mg  1 mg IntraVENous BID    dilTIAZem CD (CARDIZEM CD) capsule 240 mg  240 mg Oral DAILY    potassium chloride SR (KLOR-CON 10) tablet 20 mEq  20 mEq Oral DAILY WITH BREAKFAST    rivaroxaban (XARELTO) tablet 20 mg 20 mg Oral DAILY WITH LUNCH    hydrALAZINE (APRESOLINE) 20 mg/mL injection 20 mg  20 mg IntraVENous Q6H PRN    ondansetron (ZOFRAN) injection 4 mg  4 mg IntraVENous Q6H PRN    acetaminophen (TYLENOL) tablet 650 mg  650 mg Oral Q6H PRN    morphine injection 1 mg  1 mg IntraVENous Q4H PRN     ______________________________________________________________________  EXPECTED LENGTH OF STAY: - - -  ACTUAL LENGTH OF STAY:          0                 Cullen Melo MD

## 2020-02-20 NOTE — PROGRESS NOTES
0730: Bedside and Verbal shift change report given to Darnell Young [facility] (oncoming nurse) by Brandie Ortiz RN (offgoing nurse). Report included the following information SBAR, Kardex, Intake/Output, MAR, Accordion, Recent Results and Cardiac Rhythm a-fib. 1930: Bedside and Verbal shift change report given to Darvin Kulkarni RN (oncoming nurse) by Darnell Young RN (offgoing nurse). Report included the following information SBAR, Kardex, Intake/Output, MAR, Accordion, Recent Results and Cardiac Rhythm a-fib. Problem: Falls - Risk of  Goal: *Absence of Falls  Description  Document Brent Kurtz Fall Risk and appropriate interventions in the flowsheet.   Outcome: Progressing Towards Goal  Note: Fall Risk Interventions:  Mobility Interventions: Communicate number of staff needed for ambulation/transfer, Patient to call before getting OOB         Medication Interventions: Evaluate medications/consider consulting pharmacy, Patient to call before getting OOB    Elimination Interventions: Call light in reach, Patient to call for help with toileting needs, Toileting schedule/hourly rounds              Problem: Patient Education: Go to Patient Education Activity  Goal: Patient/Family Education  Outcome: Progressing Towards Goal     Problem: Heart Failure: Day 3  Goal: Activity/Safety  Outcome: Progressing Towards Goal  Goal: Diagnostic Test/Procedures  Outcome: Progressing Towards Goal  Goal: Nutrition/Diet  Outcome: Progressing Towards Goal  Goal: Discharge Planning  Outcome: Progressing Towards Goal  Goal: Medications  Outcome: Progressing Towards Goal  Goal: Respiratory  Outcome: Progressing Towards Goal  Goal: Treatments/Interventions/Procedures  Outcome: Progressing Towards Goal  Goal: Psychosocial  Outcome: Progressing Towards Goal  Goal: *Oxygen saturation within defined limits  Outcome: Progressing Towards Goal  Goal: *Hemodynamically stable  Outcome: Progressing Towards Goal  Goal: *Optimal pain control at patient's stated goal  Outcome: Progressing Towards Goal

## 2020-02-20 NOTE — PROGRESS NOTES
Problem: Mobility Impaired (Adult and Pediatric)  Goal: *Acute Goals and Plan of Care (Insert Text)  Description  FUNCTIONAL STATUS PRIOR TO ADMISSION: Patient was modified independent using a rolling walker for functional mobility within her home. For nocturnal voiding, patient typically uses her bedside commode. Patient denies recent falls. Patient owns a CPAP but admits to not using. Patient's sons assist with laundry and household cleaning/management. Patient uses a wheelchair within the community. HOME SUPPORT PRIOR TO ADMISSION: The patient lives with her 2 sons. Physical Therapy Goals:  Initiated 2/20/2020  1. Patient will move from supine to sit and sit to supine , scoot up and down and roll side to side in bed with independence within 7 day(s). 2.  Patient will transfer from bed to chair and chair to bed with modified independence using the least restrictive device within 7 day(s). 3.  Patient will perform sit to stand with modified independence within 7 day(s). 4.  Patient will ambulate with modified independence for 150 feet with the least restrictive device within 7 day(s). 5.  Patient will participate in a 6 MWT for her diagnosis of heart failure within 48 hours of discharge. 6.  Patient will verbally and functionally recall 3 pacing/energy conservation strategies to implement with functional tasks/mobility within 7 days. Outcome: Progressing Towards Goal    PHYSICAL THERAPY EVALUATION  Patient: Sotero Huang (44 y.o. female)  Date: 2/20/2020  Primary Diagnosis: [facility] [I50.9]    Precautions:  Fall (324 lbs)      ASSESSMENT  Based on the objective data described below, the patient presents with decreased functional independence compared to baseline following admission for CHF exacerbation.  Patient's functional mobility is currently limited by the following: impaired cardiopulmonary tolerance, generalized weakness, full-body edema, dyspnea (self-rated Feliz RPE post-ambulation: 15/20), decreased activity tolerance, and gait deficits. Overall, patient required contact guard assist to standby assist for bed mobility, transfer to/from [facility] (no assistive device), sit <> stand transfers to/from rolling walker, and attempted 6 MWT (45 ft, requested seated rest break following 3 minutes and 1 second of ambulation). Patient educated on the following: pursed-lip breathing, fall prevention strategies (bariatric walker use at all times vs furniture surfing), and pacing strategies. Patient's gait characterized by forward flexed trunk and shuffling steps (patient admits this is her baseline). With continued diuresis, anticipate improvement in patient's activity tolerance. Current Level of Function Impacting Discharge (mobility/balance): CGA to SBA    Functional Outcome Measure: The patient scored 15/28 on the Tinetti outcome measure which is indicative of a high fall risk. Other factors to consider for discharge: Sedentary lifestyle, poor compliance with CPAP, lives with her 2 sons, CHF exacerbation with frequent readmissions     Patient will benefit from skilled therapy intervention to address the above noted impairments. PLAN :  Recommendations and Planned Interventions: bed mobility training, transfer training, gait training, therapeutic exercises, edema management/control, patient and family training/education, and therapeutic activities      Frequency/Duration: Patient will be followed by physical therapy:  5 times a week to address goals.     Recommendation for discharge: (in order for the patient to meet his/her long term goals)  Physical therapy at least 2 days/week in the home     This discharge recommendation:  Has not yet been discussed the attending provider and/or case management    IF patient discharges home will need the following DME: Bariatric walker (patient only owns a rolling walker that support 300 lbs or less)         SUBJECTIVE:   Patient stated My son with brain damage can at least change my bed sheets for me.     OBJECTIVE DATA SUMMARY:   HISTORY:    Past Medical History:   Diagnosis Date    Arthritis     Atrial fibrillation (HCC)     WHITING (dyspnea on exertion)     GERD (gastroesophageal reflux disease)     Heart failure (HCC)     Hypertension     Obesity     Other ill-defined conditions(099.89)     \"fluid in the lung\"    Sleep apnea      Past Surgical History:   Procedure Laterality Date    COLONOSCOPY N/A 3/30/2017    COLONOSCOPY performed by Rodney Starr MD at [facility] ENDOSCOPY    Tompa [address]. 2.      right and left total knee replacement    HX ORTHOPAEDIC      right shoulder    HX OTHER SURGICAL      kidney stone surgery    HX OTHER SURGICAL      both eyes cataract    [place]I CARDIOVERSION ELECTIVE ARRHYTHMIA EXTERNAL N/A 10/9/2019    [place] CARDI[place]ERS[place] [address], [facility]  CATH LAB       Personal factors and/or comorbidities impacting plan of care: [facility]    Home Situation  Home Environment: Private residence  # Steps to Enter: (Ramp)  Wheelchair Ramp: Yes  One/Two Story Residence: Two story, live on 1st floor  Living Alone: No  Support Systems: Child(hansel)  Patient Expects to be Discharged to[de-identified] Private residence  Current DME Used/Available at Home: Walker, rolling, Wheelchair, CPAP, Grab bars  Tub or Shower Type: Tub/Shower combination    EXAMINATION/PRESENTATION/DECISION MAKING:   Critical Behavior:  Neurologic State: Alert, Appropriate for age  Orientation Level: Oriented X4  Cognition: Appropriate for age attention/concentration, Appropriate decision making, Appropriate safety awareness, Follows commands  Safety/Judgement: Awareness of environment, Insight into deficits  Hearing:   Auditory  Auditory Impairment: Hard of hearing, bilateral    Edema: 3 + in all extremities  Range Of Motion:  AROM: Generally decreased, functional           PROM: Generally decreased, functional Strength:    Strength: Generally decreased, functional                    Tone & Sensation:   Tone: Normal              Sensation: Impaired(2* edema)               Coordination:  Coordination: Generally decreased, functional    Functional Mobility:  Bed Mobility:  Rolling: Modified independent  Supine to Sit: Modified independent  Sit to Supine: Modified independent     Transfers:  Sit to Stand: Stand-by assistance;Contact guard assistance  Stand to Sit: Stand-by assistance;Contact guard assistance        Bed to Chair: Contact guard assistance              Balance:   Sitting: Intact; Without support  Standing: Impaired; Without support  Standing - Static: Fair  Standing - Dynamic : Fair  Ambulation/Gait Training:  Distance (ft): 45 Feet (ft)  Assistive Device: Gait belt;Walker, rolling  Ambulation - Level of Assistance: Contact guard assistance; Adaptive equipment     Gait Description (WDL): Exceptions to WDL  Gait Abnormalities: Decreased step clearance;Trunk sway increased        Base of Support: Widened;Center of gravity altered     Speed/Samia: Accelerated(Cues for pacing)  Step Length: Right shortened;Left shortened      Functional Measure:  Tinetti test:    Sitting Balance: 1  Arises: 1  Attempts to Rise: 2  Immediate Standing Balance: 1  Standing Balance: 1  Nudged: 1  Eyes Closed: 0  Turn 360 Degrees - Continuous/Discontinuous: 0  Turn 360 Degrees - Steady/Unsteady: 1  Sitting Down: 1  Balance Score: 9 Balance total score  Indication of Gait: 1  R Step Length/Height: 1  L Step Length/Height: 1  R Foot Clearance: 0  L Foot Clearance: 0  Step Symmetry: 1  Step Continuity: 1  Path: 1  Trunk: 0  Walking Time: 0  Gait Score: 6 Gait total score  Total Score: 15/28 Overall total score         Tinetti Tool Score Risk of Falls  <19 = High Fall Risk  19-24 = Moderate Fall Risk  25-28 = Low Fall Risk  Tinetti ME. Performance-Oriented Assessment of Mobility Problems in Elderly Patients. Ruiz 66; [id number].  (Scoring Description: PT Bulletin Feb. 10, 1993)    Older adults: Annelise Vidal et al, 2009; n = 1000 [facility] elderly evaluated with ABC, BUCK, ADL, and IADL)  · Mean BUCK score for males aged 69-68 years = 26.21(3.40)  · Mean BUCK score for females age 69-68 years = 25.16(4.30)  · Mean BUCK score for males over 80 years = 23.29(6.02)  · Mean BUCK score for females over 80 years = 17.20(8.32)     6 MWT results: (Specify if any supplemental oxygen is used, the type, pre, during and post sats.)  Distance Walked in Feet (ft): 45 ft. Pre Heart Rate: 77   Pre O2 Saturation: 93% on room air      Post Heart Rate: 93   Post O2 Saturation: 90% on room air(dyspneic)   Assistive device used: Assistive Device: Gait belt;Walker, rolling       Normative data:   Men 39-80 years old = 1889 feet; Women 3680 years tls=8343 feet  Modified 10 point Feliz RPE scale utilized:  0 = no breathlessness at all ---> 10 = maximum exertion  Please refer to the flowsheet for any additional vital signs taken during this treatment. Based on the above components, the patient evaluation is determined to be of the following complexity level: LOW     Pain Rating:  Patient denied pain    Activity Tolerance:   Fair, requires rest breaks, and observed SOB with activity  Please refer to the flowsheet for vital signs taken during this treatment. After treatment patient left in no apparent distress:   Supine in bed, Call bell within reach, and Side rails x 3    COMMUNICATION/EDUCATION:   The patients plan of care was discussed with: Registered Nurse. Fall prevention education was provided and the patient/caregiver indicated understanding., Patient/family have participated as able in goal setting and plan of care. , and Patient/family agree to work toward stated goals and plan of care.     Thank you for this referral.  Venecia Adan, PT, DPT   Time Calculation: 30 mins

## 2020-02-20 NOTE — PROGRESS NOTES
Transitions of Care: 14% risk of re-admission      -Patient will discharge home with [facility] for CHF   -Cardiology and PCP follow-up   -Family to transport    The CM met with the patient at bedside and offered free [address]- the patient declined, endorsed that she understands what she needs to do and she verbalized wanting to do it herself, CM encouraged patient to notify this writer if she changes her mind about the [address] program. The patient is still agreeable for [facility], this is in place for the patient with [facility]. The CM will continue to follow- possible discharge tomorrow pending diuretics, will provide Xarelto savings card at bedside upon discharge.  JOSH Jaime

## 2020-02-20 NOTE — PROGRESS NOTES
Cardiology Progress Note    Pt reports her breathing is better but right leg still swollen though better. Imp:  1. Acute on chronic diastolic heart failure-I suspect dietary non-compliance. Needs education on altering her diuretic dose as needed and follow up! 2. Afib-persistent;now off amiodarone with adequate rate control; Will need to stay on xarelto indefinitely. 3. HTN controlled  4. Sleep apnea-as above  5. GERD  6. SUJATHA improved    Rec:  Another day of IV diuretics  Compression socks  May be useful for dietician to see patient  Con't diltiazem and xarelto  Will need follow up for her sleep apnea and her thyroid abnl (which may all be related to the amidoarone). Exam:  Blood pressure 129/65, pulse 76, temperature 98 °F (36.7 °C), resp. rate 20, height 5' 9.02\" (1.753 m), weight 147.2 kg (324 lb 8.3 oz), SpO2 96 %. Lungs clear  Cor irreg  Ext with mild-mod edema on the right; min left    Labs from yesterday reviewed: TSH normal, free T4 elevated. I'm away tomorrow.   Group available if needed  Will see her as outpatient    Diogenes Lacy MD

## 2020-02-20 NOTE — PROGRESS NOTES
1930 Bedside shift change report given to Sammie Hunter RN (oncoming nurse) by Ulysses Hilario RN (offgoing nurse). Report included the following information SBAR, Kardex, Intake/Output, MAR, Recent Results and Cardiac Rhythm A Fib.       0800 Bedside shift change report given to Min, [address] [facility] (oncoming nurse) by Sammie Hunter RN (offgoing nurse). Report included the following information SBAR, Kardex, Intake/Output, MAR, Recent Results and Cardiac Rhythm A Fib. Problem: Falls - Risk of  Goal: *Absence of Falls  Description  Document Cristofer Love Fall Risk and appropriate interventions in the flowsheet.   Outcome: Progressing Towards Goal  Note: Fall Risk Interventions:  Mobility Interventions: Communicate number of staff needed for ambulation/transfer         Medication Interventions: Teach patient to arise slowly    Elimination Interventions: Call light in reach              Problem: Pain  Goal: *Control of Pain  Outcome: Progressing Towards Goal     Problem: Heart Failure: Day 2  Goal: Activity/Safety  Outcome: Progressing Towards Goal  Goal: Consults, if ordered  Outcome: Progressing Towards Goal  Goal: Diagnostic Test/Procedures  Outcome: Progressing Towards Goal  Goal: Nutrition/Diet  Outcome: Progressing Towards Goal  Goal: Discharge Planning  Outcome: Progressing Towards Goal  Goal: Medications  Outcome: Progressing Towards Goal  Goal: Respiratory  Outcome: Progressing Towards Goal  Goal: Treatments/Interventions/Procedures  Outcome: Progressing Towards Goal  Goal: Psychosocial  Outcome: Progressing Towards Goal  Goal: *Oxygen saturation within defined limits  Outcome: Progressing Towards Goal  Goal: *Hemodynamically stable  Outcome: Progressing Towards Goal  Goal: *Optimal pain control at patient's stated goal  Outcome: Progressing Towards Goal  Goal: *Anxiety reduced or absent  Outcome: Progressing Towards Goal

## 2020-02-21 VITALS
RESPIRATION RATE: 16 BRPM | HEIGHT: 69 IN | OXYGEN SATURATION: 96 % | SYSTOLIC BLOOD PRESSURE: 102 MMHG | BODY MASS INDEX: 43.4 KG/M2 | HEART RATE: 67 BPM | DIASTOLIC BLOOD PRESSURE: 46 MMHG | WEIGHT: 293 LBS | TEMPERATURE: 98 F

## 2020-02-21 LAB
ANION GAP SERPL CALC-SCNC: 5 MMOL/L (ref 5–15)
BUN SERPL-MCNC: 20 MG/DL (ref 6–20)
BUN/CREAT SERPL: 17 (ref 12–20)
CALCIUM SERPL-MCNC: 8.9 MG/DL (ref 8.5–10.1)
CHLORIDE SERPL-SCNC: 104 MMOL/L (ref 97–108)
CO2 SERPL-SCNC: 30 MMOL/L (ref 21–32)
CREAT SERPL-MCNC: 1.21 MG/DL (ref 0.55–1.02)
GLUCOSE SERPL-MCNC: 87 MG/DL (ref 65–100)
POTASSIUM SERPL-SCNC: 4.2 MMOL/L (ref 3.5–5.1)
SODIUM SERPL-SCNC: 139 MMOL/L (ref 136–145)

## 2020-02-21 PROCEDURE — 74011250637 HC RX REV CODE- 250/637: Performed by: STUDENT IN AN ORGANIZED HEALTH CARE EDUCATION/TRAINING PROGRAM

## 2020-02-21 PROCEDURE — 74011000250 HC RX REV CODE- 250: Performed by: STUDENT IN AN ORGANIZED HEALTH CARE EDUCATION/TRAINING PROGRAM

## 2020-02-21 PROCEDURE — 96376 TX/PRO/DX INJ SAME DRUG ADON: CPT

## 2020-02-21 PROCEDURE — 80048 BASIC METABOLIC PNL TOTAL CA: CPT

## 2020-02-21 PROCEDURE — 36415 COLL VENOUS BLD VENIPUNCTURE: CPT

## 2020-02-21 PROCEDURE — 99218 HC RM OBSERVATION: CPT

## 2020-02-21 RX ORDER — SODIUM CHLORIDE 0.9 % (FLUSH) 0.9 %
SYRINGE (ML) INJECTION
Status: DISCONTINUED
Start: 2020-02-21 | End: 2020-02-21 | Stop reason: HOSPADM

## 2020-02-21 RX ORDER — IPRATROPIUM BROMIDE AND ALBUTEROL SULFATE 2.5; .5 MG/3ML; MG/3ML
3 SOLUTION RESPIRATORY (INHALATION)
Qty: 30 NEBULE | Refills: 2 | Status: SHIPPED
Start: 2020-02-21 | End: 2021-04-28

## 2020-02-21 RX ORDER — BUMETANIDE 1 MG/1
1 TABLET ORAL 2 TIMES DAILY
Qty: 60 TAB | Refills: 0 | Status: SHIPPED | OUTPATIENT
Start: 2020-02-21 | End: 2021-12-14 | Stop reason: SDUPTHER

## 2020-02-21 RX ADMIN — BUMETANIDE 1 MG: 0.25 INJECTION INTRAMUSCULAR; INTRAVENOUS at 08:18

## 2020-02-21 RX ADMIN — ASPIRIN 81 MG: 81 TABLET, COATED ORAL at 08:18

## 2020-02-21 RX ADMIN — DILTIAZEM HYDROCHLORIDE 240 MG: 240 CAPSULE, EXTENDED RELEASE ORAL at 08:18

## 2020-02-21 RX ADMIN — POTASSIUM CHLORIDE 20 MEQ: 750 TABLET, FILM COATED, EXTENDED RELEASE ORAL at 08:18

## 2020-02-21 NOTE — PROGRESS NOTES
Bedside and Verbal shift change report given to [facility] (oncoming nurse) by Pilar Anders (offgoing nurse). Report included the following information SBAR, Kardex, Procedure Summary, Intake/Output, MAR and Recent Results. 1200: I agreed with and reviewed all charting for Maxine Pedraza RN.

## 2020-02-21 NOTE — PROGRESS NOTES
Problem: Falls - Risk of  Goal: *Absence of Falls  Description  Document Robert Stover Fall Risk and appropriate interventions in the flowsheet. Outcome: Progressing Towards Goal  Note: Fall Risk Interventions:  Mobility Interventions: Communicate number of staff needed for ambulation/transfer         Medication Interventions: Evaluate medications/consider consulting pharmacy    Elimination Interventions: Call light in reach              Problem: Patient Education: Go to Patient Education Activity  Goal: Patient/Family Education  Outcome: Progressing Towards Goal     Problem: Pain  Goal: *Control of Pain  Outcome: Progressing Towards Goal  Goal: *PALLIATIVE CARE:  Alleviation of Pain  Outcome: Progressing Towards Goal     Problem: Patient Education: Go to Patient Education Activity  Goal: Patient/Family Education  Outcome: Progressing Towards Goal     Problem: Discharge Planning  Goal: *Discharge to safe environment  Description  See CM notes.  Delmas Angelucci, MSW   Outcome: Progressing Towards Goal     Problem: Patient Education: Go to Patient Education Activity  Goal: Patient/Family Education  Outcome: Progressing Towards Goal     Problem: Heart Failure: Day 1  Goal: Off Pathway (Use only if patient is Off Pathway)  Outcome: Progressing Towards Goal  Goal: Activity/Safety  Outcome: Progressing Towards Goal  Goal: Consults, if ordered  Outcome: Progressing Towards Goal  Goal: Diagnostic Test/Procedures  Outcome: Progressing Towards Goal  Goal: Nutrition/Diet  Outcome: Progressing Towards Goal  Goal: Discharge Planning  Outcome: Progressing Towards Goal  Goal: Medications  Outcome: Progressing Towards Goal  Goal: Respiratory  Outcome: Progressing Towards Goal  Goal: Treatments/Interventions/Procedures  Outcome: Progressing Towards Goal  Goal: Psychosocial  Outcome: Progressing Towards Goal  Goal: *Oxygen saturation within defined limits  Outcome: Progressing Towards Goal  Goal: *Hemodynamically stable  Outcome: Progressing Towards Goal  Goal: *Optimal pain control at patient's stated goal  Outcome: Progressing Towards Goal  Goal: *Anxiety reduced or absent  Outcome: Progressing Towards Goal

## 2020-02-21 NOTE — NURSE NAVIGATOR
Follow up scheduled with [facility] with Dr. Jackie Muse for 2/27/20 at 2:15 pm.  Information on After Visit Summary.

## 2020-02-21 NOTE — PROGRESS NOTES
Transitions of Care: 14% risk of re-admission      -Patient will discharge home today, CM awaiting to hear from MD regarding [facility] PT/Skilled RN orders [facility] but PT recommending HH eval)   -Family to transport home   -PCP and Cardiology follow-up    The CM reviewed PT recommendation for [facility] PT eval- CM met with patient and she is agreeable for [facility] (previously accepted for [facility]). CM reached out to attending MD to discuss. The patient endorses her son will transport home. CM called and notified Yasmeen with [facility] of above- do not anticipate barriers. CM will follow for transitions of care. JOSH Singh    11:55 a.m.- MD agreeable for [facility] PT and RN, orders placed. CM spoke with Yasmeen with [facility]- does not anticipate added services being a barrier, AVS updated. JOSH Singh confirmed that [facility] can accept for additional services, [facility] PT and RN.  JOSH Singh

## 2020-02-21 NOTE — PROGRESS NOTES
0730: Bedside shift change report given to Ren Guidry (oncoming nurse) by Indra Bledsoe (offgoing nurse). Report included the following information SBAR, Kardex, Intake/Output, MAR and Recent Results. 1224: I have reviewed discharge instructions with the patient. The patient verbalized understanding.

## 2020-02-21 NOTE — PROGRESS NOTES
0730: .Bedside and Verbal shift change report given to Tucker Caban 43 (oncoming nurse) by Ankush Nassar RN (offgoing nurse). Report included the following information SBAR, Kardex, Intake/Output, MAR, Accordion, Recent Results, Med Rec Status and Cardiac Rhythm AFIB.

## 2020-02-21 NOTE — PROGRESS NOTES
Attempted to schedule hospital follow-up PCP appointment. Dr. Fredrick Barahona requesting patients to call in to schedule follow up appointments.  Lawayne Holstein, Care Management Specialist.

## 2020-02-21 NOTE — DISCHARGE SUMMARY
Discharge Summary       PATIENT ID: Jessenia Pittman  MRN: [id number]   YOB: 1940    DATE OF ADMISSION: 2/18/2020  6:37 PM    DATE OF DISCHARGE: 2/21/2020   PRIMARY CARE PROVIDER: Az Croft MD     ATTENDING PHYSICIAN: Yandel Nguyen  DISCHARGING PROVIDER: Charmayne England, MD    To contact this individual call [phone] and ask the  to page. If unavailable ask to be transferred the Adult Hospitalist Department. CONSULTATIONS: IP CONSULT TO HOSPITALIST  IP CONSULT TO CARDIOLOGY    PROCEDURES/SURGERIES: * No surgery found *    ADMITTING DIAGNOSES & HOSPITAL COURSE:     HPI    Patient is a 79year old Female with medical history significant for diastolic heart failure,Hypertension , Afib,Sleep apnea and Obesity who presents to the Emergency Department with Chief complaint of  Worsening Shortness of breath. Patient reports , over the past 5 days she noted increased shortness of breath and currently she is feeling short of breath at rest and while trying to get out of bed.  She reports associated increased bilateral leg swelling, wheezing, increased orthopnea and right pleuritic chest discomfort but denies any fever, chills, nausea, vomiting, palpitation, syncope or presyncope, PND, urinary or bowel complaints. Hospital course    1. Congestive heart failure  Secondary to medication and dietary noncompliance. Patient's prior echocardiogram showing EF of 51 to 55%. Patient was managed with IV Bumex. Change to p.o. Lasix and patient symptoms have improved. Cardiology evaluated the patient and patient to follow-up with cardiology as outpatient. 2.  Acute kidney insufficiency  Likely secondary to cardiorenal.  Renal function improves with diuresis. 3.  Atrial fibrillation  Patient with chronic atrial fibrillation. Patient was continue on diltiazem and amiodarone was discontinued per cardiology due to abnormal thyroid function. Patient to continue Xarelto for anticoagulation.     4. Abnormal thyroid function  Patient with elevated free T4. Will need outpatient reevaluation with repeat thyroid function tests. DISCHARGE DIAGNOSES / PLAN:      1. Congestive heart failure  2. Acute kidney insufficiency  3. Atrial fibrillation  4. Hypertension  5. Obstructive sleep apnea  6. Morbid obesity  7. Abnormal thyroid function     ADDITIONAL CARE RECOMMENDATIONS:   None     PENDING TEST RESULTS:   At the time of discharge the following test results are still pending: None    FOLLOW UP APPOINTMENTS:    Follow-up Information     Follow up With Specialties Details Why Contact Info    [facility] Call on 2/22/2020 One-time free hospital to home skilled nursing visit. Call agency if you have not heard from them by 12:00 p.m.  [address]    Charlee Klein MD Family Practice  Please call the office to schedule PCP follow up appointment. [address] [facility] [address]  [phone]               DIET: Cardiac Diet  Oral Nutritional Supplements: No Oral Supplement prescribed    ACTIVITY: PT/OT per [address]: None    EQUIPMENT needed: None      DISCHARGE MEDICATIONS:  Current Discharge Medication List      CONTINUE these medications which have CHANGED    Details   bumetanide (BUMEX) 1 mg tablet Take 1 Tab by mouth two (2) times a day. Qty: 60 Tab, Refills: 0      albuterol-ipratropium (DUO-NEB) 2.5 mg-0.5 mg/3 ml nebu 3 mL by Nebulization route every four (4) hours as needed for Shortness of Breath. Indications: Reactive Airway Disease ICD: J45.909  Qty: 30 Nebule, Refills: 2         CONTINUE these medications which have NOT CHANGED    Details   dilTIAZem ER (CARDIZEM LA) 240 mg Tb24 tablet Take 240 mg by mouth daily. potassium chloride SR (K-TAB) 20 mEq tablet Take 20 mEq by mouth daily.       acetaminophen (TYLENOL) 500 mg tablet Take 2 Tabs by mouth every six (6) hours as needed for Pain. Qty: 20 Tab, Refills: 0      rivaroxaban (XARELTO) 20 mg tab tablet Take 1 Tab by mouth daily (with lunch). Indications: PREVENT THROMBOEMBOLISM IN CHRONIC ATRIAL FIBRILLATION  Qty: 30 Tab, Refills: 1      aspirin delayed-release 81 mg tablet Take 1 Tab by mouth daily. Qty: 30 Tab, Refills: 0         STOP taking these medications       amiodarone (CORDARONE) 200 mg tablet Comments:   Reason for Stopping:         dilTIAZem CD (CARDIZEM CD) 120 mg ER capsule Comments:   Reason for Stopping:                 NOTIFY YOUR PHYSICIAN FOR ANY OF THE FOLLOWING:   Fever over 101 degrees for 24 hours. Chest pain, shortness of breath, fever, chills, nausea, vomiting, diarrhea, change in mentation, falling, weakness, bleeding. Severe pain or pain not relieved by medications. Or, any other signs or symptoms that you may have questions about. DISPOSITION:    Home With:   OT  PT  HH  RN       Long term SNF/Inpatient Rehab    Independent/assisted living    Hospice    Other:       PATIENT CONDITION AT DISCHARGE:     Functional status    Poor     Deconditioned     Independent      Cognition     Lucid     Forgetful     Dementia      Catheters/lines (plus indication)    Garcia     PICC     PEG     None      Code status     Full code     DNR      PHYSICAL EXAMINATION AT DISCHARGE:  General:          Alert, cooperative, no distress, appears stated age. HEENT:           Atraumatic, anicteric sclerae, pink conjunctivae                          No oral ulcers, mucosa moist, throat clear, dentition fair  Neck:               Supple, symmetrical  Lungs:             Clear to auscultation bilaterally. No Wheezing or Rhonchi. No rales. Chest wall:      No tenderness  No Accessory muscle use. Heart:              Regular  rhythm,  No  murmur   No edema  Abdomen:        Soft, non-tender. Not distended. Bowel sounds normal  Extremities:     No cyanosis.   No clubbing,                            Skin turgor normal, Capillary refill normal  Skin:                Not pale. Not Jaundiced  No rashes   Psych:             Not anxious or agitated.   Neurologic:      Alert, moves all extremities, answers questions appropriately and responds to commands       CHRONIC MEDICAL DIAGNOSES:  Problem List as of 2/21/2020 Date Reviewed: 11/4/2019          Codes Class Noted - Resolved    CHF exacerbation ([place] [address].) ICD-10-CM: I50.9  ICD-9-CM: 428.0  2/18/2020 - Present        CHF (congestive heart failure) ([facility] ICD-10-CM: I50.9  ICD-9-CM: 428.0  11/4/2019 - Present        Obesity, morbid ([place] [address].) ICD-10-CM: E66.01  ICD-9-CM: 278.01  8/7/2018 - Present        Wheezing ICD-10-CM: R06.2  ICD-9-CM: 786.07  6/7/2018 - Present        Leukocytosis ICD-10-CM: D72.829  ICD-9-CM: 288.60  6/7/2018 - Present        Pneumonia ICD-10-CM: J18.9  ICD-9-CM: 948  6/7/2018 - Present        A-fib ([place] [address].) ICD-10-CM: I48.91  ICD-9-CM: 427.31  3/30/2018 - Present        Hypertension ICD-10-CM: I10  ICD-9-CM: 401.9  3/30/2018 - Present        Bronchitis, acute ICD-10-CM: J20.9  ICD-9-CM: 466.0  12/17/2011 - Present        RESOLVED: Acute on chronic diastolic (congestive) heart failure ([facility] ICD-10-CM: I50.33  ICD-9-CM: 428.33, 428.0  11/4/2019 - 11/7/2019        RESOLVED: Hypokalemia ICD-10-CM: E87.6  ICD-9-CM: 276.8  3/30/2018 - 3/30/2018        RESOLVED: CHF exacerbation ([facility] ICD-10-CM: I50.9  ICD-9-CM: 428.0  3/27/2018 - 3/30/2018        RESOLVED: Back pain ICD-10-CM: M54.9  ICD-9-CM: 724.5  9/28/2014 - 10/4/2014        RESOLVED: Low back pain ICD-10-CM: M54.5  ICD-9-CM: 724.2  9/26/2014 - 10/4/2014        RESOLVED: Shortness of breath ICD-10-CM: R06.02  ICD-9-CM: 786.05  12/17/2011 - 12/20/2011              Greater than 60 minutes were spent with the patient on counseling and coordination of care    Signed:   Addy Lawson MD  2/21/2020  10:02 AM

## 2020-02-22 ENCOUNTER — HOME CARE VISIT (OUTPATIENT)
Dept: SCHEDULING | Facility: HOME HEALTH | Age: 80
End: 2020-02-22
Payer: MEDICARE

## 2020-02-22 VITALS
SYSTOLIC BLOOD PRESSURE: 142 MMHG | TEMPERATURE: 98.1 F | HEART RATE: 82 BPM | RESPIRATION RATE: 20 BRPM | OXYGEN SATURATION: 97 % | DIASTOLIC BLOOD PRESSURE: 78 MMHG

## 2020-02-22 PROCEDURE — 3331090002 HH PPS REVENUE DEBIT

## 2020-02-22 PROCEDURE — G0299 HHS/HOSPICE OF RN EA 15 MIN: HCPCS

## 2020-02-22 PROCEDURE — 400013 HH SOC

## 2020-02-22 PROCEDURE — 3331090001 HH PPS REVENUE CREDIT

## 2020-02-23 PROCEDURE — 3331090001 HH PPS REVENUE CREDIT

## 2020-02-23 PROCEDURE — 3331090002 HH PPS REVENUE DEBIT

## 2020-02-24 ENCOUNTER — PATIENT OUTREACH (OUTPATIENT)
Dept: INTERNAL MEDICINE CLINIC | Age: 80
End: 2020-02-24

## 2020-02-24 ENCOUNTER — HOME CARE VISIT (OUTPATIENT)
Dept: SCHEDULING | Facility: HOME HEALTH | Age: 80
End: 2020-02-24
Payer: MEDICARE

## 2020-02-24 VITALS — OXYGEN SATURATION: 95 % | TEMPERATURE: 98.4 F | HEART RATE: 72 BPM | RESPIRATION RATE: 20 BRPM

## 2020-02-24 PROCEDURE — G0299 HHS/HOSPICE OF RN EA 15 MIN: HCPCS

## 2020-02-24 PROCEDURE — 3331090002 HH PPS REVENUE DEBIT

## 2020-02-24 PROCEDURE — 3331090001 HH PPS REVENUE CREDIT

## 2020-02-24 NOTE — PROGRESS NOTES
Hospital Discharge Follow-Up      Date/Time:  2020 9:26 AM  [facility] Transition Nurse  Phone- [phone]    Patient was admitted to [facility] on  and discharged on  for CHF exac. The physician discharge summary was available at the time of outreach. Patient was contacted within 1 business days of discharge. Top Challenges reviewed with the provider     Advance Care Planning:   Does patient have an Advance Directive:  not on file        Was this a readmission? no     Care Transition Nurse (CTN) contacted the patient by telephone to perform post hospital discharge assessment. Verified name and  with patient as identifiers. Provided introduction to self, and explanation of the CTN role. Patient received hospital discharge instructions. CTN reviewed discharge instructions and red flags with patient who verbalized understanding. Patient given an opportunity to ask questions and does not have any further questions or concerns at this time. The patient agrees to contact the PCP office for questions related to their healthcare. CTN provided contact information for future reference. Disease Specific:   CHF    Patients top risk factors for readmission:  lack of knowledge about disease, level of motivation    Home Health orders at discharge: [facility]: [facility]  Date of initial visit: 2020    Durable Medical Equipment:  Patient had a neb machine at home but is not sure were it is. She will look for it today. If she is not able to locate her machine she will discuss with PCP about getting an order for new one. Medication(s):   New Medications at Discharge: Bumex and dounebs  Changed Medications at Discharge: none  Discontinued Medications at Discharge: amiodarone and diltiazem    Medication reconciliation was performed with patient, who verbalizes understanding of administration of home medications.   There were barriers to obtaining medications identified at this time. Patient can not find her nebulizer machine. She was able to  her dounebs from the pharmacy. She denies any SOB or wheezing since discharge and does not feel she needs neb treatment currently. Referral to Pharm D needed: no     Current Outpatient Medications   Medication Sig    bumetanide (BUMEX) 1 mg tablet Take 1 Tab by mouth two (2) times a day.  albuterol-ipratropium (DUO-NEB) 2.5 mg-0.5 mg/3 ml nebu 3 mL by Nebulization route every four (4) hours as needed for Shortness of Breath. Indications: Reactive Airway Disease ICD: J45.909    dilTIAZem ER (CARDIZEM LA) 240 mg Tb24 tablet Take 240 mg by mouth daily.  potassium chloride SR (K-TAB) 20 mEq tablet Take 20 mEq by mouth daily.  acetaminophen (TYLENOL) 500 mg tablet Take 2 Tabs by mouth every six (6) hours as needed for Pain.  rivaroxaban (XARELTO) 20 mg tab tablet Take 1 Tab by mouth daily (with lunch). Indications: PREVENT THROMBOEMBOLISM IN CHRONIC ATRIAL FIBRILLATION    aspirin delayed-release 81 mg tablet Take 1 Tab by mouth daily. No current facility-administered medications for this visit. There are no discontinued medications. [facility] follow up appointment(s):   Future Appointments   Date Time Provider Rachna Lugo   2/24/2020 To Be Determined [facility]      Non-[facility] follow up appointment(s):   PCPMichelle German- patient will call office  Cards- [email or web address]     Malcom Amaury Attends follow-up appointments as directed. 2/24/2020  Patient will contact PCP for follow up. She confirms that she has appt with cards on 2/27 and her son will transport. ~BEB       Prevent complications post hospitalization. 2/24/2020  Patient will monitor for s/s CHF exac and report to provider. She was weighing daily after last admission in 11/2019, but had stopped recently.   She will resume daily weights and report any weight gain of 2-3 lbs in a day or 3-5 lbs in a week to provider. ~BEB       Reduce risk of CHF exacerbations and complications. 2/24/2020  Patient is encouraged to monitor/limit sodium intake. She reports LE edema has improved with right leg slightly edematous and left back to her BL. She will elevate legs as able. She is feeling well and denies any current s/s of fluid retention or SOB. ~BEB

## 2020-02-25 ENCOUNTER — HOME CARE VISIT (OUTPATIENT)
Dept: SCHEDULING | Facility: HOME HEALTH | Age: 80
End: 2020-02-25
Payer: MEDICARE

## 2020-02-25 VITALS
OXYGEN SATURATION: 97 % | TEMPERATURE: 97.1 F | DIASTOLIC BLOOD PRESSURE: 80 MMHG | SYSTOLIC BLOOD PRESSURE: 140 MMHG | HEART RATE: 70 BPM | RESPIRATION RATE: 18 BRPM

## 2020-02-25 PROCEDURE — 3331090002 HH PPS REVENUE DEBIT

## 2020-02-25 PROCEDURE — 3331090001 HH PPS REVENUE CREDIT

## 2020-02-25 PROCEDURE — G0151 HHCP-SERV OF PT,EA 15 MIN: HCPCS

## 2020-02-26 ENCOUNTER — HOME CARE VISIT (OUTPATIENT)
Dept: SCHEDULING | Facility: HOME HEALTH | Age: 80
End: 2020-02-26
Payer: MEDICARE

## 2020-02-26 PROCEDURE — 3331090002 HH PPS REVENUE DEBIT

## 2020-02-26 PROCEDURE — 3331090001 HH PPS REVENUE CREDIT

## 2020-02-27 PROCEDURE — 3331090002 HH PPS REVENUE DEBIT

## 2020-02-27 PROCEDURE — 3331090001 HH PPS REVENUE CREDIT

## 2020-02-28 ENCOUNTER — HOME CARE VISIT (OUTPATIENT)
Dept: HOME HEALTH SERVICES | Facility: HOME HEALTH | Age: 80
End: 2020-02-28
Payer: MEDICARE

## 2020-02-28 ENCOUNTER — HOME CARE VISIT (OUTPATIENT)
Dept: SCHEDULING | Facility: HOME HEALTH | Age: 80
End: 2020-02-28
Payer: MEDICARE

## 2020-02-28 VITALS
HEART RATE: 70 BPM | OXYGEN SATURATION: 95 % | RESPIRATION RATE: 18 BRPM | TEMPERATURE: 96.6 F | SYSTOLIC BLOOD PRESSURE: 140 MMHG | DIASTOLIC BLOOD PRESSURE: 80 MMHG

## 2020-02-28 PROCEDURE — 3331090002 HH PPS REVENUE DEBIT

## 2020-02-28 PROCEDURE — 3331090001 HH PPS REVENUE CREDIT

## 2020-02-28 PROCEDURE — G0151 HHCP-SERV OF PT,EA 15 MIN: HCPCS

## 2020-02-29 PROCEDURE — 3331090002 HH PPS REVENUE DEBIT

## 2020-02-29 PROCEDURE — 3331090001 HH PPS REVENUE CREDIT

## 2020-03-01 PROCEDURE — 3331090001 HH PPS REVENUE CREDIT

## 2020-03-01 PROCEDURE — 3331090002 HH PPS REVENUE DEBIT

## 2020-03-02 ENCOUNTER — HOME CARE VISIT (OUTPATIENT)
Dept: SCHEDULING | Facility: HOME HEALTH | Age: 80
End: 2020-03-02
Payer: MEDICARE

## 2020-03-02 PROCEDURE — G0300 HHS/HOSPICE OF LPN EA 15 MIN: HCPCS

## 2020-03-02 PROCEDURE — 3331090002 HH PPS REVENUE DEBIT

## 2020-03-02 PROCEDURE — 3331090001 HH PPS REVENUE CREDIT

## 2020-03-03 PROCEDURE — 3331090001 HH PPS REVENUE CREDIT

## 2020-03-03 PROCEDURE — 3331090002 HH PPS REVENUE DEBIT

## 2020-03-04 VITALS
HEART RATE: 92 BPM | DIASTOLIC BLOOD PRESSURE: 78 MMHG | OXYGEN SATURATION: 97 % | RESPIRATION RATE: 18 BRPM | SYSTOLIC BLOOD PRESSURE: 132 MMHG

## 2020-03-04 PROCEDURE — 3331090001 HH PPS REVENUE CREDIT

## 2020-03-04 PROCEDURE — 3331090002 HH PPS REVENUE DEBIT

## 2020-03-05 PROCEDURE — 3331090002 HH PPS REVENUE DEBIT

## 2020-03-05 PROCEDURE — 3331090001 HH PPS REVENUE CREDIT

## 2020-03-06 ENCOUNTER — HOME CARE VISIT (OUTPATIENT)
Dept: SCHEDULING | Facility: HOME HEALTH | Age: 80
End: 2020-03-06
Payer: MEDICARE

## 2020-03-06 PROCEDURE — G0299 HHS/HOSPICE OF RN EA 15 MIN: HCPCS

## 2020-03-06 PROCEDURE — 3331090002 HH PPS REVENUE DEBIT

## 2020-03-06 PROCEDURE — 3331090001 HH PPS REVENUE CREDIT

## 2020-03-07 ENCOUNTER — HOME CARE VISIT (OUTPATIENT)
Dept: HOME HEALTH SERVICES | Facility: HOME HEALTH | Age: 80
End: 2020-03-07
Payer: MEDICARE

## 2020-03-07 VITALS
DIASTOLIC BLOOD PRESSURE: 78 MMHG | TEMPERATURE: 97.9 F | WEIGHT: 293 LBS | HEART RATE: 80 BPM | BODY MASS INDEX: 46.79 KG/M2 | OXYGEN SATURATION: 97 % | SYSTOLIC BLOOD PRESSURE: 142 MMHG

## 2020-03-07 PROCEDURE — 3331090002 HH PPS REVENUE DEBIT

## 2020-03-07 PROCEDURE — 3331090001 HH PPS REVENUE CREDIT

## 2020-03-07 PROCEDURE — G0299 HHS/HOSPICE OF RN EA 15 MIN: HCPCS

## 2020-03-08 PROCEDURE — 3331090002 HH PPS REVENUE DEBIT

## 2020-03-08 PROCEDURE — 3331090001 HH PPS REVENUE CREDIT

## 2020-03-09 ENCOUNTER — HOME CARE VISIT (OUTPATIENT)
Dept: SCHEDULING | Facility: HOME HEALTH | Age: 80
End: 2020-03-09
Payer: MEDICARE

## 2020-03-09 PROCEDURE — 3331090001 HH PPS REVENUE CREDIT

## 2020-03-09 PROCEDURE — G0300 HHS/HOSPICE OF LPN EA 15 MIN: HCPCS

## 2020-03-09 PROCEDURE — 3331090002 HH PPS REVENUE DEBIT

## 2020-03-10 ENCOUNTER — PATIENT OUTREACH (OUTPATIENT)
Dept: INTERNAL MEDICINE CLINIC | Age: 80
End: 2020-03-10

## 2020-03-10 VITALS
DIASTOLIC BLOOD PRESSURE: 78 MMHG | OXYGEN SATURATION: 99 % | SYSTOLIC BLOOD PRESSURE: 140 MMHG | HEART RATE: 74 BPM | RESPIRATION RATE: 18 BRPM

## 2020-03-10 PROCEDURE — 3331090001 HH PPS REVENUE CREDIT

## 2020-03-10 PROCEDURE — 3331090002 HH PPS REVENUE DEBIT

## 2020-03-11 PROCEDURE — 3331090001 HH PPS REVENUE CREDIT

## 2020-03-11 PROCEDURE — 3331090002 HH PPS REVENUE DEBIT

## 2020-03-12 PROCEDURE — 3331090002 HH PPS REVENUE DEBIT

## 2020-03-12 PROCEDURE — 3331090001 HH PPS REVENUE CREDIT

## 2020-03-13 ENCOUNTER — HOME CARE VISIT (OUTPATIENT)
Dept: SCHEDULING | Facility: HOME HEALTH | Age: 80
End: 2020-03-13
Payer: MEDICARE

## 2020-03-13 PROCEDURE — G0300 HHS/HOSPICE OF LPN EA 15 MIN: HCPCS

## 2020-03-13 PROCEDURE — 3331090002 HH PPS REVENUE DEBIT

## 2020-03-13 PROCEDURE — 3331090001 HH PPS REVENUE CREDIT

## 2020-03-14 PROCEDURE — 3331090001 HH PPS REVENUE CREDIT

## 2020-03-14 PROCEDURE — 3331090002 HH PPS REVENUE DEBIT

## 2020-03-15 PROCEDURE — 3331090002 HH PPS REVENUE DEBIT

## 2020-03-15 PROCEDURE — 3331090001 HH PPS REVENUE CREDIT

## 2020-03-16 PROCEDURE — 3331090002 HH PPS REVENUE DEBIT

## 2020-03-16 PROCEDURE — 3331090001 HH PPS REVENUE CREDIT

## 2020-03-17 ENCOUNTER — HOME CARE VISIT (OUTPATIENT)
Dept: SCHEDULING | Facility: HOME HEALTH | Age: 80
End: 2020-03-17
Payer: MEDICARE

## 2020-03-17 ENCOUNTER — HOME CARE VISIT (OUTPATIENT)
Dept: HOME HEALTH SERVICES | Facility: HOME HEALTH | Age: 80
End: 2020-03-17
Payer: MEDICARE

## 2020-03-17 PROCEDURE — 3331090001 HH PPS REVENUE CREDIT

## 2020-03-17 PROCEDURE — 3331090002 HH PPS REVENUE DEBIT

## 2020-03-17 PROCEDURE — G0300 HHS/HOSPICE OF LPN EA 15 MIN: HCPCS

## 2020-03-18 VITALS
DIASTOLIC BLOOD PRESSURE: 80 MMHG | RESPIRATION RATE: 18 BRPM | OXYGEN SATURATION: 97 % | TEMPERATURE: 98 F | SYSTOLIC BLOOD PRESSURE: 128 MMHG | HEART RATE: 77 BPM

## 2020-03-18 PROCEDURE — 3331090001 HH PPS REVENUE CREDIT

## 2020-03-18 PROCEDURE — 3331090002 HH PPS REVENUE DEBIT

## 2020-03-19 PROCEDURE — 3331090001 HH PPS REVENUE CREDIT

## 2020-03-19 PROCEDURE — 3331090002 HH PPS REVENUE DEBIT

## 2020-03-20 ENCOUNTER — HOME CARE VISIT (OUTPATIENT)
Dept: SCHEDULING | Facility: HOME HEALTH | Age: 80
End: 2020-03-20
Payer: MEDICARE

## 2020-03-20 PROCEDURE — 3331090002 HH PPS REVENUE DEBIT

## 2020-03-20 PROCEDURE — G0300 HHS/HOSPICE OF LPN EA 15 MIN: HCPCS

## 2020-03-20 PROCEDURE — 3331090001 HH PPS REVENUE CREDIT

## 2020-03-21 PROCEDURE — 3331090001 HH PPS REVENUE CREDIT

## 2020-03-21 PROCEDURE — 3331090002 HH PPS REVENUE DEBIT

## 2020-03-22 VITALS
RESPIRATION RATE: 18 BRPM | TEMPERATURE: 98 F | DIASTOLIC BLOOD PRESSURE: 76 MMHG | HEART RATE: 89 BPM | OXYGEN SATURATION: 96 % | SYSTOLIC BLOOD PRESSURE: 128 MMHG

## 2020-03-22 PROCEDURE — 3331090002 HH PPS REVENUE DEBIT

## 2020-03-22 PROCEDURE — 3331090001 HH PPS REVENUE CREDIT

## 2020-03-23 VITALS
TEMPERATURE: 98.2 F | HEART RATE: 89 BPM | OXYGEN SATURATION: 97 % | RESPIRATION RATE: 18 BRPM | BODY MASS INDEX: 46.64 KG/M2 | SYSTOLIC BLOOD PRESSURE: 132 MMHG | DIASTOLIC BLOOD PRESSURE: 70 MMHG | WEIGHT: 293 LBS

## 2020-03-23 PROCEDURE — 3331090002 HH PPS REVENUE DEBIT

## 2020-03-23 PROCEDURE — 3331090001 HH PPS REVENUE CREDIT

## 2020-03-24 ENCOUNTER — PATIENT OUTREACH (OUTPATIENT)
Dept: INTERNAL MEDICINE CLINIC | Age: 80
End: 2020-03-24

## 2020-03-24 PROCEDURE — 3331090002 HH PPS REVENUE DEBIT

## 2020-03-24 PROCEDURE — 3331090001 HH PPS REVENUE CREDIT

## 2020-03-24 NOTE — PROGRESS NOTES
Patient has graduated from the Transitions of Care Coordination  program on 3/24/2020. Patient/family has the ability to self-manage at this time Care management goals have been completed. Patient was not referred to the [place] team for further management. Goals Addressed                 This Visit's Progress     COMPLETED: Attends follow-up appointments as directed. 3/10/2020  Patient reports follow up appointment with Dr. Parisa Barker attended. She has not scheduled an appt with her PCP at this time, but is encouraged to do so. ~BEB    2/24/2020  Patient will contact PCP for follow up. She confirms that she has appt with cards on 2/27 and her son will transport. ~BEB       COMPLETED: Prevent complications post hospitalization. 3/10/2020  Patient is weighing daily and taking medications as directed. Patient denies any issues with afford medications~BEB    2/24/2020  Patient will monitor for s/s CHF exac and report to provider. She was weighing daily after last admission in 11/2019, but had stopped recently. She will resume daily weights and report any weight gain of 2-3 lbs in a day or 3-5 lbs in a week to provider. ~BEB       COMPLETED: Reduce risk of CHF exacerbations and complications. 3/10/2020  Patient reports breathing has improved. She has less LE edema. She feels she is at her baseline activity. ~BEB    2/24/2020  Patient is encouraged to monitor/limit sodium intake. She reports LE edema has improved with right leg slightly edematous and left back to her BL. She will elevate legs as able. She is feeling well and denies any current s/s of fluid retention or SOB. ~BEB            Patient has Care Transition Nurse's contact information for any further questions, concerns, or needs.   Patients upcoming visits:    Future Appointments   Date Time Provider Rachna Lugo   3/24/2020  4:00 PM Kayleigh Fenton LPN [address] [facility]   3/27/2020 To Be Determined Darylene Age, RN [address]   4/1/2020 To Be Determined Franko Erickson, LPN [facility] [address]   4/8/2020 To Be Determined Franko Erickson, LPN [address]   4/15/2020 To Be Determined Franko Morriss, LPN [address]   4/20/2020 To Be Determined Ashwini Pedraza RN [address]

## 2020-03-25 ENCOUNTER — HOME CARE VISIT (OUTPATIENT)
Dept: HOME HEALTH SERVICES | Facility: HOME HEALTH | Age: 80
End: 2020-03-25
Payer: MEDICARE

## 2020-03-25 ENCOUNTER — HOME CARE VISIT (OUTPATIENT)
Dept: SCHEDULING | Facility: HOME HEALTH | Age: 80
End: 2020-03-25
Payer: MEDICARE

## 2020-03-25 PROCEDURE — G0300 HHS/HOSPICE OF LPN EA 15 MIN: HCPCS

## 2020-03-25 PROCEDURE — 3331090002 HH PPS REVENUE DEBIT

## 2020-03-25 PROCEDURE — 3331090001 HH PPS REVENUE CREDIT

## 2020-03-25 PROCEDURE — 400013 HH SOC

## 2020-03-26 PROCEDURE — 3331090002 HH PPS REVENUE DEBIT

## 2020-03-26 PROCEDURE — 3331090001 HH PPS REVENUE CREDIT

## 2020-03-27 ENCOUNTER — HOME CARE VISIT (OUTPATIENT)
Dept: SCHEDULING | Facility: HOME HEALTH | Age: 80
End: 2020-03-27
Payer: MEDICARE

## 2020-03-27 ENCOUNTER — HOME CARE VISIT (OUTPATIENT)
Dept: HOME HEALTH SERVICES | Facility: HOME HEALTH | Age: 80
End: 2020-03-27
Payer: MEDICARE

## 2020-03-27 PROCEDURE — G0299 HHS/HOSPICE OF RN EA 15 MIN: HCPCS

## 2020-03-27 PROCEDURE — 3331090001 HH PPS REVENUE CREDIT

## 2020-03-27 PROCEDURE — 3331090002 HH PPS REVENUE DEBIT

## 2020-03-28 PROCEDURE — 3331090001 HH PPS REVENUE CREDIT

## 2020-03-28 PROCEDURE — 3331090002 HH PPS REVENUE DEBIT

## 2020-03-29 PROCEDURE — 3331090002 HH PPS REVENUE DEBIT

## 2020-03-29 PROCEDURE — 3331090001 HH PPS REVENUE CREDIT

## 2020-03-30 VITALS
SYSTOLIC BLOOD PRESSURE: 118 MMHG | HEART RATE: 74 BPM | DIASTOLIC BLOOD PRESSURE: 82 MMHG | RESPIRATION RATE: 18 BRPM | OXYGEN SATURATION: 98 % | TEMPERATURE: 98 F

## 2020-03-30 VITALS
RESPIRATION RATE: 18 BRPM | HEART RATE: 88 BPM | DIASTOLIC BLOOD PRESSURE: 68 MMHG | OXYGEN SATURATION: 97 % | SYSTOLIC BLOOD PRESSURE: 132 MMHG | TEMPERATURE: 98 F

## 2020-03-30 PROCEDURE — 3331090001 HH PPS REVENUE CREDIT

## 2020-03-30 PROCEDURE — 3331090002 HH PPS REVENUE DEBIT

## 2020-03-31 VITALS
SYSTOLIC BLOOD PRESSURE: 130 MMHG | DIASTOLIC BLOOD PRESSURE: 70 MMHG | TEMPERATURE: 98 F | HEART RATE: 80 BPM | OXYGEN SATURATION: 96 % | RESPIRATION RATE: 20 BRPM

## 2020-03-31 PROCEDURE — 3331090001 HH PPS REVENUE CREDIT

## 2020-03-31 PROCEDURE — 3331090002 HH PPS REVENUE DEBIT

## 2020-04-01 ENCOUNTER — HOME CARE VISIT (OUTPATIENT)
Dept: HOME HEALTH SERVICES | Facility: HOME HEALTH | Age: 80
End: 2020-04-01
Payer: MEDICARE

## 2020-04-01 ENCOUNTER — HOME CARE VISIT (OUTPATIENT)
Dept: SCHEDULING | Facility: HOME HEALTH | Age: 80
End: 2020-04-01
Payer: MEDICARE

## 2020-04-01 PROCEDURE — G0300 HHS/HOSPICE OF LPN EA 15 MIN: HCPCS

## 2020-04-01 PROCEDURE — 3331090001 HH PPS REVENUE CREDIT

## 2020-04-01 PROCEDURE — 3331090002 HH PPS REVENUE DEBIT

## 2020-04-02 VITALS
OXYGEN SATURATION: 97 % | RESPIRATION RATE: 18 BRPM | HEART RATE: 70 BPM | WEIGHT: 293 LBS | SYSTOLIC BLOOD PRESSURE: 132 MMHG | BODY MASS INDEX: 47.82 KG/M2 | DIASTOLIC BLOOD PRESSURE: 80 MMHG

## 2020-04-02 PROCEDURE — 3331090002 HH PPS REVENUE DEBIT

## 2020-04-02 PROCEDURE — 3331090001 HH PPS REVENUE CREDIT

## 2020-04-03 PROCEDURE — 3331090001 HH PPS REVENUE CREDIT

## 2020-04-03 PROCEDURE — 3331090002 HH PPS REVENUE DEBIT

## 2020-04-04 PROCEDURE — 3331090001 HH PPS REVENUE CREDIT

## 2020-04-04 PROCEDURE — 3331090002 HH PPS REVENUE DEBIT

## 2020-04-05 PROCEDURE — 3331090001 HH PPS REVENUE CREDIT

## 2020-04-05 PROCEDURE — 3331090002 HH PPS REVENUE DEBIT

## 2020-04-06 PROCEDURE — 3331090002 HH PPS REVENUE DEBIT

## 2020-04-06 PROCEDURE — 3331090001 HH PPS REVENUE CREDIT

## 2020-04-07 PROCEDURE — 3331090002 HH PPS REVENUE DEBIT

## 2020-04-07 PROCEDURE — 3331090001 HH PPS REVENUE CREDIT

## 2020-04-08 ENCOUNTER — HOME CARE VISIT (OUTPATIENT)
Dept: SCHEDULING | Facility: HOME HEALTH | Age: 80
End: 2020-04-08
Payer: MEDICARE

## 2020-04-08 ENCOUNTER — HOME CARE VISIT (OUTPATIENT)
Dept: HOME HEALTH SERVICES | Facility: HOME HEALTH | Age: 80
End: 2020-04-08
Payer: MEDICARE

## 2020-04-08 PROCEDURE — G0300 HHS/HOSPICE OF LPN EA 15 MIN: HCPCS

## 2020-04-08 PROCEDURE — 3331090002 HH PPS REVENUE DEBIT

## 2020-04-08 PROCEDURE — 3331090001 HH PPS REVENUE CREDIT

## 2020-04-09 PROCEDURE — 3331090002 HH PPS REVENUE DEBIT

## 2020-04-09 PROCEDURE — 3331090001 HH PPS REVENUE CREDIT

## 2020-04-10 PROCEDURE — 3331090001 HH PPS REVENUE CREDIT

## 2020-04-10 PROCEDURE — 3331090002 HH PPS REVENUE DEBIT

## 2020-04-11 PROCEDURE — 3331090002 HH PPS REVENUE DEBIT

## 2020-04-11 PROCEDURE — 3331090001 HH PPS REVENUE CREDIT

## 2020-04-12 VITALS
DIASTOLIC BLOOD PRESSURE: 80 MMHG | HEART RATE: 89 BPM | TEMPERATURE: 98.1 F | OXYGEN SATURATION: 97 % | WEIGHT: 293 LBS | RESPIRATION RATE: 20 BRPM | BODY MASS INDEX: 47.97 KG/M2 | SYSTOLIC BLOOD PRESSURE: 132 MMHG

## 2020-04-12 PROCEDURE — 3331090002 HH PPS REVENUE DEBIT

## 2020-04-12 PROCEDURE — 3331090001 HH PPS REVENUE CREDIT

## 2020-04-13 PROCEDURE — 3331090001 HH PPS REVENUE CREDIT

## 2020-04-13 PROCEDURE — 3331090002 HH PPS REVENUE DEBIT

## 2020-04-14 PROCEDURE — 3331090001 HH PPS REVENUE CREDIT

## 2020-04-14 PROCEDURE — 3331090002 HH PPS REVENUE DEBIT

## 2020-04-15 ENCOUNTER — HOME CARE VISIT (OUTPATIENT)
Dept: HOME HEALTH SERVICES | Facility: HOME HEALTH | Age: 80
End: 2020-04-15
Payer: MEDICARE

## 2020-04-15 ENCOUNTER — HOME CARE VISIT (OUTPATIENT)
Dept: SCHEDULING | Facility: HOME HEALTH | Age: 80
End: 2020-04-15
Payer: MEDICARE

## 2020-04-15 PROCEDURE — 3331090002 HH PPS REVENUE DEBIT

## 2020-04-15 PROCEDURE — 3331090001 HH PPS REVENUE CREDIT

## 2020-04-15 PROCEDURE — G0300 HHS/HOSPICE OF LPN EA 15 MIN: HCPCS

## 2020-04-16 PROCEDURE — 3331090002 HH PPS REVENUE DEBIT

## 2020-04-16 PROCEDURE — 3331090001 HH PPS REVENUE CREDIT

## 2020-04-17 PROCEDURE — 3331090001 HH PPS REVENUE CREDIT

## 2020-04-17 PROCEDURE — 3331090002 HH PPS REVENUE DEBIT

## 2020-04-18 PROCEDURE — 3331090001 HH PPS REVENUE CREDIT

## 2020-04-18 PROCEDURE — 3331090002 HH PPS REVENUE DEBIT

## 2020-04-19 PROCEDURE — 3331090001 HH PPS REVENUE CREDIT

## 2020-04-19 PROCEDURE — 3331090002 HH PPS REVENUE DEBIT

## 2020-04-20 ENCOUNTER — HOME CARE VISIT (OUTPATIENT)
Dept: HOME HEALTH SERVICES | Facility: HOME HEALTH | Age: 80
End: 2020-04-20
Payer: MEDICARE

## 2020-04-20 ENCOUNTER — HOME CARE VISIT (OUTPATIENT)
Dept: SCHEDULING | Facility: HOME HEALTH | Age: 80
End: 2020-04-20
Payer: MEDICARE

## 2020-04-20 VITALS
RESPIRATION RATE: 18 BRPM | WEIGHT: 293 LBS | HEART RATE: 77 BPM | BODY MASS INDEX: 48.12 KG/M2 | DIASTOLIC BLOOD PRESSURE: 70 MMHG | SYSTOLIC BLOOD PRESSURE: 132 MMHG | OXYGEN SATURATION: 97 % | TEMPERATURE: 98.4 F

## 2020-04-20 PROCEDURE — G0299 HHS/HOSPICE OF RN EA 15 MIN: HCPCS

## 2020-04-20 PROCEDURE — 3331090002 HH PPS REVENUE DEBIT

## 2020-04-20 PROCEDURE — 3331090001 HH PPS REVENUE CREDIT

## 2020-04-21 VITALS
TEMPERATURE: 98.2 F | SYSTOLIC BLOOD PRESSURE: 134 MMHG | DIASTOLIC BLOOD PRESSURE: 82 MMHG | RESPIRATION RATE: 16 BRPM | HEART RATE: 69 BPM | OXYGEN SATURATION: 95 %

## 2020-04-21 PROCEDURE — 3331090002 HH PPS REVENUE DEBIT

## 2020-04-21 PROCEDURE — 3331090001 HH PPS REVENUE CREDIT

## 2021-01-27 ENCOUNTER — HOSPITAL ENCOUNTER (EMERGENCY)
Age: 81
Discharge: HOME | End: 2021-01-28
Attending: EMERGENCY MEDICINE
Payer: MEDICARE

## 2021-01-27 ENCOUNTER — APPOINTMENT (OUTPATIENT)
Dept: GENERAL RADIOLOGY | Age: 81
End: 2021-01-27
Attending: EMERGENCY MEDICINE
Payer: MEDICARE

## 2021-01-27 DIAGNOSIS — R06.00 DYSPNEA, UNSPECIFIED TYPE: Primary | ICD-10-CM

## 2021-01-27 DIAGNOSIS — R53.83 FATIGUE, UNSPECIFIED TYPE: ICD-10-CM

## 2021-01-27 LAB
ALBUMIN SERPL-MCNC: 3.2 G/DL (ref 3.5–5)
ALBUMIN/GLOB SERPL: 0.7 {RATIO} (ref 1.1–2.2)
ALP SERPL-CCNC: 96 U/L (ref 45–117)
ALT SERPL-CCNC: 12 U/L (ref 12–78)
ANION GAP SERPL CALC-SCNC: 3 MMOL/L (ref 5–15)
AST SERPL-CCNC: 12 U/L (ref 15–37)
BASOPHILS # BLD: 0 K/UL (ref 0–0.1)
BASOPHILS NFR BLD: 1 % (ref 0–1)
BILIRUB SERPL-MCNC: 0.9 MG/DL (ref 0.2–1)
BNP SERPL-MCNC: 465 PG/ML
BUN SERPL-MCNC: 13 MG/DL (ref 6–20)
BUN/CREAT SERPL: 10 (ref 12–20)
CALCIUM SERPL-MCNC: 9.5 MG/DL (ref 8.5–10.1)
CHLORIDE SERPL-SCNC: 103 MMOL/L (ref 97–108)
CO2 SERPL-SCNC: 27 MMOL/L (ref 21–32)
COMMENT, HOLDF: NORMAL
CREAT SERPL-MCNC: 1.24 MG/DL (ref 0.55–1.02)
DIFFERENTIAL METHOD BLD: ABNORMAL
EOSINOPHIL # BLD: 0 K/UL (ref 0–0.4)
EOSINOPHIL NFR BLD: 0 % (ref 0–7)
ERYTHROCYTE [DISTWIDTH] IN BLOOD BY AUTOMATED COUNT: 15.5 % (ref 11.5–14.5)
GLOBULIN SER CALC-MCNC: 4.3 G/DL (ref 2–4)
GLUCOSE SERPL-MCNC: 95 MG/DL (ref 65–100)
HCT VFR BLD AUTO: 45.4 % (ref 35–47)
HGB BLD-MCNC: 15.1 G/DL (ref 11.5–16)
IMM GRANULOCYTES # BLD AUTO: 0.1 K/UL (ref 0–0.04)
IMM GRANULOCYTES NFR BLD AUTO: 1 % (ref 0–0.5)
LYMPHOCYTES # BLD: 1.1 K/UL (ref 0.8–3.5)
LYMPHOCYTES NFR BLD: 20 % (ref 12–49)
MCH RBC QN AUTO: 27.8 PG (ref 26–34)
MCHC RBC AUTO-ENTMCNC: 33.3 G/DL (ref 30–36.5)
MCV RBC AUTO: 83.6 FL (ref 80–99)
MONOCYTES # BLD: 0.8 K/UL (ref 0–1)
MONOCYTES NFR BLD: 14 % (ref 5–13)
NEUTS SEG # BLD: 3.5 K/UL (ref 1.8–8)
NEUTS SEG NFR BLD: 64 % (ref 32–75)
NRBC # BLD: 0 K/UL (ref 0–0.01)
NRBC BLD-RTO: 0 PER 100 WBC
PLATELET # BLD AUTO: 282 K/UL (ref 150–400)
PMV BLD AUTO: 9.9 FL (ref 8.9–12.9)
POTASSIUM SERPL-SCNC: 5.2 MMOL/L (ref 3.5–5.1)
PROT SERPL-MCNC: 7.5 G/DL (ref 6.4–8.2)
RBC # BLD AUTO: 5.43 M/UL (ref 3.8–5.2)
SAMPLES BEING HELD,HOLD: NORMAL
SODIUM SERPL-SCNC: 133 MMOL/L (ref 136–145)
TROPONIN I SERPL-MCNC: <0.05 NG/ML
WBC # BLD AUTO: 5.6 K/UL (ref 3.6–11)

## 2021-01-27 PROCEDURE — 84484 ASSAY OF TROPONIN QUANT: CPT

## 2021-01-27 PROCEDURE — 71045 X-RAY EXAM CHEST 1 VIEW: CPT

## 2021-01-27 PROCEDURE — 93005 ELECTROCARDIOGRAM TRACING: CPT

## 2021-01-27 PROCEDURE — 80053 COMPREHEN METABOLIC PANEL: CPT

## 2021-01-27 PROCEDURE — 85025 COMPLETE CBC W/AUTO DIFF WBC: CPT

## 2021-01-27 PROCEDURE — 99284 EMERGENCY DEPT VISIT MOD MDM: CPT

## 2021-01-27 PROCEDURE — 83880 ASSAY OF NATRIURETIC PEPTIDE: CPT

## 2021-01-27 PROCEDURE — 36415 COLL VENOUS BLD VENIPUNCTURE: CPT

## 2021-01-28 VITALS
RESPIRATION RATE: 17 BRPM | OXYGEN SATURATION: 93 % | SYSTOLIC BLOOD PRESSURE: 101 MMHG | HEART RATE: 78 BPM | TEMPERATURE: 97.3 F | DIASTOLIC BLOOD PRESSURE: 60 MMHG

## 2021-01-28 LAB
ATRIAL RATE: 234 BPM
CALCULATED R AXIS, ECG10: 3 DEGREES
CALCULATED T AXIS, ECG11: 27 DEGREES
COVID-19 RAPID TEST, COVR: DETECTED
DIAGNOSIS, 93000: NORMAL
Q-T INTERVAL, ECG07: 354 MS
QRS DURATION, ECG06: 76 MS
QTC CALCULATION (BEZET), ECG08: 413 MS
SARS-COV-2, COV2: NORMAL
SOURCE, COVRS: ABNORMAL
VENTRICULAR RATE, ECG03: 82 BPM

## 2021-01-28 PROCEDURE — 87635 SARS-COV-2 COVID-19 AMP PRB: CPT

## 2021-01-28 NOTE — ED NOTES
0000 Verbal shift change report given to Sandor Schwab, RN (oncoming nurse) by Gregor Matthews RN (offgoing nurse). Report included the following information SBAR, ED Summary, Intake/Output, MAR and Recent Results. 0005 COVID-19 swab sent. 0045 Rapid COVID-19 positive per lab. Will notify MD.    9979 Pt given written and verbal discharge instructions, 0 Rx; pt verbalized understanding of such. VSS at time of discharge. Belongings in pt possession at time of discharge. No complaints, needs, or questions at this time. Pt to call PCP ASAP for follow-up. Will set up a cab for her to return to [address] [place] [address]; pt states she has cash to cover the fee.    Leonora Khan Pt requesting to wait in waiting room for cab, informed her we have not gotten a call for her yet. Charge nurse made aware. States she will set up a round trip for the pt.

## 2021-01-28 NOTE — ED PROVIDER NOTES
This is a 78-year-old female who is obese and has a history of chronic diastolic congestive heart failure. Back in November of last year, the patient was diagnosed with Covid. She states that since that time she has had trouble with her breathing and fatigue. In the last week her symptoms seem to have gotten somewhat worse, without fever or chill and without pain. She does take medications for fluid overload. She has a history of atrial fibrillation which is chronic. Her symptoms began getting worse on Sunday and she has been in bed until today. She states she came to the hospital [facility] because she got tired of being pressed by her family to come to the hospital.  She denies any nausea or vomiting and no diarrhea. She has had no urinary symptoms. She denies swelling in her legs and has had no chest pain.            Past Medical History:   Diagnosis Date    Arthritis     Atrial fibrillation (HCC)     WHITING (dyspnea on exertion)     GERD (gastroesophageal reflux disease)     Heart failure (HCC)     Hypertension     Obesity     Other ill-defined conditions(799.89)     \"fluid in the lung\"    Sleep apnea        Past Surgical History:   Procedure Laterality Date    COLONOSCOPY N/A 3/30/2017    COLONOSCOPY performed by Justus Mariscal MD at [facility] ENDOSCOPY    HX CHOLECYSTECTOMY      HX HERNIA REPAIR      HX ORTHOPAEDIC      right and left total knee replacement    HX ORTHOPAEDIC      right shoulder    HX OTHER SURGICAL      kidney stone surgery    HX OTHER SURGICAL      both eyes cataract    OR CARDIOVERSION ELECTIVE ARRHYTHMIA EXTERNAL N/A 10/9/2019    EP CARDIOVERSION performed by Janis Ureña MD at [facility]         Family History:   Problem Relation Age of Onset    Tuberculosis Mother     Lung Disease Father        Social History     Socioeconomic History    Marital status: SINGLE     Spouse name: Not on file    Number of children: Not on file    Years of education: Not on file  Highest education level: Not on file   Occupational History    Not on file   Social Needs    Financial resource strain: Not on file    Food insecurity     Worry: Not on file     Inability: Not on file    Transportation needs     Medical: Not on file     Non-medical: Not on file   Tobacco Use    Smoking status: Former Smoker     Packs/day: 1.00    Smokeless tobacco: Never Used    Tobacco comment: quit at age 43,smoked for 17 years   Substance and Sexual Activity    Alcohol use: No    Drug use: No    Sexual activity: Not on file   Lifestyle    Physical activity     Days per week: Not on file     Minutes per session: Not on file    Stress: Not on file   Relationships    Social connections     Talks on phone: Not on file     Gets together: Not on file     Attends Hoahaoism service: Not on file     Active member of club or organization: Not on file     Attends meetings of clubs or organizations: Not on file     Relationship status: Not on file    Intimate partner violence     Fear of current or ex partner: Not on file     Emotionally abused: Not on file     Physically abused: Not on file     Forced sexual activity: Not on file   Other Topics Concern     Service Not Asked    Blood Transfusions Not Asked    Caffeine Concern Not Asked    Occupational Exposure Not Asked   [place] Hazards Not Asked    Sleep Concern Not Asked    Stress Concern Not Asked    Weight Concern Not Asked    Special Diet Not Asked    Back Care Not Asked    Exercise Not Asked    Bike Helmet Not Asked   [address] Not Asked    Self-Exams Not Asked   Social History Narrative    Not on file         ALLERGIES: Penicillins    Review of Systems   Constitutional: Positive for fatigue. Negative for activity change, appetite change, chills and fever. HENT: Negative for ear pain, facial swelling, sore throat and trouble swallowing. Eyes: Negative for pain, discharge and visual disturbance.    Respiratory: Positive for shortness of breath ( Usually with exertion) and wheezing ( Patient had a little wheezing this morning but it went away. ). Negative for cough and chest tightness. Cardiovascular: Negative for chest pain, palpitations and leg swelling. Gastrointestinal: Negative for abdominal pain, blood in stool, nausea and vomiting. Genitourinary: Negative for difficulty urinating, flank pain and hematuria. Musculoskeletal: Negative for arthralgias, joint swelling, myalgias and neck pain. Skin: Negative for color change and rash. Neurological: Negative for dizziness, weakness, numbness and headaches. Hematological: Negative for adenopathy. Does not bruise/bleed easily. Psychiatric/Behavioral: Negative for behavioral problems, confusion and sleep disturbance. All other systems reviewed and are negative. Vitals:    01/27/21 2041   BP: 103/60   Pulse: 89   Resp: 18   Temp: 97 °F (36.1 °C)   SpO2: 96%            Physical Exam  Vitals signs and nursing note reviewed. Constitutional:       General: She is in acute distress ( Patient is in mild distress with some shortness of breath and around on the bed. It is noted she is extremely obese. Eitel signs are as noted. ). Appearance: She is well-developed. She is obese. She is not ill-appearing, toxic-appearing or diaphoretic. HENT:      Head: Normocephalic and atraumatic. Nose: Nose normal.   Eyes:      General: No scleral icterus. Conjunctiva/sclera: Conjunctivae normal.      Pupils: Pupils are equal, round, and reactive to light. Neck:      Musculoskeletal: Normal range of motion and neck supple. Thyroid: No thyromegaly. Vascular: No JVD. Trachea: No tracheal deviation. Comments: No carotid bruits noted. Cardiovascular:      Rate and Rhythm: Normal rate. Rhythm irregular. Heart sounds: Normal heart sounds. No murmur. No friction rub. No gallop.     Pulmonary:      Effort: Tachypnea ( Mild) and respiratory distress ( Mild and trying to get into the bed.) present. No accessory muscle usage. Breath sounds: Normal breath sounds. No decreased breath sounds, wheezing, rhonchi or rales. Chest:      Chest wall: No tenderness. Abdominal:      General: Bowel sounds are normal. There is no distension. Palpations: Abdomen is soft. There is no mass. Tenderness: There is no abdominal tenderness. There is no guarding or rebound. Musculoskeletal: Normal range of motion. General: No tenderness. Right lower leg: She exhibits no tenderness. No edema. Left lower leg: She exhibits no tenderness. No edema. Lymphadenopathy:      Cervical: No cervical adenopathy. Skin:     General: Skin is warm and dry. Findings: No erythema or rash. Neurological:      Mental Status: She is alert and oriented to person, place, and time. Cranial Nerves: No cranial nerve deficit. Coordination: Coordination normal.      Deep Tendon Reflexes: Reflexes are normal and symmetric. Psychiatric:         Behavior: Behavior normal.         Thought Content:  Thought content normal.         Judgment: Judgment normal.          MDM  Number of Diagnoses or Management Options  Dyspnea, unspecified type: new and requires workup  Fatigue, unspecified type: new and requires workup     Amount and/or Complexity of Data Reviewed  Clinical lab tests: ordered and reviewed  Tests in the radiology section of CPT®: ordered and reviewed  Decide to obtain previous medical records or to obtain history from someone other than the patient: yes  Review and summarize past medical records: yes  Discuss the patient with other providers: yes  Independent visualization of images, tracings, or specimens: yes    Risk of Complications, Morbidity, and/or Mortality  Presenting problems: high  Diagnostic procedures: high  Management options: high    Patient Progress  Patient progress: stable         Procedures    Patient appears to be somewhat short of breath and getting into the bed. She is morbidly obese. There are no clear rhonchi wheezes noted on exam.  She has no neck vein distention at 45 degrees and she has no significant peripheral edema. She does have an irregular heartbeat and by monitor is in atrial fibrillation with a controlled rate. Blood pressure appears unremarkable. Will obtain a chest x-ray, BNP, EKG and labs. The patient does not have any symptoms of recurrent Covid. The patient was turned over to Dr. Munir Santiago at Change of shift pending results of labs/films. Final disposition is to be made by Dr. Munir Santiago.

## 2021-01-28 NOTE — DISCHARGE INSTRUCTIONS
You will need to take 1 extra Bumex a day for the next 3 days and follow-up with your physician if you continue to have shortness of breath.

## 2021-01-28 NOTE — ED TRIAGE NOTES
She says she had the \"virus\" in November and since then has felt tired and short of breath. She says it has gotten worse the past week.  She says her family made her come to the hospital.

## 2021-01-29 ENCOUNTER — PATIENT OUTREACH (OUTPATIENT)
Dept: CASE MANAGEMENT | Age: 81
End: 2021-01-29

## 2021-01-29 NOTE — PROGRESS NOTES
ACM attempted to reach patient for ER follow up. Unable to reach patient and left VM . Spoke with son [place], no medical info shared as no COCO form on file. Asked him to have patient return call and contact info provided    11:45 am  Patient contacted regarding recent discharge and COVID-19 risk. Discussed COVID-19 related testing which was rapid test was positive/ PCR is pending at this time. Test results were rapid test positive / PCR is still pending. Patient informed of results, if available? yes    Outreach made within 2 business days of discharge: Yes    Care Transition Nurse/ Ambulatory Care Manager/ LPN Care Coordinator contacted the patient by telephone to perform post discharge assessment. Verified name and  with patient as identifiers. Patient has following risk factors of: heart failure. CTN/ACM/LPN reviewed discharge instructions, medical action plan and red flags related to discharge diagnosis. Reviewed and educated them on any new and changed medications related to discharge diagnosis. Advised obtaining a 90-day supply of all daily and as-needed medications. Advance Care Planning:   Does patient have an Advance Directive: health care decision makers updated    Education provided regarding infection prevention, and signs and symptoms of COVID-19 and when to seek medical attention with patient who verbalized understanding. Discussed exposure protocols and quarantine from [address] you at higher risk for severe illness  and given an opportunity for questions and concerns. The patient agrees to contact the COVID-19 hotline [phone] or PCP office for questions related to their healthcare. CTN/ACM/LPN provided contact information for future reference. From CDC: Are you at higher risk for severe illness?  Wash your hands often.  Avoid close contact (6 feet, which is about two arm lengths) with people who are sick.    Put distance between yourself and other people if COVID-19 is spreading in your community.  Clean and disinfect frequently touched surfaces.  Avoid all cruise travel and non-essential air travel.  Call your healthcare professional if you have concerns about COVID-19 and your underlying condition or if you are sick. For more information on steps you can take to protect yourself, see CDC's How to Protect Yourself      Patient/family/caregiver given information for Cydney Luz and agrees to enroll no  Doesn't use text/ email    Plan for follow-up call in 3-5 days based on severity of symptoms and risk factors. Patient reports that she did take Bumex 2mg dose today per d/c instructions. Reviewed that she is only to take this dose per MD x 3 days and then resume her normal 1mg dose. Patient notes understanding. Patient reports she is feeling ok today. She notes she does have some WHITING but is not SOB at rest and is able to lie flat and sleep without difficulty. Reports very mild swelling in RLE. Patient notified that rapid COVID in the ER came back positive - she was unaware as she had been dc'd before it resulted. Notified that PCR test was sent out to confirm and is pending. She needs to isolate from her family as much as possible, clean surfaces with bleach. She lives with 2 sons and she and both of them already had COVID around Thanksgiving. Recommend still trying to take precautions and that until her PCR test comes back they need to also quarantine x 14 days. Assisted to set up [facility] visit for post acute follow up as she cannot go in to be examined by PCP with +COVID. Hopefully by the time she is seen her PCR results will be available for [facility] to see. Discussed return precautions for the ER and that if SOB becomes severe or she is unable to stay hydrated needs to return to ER.      Ambulatory Care Management Note    Date/Time:  1/29/2021 12:07 PM    This patient was received as a referral from Daily Assignment   Ambulatory Care Manager outreached to patient today to offer care management services. Introduction to self and role of care manager provided. Patient accepted care management services at this time. Follow up call scheduled at this time. Patient has Ambulatory Care Manager's contact number for for any questions or concerns. 1:10 pm  ACM spoke with patients son per patient to clarify some questions. They would like to know if her rapid COVID test is still positive as she had COVID in early Dec or if she has a new infection. This ACM advised that PCR is still pending. They should contact the [place] [id number] or [facility] with any additional questions. 4:00  Patient reports missed call from [facility] to set up appt. Provided phone number for her to call back and authorize visit. ACM already set up all of the details/ reason for visit . She just needs to confirm and set a time. Patient notes understanding

## 2021-02-12 NOTE — PROGRESS NOTES
Felciia is a 32 Outbound call to patient again today to follow-up on status following [facility] visit on 6/12/18. Patient verified by 3 identifiers. Had visit, which patient states went well. Patient also says she saw her GI doctor on yesterday. Patient a little confuse about dates. NN also called sonAna to confirm appointments and complete medication reconciliation. Patient has all medications available except her Spiriva. [facility] is following patient. Initial visit on 6/13/18. NN made call to [facility] to try preschedule a closer appointment for the patient and to check on samples. NN spoke to [facility] and was told the patient is on the list for an earlier appointment if one comes available. Nurse sent message to nurse \"Nichole\" to check for samples of Spiriva for patient since she was scheduled to come in, and asked to call son to pick any samples up at the office at [address] [place] [address]. NN was told that patient would be seeing Dr. Shira Dean on 7/25/18 at 11:00 AM and patient needed to be there by 10:30 to complete new patient paperwork. Son and patient verbalized an appreciation for call and agrees with plan. NN called Dr. Marcus Caldwell office several times again today and was unable to schedule an appointment. Message left on VM, which again, stated to call back during office hours.

## 2021-03-03 ENCOUNTER — PATIENT OUTREACH (OUTPATIENT)
Dept: CASE MANAGEMENT | Age: 81
End: 2021-03-03

## 2021-03-03 RX ORDER — SPIRONOLACTONE 25 MG/1
TABLET ORAL
COMMUNITY
End: 2021-04-28

## 2021-03-03 RX ORDER — POTASSIUM CHLORIDE 20 MEQ/1
TABLET, EXTENDED RELEASE ORAL
COMMUNITY
End: 2021-11-23

## 2021-03-03 RX ORDER — DILTIAZEM HYDROCHLORIDE 240 MG/1
CAPSULE, EXTENDED RELEASE ORAL
COMMUNITY

## 2021-03-03 NOTE — PROGRESS NOTES
Ambulatory Care Management Note    Date/Time:  3/3/2021 1:18 PM    This Ambulatory Care Manager (ACM) reviewed and updated the following screenings during this call; general assessment, disease specific assessment, self management assessment, ACP assessment and note, medication reconciliation. Patient's challenges to self management identified:   functional cognitive ability, functional physical ability, lack of knowledge about disease, medication management and PCP relationship- patient states she has trouble with her memory at times. Patient admits she occasionally forgets to take her meds but when recommended to ask her son to assist with this the patient declines stating she can manage her own medications. Patient reports she doesn't even consider Dr Patricia Pollock her PCP anymore and never goes to see him anymore just sees the cardiologist Dr Coreen Contreras      Medication Management:  good understanding and poor adherence    Advance Care Planning:   Does patient have an Advance Directive:  patient declined education- patient reports not interested in completing an [address], Referrals, and Durable Medical Equipment: none      Health Maintenance Due   Topic Date Due    COVID-19 Vaccine (1 of 2) Never done    DTaP/Tdap/Td series (1 - Tdap) Never done    Shingrix Vaccine Age 50> (1 of 2) Never done    GLAUCOMA SCREENING Q2Y  Never done    Bone Densitometry (Dexa) Screening  Never done    Pneumococcal 65+ years (1 of 1 - PPSV23) Never done    Medicare Yearly Exam  Never done    Flu Vaccine (1) Never done     Health Maintenance reviewed - not reviewed/ patient states she does not see PCP any longer. Patient reports she does not limit NA intake much she uses NA as she chooses. She doesn't do daily weights for CHF. Per EMR Bumex ordered BID , patient reports takes only daily . ACM will verify with DR Coreen Contreras office how frequently she should take.  Patient reports some WHITING with fatigue that had worsened after COVID but this has started to improve some. She does sleep with 2-3 pillows at night and has for years. ACM spoke with Dr Akshat Rodríguez office/ per [facility] patient has not been seen there since June 2020 and per patient has not seen PCP in a long time. ACM awaiting call from Dr Akshat Rodríguez office to clarify Meds and to see when they last refilled her meds? Concerned that patient may not have had recent refills since she hasn't been seen in over a year. Once all of this is verified will follow up with patient / and son (if patient gives approval ) as appropriate. Spoke with Dr Akshat Rodríguez nurse verified meds she states patient is to alternate Bumex 1mg a day with 2mg a day / noted after call she did not clarify if this is daily or BID/ asked son to call to verify. All other meds on list verified. She verified patient should not be on Amiodarone. Spoke with patients son he has concerns that she has slowly declined with more SOB / trouble getting room to room due to SOB and fatigue, this has progressed over time. Recommend making follow up with Dr Raymond Samano and ensuring taking medications as ordered. Reviewed med list and clarified discrepancies. Patient was asked to consider health care goals that they would like to focus on with this ACM. ACM will follow up with patient to discuss goals and establish care plan in the next 7-14 days.      Son notified of appt scheduled with new PCP    PCP/Specialist follow up:   Future Appointments   Date Time Provider Rachna Lugo   4/15/2021  1:30 PM Joanie Bernheim, NP PAFROSA BS AMB

## 2021-04-05 ENCOUNTER — PATIENT OUTREACH (OUTPATIENT)
Dept: PRIMARY CARE CLINIC | Age: 81
End: 2021-04-05

## 2021-04-05 NOTE — PROGRESS NOTES
Ambulatory Care Management Note      Date/Time:  4/5/2021 2:04 PM    This patient was received as a referral from daily assignment   Top Challenges reviewed with the provider   -patient admits she is not compliant with taking Bumex as ordered as it \"runs me too much\"  Reports always baseline SOB and sleeps with 3 pillows.    -Patient admits that overall she is not compliant with HF measures and per EMR and [facility] notes review has not been in the past, patient states she does not follow a low NA diet or weigh daily and does not consistently take diuretics though she has had thorough teaching on all of these items and is aware of what she should be doing. Reinforced the importance of following these measures to avoid decompensation and or hospitalization. Saw Dr Ellen Michel last month and told to take alternating doses of Bumex   1 mg BID with 1 mg daily but reports she doesn't take it consistently  Denies feeling depressed just is not motivated          Ambulatory  contacted patient for discussion and case management of   Summary of patients top problems:   1. CHF- patient reports bilat lower ext edema that has not worsened from baseline. Reports some WHITING. Denies SOB at rest. Reports sleeps with 3 pillows and this is baseline. Admits does not watch sodium in diet and does not do daily weights and also doesn't consistently take diuretics. Sees Dr Ellen Michel cardiology and saw last month. Last ER visit was in AFIB  2. Obesity- Patient overweight. This contributes to SOB and poor mobility. Patient does not exercise and is mostly sedentary . 3. Noncompliance- Patient is not motivated. Unable to provide anything that gives her motivation today besides having 7 great grandkids who she likes to spend time with. Reports otherwise no activities she enjoys spends her days alone watching soaps. Denies feeling depressed.  Per review of records has a long standing history of noncompliance with instructions by Home health etc on diet / management of HF/ meds etc. ACM provided reinforcement and education and importance of following regimen. Encouraged to be compliant . ACM will follow up in a few weeks, if remains noncompliant will d/c from CM services. Assisted to establish PCP as she had not been regularly seeing her PCP which may have contributed to lack of responsibility with medical management. She will see new PCP on 4/28. Patient's challenges to self management identified:   level of motivation, medication management, PCP relationship and noncompliance- will establish with new PCP on 4/28/21      Medication Management:  good understanding and poor adherence    Advance Care Planning:   Does patient have an Advance Directive:  currently not on file; patient declined education    [employer], Referrals, and Durable Medical Equipment: none    PCP/Specialist follow up:   Future Appointments   Date Time Provider Rachna Lugo   4/28/2021  9:00 AM Nando Gee NP PAFP BS AMB          Goals      Encourage / assist patient to establish PCP care (pt-stated)      4/5/21- Patient has appt with a new PCP with [facility] on 4/28/21 and the date/time verified with the patient . Patient agrees to attend. Discussed the importance of having a PCP. LN    3/3/21- patient reports has not been seeing PCP for a long time. Note that patient has been seen in ER and by [facility]. ACM will encourage patient to establish with PCP and also ask patients approval to speak with son and encourage son to encourage/assist to establish care with PCP for routine follow up as well as routine follow up with cardiology (office was contacted and she has not seen them since June 2020) Spoke with patients son he agrees with establishing a new PCP at [facility] with [facility] [employer] [facility] [employer]. PCP will call to set up visit for him. LN        Knowledge and adherence of prescribed medication (ie. action, side effects, missed dose, etc.). 4/5/21- Patient reports clarified Bumex dosing with cardiologist. Patient is aware of medications and verified with son meds were correct. Patient admits she just does not consistently take them they way that she should. ACM encouraged patient to be consistent with meds mainly the diuretics . ACM will follow up in 2 weeks to ensure she has begun taking these as instructed. LN    3/3/21- ACM identified some inconsistencies in meds patient reported taking and med list and meds cardiologist office reports she should be taking. ACM got approval from patient to speak with her son Mike Brown. Left him a  requesting a return call to clarify and recommend he assist with med management as well as with establishing a PCP to help with regular filling of medications. ACM will follow up in 1-2 days. ACM spoke with son. Son was aware that patient not always consistent with meds notes she is stubborn. He will discuss importance with her of taking correctly . Verified med list. Asked him to verify frequency of Bumex with cardiology. He will try to help patient set up med box if she will allow it- Son checked and verified patient has and is taking meds as listed. LN            Notes sent to Chela Mccarty who patient will be establishing care with on 4/28/21  Patient verbalized understanding of all information discussed. Patient has this Ambulatory Care Manager's contact information for any further questions, concerns, or needs.

## 2021-04-27 ENCOUNTER — PATIENT OUTREACH (OUTPATIENT)
Dept: CASE MANAGEMENT | Age: 81
End: 2021-04-27

## 2021-04-27 NOTE — PROGRESS NOTES
ACM contacted patient to remind her of appt tomorrow with NP at [facility] to establish care with PCP. Patient verified appt location and time at 9:00 am and that she plans to attend. No other questions or concerns at this time. After patient sees PCP ACM will follow up with in a week or so to evaluate further for any further CM needs.

## 2021-04-28 ENCOUNTER — OFFICE VISIT (OUTPATIENT)
Dept: FAMILY MEDICINE CLINIC | Age: 81
End: 2021-04-28
Payer: MEDICARE

## 2021-04-28 VITALS
BODY MASS INDEX: 43.4 KG/M2 | TEMPERATURE: 96.9 F | RESPIRATION RATE: 18 BRPM | WEIGHT: 293 LBS | OXYGEN SATURATION: 97 % | HEIGHT: 69 IN | SYSTOLIC BLOOD PRESSURE: 116 MMHG | DIASTOLIC BLOOD PRESSURE: 78 MMHG | HEART RATE: 79 BPM

## 2021-04-28 DIAGNOSIS — Z91.81 AT MODERATE RISK FOR FALL: ICD-10-CM

## 2021-04-28 DIAGNOSIS — G47.33 OSA (OBSTRUCTIVE SLEEP APNEA): ICD-10-CM

## 2021-04-28 DIAGNOSIS — Z79.01 ON ANTICOAGULANT THERAPY: ICD-10-CM

## 2021-04-28 DIAGNOSIS — K59.00 CONSTIPATION, UNSPECIFIED CONSTIPATION TYPE: ICD-10-CM

## 2021-04-28 DIAGNOSIS — Z78.0 POST-MENOPAUSAL: ICD-10-CM

## 2021-04-28 DIAGNOSIS — Z13.220 ENCOUNTER FOR SCREENING FOR LIPID DISORDER: ICD-10-CM

## 2021-04-28 DIAGNOSIS — I50.32 CHRONIC DIASTOLIC CONGESTIVE HEART FAILURE (HCC): ICD-10-CM

## 2021-04-28 DIAGNOSIS — I48.0 PAROXYSMAL ATRIAL FIBRILLATION (HCC): ICD-10-CM

## 2021-04-28 DIAGNOSIS — R79.89 ABNORMAL TSH: ICD-10-CM

## 2021-04-28 DIAGNOSIS — E66.01 OBESITY, MORBID (HCC): ICD-10-CM

## 2021-04-28 DIAGNOSIS — N18.9 CHRONIC KIDNEY DISEASE, UNSPECIFIED CKD STAGE: ICD-10-CM

## 2021-04-28 DIAGNOSIS — Z76.89 ENCOUNTER TO ESTABLISH CARE: Primary | ICD-10-CM

## 2021-04-28 PROBLEM — I50.9 CHF (CONGESTIVE HEART FAILURE) (HCC): Status: RESOLVED | Noted: 2019-11-04 | Resolved: 2021-04-28

## 2021-04-28 PROBLEM — D72.829 LEUKOCYTOSIS: Status: RESOLVED | Noted: 2018-06-07 | Resolved: 2021-04-28

## 2021-04-28 PROBLEM — R06.2 WHEEZING: Status: RESOLVED | Noted: 2018-06-07 | Resolved: 2021-04-28

## 2021-04-28 PROBLEM — J18.9 PNEUMONIA: Status: RESOLVED | Noted: 2018-06-07 | Resolved: 2021-04-28

## 2021-04-28 PROBLEM — I50.9 CHF EXACERBATION (HCC): Status: RESOLVED | Noted: 2020-02-18 | Resolved: 2021-04-28

## 2021-04-28 LAB
ALBUMIN SERPL-MCNC: 3.4 G/DL (ref 3.5–5)
ALBUMIN/GLOB SERPL: 1 {RATIO} (ref 1.1–2.2)
ALP SERPL-CCNC: 91 U/L (ref 45–117)
ALT SERPL-CCNC: 12 U/L (ref 12–78)
ANION GAP SERPL CALC-SCNC: 5 MMOL/L (ref 5–15)
AST SERPL-CCNC: 10 U/L (ref 15–37)
BASOPHILS # BLD: 0 K/UL (ref 0–0.1)
BASOPHILS NFR BLD: 1 % (ref 0–1)
BILIRUB SERPL-MCNC: 0.8 MG/DL (ref 0.2–1)
BUN SERPL-MCNC: 16 MG/DL (ref 6–20)
BUN/CREAT SERPL: 15 (ref 12–20)
CALCIUM SERPL-MCNC: 9.3 MG/DL (ref 8.5–10.1)
CHLORIDE SERPL-SCNC: 108 MMOL/L (ref 97–108)
CHOLEST SERPL-MCNC: 200 MG/DL
CO2 SERPL-SCNC: 27 MMOL/L (ref 21–32)
CREAT SERPL-MCNC: 1.06 MG/DL (ref 0.55–1.02)
DIFFERENTIAL METHOD BLD: ABNORMAL
EOSINOPHIL # BLD: 0 K/UL (ref 0–0.4)
EOSINOPHIL NFR BLD: 1 % (ref 0–7)
ERYTHROCYTE [DISTWIDTH] IN BLOOD BY AUTOMATED COUNT: 15.1 % (ref 11.5–14.5)
GLOBULIN SER CALC-MCNC: 3.3 G/DL (ref 2–4)
GLUCOSE SERPL-MCNC: 89 MG/DL (ref 65–100)
HCT VFR BLD AUTO: 43.5 % (ref 35–47)
HDLC SERPL-MCNC: 62 MG/DL
HDLC SERPL: 3.2 {RATIO} (ref 0–5)
HGB BLD-MCNC: 13.8 G/DL (ref 11.5–16)
IMM GRANULOCYTES # BLD AUTO: 0 K/UL (ref 0–0.04)
IMM GRANULOCYTES NFR BLD AUTO: 1 % (ref 0–0.5)
LDLC SERPL CALC-MCNC: 120 MG/DL (ref 0–100)
LIPID PROFILE,FLP: ABNORMAL
LYMPHOCYTES # BLD: 1.5 K/UL (ref 0.8–3.5)
LYMPHOCYTES NFR BLD: 31 % (ref 12–49)
MCH RBC QN AUTO: 27.2 PG (ref 26–34)
MCHC RBC AUTO-ENTMCNC: 31.7 G/DL (ref 30–36.5)
MCV RBC AUTO: 85.8 FL (ref 80–99)
MONOCYTES # BLD: 0.7 K/UL (ref 0–1)
MONOCYTES NFR BLD: 14 % (ref 5–13)
NEUTS SEG # BLD: 2.6 K/UL (ref 1.8–8)
NEUTS SEG NFR BLD: 52 % (ref 32–75)
NRBC # BLD: 0 K/UL (ref 0–0.01)
NRBC BLD-RTO: 0 PER 100 WBC
PLATELET # BLD AUTO: 228 K/UL (ref 150–400)
PMV BLD AUTO: 10.2 FL (ref 8.9–12.9)
POTASSIUM SERPL-SCNC: 4.8 MMOL/L (ref 3.5–5.1)
PROT SERPL-MCNC: 6.7 G/DL (ref 6.4–8.2)
RBC # BLD AUTO: 5.07 M/UL (ref 3.8–5.2)
SODIUM SERPL-SCNC: 140 MMOL/L (ref 136–145)
TRIGL SERPL-MCNC: 90 MG/DL (ref ?–150)
VLDLC SERPL CALC-MCNC: 18 MG/DL
WBC # BLD AUTO: 4.9 K/UL (ref 3.6–11)

## 2021-04-28 PROCEDURE — G8536 NO DOC ELDER MAL SCRN: HCPCS | Performed by: NURSE PRACTITIONER

## 2021-04-28 PROCEDURE — G8400 PT W/DXA NO RESULTS DOC: HCPCS | Performed by: NURSE PRACTITIONER

## 2021-04-28 PROCEDURE — 99204 OFFICE O/P NEW MOD 45 MIN: CPT | Performed by: NURSE PRACTITIONER

## 2021-04-28 PROCEDURE — G8417 CALC BMI ABV UP PARAM F/U: HCPCS | Performed by: NURSE PRACTITIONER

## 2021-04-28 PROCEDURE — 1090F PRES/ABSN URINE INCON ASSESS: CPT | Performed by: NURSE PRACTITIONER

## 2021-04-28 PROCEDURE — G8754 DIAS BP LESS 90: HCPCS | Performed by: NURSE PRACTITIONER

## 2021-04-28 PROCEDURE — G8427 DOCREV CUR MEDS BY ELIG CLIN: HCPCS | Performed by: NURSE PRACTITIONER

## 2021-04-28 PROCEDURE — G8752 SYS BP LESS 140: HCPCS | Performed by: NURSE PRACTITIONER

## 2021-04-28 PROCEDURE — G8432 DEP SCR NOT DOC, RNG: HCPCS | Performed by: NURSE PRACTITIONER

## 2021-04-28 PROCEDURE — 1101F PT FALLS ASSESS-DOCD LE1/YR: CPT | Performed by: NURSE PRACTITIONER

## 2021-04-28 RX ORDER — POLYETHYLENE GLYCOL 3350 17 G/17G
17 POWDER, FOR SOLUTION ORAL
Qty: 30 EACH | Refills: 2 | Status: SHIPPED | OUTPATIENT
Start: 2021-04-28 | End: 2021-11-30

## 2021-04-28 RX ORDER — AMIODARONE HYDROCHLORIDE 200 MG/1
TABLET ORAL
COMMUNITY
End: 2021-04-28

## 2021-04-28 NOTE — PROGRESS NOTES
Identified pt with two pt identifiers(name and ). Reviewed record in preparation for visit and have obtained necessary documentation. Chief Complaint   Patient presents with    New Patient        Vitals:    21 0906   Weight: 313 lb 9.6 oz (142.2 kg)   Height: 5' 9\" (1.753 m)   PainSc:   0 - No pain       Health Maintenance Due   Topic    COVID-19 Vaccine (1)    DTaP/Tdap/Td series (1 - Tdap)    Shingrix Vaccine Age 50> (1 of 2)    Bone Densitometry (Dexa) Screening     Pneumococcal 65+ years (1 of 1 - PPSV23)    Medicare Yearly Exam        Coordination of Care Questionnaire:  :   1) Have you been to an emergency room, urgent care, or hospitalized since your last visit? If yes, where when, and reason for visit? no       2. Have seen or consulted any other health care provider since your last visit? If yes, where when, and reason for visit? NO      Patient is accompanied by son I have received verbal consent from Terrie Laureano to discuss any/all medical information while they are present in the room.

## 2021-04-28 NOTE — PATIENT INSTRUCTIONS

## 2021-04-28 NOTE — PROGRESS NOTES
[facility] Note  HPI:   Foreign Lema is a [de-identified] y.o. female who presents to \[facility]\"" care. Previous provider: La Stroud MD - release of records signed. Last visit was over 6 months. Chief Complaint   Patient presents with    New Patient     Cardiovascular Review:  The patient has HTN, HF with preserved EF, AFIB, TAM, CKD. Cardiologist: Dr. Greg Bassett - last visit was 3/2021. Diet and Lifestyle: not attempting to follow a low sodium diet  Home BP Monitoring: is not measured at home. Pertinent ROS: taking medications as instructed, no medication side effects noted, no TIA's, no chest pain on exertion, no dyspnea on exertion, no orthopnea or paroxysmal nocturnal dyspnea. Using walker at home. Wheelchair when out of the home. Unsteady gait. Declines PT evaluation today. No recent falls. Agreeable to DEXA. Fall Risk Assessment, last 12 mths 4/28/2021   Able to walk? Yes   Fall in past 12 months? 0   Do you feel unsteady? 1   Are you worried about falling 1   Is TUG test greater than 12 seconds? 0   Is the gait abnormal? 1   Number of falls in past 12 months 0       Current Outpatient Medications   Medication Sig Dispense Refill    polyethylene glycol (MIRALAX) 17 gram packet Take 1 Packet by mouth daily as needed for Constipation. 30 Each 2    potassium chloride (Klor-Con M20) 20 mEq tablet Klor-Con M20 mEq tablet,extended release      dilTIAZem ER (Tiadylt ER) 240 mg capsule Tiadylt  mg capsule,extended release   TAKE 1 CAPSULE BY MOUTH EVERY DAY      bumetanide (BUMEX) 1 mg tablet Take 1 Tab by mouth two (2) times a day. 60 Tab 0    acetaminophen (TYLENOL) 500 mg tablet Take 2 Tabs by mouth every six (6) hours as needed for Pain. 20 Tab 0    rivaroxaban (XARELTO) 20 mg tab tablet Take 1 Tab by mouth daily (with lunch).  Indications: PREVENT THROMBOEMBOLISM IN CHRONIC ATRIAL FIBRILLATION 30 Tab 1    aspirin delayed-release 81 mg tablet Take 1 Tab by mouth daily. 30 Tab 0      Allergies   Allergen Reactions    Penicillins Hives      Patient Active Problem List   Diagnosis Code    A-fib ([facility] I48.91    Hypertension I10    Obesity, morbid ([facility] E66.01    Chronic diastolic congestive heart failure (HCC) I50.32    At moderate risk for fall Z91.81     Past Medical History:   Diagnosis Date    Arthritis     Atrial fibrillation (HCC)     Bronchitis, acute 27/98/8260    Diastolic heart failure (HCC)     WHITING (dyspnea on exertion)     GERD (gastroesophageal reflux disease)     Hypertension     Obesity     TAM (obstructive sleep apnea)     Other ill-defined conditions(799.89)     \"fluid in the lung\"    Pneumonia 6/7/2018    Sleep apnea       Past Surgical History:   Procedure Laterality Date    COLONOSCOPY N/A 3/30/2017    COLONOS[facility] performed by [facility]ins [facility] at [facility] ENDOSCOPY    HX CHOLECYSTECTOMY      HX HERNIA REPAIR      HX ORTHOPAEDIC      right and left total knee replacement    HX ORTHOPAEDIC      right shoulder    HX OTHER SURGICAL      kidney stone surgery    HX OTHER SURGICAL      both eyes cataract    IL CARDIOVERSION ELECTIVE ARRHYTHMIA EXTERNAL N/A 10/9/2019    EP CARDIOVERSION performed by Mónica Dooley MD at [facility] [place] CATH LAB      No LMP recorded.  Patient is postmenopausal.   Family History   Problem Relation Age of Onset    Tuberculosis Mother     Lung Disease Father       Social History     Socioeconomic History    Marital status: SINGLE     Spouse name: Not on file    Number of children: Not on file    Years of education: Not on file    Highest education level: Not on file   Occupational History    Not on file   Social Needs    Financial resource strain: Not on file    Food insecurity     Worry: Not on file     Inability: Not on file    Transportation needs     Medical: Not on file     Non-medical: Not on file   Tobacco Use    Smoking status: Former Smoker     Packs/day: 1.00    Smokeless tobacco: Never Used  Tobacco comment: quit at age 43,smoked for 17 years   Substance and Sexual Activity    Alcohol use: No    Drug use: No    Sexual activity: Not on file   Lifestyle    Physical activity     Days per week: Not on file     Minutes per session: Not on file    Stress: Not on file   Relationships    Social connections     Talks on phone: Not on file     Gets together: Not on file     Attends Synagogue service: Not on file     Active member of club or organization: Not on file     Attends meetings of clubs or organizations: Not on file     Relationship status: Not on file    Intimate partner violence     Fear of current or ex partner: Not on file     Emotionally abused: Not on file     Physically abused: Not on file     Forced sexual activity: Not on file   Other Topics Concern     Service Not Asked    Blood Transfusions Not Asked    Caffeine Concern Not Asked    Occupational Exposure Not Asked   Toya [employer]aster Hazards Not Asked    Sleep Concern Not Asked    Stress Concern Not Asked    Weight Concern Not Asked    Special Diet Not Asked    Back Care Not Asked    Exercise Not Asked    Bike Helmet Not Asked   [address] Not Asked    Self-Exams Not Asked   Social History Narrative    Lives at home with 2 sons. Manages         ROS:   Review of Systems   Constitutional: Negative for chills, diaphoresis, fever, malaise/fatigue and weight loss. HENT: Negative for congestion, ear pain, hearing loss, sinus pain, sore throat and tinnitus. Eyes: Negative for blurred vision and double vision. Respiratory: Negative for shortness of breath. Cardiovascular: Negative for chest pain, palpitations and leg swelling. Gastrointestinal: Negative for abdominal pain, blood in stool, constipation, diarrhea, heartburn, melena, nausea and vomiting. Genitourinary: Negative for dysuria, frequency, hematuria and urgency. Musculoskeletal: Negative for joint pain and myalgias. Skin: Negative.   Negative for itching and rash. Neurological: Negative for dizziness, tingling, weakness and headaches. Endo/Heme/Allergies: Negative for polydipsia. Psychiatric/Behavioral: Negative. Physical Exam:     Visit Vitals  /78 (BP 1 Location: Left upper arm, BP Patient Position: Sitting, BP Cuff Size: Large adult)   Pulse 79   Temp 96.9 °F (36.1 °C) (Temporal)   Resp 18   Ht 5' 9\" (1.753 m)   Wt 313 lb 9.6 oz (142.2 kg)   SpO2 97%   BMI 46.31 kg/m²        Vitals and Nurse Documentation reviewed. Physical Exam  Vitals signs and nursing note reviewed. Constitutional:       General: She is not in acute distress. Appearance: She is obese. She is not ill-appearing, toxic-appearing or diaphoretic. HENT:      Head: Normocephalic and atraumatic. Neck:      Vascular: No carotid bruit. Cardiovascular:      Rate and Rhythm: Normal rate. Rhythm irregular. Heart sounds: Normal heart sounds, S1 normal and S2 normal. No murmur. No friction rub. No gallop. Comments: Trace pitting edema of left ankle   Pulmonary:      Effort: Pulmonary effort is normal. No respiratory distress. Breath sounds: Normal breath sounds. No decreased breath sounds, wheezing, rhonchi or rales. Chest:      Chest wall: No tenderness. Abdominal:      General: Bowel sounds are normal. There is no distension. Palpations: Abdomen is soft. Tenderness: There is no abdominal tenderness. Musculoskeletal:      Right lower leg: No edema. Skin:     General: Skin is warm and dry. Neurological:      Mental Status: She is alert and oriented to person, place, and time. Comments: Examined in wheelchair    Psychiatric:         Mood and Affect: Mood and affect normal.         Cognition and Memory: Memory normal.         Judgment: Judgment normal.           Assessment/ Plan:   [employer] were discussed including hours of operation, on-call providers, and MyChart. Diagnoses and all orders for this visit:    1. Encounter to establish care: Return for Medicare wellness visit. Release of records signed for previous PCP and cardiologist.     2. Paroxysmal atrial fibrillation [facility]): managed by specialist.     3. Chronic diastolic congestive [facility] failure ([facility]):  managed by specialist. BP well controlled. Advised low sodium diet. -     METABOLIC PANEL, COMPREHENSIVE; Future    4. Obesity, morbid ([facility]): I have reviewed/discussed the above normal BMI with the patient. I have recommended the following interventions: dietary management education, guidance, and counseling, encourage exercise, monitor weight and prescribed dietary intake. 5. TAM (obstructive sleep apnea) managed by specialist.p    6. Chronic kidney disease, unspecified CKD stage: Labs today. 7. At moderate risk for fall: No recent falls. Declines PT eval. Agreeable to DEXA     8. Abnormal TSH: history of anormal tsh - will obtain records. 9. On anticoagulant therapy  -     CBC WITH AUTOMATED DIFF; Future    10. Encounter for screening for lipid disorder  -     LIPID PANEL; Future    11. Constipation, unspecified constipation type  -     polyethylene glycol (MIRALAX) 17 gram packet; Take 1 Packet by mouth daily as needed for Constipation. 12. Post-menopausal  -     DEXA BONE DENSITY STUDY AXIAL; Future      Follow-up and Dispositions    · Return in 3 months (on 7/28/2021).           Discussed expected course/resolution/complications of diagnosis in detail with patient.    Medication risks/benefits/costs/interactions/alternatives discussed with patient.    Pt was given an after visit summary which includes diagnoses, current medications & vitals.  Pt expressed understanding with the diagnosis and plan

## 2021-05-03 NOTE — PROGRESS NOTES
Your cholesterol was mildly elevated, I will forward to your cardiologist, Dr. Ivon Ambrocio, to discuss results and medication reccomendations with her. I recommend routine exercise, and follow a low cholesterol diet. Limit fried foods, fatty foods, butter, gravy, red meat, ice cream, cheese, and eggs in your diet, which are all high in cholesterol. Your remaining labs are stable including your kidneys, liver, electrolytes and blood cell counts. AJ - please sent letter to patient and forward labs to cardiologist, Dr. Tamanna Gamino. Thank you.

## 2021-05-13 ENCOUNTER — PATIENT OUTREACH (OUTPATIENT)
Dept: CASE MANAGEMENT | Age: 81
End: 2021-05-13

## 2021-05-13 NOTE — PROGRESS NOTES
ACM contacted patient for follow up. Patient very short and guarded with this writer. On each CM conversation the patient has had challenges with progressing with goals and some challenges with diet compliance. Today patient notes she has not scheduled Dexa scan PCP ordered and that at times she has trouble remembering . ACM inquired as to if she has begun taking her meds as ordered as she has not always taken medications compliantly previously. She admits that sometimes she forgets to take her medications. She takes them most days but some days she forgets. ACM offered to assist with setting up medication blister packing and also recommended having her son to assist her or remind her daily to take her meds. She feels she will forget them just the same in a blister pack as she would in the bottles. Patient became defensive with this writer, states that her son is there beside her during this conversation and she can manage her own medications. She declined any further CM needs or assistance from this ACM. ACM will discharge from CM services at this time. ACM will forward notes to PCP for care coordination. Patient has graduated from the Complex Case Management  program on 5/13/21. Patient/family has the ability to self-manage at this time Care management goals have been completed. No further Ambulatory Care Manager follow up scheduled. Goals Addressed                 This Visit's Progress     COMPLETED: Encourage / assist patient to establish PCP care (pt-stated)        5/13/21- patient reports established care with PCP and is planning to return in July. LN    4/27/21-ACM contacted patient to remind of appt with PCP tomorrow . Patient verified appt. LN    4/5/21- Patient has appt with a new PCP with [facility] on 4/28/21 and the date/time verified with the patient . Patient agrees to attend. Discussed the importance of having a PCP.  LN    3/3/21- patient reports has not been seeing PCP for a long time. Note that patient has been seen in ER and by [facility]. ACM will encourage patient to establish with PCP and also ask patients approval to speak with son and encourage son to encourage/assist to establish care with PCP for routine follow up as well as routine follow up with cardiology (office was contacted and she has not seen them since June 2020) Spoke with patients son he agrees with establishing a new PCP at [facility] with [employer]. PCP will call to set up visit for him. LN        COMPLETED: Knowledge and adherence of prescribed medication (ie. action, side effects, missed dose, etc.).        5/13/21- Patient reports taking medications as ordered daily. Patient reports occasionally forgets to take medications. She feels she is getting forgetful at times. She declines ACM assisting with bubble packing or having son help with med management. LN    4/5/21- Patient reports clarified Bumex dosing with cardiologist. Patient is aware of medications and verified with son meds were correct. Patient admits she just does not consistently take them they way that she should. ACM encouraged patient to be consistent with meds mainly the diuretics . ACM will follow up in 2 weeks to ensure she has begun taking these as instructed. LN    3/3/21- ACM identified some inconsistencies in meds patient reported taking and med list and meds cardiologist office reports she should be taking. ACM got approval from patient to speak with her son Misti Lizarraga. Left him a VM requesting a return call to clarify and recommend he assist with med management as well as with establishing a PCP to help with regular filling of medications. ACM will follow up in 1-2 days. ACM spoke with son. Son was aware that patient not always consistent with meds notes she is stubborn. He will discuss importance with her of taking correctly . Verified med list. Asked him to verify frequency of Bumex with cardiology.  He will try to help patient set up med box if she will allow it- Son checked and verified patient has and is taking meds as listed. LN             Patient has Ambulatory Care Manager's contact information for any further questions, concerns, or needs.   Patients upcoming visits:    Future Appointments   Date Time Provider Rachna Lugo   7/28/2021 10:45 AM SUE Victoria BS AMB

## 2021-05-15 NOTE — DISCHARGE INSTRUCTIONS
MD Bowman contacted regarding rate of heparin drip, orders received from MD and followed through You will need to stop your Lasix and begin Bumex 1 mg. Continue your other medications including the Xarelto. Follow-up with Dr. Corinne Sandoval this coming week for reevaluation. She will need to reevaluate the fluid as well as the chest and arm discomfort you have had over the last 2 weeks. If any of the symptoms worsen in the meantime return to the ED or call Dr. Moise Brody.

## 2021-11-16 ENCOUNTER — PATIENT OUTREACH (OUTPATIENT)
Dept: CASE MANAGEMENT | Age: 81
End: 2021-11-16

## 2021-11-16 NOTE — PROGRESS NOTES
[facility] received call from patients son Miracle Alvarado . He states he had this [facility]'s contact info and had concerns for the patient and wasn't sure where to turn so contacted [facility]. Patients son states she has begun to decline. Wont drink much water. Her breathing has gotten worse and worse and she labors really hard to breath. She is still of sound mind to make decisions but is \"stubborn\". She doesn't want to go to the hospital or doctor but is worsening daily. He states he is very worried and doesn't know what to do. AC recommended he encourage her strongly to go to the ER as if she is having HF exacerbation it could lead to resp failure and she needs treatment if she is laboring to breath. ACM recommended he call EMS for her and encourage she go to the ER. He notes understanding no further questions. [facility] will follow up with son in a few days to see how she is doing and if any other CM assistance is needed.

## 2021-11-17 ENCOUNTER — APPOINTMENT (OUTPATIENT)
Dept: GENERAL RADIOLOGY | Age: 81
End: 2021-11-17
Attending: STUDENT IN AN ORGANIZED HEALTH CARE EDUCATION/TRAINING PROGRAM
Payer: MEDICARE

## 2021-11-17 ENCOUNTER — HOSPITAL ENCOUNTER (OUTPATIENT)
Age: 81
Setting detail: OBSERVATION
Discharge: HOME HEALTH | End: 2021-11-21
Attending: STUDENT IN AN ORGANIZED HEALTH CARE EDUCATION/TRAINING PROGRAM | Admitting: HOSPITALIST
Payer: MEDICARE

## 2021-11-17 DIAGNOSIS — I50.31 ACUTE DIASTOLIC CHF (CONGESTIVE HEART FAILURE) (HCC): Primary | ICD-10-CM

## 2021-11-17 PROBLEM — R06.02 SHORTNESS OF BREATH: Status: ACTIVE | Noted: 2021-11-17

## 2021-11-17 LAB
ALBUMIN SERPL-MCNC: 3.3 G/DL (ref 3.5–5)
ALBUMIN/GLOB SERPL: 0.8 {RATIO} (ref 1.1–2.2)
ALP SERPL-CCNC: 94 U/L (ref 45–117)
ALT SERPL-CCNC: 15 U/L (ref 12–78)
ANION GAP SERPL CALC-SCNC: 4 MMOL/L (ref 5–15)
AST SERPL-CCNC: 15 U/L (ref 15–37)
BASOPHILS # BLD: 0 K/UL (ref 0–0.1)
BASOPHILS NFR BLD: 1 % (ref 0–1)
BILIRUB SERPL-MCNC: 1 MG/DL (ref 0.2–1)
BNP SERPL-MCNC: 595 PG/ML
BUN SERPL-MCNC: 12 MG/DL (ref 6–20)
BUN/CREAT SERPL: 11 (ref 12–20)
CALCIUM SERPL-MCNC: 9.6 MG/DL (ref 8.5–10.1)
CHLORIDE SERPL-SCNC: 107 MMOL/L (ref 97–108)
CO2 SERPL-SCNC: 29 MMOL/L (ref 21–32)
COMMENT, HOLDF: NORMAL
CREAT SERPL-MCNC: 1.12 MG/DL (ref 0.55–1.02)
DIFFERENTIAL METHOD BLD: ABNORMAL
EOSINOPHIL # BLD: 0.1 K/UL (ref 0–0.4)
EOSINOPHIL NFR BLD: 1 % (ref 0–7)
ERYTHROCYTE [DISTWIDTH] IN BLOOD BY AUTOMATED COUNT: 13.8 % (ref 11.5–14.5)
GLOBULIN SER CALC-MCNC: 4.4 G/DL (ref 2–4)
GLUCOSE SERPL-MCNC: 96 MG/DL (ref 65–100)
HCT VFR BLD AUTO: 48.8 % (ref 35–47)
HGB BLD-MCNC: 15.4 G/DL (ref 11.5–16)
IMM GRANULOCYTES # BLD AUTO: 0.1 K/UL (ref 0–0.04)
IMM GRANULOCYTES NFR BLD AUTO: 1 % (ref 0–0.5)
LYMPHOCYTES # BLD: 1.9 K/UL (ref 0.8–3.5)
LYMPHOCYTES NFR BLD: 33 % (ref 12–49)
MCH RBC QN AUTO: 27.7 PG (ref 26–34)
MCHC RBC AUTO-ENTMCNC: 31.6 G/DL (ref 30–36.5)
MCV RBC AUTO: 87.8 FL (ref 80–99)
MONOCYTES # BLD: 0.6 K/UL (ref 0–1)
MONOCYTES NFR BLD: 10 % (ref 5–13)
NEUTS SEG # BLD: 3.2 K/UL (ref 1.8–8)
NEUTS SEG NFR BLD: 54 % (ref 32–75)
NRBC # BLD: 0 K/UL (ref 0–0.01)
NRBC BLD-RTO: 0 PER 100 WBC
PLATELET # BLD AUTO: 228 K/UL (ref 150–400)
PMV BLD AUTO: 10.1 FL (ref 8.9–12.9)
POTASSIUM SERPL-SCNC: 4.4 MMOL/L (ref 3.5–5.1)
PROT SERPL-MCNC: 7.7 G/DL (ref 6.4–8.2)
RBC # BLD AUTO: 5.56 M/UL (ref 3.8–5.2)
SAMPLES BEING HELD,HOLD: NORMAL
SODIUM SERPL-SCNC: 140 MMOL/L (ref 136–145)
TROPONIN-HIGH SENSITIVITY: 7 NG/L (ref 0–51)
WBC # BLD AUTO: 5.9 K/UL (ref 3.6–11)

## 2021-11-17 PROCEDURE — 83880 ASSAY OF NATRIURETIC PEPTIDE: CPT

## 2021-11-17 PROCEDURE — 74011250637 HC RX REV CODE- 250/637: Performed by: STUDENT IN AN ORGANIZED HEALTH CARE EDUCATION/TRAINING PROGRAM

## 2021-11-17 PROCEDURE — 36415 COLL VENOUS BLD VENIPUNCTURE: CPT

## 2021-11-17 PROCEDURE — 74011000250 HC RX REV CODE- 250: Performed by: STUDENT IN AN ORGANIZED HEALTH CARE EDUCATION/TRAINING PROGRAM

## 2021-11-17 PROCEDURE — 96374 THER/PROPH/DIAG INJ IV PUSH: CPT

## 2021-11-17 PROCEDURE — 93005 ELECTROCARDIOGRAM TRACING: CPT

## 2021-11-17 PROCEDURE — 71046 X-RAY EXAM CHEST 2 VIEWS: CPT

## 2021-11-17 PROCEDURE — G0378 HOSPITAL OBSERVATION PER HR: HCPCS

## 2021-11-17 PROCEDURE — 99285 EMERGENCY DEPT VISIT HI MDM: CPT

## 2021-11-17 PROCEDURE — 85025 COMPLETE CBC W/AUTO DIFF WBC: CPT

## 2021-11-17 PROCEDURE — 80053 COMPREHEN METABOLIC PANEL: CPT

## 2021-11-17 PROCEDURE — 84484 ASSAY OF TROPONIN QUANT: CPT

## 2021-11-17 RX ORDER — ACETAMINOPHEN 650 MG/1
650 SUPPOSITORY RECTAL
Status: DISCONTINUED | OUTPATIENT
Start: 2021-11-17 | End: 2021-11-21 | Stop reason: HOSPADM

## 2021-11-17 RX ORDER — ONDANSETRON 2 MG/ML
4 INJECTION INTRAMUSCULAR; INTRAVENOUS
Status: DISCONTINUED | OUTPATIENT
Start: 2021-11-17 | End: 2021-11-21 | Stop reason: HOSPADM

## 2021-11-17 RX ORDER — POLYETHYLENE GLYCOL 3350 17 G/17G
17 POWDER, FOR SOLUTION ORAL DAILY PRN
Status: DISCONTINUED | OUTPATIENT
Start: 2021-11-17 | End: 2021-11-21 | Stop reason: HOSPADM

## 2021-11-17 RX ORDER — BUMETANIDE 0.25 MG/ML
2 INJECTION INTRAMUSCULAR; INTRAVENOUS
Status: COMPLETED | OUTPATIENT
Start: 2021-11-17 | End: 2021-11-17

## 2021-11-17 RX ORDER — ONDANSETRON 4 MG/1
4 TABLET, ORALLY DISINTEGRATING ORAL
Status: DISCONTINUED | OUTPATIENT
Start: 2021-11-17 | End: 2021-11-21 | Stop reason: HOSPADM

## 2021-11-17 RX ORDER — SODIUM CHLORIDE 0.9 % (FLUSH) 0.9 %
5-40 SYRINGE (ML) INJECTION AS NEEDED
Status: DISCONTINUED | OUTPATIENT
Start: 2021-11-17 | End: 2021-11-21 | Stop reason: HOSPADM

## 2021-11-17 RX ORDER — ALBUTEROL SULFATE 0.83 MG/ML
2.5 SOLUTION RESPIRATORY (INHALATION)
Status: DISCONTINUED | OUTPATIENT
Start: 2021-11-17 | End: 2021-11-21 | Stop reason: HOSPADM

## 2021-11-17 RX ORDER — GUAIFENESIN 100 MG/5ML
162 LIQUID (ML) ORAL
Status: COMPLETED | OUTPATIENT
Start: 2021-11-17 | End: 2021-11-17

## 2021-11-17 RX ORDER — BUMETANIDE 1 MG/1
2 TABLET ORAL 2 TIMES DAILY
Status: DISCONTINUED | OUTPATIENT
Start: 2021-11-17 | End: 2021-11-21

## 2021-11-17 RX ORDER — DILTIAZEM HYDROCHLORIDE 120 MG/1
240 CAPSULE, COATED, EXTENDED RELEASE ORAL DAILY
Status: DISCONTINUED | OUTPATIENT
Start: 2021-11-18 | End: 2021-11-21 | Stop reason: HOSPADM

## 2021-11-17 RX ORDER — ASPIRIN 81 MG/1
81 TABLET ORAL DAILY
Status: DISCONTINUED | OUTPATIENT
Start: 2021-11-18 | End: 2021-11-21 | Stop reason: HOSPADM

## 2021-11-17 RX ORDER — ACETAMINOPHEN 325 MG/1
650 TABLET ORAL
Status: DISCONTINUED | OUTPATIENT
Start: 2021-11-17 | End: 2021-11-21 | Stop reason: HOSPADM

## 2021-11-17 RX ORDER — SODIUM CHLORIDE 0.9 % (FLUSH) 0.9 %
5-40 SYRINGE (ML) INJECTION EVERY 8 HOURS
Status: DISCONTINUED | OUTPATIENT
Start: 2021-11-17 | End: 2021-11-21 | Stop reason: HOSPADM

## 2021-11-17 RX ADMIN — BUMETANIDE 2 MG: 0.25 INJECTION INTRAMUSCULAR; INTRAVENOUS at 23:37

## 2021-11-17 RX ADMIN — Medication 10 ML: at 23:37

## 2021-11-17 RX ADMIN — ASPIRIN 81 MG CHEWABLE TABLET 162 MG: 81 TABLET CHEWABLE at 23:37

## 2021-11-17 NOTE — ED TRIAGE NOTES
Patient arrives for increased shortness of breath with ambulation. Son is concerned about patient's gradual decline at home. Patient alert and oriented x4. Denies chest pain. Able to speak in complete sentences.

## 2021-11-18 PROCEDURE — 97530 THERAPEUTIC ACTIVITIES: CPT

## 2021-11-18 PROCEDURE — G0378 HOSPITAL OBSERVATION PER HR: HCPCS

## 2021-11-18 PROCEDURE — 97165 OT EVAL LOW COMPLEX 30 MIN: CPT

## 2021-11-18 PROCEDURE — 97535 SELF CARE MNGMENT TRAINING: CPT

## 2021-11-18 PROCEDURE — 74011250637 HC RX REV CODE- 250/637: Performed by: HOSPITALIST

## 2021-11-18 PROCEDURE — 97161 PT EVAL LOW COMPLEX 20 MIN: CPT

## 2021-11-18 RX ADMIN — ASPIRIN 81 MG: 81 TABLET, COATED ORAL at 11:23

## 2021-11-18 RX ADMIN — DILTIAZEM HYDROCHLORIDE 240 MG: 120 CAPSULE, EXTENDED RELEASE ORAL at 11:24

## 2021-11-18 RX ADMIN — Medication 10 ML: at 21:30

## 2021-11-18 RX ADMIN — RIVAROXABAN 20 MG: 10 TABLET, FILM COATED ORAL at 11:24

## 2021-11-18 RX ADMIN — BUMETANIDE 2 MG: 1 TABLET ORAL at 11:23

## 2021-11-18 NOTE — PROGRESS NOTES
Reason for Admission:  SOB                     RUR Score:    NA                 Plan for utilizing home health:      Has used [facility] in the past (preferred agency if [facility] ordered)    PCP: First and Last name: Dinorah Arana NP     Name of Practice:    Are you a current patient: Yes/No:  Yes   Approximate date of last visit:  April 2021   Can you participate in a virtual visit with your PCP:                     Current Advanced Directive/Advance Care Plan: Full Code      Healthcare Decision Maker:   Click here to complete [employer] including selection of the Healthcare Decision Maker Relationship (ie \"Primary\")             Primary Decision Maker: Sharyle Drew - Son - [phone]    Secondary Decision Maker: Rama Tang - [phone]                  Transition of Care Plan:     EMR reviewed. History significant for HF. TAM, obesity, HTN and SOB. Met w/patient and introduced to role of CM.  is alert and provided history. Patient lives in 2 story home (bedroom is on the 1st floor). Support received from vinny Heredia and [facility]. DME includes w/c and rollator. Grocery shopping by Giovanna and friend (drives to store). Noted from MR cecilia is concerned about patient's gradual decline and decreased mobility. At time of CM initial assessment Ms. Danni Jewell did not express any transitional care needs. However later witnessed crying when PT attempted assessment. This writer was able to return Ms. Danni Jewell while crying expressed \" I can't get out of bed I'm weak and I normally take care of myself. \" Emotional support provided and acknowledged skilled and supportive staff available. [employer] is insurance provider. No AMD on file. [facility]) is pharmacy. Care Management Interventions  PCP Verified by CM:  Yes  Last Visit to PCP: 04/16/21  Palliative Care Criteria Met (RRAT>21 & CHF Dx)?: No  Transition of Care Consult (CM Consult): Discharge Planning  Isabelat Signup: No  Discharge Durable Medical Equipment: No  Health Maintenance Reviewed: Yes  Physical Therapy Consult: Yes  Occupational Therapy Consult: No  Speech Therapy Consult: No  Support Systems: Child(hansel)  [employer] Information Provided?: No  Discharge Location  Discharge Placement:  (TBD)

## 2021-11-18 NOTE — NURSE NAVIGATOR
Chart reviewed by Heart Failure Nurse Navigator. Heart Failure database completed. EF:  51/55% (echo 11/2019)    ACEi/ARB/ARNi: not currently indicated    BB:not currently indicated    Aldosterone Antagonist: not currently indicated    Obstructive Sleep Apnea Screening: dx TAM   STOP-BANG score:   Referred to Sleep Medicine:     CRT: not currently indicated     NYHA Functional Class documentation requested. Heart Failure Teach Back in Patient Education. Heart Failure Avoiding Triggers on Discharge Instructions. Cardiologist: not yet consulted      Post discharge follow up phone call to be made within 48-72 hours of discharge.

## 2021-11-18 NOTE — PROGRESS NOTES
Problem: Mobility Impaired (Adult and Pediatric)  Goal: *Acute Goals and Plan of Care (Insert Text)  Description: FUNCTIONAL STATUS PRIOR TO ADMISSION: Patient was modified independent using a walker short distances in the home and power scooter in the community for functional mobility. HOME SUPPORT PRIOR TO ADMISSION: The patient lived with her two sons. Physical Therapy Goals  Initiated 11/18/2021  1. Patient will move from supine to sit and sit to supine , scoot up and down, and roll side to side in bed with modified independence within 7 day(s). 2.  Patient will transfer from bed to chair and chair to bed with modified independence using the least restrictive device within 7 day(s). 3.  Patient will perform sit to stand with modified independence within 7 day(s). 4.  Patient will ambulate with supervision/set-up for 25 feet with the least restrictive device within 7 day(s). Outcome: Not Met     PHYSICAL THERAPY EVALUATION  Patient: Rochelle Montes (72 y.o. female)  Date: 11/18/2021  Primary Diagnosis: Shortness of breath [R06.02]        Precautions:  Fall    ASSESSMENT  Based on the objective data described below, the patient presents with SOB, impaired balance, weakness, pain, increased body habitus, and decreased activity tolerance s/p admission with SOB d/t CHF. At baseline, patient ambulates short distances in the home with a walker and a power scooter for longer/ community distances. Noted h/o medication non compliance. Patient endorses making the decision to stop her diuretics citing urinary frequency and not being able to make it to the bathroom. She was tearful when discussing her toileting issues and inability to make it to the bathroom. Today, patient was Min assist to transition from the plinth to standing with the walker. She was not able to take steps to ambulate and only able to stand briefly citing LE weakness.    Effort to transition positions and stand was taxing, takes extra time and with increased SOB. SpO2 down to 89%, RR 33, &  bpm with min activity while on RA. O2 sat and RR improved with rest, she remained tachycardic (100-110's) at rest.  Patient started crying at the end this session due to her mobility impairment and difficulty making to to the bathroom. Discussed with SED NP who assisted with a PureWick. Patient is below her stated baseline. Acute therapy will follow and continue to monitor for O2 needs. Recommend SNF level rehab when medically stable for DC. Current Level of Function Impacting Discharge (mobility/balance): Min A to stand, Unable to ambulate or transfer to chair    Functional Outcome Measure: The patient scored 7/28 on the Tinetti outcome measure which is indicative of high fall risk. Other factors to consider for discharge: PLOF indep with AD, lives with sons (one is able to assist some & works outside of the home)     Patient will benefit from skilled therapy intervention to address the above noted impairments. PLAN :  Recommendations and Planned Interventions: bed mobility training, transfer training, gait training, therapeutic exercises, edema management/control, patient and family training/education and therapeutic activities      Frequency/Duration: Patient will be followed by physical therapy:  5 times a week to address goals. Recommendation for discharge: (in order for the patient to meet his/her long term goals)  Therapy up to 5 days/week in SNF setting    This discharge recommendation:  Has been made in collaboration with the attending provider and/or case management    IF patient discharges home will need the following DME: patient owns DME required for discharge         SUBJECTIVE:   Patient stated It was all over the place.   Referring to urine on bed linens when attempting to use the PureWick    OBJECTIVE DATA SUMMARY:   HISTORY:    Past Medical History:   Diagnosis Date    Arthritis     Atrial fibrillation ([facility] [address]     Bronchitis, acute 61/83/7135    Diastolic heart failure (HCC)     WHITING (dyspnea on exertion)     GERD (gastroesophageal reflux disease)     Hypertension     Obesity     TAM (obstructive sleep apnea)     Other ill-defined conditions(799.89)     \"fluid in the lung\"    Pneumonia 6/7/2018    Sleep apnea      Past Surgical History:   Procedure Laterality Date    COLONOSCOPY N/A 3/30/2017    COLONOSCOPY performed by Kanika Gonzalez MD at [address] HX CHOLECYSTECTOMY      HX HERNIA REPAIR      HX ORTHOPAEDIC      right and left total knee replacement    HX ORTHOPAEDIC      right shoulder    HX OTHER SURGICAL      kidney stone surgery    HX OTHER SURGICAL      both eyes cataract    [place] CARDIOVERSION ELECTIVE ARRHYTHMIA EXTERNAL N/A 10/9/2019    EP CARDIOVERSION performed by Rolando Loomis MD at [facility] CATH LAB       Personal factors and/or comorbidities impacting plan of care: [facility]    Home Situation  Home Environment: Private residence  Wheelchair Ramp: Yes  One/Two Story Residence: Two story, live on 1st floor  Living Alone: No (with 2 sons, one able to assist some)  Support Systems: Child(hansel)  Current DME Used/Available at Home: Walker, rollator, Walker, rolling (Motorized scooter in community)  Tub or Shower Type:  (walk-in tub)    EXAMINATION/PRESENTATION/DECISION MAKING:   Critical Behavior:  Neurologic State: Alert  Orientation Level: Oriented X4  Cognition: Follows commands     Hearing:   Auditory  Auditory Impairment: None  Skin:  Exposed areas grossly intact  Edema: BLE (though pt notes improvement as compare with recent baseline)  Range Of Motion:  AROM: Generally decreased, functional                       Strength:    Strength: Generally decreased, functional                    Tone & Sensation:   Tone: Normal              Sensation: Intact               Coordination:  Coordination: Within functional limits  Vision:   Acuity: Within Defined Limits  Functional Mobility:  Bed Mobility:  Supine to Sit: Stand-by assistance  Sit to Supine: Minimum assistance for LE management  Scooting: Stand-by assistance     Transfers:  Sit to Stand: Minimum assistance  Stand to Sit: Minimum assistance                          Balance:   Sitting: Impaired  Sitting - Static: Good (unsupported)  Sitting - Dynamic: Fair (occasional)  Standing: Impaired; With support  Standing - Static: Fair  Standing - Dynamic : Fair  Ambulation/Gait Training:  Gait Description (WDL): Exceptions to WDL (unable - attempted x2)   Pt able to take a few small shuffling side steps and stand about 15 seconds before onset of fatigue, SOB, and weakness and abruptly sitting. Noted improved control on the second attempt w/ instruction provided. Functional Measure:  Tinetti test:    Sitting Balance: 1  Arises: 0  Attempts to Rise: 1  Immediate Standing Balance: 1  Standing Balance: 1  Nudged: 2 (inferred w/ walker support)  Eyes Closed: 0 (inferred)  Turn 360 Degrees - Continuous/Discontinuous: 0  Turn 360 Degrees - Steady/Unsteady: 0  Sitting Down: 1  Balance Score: 7 Balance total score  Indication of Gait: 0  R Step Length/Height: 0  L Step Length/Height: 0  R Foot Clearance: 0  L Foot Clearance: 0  Step Symmetry: 0  Step Continuity: 0  Path: 0  Trunk: 0  Walking Time: 0  Gait Score: 0 Gait total score  Total Score: 7/28 Overall total score         Tinetti Tool Score Risk of Falls  <19 = High Fall Risk  19-24 = Moderate Fall Risk  25-28 = Low Fall Risk  Tinetti ME. Performance-Oriented Assessment of Mobility Problems in Elderly Patients. Ruiz 66; [id number].  (Scoring Description: PT Bulletin Feb. 10, 1993)    Older adults: Alana Hurtado et al, 2009; n = 1000 [facility] elderly evaluated with ABC, BUCK, ADL, and IADL)  · Mean BUCK score for males aged 69-68 years = 26.21(3.40)  · Mean BUCK score for females age 69-68 years = 25.16(4.30)  · Mean BUCK score for males over 80 years = 23.29(6.02)  · Mean BUCK score for females over 80 years = 17.20(8.32)            Physical Therapy Evaluation Charge Determination   History Examination Presentation Decision-Making   MEDIUM  Complexity : 1-2 comorbidities / personal factors will impact the outcome/ POC  MEDIUM Complexity : 3 Standardized tests and measures addressing body structure, function, activity limitation and / or participation in recreation  MEDIUM Complexity : Evolving with changing characteristics  Other outcome measures Tinetti 7/28  HIGH       Based on the above components, the patient evaluation is determined to be of the following complexity level: MEDIUM    Pain Rating:  Bilateral LE's (anterolateral legs), did not quantify, intermittent & chronic  Occurred during this session with activity    Therapeutic Activity:   Transfer training with RW - cues provided for hand placement  Education re: medication management and ways to overcome obstacles (mediplanner/ pill box, discussing with MD vs stopping medication). Educated in role of PT and plan to continue    Activity Tolerance:   Poor, requires frequent rest breaks, observed SOB with activity, and SpO2 down to 89%    After treatment patient left in no apparent distress:   Supine in bed, Call bell within reach, and Siderails x2 in ED, NP and CM at bedside     COMMUNICATION/EDUCATION:   The patients plan of care was discussed with: Registered nurse, Case management, and SED NP . Fall prevention education was provided and the patient/caregiver indicated understanding., Patient/family have participated as able in goal setting and plan of care. , and Patient/family agree to work toward stated goals and plan of care.     Thank you for this referral.  Yobani Hudson, PT   Time Calculation: 28 mins

## 2021-11-18 NOTE — ED PROVIDER NOTES
The history is provided by the patient and a relative. Shortness of Breath  This is a recurrent problem. The average episode lasts 5 days. The problem occurs frequently. The current episode started more than 2 days ago. The problem has been gradually worsening. Associated symptoms include leg swelling. Pertinent negatives include no fever, no headaches, no sore throat, no cough, no hemoptysis, no chest pain, no syncope, no vomiting and no abdominal pain. Precipitated by: son thinks patient not taking medications as directed. She has tried nothing for the symptoms. The treatment provided no relief. She has had prior hospitalizations. She has had prior ED visits. Associated medical issues include heart failure.         Past Medical History:   Diagnosis Date    Arthritis     At[facility] fibrillation ([facility] [address]     Bronchitis, acute 58/73/5761    Diastolic heart failure (HCC)     WHITING (dyspnea on exertion)     GERD (gastroesophageal reflux disease)     Hypertension     Obesity     TAM (obstructive sleep apnea)     Other ill-defined conditions(799.89)     \"fluid in the lung\"    Pneumonia 6/7/2018    Sleep apnea        Past Surgical History:   Procedure Laterality Date    COLONOSCOPY N/A 3/30/2017    COLON[facility] Elizabeth Mcduffie MD [facility]COPY    HX CHOLECYSTECTOMY      HX HERNIA REPAIR      HX ORTHOPAEDIC      right and left total knee replacement    HX ORTHOPAEDIC      right shoulder    HX OTHER SURGICAL      kidney stone surgery    HX OTHER SURGICAL      both eyes cataract    [place] CARDIOVERSION ELECTIVE ARRHYTHMIA EXTERNAL N/A 10/9/2019    EP CARDIOVERSION [place] [id number], [facility] AGUERO LAB         Family History:   Problem Relation Age of Onset    Tuberculosis Mother     Lung Disease Father        Social History     Socioeconomic History    Marital status: SINGLE     Spouse name: Not on file    Number of children: Not on file    Years of education: Not on file    Highest education level: Not on file   Occupational History    Not on file   Tobacco Use    Smoking status: Former Smoker     Packs/day: 1.00    Smokeless tobacco: Never Used    Tobacco comment: quit at age 43,smoked for 16 years   Substance and Sexual Activity    Alcohol use: No    Drug use: No    Sexual activity: Not on file   Other Topics Concern     Service Not Asked    Blood Transfusions Not Asked    Caffeine Concern Not Asked    Occupational Exposure Not Asked    Hobby Hazards Not Asked    Sleep Concern Not Asked    Stress Concern Not Asked    Weight Concern Not Asked    Special Diet Not Asked    Back Care Not Asked    Exercise Not Asked    Bike Helmet Not Asked   [address] Not Asked    Self-Exams Not Asked   Social History Narrative    Lives at home with 2 sons. Manages      Social Determinants of Health     Financial Resource Strain:     Difficulty of Paying Living Expenses: Not on file   Food Insecurity:     Worried About Running Out of Food in the Last Year: Not on file    Astrid of Food in the Last Year: Not on file   Transportation Needs:     Lack of Transportation (Medical): Not on file    Lack of Transportation (Non-Medical):  Not on file   Physical Activity:     Days of Exercise per Week: Not on file    Minutes of Exercise per Session: Not on file   Stress:     Feeling of Stress : Not on file   Social Connections:     Frequency of Communication with Friends and Family: Not on file    Frequency of Social Gatherings with Friends and Family: Not on file    Attends Sikh Services: Not on file    Active Member of Clubs or Organizations: Not on file    Attends Club or Organization Meetings: Not on file    Marital Status: Not on file   Intimate Partner Violence:     Fear of Current or Ex-Partner: Not on file    Emotionally Abused: Not on file    Physically Abused: Not on file    Sexually Abused: Not on file   Housing Stability:     Unable to Pay for Housing in the Last Year: Not on file    Number of Places Lived in the Last Year: Not on file    Unstable Housing in the Last Year: Not on file         ALLERGIES: Penicillins    Review of Systems   Constitutional: Negative for fever. HENT: Negative for sore throat. Respiratory: Positive for shortness of breath. Negative for cough and hemoptysis. Cardiovascular: Positive for leg swelling. Negative for chest pain and syncope. Gastrointestinal: Negative for abdominal pain and vomiting. Musculoskeletal: Negative for back pain. Neurological: Negative for syncope and headaches. All other systems reviewed and are negative. Vitals:    11/17/21 1511 11/17/21 1959   BP: 116/66 123/69   Pulse: 78 93   Resp: 20 18   Temp: 97.3 °F (36.3 °C) 97.6 °F (36.4 °C)   SpO2: 98% 97%            Physical Exam  Vitals and nursing note reviewed. Constitutional:       General: She is not in acute distress. Appearance: She is well-developed. She is obese. HENT:      Head: Normocephalic and atraumatic. Eyes:      Conjunctiva/sclera: Conjunctivae normal.   Cardiovascular:      Rate and Rhythm: Normal rate. Rhythm irregular. Pulmonary:      Effort: Pulmonary effort is normal.      Breath sounds: Normal breath sounds. Abdominal:      Palpations: Abdomen is soft. Tenderness: There is no abdominal tenderness. There is no guarding. Musculoskeletal:         General: Normal range of motion. Cervical back: Normal range of motion and neck supple. Right lower leg: No tenderness. Left lower leg: No tenderness. Edema present. Skin:     General: Skin is warm and dry. Neurological:      Mental Status: She is alert and oriented to person, place, and time. Motor: No abnormal muscle tone. MDM       Procedures  EKG interpretation: 16:56  Rhythm: atrial fib with RVR; and irregular. Rate (approx.): 114; Axis: left axis deviation;  Intervals: QTc 441 ms; ST/T wave: non-specific findings, no STEMI; EKG documented and interpreted by Ulysses Kappa, MD, ED MD. [place] Billing for Admission  8:14 PM    ED Room Number: R40/R40  Patient Name and age: Mily Bean [de-identified] y.o.  female  Working Diagnosis:   1. Acute diastolic CHF (congestive heart failure) ([facility] [address]        COVID-19 Suspicion:  no  Sepsis present:  no  Reassessment needed: no  Code Status:  Full Code  Readmission: no  Isolation Requirements:  no  Recommended Level of Care:  telemetry  Department:[facility] Adult ED - 21   Other:  Bumex 2mg IV ordered.

## 2021-11-18 NOTE — PROGRESS NOTES
Problem: Self Care Deficits Care Plan (Adult)  Goal: *Acute Goals and Plan of Care (Insert Text)  Description:    FUNCTIONAL STATUS PRIOR TO ADMISSION: Patient was modified independent for ADLs and functional mobility, ambulatory with RW within home and motorized scooter in community. Reports she is normally ambulatory room to room with frequent seated rest breaks. Endorses no recent falls. HOME SUPPORT: Patient resided with 2 sons but did not require assistance. She reports 1 son is able to provide assistance but works and her other son is not able to provide assistance. Occupational Therapy Goals  Initiated 11/18/2021  1. Patient will perform grooming standing at sink with supervision/ set-up within 7 day(s). 2.  Patient will perform lower body dressing with supervision/set-up within 7 day(s). 3.  Patient will perform bathing with supervision/set-up within 7 day(s). 4.  Patient will perform toilet transfers with supervision/set-up within 7 day(s). 5.  Patient will perform all aspects of toileting with supervision/set-up within 7 day(s). 6.  Patient will participate in upper extremity therapeutic exercise/activities with supervision/set-up for 10 minutes within 7 day(s). 7.  Patient will utilize energy conservation techniques during functional activities with verbal cues within 7 day(s). 11/18/2021 1302 by Kenia Colón OT  Outcome: Not Met  OCCUPATIONAL THERAPY EVALUATION  Patient: Doroteo Vera (35 y.o. female)  Date: 11/18/2021  Primary Diagnosis: Shortness of breath [R06.02]        Precautions: fall       ASSESSMENT  Note patient admitted for shortness of breath/ suspected mild acute on chronic diastolic CHF due to diuretic non-compliance. Patient reports she manages her own medications, sometimes forgets her pills, and recently stopped taking her diuretic d/t urinary frequency/ urgency.   Patient was advised to utilize a pill organizer (which she previously did), to comply with medications as prescribed by physician, and to utilize pads/ briefs if needed for bladder management. Patient presents for OT evaluation with moderate WHITING, limited functional endurance, limited standing tolerance (~20 seconds), mild BLE edema, impaired LB reach, and impaired balance. Required moderate assistance to don socks, minimum assistance for sit-stand from elevated bed height (2 trials), and minimum assistance to side-step along HOB with RW. With activity: HR 110s-128, SPO2 >89% on RA, RR 20s-33. Patient is below functional baseline and would benefit from SNF at d/c. Current Level of Function Impacting Discharge (ADLs/self-care): stand-by assistance supine-sit, minimum assistance sit-stand/ side-step. Infer overall set-up to moderate assistance ADLs. Functional Outcome Measure: The patient scored 45/100 on the Barthel Index outcome measure which is indicative of ~55% impairment in functional performance. Patient will benefit from skilled therapy intervention to address the above noted impairments. PLAN :  Recommendations and Planned Interventions: self care training, functional mobility training, therapeutic exercise, balance training, therapeutic activities, endurance activities, patient education, home safety training, and family training/education    Frequency/Duration: Patient will be followed by occupational therapy 5 times a week to address goals. Recommendation for discharge: (in order for the patient to meet his/her long term goals)  Therapy up to 5 days/week in SNF setting    This discharge recommendation:  Has not yet been discussed the attending provider and/or case management         SUBJECTIVE:   Patient stated I can't stand for long.     OBJECTIVE DATA SUMMARY:   HISTORY:   Past Medical History:   Diagnosis Date    Arthritis     Atrial fibrillation ([facility] [address]     Bronchitis, acute 62/70/4487    Diastolic heart failure (HCC)     WHITING (dyspnea on exertion)     GERD (gastroesophageal reflux disease)     Hypertension     Obesity     TAM (obstructive sleep apnea)     Other ill-defined conditions(799.89)     \"fluid in the lung\"    Pneumonia 6/7/2018    Sleep apnea      Past Surgical History:   Procedure Laterality Date    COLONOSCOPY N/A 3/30/2017    COLONOSCOPY performed by Thomas Stahl MD at [facility] ENDOSCOPY    HX CHOLECYSTECTOMY      [address]      right and left total knee replacement    HX ORTHOPAEDIC      right shoulder    HX OTHER SURGICAL      kidney stone surgery    HX OTHER SURGICAL      both eyes cataract    DC CARDIOVERSION ELECTIVE ARRHYTHMIA EXTERNAL N/A 10/9/2019    EP CARDIOVERSION performed by Erin Causey MD at [place] [address], [facility] CATH LAB       Expanded or extensive additional review of patient history:     Home Situation  Home Environment: Private residence  Wheelchair Ramp: Yes  One/Two Story Residence: Two story, live on 1st floor  Living Alone: No (with 2 sons)  Support Systems: Child(hansel)  Current DME Used/Available at Home: Payal Poser, rollator, Walker, rolling (Motorized scooter in community)  Tub or Shower Type:  (walk-in tub)    EXAMINATION OF PERFORMANCE DEFICITS:  Cognitive/Behavioral Status:  Neurologic State: Alert  Orientation Level: Oriented X4  Cognition: Follows commands             Skin: visible skin appears intact    Edema: none noted    Hearing: Auditory  Auditory Impairment: None    Vision/Perceptual:                           Acuity: Within Defined Limits         Range of Motion:    AROM: Generally decreased, functional                         Strength:    Strength: Generally decreased, functional                Coordination:  Coordination: Within functional limits  Fine Motor Skills-Upper: Left Intact; Right Intact    Gross Motor Skills-Upper: Left Intact;  Right Intact    Tone & Sensation:    Tone: Normal  Sensation: Intact                      Balance:  Sitting: Impaired  Sitting - Static: Good (unsupported)  Sitting - Dynamic: Fair (occasional)  Standing: Impaired; With support  Standing - Static: Fair  Standing - Dynamic : Fair    Functional Mobility and Transfers for ADLs:  Bed Mobility:  Supine to Sit: Stand-by assistance  Sit to Supine: Minimum assistance    Transfers:  Sit to Stand: Minimum assistance  Stand to Sit: Minimum assistance    ADL Assessment:  Feeding: Independent (inferred)    Oral Facial Hygiene/Grooming: Setup (inferred)    Bathing: Moderate assistance (inferred for WHITING, endurance, distal reach)    Upper Body Dressing: Setup (inferred)    Lower Body Dressing: Moderate assistance (difficulty donning socks, impaired LB reach, endurance)    Toileting: Moderate assistance (inferred)            Functional Measure:    Barthel Index:  Bathin  Bladder: 5  Bowels: 10  Groomin  Dressin  Feeding: 10  Mobility: 0  Stairs: 0  Toilet Use: 5  Transfer (Bed to Chair and Back): 5  Total: 45/100      The Barthel ADL Index: Guidelines  1. The index should be used as a record of what a patient does, not as a record of what a patient could do. 2. The main aim is to establish degree of independence from any help, physical or verbal, however minor and for whatever reason. 3. The need for supervision renders the patient not independent. 4. A patient's performance should be established using the best available evidence. Asking the patient, friends/relatives and nurses are the usual sources, but direct observation and common sense are also important. However direct testing is not needed. 5. Usually the patient's performance over the preceding 24-48 hours is important, but occasionally longer periods will be relevant. 6. Middle categories imply that the patient supplies over 50 per cent of the effort. 7. Use of aids to be independent is allowed.     Score Interpretation (from [address] [address])    Independent   60-79 Minimally independent   40-59 Partially dependent   20-39 Very dependent   <20 Totally dependent -Sarwat Amador., Barthel, D.W. (7705). Functional evaluation: the Barthel Index. [address] ([address] (1997). The Barthel activities of daily living index: self-reporting versus actual performance in the old (> or = 75 years). Journal of [facility]4 [facility] 45(7), 14 [employer], ABRIL, Jennifer Gaitan, Gracia Lewis. (1999). Measuring the change in disability after inpatient rehabilitation; comparison of the responsiveness of the Barthel Index and Functional [place] Measure. Journal of Neurology, Neurosurgery, and Psychiatry, 66(4), 497-528. Mamadou Ag, N.J.A, RICARDO Granda, & Leydi Muniz MTAMIKA. (2004) Assessment of post-stroke quality of life in cost-effectiveness studies: The usefulness of the Barthel Index and the EuroQoL-5D. Quality of Life Research, 15, 996-59         Occupational Therapy Evaluation Charge Determination   History Examination Decision-Making   LOW Complexity : Brief history review  MEDIUM Complexity : 3-5 performance deficits relating to physical, cognitive , or psychosocial skils that result in activity limitations and / or participation restrictions MEDIUM Complexity : Patient may present with comorbidities that affect occupational performnce. Miniml to moderate modification of tasks or assistance (eg, physical or verbal ) with assesment(s) is necessary to enable patient to complete evaluation       Based on the above components, the patient evaluation is determined to be of the following complexity level: LOW   Pain Rating:  Patient reported anterior BLE pain with mobility    Activity Tolerance:   Fair    After treatment patient left in no apparent distress:    Supine in bed and Call bell within reach    COMMUNICATION/EDUCATION:   The patients plan of care was discussed with: Physical therapist and Registered nurse.      Home safety education was provided and the patient/caregiver indicated understanding., Patient/family have participated as able in goal setting and plan of care. , and Patient/family agree to work toward stated goals and plan of care. This patients plan of care is appropriate for delegation to FRANCINE.     Thank you for this referral.  Gumaro Merino, OT

## 2021-11-18 NOTE — ED NOTES
Verbal shift change report given to Denis Terrell (oncoming nurse) by Zulma Borja (offgoing nurse). Report included the following information SBAR, Kardex, ED Summary, Intake/Output, MAR and Recent Results.

## 2021-11-18 NOTE — PROGRESS NOTES
800:    Bedside shift change report given to [facility] (oncoming nurse) by Sabrina Olmstead (offgoing nurse). Report included the following information SBAR, Kardex, Intake/Output, MAR and Recent Results.

## 2021-11-18 NOTE — ED NOTES
Per MD Delgado, okay to wait to give bumex when patient is in private room with bedside commode available. Currently in focused care recliner.

## 2021-11-18 NOTE — PROGRESS NOTES
[facility] Adult  Hospitalist Group                                                                                          Hospitalist Progress Note  Patricia Healy, [place]  Answering service: [phone] OR [phone] from in house phone        Date of Service:  2021  NAME:  Marek Miles  :  1940  MRN:  [id number]      Admission Summary:   Per H&P \" [de-identified] y.o lady w/ afib, HFpEF, TAM, obesity, HTN, who presents with shortness of breath. She's developed gradual dyspnea on exertion over the past week. She feels fine at rest. No chest pain, cough, or fever. She does have leg swelling and 3 pillow orthopnea. She has not been taking her diuretics because they make her urinate too much. \"    Interval history / Subjective:    Chart reviewed. Patient examined at bedside. No acute distress noted, patient without complaints. Patient endorses SOB with movement. Assessment & Plan:     # Dyspnea  # Possibly Acute on Chronic diastolic CHF 2/2 medication non-compliance     - Patient has hx of Covid-19 pneumonia and has experienced intermittent SOB since that time     - pro-BNP of 595     - Continue PO bumex     - As needed Albuterol in place  # Chronic A-fib     - Continue diltiazem     - Continue xarelto  # TAM     - Continue to monitor  # Morbid obesity     - Counseled on benefits of healthy diet and exercise           Code status: Full  DVT prophylaxis: [facility] discussed with: Patient/Family  Anticipated Disposition: Home w/Family  Anticipated Discharge: 24 hours to 48 hours     Hospital Problems  Date Reviewed: 2019          Codes Class Noted POA    Shortness of breath ICD-10-CM: R06.02  ICD-9-CM: 786.05  2021 Unknown                Review of Systems:   Pertinent items are noted in HPI. Vital Signs:    Last 24hrs VS reviewed since prior progress note.  Most recent are:  Visit Vitals  /72 (BP 1 Location: Left upper arm)   Pulse 88   Temp 97.4 °F (36.3 °C)   Resp 17 SpO2 94%         Intake/Output Summary (Last 24 hours) at 11/18/2021 1618  Last data filed at 11/18/2021 0720  Gross per 24 hour   Intake    Output 700 ml   Net -700 ml        Physical Examination:     I had a face to face encounter with this patient and independently examined them on 11/18/2021 as outlined below:          Constitutional:  No acute distress, cooperative, pleasant    ENT:  Oral mucosa moist, oropharynx benign. Resp:  CTA bilaterally. No wheezing/rhonchi/rales. No accessory muscle use   CV:  Regular rhythm, normal rate    GI:  Soft, non distended, non tender. normoactive bowel sounds     Musculoskeletal:  No edema, warm, 2+ pulses throughout    Neurologic:  Moves all extremities. AAOx3, CN II-XII reviewed            Data Review:    Review and/or order of clinical lab test  Review and/or order of tests in the radiology section of CPT  Review and/or order of tests in the medicine section of CPT      Labs:     Recent Labs     11/17/21  1655   WBC 5.9   HGB 15.4   HCT 48.8*        Recent Labs     11/17/21  1655      K 4.4      CO2 29   BUN 12   CREA 1.12*   GLU 96   CA 9.6     Recent Labs     11/17/21  1655   ALT 15   AP 94   TBILI 1.0   TP 7.7   ALB 3.3*   GLOB 4.4*     No results for input(s): INR, PTP, APTT, INREXT in the last 72 hours. No results for input(s): FE, TIBC, PSAT, FERR in the last 72 hours. No results found for: FOL, RBCF   No results for input(s): PH, PCO2, PO2 in the last 72 hours. No results for input(s): CPK, CKNDX, TROIQ in the last 72 hours.     No lab exists for component: CPKMB  Lab Results   Component Value Date/Time    Cholesterol, total 200 (H) 04/28/2021 10:05 AM    HDL Cholesterol 62 04/28/2021 10:05 AM    LDL, calculated 120 (H) 04/28/2021 10:05 AM    Triglyceride 90 04/28/2021 10:05 AM    CHOL/HDL Ratio 3.2 04/28/2021 10:05 AM     Lab Results   Component Value Date/Time    Glucose (POC) 108 (H) 10/03/2014 06:32 AM     Lab Results   Component Value Date/Time    Color DARK YELLOW 06/07/2018 03:43 PM    Appearance CLOUDY (A) 06/07/2018 03:43 PM    Specific gravity 1.015 06/07/2018 03:43 PM    pH (UA) 5.5 06/07/2018 03:43 PM    Protein 30 (A) 06/07/2018 03:43 PM    Glucose NEGATIVE  06/07/2018 03:43 PM    Ketone NEGATIVE  06/07/2018 03:43 PM    Bilirubin NEGATIVE  03/27/2018 04:57 PM    Urobilinogen 2.0 (H) 06/07/2018 03:43 PM    Nitrites NEGATIVE  06/07/2018 03:43 PM    Leukocyte Esterase NEGATIVE  06/07/2018 03:43 PM    Epithelial cells FEW 06/07/2018 03:43 PM    Bacteria NEGATIVE  06/07/2018 03:43 PM    WBC 0-4 06/07/2018 03:43 PM    RBC 0-5 06/07/2018 03:43 PM         Medications Reviewed:     Current Facility-Administered Medications   Medication Dose Route Frequency    sodium chloride (NS) flush 5-40 mL  5-40 mL IntraVENous Q8H    sodium chloride (NS) flush 5-40 mL  5-40 mL IntraVENous PRN    acetaminophen (TYLENOL) tablet 650 mg  650 mg Oral Q6H PRN    Or    acetaminophen (TYLENOL) suppository 650 mg  650 mg Rectal Q6H PRN    polyethylene glycol (MIRALAX) packet 17 g  17 g Oral DAILY PRN    ondansetron (ZOFRAN ODT) tablet 4 mg  4 mg Oral Q8H PRN    Or    ondansetron (ZOFRAN) injection 4 mg  4 mg IntraVENous Q6H PRN    albuterol (PROVENTIL VENTOLIN) nebulizer solution 2.5 mg  2.5 mg Nebulization Q4H PRN    aspirin delayed-release tablet 81 mg  81 mg Oral DAILY    bumetanide (BUMEX) tablet 2 mg  2 mg Oral BID    dilTIAZem ER (CARDIZEM CD) capsule 240 mg  240 mg Oral DAILY    rivaroxaban (XARELTO) tablet 20 mg  20 mg Oral DAILY     Current Outpatient Medications   Medication Sig    polyethylene glycol (MIRALAX) 17 gram packet Take 1 Packet by mouth daily as needed for Constipation.     potassium chloride (Klor-Con M20) 20 mEq tablet Klor-Con M20 mEq tablet,extended release    dilTIAZem ER (Tiadylt ER) 240 mg capsule Tiadylt  mg capsule,extended release   TAKE 1 CAPSULE BY MOUTH EVERY DAY    bumetanide (BUMEX) 1 mg tablet Take 1 Tab by mouth two (2) times a day.  acetaminophen (TYLENOL) 500 mg tablet Take 2 Tabs by mouth every six (6) hours as needed for Pain.  rivaroxaban (XARELTO) 20 mg tab tablet Take 1 Tab by mouth daily (with lunch). Indications: PREVENT THROMBOEMBOLISM IN CHRONIC ATRIAL FIBRILLATION    aspirin delayed-release 81 mg tablet Take 1 Tab by mouth daily.      ______________________________________________________________________  EXPECTED LENGTH OF STAY: - - -  ACTUAL LENGTH OF STAY:          Kale Hawkins, BENITOP

## 2021-11-18 NOTE — ED NOTES
Patient in wheelchair in waiting room, updated on plan of care. Respirations even and unlabored, VSS. Patient asked to notify RN for any changes.

## 2021-11-18 NOTE — H&P
HISTORY AND PHYSICAL      PCP: Nargis Lyons NP  History source: the patient, her son      CC: shortness of breath      HPI: [de-identified] y.o lady w/ afib, HFpEF, TAM, obesity, HTN, who presents with shortness of breath. She's developed gradual dyspnea on exertion over the past week. She feels fine at rest. No chest pain, cough, or fever. She does have leg swelling and 3 pillow orthopnea. She has not been taking her diuretics because they make her urinate too much. PMH/PSH:  Past Medical History:   Diagnosis Date    Arthritis     Atrial fibrillation ([facility] [address]     Bronchitis, acute 40/63/4466    Diastolic heart failure (HCC)     WHITING (dyspnea on exertion)     GERD (gastroesophageal reflux disease)     Hypertension     Obesity     TAM (obstructive sleep apnea)     Other ill-defined conditions(799.89)     \"fluid in the lung\"    Pneumonia 6/7/2018    Sleep apnea      Past Surgical History:   Procedure Laterality Date    COLONOSCOPY N/A 3/30/2017    COLONOSCOPY performed by Monika Goodwin MD at [facility] ENDOSCOPY    HX CHOLECYSTECTOMY      HX HERNIA REPAIR      HX ORTHOPAEDIC      right and left total knee replacement    HX ORTHOPAEDIC      right shoulder    HX OTHER SURGICAL      kidney stone surgery    HX OTHER SURGICAL      both eyes cataract    GA CARDIOVERSION ELECTIVE ARRHYTHMIA EXTERNAL N/A 10/9/2019    EP CARDIOVERSION performed by Lovie Denver, MD at [address] CATH LAB       Home meds:   Prior to Admission medications    Medication Sig Start Date End Date Taking? Authorizing Provider   polyethylene glycol (MIRALAX) 17 gram packet Take 1 Packet by mouth daily as needed for Constipation.  4/28/21   Nargis Lyons NP   potassium chloride (Klor-Con M20) 20 mEq tablet Klor-Con M20 mEq tablet,extended release    Provider, Historical   dilTIAZem ER (Tiadylt ER) 240 mg capsule Tiadylt  mg capsule,extended release   TAKE 1 CAPSULE BY MOUTH EVERY DAY    Provider, Historical   bumetanide (BUMEX) 1 mg tablet Take 1 Tab by mouth two (2) times a day. 2/21/20   Boubacar Goodrich MD   acetaminophen (TYLENOL) 500 mg tablet Take 2 Tabs by mouth every six (6) hours as needed for Pain. 7/2/19   Celina Soler MD   rivaroxaban (XARELTO) 20 mg tab tablet Take 1 Tab by mouth daily (with lunch). Indications: PREVENT THROMBOEMBOLISM IN CHRONIC ATRIAL FIBRILLATION 3/30/18   Minor, Joselo Patel NP   aspirin delayed-release 81 mg tablet Take 1 Tab by mouth daily. 3/31/18   Minor, Joselo Patel NP       Allergies: Allergies   Allergen Reactions    Penicillins Hives       FH:  Family History   Problem Relation Age of Onset   Ginger Delarosa Tuberculosis Mother     Lung Disease Father        SH:  Social History     Tobacco Use    Smoking status: Former Smoker     Packs/day: 1.00    Smokeless tobacco: Never Used    Tobacco comment: quit at age 42,smoked for 17 years   Substance Use Topics    Alcohol use: No       ROS: A comprehensive review of systems was negative except for that written in the HPI. PHYSICAL EXAM:  Visit Vitals  /69 (BP 1 Location: Left arm, BP Patient Position: Sitting)   Pulse 93   Temp 97.6 °F (36.4 °C)   Resp 18   SpO2 97%       Gen: NAD, non-toxic, obese  HEENT: anicteric sclerae, normal conjunctiva  Neck: supple, trachea midline, no adenopathy  Heart: irreg irreg, no MRG, no JVD, trace b/l LE edema  Lungs: CTA b/l, non-labored respirations  Abd: soft, NT  Extr: warm  Skin: dry, no rash  Neuro: CN II-XII grossly intact, normal speech, moves all extremities  Psych: normal mood, appropriate affect      Labs/Imaging:  Recent Results (from the past 24 hour(s))   SAMPLES BEING HELD    Collection Time: 11/17/21  4:55 PM   Result Value Ref Range    SAMPLES BEING HELD 1red     COMMENT        Add-on orders for these samples will be processed based on acceptable specimen integrity and analyte stability, which may vary by analyte.    CBC WITH AUTOMATED DIFF    Collection Time: 11/17/21  4:55 PM   Result Value Ref Range    WBC 5.9 3.6 - 11.0 K/uL    RBC 5.56 (H) 3.80 - 5.20 M/uL    HGB 15.4 11.5 - 16.0 g/dL    HCT 48.8 (H) 35.0 - 47.0 %    MCV 87.8 80.0 - 99.0 FL    MCH 27.7 26.0 - 34.0 PG    MCHC 31.6 30.0 - 36.5 g/dL    RDW 13.8 11.5 - 14.5 %    PLATELET 217 907 - 866 K/uL    MPV 10.1 8.9 - 12.9 FL    NRBC 0.0 0  WBC    ABSOLUTE NRBC 0.00 0.00 - 0.01 K/uL    NEUTROPHILS 54 32 - 75 %    LYMPHOCYTES 33 12 - 49 %    MONOCYTES 10 5 - 13 %    EOSINOPHILS 1 0 - 7 %    BASOPHILS 1 0 - 1 %    IMMATURE GRANULOCYTES 1 (H) 0.0 - 0.5 %    ABS. NEUTROPHILS 3.2 1.8 - 8.0 K/UL    ABS. LYMPHOCYTES 1.9 0.8 - 3.5 K/UL    ABS. MONOCYTES 0.6 0.0 - 1.0 K/UL    ABS. EOSINOPHILS 0.1 0.0 - 0.4 K/UL    ABS. BASOPHILS 0.0 0.0 - 0.1 K/UL    ABS. IMM. GRANS. 0.1 (H) 0.00 - 0.04 K/UL    DF AUTOMATED     METABOLIC PANEL, COMPREHENSIVE    Collection Time: 11/17/21  4:55 PM   Result Value Ref Range    Sodium 140 136 - 145 mmol/L    Potassium 4.4 3.5 - 5.1 mmol/L    Chloride 107 97 - 108 mmol/L    CO2 29 21 - 32 mmol/L    Anion gap 4 (L) 5 - 15 mmol/L    Glucose 96 65 - 100 mg/dL    BUN 12 6 - 20 MG/DL    Creatinine 1.12 (H) 0.55 - 1.02 MG/DL    BUN/Creatinine ratio 11 (L) 12 - 20      GFR est AA 57 (L) >60 ml/min/1.73m2    GFR est non-AA 47 (L) >60 ml/min/1.73m2    Calcium 9.6 8.5 - 10.1 MG/DL    Bilirubin, total 1.0 0.2 - 1.0 MG/DL    ALT (SGPT) 15 12 - 78 U/L    AST (SGOT) 15 15 - 37 U/L    Alk.  phosphatase 94 45 - 117 U/L    Protein, total 7.7 6.4 - 8.2 g/dL    Albumin 3.3 (L) 3.5 - 5.0 g/dL    Globulin 4.4 (H) 2.0 - 4.0 g/dL    A-G Ratio 0.8 (L) 1.1 - 2.2     TROPONIN-HIGH SENSITIVITY    Collection Time: 11/17/21  4:55 PM   Result Value Ref Range    Troponin-High Sensitivity 7 0 - 51 ng/L   NT-PRO BNP    Collection Time: 11/17/21  4:55 PM   Result Value Ref Range    NT pro- (H) <450 PG/ML   EKG, 12 LEAD, INITIAL    Collection Time: 11/17/21  4:56 PM   Result Value Ref Range    Ventricular Rate 114 BPM    QRS Duration 76 ms    Q-T Interval 320 ms QTC Calculation (Bezet) 441 ms    Calculated T Axis 46 degrees    Diagnosis       Atrial fibrillation with rapid ventricular response  Cannot rule out Anterior infarct , age undetermined  Abnormal ECG  When compared with ECG of 27-JAN-2021 22:18,  ST no longer elevated in Lateral leads  Nonspecific T wave abnormality now evident in Lateral leads         Recent Labs     11/17/21  1655   WBC 5.9   HGB 15.4   HCT 48.8*        Recent Labs     11/17/21  1655      K 4.4      CO2 29   BUN 12   CREA 1.12*   GLU 96   CA 9.6     Recent Labs     11/17/21  1655   ALT 15   AP 94   TBILI 1.0   TP 7.7   ALB 3.3*   GLOB 4.4*       No results for input(s): CPK, CKNDX, TROIQ in the last 72 hours. No lab exists for component: CPKMB    No results for input(s): INR, PTP, APTT, INREXT in the last 72 hours. No results for input(s): PH, PCO2, PO2 in the last 72 hours. XR CHEST PA LAT    Result Date: 11/17/2021  1. Persistent moderate cardiomegaly. 2. Chronic mild atelectasis medially in the right lower lobe. 3. No pneumonia or pulmonary edema          Assessment & Plan:     Shortness of breath: suspect mild acute on chronic diastolic CHF due to diuretic non-compliance.  Not hypoxic.  -receiving IV bumex in the ED will resume PO diuretics tomorrow  -ambulate with therapy and check O2 levels    Afib:  -continue diltiazem, Xarelto    Morbid obesity    TAM    DVT ppx: anticoagulated  Code status: full  Disposition: home when ready    Signed By: Tawana Garcia MD     November 17, 2021

## 2021-11-19 ENCOUNTER — APPOINTMENT (OUTPATIENT)
Dept: CT IMAGING | Age: 81
End: 2021-11-19
Attending: NURSE PRACTITIONER
Payer: MEDICARE

## 2021-11-19 ENCOUNTER — HOME HEALTH ADMISSION (OUTPATIENT)
Dept: HOME HEALTH SERVICES | Facility: HOME HEALTH | Age: 81
End: 2021-11-19
Payer: MEDICARE

## 2021-11-19 LAB
ANION GAP SERPL CALC-SCNC: 3 MMOL/L (ref 5–15)
BASOPHILS # BLD: 0 K/UL (ref 0–0.1)
BASOPHILS NFR BLD: 1 % (ref 0–1)
BNP SERPL-MCNC: 359 PG/ML
BUN SERPL-MCNC: 17 MG/DL (ref 6–20)
BUN/CREAT SERPL: 16 (ref 12–20)
CALCIUM SERPL-MCNC: 9 MG/DL (ref 8.5–10.1)
CALCULATED T AXIS, ECG11: 46 DEGREES
CHLORIDE SERPL-SCNC: 107 MMOL/L (ref 97–108)
CO2 SERPL-SCNC: 27 MMOL/L (ref 21–32)
CREAT SERPL-MCNC: 1.06 MG/DL (ref 0.55–1.02)
DIAGNOSIS, 93000: NORMAL
DIFFERENTIAL METHOD BLD: ABNORMAL
EOSINOPHIL # BLD: 0.1 K/UL (ref 0–0.4)
EOSINOPHIL NFR BLD: 1 % (ref 0–7)
ERYTHROCYTE [DISTWIDTH] IN BLOOD BY AUTOMATED COUNT: 14.3 % (ref 11.5–14.5)
GLUCOSE SERPL-MCNC: 100 MG/DL (ref 65–100)
HCT VFR BLD AUTO: 46.1 % (ref 35–47)
HGB BLD-MCNC: 15.3 G/DL (ref 11.5–16)
IMM GRANULOCYTES # BLD AUTO: 0 K/UL (ref 0–0.04)
IMM GRANULOCYTES NFR BLD AUTO: 1 % (ref 0–0.5)
LYMPHOCYTES # BLD: 1.8 K/UL (ref 0.8–3.5)
LYMPHOCYTES NFR BLD: 33 % (ref 12–49)
MCH RBC QN AUTO: 28.2 PG (ref 26–34)
MCHC RBC AUTO-ENTMCNC: 33.2 G/DL (ref 30–36.5)
MCV RBC AUTO: 85.1 FL (ref 80–99)
MONOCYTES # BLD: 0.7 K/UL (ref 0–1)
MONOCYTES NFR BLD: 13 % (ref 5–13)
NEUTS SEG # BLD: 2.9 K/UL (ref 1.8–8)
NEUTS SEG NFR BLD: 51 % (ref 32–75)
NRBC # BLD: 0 K/UL (ref 0–0.01)
NRBC BLD-RTO: 0 PER 100 WBC
PLATELET # BLD AUTO: 201 K/UL (ref 150–400)
PMV BLD AUTO: 10 FL (ref 8.9–12.9)
POTASSIUM SERPL-SCNC: 3.9 MMOL/L (ref 3.5–5.1)
Q-T INTERVAL, ECG07: 320 MS
QRS DURATION, ECG06: 76 MS
QTC CALCULATION (BEZET), ECG08: 441 MS
RBC # BLD AUTO: 5.42 M/UL (ref 3.8–5.2)
SODIUM SERPL-SCNC: 137 MMOL/L (ref 136–145)
VENTRICULAR RATE, ECG03: 114 BPM
WBC # BLD AUTO: 5.5 K/UL (ref 3.6–11)

## 2021-11-19 PROCEDURE — 80048 BASIC METABOLIC PNL TOTAL CA: CPT

## 2021-11-19 PROCEDURE — 97116 GAIT TRAINING THERAPY: CPT

## 2021-11-19 PROCEDURE — G0378 HOSPITAL OBSERVATION PER HR: HCPCS

## 2021-11-19 PROCEDURE — 74011250637 HC RX REV CODE- 250/637: Performed by: HOSPITALIST

## 2021-11-19 PROCEDURE — 71250 CT THORAX DX C-: CPT

## 2021-11-19 PROCEDURE — 36415 COLL VENOUS BLD VENIPUNCTURE: CPT

## 2021-11-19 PROCEDURE — 83880 ASSAY OF NATRIURETIC PEPTIDE: CPT

## 2021-11-19 PROCEDURE — 85025 COMPLETE CBC W/AUTO DIFF WBC: CPT

## 2021-11-19 PROCEDURE — 97530 THERAPEUTIC ACTIVITIES: CPT

## 2021-11-19 RX ADMIN — Medication 10 ML: at 14:00

## 2021-11-19 RX ADMIN — Medication 10 ML: at 06:29

## 2021-11-19 RX ADMIN — BUMETANIDE 2 MG: 1 TABLET ORAL at 11:20

## 2021-11-19 RX ADMIN — DILTIAZEM HYDROCHLORIDE 240 MG: 120 CAPSULE, EXTENDED RELEASE ORAL at 10:20

## 2021-11-19 RX ADMIN — Medication 10 ML: at 22:00

## 2021-11-19 RX ADMIN — RIVAROXABAN 20 MG: 10 TABLET, FILM COATED ORAL at 09:32

## 2021-11-19 RX ADMIN — ASPIRIN 81 MG: 81 TABLET, COATED ORAL at 09:32

## 2021-11-19 RX ADMIN — BUMETANIDE 2 MG: 1 TABLET ORAL at 17:55

## 2021-11-19 NOTE — ED NOTES
TRANSFER - OUT REPORT:    Verbal report given to MALCOLM Ochoa(name) on Keith Mesa  being transferred to 565(unit) for routine progression of care       Report consisted of patients Situation, Background, Assessment and   Recommendations(SBAR). Information from the following report(s) SBAR, ED Summary, Intake/Output and Recent Results was reviewed with the receiving nurse. Lines:   Peripheral IV 11/17/21 Right Antecubital (Active)   Site Assessment Clean, dry, & intact 11/17/21 1705   Phlebitis Assessment 0 11/17/21 1705   Infiltration Assessment 0 11/17/21 1705   Dressing Status Clean, dry, & intact 11/17/21 1705   Dressing Type Transparent 11/17/21 1705   Hub Color/Line Status Pink 11/17/21 1705   Action Taken Blood drawn 11/17/21 1705   Alcohol Cap Used No 11/17/21 1705        Opportunity for questions and clarification was provided.       Patient transported with:   [employer]

## 2021-11-19 NOTE — PROGRESS NOTES
[address] [facility]  Hospitalist Group                                                                                          Hospitalist Progress Note  Marquise Townsend, [address]  Answering service: [phone] OR [phone] from in house phone        Date of Service:  2021  NAME:  George Ortiz  :  1940  MRN:  [id number]      Admission Summary:   Per H&P \" [de-identified] y.o lady w/ afib, HFpEF, TAM, obesity, HTN, who presents with shortness of breath. She's developed gradual dyspnea on exertion over the past week. She feels fine at rest. No chest pain, cough, or fever. She does have leg swelling and 3 pillow orthopnea. She has not been taking her diuretics because they make her urinate too much. \"    Interval history / Subjective:    Chart reviewed. Patient examined at bedside. No acute distress noted, patient without complaints. Patient endorses SOB with movement. Patient's RN had concerns with giving patient's scheduled cardizem and bumex with a BP of 113/65. Instructed to give Cardizem in one hour re-assess BP, if stable give bumex. Assessment & Plan:     # Dyspnea  # Possibly Acute on Chronic diastolic CHF 2/2 medication non-compliance     - Patient has hx of Covid-19 pneumonia in 2021 and has experienced intermittent SOB since that time     - pro-BNP of 595     - Continue PO bumex     - As needed Albuterol in place     - CT chest and Echo pending to assess for cardiomyopathy and Covid related interstitial lung disease.   # Chronic A-fib     - Continue diltiazem     - Continue xarelto  # TAM     - Continue to monitor  # Morbid obesity     - Counseled on benefits of healthy diet and exercise           Code status: Full  DVT prophylaxis: Xarelto     Care Plan discussed with: Patient/Family  Anticipated Disposition: Home w/Family  Anticipated Discharge: 24 hours to 48 hours     Hospital Problems  Date Reviewed: 2019          Codes Class Noted POA    Shortness of breath ICD-10-CM: R06.02  ICD-9-CM: 786.05  11/17/2021 Unknown                Review of Systems:   Pertinent items are noted in HPI. Vital Signs:    Last 24hrs VS reviewed since prior progress note. Most recent are:  Visit Vitals  /76 (BP 1 Location: Right upper arm, BP Patient Position: At rest)   Pulse 77   Temp 98.1 °F (36.7 °C)   Resp 18   Wt 130.3 kg (287 lb 4.2 oz)   SpO2 94%   BMI 42.42 kg/m²         Intake/Output Summary (Last 24 hours) at 11/19/2021 1519  Last data filed at 11/19/2021 0608  Gross per 24 hour   Intake    Output 1575 ml   Net -1575 ml        Physical Examination:     I had a face to face encounter with this patient and independently examined them on 11/19/2021 as outlined below:          Constitutional:  No acute distress, cooperative, pleasant    ENT:  Oral mucosa moist, oropharynx benign. Resp:  CTA bilaterally. No wheezing/rhonchi/rales. No accessory muscle use   CV:  Regular rhythm, normal rate    GI:  Soft, non distended, non tender. normoactive bowel sounds     Musculoskeletal:  No edema, warm, 2+ pulses throughout    Neurologic:  Moves all extremities. AAOx3, CN II-XII reviewed            Data Review:    Review and/or order of clinical lab test  Review and/or order of tests in the radiology section of CPT  Review and/or order of tests in the medicine section of CPT      Labs:     Recent Labs     11/19/21  0653 11/17/21  1655   WBC 5.5 5.9   HGB 15.3 15.4   HCT 46.1 48.8*    228     Recent Labs     11/19/21  0653 11/17/21  1655    140   K 3.9 4.4    107   CO2 27 29   BUN 17 12   CREA 1.06* 1.12*    96   CA 9.0 9.6     Recent Labs     11/17/21  1655   ALT 15   AP 94   TBILI 1.0   TP 7.7   ALB 3.3*   GLOB 4.4*     No results for input(s): INR, PTP, APTT, INREXT, INREXT in the last 72 hours. No results for input(s): FE, TIBC, PSAT, FERR in the last 72 hours. No results found for: FOL, RBCF   No results for input(s): PH, PCO2, PO2 in the last 72 hours.   No results for input(s): CPK, CKNDX, TROIQ in the last 72 hours.     No lab exists for component: CPKMB  Lab Results   Component Value Date/Time    Cholesterol, total 200 (H) 04/28/2021 10:05 AM    HDL Cholesterol 62 04/28/2021 10:05 AM    LDL, calculated 120 (H) 04/28/2021 10:05 AM    Triglyceride 90 04/28/2021 10:05 AM    CHOL/HDL Ratio 3.2 04/28/2021 10:05 AM     Lab Results   Component Value Date/Time    Glucose (POC) 108 (H) 10/03/2014 06:32 AM     Lab Results   Component Value Date/Time    Color DARK YELLOW 06/07/2018 03:43 PM    Appearance CLOUDY (A) 06/07/2018 03:43 PM    Specific gravity 1.015 06/07/2018 03:43 PM    pH (UA) 5.5 06/07/2018 03:43 PM    Protein 30 (A) 06/07/2018 03:43 PM    Glucose NEGATIVE  06/07/2018 03:43 PM    Ketone NEGATIVE  06/07/2018 03:43 PM    Bilirubin NEGATIVE  03/27/2018 04:57 PM    Urobilinogen 2.0 (H) 06/07/2018 03:43 PM    Nitrites NEGATIVE  06/07/2018 03:43 PM    Leukocyte Esterase NEGATIVE  06/07/2018 03:43 PM    Epithelial cells FEW 06/07/2018 03:43 PM    Bacteria NEGATIVE  06/07/2018 03:43 PM    WBC 0-4 06/07/2018 03:43 PM    RBC 0-5 06/07/2018 03:43 PM         Medications Reviewed:     Current Facility-Administered Medications   Medication Dose Route Frequency    sodium chloride (NS) flush 5-40 mL  5-40 mL IntraVENous Q8H    sodium chloride (NS) flush 5-40 mL  5-40 mL IntraVENous PRN    acetaminophen (TYLENOL) tablet 650 mg  650 mg Oral Q6H PRN    Or    acetaminophen (TYLENOL) suppository 650 mg  650 mg Rectal Q6H PRN    polyethylene glycol (MIRALAX) packet 17 g  17 g Oral DAILY PRN    ondansetron (ZOFRAN ODT) tablet 4 mg  4 mg Oral Q8H PRN    Or    ondansetron (ZOFRAN) injection 4 mg  4 mg IntraVENous Q6H PRN    albuterol (PROVENTIL VENTOLIN) nebulizer solution 2.5 mg  2.5 mg Nebulization Q4H PRN    aspirin delayed-release tablet 81 mg  81 mg Oral DAILY    bumetanide (BUMEX) tablet 2 mg  2 mg Oral BID    dilTIAZem ER (CARDIZEM CD) capsule 240 mg  240 mg Oral DAILY  rivaroxaban (XARELTO) tablet 20 mg  20 mg Oral DAILY     ______________________________________________________________________  EXPECTED LENGTH OF STAY: - - -  ACTUAL LENGTH OF STAY:          PETRA Larios

## 2021-11-19 NOTE — PROGRESS NOTES
Perfect serve sent to Dr. Stacey Rodriguez regarding patient's BP of 113/65, asking if I should hold 240MG Diltiazem and 2 MG Bumex. 0482: Dr. Stacey Rodriguez responded saying Dwayne Samuels NP will be addressing this matter. 1010: Phone call received from Dwayne Samuels NP saying to give both medications, but to stagger them. 1020 Diltiazem administered.      1120: Bumex administered

## 2021-11-19 NOTE — PROGRESS NOTES
Problem: Mobility Impaired (Adult and Pediatric)  Goal: *Acute Goals and Plan of Care (Insert Text)  Description: FUNCTIONAL STATUS PRIOR TO ADMISSION: Patient was modified independent using a walker short distances in the home and power scooter in the community for functional mobility. HOME SUPPORT PRIOR TO ADMISSION: The patient lived with her two sons. Physical Therapy Goals  Initiated 11/18/2021  1. Patient will move from supine to sit and sit to supine , scoot up and down, and roll side to side in bed with modified independence within 7 day(s). 2.  Patient will transfer from bed to chair and chair to bed with modified independence using the least restrictive device within 7 day(s). 3.  Patient will perform sit to stand with modified independence within 7 day(s). 4.  Patient will ambulate with supervision/set-up for 25 feet with the least restrictive device within 7 day(s). Outcome: Progressing Towards Goal   PHYSICAL THERAPY TREATMENT  Patient: Sebastián Dalton (89 y.o. female)  Date: 11/19/2021  Diagnosis: Shortness of breath [R06.02]   <principal problem not specified>       Precautions: Fall  Chart, physical therapy assessment, plan of care and goals were reviewed. ASSESSMENT  Patient continues with skilled PT services and is progressing towards goals. This afternoon, pt unable to tolerate 6MWT. Only able to tolerate limited gait w/in room w/ seated rest break between 2 reps of amb to/from door. Pt required rest breaks for at least 3-5 minutes, O2 above 90% on RA overall. PLB instructed to control rate of breathing and to increase O2 when briefly reaching 88-89% once initially seated. Current Level of Function Impacting Discharge (mobility/balance): SBA for bed mobility and CGA/Min Ax1 for sit<>stand. Min A for fair eccentric control when seated in chair. Likely d/t fatigue. Other factors to consider for discharge: mobility below baseline.  Pt has 2 sons but only one son able to assist but works         PLAN :  Patient continues to benefit from skilled intervention to address the above impairments. Continue treatment per established plan of care. to address goals. Recommendation for discharge: (in order for the patient to meet his/her long term goals)  Therapy up to 5 days/week in SNF setting    This discharge recommendation:  Has been made in collaboration with the attending provider and/or case management    IF patient discharges home will need the following DME: patient owns DME required for discharge       SUBJECTIVE:   Patient stated My legs, its like they give me a warning they're about to give.  PTA, pt report that she has experienced \"pulling\" sensation  in BLE's be it at rest, mobilizing, in dependent, or elevated position. Pt reports limitations are endurance LE impairments (weakness). She also reported 8/10 exertion/difficulty w/ short gait w/in room while using RW. OBJECTIVE DATA SUMMARY:   Critical Behavior:  Neurologic State: Alert  Orientation Level: Oriented X4  Cognition: Appropriate decision making, Appropriate for age attention/concentration, Appropriate safety awareness, Follows commands     Functional Mobility Training:  Bed Mobility:     Supine to Sit: Stand-by assistance; Assist x1  Sit to Supine:  (remained OOB in chair)           Transfers:  Sit to Stand: Contact guard assistance; Assist x1  Stand to Sit: Minimum assistance; Assist x1 (fair eccentric control)                             Balance:  Sitting: Intact  Standing: Impaired; With support  Standing - Static: Constant support; Fair  Standing - Dynamic : Constant support; Fair  Ambulation/Gait Training:  Distance (ft): 10 Feet (ft) (x4)  Assistive Device: Gait belt; Walker, rolling  Ambulation - Level of Assistance: Contact guard assistance; Assist x1        Gait Abnormalities: Decreased step clearance; Shuffling gait; Step to gait        Base of Support: Center of gravity altered;  Widened        Step Length: Left shortened; Right shortened      Therapeutic Exercises:   PLB w/ seated rest breaks and during amb     Pain Rating:  No verbal c/o pain at rest or during     Activity Tolerance:   Fair, SpO2 stable on RA, and requires rest breaks    After treatment patient left in no apparent distress:   Sitting in chair and Call bell within reach    COMMUNICATION/COLLABORATION:   The patients plan of care was discussed with: Registered nurse.      Felisa Valadez,PTA   Time Calculation: 33 mins

## 2021-11-19 NOTE — PROGRESS NOTES
Bedside and Verbal shift change report given to Blanquita Khan RN (oncoming nurse) by MALCOLM Cohn (offgoing nurse). Report included the following information SBAR, Kardex, Procedure Summary, Intake/Output, MAR and Accordion.

## 2021-11-19 NOTE — PROGRESS NOTES
Chart reviewed. Discussed with SUE Bradshaw. I have independently examined patient. Patient sitting in a chair by the bedside, states she is feeling much better. On room air, denies shortness of breath or chest pain. She has mild peripheral edema. Current Facility-Administered Medications   Medication Dose Route Frequency    sodium chloride (NS) flush 5-40 mL  5-40 mL IntraVENous Q8H    sodium chloride (NS) flush 5-40 mL  5-40 mL IntraVENous PRN    acetaminophen (TYLENOL) tablet 650 mg  650 mg Oral Q6H PRN    Or    acetaminophen (TYLENOL) suppository 650 mg  650 mg Rectal Q6H PRN    polyethylene glycol (MIRALAX) packet 17 g  17 g Oral DAILY PRN    ondansetron (ZOFRAN ODT) tablet 4 mg  4 mg Oral Q8H PRN    Or    ondansetron (ZOFRAN) injection 4 mg  4 mg IntraVENous Q6H PRN    albuterol (PROVENTIL VENTOLIN) nebulizer solution 2.5 mg  2.5 mg Nebulization Q4H PRN    aspirin delayed-release tablet 81 mg  81 mg Oral DAILY    bumetanide (BUMEX) tablet 2 mg  2 mg Oral BID    dilTIAZem ER (CARDIZEM CD) capsule 240 mg  240 mg Oral DAILY    rivaroxaban (XARELTO) tablet 20 mg  20 mg Oral DAILY     CT chest and echocardiogram pending. Patient is improving, expect discharge in 1-2 days. See progress note by SUE Bradshaw for other details.

## 2021-11-19 NOTE — PROGRESS NOTES
Primary Nurse Brian Arias RN and Mark Ball RN performed a dual skin assessment on this patient Impairment noted- see wound doc flow sheet  Marc score is 20   - small healed skin wound on bottom and one small abrasion on bottom

## 2021-11-19 NOTE — PROGRESS NOTES
TRANSFER - IN REPORT:    Verbal report received from [facility], [facility] (name) on Lorenzo Pandya  being received from [facility] [place] ED(unit) for routine progression of care      Report consisted of patients Situation, Background, Assessment and   Recommendations(SBAR). Information from the following report(s) SBAR, Kardex, Procedure Summary, Intake/Output, MAR, Accordion and Recent Results was reviewed with the receiving nurse. Opportunity for questions and clarification was provided. Assessment completed upon patients arrival to unit and care assumed.

## 2021-11-19 NOTE — PROGRESS NOTES
Transition of Care Plan   RUR: obs%   Disposition: The disposition plan is home with [facility]; placed on AVS   SNF recommended; pt refusing. Agreeable to [facility] PT/OT   F/U with PCP/Specialist     Transportation: Family vs. BLS    CM met with patient and discussed SNF placement. Patient is not agreeable to SNF at this time. Patient is agreeable to Home Health PT/OT. Patient stated she has lots of help in the home including her children. Transition of Care Plan: Home Health PT/OT    The Plan for Transition of Care is related to the following treatment goals: Home Health    The Patient was provided with a choice of provider and agrees  with the discharge plan. Yes [x] No []    A Freedom of choice list was provided with basic dialogue that supports the patient's individualized plan of care/goals and shares the quality data associated with the providers.        Yes [] No []    Gonzales Ritchie, JOSE

## 2021-11-20 LAB
ANION GAP SERPL CALC-SCNC: 9 MMOL/L (ref 5–15)
BASOPHILS # BLD: 0 K/UL (ref 0–0.1)
BASOPHILS NFR BLD: 1 % (ref 0–1)
BUN SERPL-MCNC: 19 MG/DL (ref 6–20)
BUN/CREAT SERPL: 16 (ref 12–20)
CALCIUM SERPL-MCNC: 9.1 MG/DL (ref 8.5–10.1)
CHLORIDE SERPL-SCNC: 100 MMOL/L (ref 97–108)
CO2 SERPL-SCNC: 25 MMOL/L (ref 21–32)
CREAT SERPL-MCNC: 1.19 MG/DL (ref 0.55–1.02)
DIFFERENTIAL METHOD BLD: ABNORMAL
EOSINOPHIL # BLD: 0.1 K/UL (ref 0–0.4)
EOSINOPHIL NFR BLD: 1 % (ref 0–7)
ERYTHROCYTE [DISTWIDTH] IN BLOOD BY AUTOMATED COUNT: 13.9 % (ref 11.5–14.5)
GLUCOSE SERPL-MCNC: 93 MG/DL (ref 65–100)
HCT VFR BLD AUTO: 47.2 % (ref 35–47)
HGB BLD-MCNC: 15.5 G/DL (ref 11.5–16)
IMM GRANULOCYTES # BLD AUTO: 0 K/UL (ref 0–0.04)
IMM GRANULOCYTES NFR BLD AUTO: 1 % (ref 0–0.5)
LYMPHOCYTES # BLD: 1.8 K/UL (ref 0.8–3.5)
LYMPHOCYTES NFR BLD: 27 % (ref 12–49)
MCH RBC QN AUTO: 27.8 PG (ref 26–34)
MCHC RBC AUTO-ENTMCNC: 32.8 G/DL (ref 30–36.5)
MCV RBC AUTO: 84.7 FL (ref 80–99)
MONOCYTES # BLD: 0.9 K/UL (ref 0–1)
MONOCYTES NFR BLD: 14 % (ref 5–13)
NEUTS SEG # BLD: 3.6 K/UL (ref 1.8–8)
NEUTS SEG NFR BLD: 56 % (ref 32–75)
NRBC # BLD: 0 K/UL (ref 0–0.01)
NRBC BLD-RTO: 0 PER 100 WBC
PLATELET # BLD AUTO: 211 K/UL (ref 150–400)
PMV BLD AUTO: 9.9 FL (ref 8.9–12.9)
POTASSIUM SERPL-SCNC: 3.8 MMOL/L (ref 3.5–5.1)
RBC # BLD AUTO: 5.57 M/UL (ref 3.8–5.2)
SODIUM SERPL-SCNC: 134 MMOL/L (ref 136–145)
WBC # BLD AUTO: 6.4 K/UL (ref 3.6–11)

## 2021-11-20 PROCEDURE — 85025 COMPLETE CBC W/AUTO DIFF WBC: CPT

## 2021-11-20 PROCEDURE — 36415 COLL VENOUS BLD VENIPUNCTURE: CPT

## 2021-11-20 PROCEDURE — G0378 HOSPITAL OBSERVATION PER HR: HCPCS

## 2021-11-20 PROCEDURE — 80048 BASIC METABOLIC PNL TOTAL CA: CPT

## 2021-11-20 PROCEDURE — 74011250637 HC RX REV CODE- 250/637: Performed by: HOSPITALIST

## 2021-11-20 RX ADMIN — DILTIAZEM HYDROCHLORIDE 240 MG: 120 CAPSULE, EXTENDED RELEASE ORAL at 09:46

## 2021-11-20 RX ADMIN — Medication 10 ML: at 14:00

## 2021-11-20 RX ADMIN — RIVAROXABAN 20 MG: 10 TABLET, FILM COATED ORAL at 09:46

## 2021-11-20 RX ADMIN — Medication 10 ML: at 22:00

## 2021-11-20 RX ADMIN — BUMETANIDE 2 MG: 1 TABLET ORAL at 09:46

## 2021-11-20 RX ADMIN — ASPIRIN 81 MG: 81 TABLET, COATED ORAL at 09:46

## 2021-11-20 NOTE — PROGRESS NOTES
Bedside shift change report given to Eliseo Centeno RN  (oncoming nurse) by Boni Aparicio RN (offgoing nurse).  Report included the following information SBAR, Kardex, etc

## 2021-11-20 NOTE — PROGRESS NOTES
Bedside and Verbal shift change report given to Daniella Lui RN (oncoming nurse) by Merlyn Rios RN (offgoing nurse). Report included the following information SBAR, Intake/Output, MAR and Recent Results.

## 2021-11-20 NOTE — PROGRESS NOTES
[facility] Adult  Hospitalist Group                                                                                          Hospitalist Progress Note  [place], [place]  Answering service: [phone] OR [phone] from in house phone        Date of Service:  2021  NAME:  Keith Mesa  :  1940  MRN:  [id number]      Admission Summary:   Per H&P \" [de-identified] y.o lady w/ afib, HFpEF, TAM, obesity, HTN, who presents with shortness of breath. She's developed gradual dyspnea on exertion over the past week. She feels fine at rest. No chest pain, cough, or fever. She does have leg swelling and 3 pillow orthopnea. She has not been taking her diuretics because they make her urinate too much. \"    Interval history / Subjective:   No acute distress noted. Patient reports SOB has improved and feels she is near baseline. Anticipated Discharge 24 hours     Assessment & Plan:     # Dyspnea  # Possibly Acute on Chronic diastolic CHF 2/2 medication non-compliance     - Patient has hx of Covid-19 pneumonia in 2021 and has experienced intermittent SOB since that time     - pro-BNP of 595 on admission, now 359     - Continue PO bumex     - As needed Albuterol in place     - CT chest: No evidence of acute process. - Echo pending  # Chronic A-fib     - Continue diltiazem     - Continue xarelto  # TAM     - Continue to monitor  # Morbid obesity     - Counseled on benefits of healthy diet and exercise           Code status: Full  DVT prophylaxis: Xarelto     Care Plan discussed with: Patient/Family  Anticipated Disposition: Home w/Family  Anticipated Discharge: 24 hours to 48 hours     Hospital Problems  Date Reviewed: 2019          Codes Class Noted POA    Shortness of breath ICD-10-CM: R06.02  ICD-9-CM: 786.05  2021 Unknown                Review of Systems:   Pertinent items are noted in HPI. Vital Signs:    Last 24hrs VS reviewed since prior progress note.  Most recent are:  Visit Vitals  BP 100/65 (BP 1 Location: Right upper arm, BP Patient Position: At rest)   Pulse 81   Temp 98.2 °F (36.8 °C)   Resp 18   Wt 130.3 kg (287 lb 4.2 oz)   SpO2 95%   BMI 42.42 kg/m²       No intake or output data in the 24 hours ending 11/20/21 5446     Physical Examination:     I had a face to face encounter with this patient and independently examined them on 11/20/2021 as outlined below:          Constitutional:  No acute distress, cooperative, pleasant    ENT:  Oral mucosa moist, oropharynx benign. Resp:  CTA bilaterally. No wheezing/rhonchi/rales. No accessory muscle use   CV:  Regular rhythm, normal rate    GI:  Soft, non distended, non tender. normoactive bowel sounds     Musculoskeletal:  No edema, warm, 2+ pulses throughout    Neurologic:  Moves all extremities. AAOx3, CN II-XII reviewed            Data Review:    Review and/or order of clinical lab test  Review and/or order of tests in the radiology section of CPT  Review and/or order of tests in the medicine section of CPT      Labs:     Recent Labs     11/20/21  0045 11/19/21  0653   WBC 6.4 5.5   HGB 15.5 15.3   HCT 47.2* 46.1    201     Recent Labs     11/20/21  0045 11/19/21  0653 11/17/21  1655   * 137 140   K 3.8 3.9 4.4    107 107   CO2 25 27 29   BUN 19 17 12   CREA 1.19* 1.06* 1.12*   GLU 93 100 96   CA 9.1 9.0 9.6     Recent Labs     11/17/21  1655   ALT 15   AP 94   TBILI 1.0   TP 7.7   ALB 3.3*   GLOB 4.4*     No results for input(s): INR, PTP, APTT, INREXT, INREXT in the last 72 hours. No results for input(s): FE, TIBC, PSAT, FERR in the last 72 hours. No results found for: FOL, RBCF   No results for input(s): PH, PCO2, PO2 in the last 72 hours. No results for input(s): CPK, CKNDX, TROIQ in the last 72 hours.     No lab exists for component: CPKMB  Lab Results   Component Value Date/Time    Cholesterol, total 200 (H) 04/28/2021 10:05 AM    HDL Cholesterol 62 04/28/2021 10:05 AM    LDL, calculated 120 (H) 04/28/2021 10:05 AM    Triglyceride 90 04/28/2021 10:05 AM    CHOL/HDL Ratio 3.2 04/28/2021 10:05 AM     Lab Results   Component Value Date/Time    Glucose (POC) 108 (H) 10/03/2014 06:32 AM     Lab Results   Component Value Date/Time    Color DARK YELLOW 06/07/2018 03:43 PM    Appearance CLOUDY (A) 06/07/2018 03:43 PM    Specific gravity 1.015 06/07/2018 03:43 PM    pH (UA) 5.5 06/07/2018 03:43 PM    Protein 30 (A) 06/07/2018 03:43 PM    Glucose NEGATIVE  06/07/2018 03:43 PM    Ketone NEGATIVE  06/07/2018 03:43 PM    Bilirubin NEGATIVE  03/27/2018 04:57 PM    Urobilinogen 2.0 (H) 06/07/2018 03:43 PM    Nitrites NEGATIVE  06/07/2018 03:43 PM    Leukocyte Esterase NEGATIVE  06/07/2018 03:43 PM    Epithelial cells FEW 06/07/2018 03:43 PM    Bacteria NEGATIVE  06/07/2018 03:43 PM    WBC 0-4 06/07/2018 03:43 PM    RBC 0-5 06/07/2018 03:43 PM         Medications Reviewed:     Current Facility-Administered Medications   Medication Dose Route Frequency    sodium chloride (NS) flush 5-40 mL  5-40 mL IntraVENous Q8H    sodium chloride (NS) flush 5-40 mL  5-40 mL IntraVENous PRN    acetaminophen (TYLENOL) tablet 650 mg  650 mg Oral Q6H PRN    Or    acetaminophen (TYLENOL) suppository 650 mg  650 mg Rectal Q6H PRN    polyethylene glycol (MIRALAX) packet 17 g  17 g Oral DAILY PRN    ondansetron (ZOFRAN ODT) tablet 4 mg  4 mg Oral Q8H PRN    Or    ondansetron (ZOFRAN) injection 4 mg  4 mg IntraVENous Q6H PRN    albuterol (PROVENTIL VENTOLIN) nebulizer solution 2.5 mg  2.5 mg Nebulization Q4H PRN    aspirin delayed-release tablet 81 mg  81 mg Oral DAILY    bumetanide (BUMEX) tablet 2 mg  2 mg Oral BID    dilTIAZem ER (CARDIZEM CD) capsule 240 mg  240 mg Oral DAILY    rivaroxaban (XARELTO) tablet 20 mg  20 mg Oral DAILY     ______________________________________________________________________  EXPECTED LENGTH OF STAY: - - -  ACTUAL LENGTH OF STAY:          PETRA Larios

## 2021-11-21 ENCOUNTER — APPOINTMENT (OUTPATIENT)
Dept: NON INVASIVE DIAGNOSTICS | Age: 81
End: 2021-11-21
Attending: HOSPITALIST
Payer: MEDICARE

## 2021-11-21 VITALS
BODY MASS INDEX: 42.45 KG/M2 | WEIGHT: 286.6 LBS | HEIGHT: 69 IN | SYSTOLIC BLOOD PRESSURE: 122 MMHG | DIASTOLIC BLOOD PRESSURE: 81 MMHG | HEART RATE: 71 BPM | RESPIRATION RATE: 18 BRPM | OXYGEN SATURATION: 94 % | TEMPERATURE: 98 F

## 2021-11-21 LAB
ANION GAP SERPL CALC-SCNC: 5 MMOL/L (ref 5–15)
AV R PG: 50.85 MMHG
BASOPHILS # BLD: 0 K/UL (ref 0–0.1)
BASOPHILS NFR BLD: 1 % (ref 0–1)
BUN SERPL-MCNC: 24 MG/DL (ref 6–20)
BUN/CREAT SERPL: 20 (ref 12–20)
CALCIUM SERPL-MCNC: 9 MG/DL (ref 8.5–10.1)
CHLORIDE SERPL-SCNC: 100 MMOL/L (ref 97–108)
CO2 SERPL-SCNC: 30 MMOL/L (ref 21–32)
CREAT SERPL-MCNC: 1.19 MG/DL (ref 0.55–1.02)
DIFFERENTIAL METHOD BLD: ABNORMAL
ECHO AO ROOT DIAM: 3.2 CM
ECHO AR MAX VEL PISA: 356.56 CM/S
ECHO AV PEAK GRADIENT: 7 MMHG
ECHO AV PEAK VELOCITY: 132.28 CM/S
ECHO AV REGURGITANT PHT: 428.97 MS
ECHO EST RA PRESSURE: 3 MMHG
ECHO LA MAJOR AXIS: 3.44 CM
ECHO LA MINOR AXIS: 1.43 CM
ECHO LV E' LATERAL VELOCITY: 6.42 CM/S
ECHO LV E' SEPTAL VELOCITY: 8.13 CM/S
ECHO LV EDV A2C: 98.27 ML
ECHO LV EDV A4C: 101.16 ML
ECHO LV EDV BP: 100.48 ML (ref 56–104)
ECHO LV EDV INDEX A4C: 42 ML/M2
ECHO LV EDV INDEX BP: 41.7 ML/M2
ECHO LV EDV NDEX A2C: 40.8 ML/M2
ECHO LV EJECTION FRACTION A2C: 50 PERCENT
ECHO LV EJECTION FRACTION A4C: 52 PERCENT
ECHO LV EJECTION FRACTION BIPLANE: 50.1 PERCENT (ref 55–100)
ECHO LV ESV A2C: 49.62 ML
ECHO LV ESV A4C: 48.29 ML
ECHO LV ESV BP: 50.14 ML (ref 19–49)
ECHO LV ESV INDEX A2C: 20.6 ML/M2
ECHO LV ESV INDEX A4C: 20 ML/M2
ECHO LV ESV INDEX BP: 20.8 ML/M2
ECHO LV INTERNAL DIMENSION DIASTOLIC: 5.01 CM (ref 3.9–5.3)
ECHO LV INTERNAL DIMENSION SYSTOLIC: 3.9 CM
ECHO LV IVSD: 0.84 CM (ref 0.6–0.9)
ECHO LV MASS 2D: 164.6 G (ref 67–162)
ECHO LV MASS INDEX 2D: 68.3 G/M2 (ref 43–95)
ECHO LV POSTERIOR WALL DIASTOLIC: 1.01 CM (ref 0.6–0.9)
ECHO LVOT PEAK GRADIENT: 4.3 MMHG
ECHO LVOT PEAK VELOCITY: 103.67 CM/S
ECHO MV E VELOCITY: 81.71 CM/S
ECHO MV E/E' LATERAL: 12.73
ECHO MV E/E' RATIO (AVERAGED): 11.39
ECHO MV E/E' SEPTAL: 10.05
ECHO PV MAX VELOCITY: 96.5 CM/S
ECHO PV PEAK INSTANTANEOUS GRADIENT SYSTOLIC: 3.72 MMHG
ECHO RIGHT VENTRICULAR SYSTOLIC PRESSURE (RVSP): 33.66 MMHG
ECHO RV TAPSE: 1.27 CM (ref 1.5–2)
ECHO TV REGURGITANT MAX VELOCITY: 276.86 CM/S
ECHO TV REGURGITANT PEAK GRADIENT: 30.66 MMHG
EOSINOPHIL # BLD: 0 K/UL (ref 0–0.4)
EOSINOPHIL NFR BLD: 1 % (ref 0–7)
ERYTHROCYTE [DISTWIDTH] IN BLOOD BY AUTOMATED COUNT: 13.8 % (ref 11.5–14.5)
GLUCOSE SERPL-MCNC: 107 MG/DL (ref 65–100)
HCT VFR BLD AUTO: 44 % (ref 35–47)
HGB BLD-MCNC: 14.9 G/DL (ref 11.5–16)
IMM GRANULOCYTES # BLD AUTO: 0.1 K/UL (ref 0–0.04)
IMM GRANULOCYTES NFR BLD AUTO: 1 % (ref 0–0.5)
LYMPHOCYTES # BLD: 1.8 K/UL (ref 0.8–3.5)
LYMPHOCYTES NFR BLD: 28 % (ref 12–49)
MCH RBC QN AUTO: 28.4 PG (ref 26–34)
MCHC RBC AUTO-ENTMCNC: 33.9 G/DL (ref 30–36.5)
MCV RBC AUTO: 83.8 FL (ref 80–99)
MONOCYTES # BLD: 0.9 K/UL (ref 0–1)
MONOCYTES NFR BLD: 14 % (ref 5–13)
NEUTS SEG # BLD: 3.5 K/UL (ref 1.8–8)
NEUTS SEG NFR BLD: 55 % (ref 32–75)
NRBC # BLD: 0 K/UL (ref 0–0.01)
NRBC BLD-RTO: 0 PER 100 WBC
PLATELET # BLD AUTO: 210 K/UL (ref 150–400)
PMV BLD AUTO: 10.9 FL (ref 8.9–12.9)
POTASSIUM SERPL-SCNC: 3.6 MMOL/L (ref 3.5–5.1)
RBC # BLD AUTO: 5.25 M/UL (ref 3.8–5.2)
SODIUM SERPL-SCNC: 135 MMOL/L (ref 136–145)
WBC # BLD AUTO: 6.2 K/UL (ref 3.6–11)

## 2021-11-21 PROCEDURE — G0378 HOSPITAL OBSERVATION PER HR: HCPCS

## 2021-11-21 PROCEDURE — 36415 COLL VENOUS BLD VENIPUNCTURE: CPT

## 2021-11-21 PROCEDURE — 74011250637 HC RX REV CODE- 250/637: Performed by: HOSPITALIST

## 2021-11-21 PROCEDURE — C8929 TTE W OR WO FOL WCON,DOPPLER: HCPCS

## 2021-11-21 PROCEDURE — 74011000250 HC RX REV CODE- 250: Performed by: HOSPITALIST

## 2021-11-21 PROCEDURE — 80048 BASIC METABOLIC PNL TOTAL CA: CPT

## 2021-11-21 PROCEDURE — 85025 COMPLETE CBC W/AUTO DIFF WBC: CPT

## 2021-11-21 PROCEDURE — 74011250636 HC RX REV CODE- 250/636: Performed by: HOSPITALIST

## 2021-11-21 RX ORDER — BUMETANIDE 1 MG/1
1 TABLET ORAL 2 TIMES DAILY
Status: DISCONTINUED | OUTPATIENT
Start: 2021-11-21 | End: 2021-11-21 | Stop reason: HOSPADM

## 2021-11-21 RX ORDER — ALBUTEROL SULFATE 90 UG/1
2 AEROSOL, METERED RESPIRATORY (INHALATION)
Qty: 18 G | Refills: 0 | Status: SHIPPED | OUTPATIENT
Start: 2021-11-21 | End: 2022-03-09 | Stop reason: SDUPTHER

## 2021-11-21 RX ADMIN — PERFLUTREN 1.5 ML: 6.52 INJECTION, SUSPENSION INTRAVENOUS at 10:00

## 2021-11-21 RX ADMIN — BUMETANIDE 2 MG: 1 TABLET ORAL at 09:12

## 2021-11-21 RX ADMIN — RIVAROXABAN 20 MG: 10 TABLET, FILM COATED ORAL at 09:12

## 2021-11-21 RX ADMIN — ASPIRIN 81 MG: 81 TABLET, COATED ORAL at 09:12

## 2021-11-21 RX ADMIN — Medication 10 ML: at 06:00

## 2021-11-21 RX ADMIN — DILTIAZEM HYDROCHLORIDE 240 MG: 120 CAPSULE, EXTENDED RELEASE ORAL at 09:12

## 2021-11-21 NOTE — DISCHARGE INSTRUCTIONS
Discharge Instructions       PATIENT ID: Brina Moreno  MRN: [id number]   YOB: 1940    DATE OF ADMISSION: 11/17/2021  8:06 PM    DATE OF DISCHARGE: 11/21/2021    PRIMARY CARE PROVIDER: Prema Green NP     ATTENDING PHYSICIAN: Aria Don MD  DISCHARGING PROVIDER: PETRA Holloway    To contact this individual call [phone] and ask the  to page. If unavailable ask to be transferred the Adult Hospitalist Department. DISCHARGE DIAGNOSES Dyspnea, Acute on Chronic Diastolic Heart Failure    CONSULTATIONS: None    PROCEDURES/SURGERIES: * No surgery found *    PENDING TEST RESULTS:   At the time of discharge the following test results are still pending: None    FOLLOW UP APPOINTMENTS:   Follow-up Information     Follow up With Specialties Details Why Contact Info    Osmeljarocho Arevalo Call in 1 day Please contact agency within 24 hours of discharge if you have not heard from them. [facility] [phone] [facility]    Prema Green NP Nurse Practitioner   [facility] [address]  [facility]  Schedule an appointment as soon as possible for a visit  [address] [place] [address] [address] [place] [address],[address]:     Please make sure to follow up with your primary care physician within 1-2 weeks of discharge for hospital follow up. You should also follow up with your cardiologist.   - Please make sure to continue to monitor for: Chest pain, shortness of breath, high fevers, or sudden weakness or numbness and return to the Emergency Department with any of these symptoms.   - It is also important that you follow-up on your sleep study recommendations for CPAP. - If your symptoms return/worsen seek medical attention immediately. - Please get up slowly from a seated or laying position, avoid falls.    - Avoid tobacco, alcohol and other illicit drug use. - Please measure your weight daily, if you note a sharp increase (2-3lb) in a short period of time, please call your cardiologist. Real Fridge should take a fluid pill at that time depending on their instruction.   - Keep to a low salt diet. This will help reduce the amount of excess fluid accumulation.   - Diet restrictions: salt restriction. - Activity restrictions: none   - Be sure to monitor your blood pressure before taking your Bumex and Diltiazem ( Cardizem). DIET: Low-salt diet  Oral Nutritional Supplements: None None     ACTIVITY: As tolerated    WOUND CARE: None    EQUIPMENT needed: Owned by patient      DISCHARGE MEDICATIONS:   See Medication Reconciliation Form    · It is important that you take the medication exactly as they are prescribed. · Keep your medication in the bottles provided by the pharmacist and keep a list of the medication names, dosages, and times to be taken in your wallet. · Do not take other medications without consulting your doctor. NOTIFY YOUR PHYSICIAN FOR ANY OF THE FOLLOWING:   Fever over 101 degrees for 24 hours. Chest pain, shortness of breath, fever, chills, nausea, vomiting, diarrhea, change in mentation, falling, weakness, bleeding. Severe pain or pain not relieved by medications. Or, any other signs or symptoms that you may have questions about.       DISPOSITION:   X Home With:   OT  PT  HH  RN       SNF/Inpatient Rehab/LTAC    Independent/assisted living    Hospice    Other:     CDMP Checked:   Yes ***     PROBLEM LIST Updated:  Yes ***       Signed:   PETRA Erickson  11/21/2021  2:24 PM

## 2021-11-21 NOTE — PROGRESS NOTES
[facility] [facility] Adult  Hospitalist Group                                                                                          Hospitalist Progress Note  [place], [place]  Answering service: [phone] OR [phone] from in house phone        Date of Service:  2021  NAME:  Carson Last  :  1940  MRN:  [id number]      Admission Summary:   Per H&P \" [de-identified] y.o lady w/ afib, HFpEF, TAM, obesity, HTN, who presents with shortness of breath. She's developed gradual dyspnea on exertion over the past week. She feels fine at rest. No chest pain, cough, or fever. She does have leg swelling and 3 pillow orthopnea. She has not been taking her diuretics because they make her urinate too much. \"    Interval history / Subjective:   No acute distress noted. Discharge pending ECHO  Patient counseled on importance of cardiology follow-up and CPAP compliance. Assessment & Plan:     # Dyspnea  # Possibly Acute on Chronic diastolic CHF 2/2 medication non-compliance     - Patient has hx of Covid-19 pneumonia in 2021 and has experienced intermittent SOB since that time     - pro-BNP of 595 on admission, now 359     - Resume home dosage of Bumex 1 g BID     - As needed Albuterol in place     - CT chest: No evidence of acute process. - Echo pending  # Chronic A-fib     - Continue diltiazem     - Continue xarelto  # TAM     - Continue to monitor  # Morbid obesity     - Counseled on benefits of healthy diet and exercise           Code status: Full  DVT prophylaxis: Xarelto     Care Plan discussed with: Patient/Family  Anticipated Disposition: Home w/Family  Anticipated Discharge: 24 hours to 48 hours     Hospital Problems  Date Reviewed: 2019          Codes Class Noted POA    Shortness of breath ICD-10-CM: R06.02  ICD-9-CM: 786.05  2021 Unknown                Review of Systems:   Pertinent items are noted in HPI. Vital Signs:    Last 24hrs VS reviewed since prior progress note.  Most recent are:  Visit Vitals  /87   Pulse 74   Temp 97.5 °F (36.4 °C)   Resp 18   Ht 5' 9\" (1.753 m)   Wt 130 kg (286 lb 9.6 oz)   SpO2 97%   BMI 42.32 kg/m²       No intake or output data in the 24 hours ending 11/21/21 1036     Physical Examination:     I had a face to face encounter with this patient and independently examined them on 11/21/2021 as outlined below:          Constitutional:  No acute distress, cooperative, pleasant    ENT:  Oral mucosa moist, oropharynx benign. Resp:  CTA bilaterally. No wheezing/rhonchi/rales. No accessory muscle use   CV:  Regular rhythm, normal rate    GI:  Soft, non distended, non tender. normoactive bowel sounds     Musculoskeletal:  No edema, warm, 2+ pulses throughout    Neurologic:  Moves all extremities. AAOx3, CN II-XII reviewed            Data Review:    Review and/or order of clinical lab test  Review and/or order of tests in the radiology section of CPT  Review and/or order of tests in the medicine section of CPT      Labs:     Recent Labs     11/20/21  0045 11/19/21  0653   WBC 6.4 5.5   HGB 15.5 15.3   HCT 47.2* 46.1    201     Recent Labs     11/21/21  0652 11/20/21 0045 11/19/21  0653   * 134* 137   K 3.6 3.8 3.9    100 107   CO2 30 25 27   BUN 24* 19 17   CREA 1.19* 1.19* 1.06*   * 93 100   CA 9.0 9.1 9.0     No results for input(s): ALT, AP, TBIL, TBILI, TP, ALB, GLOB, GGT, AML, LPSE in the last 72 hours. No lab exists for component: SGOT, GPT, AMYP, HLPSE  No results for input(s): INR, PTP, APTT, INREXT, INREXT in the last 72 hours. No results for input(s): FE, TIBC, PSAT, FERR in the last 72 hours. No results found for: FOL, RBCF   No results for input(s): PH, PCO2, PO2 in the last 72 hours. No results for input(s): CPK, CKNDX, TROIQ in the last 72 hours.     No lab exists for component: CPKMB  Lab Results   Component Value Date/Time    Cholesterol, total 200 (H) 04/28/2021 10:05 AM    HDL Cholesterol 62 04/28/2021 10:05 AM LDL, calculated 120 (H) 04/28/2021 10:05 AM    Triglyceride 90 04/28/2021 10:05 AM    CHOL/HDL Ratio 3.2 04/28/2021 10:05 AM     Lab Results   Component Value Date/Time    Glucose (POC) 108 (H) 10/03/2014 06:32 AM     Lab Results   Component Value Date/Time    Color DARK YELLOW 06/07/2018 03:43 PM    Appearance CLOUDY (A) 06/07/2018 03:43 PM    Specific gravity 1.015 06/07/2018 03:43 PM    pH (UA) 5.5 06/07/2018 03:43 PM    Protein 30 (A) 06/07/2018 03:43 PM    Glucose NEGATIVE  06/07/2018 03:43 PM    Ketone NEGATIVE  06/07/2018 03:43 PM    Bilirubin NEGATIVE  03/27/2018 04:57 PM    Urobilinogen 2.0 (H) 06/07/2018 03:43 PM    Nitrites NEGATIVE  06/07/2018 03:43 PM    Leukocyte Esterase NEGATIVE  06/07/2018 03:43 PM    Epithelial cells FEW 06/07/2018 03:43 PM    Bacteria NEGATIVE  06/07/2018 03:43 PM    WBC 0-4 06/07/2018 03:43 PM    RBC 0-5 06/07/2018 03:43 PM         Medications Reviewed:     Current Facility-Administered Medications   Medication Dose Route Frequency    bumetanide (BUMEX) tablet 1 mg  1 mg Oral BID    sodium chloride (NS) flush 5-40 mL  5-40 mL IntraVENous Q8H    sodium chloride (NS) flush 5-40 mL  5-40 mL IntraVENous PRN    acetaminophen (TYLENOL) tablet 650 mg  650 mg Oral Q6H PRN    Or    acetaminophen (TYLENOL) suppository 650 mg  650 mg Rectal Q6H PRN    polyethylene glycol (MIRALAX) packet 17 g  17 g Oral DAILY PRN    ondansetron (ZOFRAN ODT) tablet 4 mg  4 mg Oral Q8H PRN    Or    ondansetron (ZOFRAN) injection 4 mg  4 mg IntraVENous Q6H PRN    albuterol (PROVENTIL VENTOLIN) nebulizer solution 2.5 mg  2.5 mg Nebulization Q4H PRN    aspirin delayed-release tablet 81 mg  81 mg Oral DAILY    dilTIAZem ER (CARDIZEM CD) capsule 240 mg  240 mg Oral DAILY    rivaroxaban (XARELTO) tablet 20 mg  20 mg Oral DAILY     ______________________________________________________________________  EXPECTED LENGTH OF STAY: - - -  ACTUAL LENGTH OF STAY:          0                 Blenda Double, FNP

## 2021-11-21 NOTE — DISCHARGE SUMMARY
Discharge Summary       PATIENT ID: Radha Eid  MRN: [id number]   YOB: 1940    DATE OF ADMISSION: 11/17/2021  8:06 PM    DATE OF DISCHARGE: 11/21/21  PRIMARY CARE PROVIDER: Manolo Bennett NP     ATTENDING PHYSICIAN: Zohreh Vallejo MD  DISCHARGING PROVIDER: PETRA Mcqueen    To contact this individual call [phone] and ask the  to page. If unavailable ask to be transferred the Adult Hospitalist Department. CONSULTATIONS: None    PROCEDURES/SURGERIES: * No surgery found *    ADMITTING DIAGNOSES & HOSPITAL COURSE:   Per H&P \" [de-identified] y.o lady w/ afib, HFpEF, TAM, obesity, HTN, who presents with shortness of breath. She's developed gradual dyspnea on exertion over the past week. She feels fine at rest. No chest pain, cough, or fever. She does have leg swelling and 3 pillow orthopnea. She has not been taking her diuretics because they make her urinate too much. \"    Patient was treated with Bumex 2 mg BID with improvement of dyspnea on exertion. Will continue PTA Bumex patient encouraged to follow-up outpatient with cardiologist, as she missed previous appointment. CHF management as outlined in additional care recommendations below discussed in detail with patient at bedside and patient's son Lisset Ferrara via phone. DISCHARGE DIAGNOSES / PLAN:      # Dyspnea  # Possibly Acute on Chronic diastolic CHF 2/2 medication non-compliance     - Patient has hx of Covid-19 pneumonia in 01/2021 and has experienced intermittent      SOB since that time     - pro-BNP of 595 on admission, now 359     - CT chest: No evidence of acute process. - Echo: Normal cavity size and wall thickness. The estimated EF is 50 - 55%. Low      normal systolic function.  There is inconclusive left ventricular diastolic function.     - Resume home dosage of Bumex 1 g BID     - Follow-up Outpatient with [facility]  # Chronic A-fib     - Continue diltiazem     - Continue xarelto  # TAM     - Compliance with home CPAP encouraged  # Morbid obesity     - Counseled on benefits of healthy diet and exercise       PENDING TEST RESULTS:   At the time of discharge the following test results are still pending: None    FOLLOW UP APPOINTMENTS:    Follow-up Information     Follow up With Specialties Details Why Contact Lenore    Osmeljarocho Irina Call in 1 day Please contact agency within 24 hours of discharge if you have not heard from them. [phone] [place] Juany Stephenson NP Nurse Practitioner   Rex Harrington  [facility] [address]  [address] [place] [address] Cardiovascular Specialists  Schedule an appointment as soon as possible for a visit  [address] [facility] [address] [facility] [address] [facility],[address]:   Please make sure to follow up with your primary care physician within 1-2 weeks of discharge for hospital follow up. You should also follow up with your cardiologist.   - Please make sure to continue to monitor for: Chest pain, shortness of breath, high fevers, or sudden weakness or numbness and return to the Emergency Department with any of these symptoms.   - Follow-up on recommendations from you sleep study concerning CPAP use. - If your symptoms return/worsen seek medical attention immediately. - Please get up slowly from a seated or laying position, avoid falls. - Avoid tobacco, alcohol and other illicit drug use. - Please measure your weight daily, if you note a sharp increase (2-3lb) in a short period of time, please call your cardiologist. Thomas Emilia should take a fluid pill at that time depending on their instruction.   - Keep to a low salt diet. This will help reduce the amount of excess fluid accumulation.   - Diet restrictions: salt restriction. - Activity restrictions: none   - Be sure to monitor your blood pressure before taking your Bumex and Diltiazem ( Cardizem).     DIET: Low-salt diet  Oral Nutritional Supplements: NoneNone    ACTIVITY: As tolerated    WOUND CARE: None    EQUIPMENT needed: Neccessary DME owned by patient       DISCHARGE MEDICATIONS:  Current Discharge Medication List      START taking these medications    Details   albuterol (PROVENTIL HFA, VENTOLIN HFA, PROAIR HFA) 90 mcg/actuation inhaler Take 2 Puffs by inhalation every six (6) hours as needed for Wheezing or Shortness of Breath. Qty: 18 g, Refills: 0  Start date: 11/21/2021         CONTINUE these medications which have NOT CHANGED    Details   polyethylene glycol (MIRALAX) 17 gram packet Take 1 Packet by mouth daily as needed for Constipation. Qty: 30 Each, Refills: 2    Associated Diagnoses: Constipation, unspecified constipation type      potassium chloride (Klor-Con M20) 20 mEq tablet Klor-Con M20 mEq tablet,extended release      dilTIAZem ER (Tiadylt ER) 240 mg capsule Tiadylt  mg capsule,extended release   TAKE 1 CAPSULE BY MOUTH EVERY DAY      bumetanide (BUMEX) 1 mg tablet Take 1 Tab by mouth two (2) times a day. Qty: 60 Tab, Refills: 0      acetaminophen (TYLENOL) 500 mg tablet Take 2 Tabs by mouth every six (6) hours as needed for Pain. Qty: 20 Tab, Refills: 0      rivaroxaban (XARELTO) 20 mg tab tablet Take 1 Tab by mouth daily (with lunch). Indications: PREVENT THROMBOEMBOLISM IN CHRONIC ATRIAL FIBRILLATION  Qty: 30 Tab, Refills: 1      aspirin delayed-release 81 mg tablet Take 1 Tab by mouth daily. Qty: 30 Tab, Refills: 0               NOTIFY YOUR PHYSICIAN FOR ANY OF THE FOLLOWING:   Fever over 101 degrees for 24 hours. Chest pain, shortness of breath, fever, chills, nausea, vomiting, diarrhea, change in mentation, falling, weakness, bleeding. Severe pain or pain not relieved by medications. Or, any other signs or symptoms that you may have questions about.     DISPOSITION:  X  Home With:   OT  PT  HH  RN       Long term SNF/Inpatient Rehab    Independent/assisted living    Hospice Other: PATIENT CONDITION AT DISCHARGE:     Functional status    Poor     Deconditioned    X Independent      Cognition   X  Lucid     Forgetful     Dementia      Catheters/lines (plus indication)    Garcia     PICC     PEG    X None      Code status    X Full code     DNR      PHYSICAL EXAMINATION AT DISCHARGE:  General:  A/A/O X 3. NAD. HEENT:  Normocephalic. Sclera anicteric. EOMI. Mucous membranes moist.    Chest:  Resps even/unlabored with symmetrical CWE. Air entry full. Lungs CTA. No use of accessory muscles. CV:  RRR. Normal S1/S2. No M/C/R appreciated. No JVD. No peripheral edema. Cap refill < 3 sec. Peripheral pulses 2+. GI:  Abdomen soft/NT/ND. No organomegaly. No hernia. ABT X 4.    :  Voiding. Neurologic:  Nonfocal.  CN II-XII grossly intact. Face symmetrical.  Speech normal.     Psych:  Cooperative. No anxiety or agitation. No suicidal/homicidal ideation. Skin:  Warm and color appropriate. No rashes or jaundice. Turgor elastic.        CHRONIC MEDICAL DIAGNOSES:  Problem List as of 11/21/2021 Date Reviewed: 11/4/2019          Codes Class Noted - Resolved    Shortness of breath ICD-10-CM: R06.02  ICD-9-CM: 786.05  11/17/2021 - Present        At moderate risk for fall ICD-10-CM: Z91.81  ICD-9-CM: V15.88  4/28/2021 - Present        Chronic diastolic congestive heart failure (HCC) ICD-10-CM: I50.32  ICD-9-CM: 428.32, 428.0  11/4/2019 - Present        Obesity, morbid ([facility] ICD-10-CM: E66.01  ICD-9-CM: 278.01  8/7/2018 - Present        A-fib ([facility] ICD-10-CM: I48.91  ICD-9-CM: 427.31  3/30/2018 - Present        Hypertension ICD-10-CM: I10  ICD-9-CM: 401.9  3/30/2018 - Present        RESOLVED: CHF exacerbation ([facility] ICD-10-CM: I50.9  ICD-9-CM: 428.0  2/18/2020 - 4/28/2021        RESOLVED: CHF (congestive heart failure) ([facility] ICD-10-CM: I50.9  ICD-9-CM: 428.0  11/4/2019 - 4/28/2021        RESOLVED: Wheezing ICD-10-CM: R06.2  ICD-9-CM: 786.07  6/7/2018 - 4/28/2021 RESOLVED: Leukocytosis ICD-10-CM: C76.912  ICD-9-CM: 288.60  6/7/2018 - 4/28/2021        RESOLVED: Pneumonia ICD-10-CM: J18.9  ICD-9-CM: 184  6/7/2018 - 4/28/2021        RESOLVED: Hypokalemia ICD-10-CM: E87.6  ICD-9-CM: 276.8  3/30/2018 - 3/30/2018        RESOLVED: CHF exacerbation ([facility] ICD-10-CM: I50.9  ICD-9-CM: 428.0  3/27/2018 - 3/30/2018        RESOLVED: Back pain ICD-10-CM: M54.9  ICD-9-CM: 724.5  9/28/2014 - 10/4/2014        RESOLVED: Low back pain ICD-10-CM: M54.50  ICD-9-CM: 724.2  9/26/2014 - 10/4/2014        RESOLVED: Shortness of breath ICD-10-CM: R06.02  ICD-9-CM: 786.05  12/17/2011 - 12/20/2011        RESOLVED: Bronchitis, acute ICD-10-CM: J20.9  ICD-9-CM: 466.0  12/17/2011 - 4/28/2021              Greater than 30 minutes were spent with the patient on counseling and coordination of care    Signed:   PETRA Andrews  11/21/2021  2:06 PM

## 2021-11-21 NOTE — PROGRESS NOTES
Bedside and Verbal shift change report given to Rosalind Minor RN (oncoming nurse) by Merry Griggs RN (offgoing nurse). Report included the following information SBAR, Intake/Output, MAR and Recent Results.

## 2021-11-22 ENCOUNTER — TELEPHONE (OUTPATIENT)
Dept: FAMILY MEDICINE CLINIC | Age: 81
End: 2021-11-22

## 2021-11-22 ENCOUNTER — PATIENT OUTREACH (OUTPATIENT)
Dept: CASE MANAGEMENT | Age: 81
End: 2021-11-22

## 2021-11-22 ENCOUNTER — HOME CARE VISIT (OUTPATIENT)
Dept: SCHEDULING | Facility: HOME HEALTH | Age: 81
End: 2021-11-22
Payer: MEDICARE

## 2021-11-22 VITALS
TEMPERATURE: 98.2 F | OXYGEN SATURATION: 98 % | SYSTOLIC BLOOD PRESSURE: 128 MMHG | HEART RATE: 82 BPM | DIASTOLIC BLOOD PRESSURE: 82 MMHG | RESPIRATION RATE: 20 BRPM

## 2021-11-22 PROCEDURE — 400018 HH-NO PAY CLAIM PROCEDURE

## 2021-11-22 PROCEDURE — 3331090001 HH PPS REVENUE CREDIT

## 2021-11-22 PROCEDURE — G0299 HHS/HOSPICE OF RN EA 15 MIN: HCPCS

## 2021-11-22 PROCEDURE — 400013 HH SOC

## 2021-11-22 PROCEDURE — 3331090002 HH PPS REVENUE DEBIT

## 2021-11-22 NOTE — PROGRESS NOTES
Care Transitions Initial Call    Call within 2 business days of discharge: Yes     Patient: Dana Serna Patient : 1940 MRN: [id number]    Last Discharge [address]       Complaint Diagnosis Description Type Department Provider    21 Shortness of Breath Acute diastolic CHF (congestive heart failure) [facility]) ED to Hosp-Admission (Discharged) (ADMIT) [id number] Carley Pro MD; Carmelina Jamil. .. Was this an external facility discharge? No Discharge Facility: n/a    Challenges to be reviewed by the provider   VCS [facility]. Greg[email or web address] SNF at discharge but patient refused. Patient reports she has lots of help at home. BS HH ordered for SN/PT/OT. Method of communication with provider : none    Discussed COVID-19 related testing which was not done at this time. Test results were not done. Patient informed of results, if available? n/a     Advance Care Planning:   Does patient have an Advance Directive:  will discuss at follow up    Inpatient Readmission Risk score: No data recorded  Was this a readmission? no   Patient stated reason for the admission: leg swelling and SOB    Patients top risk factors for readmission: medical condition-HF and medication management   Interventions to address risk factors: Scheduled appointment with PCP-, Scheduled appointment with [email or web address] Audio and Obtained and reviewed discharge summary and/or continuity of care documents    Care Transition Nurse (CTN) contacted the patient by telephone to perform post hospital discharge assessment. Verified name and  with patient as identifiers. Provided introduction to self, and explanation of the CTN role. CTN spoke with patient and sonИрина    CTN reviewed discharge instructions, medical action plan and red flags with patient who verbalized understanding. Were discharge instructions available to patient? yes.  Reviewed appropriate site of care based on symptoms and resources available to patient including: PCP, Specialist and [address] [place] [address] Oscar Anderson. Patient given an opportunity to ask questions and does not have any further questions or concerns at this time. The patient agrees to contact the PCP office for questions related to their healthcare. Medication reconciliation was performed with patient, who verbalizes understanding of administration of home medications. Advised obtaining a 90-day supply of all daily and as-needed medications. Referral to Pharm D needed: no   Contacted [facility] office for follow up appt and message sent to request refill on spironolactone. Home Health/Outpatient orders at discharge: home health care, PT, OT and [address]: [employer]  Date of initial visit: 11/22/2021    Durable Medical Equipment ordered at discharge: None    Covid Risk Education    Educated patient about risk for severe COVID-19 due to risk factors according to CDC guidelines. CTN reviewed discharge instructions, medical action plan and red flag symptoms with the patient who verbalized understanding. Discussed COVID vaccination status: yes. Education provided on COVID-19 vaccination as appropriate. Discussed exposure protocols and quarantine with CDC Guidelines. Patient was given an opportunity to verbalize any questions and concerns and agrees to contact CTN or health care provider for questions related to their healthcare. Was patient discharged with a pulse oximeter? no.     Discussed follow-up appointments. If no appointment was previously scheduled, appointment scheduling offered: n/a. Is follow up appointment scheduled within 7 days of discharge? yes.    [address] follow up appointment(s):   Future Appointments   Date Time Provider Rachna Lugo   11/23/2021 To Be Determined Carlos Gordillo OT [address] [place] [address]   12/1/2021  1:30 PM Anum Suárez, NP PAFP BS AMB     Non-[facility] follow up appointment(s):   [facility]-[email or web address] (VCS-was seeing Dr. Soumya Joe but office advises she has retired)    Plan for follow-up call in 7-10 days based on severity of symptoms and risk factors. Plan for next call: symptom management-medication compliance, daily weight and follow up appointment-attend as directed  CTN provided contact information for future needs. Goals Addressed                 This Visit's Progress     Attends follow-up appointments as directed. [address] follow up appointment(s):   Future Appointments   Date Time Provider Rachna Lugo   11/23/2021 To Be Determined Stacey Waddell  [place] [address] [employer] [facility]   12/1/2021  1:30 PM Herb Suárez NP PAFP BS [facility]     Non-BS follow up appointment(s):   Cards-[email or web address]       Prevent complications post hospitalization. 11/22/21   Daily weights-patient will begin tomorrow. She is instructed to write down and report weight gains.  Activity-  PT/OT to increase mobility and prevent falls.  Low sodium diet/fluid restrictions   Medication compliance   Patient will monitor  and report following (red flags):  - Weight gain of 2-3 lbs. in one day or 5 lbs.  in one week  -increased SOB, feeling more tired or no energy, dizziness  -increase swelling to feet, ankles, legs or stomach  -CP

## 2021-11-22 NOTE — TELEPHONE ENCOUNTER
Pam Ocasio called and said Pt  D/C on 48/24/23, dx diastolic heart failure, needs 2 wks hos f/u. Scheduled 1/13/21. She stated that Pt needs to be seen sooner. Please advise.      BCB# [phone]

## 2021-11-22 NOTE — TELEPHONE ENCOUNTER
Called, spoke to pt. NSG  Meera Moore   Two pt identifiers confirmed. Writer informed Nsg that appt was schedule 12/1/21 1:30pm for Hospital F/u.

## 2021-11-23 ENCOUNTER — HOME CARE VISIT (OUTPATIENT)
Dept: SCHEDULING | Facility: HOME HEALTH | Age: 81
End: 2021-11-23
Payer: MEDICARE

## 2021-11-23 VITALS
HEART RATE: 85 BPM | OXYGEN SATURATION: 99 % | RESPIRATION RATE: 19 BRPM | SYSTOLIC BLOOD PRESSURE: 122 MMHG | DIASTOLIC BLOOD PRESSURE: 78 MMHG | TEMPERATURE: 97.7 F

## 2021-11-23 PROCEDURE — 3331090001 HH PPS REVENUE CREDIT

## 2021-11-23 PROCEDURE — G0152 HHCP-SERV OF OT,EA 15 MIN: HCPCS

## 2021-11-23 PROCEDURE — 3331090002 HH PPS REVENUE DEBIT

## 2021-11-24 ENCOUNTER — HOME CARE VISIT (OUTPATIENT)
Dept: HOME HEALTH SERVICES | Facility: HOME HEALTH | Age: 81
End: 2021-11-24
Payer: MEDICARE

## 2021-11-24 PROCEDURE — 3331090001 HH PPS REVENUE CREDIT

## 2021-11-24 PROCEDURE — 3331090002 HH PPS REVENUE DEBIT

## 2021-11-24 NOTE — CASE COMMUNICATION
PT contacted the patient prior to the eval visit and patient told this writer to come on. Upon arrival PT found the patient in bed and per patient she doesn't feels like getting up and want no PT eval today. Per patient she wants to wait another week to start her PT.  PT evaluation rescheduled to 12/6/2021 per patients request.

## 2021-11-25 ENCOUNTER — HOME CARE VISIT (OUTPATIENT)
Dept: SCHEDULING | Facility: HOME HEALTH | Age: 81
End: 2021-11-25
Payer: MEDICARE

## 2021-11-25 PROCEDURE — 3331090001 HH PPS REVENUE CREDIT

## 2021-11-25 PROCEDURE — 3331090002 HH PPS REVENUE DEBIT

## 2021-11-26 PROCEDURE — 3331090001 HH PPS REVENUE CREDIT

## 2021-11-26 PROCEDURE — 3331090002 HH PPS REVENUE DEBIT

## 2021-11-27 ENCOUNTER — HOME CARE VISIT (OUTPATIENT)
Dept: SCHEDULING | Facility: HOME HEALTH | Age: 81
End: 2021-11-27
Payer: MEDICARE

## 2021-11-27 VITALS
TEMPERATURE: 97.3 F | DIASTOLIC BLOOD PRESSURE: 90 MMHG | SYSTOLIC BLOOD PRESSURE: 141 MMHG | OXYGEN SATURATION: 97 % | RESPIRATION RATE: 16 BRPM | HEART RATE: 81 BPM

## 2021-11-27 PROCEDURE — 3331090002 HH PPS REVENUE DEBIT

## 2021-11-27 PROCEDURE — 3331090001 HH PPS REVENUE CREDIT

## 2021-11-27 PROCEDURE — G0300 HHS/HOSPICE OF LPN EA 15 MIN: HCPCS

## 2021-11-28 PROCEDURE — 3331090002 HH PPS REVENUE DEBIT

## 2021-11-28 PROCEDURE — 3331090001 HH PPS REVENUE CREDIT

## 2021-11-29 ENCOUNTER — HOME CARE VISIT (OUTPATIENT)
Dept: SCHEDULING | Facility: HOME HEALTH | Age: 81
End: 2021-11-29
Payer: MEDICARE

## 2021-11-29 VITALS
RESPIRATION RATE: 18 BRPM | TEMPERATURE: 97.6 F | OXYGEN SATURATION: 97 % | SYSTOLIC BLOOD PRESSURE: 105 MMHG | DIASTOLIC BLOOD PRESSURE: 70 MMHG | HEART RATE: 64 BPM

## 2021-11-29 VITALS
HEART RATE: 85 BPM | DIASTOLIC BLOOD PRESSURE: 70 MMHG | SYSTOLIC BLOOD PRESSURE: 100 MMHG | TEMPERATURE: 97.2 F | OXYGEN SATURATION: 96 %

## 2021-11-29 PROCEDURE — G0152 HHCP-SERV OF OT,EA 15 MIN: HCPCS

## 2021-11-29 PROCEDURE — 3331090002 HH PPS REVENUE DEBIT

## 2021-11-29 PROCEDURE — G0299 HHS/HOSPICE OF RN EA 15 MIN: HCPCS

## 2021-11-29 PROCEDURE — 3331090001 HH PPS REVENUE CREDIT

## 2021-11-30 ENCOUNTER — PATIENT OUTREACH (OUTPATIENT)
Dept: CASE MANAGEMENT | Age: 81
End: 2021-11-30

## 2021-11-30 PROCEDURE — 3331090002 HH PPS REVENUE DEBIT

## 2021-11-30 PROCEDURE — 3331090001 HH PPS REVENUE CREDIT

## 2021-11-30 RX ORDER — ONDANSETRON 8 MG/1
8 TABLET, ORALLY DISINTEGRATING ORAL
Qty: 20 TABLET | Refills: 0 | Status: SHIPPED | OUTPATIENT
Start: 2021-11-30 | End: 2022-01-30

## 2021-11-30 NOTE — PROGRESS NOTES
Care Transitions follow up outreach attempt    Attempted to reach patient for follow up. Unable to reach patient. Patient: Adam Dubose Patient : 1940 MRN: [id number]    Last Discharge [address]       Complaint Diagnosis Description Type Department Provider    21 Shortness of Breath Acute diastolic CHF (congestive heart failure) [facility]) ED to Hosp-Admission (Discharged) (ADMIT) Jakob Thomson MD; Shahla Shafer. ..         Noted following upcoming appointments from discharge chart review:   [facility] follow up appointment(s):   Future Appointments   Date Time Provider Rachna Lugo   2021  1:30 PM Yelena Gutierrez NP PAFP BS AMB   2021 To Be Determined Evans Smoker, OT [facility]   2021 To Be Determined Adriana [place] [address]   2021 To Be Determined Evans Smoker, OT [address] [place] [address] [address]   2021  1:00 PM Belem Peña PT [facility]   2021 To Be Determined Adriana [place] [address]   2021 To Be Determined Evans Smoker, OT [facility] [place] [address]   2021 To Be Determined Adriana [place] [address]   2021 To Be Determined Evans Smoker, OT [place] [place] [address]   12/15/2021 To Be Determined Evans Smoker, OT [facility] [place] [address]   2021 To Be Determined Adriana [place] [address]   2021 To Be Determined Adriana [place] [address]   2021 To Be Determined Adriana [place] [address]   2022 To Be Determined Adriana [place] [address]   2022 To Be Determined Jerod Schwarz RN [facility] [place] [address]

## 2021-12-01 ENCOUNTER — OFFICE VISIT (OUTPATIENT)
Dept: FAMILY MEDICINE CLINIC | Age: 81
End: 2021-12-01
Payer: MEDICARE

## 2021-12-01 VITALS
SYSTOLIC BLOOD PRESSURE: 114 MMHG | TEMPERATURE: 97.9 F | HEART RATE: 98 BPM | RESPIRATION RATE: 16 BRPM | DIASTOLIC BLOOD PRESSURE: 79 MMHG | BODY MASS INDEX: 42.32 KG/M2 | HEIGHT: 69 IN | OXYGEN SATURATION: 99 %

## 2021-12-01 DIAGNOSIS — Z09 HOSPITAL DISCHARGE FOLLOW-UP: Primary | ICD-10-CM

## 2021-12-01 DIAGNOSIS — I48.20 CHRONIC ATRIAL FIBRILLATION (HCC): ICD-10-CM

## 2021-12-01 DIAGNOSIS — I50.32 CHRONIC DIASTOLIC CONGESTIVE HEART FAILURE (HCC): ICD-10-CM

## 2021-12-01 DIAGNOSIS — R06.00 DYSPNEA, UNSPECIFIED TYPE: ICD-10-CM

## 2021-12-01 DIAGNOSIS — E66.01 MORBID OBESITY WITH BMI OF 40.0-44.9, ADULT (HCC): ICD-10-CM

## 2021-12-01 PROCEDURE — 1101F PT FALLS ASSESS-DOCD LE1/YR: CPT | Performed by: NURSE PRACTITIONER

## 2021-12-01 PROCEDURE — G8752 SYS BP LESS 140: HCPCS | Performed by: NURSE PRACTITIONER

## 2021-12-01 PROCEDURE — G8432 DEP SCR NOT DOC, RNG: HCPCS | Performed by: NURSE PRACTITIONER

## 2021-12-01 PROCEDURE — G8427 DOCREV CUR MEDS BY ELIG CLIN: HCPCS | Performed by: NURSE PRACTITIONER

## 2021-12-01 PROCEDURE — G8400 PT W/DXA NO RESULTS DOC: HCPCS | Performed by: NURSE PRACTITIONER

## 2021-12-01 PROCEDURE — G8417 CALC BMI ABV UP PARAM F/U: HCPCS | Performed by: NURSE PRACTITIONER

## 2021-12-01 PROCEDURE — G8754 DIAS BP LESS 90: HCPCS | Performed by: NURSE PRACTITIONER

## 2021-12-01 PROCEDURE — 3331090002 HH PPS REVENUE DEBIT

## 2021-12-01 PROCEDURE — 99213 OFFICE O/P EST LOW 20 MIN: CPT | Performed by: NURSE PRACTITIONER

## 2021-12-01 PROCEDURE — 1090F PRES/ABSN URINE INCON ASSESS: CPT | Performed by: NURSE PRACTITIONER

## 2021-12-01 PROCEDURE — G8536 NO DOC ELDER MAL SCRN: HCPCS | Performed by: NURSE PRACTITIONER

## 2021-12-01 PROCEDURE — 3331090001 HH PPS REVENUE CREDIT

## 2021-12-01 NOTE — PROGRESS NOTES
Chief Complaint   Patient presents with   [facility] Follow Up       1. \"Have you been to the ER, urgent care clinic since your last visit? Hospitalized since your last visit? \" Yes When: 11/2021 Where: [facility] Reason for visit: wheezing, heart palpitations, SOB    2. \"Have you seen or consulted any other health care providers outside of the [address] [place] [address] since your last visit? \" No     3. For patients over 45: Has the patient had a colonoscopy? Yes, HM satisfied with blue hyperlink     If the patient is female:    4. For patients over 40: Has the patient had a mammogram? Yes, HM satisfied with blue hyperlink    5. For patients over 21: Has the patient had a pap smear?  No    3 most recent PHQ Screens 12/1/2021   Little interest or pleasure in doing things Not at all   Feeling down, depressed, irritable, or hopeless Nearly every day   Total Score PHQ 2 3

## 2021-12-01 NOTE — PROGRESS NOTES
[facility] [facility] Note  Subjective:      Dana Serna is a [de-identified] y.o. female who presents for follow up from ER on 11/17/2022 for SOB. Patient has history of chronic atrial fibrillation, diastolic heart failure, hypertension,and Morbid obesity. She went to ER for SOB that developed gradually over one week. She had leg swelling due to not taking her diuretic. She stated that she didn't want to got bathroom frequently. She had no chills,fever or cough. In ER she was treated with Bumex 2 mg BID, which improved SOB on exertion, she was also encouraged to follow up with her cardiologist.   Today she is accompanied by her son, who lives with her,Denies SOB  Has appointment with her cardiologist tomorrow    Past Medical History:   Diagnosis Date    Arthritis     Atrial fibrillation ([address]     Bronchitis, acute 77/54/9242    Diastolic heart failure ([facility]     WHITING (dyspnea on exertion)     GERD (gastroesophageal reflux disease)     Hypertension     Obesity     TAM (obstructive sleep apnea)     Other ill-defined conditions(799.89)     \"fluid in the lung\"    Pneumonia 6/7/2018    Sleep apnea          Current Outpatient Medications   Medication Sig Dispense Refill    potassium chloride (K-DUR, KLOR-CON M20) 20 mEq tablet Take 20 mEq by mouth daily.  spironolactone (ALDACTONE) 25 mg tablet Take 25 mg by mouth daily.  albuterol (PROVENTIL HFA, VENTOLIN HFA, PROAIR HFA) 90 mcg/actuation inhaler Take 2 Puffs by inhalation every six (6) hours as needed for Wheezing or Shortness of Breath. 18 g 0    dilTIAZem ER (Tiadylt ER) 240 mg capsule Tiadylt  mg capsule,extended release   TAKE 1 CAPSULE BY MOUTH EVERY DAY      bumetanide (BUMEX) 1 mg tablet Take 1 Tab by mouth two (2) times a day. 60 Tab 0    rivaroxaban (XARELTO) 20 mg tab tablet Take 1 Tab by mouth daily (with lunch).  Indications: PREVENT THROMBOEMBOLISM IN CHRONIC ATRIAL FIBRILLATION 30 Tab 1    aspirin delayed-release 81 mg tablet Take 1 Tab by mouth daily. 30 Tab 0    ondansetron (ZOFRAN ODT) 8 mg disintegrating tablet Take 1 Tablet by mouth every twelve (12) hours as needed for Nausea or Vomiting. (Patient not taking: Reported on 12/1/2021) 20 Tablet 0     Allergies   Allergen Reactions    Penicillins Hives       ROS:   Complete review of systems was reviewed with pertinent information listed in HPI. Review of Systems   Constitutional: Negative. HENT: Negative. Respiratory: Negative. Cardiovascular: Negative. Gastrointestinal: Negative. Genitourinary: Negative. Psychiatric/Behavioral: Negative. Objective:     Visit Vitals  /79 (BP 1 Location: Left upper arm, BP Patient Position: Sitting, BP Cuff Size: Large adult)   Pulse 98   Temp 97.9 °F (36.6 °C) (Temporal)   Resp 16   Ht 5' 9\" (1.753 m)   SpO2 99%   BMI 42.32 kg/m²       Vitals and Nurse Documentation reviewed. Physical Exam  Constitutional:       Appearance: Normal appearance. She is obese. HENT:      Mouth/Throat:      Mouth: Mucous membranes are moist.   Cardiovascular:      Rate and Rhythm: Normal rate. Pulses: Normal pulses. Heart sounds: Normal heart sounds. No murmur heard. Comments: A fib  Pulmonary:      Effort: Pulmonary effort is normal.      Breath sounds: Normal breath sounds. Abdominal:      General: Bowel sounds are normal.      Palpations: Abdomen is soft. Musculoskeletal:      Cervical back: Normal range of motion and neck supple. Comments: Using wheel chair   Psychiatric:         Mood and Affect: Mood normal.         Thought Content: Thought content normal.         Assessment/Plan:     Diagnoses and all orders for this visit:    1. Hospital discharge follow-up    2. Dyspnea, unspecified type, has resolved  Continue with Bumex    3. Chronic diastolic congestive heart failure ([address]    4. Chronic atrial fibrillation (HCC)   will continue with Diltiazem and Xarelto    5.  Morbid obesity with BMI of 40.0-44.9, adult ([facility]  Diet and exercise     Follow up with cardiologist    Pt expressed understanding with the diagnosis and plan        Discussed expected course/resolution/complications of diagnosis in detail with patient.    Medication risks/benefits/costs/interactions/alternatives discussed with patient.    Pt was given an after visit summary which includes diagnoses, current medications & vitals.  Pt expressed understanding with the diagnosis and plan

## 2021-12-02 ENCOUNTER — HOME CARE VISIT (OUTPATIENT)
Dept: SCHEDULING | Facility: HOME HEALTH | Age: 81
End: 2021-12-02
Payer: MEDICARE

## 2021-12-02 VITALS
TEMPERATURE: 98.1 F | SYSTOLIC BLOOD PRESSURE: 100 MMHG | DIASTOLIC BLOOD PRESSURE: 70 MMHG | RESPIRATION RATE: 18 BRPM | HEART RATE: 85 BPM | OXYGEN SATURATION: 94 %

## 2021-12-02 PROCEDURE — G0152 HHCP-SERV OF OT,EA 15 MIN: HCPCS

## 2021-12-02 PROCEDURE — 3331090002 HH PPS REVENUE DEBIT

## 2021-12-02 PROCEDURE — 3331090001 HH PPS REVENUE CREDIT

## 2021-12-03 PROCEDURE — 3331090002 HH PPS REVENUE DEBIT

## 2021-12-03 PROCEDURE — 3331090001 HH PPS REVENUE CREDIT

## 2021-12-04 PROCEDURE — 3331090002 HH PPS REVENUE DEBIT

## 2021-12-04 PROCEDURE — 3331090001 HH PPS REVENUE CREDIT

## 2021-12-05 PROCEDURE — 3331090001 HH PPS REVENUE CREDIT

## 2021-12-05 PROCEDURE — 3331090002 HH PPS REVENUE DEBIT

## 2021-12-06 ENCOUNTER — HOME CARE VISIT (OUTPATIENT)
Dept: SCHEDULING | Facility: HOME HEALTH | Age: 81
End: 2021-12-06
Payer: MEDICARE

## 2021-12-06 VITALS
OXYGEN SATURATION: 97 % | SYSTOLIC BLOOD PRESSURE: 100 MMHG | TEMPERATURE: 97.7 F | HEART RATE: 63 BPM | DIASTOLIC BLOOD PRESSURE: 55 MMHG | RESPIRATION RATE: 17 BRPM

## 2021-12-06 PROCEDURE — 3331090001 HH PPS REVENUE CREDIT

## 2021-12-06 PROCEDURE — 3331090002 HH PPS REVENUE DEBIT

## 2021-12-06 PROCEDURE — G0152 HHCP-SERV OF OT,EA 15 MIN: HCPCS

## 2021-12-07 ENCOUNTER — HOME CARE VISIT (OUTPATIENT)
Dept: SCHEDULING | Facility: HOME HEALTH | Age: 81
End: 2021-12-07
Payer: MEDICARE

## 2021-12-07 ENCOUNTER — PATIENT OUTREACH (OUTPATIENT)
Dept: CASE MANAGEMENT | Age: 81
End: 2021-12-07

## 2021-12-07 VITALS
RESPIRATION RATE: 17 BRPM | DIASTOLIC BLOOD PRESSURE: 85 MMHG | OXYGEN SATURATION: 98 % | HEART RATE: 77 BPM | TEMPERATURE: 97.1 F | SYSTOLIC BLOOD PRESSURE: 140 MMHG

## 2021-12-07 PROCEDURE — 3331090002 HH PPS REVENUE DEBIT

## 2021-12-07 PROCEDURE — G0151 HHCP-SERV OF PT,EA 15 MIN: HCPCS

## 2021-12-07 PROCEDURE — G0299 HHS/HOSPICE OF RN EA 15 MIN: HCPCS

## 2021-12-07 PROCEDURE — 3331090001 HH PPS REVENUE CREDIT

## 2021-12-07 NOTE — PROGRESS NOTES
Care Transitions Follow Up Call    Care Transition Nurse (CTN) contacted the patient by telephone to follow up after admission on -. Verified name and  with patient as identifiers. Follow up appointment completed? yes. Was follow up appointment scheduled within 7 days of discharge? no. Discharged on , follow up on 2021. CTN reviewed discharge instructions, medical action plan and red flags with patient and discussed any barriers to care and/or understanding of plan of care after discharge. Discussed appropriate site of care based on symptoms and resources available to patient including: PCP and Specialist. The patient agrees to contact the PCP office for questions related to their healthcare. Patients top risk factors for readmission: lack of knowledge about disease, level of motivation and medical condition-HF, afib, morbid obesity   Interventions to address risk factors: Scheduled appointment with PCP-attended follow up, Scheduled appointment with Specialist-attended follow up and Obtained and reviewed discharge summary and/or continuity of care documents    [facility] follow up appointment(s):   Future Appointments   Date Time Provider Rachna Lugo   2021 11:00 AM Phoenix Peace OT [facility]   2021 To Be Determined Saddie [place] [address]   2021 To Be Determined Camliu Solmaria, OT [place] [facility] [place] [address]   12/15/2021 To Be Determined Camillo Solo, OT [place] [facility] [place] [address]   2021 To Be Determined Saddie [place] [address]   2021 To Be Determined [place] [address]   2021 To Be Determined [place] [address]   2022 To Be Determined S[place] [address]   2022 To Be Determined Yelena Mccarthy, RN [address] [facility]     Non-[facility] follow up appointment(s):   Cards- Dr. Sera Chairez ([facility]) - patient does not remember date but believes it is next month.     CTN provided contact information for future needs. Plan for follow-up call in 7-10 days based on severity of symptoms and risk factors. Plan for next call: self management-daily weights, low sodium diet and follow up appointment-schedule/attend follow up     Goals Addressed                 This Visit's Progress     Attends follow-up appointments as directed. [facility] follow up appointment(s):   Future Appointments   Date Time Provider Rachna Lugo   11/23/2021 To Be Determined Serafin Richmond  [place] [address] [address]   12/1/2021  1:30 PM Santos Suárez, NP PAFP BS [facility]     Non-[facility] follow up appointment(s):   Cards-[email or web address]    12/07/21  Patient attended follow up with PCP and cards.  Prevent complications post hospitalization. 11/22/21   Daily weights-patient will begin tomorrow. She is instructed to write down and report weight gains.  Activity-  PT/OT to increase mobility and prevent falls.  Low sodium diet/fluid restrictions   Medication compliance-patient stopped taking bumex prior to hospitalization d/t excessive urination.  Patient will monitor  and report following (red flags):  - Weight gain of 2-3 lbs. in one day or 5 lbs. in one week  -increased SOB, feeling more tired or no energy, dizziness  -increase swelling to feet, ankles, legs or stomach  -CP    12/07/21   Patient has not been weighing daily d/t difficulty standing still on scales long enough to get a reading. She will consider getting larger scale that may allow for her to stand on with walker for balance.  She denies any CP or increased SOB. She has some lower ext edema R worse than L, but this is now new. She reports some dyspnea when she walks a distance this is also not new.  Medication compliance- patient reports she is taking Bumex 1 mg daily now. Per patient, this was changed by cards from twice daily at her follow up.

## 2021-12-08 ENCOUNTER — HOME CARE VISIT (OUTPATIENT)
Dept: SCHEDULING | Facility: HOME HEALTH | Age: 81
End: 2021-12-08
Payer: MEDICARE

## 2021-12-08 VITALS
HEART RATE: 79 BPM | TEMPERATURE: 96.7 F | RESPIRATION RATE: 19 BRPM | DIASTOLIC BLOOD PRESSURE: 70 MMHG | SYSTOLIC BLOOD PRESSURE: 115 MMHG | OXYGEN SATURATION: 94 %

## 2021-12-08 PROCEDURE — G0152 HHCP-SERV OF OT,EA 15 MIN: HCPCS

## 2021-12-08 PROCEDURE — 3331090001 HH PPS REVENUE CREDIT

## 2021-12-08 PROCEDURE — 3331090002 HH PPS REVENUE DEBIT

## 2021-12-09 ENCOUNTER — HOME CARE VISIT (OUTPATIENT)
Dept: SCHEDULING | Facility: HOME HEALTH | Age: 81
End: 2021-12-09
Payer: MEDICARE

## 2021-12-09 VITALS
HEART RATE: 86 BPM | TEMPERATURE: 97.1 F | DIASTOLIC BLOOD PRESSURE: 85 MMHG | RESPIRATION RATE: 17 BRPM | DIASTOLIC BLOOD PRESSURE: 70 MMHG | TEMPERATURE: 96.2 F | SYSTOLIC BLOOD PRESSURE: 140 MMHG | SYSTOLIC BLOOD PRESSURE: 118 MMHG | HEART RATE: 77 BPM | OXYGEN SATURATION: 98 % | RESPIRATION RATE: 17 BRPM | OXYGEN SATURATION: 95 %

## 2021-12-09 PROCEDURE — G0299 HHS/HOSPICE OF RN EA 15 MIN: HCPCS

## 2021-12-09 PROCEDURE — 3331090002 HH PPS REVENUE DEBIT

## 2021-12-09 PROCEDURE — 3331090001 HH PPS REVENUE CREDIT

## 2021-12-10 PROCEDURE — 3331090002 HH PPS REVENUE DEBIT

## 2021-12-10 PROCEDURE — 3331090001 HH PPS REVENUE CREDIT

## 2021-12-11 PROCEDURE — 3331090001 HH PPS REVENUE CREDIT

## 2021-12-11 PROCEDURE — 3331090002 HH PPS REVENUE DEBIT

## 2021-12-12 PROCEDURE — 3331090001 HH PPS REVENUE CREDIT

## 2021-12-12 PROCEDURE — 3331090002 HH PPS REVENUE DEBIT

## 2021-12-13 ENCOUNTER — HOME CARE VISIT (OUTPATIENT)
Dept: SCHEDULING | Facility: HOME HEALTH | Age: 81
End: 2021-12-13
Payer: MEDICARE

## 2021-12-13 VITALS
SYSTOLIC BLOOD PRESSURE: 110 MMHG | TEMPERATURE: 96.5 F | DIASTOLIC BLOOD PRESSURE: 60 MMHG | OXYGEN SATURATION: 95 % | HEART RATE: 73 BPM | RESPIRATION RATE: 17 BRPM

## 2021-12-13 PROCEDURE — G0152 HHCP-SERV OF OT,EA 15 MIN: HCPCS

## 2021-12-13 PROCEDURE — 3331090001 HH PPS REVENUE CREDIT

## 2021-12-13 PROCEDURE — 3331090002 HH PPS REVENUE DEBIT

## 2021-12-14 ENCOUNTER — HOME CARE VISIT (OUTPATIENT)
Dept: SCHEDULING | Facility: HOME HEALTH | Age: 81
End: 2021-12-14
Payer: MEDICARE

## 2021-12-14 ENCOUNTER — HOME CARE VISIT (OUTPATIENT)
Dept: HOME HEALTH SERVICES | Facility: HOME HEALTH | Age: 81
End: 2021-12-14
Payer: MEDICARE

## 2021-12-14 VITALS
TEMPERATURE: 98 F | HEART RATE: 77 BPM | OXYGEN SATURATION: 98 % | RESPIRATION RATE: 17 BRPM | DIASTOLIC BLOOD PRESSURE: 80 MMHG | SYSTOLIC BLOOD PRESSURE: 135 MMHG

## 2021-12-14 PROCEDURE — 3331090001 HH PPS REVENUE CREDIT

## 2021-12-14 PROCEDURE — G0151 HHCP-SERV OF PT,EA 15 MIN: HCPCS

## 2021-12-14 PROCEDURE — 3331090002 HH PPS REVENUE DEBIT

## 2021-12-14 RX ORDER — BUMETANIDE 1 MG/1
1 TABLET ORAL DAILY
Qty: 60 TABLET | Refills: 0 | Status: SHIPPED | OUTPATIENT
Start: 2021-12-14 | End: 2022-02-06

## 2021-12-15 ENCOUNTER — PATIENT OUTREACH (OUTPATIENT)
Dept: CASE MANAGEMENT | Age: 81
End: 2021-12-15

## 2021-12-15 ENCOUNTER — HOME CARE VISIT (OUTPATIENT)
Dept: SCHEDULING | Facility: HOME HEALTH | Age: 81
End: 2021-12-15
Payer: MEDICARE

## 2021-12-15 VITALS
RESPIRATION RATE: 19 BRPM | OXYGEN SATURATION: 96 % | DIASTOLIC BLOOD PRESSURE: 60 MMHG | SYSTOLIC BLOOD PRESSURE: 110 MMHG | HEART RATE: 73 BPM | TEMPERATURE: 97.8 F

## 2021-12-15 PROCEDURE — 3331090001 HH PPS REVENUE CREDIT

## 2021-12-15 PROCEDURE — G0152 HHCP-SERV OF OT,EA 15 MIN: HCPCS

## 2021-12-15 PROCEDURE — 3331090002 HH PPS REVENUE DEBIT

## 2021-12-15 NOTE — PROGRESS NOTES
Care Transitions Follow Up Call    Care Transition Nurse (CTN) contacted the patient by telephone to follow up after admission on -. Verified name and  with patient as identifiers. CTN reviewed discharge instructions, medical action plan and red flags with patient and discussed any barriers to care and/or understanding of plan of care after discharge. Discussed appropriate site of care based on symptoms and resources available to patient including: PCP and Specialist. The patient agrees to contact the PCP office for questions related to their healthcare. [address] follow up appointment(s):   Future Appointments   Date Time Provider Department Center   2021 To Be Determined [place] [id number] [facility]   2021  9:00 AM Giorgi Hadley PT [facility] [address]   2021 To Be Determined [place] [address] [facility]   [facility]   2021 To Be Determined Nan [place] [address] [facility]   2021 To Be Determined Nan [place] [address] [facility]   2021 To Be Determined Christinia [facility] [address] [facility]   2021 To Be Determined Nan [place] [address] [facility]   2022 To Be Determined Christinia [place] [id number] [facility]   2022 To Be Determined Nan [place] [address] [facility]   2022 To Be Determined Nan [place] [address] [facility]   2022 To Be Determined Nan [place] [address] [facility]   2022 To Be Determined Nan [facility] [address]   2022 To Be Determined Bhanu Pozo RN [facility]     CTN provided contact information for future needs. Plan for follow-up call in 10-14 days based on severity of symptoms and risk factors. Plan for next call: self management-review CHF red flags     Goals Addressed                 This Visit's Progress     COMPLETED: Attends follow-up appointments as directed.         [address] follow up appointment(s): Future Appointments   Date Time Provider Rachna Lugo   11/23/2021 To Be Determined Phoenix Peace,  [place] [address]   12/1/2021  1:30 PM Bianca Suárez NP PAFROSA BS [facility]     Non-BS follow up appointment(s):   Cards-[email or web address]    12/07/21  Patient attended follow up with PCP and cards.  Prevent complications post hospitalization. 11/22/21   Daily weights-patient will begin tomorrow. She is instructed to write down and report weight gains.  Activity-  PT/OT to increase mobility and prevent falls.  Low sodium diet/fluid restrictions   Medication compliance-patient stopped taking bumex prior to hospitalization d/t excessive urination.  Patient will monitor  and report following (red flags):  - Weight gain of 2-3 lbs. in one day or 5 lbs. in one week  -increased SOB, feeling more tired or no energy, dizziness  -increase swelling to feet, ankles, legs or stomach  -CP    12/07/21   Patient has not been weighing daily d/t difficulty standing still on scales long enough to get a reading. She will consider getting larger scale that may allow for her to stand on with walker for balance.  She denies any CP or increased SOB. She has some lower ext edema R worse than L, but this is now new. She reports some dyspnea when she walks a distance this is also not new.  Medication compliance- patient reports she is taking Bumex 1 mg daily now. Per patient, this was changed by cards from twice daily at her follow up.    12/15/21   Patient not able to weigh daily still. She does not note any increase LE swelling or abdominal distention. Feels breathing is at BL.  Diet- low sodium. She does not use table salt and tries to limit sodium. Discussed food choice during the holidays and hidden sodium.

## 2021-12-16 ENCOUNTER — HOME CARE VISIT (OUTPATIENT)
Dept: SCHEDULING | Facility: HOME HEALTH | Age: 81
End: 2021-12-16
Payer: MEDICARE

## 2021-12-16 VITALS
DIASTOLIC BLOOD PRESSURE: 80 MMHG | RESPIRATION RATE: 16 BRPM | TEMPERATURE: 97 F | OXYGEN SATURATION: 98 % | SYSTOLIC BLOOD PRESSURE: 130 MMHG | HEART RATE: 77 BPM

## 2021-12-16 VITALS
OXYGEN SATURATION: 100 % | HEART RATE: 68 BPM | DIASTOLIC BLOOD PRESSURE: 80 MMHG | SYSTOLIC BLOOD PRESSURE: 118 MMHG | TEMPERATURE: 97.9 F

## 2021-12-16 PROCEDURE — 3331090001 HH PPS REVENUE CREDIT

## 2021-12-16 PROCEDURE — G0151 HHCP-SERV OF PT,EA 15 MIN: HCPCS

## 2021-12-16 PROCEDURE — G0299 HHS/HOSPICE OF RN EA 15 MIN: HCPCS

## 2021-12-16 PROCEDURE — 3331090002 HH PPS REVENUE DEBIT

## 2021-12-16 NOTE — Clinical Note
Thanks, Garcia Lucero!  ----- Message -----  From: Daniella Mckeon  Sent: 12/16/2021  11:59 PM EST  To: Ann Centeno RN      Pt has a abscess on right lower leg, pt contacted dispatch health for assessment. Pt was prescribed doxycycline x 7 days and warm compressess.

## 2021-12-17 PROCEDURE — 3331090001 HH PPS REVENUE CREDIT

## 2021-12-17 PROCEDURE — 3331090002 HH PPS REVENUE DEBIT

## 2021-12-17 NOTE — CASE COMMUNICATION
Pt has a abscess on right lower leg, pt contacted dispatch health for assessment. Pt was prescribed doxycycline x 7 days and warm compressess.

## 2021-12-18 PROCEDURE — 3331090002 HH PPS REVENUE DEBIT

## 2021-12-18 PROCEDURE — 3331090001 HH PPS REVENUE CREDIT

## 2021-12-19 PROCEDURE — 3331090001 HH PPS REVENUE CREDIT

## 2021-12-19 PROCEDURE — 3331090002 HH PPS REVENUE DEBIT

## 2021-12-20 ENCOUNTER — TELEPHONE (OUTPATIENT)
Dept: FAMILY MEDICINE CLINIC | Age: 81
End: 2021-12-20

## 2021-12-20 ENCOUNTER — HOME CARE VISIT (OUTPATIENT)
Dept: SCHEDULING | Facility: HOME HEALTH | Age: 81
End: 2021-12-20
Payer: MEDICARE

## 2021-12-20 ENCOUNTER — PATIENT OUTREACH (OUTPATIENT)
Dept: CASE MANAGEMENT | Age: 81
End: 2021-12-20

## 2021-12-20 VITALS
DIASTOLIC BLOOD PRESSURE: 65 MMHG | HEART RATE: 66 BPM | TEMPERATURE: 97.8 F | RESPIRATION RATE: 18 BRPM | OXYGEN SATURATION: 95 % | SYSTOLIC BLOOD PRESSURE: 120 MMHG

## 2021-12-20 PROCEDURE — G0152 HHCP-SERV OF OT,EA 15 MIN: HCPCS

## 2021-12-20 PROCEDURE — 3331090001 HH PPS REVENUE CREDIT

## 2021-12-20 PROCEDURE — 3331090002 HH PPS REVENUE DEBIT

## 2021-12-20 NOTE — Clinical Note
FALL REPORT    SOC Date: 11/22/21    Date and time of fall (separate report for each fall occurrence): 12/20/21 at approx 5am    Fall observed by [facility] Staff? no    Describe Event and Document any re-training or treatment plan modification indicated (please include location of fall and may copy and paste from visit note): Pt reporting she fell out of bed this morning in her sleep. She states she landed on step stool at bedside and hit her head on nightstand. Pt denies any injuries or pain. No confusion noted and vitals WNL. Pt declining to go to ER for assessment and declining to ambulate to bedroom for falls assessment. Therapist educated pt in positioning herself closer to center of bed and provided recommendation for obtaining bed assist bar for decreased fall risk. Pt agreeable to purchasing bed assist bar and was provided with written resource info. Response to re-training or treatment plan modifications: Pt states she will order bed assist bar    Assistive Devices used by patient prior to fall: walker       Was this equipment in use at time of fall? no, pt was asleep     Injury? (yes/no) If yes, describe: none reported    Emergent Care Received: no      Was patient identified as High Risk prior to fall?  YES  List Tests Performed, Comparison of Scores of Tests Post Fall to SOC/NAE, and Patient Risk Factors: [facility]-10 was 5 at [facility]; Aayush Joyce is now 6    MD Notified (name and time): Tarsha Henderson NP, 11:30am

## 2021-12-20 NOTE — PROGRESS NOTES
Ambulatory Care Management Note    Date/Time:  12/20/2021 3:53 PM    This Ambulatory Care Manager (ACM) reviewed and updated the following screenings during this call; general assessment, self management assessment and ACP assessment and note    Patient's challenges to self-management identified:   functional physical ability, lack of knowledge about disease, level of motivation and medication management      Medication Management:  poor adherence- full med list not reviewed today / patient admits she does miss Bumex some days. Reports lately has been more consistent. Advance Care Planning:   Does patient have an Advance Directive:  currently not on file; patient declined education    [employer], Referrals, and Durable Medical Equipment: none      Health Maintenance Due   Topic Date Due    COVID-19 Vaccine (1) Never done    DTaP/Tdap/Td series (1 - Tdap) Never done    Bone Densitometry (Dexa) Screening  Never done    Medicare Yearly Exam  Never done    Flu Vaccine (1) Never done     Health Maintenance Reviewed: not reviewed today    Patient was asked to consider health care goals that they would like to focus on with this ACM. ACM will follow up with patient to discuss goals and establish care plan in the next 7-14 days.        PCP/Specialist follow up:   Future Appointments   Date Time Provider Rachna Lugo   12/21/2021 10:00 AM Wolfgang Garnica PT [facility]   12/21/2021 10:30 AM Morris Griffin OT [place]   12/22/2021 To Be Determined Dee Blanco [place] [address] [facility] [place]   12/23/2021 10:00 AM Wolfgang Garnica PT [place] [address] [facility] [place]   12/27/2021 To Be Determined Morris Griffin OT [place]   12/28/2021 To Be Determined Ronda Castro [place] [address] [facility] [place]   12/29/2021 To Be Determined Morris Griffin OT [facility]   12/30/2021 To Be Determined Dee Francis [place] [address] [facility] [place]   12/30/2021 To Be Determined Ronda Castro [address] [place] [address]   1/3/2022 To Be Determined Azzie Comfort, OT [place]   1/4/2022 To Be Determined Yesenia Hoff [place] [address]   1/4/2022 To Be Determined Margi [facility] [place] [address]   1/5/2022 To Be Determined Azzie Comfort, OT [place]   1/6/2022 To Be Determined Margi [facility]th[facility] [place] [address]   1/10/2022 To Be Determined Azzie Comfort, OT [facility] [place] [address]   1/11/2022 To Be Determined Margi Hawth[facility] [place] [address]   1/12/2022 To Be Determined Azzie Comfort, OT [facility] [place] [facility]   1/13/2022 To Be Determined [facility] [address]   1/18/2022 To Be Determined Alexis Julien RN [facility]     Goals      Prevent complications post hospitalization. 11/22/21   Daily weights-patient will begin tomorrow. She is instructed to write down and report weight gains.  Activity-  PT/OT to increase mobility and prevent falls.  Low sodium diet/fluid restrictions   Medication compliance-patient stopped taking bumex prior to hospitalization d/t excessive urination.  Patient will monitor  and report following (red flags):  - Weight gain of 2-3 lbs. in one day or 5 lbs. in one week  -increased SOB, feeling more tired or no energy, dizziness  -increase swelling to feet, ankles, legs or stomach  -CP    12/07/21   Patient has not been weighing daily d/t difficulty standing still on scales long enough to get a reading. She will consider getting larger scale that may allow for her to stand on with walker for balance.  She denies any CP or increased SOB. She has some lower ext edema R worse than L, but this is now new. She reports some dyspnea when she walks a distance this is also not new.  Medication compliance- patient reports she is taking Bumex 1 mg daily now. Per patient, this was changed by cards from twice daily at her follow up.    12/15/21   Patient not able to weigh daily still. She does not note any increase LE swelling or abdominal distention.  Feels breathing is at [facility].  Diet- low sodium. She does not use table salt and tries to limit sodium. Discussed food choice during the holidays and hidden sodium.  Understand CHF action plan. 12/20/21- Discussed low NA diet options with patient for carmen meal, patient reports plan is for crab cakes and bbq ribs. Recommended using low NA sauces and spices where able and pairing with fresh fruits and veggies. Recommend limiting fluids as patient reports drinks large amounts of fluid. Discussed monitoring for increased SOB and swelling and when to call MD. Patient reports has been working with PT/OT and mobility has been a little better. Has been able to get to the Bathroom more and has been more compliantly taking Bumex since. Agrees to watch NA intake and refill all meds needed prior to [address]. ACM will follow up in 1-2 weeks. LN           Per earlier chart notes patient fell OOB and hit head this am on Xarelto and ASA. Advised ER visit for eval . Also attempted per patient request to advise son/ he is busy at work and unable to speak further with this writer.      ACM will follow up in `1-2 weeks

## 2021-12-20 NOTE — TELEPHONE ENCOUNTER
Lisa from [facility] occupational health called state patient felled out of bed in her sleep and hit head on nigh stand and want to know if patient should be check out.   Vitals are normal.    Best call back #[phone]

## 2021-12-20 NOTE — TELEPHONE ENCOUNTER
Spoke with Serinaparvin Combs with [employer] ( Occupational therapy ) and advised that patient should go to ER or urgent care, recommended [facility] . Ms. Venessa Combs informed me that she did advise patient to go to the ER and patient declined. Informed will be noted and provider made aware. Venessa Combs stated she will contact to see if willing to have [facility] to come to evaluate.   Dennie Silvan, LPN

## 2021-12-21 ENCOUNTER — PATIENT OUTREACH (OUTPATIENT)
Dept: CASE MANAGEMENT | Age: 81
End: 2021-12-21

## 2021-12-21 ENCOUNTER — HOME CARE VISIT (OUTPATIENT)
Dept: SCHEDULING | Facility: HOME HEALTH | Age: 81
End: 2021-12-21
Payer: MEDICARE

## 2021-12-21 VITALS
SYSTOLIC BLOOD PRESSURE: 105 MMHG | TEMPERATURE: 97.8 F | OXYGEN SATURATION: 98 % | HEART RATE: 81 BPM | DIASTOLIC BLOOD PRESSURE: 60 MMHG | RESPIRATION RATE: 17 BRPM

## 2021-12-21 PROCEDURE — 3331090002 HH PPS REVENUE DEBIT

## 2021-12-21 PROCEDURE — 3331090001 HH PPS REVENUE CREDIT

## 2021-12-21 PROCEDURE — G0152 HHCP-SERV OF OT,EA 15 MIN: HCPCS

## 2021-12-21 NOTE — CASE COMMUNICATION
Ok  Thank you  Moreno Garcia  ----- Message -----  From: Carolina Hahn OT  Sent: 12/20/2021   9:44 PM EST  To: Natalya Eastman PT      FALL REPORT    SOC Date: 11/22/21    Date and time of fall (separate report for each fall occurrence): 12/20/21 at approx 5am    Fall observed by [facility] Staff? no    Describe Event and Document any re-training or treatment plan modification indicated (please include location of fall and may copy and paste from v isit note): Pt reporting she fell out of bed this morning in her sleep. She states she landed on step stool at bedside and hit her head on nightstand. Pt denies any injuries or pain. No confusion noted and vitals WNL. Pt declining to go to ER for assessment and declining to ambulate to bedroom for falls assessment. Therapist educated pt in positioning herself closer to center of bed and provided recommendation for obtaining bed assist b ar for decreased fall risk. Pt agreeable to purchasing bed assist bar and was provided with written resource info. Response to re-training or treatment plan modifications: Pt states she will order bed assist bar    Assistive Devices used by patient prior to fall: walker       Was this equipment in use at time of fall? no, pt was asleep     Injury? (yes/no) If yes, describe: none reported    Emergent Care Received: no      Was patien t identified as High Risk prior to fall?  YES  List Tests Performed, Comparison of Scores of Tests Post Fall to SOC/NAE, and Patient Risk Factors: [facility]-10 was 5 at [facility] acute [facility] Maggi Coleman is now 6    MD Notified (name and time): Enoc Mederos NP, 11:30am

## 2021-12-21 NOTE — Clinical Note
Thank you, Lisa.   ----- Message -----  From: Ruddy Wills OT  Sent: 12/21/2021   5:53 PM EST  To: Micky Hemphill RN      Per pt, one of her sons tested positive for covid on 12/20. Pt was last around son on 12/19. She is asymptomatic but was advised to get tested for covid. Pt in agreement and plans to call [facility] today.

## 2021-12-21 NOTE — Clinical Note
Per pt, one of her sons tested positive for covid on 12/20. Pt was last around son on 12/19. She is asymptomatic but was advised to get tested for covid. Pt in agreement and plans to call [facility] today.

## 2021-12-21 NOTE — PROGRESS NOTES
Patient has graduated from the Transitions of Care Coordination  program on 12/21/2021. Patient/family has the ability to self-manage at this time Care management goals have been completed. Patient was referred back to the [address] [place] [address] for further management. Goals Addressed    None         Patient has Care Transition Nurse's contact information for any further questions, concerns, or needs.   Patients upcoming visits:    Future Appointments   Date Time Provider Rachna Lugo   12/21/2021 10:00 AM SHIELA Quigley   12/21/2021 10:30 AM Juliet Chapa OT [facility]   12/22/2021 To Be Determined Michael Melissa [place] [address]   12/23/2021 10:00 AM Lorri Jean [facility] [place] [address]   12/27/2021 To Be Determined Juliet Chapa [facility] [place] [address]   12/28/2021 [facility] [place] [address]   12/29/2021 To Be Determined Jluiet Chapa OT [facility] [place] [address]   12/30/2021 To Be Determined Michael Ferrolyn [place] [address]   12/30/2021 To Be Determined Markus Lodi [facility] [place] [address]   1/3/2022 To Be Determined Juliet Chapa OT [facility] [place] [address]   1/4/2022 To Be Determined Michael Ferrolyn [place] [address]   1/4/2022 [facility] [place] [address]   1/5/2022 To Be Determined Juliet Chapa OT [facility] [place] [address]   1/6/2022 [facility] [place] [address]   1/10/2022 To Be Determined Juliet Chapa, OT [facility] [place] [address]   1/11/2022 To Be Determined [facility] [place] [address]   1/12/2022 To Be Determined Juliet Chapa, OT [facility] [place] [address]   1/13/2022 [facility] [place] [address]   1/18/2022 To Be Determined Jenniffer Lynne RN [facility] [address]

## 2021-12-22 ENCOUNTER — HOME CARE VISIT (OUTPATIENT)
Dept: SCHEDULING | Facility: HOME HEALTH | Age: 81
End: 2021-12-22
Payer: MEDICARE

## 2021-12-22 PROCEDURE — 3331090001 HH PPS REVENUE CREDIT

## 2021-12-22 PROCEDURE — G0299 HHS/HOSPICE OF RN EA 15 MIN: HCPCS

## 2021-12-22 PROCEDURE — 400013 HH SOC

## 2021-12-22 PROCEDURE — 3331090002 HH PPS REVENUE DEBIT

## 2021-12-22 PROCEDURE — 400018 HH-NO PAY CLAIM PROCEDURE

## 2021-12-22 NOTE — CASE COMMUNICATION
PT visit canceled as per patients request. Per patient she is waiting for her pending covid teast result and and want no visit until next week. patient will be seen again next week as scheduled.

## 2021-12-23 ENCOUNTER — HOME CARE VISIT (OUTPATIENT)
Dept: SCHEDULING | Facility: HOME HEALTH | Age: 81
End: 2021-12-23
Payer: MEDICARE

## 2021-12-23 PROCEDURE — 3331090002 HH PPS REVENUE DEBIT

## 2021-12-23 PROCEDURE — 3331090001 HH PPS REVENUE CREDIT

## 2021-12-23 NOTE — CASE COMMUNICATION
ok  Thank you  ----- Message -----  From: Akila Brown OT  Sent: 12/21/2021   5:53 PM EST  To: Guillermina Flores, PT      Per pt, one of her sons tested positive for covid on 12/20. Pt was last around son on 12/19. She is asymptomatic but was advised to get tested for covid. Pt in agreement and plans to call [facility] today.

## 2021-12-24 PROCEDURE — 3331090001 HH PPS REVENUE CREDIT

## 2021-12-24 PROCEDURE — 3331090002 HH PPS REVENUE DEBIT

## 2021-12-25 PROCEDURE — 3331090001 HH PPS REVENUE CREDIT

## 2021-12-25 PROCEDURE — 3331090002 HH PPS REVENUE DEBIT

## 2021-12-26 PROCEDURE — 3331090002 HH PPS REVENUE DEBIT

## 2021-12-26 PROCEDURE — 3331090001 HH PPS REVENUE CREDIT

## 2021-12-27 ENCOUNTER — HOME CARE VISIT (OUTPATIENT)
Dept: SCHEDULING | Facility: HOME HEALTH | Age: 81
End: 2021-12-27
Payer: MEDICARE

## 2021-12-27 VITALS
HEART RATE: 85 BPM | OXYGEN SATURATION: 96 % | DIASTOLIC BLOOD PRESSURE: 60 MMHG | RESPIRATION RATE: 18 BRPM | SYSTOLIC BLOOD PRESSURE: 100 MMHG | TEMPERATURE: 97.2 F

## 2021-12-27 PROCEDURE — G0152 HHCP-SERV OF OT,EA 15 MIN: HCPCS

## 2021-12-27 PROCEDURE — 3331090002 HH PPS REVENUE DEBIT

## 2021-12-27 PROCEDURE — 3331090001 HH PPS REVENUE CREDIT

## 2021-12-28 ENCOUNTER — HOME CARE VISIT (OUTPATIENT)
Dept: SCHEDULING | Facility: HOME HEALTH | Age: 81
End: 2021-12-28
Payer: MEDICARE

## 2021-12-28 PROCEDURE — 3331090002 HH PPS REVENUE DEBIT

## 2021-12-28 PROCEDURE — 3331090001 HH PPS REVENUE CREDIT

## 2021-12-29 ENCOUNTER — HOME CARE VISIT (OUTPATIENT)
Dept: SCHEDULING | Facility: HOME HEALTH | Age: 81
End: 2021-12-29
Payer: MEDICARE

## 2021-12-29 ENCOUNTER — HOME CARE VISIT (OUTPATIENT)
Dept: HOME HEALTH SERVICES | Facility: HOME HEALTH | Age: 81
End: 2021-12-29
Payer: MEDICARE

## 2021-12-29 VITALS
SYSTOLIC BLOOD PRESSURE: 130 MMHG | OXYGEN SATURATION: 98 % | HEART RATE: 78 BPM | DIASTOLIC BLOOD PRESSURE: 75 MMHG | RESPIRATION RATE: 17 BRPM | TEMPERATURE: 97 F

## 2021-12-29 PROCEDURE — 3331090001 HH PPS REVENUE CREDIT

## 2021-12-29 PROCEDURE — G0151 HHCP-SERV OF PT,EA 15 MIN: HCPCS

## 2021-12-29 PROCEDURE — 3331090002 HH PPS REVENUE DEBIT

## 2021-12-29 NOTE — CASE COMMUNICATION
PT visit canceled as per patients request. Per patient today is not a good day and want no PT today.  Patient will be seen again tomorrow as scheduled

## 2021-12-30 PROCEDURE — 3331090002 HH PPS REVENUE DEBIT

## 2021-12-30 PROCEDURE — 3331090001 HH PPS REVENUE CREDIT

## 2021-12-31 PROCEDURE — 3331090002 HH PPS REVENUE DEBIT

## 2021-12-31 PROCEDURE — 3331090001 HH PPS REVENUE CREDIT

## 2022-01-01 PROCEDURE — 3331090002 HH PPS REVENUE DEBIT

## 2022-01-01 PROCEDURE — 3331090001 HH PPS REVENUE CREDIT

## 2022-01-02 PROCEDURE — 3331090001 HH PPS REVENUE CREDIT

## 2022-01-02 PROCEDURE — 3331090002 HH PPS REVENUE DEBIT

## 2022-01-03 PROCEDURE — 3331090002 HH PPS REVENUE DEBIT

## 2022-01-03 PROCEDURE — 3331090001 HH PPS REVENUE CREDIT

## 2022-01-04 ENCOUNTER — HOME CARE VISIT (OUTPATIENT)
Dept: SCHEDULING | Facility: HOME HEALTH | Age: 82
End: 2022-01-04
Payer: MEDICARE

## 2022-01-04 VITALS
HEART RATE: 74 BPM | RESPIRATION RATE: 17 BRPM | TEMPERATURE: 98.5 F | SYSTOLIC BLOOD PRESSURE: 110 MMHG | OXYGEN SATURATION: 98 % | DIASTOLIC BLOOD PRESSURE: 70 MMHG

## 2022-01-04 PROCEDURE — 3331090002 HH PPS REVENUE DEBIT

## 2022-01-04 PROCEDURE — 3331090001 HH PPS REVENUE CREDIT

## 2022-01-04 PROCEDURE — G0152 HHCP-SERV OF OT,EA 15 MIN: HCPCS

## 2022-01-05 PROCEDURE — 3331090001 HH PPS REVENUE CREDIT

## 2022-01-05 PROCEDURE — 3331090002 HH PPS REVENUE DEBIT

## 2022-01-06 ENCOUNTER — HOME CARE VISIT (OUTPATIENT)
Dept: SCHEDULING | Facility: HOME HEALTH | Age: 82
End: 2022-01-06
Payer: MEDICARE

## 2022-01-06 VITALS
OXYGEN SATURATION: 97 % | TEMPERATURE: 97.3 F | RESPIRATION RATE: 18 BRPM | SYSTOLIC BLOOD PRESSURE: 110 MMHG | DIASTOLIC BLOOD PRESSURE: 70 MMHG | HEART RATE: 73 BPM

## 2022-01-06 PROCEDURE — 3331090002 HH PPS REVENUE DEBIT

## 2022-01-06 PROCEDURE — G0299 HHS/HOSPICE OF RN EA 15 MIN: HCPCS

## 2022-01-06 PROCEDURE — 3331090001 HH PPS REVENUE CREDIT

## 2022-01-07 PROCEDURE — 3331090002 HH PPS REVENUE DEBIT

## 2022-01-07 PROCEDURE — 3331090001 HH PPS REVENUE CREDIT

## 2022-01-08 PROCEDURE — 3331090002 HH PPS REVENUE DEBIT

## 2022-01-08 PROCEDURE — 3331090001 HH PPS REVENUE CREDIT

## 2022-01-09 PROCEDURE — 3331090001 HH PPS REVENUE CREDIT

## 2022-01-09 PROCEDURE — 3331090002 HH PPS REVENUE DEBIT

## 2022-01-10 PROCEDURE — 3331090002 HH PPS REVENUE DEBIT

## 2022-01-10 PROCEDURE — 3331090001 HH PPS REVENUE CREDIT

## 2022-01-11 PROCEDURE — 3331090001 HH PPS REVENUE CREDIT

## 2022-01-11 PROCEDURE — 3331090002 HH PPS REVENUE DEBIT

## 2022-01-12 PROCEDURE — 3331090001 HH PPS REVENUE CREDIT

## 2022-01-12 PROCEDURE — 3331090002 HH PPS REVENUE DEBIT

## 2022-01-13 PROCEDURE — 3331090002 HH PPS REVENUE DEBIT

## 2022-01-13 PROCEDURE — 3331090001 HH PPS REVENUE CREDIT

## 2022-01-14 PROCEDURE — 3331090002 HH PPS REVENUE DEBIT

## 2022-01-14 PROCEDURE — 3331090001 HH PPS REVENUE CREDIT

## 2022-01-15 PROCEDURE — 3331090002 HH PPS REVENUE DEBIT

## 2022-01-15 PROCEDURE — 3331090001 HH PPS REVENUE CREDIT

## 2022-01-16 PROCEDURE — 3331090002 HH PPS REVENUE DEBIT

## 2022-01-16 PROCEDURE — 3331090001 HH PPS REVENUE CREDIT

## 2022-01-17 PROCEDURE — 3331090002 HH PPS REVENUE DEBIT

## 2022-01-17 PROCEDURE — 3331090001 HH PPS REVENUE CREDIT

## 2022-01-18 ENCOUNTER — HOME CARE VISIT (OUTPATIENT)
Dept: SCHEDULING | Facility: HOME HEALTH | Age: 82
End: 2022-01-18
Payer: MEDICARE

## 2022-01-18 PROCEDURE — G0299 HHS/HOSPICE OF RN EA 15 MIN: HCPCS

## 2022-01-18 PROCEDURE — 3331090001 HH PPS REVENUE CREDIT

## 2022-01-18 PROCEDURE — 3331090003 HH PPS REVENUE ADJ

## 2022-01-18 PROCEDURE — 3331090002 HH PPS REVENUE DEBIT

## 2022-01-19 VITALS
TEMPERATURE: 97.5 F | RESPIRATION RATE: 18 BRPM | SYSTOLIC BLOOD PRESSURE: 134 MMHG | HEART RATE: 88 BPM | OXYGEN SATURATION: 99 % | DIASTOLIC BLOOD PRESSURE: 78 MMHG

## 2022-01-19 PROCEDURE — 3331090002 HH PPS REVENUE DEBIT

## 2022-01-19 PROCEDURE — 3331090001 HH PPS REVENUE CREDIT

## 2022-01-30 ENCOUNTER — APPOINTMENT (OUTPATIENT)
Dept: GENERAL RADIOLOGY | Age: 82
End: 2022-01-30
Attending: PHYSICIAN ASSISTANT
Payer: MEDICARE

## 2022-01-30 ENCOUNTER — HOSPITAL ENCOUNTER (EMERGENCY)
Age: 82
Discharge: NURSING, REHAB OR LONG-TERM CARE | End: 2022-01-31
Attending: EMERGENCY MEDICINE
Payer: MEDICARE

## 2022-01-30 DIAGNOSIS — M79.672 BILATERAL FOOT PAIN: Primary | ICD-10-CM

## 2022-01-30 DIAGNOSIS — M79.671 BILATERAL FOOT PAIN: Primary | ICD-10-CM

## 2022-01-30 LAB
ALBUMIN SERPL-MCNC: 2.9 G/DL (ref 3.5–5)
ALBUMIN/GLOB SERPL: 0.6 {RATIO} (ref 1.1–2.2)
ALP SERPL-CCNC: 113 U/L (ref 45–117)
ALT SERPL-CCNC: 12 U/L (ref 12–78)
ANION GAP SERPL CALC-SCNC: 5 MMOL/L (ref 5–15)
AST SERPL-CCNC: 15 U/L (ref 15–37)
BASOPHILS # BLD: 0 K/UL (ref 0–0.1)
BASOPHILS NFR BLD: 0 % (ref 0–1)
BILIRUB SERPL-MCNC: 1.3 MG/DL (ref 0.2–1)
BUN SERPL-MCNC: 17 MG/DL (ref 6–20)
BUN/CREAT SERPL: 15 (ref 12–20)
CALCIUM SERPL-MCNC: 8.9 MG/DL (ref 8.5–10.1)
CHLORIDE SERPL-SCNC: 105 MMOL/L (ref 97–108)
CO2 SERPL-SCNC: 28 MMOL/L (ref 21–32)
CREAT SERPL-MCNC: 1.13 MG/DL (ref 0.55–1.02)
DIFFERENTIAL METHOD BLD: ABNORMAL
EOSINOPHIL # BLD: 0 K/UL (ref 0–0.4)
EOSINOPHIL NFR BLD: 0 % (ref 0–7)
ERYTHROCYTE [DISTWIDTH] IN BLOOD BY AUTOMATED COUNT: 15.3 % (ref 11.5–14.5)
GLOBULIN SER CALC-MCNC: 5.1 G/DL (ref 2–4)
GLUCOSE SERPL-MCNC: 100 MG/DL (ref 65–100)
HCT VFR BLD AUTO: 45.9 % (ref 35–47)
HGB BLD-MCNC: 14.9 G/DL (ref 11.5–16)
IMM GRANULOCYTES # BLD AUTO: 0.2 K/UL (ref 0–0.04)
IMM GRANULOCYTES NFR BLD AUTO: 2 % (ref 0–0.5)
LYMPHOCYTES # BLD: 1.3 K/UL (ref 0.8–3.5)
LYMPHOCYTES NFR BLD: 14 % (ref 12–49)
MCH RBC QN AUTO: 28.7 PG (ref 26–34)
MCHC RBC AUTO-ENTMCNC: 32.5 G/DL (ref 30–36.5)
MCV RBC AUTO: 88.4 FL (ref 80–99)
MONOCYTES # BLD: 0.9 K/UL (ref 0–1)
MONOCYTES NFR BLD: 10 % (ref 5–13)
NEUTS SEG # BLD: 6.7 K/UL (ref 1.8–8)
NEUTS SEG NFR BLD: 74 % (ref 32–75)
NRBC # BLD: 0 K/UL (ref 0–0.01)
NRBC BLD-RTO: 0 PER 100 WBC
PLATELET # BLD AUTO: 271 K/UL (ref 150–400)
PMV BLD AUTO: 10.8 FL (ref 8.9–12.9)
POTASSIUM SERPL-SCNC: 4.5 MMOL/L (ref 3.5–5.1)
PROT SERPL-MCNC: 8 G/DL (ref 6.4–8.2)
RBC # BLD AUTO: 5.19 M/UL (ref 3.8–5.2)
SODIUM SERPL-SCNC: 138 MMOL/L (ref 136–145)
TROPONIN-HIGH SENSITIVITY: 6 NG/L (ref 0–51)
WBC # BLD AUTO: 9.1 K/UL (ref 3.6–11)

## 2022-01-30 PROCEDURE — 84484 ASSAY OF TROPONIN QUANT: CPT

## 2022-01-30 PROCEDURE — 36415 COLL VENOUS BLD VENIPUNCTURE: CPT

## 2022-01-30 PROCEDURE — 71046 X-RAY EXAM CHEST 2 VIEWS: CPT

## 2022-01-30 PROCEDURE — 73630 X-RAY EXAM OF FOOT: CPT

## 2022-01-30 PROCEDURE — 85025 COMPLETE CBC W/AUTO DIFF WBC: CPT

## 2022-01-30 PROCEDURE — 80053 COMPREHEN METABOLIC PANEL: CPT

## 2022-01-30 PROCEDURE — 99285 EMERGENCY DEPT VISIT HI MDM: CPT

## 2022-01-30 RX ORDER — HYDROCODONE BITARTRATE AND ACETAMINOPHEN 5; 325 MG/1; MG/1
1 TABLET ORAL
Status: COMPLETED | OUTPATIENT
Start: 2022-01-30 | End: 2022-01-31

## 2022-01-31 VITALS
DIASTOLIC BLOOD PRESSURE: 74 MMHG | SYSTOLIC BLOOD PRESSURE: 124 MMHG | RESPIRATION RATE: 18 BRPM | TEMPERATURE: 97.6 F | OXYGEN SATURATION: 100 % | HEART RATE: 98 BPM

## 2022-01-31 LAB
APPEARANCE UR: ABNORMAL
BACTERIA URNS QL MICRO: ABNORMAL /HPF
BILIRUB UR QL: NEGATIVE
COLOR UR: ABNORMAL
COVID-19 RAPID TEST, COVR: NOT DETECTED
EPITH CASTS URNS QL MICRO: ABNORMAL /LPF
GLUCOSE UR STRIP.AUTO-MCNC: NEGATIVE MG/DL
HGB UR QL STRIP: NEGATIVE
HYALINE CASTS URNS QL MICRO: ABNORMAL /LPF (ref 0–5)
KETONES UR QL STRIP.AUTO: NEGATIVE MG/DL
LEUKOCYTE ESTERASE UR QL STRIP.AUTO: NEGATIVE
NITRITE UR QL STRIP.AUTO: NEGATIVE
PH UR STRIP: 5 [PH] (ref 5–8)
PROT UR STRIP-MCNC: NEGATIVE MG/DL
RBC #/AREA URNS HPF: ABNORMAL /HPF (ref 0–5)
SOURCE, COVRS: NORMAL
SP GR UR REFRACTOMETRY: 1.02 (ref 1–1.03)
TROPONIN-HIGH SENSITIVITY: 6 NG/L (ref 0–51)
UR CULT HOLD, URHOLD: NORMAL
UROBILINOGEN UR QL STRIP.AUTO: 1 EU/DL (ref 0.2–1)
WBC URNS QL MICRO: ABNORMAL /HPF (ref 0–4)

## 2022-01-31 PROCEDURE — 81001 URINALYSIS AUTO W/SCOPE: CPT

## 2022-01-31 PROCEDURE — 87635 SARS-COV-2 COVID-19 AMP PRB: CPT

## 2022-01-31 PROCEDURE — 36415 COLL VENOUS BLD VENIPUNCTURE: CPT

## 2022-01-31 PROCEDURE — 74011250637 HC RX REV CODE- 250/637: Performed by: PHYSICIAN ASSISTANT

## 2022-01-31 PROCEDURE — 97530 THERAPEUTIC ACTIVITIES: CPT

## 2022-01-31 PROCEDURE — 97161 PT EVAL LOW COMPLEX 20 MIN: CPT

## 2022-01-31 PROCEDURE — 84484 ASSAY OF TROPONIN QUANT: CPT

## 2022-01-31 RX ADMIN — HYDROCODONE BITARTRATE AND ACETAMINOPHEN 1 TABLET: 5; 325 TABLET ORAL at 00:27

## 2022-01-31 NOTE — ED NOTES
ED EKG interpretation:  Rhythm: atrial fib; and irregular. Rate (approx.): 96; Axis: left axis deviation; P wave: ; QRS interval: normal ; ST/T wave: non-specific changes; This EKG was interpreted by Lissa Canchola MD,ED Provider.

## 2022-01-31 NOTE — PROGRESS NOTES
H/P and PT Evaluation faxed to [facility]. CM placed call to [address] [place] [address] [phone] spoke w/ Fabiano Rocha requested 1800 . Call placed to Swati Archibald [phone] to informed of bed authorization today. Tanika Leon states he's father is at hospital.     Alternative call placed to Terry Ceballos [phone] introduced to role of CM. Transitional care plan discussed informed of discharge and bed available today. Asked facility info be left on  - address and number left of . Son had recent left [facility].      1715 Met w/patient and in agreement w/ transitional care plan to [facility]. Updates provided to Dr. Art Cuellar and Lincoln Alpha report will need to be called to [phone].

## 2022-01-31 NOTE — ED PROVIDER NOTES
80yo female with PMH of A-fib on Xarelto, GERD, HTN, CHF on Bumex who presents for bilateral foot pain and swelling x 2-3 days. She denies trauma or fall, fever, chills, N/V/D, CP, SOB, cough. She endorses taking her Xarelto as prescribed but missed a dose of her Bumex today stating 'I felt tired so I didn't take it. \" She states  Laying in bed the past 2 days with minimal desire to ambulate. She admits to feeling depressed \"since I was discharged after Thanksgiving\". She lives at home with her son. She denies hx of gout. She endorses having episodes of foot pain, usually unilateral and resolves spontaneously.            Past Medical History:   Diagnosis Date    Arthritis     Atrial fibrillation ([address]     Bronchitis, acute 55/47/5035    Diastolic heart failure (HCC)     WHITING (dyspnea on exertion)     GERD (gastroesophageal reflux disease)     Hypertension     Obesity     TAM (obstructive sleep apnea)     Other ill-defined conditions(799.89)     \"fluid in the lung\"    Pneumonia 6/7/2018    Sleep apnea        Past Surgical History:   Procedure Laterality Date    COLONOSCOPY N/A 3/30/2017    COL[facility] Fl[facility] Flores [facility] at [facility] ENDOSCOPY    HX CHOLECYSTECTOMY      HX HERNIA REPAIR      HX ORTHOPAEDIC      right and left total knee replacement    HX ORTHOPAEDIC      right shoulder    HX OTHER SURGICAL      kidney stone surgery    HX OTHER SURGICAL      both eyes cataract    GA CARDIOVERSION ELECTIVE ARRHYTHMIA EXTERNAL N/A 10/9/2019    EP CARDIOVERSION [facility]         Family History:   Problem Relation Age of Onset    Tuberculosis Mother     Lung Disease Father        Social History     Socioeconomic History    Marital status: SINGLE     Spouse name: Not on file    Number of children: Not on file    Years of education: Not on file    Highest education level: Not on file   Occupational History    Not on file   Tobacco Use    Smoking status: Former Smoker     Packs/day: 1.00    Smokeless tobacco: Never Used    Tobacco comment: quit at age 43,smoked for 16 years   Substance and Sexual Activity    Alcohol use: No    Drug use: No    Sexual activity: Not on file   Other Topics Concern     Service Not Asked    Blood Transfusions Not Asked    Caffeine Concern Not Asked    Occupational Exposure Not Asked    Hobby Hazards Not Asked    Sleep Concern Not Asked    Stress Concern Not Asked    Weight Concern Not Asked    Special Diet Not Asked    Back Care Not Asked    Exercise Not Asked    Bike Helmet Not Asked   [address] Not Asked    Self-Exams Not Asked   Social History Narrative    Lives at home with 2 sons. Manages      Social Determinants of Health     Financial Resource Strain:     Difficulty of Paying Living Expenses: Not on file   Food Insecurity:     Worried About Running Out of Food in the Last Year: Not on file    Astrid of Food in the Last Year: Not on file   Transportation Needs:     Lack of Transportation (Medical): Not on file    Lack of Transportation (Non-Medical):  Not on file   Physical Activity:     Days of Exercise per Week: Not on file    Minutes of Exercise per Session: Not on file   Stress:     Feeling of Stress : Not on file   Social Connections:     Frequency of Communication with Friends and Family: Not on file    Frequency of Social Gatherings with Friends and Family: Not on file    Attends [employer] Services: Not on file    Active Member of Clubs or Organizations: Not on file    Attends Club or Organization Meetings: Not on file    Marital Status: Not on file   Intimate Partner Violence:     Fear of Current or Ex-Partner: Not on file    Emotionally Abused: Not on file    Physically Abused: Not on file    Sexually Abused: Not on file   Housing Stability:     Unable to Pay for Housing in the Last Year: Not on file    Number of [place] in the Last Year: Not on file    Unstable Housing in the Last Year: Not on file         ALLERGIES: Penicillins    Review of Systems   Constitutional: Negative. Negative for activity change, chills, fatigue and unexpected weight change. HENT: Negative for trouble swallowing. Respiratory: Negative for cough, chest tightness, shortness of breath and wheezing. Cardiovascular: Negative. Negative for chest pain and palpitations. Gastrointestinal: Negative. Negative for abdominal pain, diarrhea, nausea and vomiting. Genitourinary: Negative. Negative for dysuria, flank pain, frequency and hematuria. Musculoskeletal: Positive for arthralgias and gait problem. Negative for back pain, neck pain and neck stiffness. Skin: Negative. Negative for color change and rash. Neurological: Negative for dizziness, numbness and headaches. All other systems reviewed and are negative. Vitals:    01/30/22 2121   BP: 133/74   Pulse: 69   Resp: 19   Temp: 97.9 °F (36.6 °C)   SpO2: 98%            Physical Exam  Vitals and nursing note reviewed. Constitutional:       Appearance: Normal appearance. Comments: AA female, elevated BMI   Cardiovascular:      Rate and Rhythm: Normal rate. Pulses: Normal pulses. Heart sounds: No murmur heard. No friction rub. No gallop. Pulmonary:      Effort: Pulmonary effort is normal. No respiratory distress. Breath sounds: Normal breath sounds. Abdominal:      General: Abdomen is flat. Musculoskeletal:         General: Normal range of motion. Cervical back: Normal range of motion. Comments: BL feet with soft tissue swelling; feet warm to touch; no erythema, edema or cellulitis; no calf pain or induration. DP and PT pulses 2+. NVI throughout. Strength 5/5 in upper and lower extremities. Skin:     General: Skin is warm. Capillary Refill: Capillary refill takes less than 2 seconds. Neurological:      General: No focal deficit present.       Mental Status: She is alert and oriented to person, place, and time. MDM  Number of Diagnoses or Management Options  Diagnosis management comments:   Ddx: gout, cellulitis, CHF, electrolyte abnormality, UTI, frx, sprain, inflammatory process, RA       Amount and/or Complexity of Data Reviewed  Clinical lab tests: ordered and reviewed  Tests in the radiology section of CPT®: ordered and reviewed  Review and summarize past medical records: yes  Discuss the patient with other providers: yes (ER attending who also saw patient.)  Independent visualization of images, tracings, or specimens: yes    Patient Progress  Patient progress: stable         Procedures    I discussed patient's PMH, exam findings as well as careplan with the ED attending who agrees with care plan. I requested the ED attending see and evaluate patient. Harlan Singh PA-C    0300  Attempted to get patient up to walk. She was unable to ambulate using walker. Placed in stretcher. Will have PT/OT see in the morning.    SANTOSH CoronaC

## 2022-01-31 NOTE — SENIOR SERVICES NOTE
Senior services consult received and appreciated. Chart Reviewed. Patient being seen today for generalized fatigue and foot pain, nontraumatic. Patient states that she has not fallen. Patient greeted in ED room, alert and able to state her name,  and location, well dressed, tennis shoes and socks in place. I introduced myself and role as SS[facility] NP, patient receptive and agreeable to geriatric screening. Delirium: Negative  Depression: Positive, patient became very tearful, denies SI/HI, states that she just wants to be able to walk again. She verbalized that she knows she did not do what she should have with home PT and feels like she needs more support. I offered to have the [facility] come speak with her and she is refusing at this time. Patient denies feeling threatened or in harms way at home, states that she has everything she needs. Offered outpatient services, patient refused. ADL: Requires assistance with bathing and getting to and from the bathroom. IADLs: Is now requiring assistance with meals which is new, cleaning the home. Family drives patients. Home: Patient lives with 2 sons, 1 son works full time and the other in home had a traumatic brain injury a few years ago that assists at times. Patient has a total of 6 sons, 5 are local and 1 lives in [place]. DME: rolling walker- mainly used at home, rollator, motorized scooter, wheelchair. Through conversation and patient remaining tearful she verbalizes that she knows that she needs more help and encouragement than Home Health and feels like she would do better in a Rehab. Verbalizing that she would be ok with going to [facility] where she went after her knee replacements. I was present with Manisha Davis PT during evaluation and patient required assist x 2 for any mobility. All findings collaborated with Dr. Diane Carmona and Stevie Mathew CM to assist with efforts to seek placement at a SNF after today's visit.      Gilda Wong Eve Macias [facility] 39  11:18 AM  [phone]

## 2022-01-31 NOTE — ED NOTES
[facility] at bedside. SBAR given to [facility]. Paperwork packet given to [facility] for [facility] [place]. Pt left via [facility] stretcher without signs/symptoms of distress. RN attempted to call report to [facility] several times ([phone]). No answer.

## 2022-01-31 NOTE — PROGRESS NOTES
Date of previous inpatient admission/ ED visit? 11/17/21-11/21/21 SOB inpatient admission    What brought the patient back to ED? BLE foot pain and swelling  Did patient decline recommended services during last admission/ ED visit (if yes, what)? Yes SNF recommended and patient declined. [facility])    Has patient seen a provider since their last inpatient admission/ED visit (if yes, when)? Yes    PCP: First and Last name:   Name of Practice:    Are you a current patient: Yes/No:    Approximate date of last visit:    CM Interventions:  From previous inpatient admission/ED visit: Assessment / Interventions referral to [facility]. From current inpatient admission/ED visit SSED/CM consult received and appreciated. EMR reviewed. Updates received from [facility] and noted recommendations for SNF. Patient is verbalizing would like [facility] if available beds today. Noted [employer] insurance CM will communicate w/ local SNFs to determine if insurance is suspending authorizations through end of January (outcome pending). Support received from patients sons. No AMD on file. Case Management will follow for transitions of care needs. 1230 No available beds at [facility]. CM will communicate w/ patient regarding alternative choices. 1300 Met w/patient and informed no beds at [facility]. FOC offered and list. Patient in agreement w/ this writer seeking available beds near [place] then continue radius search. Nursing informed CM multiple calls received from family members. CM noted [facility] as emergency contact. Patient lives in 2 story home w/ ramp w/ Raissa Murcia (son w/ brain injury), [employer] (son works) and [employer] (grandson).  states she doesn't know why her legs stopped working and needs to be safe w/ mobility. Allan Regan is unable to assist w/ mobility while [facility] is at work. CM will follow for transitions of care needs.

## 2022-01-31 NOTE — PROGRESS NOTES
Transition of Care Plan to SNF/Rehab    SNF/Rehab Transition:  Patient has been accepted to [facility] and meets criteria for admission. Patient will transported by [facility] and expected to leave at 1800    Communication to Patient/Family:  Met with patient and spoke w/ Cristin Media son/by phone (identified care giver) and they are agreeable to the transition plan. Communication to SNF/Rehab:  Bedside RN,  has been notified to update the transition plan to the facility and call report (phone number [phone]). Discharge information has been updated on the AVS.         Nursing Please include all hard scripts for controlled substances, med rec and dc summary, and AVS in packet. Reviewed and confirmed with facility/Admissions, -can manage the patient care needs for the following:     SNF/Rehab Transition:  Patient to follow-up with Home Health: [facility])  PCP/Specialist:    Reviewed and confirmed with facility, they can manage the patient care needs for the following:     Meri Godinez with (X) only those applicable:    Medication:  []  Medications will be available at the facility  []  IV Antibiotics  []  Controlled Substance - hard copy to be sent with patient   []  Weekly Labs   Documents:  [] Hard RX  [] MAR  [] Kardex  [x] AVS  []Transfer Summary  []Discharge   Equipment:  []  CPAP/BiPAP  []  Wound Vacuum  []  Garcia or Urinary Device  []  PICC/Central Line  []  Nebulizer  []  Ventilator   Treatment:  []Isolation (for MRSA, VRE, etc.)  []Surgical Drain Management  []Tracheostomy Care  []Dressing Changes  []Dialysis with transportation and chair time   []PEG Care  []Oxygen  []Daily Weights for Heart Failure   Dietary:  []Any diet limitations  []Tube Feedings   []Total Parenteral Management (TPN)   Eligible for Medicaid Long Term Services and Supports  Yes:  [] Eligible for medical assistance or will become eligible within 180 days and UAI completed.    [] Provider/Patient and/or support system has requested screening. [] UAI copy provided to patient or responsible party  [] UAI unavailable at discharge will send once processed to SNF provider. [] UAI unavailable at discharged mailed to patient  No:   [] Private pay and is not financially eligible for Medicaid within the next 180 days. [] Reside out-of-state.   [] A residents of a state owned/operated facility that is licensed  by [address] [employer] [facility] or [facility]  [] Enrollment in \[facility] [place] [facility]\"" services  [] [address] [place] [address] [address]  [] Patient /Family declines to have screening completed or provide financial information for screening     Financial Resources:  Medicaid    [] Initiated and application pending   [] Full coverage     Advanced Care Plan:  []Surrogate Decision Maker of Care  []POA  []Communicated Code Status (DDNR\", \"Full\")    Other     Financial Resources:  Medicaid    [] Initiated and application pending   [] Full coverage     Advanced Care Plan:  []Surrogate Decision Maker of Care  []POA  []Communicated Code Status  (DDNR\", \"Full\")    Other

## 2022-01-31 NOTE — PROGRESS NOTES
Referrals sent to [facility] / The [address]/ [address] [facility] [address] and [place] referral to [facility] (outcome pending). [address] [place] placed to [address] interested will need weight and to communicate w/ [employer]. [facility] [place] [facility]. has beds however patient will need to be vaccinated. No beds on quarantine units.  left for [facility] regarding available beds (outcome pending).  left for [address] [facility] [address] patient has been vaccinated.  left for [address] of weight. [address] Dagmar Hopson w/ Admissions [facility] acknowledged authorization would need to be initiated by [facility] usual process. Admissions will return back to the office Tuesday. GIRMA obtained number for Geni Gabriel [phone] #3/#1 spoke w/ WALTER Renner requesting H/P and PT/OT notes be faxed to [phone] informed patient can be admitted under covid waiver - extended 2/6/22. Reference number obtained for [facility] ETOWAH [id number] number only for SNF not transportation. CM contacted Ethan Santa, Admissions Elaine Crowley was to initiate authorization provided reference number and will communicate back w/ [facility]i Gabriel.

## 2022-01-31 NOTE — PROGRESS NOTES
PHYSICAL THERAPY EMERGENCY DEPARTMENT EVALUATION     Patient: Tamra Nichols (54 y.o. female)  Date: 1/31/2022  Primary Diagnosis: No admission diagnoses are documented for this encounter. Precautions:  Fall    ASSESSMENT  Based on the objective data described below, the patient presents with impaired functional independence due to BLE pain and weakness. Patient endorses a h/o pain episodes which do not usually limit her ability to walk. She has not been able to walk with this pain episode. Received pt sitting EOB, tearful when discussing her situation. Functionally, she is mod/ max A to transition to standing with the walker. She was not able to take steps to ambulate and had to sit after a 10 seconds standing. Patient's functional mobility is limited by pain, weakness, ROM, and activity tolerance. Patient would benefit from acute therapy to address her impairments. Currently recommending SNF when medically stable for discharge. Current Level of Function Impacting Discharge (mobility/balance): Mod/Max A sit to stand, unable to stand > 15 seconds, unable to ambulate    Functional Outcome Measure: The patient scored 3/28 on the Tinetti outcome measure which is indicative of high fall risk. Other factors to consider for discharge: lives w/ two sons     Patient will benefit from continued physical therapy if admitted. PLAN :  Recommendations and Planned Interventions: bed mobility training, transfer training, gait training, therapeutic exercises, neuromuscular re-education, patient and family training/education and therapeutic activities      Frequency/Duration: Patient will be followed by physical therapy:  5 times a week to address goals.     Recommendation for discharge: (in order for the patient to meet his/her long term goals)  Therapy up to 5 days/week in SNF setting    This discharge recommendation:  Has been made in collaboration with the attending provider and/or case management    Equipment recommendations for successful discharge (if) home: patient owns DME required for discharge     SUBJECTIVE:   Patient stated Sometimes I have 3 (sons).  living with her    OBJECTIVE DATA SUMMARY:   HISTORY:    Past Medical History:   Diagnosis Date    Arthritis     Atrial fibrillation ([address]     Bronchitis, acute 65/76/2606    Diastolic heart failure (HCC)     WHITING (dyspnea on exertion)     GERD (gastroesophageal reflux disease)     Hypertension     Obesity     TAM (obstructive sleep apnea)     Other ill-defined conditions(799.89)     \"fluid in the lung\"    Pneumonia 6/7/2018    Sleep apnea      Past Surgical History:   Procedure Laterality Date    COLONOSCOPY N/A 3/30/2017    COLONOSCOPY performed by Ignacio Ambriz MD at [address] HX CHOLECYSTECTOMY      HX HERNIA REPAIR      HX ORTHOPAEDIC      right and left total knee replacement    HX ORTHOPAEDIC      right shoulder    HX OTHER SURGICAL      kidney stone surgery    HX OTHER SURGICAL      both eyes cataract    NC CARDIOVERSION ELECTIVE ARRHYTHMIA EXTERNAL N/A 10/9/2019    EP CARDIOVERSION performed by Tyler Baker MD at [address] CATH LAB       Home Situation  Home Environment: Private residence  # Steps to Enter: 0  Wheelchair Ramp: Yes  One/Two Story Residence: Two story, live on 1st floor  Living Alone: No  Support Systems: Child(hansel)  Current DME Used/Available at Home: Wheelchair, power,Walker, rolling,Wheelchair    EXAMINATION/PRESENTATION/DECISION MAKING:   Critical Behavior:  Neurologic State: Alert  Orientation Level: Oriented X4  Cognition: Appropriate decision making  Safety/Judgement: Awareness of environment  Hearing:    Skin:    Range Of Motion:  AROM: Generally decreased, functional                       Strength:    Strength: Generally decreased, functional                 Tone & Sensation:   Tone: Normal                              Coordination:  Coordination: Within functional limits      Functional Mobility:  Bed Mobility:  Recieved/ remained sitting EOB           Transfers:  Sit to Stand: Moderate assistance;Maximum assistance;Assist x2; Additional time  Stand to Sit: Moderate assistance  Tx: Instructed in rocking momentum and foot placement for sit to stand. Cues (verbal and tactile) provided to facilitate rocks and correct hand and foot placement. Manual assist provided to clear buttocks, support and assist trunk to upright. Attempted x3 with near upright stance x10 seconds on 3rd attempt. Balance:   Sitting: Intact; Without support  Standing: Impaired; With support  Standing - Static: Fair;Constant support  Ambulation/Gait Training:  Unable                                      Functional Measure:  Tinetti test:    Sitting Balance: 1  Arises: 0  Attempts to Rise: 0  Immediate Standing Balance: 0  Standing Balance: 1  Nudged: 0  Eyes Closed: 0  Turn 360 Degrees - Continuous/Discontinuous: 0  Turn 360 Degrees - Steady/Unsteady: 0  Sitting Down: 1  Balance Score: 3 Balance total score  Indication of Gait: 0  R Step Length/Height: 0  L Step Length/Height: 0  R Foot Clearance: 0  L Foot Clearance: 0  Step Symmetry: 0  Step Continuity: 0  Path: 0  Trunk: 0  Walking Time: 0  Gait Score: 0 Gait total score  Total Score: 3/28 Overall total score         Tinetti Tool Score Risk of Falls  <19 = High Fall Risk  19-24 = Moderate Fall Risk  25-28 = Low Fall Risk  Tinetti ME. Performance-Oriented Assessment of Mobility Problems in Elderly Patients. [address] 66; [id number].  (Scoring Description: PT Bulletin Feb. 10, 1993)    Older adults: Ginger Tee et al, 2009; n = 1000 [facility] elderly evaluated with ABC, BUCK, ADL, and IADL)  · Mean BUCK score for males aged 69-68 years = 26.21(3.40)  · Mean BUCK score for females age 69-68 years = 25.16(4.30)  · Mean BUCK score for males over 80 years = 23.29(6.02)  · Mean BUCK score for females over 80 years = 17.20(8.32)            Physical Therapy Evaluation Charge Determination   History Examination Presentation Decision-Making   MEDIUM  Complexity : 1-2 comorbidities / personal factors will impact the outcome/ POC  LOW Complexity : 1-2 Standardized tests and measures addressing body structure, function, activity limitation and / or participation in recreation  LOW Complexity : Stable, uncomplicated  Other outcome measures Tinetti 3/28  LOW       Based on the above components, the patient evaluation is determined to be of the following complexity level: LOW      Pain:  Pain Scale 1: Numeric (0 - 10)  Pain Intensity 1: 0     Activity Tolerance:   Fair, requires rest breaks and pain, tearful    After treatment patient left in no apparent distress:   sitting EOB, call bell w/in reach, SED NP at bedside    COMMUNICATION/EDUCATION:   Communication/Collaboration:  Fall prevention education was provided and the patient/caregiver indicated understanding., Patient/family have participated as able in goal setting and plan of care. and Patient/family agree to work toward stated goals and plan of care. Findings and recommendations were discussed with: SED NP & CM and Registered nurse.      Thank you for this referral.  Malka Navas, PT   Time Calculation: 16 mins

## 2022-01-31 NOTE — ED TRIAGE NOTES
Pt arrives from home where she lives with family for CC of BLE foot pain and swelling L>R that has been going on for a few days but worsening. She denies open wounds. She states this has happened once before.

## 2022-02-01 ENCOUNTER — EXTERNAL NURSING HOME DOCUMENTATION (OUTPATIENT)
Dept: INTERNAL MEDICINE CLINIC | Age: 82
End: 2022-02-01

## 2022-02-01 DIAGNOSIS — E66.01 OBESITY, MORBID (HCC): ICD-10-CM

## 2022-02-01 DIAGNOSIS — R26.9 GAIT ABNORMALITY: ICD-10-CM

## 2022-02-01 DIAGNOSIS — I48.0 PAROXYSMAL ATRIAL FIBRILLATION (HCC): Primary | ICD-10-CM

## 2022-02-01 DIAGNOSIS — I50.32 CHRONIC DIASTOLIC CONGESTIVE HEART FAILURE (HCC): ICD-10-CM

## 2022-02-01 DIAGNOSIS — M10.279 ACUTE DRUG-INDUCED GOUT INVOLVING TOE, UNSPECIFIED LATERALITY: ICD-10-CM

## 2022-02-01 PROCEDURE — 99306 1ST NF CARE HIGH MDM 50: CPT | Performed by: INTERNAL MEDICINE

## 2022-02-01 NOTE — PROGRESS NOTES
History of Present Illness: This is an 81-year-old -American female with past medical history significant for atrial fibrillation, GERD, hypertension, congestive heart failure, presented to [facility] emergency room with bilateral foot pain and swelling for the past several days. She had no history of trauma or fall. She mentioned she has been taking Xarelto as prescribed, but missed a dose of Bumex. She was not feeling well, so she did not take the Bumex. She was evaluated in the emergency room and found to have gout. Apparently I do not see treatment for gout was given. She was transferred to [facility]. She is doing well, but complaining about bilateral leg swelling and foot pain. No other discomfort. Past Medical History:  Atrial fibrillation, diastolic heart failure, arthritis, GERD, hypertension, obesity, obstructive sleep apnea. Past Surgical History: Colonoscopy, cholecystectomy, hernia repair, right and left total knee replacement, right shoulder surgery, kidney stone surgery, cataract surgery and cardioversion. Family History: Mother has tuberculosis, father had lung disease. Social History:  The patient is single. She is a former smoker. Never drank any alcohol. She lives at home with two sons. Allergies:  She is allergic to penicillin. Medications:  She is on albuterol two puffs every six hours as needed, aspirin 81 mg once a day, Bumex 1 mg tablet daily, Zofran every eight hours as needed, potassium chloride 20 mEq a day, Xarelto 20 mg once a day, spironolactone 25 mg once a day, Cardizem  mg once a day. Review of Systems:  Complete review of systems done, right now significant for foot pain. Physical Examination:  GENERAL:  This is a pleasant -American female, not in apparent distress. VITALS:  T:  98 degrees Fahrenheit. P:  70 per minute. BP:  120/80 mmHg. SaO2:  98% on room air. HEENT:  No abnormality detected.   NECK: Supple, no JVD, no carotid bruits, no thyromegaly. CHEST:  Chest is clear to auscultation bilaterally. No rales, no rhonchi. CARDIOVASCULAR:  S1, S2 normal.  Regular rate and rhythm. ABDOMEN:  Soft, nontender, nondistended, bowel sounds present. EXTREMITIES:  Bilateral trace leg edema present in both legs. Dorsalis pedis pulse normal.  Tenderness present in both legs. Both feet warm to touch. NEUROLOGICAL:  Alert and oriented x3. Cranial nerves II through XII grossly intact. Motor 5/5 bilaterally. Sensory within normal limits. Assessment and Plan:  1. Acute gouty attack. We will put her on prednisone Dosepak. We will start her on colchicine 0.6 mg one tablet twice a day for a week. We will repeat uric acid level, CBC, CMP and blood work. We will monitor her closely. 2. Atrial fibrillation. Rate and rhythm controlled. The patient is on diltiazem CD and Xarelto. We will continue it. 3. Diastolic heart failure compensated. The patient is on Bumex and potassium chloride probably causing gouty attack. She is compensated. She is also taking spironolactone. 4. History of GERD. She is doing well. No need for any medication right now. 5. Gait weakness. The patient to continue physical therapy and occupational therapy. THIS IS NOT A COMPLETE MEDICAL RECORD ON THIS PATIENT.    THIS IS A PATIENT AT [facility].    PLEASE CONTACT THE FACILITY LISTED BELOW FOR MORE INFORMATION ON THIS PATIENT.    THE COMPLETE RECORD RESIDES WITH THIS LONG TERM CARE FACILITY

## 2022-02-04 ENCOUNTER — OFFICE VISIT (OUTPATIENT)
Dept: INTERNAL MEDICINE CLINIC | Age: 82
End: 2022-02-04
Payer: MEDICARE

## 2022-02-04 DIAGNOSIS — E66.01 OBESITY, MORBID (HCC): ICD-10-CM

## 2022-02-04 DIAGNOSIS — R39.15 URINARY URGENCY: ICD-10-CM

## 2022-02-04 DIAGNOSIS — R26.9 GAIT ABNORMALITY: ICD-10-CM

## 2022-02-04 DIAGNOSIS — M79.672 BILATERAL FOOT PAIN: Primary | ICD-10-CM

## 2022-02-04 DIAGNOSIS — R35.0 URINARY FREQUENCY: ICD-10-CM

## 2022-02-04 DIAGNOSIS — M10.9 GOUT, UNSPECIFIED CAUSE, UNSPECIFIED CHRONICITY, UNSPECIFIED SITE: ICD-10-CM

## 2022-02-04 DIAGNOSIS — M79.671 BILATERAL FOOT PAIN: Primary | ICD-10-CM

## 2022-02-04 DIAGNOSIS — I50.32 CHRONIC DIASTOLIC CONGESTIVE HEART FAILURE (HCC): ICD-10-CM

## 2022-02-04 DIAGNOSIS — I48.0 PAROXYSMAL ATRIAL FIBRILLATION (HCC): ICD-10-CM

## 2022-02-04 PROCEDURE — G8432 DEP SCR NOT DOC, RNG: HCPCS | Performed by: NURSE PRACTITIONER

## 2022-02-04 PROCEDURE — G8536 NO DOC ELDER MAL SCRN: HCPCS | Performed by: NURSE PRACTITIONER

## 2022-02-04 PROCEDURE — 1101F PT FALLS ASSESS-DOCD LE1/YR: CPT | Performed by: NURSE PRACTITIONER

## 2022-02-04 PROCEDURE — 99309 SBSQ NF CARE MODERATE MDM 30: CPT | Performed by: NURSE PRACTITIONER

## 2022-02-04 RX ORDER — HYDROCODONE BITARTRATE AND ACETAMINOPHEN 5; 325 MG/1; MG/1
1 TABLET ORAL
Qty: 10 TABLET | Refills: 0 | Status: SHIPPED | OUTPATIENT
Start: 2022-02-04 | End: 2022-02-09

## 2022-02-04 NOTE — PROGRESS NOTES
THIS IS NOT A COMPLETE MEDICAL RECORD ON THIS PATIENT. THIS IS A PATIENT AT [facility]. PLEASE CONTACT THE FACILITY LISTED BELOW FOR MORE INFORMATION ON THIS PATIENT. THE COMPLETE RECORD RESIDES WITH THIS LONG TERM CARE FACILITY. HISTORY OF PRESENT ILLNESS  Dick Hill is a 81 y.o. female. This patient is currently under the care of Dr. Balaji Puente at [facility].  The patient was admitted to this facility following ER visit on 01/30/22. The patient presented to the ED with complaints of bilateral foot pain. The patient was thought to have gout. She was transferred to SNF due to inability to ambulate due to pain. The patient was started on Prednisone dose pack as well as Colchicine 0.6 mg po bid x 1 week at time of recent admission visit with Dr. Balaji Puente. Patient states she has ongoing pain to her feet, L>R. She is unable to stand on feet due to pain. She requests pain medication stronger than Tylenol. Per chart review, patient received a dose of Hydrocodone- APAP in ER that was effective at pain control. Patient also notes that she has been experiencing urinary frequency and urgency. Her past medical history includes:  Atrial fibrillation, diastolic heart failure, arthritis, GERD, hypertension, obesity, obstructive sleep apnea. HPI    Review of Systems   Constitutional: Negative for chills and fever. HENT: Negative for congestion. Respiratory: Negative for cough and shortness of breath. Cardiovascular: Negative for chest pain. Gastrointestinal: Negative. Genitourinary: Positive for frequency and urgency. Musculoskeletal: Positive for joint pain. Neurological: Negative for headaches. Endo/Heme/Allergies: Negative. Psychiatric/Behavioral: Negative. Physical Exam  Constitutional:       General: She is not in acute distress. HENT:      Head: Normocephalic and atraumatic. Nose: No congestion.    Eyes:      Conjunctiva/sclera: Conjunctivae normal. Cardiovascular:      Rate and Rhythm: Normal rate and regular rhythm. Pulmonary:      Effort: Pulmonary effort is normal.      Breath sounds: Normal breath sounds. Abdominal:      General: Bowel sounds are normal. There is no distension. Palpations: Abdomen is soft. Tenderness: There is no abdominal tenderness. Musculoskeletal:      Right lower leg: No edema. Left lower leg: No edema. Comments: Tenderness left foot   Skin:     General: Skin is warm and dry. Neurological:      Mental Status: She is alert. Psychiatric:         Mood and Affect: Mood normal.         ASSESSMENT and PLAN  Encounter Diagnoses   Name Primary?  Bilateral foot pain Yes    Gout, unspecified cause, unspecified chronicity, unspecified site     Paroxysmal atrial fibrillation (HCC)     Chronic diastolic congestive heart failure (HCC)     Obesity, morbid (HCC)     Gait abnormality     Urinary urgency     Urinary frequency      Will order  Hydrocodone-APAP 5-325 mg po bid PRN pain x 5 days. Will order U/A C&S. Continue PT/OT as tolerated. Reviewed medications and side effects in detail. Continue current medications at this time. Nursing to continue to monitor closely and notify MD/NP of any change in condition.

## 2022-02-06 RX ORDER — BUMETANIDE 1 MG/1
TABLET ORAL
Qty: 60 TABLET | Refills: 0 | Status: SHIPPED | OUTPATIENT
Start: 2022-02-06 | End: 2022-07-18

## 2022-02-11 ENCOUNTER — EXTERNAL NURSING HOME DOCUMENTATION (OUTPATIENT)
Dept: INTERNAL MEDICINE CLINIC | Age: 82
End: 2022-02-11
Payer: MEDICARE

## 2022-02-11 DIAGNOSIS — R35.0 URINE FREQUENCY: ICD-10-CM

## 2022-02-11 DIAGNOSIS — I48.0 PAROXYSMAL ATRIAL FIBRILLATION (HCC): Primary | ICD-10-CM

## 2022-02-11 DIAGNOSIS — E66.01 OBESITY, MORBID (HCC): ICD-10-CM

## 2022-02-11 DIAGNOSIS — I50.32 CHRONIC DIASTOLIC CONGESTIVE HEART FAILURE (HCC): ICD-10-CM

## 2022-02-11 PROCEDURE — 99309 SBSQ NF CARE MODERATE MDM 30: CPT | Performed by: INTERNAL MEDICINE

## 2022-02-11 NOTE — PROGRESS NOTES
Progress Note    Subjective:  Ms. Julia Rogers is doing better. Her leg and knee pain has improved with prednisone. Gout has subsided, but she has noticed she has to go to bathroom too frequently for urination. No flank pain. No fever. Otherwise she is eating and sleeping good. Objective:    VITALS:  T:  97.8 degrees Fahrenheit. P:  84 per minute. BP:  106/75 mmHg. SaO2:  96% on room air. Weight is 296.6 pounds. HEENT:  No abnormality detected. NECK:  Supple, no JVD, no carotid bruits, no thyromegaly. CHEST:  Chest is clear to auscultation bilaterally. No rales, no rhonchi. CARDIOVASCULAR:  S1, S2 normal.  Regular rate and rhythm. ABDOMEN:  Soft, nontender, nondistended, bowel sounds present. EXTREMITIES:  No leg edema present. Laboratory Data:   Lab work done. CBC and CMP were stable. Assessment and Plan:   1. Urinary frequency. We will check UA and urine culture to rule out UTI. She needs to gain more weight. 2. Gout attack has subsided. The patient finished up prednisone Dosepak and colchicine, doing well. 3. Atrial fibrillation. Rate and rhythm controlled. on Xarelto  . 4. Diastolic heart failure. The patient is on Bumex and spironolactone, compensated. 5. Gait weakness. The patient to continue physical therapy and occupational therapy. THIS IS NOT A COMPLETE MEDICAL RECORD ON THIS PATIENT.    THIS IS A PATIENT AT [facility].    PLEASE CONTACT THE FACILITY LISTED BELOW FOR MORE INFORMATION ON THIS PATIENT.    THE COMPLETE RECORD RESIDES WITH THIS LONG TERM CARE FACILITY

## 2022-02-14 ENCOUNTER — HOSPITAL ENCOUNTER (EMERGENCY)
Age: 82
Discharge: HOME | End: 2022-02-14
Attending: EMERGENCY MEDICINE
Payer: MEDICARE

## 2022-02-14 ENCOUNTER — APPOINTMENT (OUTPATIENT)
Dept: CT IMAGING | Age: 82
End: 2022-02-14
Attending: EMERGENCY MEDICINE
Payer: MEDICARE

## 2022-02-14 VITALS
OXYGEN SATURATION: 94 % | RESPIRATION RATE: 15 BRPM | TEMPERATURE: 98.4 F | HEART RATE: 75 BPM | DIASTOLIC BLOOD PRESSURE: 74 MMHG | SYSTOLIC BLOOD PRESSURE: 109 MMHG

## 2022-02-14 DIAGNOSIS — R11.2 NAUSEA, VOMITING, AND DIARRHEA: Primary | ICD-10-CM

## 2022-02-14 DIAGNOSIS — E86.0 DEHYDRATION: ICD-10-CM

## 2022-02-14 DIAGNOSIS — R19.7 NAUSEA, VOMITING, AND DIARRHEA: Primary | ICD-10-CM

## 2022-02-14 LAB
ALBUMIN SERPL-MCNC: 3.4 G/DL (ref 3.5–5)
ALBUMIN/GLOB SERPL: 0.7 {RATIO} (ref 1.1–2.2)
ALP SERPL-CCNC: 120 U/L (ref 45–117)
ALT SERPL-CCNC: 20 U/L (ref 12–78)
ANION GAP SERPL CALC-SCNC: 7 MMOL/L (ref 5–15)
AST SERPL-CCNC: 16 U/L (ref 15–37)
BASOPHILS # BLD: 0 K/UL (ref 0–0.1)
BASOPHILS NFR BLD: 0 % (ref 0–1)
BILIRUB SERPL-MCNC: 0.9 MG/DL (ref 0.2–1)
BUN SERPL-MCNC: 39 MG/DL (ref 6–20)
BUN/CREAT SERPL: 25 (ref 12–20)
CALCIUM SERPL-MCNC: 9.6 MG/DL (ref 8.5–10.1)
CHLORIDE SERPL-SCNC: 95 MMOL/L (ref 97–108)
CO2 SERPL-SCNC: 28 MMOL/L (ref 21–32)
COMMENT, HOLDF: NORMAL
CREAT SERPL-MCNC: 1.54 MG/DL (ref 0.55–1.02)
DIFFERENTIAL METHOD BLD: ABNORMAL
EOSINOPHIL # BLD: 0 K/UL (ref 0–0.4)
EOSINOPHIL NFR BLD: 0 % (ref 0–7)
ERYTHROCYTE [DISTWIDTH] IN BLOOD BY AUTOMATED COUNT: 14.2 % (ref 11.5–14.5)
GLOBULIN SER CALC-MCNC: 4.7 G/DL (ref 2–4)
GLUCOSE SERPL-MCNC: 105 MG/DL (ref 65–100)
HCT VFR BLD AUTO: 48.3 % (ref 35–47)
HGB BLD-MCNC: 16.7 G/DL (ref 11.5–16)
IMM GRANULOCYTES # BLD AUTO: 0.2 K/UL (ref 0–0.04)
IMM GRANULOCYTES NFR BLD AUTO: 1 % (ref 0–0.5)
LYMPHOCYTES # BLD: 1.6 K/UL (ref 0.8–3.5)
LYMPHOCYTES NFR BLD: 13 % (ref 12–49)
MAGNESIUM SERPL-MCNC: 2.1 MG/DL (ref 1.6–2.4)
MCH RBC QN AUTO: 28.5 PG (ref 26–34)
MCHC RBC AUTO-ENTMCNC: 34.6 G/DL (ref 30–36.5)
MCV RBC AUTO: 82.4 FL (ref 80–99)
MONOCYTES # BLD: 1.3 K/UL (ref 0–1)
MONOCYTES NFR BLD: 11 % (ref 5–13)
NEUTS SEG # BLD: 8.7 K/UL (ref 1.8–8)
NEUTS SEG NFR BLD: 74 % (ref 32–75)
NRBC # BLD: 0 K/UL (ref 0–0.01)
NRBC BLD-RTO: 0 PER 100 WBC
PLATELET # BLD AUTO: 444 K/UL (ref 150–400)
PMV BLD AUTO: 9.7 FL (ref 8.9–12.9)
POTASSIUM SERPL-SCNC: 3.7 MMOL/L (ref 3.5–5.1)
PROT SERPL-MCNC: 8.1 G/DL (ref 6.4–8.2)
RBC # BLD AUTO: 5.86 M/UL (ref 3.8–5.2)
SAMPLES BEING HELD,HOLD: NORMAL
SODIUM SERPL-SCNC: 130 MMOL/L (ref 136–145)
WBC # BLD AUTO: 11.8 K/UL (ref 3.6–11)

## 2022-02-14 PROCEDURE — 96361 HYDRATE IV INFUSION ADD-ON: CPT

## 2022-02-14 PROCEDURE — 74176 CT ABD & PELVIS W/O CONTRAST: CPT

## 2022-02-14 PROCEDURE — 99284 EMERGENCY DEPT VISIT MOD MDM: CPT

## 2022-02-14 PROCEDURE — 93005 ELECTROCARDIOGRAM TRACING: CPT

## 2022-02-14 PROCEDURE — 36415 COLL VENOUS BLD VENIPUNCTURE: CPT

## 2022-02-14 PROCEDURE — 74011250636 HC RX REV CODE- 250/636: Performed by: EMERGENCY MEDICINE

## 2022-02-14 PROCEDURE — 36600 WITHDRAWAL OF ARTERIAL BLOOD: CPT

## 2022-02-14 PROCEDURE — 83735 ASSAY OF MAGNESIUM: CPT

## 2022-02-14 PROCEDURE — 80053 COMPREHEN METABOLIC PANEL: CPT

## 2022-02-14 PROCEDURE — 85025 COMPLETE CBC W/AUTO DIFF WBC: CPT

## 2022-02-14 PROCEDURE — 96374 THER/PROPH/DIAG INJ IV PUSH: CPT

## 2022-02-14 RX ORDER — ONDANSETRON 4 MG/1
4 TABLET, ORALLY DISINTEGRATING ORAL
Qty: 15 TABLET | Refills: 0 | Status: SHIPPED | OUTPATIENT
Start: 2022-02-14 | End: 2022-02-19

## 2022-02-14 RX ORDER — ONDANSETRON 2 MG/ML
4 INJECTION INTRAMUSCULAR; INTRAVENOUS
Status: COMPLETED | OUTPATIENT
Start: 2022-02-14 | End: 2022-02-14

## 2022-02-14 RX ADMIN — ONDANSETRON HYDROCHLORIDE 4 MG: 2 SOLUTION INTRAMUSCULAR; INTRAVENOUS at 20:16

## 2022-02-14 RX ADMIN — SODIUM CHLORIDE 500 ML: 9 INJECTION, SOLUTION INTRAVENOUS at 20:16

## 2022-02-14 NOTE — ED TRIAGE NOTES
Triage: Pt arrives via EMS from [facility] with CC of diffuse abdominal pain and vomiting bile. Pt reports she has been taking zofran without relief. Pt reportedly takes zofran regularly for chronic nausea.

## 2022-02-15 NOTE — ED NOTES
Pt discharged with paperwork. Pt had no questions about discharge plan and was alert & oriented in no distress. Pt returned to [facility] via [facility]. Son at bedside with pt @ discharge.

## 2022-02-15 NOTE — ED NOTES
[facility] called amd transport scheduled for pt back to [facility]. ETA given 0000 hours 2-.

## 2022-02-15 NOTE — ED NOTES
Verbal shift change report given to Luana Emanuel RN (oncoming nurse) by Rupali Enrique RN (offgoing nurse). Report included the following information SBAR, Kardex and ED Summary.

## 2022-02-15 NOTE — ED PROVIDER NOTES
The history is provided by the patient. Abdominal Pain   This is a new problem. Episode onset: 4 days. The problem occurs constantly. The problem has not changed since onset. The pain is associated with vomiting. The pain is located in the generalized abdominal region. The pain is moderate. Associated symptoms include anorexia, diarrhea, nausea, vomiting, dysuria and frequency. Pertinent negatives include no fever, no hematuria and no chest pain. The pain is worsened by eating. The pain is relieved by nothing. Past workup includes no CT scan. Her past medical history is significant for kidney stones. The patient's surgical history includes cholecystectomy.        Past Medical History:   Diagnosis Date    Arthritis     Atrial fibrillation ([facility] [address]     Bronchitis, acute 99/80/8612    Diastolic heart failure (HCC)     WHITING (dyspnea on exertion)     GERD (gastroesophageal reflux disease)     Hypertension     Obesity     TAM (obstructive sleep apnea)     Other ill-defined conditions(799.89)     \"fluid in the lung\"    Pneumonia 6/7/2018    Sleep apnea        Past Surgical History:   Procedure Laterality Date    COLONOSCOPY N/A 3/30/2017    COLONOSCOPY performed by Ignacio Ambriz MD at [address] [place] [address] ENDOSCOPY    HX CHOLECYSTECTOMY      HX HERNIA REPAIR      HX ORTHOPAEDIC      right and left total knee replacement    HX ORTHOPAEDIC      right shoulder    HX OTHER SURGICAL      kidney stone surgery    HX OTHER SURGICAL      both eyes cataract    [place] CARDIOVERSION ELECTIVE ARRHYTHMIA EXTERNAL N/A 10/9/2019    EP CARDIOVERSION performed by Tyler Baker MD at [facility] [place] [address], [facility]         Family History:   Problem Relation Age of Onset    Tuberculosis Mother     Lung Disease Father        Social History     Socioeconomic History    Marital status: SINGLE     Spouse name: Not on file    Number of children: Not on file    Years of education: Not on file    Highest education level: Not on file   Occupational History  Not on file   Tobacco Use    Smoking status: Former Smoker     Packs/day: 1.00    Smokeless tobacco: Never Used    Tobacco comment: quit at age 43,smoked for 16 years   Substance and Sexual Activity    Alcohol use: No    Drug use: No    Sexual activity: Not on file   Other Topics Concern     Service Not Asked    Blood Transfusions Not Asked    Caffeine Concern Not Asked    Occupational Exposure Not Asked    Hobby Hazards Not Asked    Sleep Concern Not Asked    Stress Concern Not Asked    Weight Concern Not Asked    Special Diet Not Asked    Back Care Not Asked    Exercise Not Asked    Bike Helmet Not Asked   [address] Not Asked    Self-Exams Not Asked   Social History Narrative    Lives at home with 2 sons. Manages      Social Determinants of Health     Financial Resource Strain:     Difficulty of Paying Living Expenses: Not on file   Food Insecurity:     Worried About Running Out of Food in the Last Year: Not on file    Astrid of Food in the Last Year: Not on file   Transportation Needs:     Lack of Transportation (Medical): Not on file    Lack of Transportation (Non-Medical):  Not on file   Physical Activity:     Days of Exercise per Week: Not on file    Minutes of Exercise per Session: Not on file   Stress:     Feeling of Stress : Not on file   Social Connections:     Frequency of Communication with Friends and Family: Not on file    Frequency of Social Gatherings with Friends and Family: Not on file    Attends Caodaism Services: Not on file    Active Member of Clubs or Organizations: Not on file    Attends Club or Organization Meetings: Not on file    Marital Status: Not on file   Intimate Partner Violence:     Fear of Current or Ex-Partner: Not on file    Emotionally Abused: Not on file    Physically Abused: Not on file    Sexually Abused: Not on file   Housing Stability:     Unable to Pay for Housing in the Last Year: Not on file    Number of Places Lived in the Last Year: Not on file    Unstable Housing in the Last Year: Not on file         ALLERGIES: Penicillins    Review of Systems   Constitutional: Negative for fever. Cardiovascular: Negative for chest pain. Gastrointestinal: Positive for abdominal pain, anorexia, diarrhea, nausea and vomiting. Genitourinary: Positive for dysuria and frequency. Negative for hematuria. All other systems reviewed and are negative. Vitals:    22   BP: (!) 111/57    Pulse: 94    Resp: 16    Temp: 98.4 °F (36.9 °C)    SpO2: 97% 94%            Physical Exam  Vitals and nursing note reviewed. Constitutional:       Appearance: She is well-developed. HENT:      Head: Normocephalic and atraumatic. Eyes:      Pupils: Pupils are equal, round, and reactive to light. Cardiovascular:      Rate and Rhythm: Normal rate and regular rhythm. Pulmonary:      Effort: Pulmonary effort is normal.      Breath sounds: Normal breath sounds. Abdominal:      General: There is no distension. Palpations: Abdomen is soft. Tenderness: There is abdominal tenderness in the right upper quadrant and right lower quadrant. There is no guarding or rebound. Musculoskeletal:      Cervical back: Normal range of motion and neck supple. Skin:     General: Skin is warm and dry. Capillary Refill: Capillary refill takes less than 2 seconds. Neurological:      General: No focal deficit present. Mental Status: She is alert and oriented to person, place, and time.    Psychiatric:         Mood and Affect: Mood normal.         Behavior: Behavior normal.          MDM       Procedures    EKG at 8:27 PM  Atrial fibrillation, 79 bpm, nonspecific T wave changes, no STEMI, regular rhythm  EKG interpreted by Tavon Dwyer MD    MEDICAL DECISION MAKIN y.o. female presents with Abdominal Pain    Differential diagnosis includes but not limited to:  Gastroenteritis, acute appendicitis, bowel obstruction, incarcerated hernia, acute cholecystitis, bowel perforation, major gastrointestinal bleeding, severe diverticulitis, sepsis, dehydration, SUJATHA      LABORATORY TESTS:  Labs Reviewed   METABOLIC PANEL, COMPREHENSIVE - Abnormal; Notable for the following components:       Result Value    Sodium 130 (*)     Chloride 95 (*)     Glucose 105 (*)     BUN 39 (*)     Creatinine 1.54 (*)     BUN/Creatinine ratio 25 (*)     GFR est AA 39 (*)     GFR est non-AA 32 (*)     Alk. phosphatase 120 (*)     Albumin 3.4 (*)     Globulin 4.7 (*)     A-G Ratio 0.7 (*)     All other components within normal limits   CBC WITH AUTOMATED DIFF - Abnormal; Notable for the following components:    WBC 11.8 (*)     RBC 5.86 (*)     HGB 16.7 (*)     HCT 48.3 (*)     PLATELET 328 (*)     IMMATURE GRANULOCYTES 1 (*)     ABS. NEUTROPHILS 8.7 (*)     ABS. MONOCYTES 1.3 (*)     ABS. IMM. GRANS. 0.2 (*)     All other components within normal limits   SAMPLES BEING HELD   MAGNESIUM       IMAGING RESULTS:  CT ABD PELV WO CONT   Final Result   No acute process. MEDICATIONS GIVEN:  Medications   sodium chloride 0.9 % bolus infusion 500 mL (500 mL IntraVENous New Bag 2/14/22 2016)   ondansetron (ZOFRAN) injection 4 mg (4 mg IntraVENous Given 2/14/22 2016)       PROGRESS NOTE:   9:13 PM Patient's symptoms have improved after treatment        IMPRESSION:  1. Nausea, vomiting, and diarrhea    2. Dehydration        PLAN:  -   Discharge  Current Discharge Medication List      START taking these medications    Details   ondansetron (ZOFRAN ODT) 4 mg disintegrating tablet Take 1 Tablet by mouth every eight (8) hours as needed for Nausea or Vomiting for up to 5 days.   Qty: 15 Tablet, Refills: 0  Start date: 2/14/2022, End date: 2/19/2022           Follow-up Information     Follow up With Specialties Details Why Contact Info    Patrick De La Fuente NP Nurse Practitioner Schedule an appointment as soon as possible for a visit in 3 days As needed [address] [place] [address]  [phone]              Papo Deleon MD          Please note that this dictation was completed with [employer], the computer voice recognition software. Quite often unanticipated grammatical, syntax, homophones, and other interpretive errors are inadvertently transcribed by the computer software. Please disregard these errors. Please excuse any errors that have escaped final proofreading.

## 2022-02-16 LAB
CALCULATED R AXIS, ECG10: 14 DEGREES
CALCULATED T AXIS, ECG11: 56 DEGREES
DIAGNOSIS, 93000: NORMAL
Q-T INTERVAL, ECG07: 322 MS
QRS DURATION, ECG06: 98 MS
QTC CALCULATION (BEZET), ECG08: 369 MS
VENTRICULAR RATE, ECG03: 79 BPM

## 2022-02-18 ENCOUNTER — PATIENT OUTREACH (OUTPATIENT)
Dept: CASE MANAGEMENT | Age: 82
End: 2022-02-18

## 2022-02-18 ENCOUNTER — EXTERNAL NURSING HOME DOCUMENTATION (OUTPATIENT)
Dept: INTERNAL MEDICINE CLINIC | Age: 82
End: 2022-02-18
Payer: MEDICARE

## 2022-02-18 DIAGNOSIS — E66.01 OBESITY, MORBID (HCC): ICD-10-CM

## 2022-02-18 DIAGNOSIS — I50.32 CHRONIC DIASTOLIC CONGESTIVE HEART FAILURE (HCC): ICD-10-CM

## 2022-02-18 DIAGNOSIS — I48.0 PAROXYSMAL ATRIAL FIBRILLATION (HCC): ICD-10-CM

## 2022-02-18 DIAGNOSIS — R19.7 DIARRHEA, UNSPECIFIED TYPE: ICD-10-CM

## 2022-02-18 DIAGNOSIS — R11.2 NON-INTRACTABLE VOMITING WITH NAUSEA, UNSPECIFIED VOMITING TYPE: Primary | ICD-10-CM

## 2022-02-18 PROCEDURE — 99309 SBSQ NF CARE MODERATE MDM 30: CPT | Performed by: INTERNAL MEDICINE

## 2022-02-18 NOTE — PROGRESS NOTES
Progress Note    Subjective:  Ms. Maurilio Casas is feeling very nauseous. She is having some watery vomiting few times. She mentions she is not constipated, but she has been going to bathroom too frequently. Has mild abdominal discomfort, but no abdominal pain. She is not having any fever. She had history of gout for which she was on prednisone and colchicine. Had lab work done a couple of weeks back, showed abnormal cell in blood count. No other abnormality. Objective:    VITALS:  T:  97.6 degrees Fahrenheit. P:  81 per minute. BP:  103/63 mmHg. SaO2:  97% on room air. Weight is 296 pounds. HEENT:  No abnormality detected. NECK:  Supple, no JVD, no carotid bruits, no thyromegaly. CHEST:  Chest is clear to auscultation bilaterally. No rales, no rhonchi. CARDIOVASCULAR:  S1, S2 normal.  Regular rate and rhythm. ABDOMEN:  Soft, nontender, nondistended, bowel sounds present. EXTREMITIES:  No edema is noticed. Dorsalis pedis pulse normal.  NEUROLOGICAL:  Alert and oriented x3. Laboratory Data:   Last labs done. CBC showed okay, but has metamyelocyte positive. CMP was done. BUN and creatinine was normal.  Albumin level was too low. Assessment and Plan:   1. Nausea and vomiting. The patient is on Zofran. We will continue it for now. 2. Frequent loose stool. We will check stool for c. diff. 3. Abnormal cell on blood work. We will repeat CBC with differential to see if she still has metamyelocyte. If we find that, we will refer her to heme-oncologist.  4. Atrial fibrillation. Rate and rhythm controlled on diltiazem CD and Xarelto doing well. 5. Diastolic heart failure compensated. The patient is on Bumex and potassium chloride. 6. Gout attack subsided. She is doing well. THIS IS NOT A COMPLETE MEDICAL RECORD ON THIS PATIENT.    THIS IS A PATIENT AT [facility].    PLEASE CONTACT THE FACILITY LISTED BELOW FOR MORE INFORMATION ON THIS PATIENT.    THE COMPLETE RECORD RESIDES WITH THIS LONG TERM CARE FACILITY

## 2022-02-18 NOTE — PROGRESS NOTES
EMR reviewed , noted patient is currently in a SNF post ER visit on 1/31 at [facility]. Had another ER visit on 2/14 for N/V and returned to SNF. Attempted to reach cecilia Olmos to discuss long term plan of care for the patient.  Left  for return call

## 2022-02-19 ENCOUNTER — HOSPITAL ENCOUNTER (EMERGENCY)
Age: 82
Discharge: NURSING, REHAB OR LONG-TERM CARE | End: 2022-02-20
Attending: EMERGENCY MEDICINE
Payer: MEDICARE

## 2022-02-19 ENCOUNTER — APPOINTMENT (OUTPATIENT)
Dept: GENERAL RADIOLOGY | Age: 82
End: 2022-02-19
Attending: EMERGENCY MEDICINE
Payer: MEDICARE

## 2022-02-19 DIAGNOSIS — R07.9 CHEST PAIN, UNSPECIFIED TYPE: Primary | ICD-10-CM

## 2022-02-19 DIAGNOSIS — R11.2 NON-INTRACTABLE VOMITING WITH NAUSEA, UNSPECIFIED VOMITING TYPE: ICD-10-CM

## 2022-02-19 LAB
ALBUMIN SERPL-MCNC: 2.7 G/DL (ref 3.5–5)
ALBUMIN SERPL-MCNC: 2.7 G/DL (ref 3.5–5)
ALBUMIN/GLOB SERPL: 0.5 {RATIO} (ref 1.1–2.2)
ALBUMIN/GLOB SERPL: 0.6 {RATIO} (ref 1.1–2.2)
ALP SERPL-CCNC: 100 U/L (ref 45–117)
ALP SERPL-CCNC: 99 U/L (ref 45–117)
ALT SERPL-CCNC: 15 U/L (ref 12–78)
ALT SERPL-CCNC: 17 U/L (ref 12–78)
ANION GAP SERPL CALC-SCNC: 7 MMOL/L (ref 5–15)
ANION GAP SERPL CALC-SCNC: 8 MMOL/L (ref 5–15)
AST SERPL-CCNC: 15 U/L (ref 15–37)
AST SERPL-CCNC: 46 U/L (ref 15–37)
BASOPHILS # BLD: 0.2 K/UL (ref 0–0.1)
BASOPHILS NFR BLD: 2 % (ref 0–1)
BILIRUB SERPL-MCNC: 1 MG/DL (ref 0.2–1)
BILIRUB SERPL-MCNC: 1.1 MG/DL (ref 0.2–1)
BUN SERPL-MCNC: 28 MG/DL (ref 6–20)
BUN SERPL-MCNC: 28 MG/DL (ref 6–20)
BUN/CREAT SERPL: 15 (ref 12–20)
BUN/CREAT SERPL: 17 (ref 12–20)
CALCIUM SERPL-MCNC: 8.8 MG/DL (ref 8.5–10.1)
CALCIUM SERPL-MCNC: 9.4 MG/DL (ref 8.5–10.1)
CHLORIDE SERPL-SCNC: 91 MMOL/L (ref 97–108)
CHLORIDE SERPL-SCNC: 93 MMOL/L (ref 97–108)
CO2 SERPL-SCNC: 27 MMOL/L (ref 21–32)
CO2 SERPL-SCNC: 29 MMOL/L (ref 21–32)
COMMENT, HOLDF: NORMAL
CREAT SERPL-MCNC: 1.65 MG/DL (ref 0.55–1.02)
CREAT SERPL-MCNC: 1.82 MG/DL (ref 0.55–1.02)
DIFFERENTIAL METHOD BLD: ABNORMAL
EOSINOPHIL # BLD: 0 K/UL (ref 0–0.4)
EOSINOPHIL NFR BLD: 0 % (ref 0–7)
ERYTHROCYTE [DISTWIDTH] IN BLOOD BY AUTOMATED COUNT: 14.5 % (ref 11.5–14.5)
GLOBULIN SER CALC-MCNC: 4.5 G/DL (ref 2–4)
GLOBULIN SER CALC-MCNC: 5.1 G/DL (ref 2–4)
GLUCOSE SERPL-MCNC: 107 MG/DL (ref 65–100)
GLUCOSE SERPL-MCNC: 124 MG/DL (ref 65–100)
HCT VFR BLD AUTO: 45.4 % (ref 35–47)
HGB BLD-MCNC: 15.4 G/DL (ref 11.5–16)
IMM GRANULOCYTES # BLD AUTO: 0 K/UL
IMM GRANULOCYTES NFR BLD AUTO: 0 %
LYMPHOCYTES # BLD: 1.5 K/UL (ref 0.8–3.5)
LYMPHOCYTES NFR BLD: 13 % (ref 12–49)
MCH RBC QN AUTO: 28.1 PG (ref 26–34)
MCHC RBC AUTO-ENTMCNC: 33.9 G/DL (ref 30–36.5)
MCV RBC AUTO: 82.7 FL (ref 80–99)
MONOCYTES # BLD: 1.9 K/UL (ref 0–1)
MONOCYTES NFR BLD: 16 % (ref 5–13)
MYELOCYTES NFR BLD MANUAL: 1 %
NEUTS SEG # BLD: 8 K/UL (ref 1.8–8)
NEUTS SEG NFR BLD: 68 % (ref 32–75)
NRBC # BLD: 0 K/UL (ref 0–0.01)
NRBC BLD-RTO: 0 PER 100 WBC
PLATELET # BLD AUTO: 307 K/UL (ref 150–400)
PLATELET COMMENTS,PCOM: ABNORMAL
PMV BLD AUTO: 10.8 FL (ref 8.9–12.9)
POTASSIUM SERPL-SCNC: 3.4 MMOL/L (ref 3.5–5.1)
POTASSIUM SERPL-SCNC: 5.8 MMOL/L (ref 3.5–5.1)
PROT SERPL-MCNC: 7.2 G/DL (ref 6.4–8.2)
PROT SERPL-MCNC: 7.8 G/DL (ref 6.4–8.2)
RBC # BLD AUTO: 5.49 M/UL (ref 3.8–5.2)
RBC MORPH BLD: ABNORMAL
SAMPLES BEING HELD,HOLD: NORMAL
SODIUM SERPL-SCNC: 125 MMOL/L (ref 136–145)
SODIUM SERPL-SCNC: 130 MMOL/L (ref 136–145)
TROPONIN-HIGH SENSITIVITY: 8 NG/L (ref 0–51)
WBC # BLD AUTO: 11.7 K/UL (ref 3.6–11)

## 2022-02-19 PROCEDURE — 99285 EMERGENCY DEPT VISIT HI MDM: CPT

## 2022-02-19 PROCEDURE — 85025 COMPLETE CBC W/AUTO DIFF WBC: CPT

## 2022-02-19 PROCEDURE — 36415 COLL VENOUS BLD VENIPUNCTURE: CPT

## 2022-02-19 PROCEDURE — 84484 ASSAY OF TROPONIN QUANT: CPT

## 2022-02-19 PROCEDURE — 80053 COMPREHEN METABOLIC PANEL: CPT

## 2022-02-19 PROCEDURE — 93005 ELECTROCARDIOGRAM TRACING: CPT

## 2022-02-19 PROCEDURE — 71045 X-RAY EXAM CHEST 1 VIEW: CPT

## 2022-02-19 RX ORDER — ONDANSETRON 4 MG/1
4 TABLET, ORALLY DISINTEGRATING ORAL
Qty: 12 TABLET | Refills: 0 | Status: SHIPPED | OUTPATIENT
Start: 2022-02-19 | End: 2022-03-09 | Stop reason: ALTCHOICE

## 2022-02-19 NOTE — ED PROVIDER NOTES
Date of Service:  2/19/22    Patient:  Angy Fleming    Chief Complaint:  Chest Pain       HPI:  Angy Fleming is a 81 y.o.  female who presents for evaluation of chest pain. Patient started having chest pain at approximately 4 PM this afternoon. Described as midsternal left-sided nonradiating discomfort. EMS found the patient to be in A. fib at a rate of around 80 with continued chest pain. Nausea all day without any vomiting. Patient denies other acute complaints. For EMS, patient was provided full dose aspirin and 1 sublingual nitro that did cause some resolution in the discomfort. Patient currently continues to have pain, currently 6 out of 10. No other complaints.            Past Medical History:   Diagnosis Date    Arthritis     Atrial fibrillation ([facility] [address]     Bronchitis, acute 43/62/9681    Diastolic heart failure (HCC)     WHITING (dyspnea on exertion)     GERD (gastroesophageal reflux disease)     Hypertension     Obesity     TAM (obstructive sleep apnea)     Other ill-defined conditions(799.89)     \"fluid in the lung\"    Pneumonia 6/7/2018    Sleep apnea        Past Surgical History:   Procedure Laterality Date    COLONOSCOPY N/A 3/30/2017    COLONOSCOPY performed by Darius Hutchins MD at [facility] ENDOSCOPY    HX CHOLECYSTECTOMY      HX HERNIA REPAIR      HX ORTHOPAEDIC      right and left total knee replacement    HX ORTHOPAEDIC      right shoulder    HX OTHER SURGICAL      kidney stone surgery    HX OTHER SURGICAL      both eyes cataract    [place] CARDIOVERSION ELECTIVE ARRHYTHMIA EXTERNAL N/A 10/9/2019    EP CARDIOVERSION performed by Esther Layton MD at [facility], [facility] [address] [facility]         Family History:   Problem Relation Age of Onset    Tuberculosis Mother     Lung Disease Father        Social History     Socioeconomic History    Marital status: SINGLE     Spouse name: Not on file    Number of children: Not on file    Years of education: Not on file    Highest education level: Not on file Occupational History    Not on file   Tobacco Use    Smoking status: Former Smoker     Packs/day: 1.00    Smokeless tobacco: Never Used    Tobacco comment: quit at age 43,smoked for 16 years   Substance and Sexual Activity    Alcohol use: No    Drug use: No    Sexual activity: Not on file   Other Topics Concern     Service Not Asked    Blood Transfusions Not Asked    Caffeine Concern Not Asked    Occupational Exposure Not Asked    Hobby Hazards Not Asked    Sleep Concern Not Asked    Stress Concern Not Asked    Weight Concern Not Asked    Special Diet Not Asked    Back Care Not Asked    Exercise Not Asked    Bike Helmet Not Asked   [address] Not Asked    Self-Exams Not Asked   Social History Narrative    Lives at home with 2 sons. Manages      Social Determinants of Health     Financial Resource Strain:     Difficulty of Paying Living Expenses: Not on file   Food Insecurity:     Worried About Running Out of Food in the Last Year: Not on file    Astrid of Food in the Last Year: Not on file   Transportation Needs:     Lack of Transportation (Medical): Not on file    Lack of Transportation (Non-Medical):  Not on file   Physical Activity:     Days of Exercise per Week: Not on file    Minutes of Exercise per Session: Not on file   Stress:     Feeling of Stress : Not on file   Social Connections:     Frequency of Communication with Friends and Family: Not on file    Frequency of Social Gatherings with Friends and Family: Not on file    Attends Restorationist Services: Not on file    Active Member of Clubs or Organizations: Not on file    Attends Club or Organization Meetings: Not on file    Marital Status: Not on file   Intimate Partner Violence:     Fear of Current or Ex-Partner: Not on file    Emotionally Abused: Not on file    Physically Abused: Not on file    Sexually Abused: Not on file   Housing Stability:     Unable to Pay for Housing in the Last Year: Not on file  Number of Places Lived in the Last Year: Not on file    Unstable Housing in the Last Year: Not on file         ALLERGIES: Penicillins    Review of Systems   Cardiovascular: Positive for chest pain. Gastrointestinal: Positive for nausea. Negative for vomiting. All other systems reviewed and are negative. Vitals:    02/19/22 1750   Pulse: 83   Temp: 98.4 °F (36.9 °C)            Physical Exam  Vitals and nursing note reviewed. Constitutional:       Appearance: She is well-developed. HENT:      Head: Normocephalic and atraumatic. Cardiovascular:      Rate and Rhythm: Normal rate. Heart sounds: Normal heart sounds. Pulmonary:      Effort: Pulmonary effort is normal. No tachypnea. Breath sounds: Normal breath sounds. Chest:      Chest wall: No mass or tenderness. Abdominal:      Palpations: Abdomen is soft. Tenderness: There is no abdominal tenderness. Musculoskeletal:      Right lower leg: No tenderness. Edema present. Left lower leg: No tenderness. Edema present. Skin:     General: Skin is warm. Capillary Refill: Capillary refill takes less than 2 seconds. Neurological:      Mental Status: She is alert and oriented to person, place, and time.    Psychiatric:         Mood and Affect: Mood normal.          [facility]  ED Course as of 02/19/22 2125   Sat Feb 19, 2022   1806 EKG 1802  Afib 76  Normal axis  Otherwise normal intervals  No STEMI [GG]   1856 Sodium(!): 125 [GG]   1856 Potassium(!): 5.8 [GG]      ED Course User Index  [GG] Nicole Butcher, DO     VITAL SIGNS:  Patient Vitals for the past 4 hrs:   Temp Pulse Resp BP SpO2   02/19/22 2048  68 13 119/80 95 %   02/19/22 1756 98.4 °F (36.9 °C) 83 18 (!) 94/50 96 %         LABS:  Recent Results (from the past 6 hour(s))   EKG, 12 LEAD, INITIAL    Collection Time: 02/19/22  6:02 PM   Result Value Ref Range    Ventricular Rate 76 BPM    QRS Duration 86 ms    Q-T Interval 346 ms    QTC Calculation (Bezet) 389 ms Calculated R Axis 12 degrees    Calculated T Axis 149 degrees    Diagnosis       Atrial fibrillation  Nonspecific ST and T wave abnormality  Abnormal ECG  When compared with ECG of 14-FEB-2022 20:27,  Nonspecific T wave abnormality, worse in Anterior leads     SAMPLES BEING HELD    Collection Time: 02/19/22  6:21 PM   Result Value Ref Range    SAMPLES BEING HELD 1red,1blu,1lav     COMMENT        Add-on orders for these samples will be processed based on acceptable specimen integrity and analyte stability, which may vary by analyte. CBC WITH AUTOMATED DIFF    Collection Time: 02/19/22  6:21 PM   Result Value Ref Range    WBC 11.7 (H) 3.6 - 11.0 K/uL    RBC 5.49 (H) 3.80 - 5.20 M/uL    HGB 15.4 11.5 - 16.0 g/dL    HCT 45.4 35.0 - 47.0 %    MCV 82.7 80.0 - 99.0 FL    MCH 28.1 26.0 - 34.0 PG    MCHC 33.9 30.0 - 36.5 g/dL    RDW 14.5 11.5 - 14.5 %    PLATELET 869 244 - 314 K/uL    MPV 10.8 8.9 - 12.9 FL    NRBC 0.0 0  WBC    ABSOLUTE NRBC 0.00 0.00 - 0.01 K/uL    NEUTROPHILS 68 32 - 75 %    LYMPHOCYTES 13 12 - 49 %    MONOCYTES 16 (H) 5 - 13 %    EOSINOPHILS 0 0 - 7 %    BASOPHILS 2 (H) 0 - 1 %    MYELOCYTES 1 (H) 0 %    IMMATURE GRANULOCYTES 0 %    ABS. NEUTROPHILS 8.0 1.8 - 8.0 K/UL    ABS. LYMPHOCYTES 1.5 0.8 - 3.5 K/UL    ABS. MONOCYTES 1.9 (H) 0.0 - 1.0 K/UL    ABS. EOSINOPHILS 0.0 0.0 - 0.4 K/UL    ABS. BASOPHILS 0.2 (H) 0.0 - 0.1 K/UL    ABS. IMM.  GRANS. 0.0 K/UL    DF MANUAL      PLATELET COMMENTS CLUMPED PLATELETS      RBC COMMENTS MICROCYTOSIS  1+       METABOLIC PANEL, COMPREHENSIVE    Collection Time: 02/19/22  6:21 PM   Result Value Ref Range    Sodium 125 (L) 136 - 145 mmol/L    Potassium 5.8 (H) 3.5 - 5.1 mmol/L    Chloride 91 (L) 97 - 108 mmol/L    CO2 27 21 - 32 mmol/L    Anion gap 7 5 - 15 mmol/L    Glucose 124 (H) 65 - 100 mg/dL    BUN 28 (H) 6 - 20 MG/DL    Creatinine 1.82 (H) 0.55 - 1.02 MG/DL    BUN/Creatinine ratio 15 12 - 20      GFR est AA 32 (L) >60 ml/min/1.73m2    GFR est non-AA 27 (L) >60 ml/min/1.73m2    Calcium 9.4 8.5 - 10.1 MG/DL    Bilirubin, total 1.1 (H) 0.2 - 1.0 MG/DL    ALT (SGPT) 17 12 - 78 U/L    AST (SGOT) 46 (H) 15 - 37 U/L    Alk. phosphatase 99 45 - 117 U/L    Protein, total 7.8 6.4 - 8.2 g/dL    Albumin 2.7 (L) 3.5 - 5.0 g/dL    Globulin 5.1 (H) 2.0 - 4.0 g/dL    A-G Ratio 0.5 (L) 1.1 - 2.2     TROPONIN-HIGH SENSITIVITY    Collection Time: 02/19/22  6:21 PM   Result Value Ref Range    Troponin-High Sensitivity 8 0 - 51 ng/L   METABOLIC PANEL, COMPREHENSIVE    Collection Time: 02/19/22  7:40 PM   Result Value Ref Range    Sodium 130 (L) 136 - 145 mmol/L    Potassium 3.4 (L) 3.5 - 5.1 mmol/L    Chloride 93 (L) 97 - 108 mmol/L    CO2 29 21 - 32 mmol/L    Anion gap 8 5 - 15 mmol/L    Glucose 107 (H) 65 - 100 mg/dL    BUN 28 (H) 6 - 20 MG/DL    Creatinine 1.65 (H) 0.55 - 1.02 MG/DL    BUN/Creatinine ratio 17 12 - 20      GFR est AA 36 (L) >60 ml/min/1.73m2    GFR est non-AA 30 (L) >60 ml/min/1.73m2    Calcium 8.8 8.5 - 10.1 MG/DL    Bilirubin, total 1.0 0.2 - 1.0 MG/DL    ALT (SGPT) 15 12 - 78 U/L    AST (SGOT) 15 15 - 37 U/L    Alk. phosphatase 100 45 - 117 U/L    Protein, total 7.2 6.4 - 8.2 g/dL    Albumin 2.7 (L) 3.5 - 5.0 g/dL    Globulin 4.5 (H) 2.0 - 4.0 g/dL    A-G Ratio 0.6 (L) 1.1 - 2.2          IMAGING:  XR CHEST SNGL V   Final Result   Unchanged cardiomegaly               Medications During Visit:  Medications - No data to display      DECISION MAKING:  Alivia Toney is a 81 y.o. female who comes in as above. Here, patient appears well. No longer patient having any chest pain. She states that she actually feels better with exception of some slight nausea. Provided with a prescription for nausea medicine and have the patient discharged back to her facility. She agrees with plan. She feels comfortable      IMPRESSION:  1. Chest pain, unspecified type    2.  Non-intractable vomiting with nausea, unspecified vomiting type        DISPOSITION:  Discharged      Current Discharge Medication List      START taking these medications    Details   !! ondansetron (ZOFRAN ODT) 4 mg disintegrating tablet Take 1 Tablet by mouth every eight (8) hours as needed for Nausea for up to 12 doses. Qty: 12 Tablet, Refills: 0  Start date: 2/19/2022       !! - Potential duplicate medications found. Please discuss with provider. CONTINUE these medications which have NOT CHANGED    Details   !! ondansetron (ZOFRAN ODT) 4 mg disintegrating tablet Take 1 Tablet by mouth every eight (8) hours as needed for Nausea or Vomiting for up to 5 days. Qty: 15 Tablet, Refills: 0       !! - Potential duplicate medications found. Please discuss with provider. Follow-up Information     Follow up With Specialties Details Why Contact Info    Your PCP  Schedule an appointment as soon as possible for a visit               The patient is asked to follow-up with their primary care provider in the next several days. They are to call tomorrow for an appointment. The patient is asked to return promptly for any increased concerns or worsening of symptoms. They can return to this emergency department or any other emergency department.         Procedures

## 2022-02-19 NOTE — ED TRIAGE NOTES
Pt was placed in EMS [place] [facility]. Pt arrives to ED via [facility] from [facility] with c/o CP onset 4 pm. Denies SOB, N/V, dizziness, radiation of pain. Received 324 mg chewable aspirin and NTG 0.4 mg SL. Pt reports relief after NTG SL. BP elevated prior to medication now hypotensive at 94/50.

## 2022-02-20 VITALS
RESPIRATION RATE: 15 BRPM | SYSTOLIC BLOOD PRESSURE: 104 MMHG | BODY MASS INDEX: 42.21 KG/M2 | HEIGHT: 69 IN | WEIGHT: 285 LBS | DIASTOLIC BLOOD PRESSURE: 67 MMHG | HEART RATE: 74 BPM | TEMPERATURE: 98.4 F | OXYGEN SATURATION: 100 %

## 2022-02-20 NOTE — ED NOTES
Transportation service called and set up for return to facility, patients son stated he would take his mom home but she refused to go with him.  [facility] gave a  time for 0430, patient made aware

## 2022-02-20 NOTE — ED NOTES
Bedside shift change report given to Cassandra Michaels (oncoming nurse) by Eliza (offgoing nurse). Report included the following information SBAR, Kardex, ED Summary, Intake/Output, MAR and Recent Results. Awaiting [facility] transport, ETA was 0730.

## 2022-02-21 LAB
CALCULATED R AXIS, ECG10: 12 DEGREES
CALCULATED T AXIS, ECG11: 149 DEGREES
DIAGNOSIS, 93000: NORMAL
Q-T INTERVAL, ECG07: 346 MS
QRS DURATION, ECG06: 86 MS
QTC CALCULATION (BEZET), ECG08: 389 MS
VENTRICULAR RATE, ECG03: 76 BPM

## 2022-03-03 ENCOUNTER — APPOINTMENT (OUTPATIENT)
Dept: GENERAL RADIOLOGY | Age: 82
End: 2022-03-03
Attending: NURSE PRACTITIONER
Payer: MEDICARE

## 2022-03-03 ENCOUNTER — HOSPITAL ENCOUNTER (EMERGENCY)
Age: 82
Discharge: HOME | End: 2022-03-03
Attending: EMERGENCY MEDICINE | Admitting: EMERGENCY MEDICINE
Payer: MEDICARE

## 2022-03-03 VITALS
WEIGHT: 293 LBS | HEART RATE: 81 BPM | SYSTOLIC BLOOD PRESSURE: 135 MMHG | OXYGEN SATURATION: 93 % | TEMPERATURE: 98.2 F | RESPIRATION RATE: 18 BRPM | HEIGHT: 68 IN | BODY MASS INDEX: 44.41 KG/M2 | DIASTOLIC BLOOD PRESSURE: 70 MMHG

## 2022-03-03 DIAGNOSIS — M79.672 LEFT FOOT PAIN: Primary | ICD-10-CM

## 2022-03-03 LAB
BASOPHILS # BLD: 0 K/UL (ref 0–0.1)
BASOPHILS NFR BLD: 0 % (ref 0–1)
CREAT SERPL-MCNC: 1.22 MG/DL (ref 0.55–1.02)
CRP SERPL-MCNC: 4.46 MG/DL (ref 0–0.6)
DIFFERENTIAL METHOD BLD: ABNORMAL
EOSINOPHIL # BLD: 0.1 K/UL (ref 0–0.4)
EOSINOPHIL NFR BLD: 1 % (ref 0–7)
ERYTHROCYTE [DISTWIDTH] IN BLOOD BY AUTOMATED COUNT: 15.5 % (ref 11.5–14.5)
ERYTHROCYTE [SEDIMENTATION RATE] IN BLOOD: 53 MM/HR (ref 0–30)
HCT VFR BLD AUTO: 43.3 % (ref 35–47)
HGB BLD-MCNC: 14 G/DL (ref 11.5–16)
IMM GRANULOCYTES # BLD AUTO: 0.1 K/UL (ref 0–0.04)
IMM GRANULOCYTES NFR BLD AUTO: 1 % (ref 0–0.5)
LYMPHOCYTES # BLD: 2.1 K/UL (ref 0.8–3.5)
LYMPHOCYTES NFR BLD: 27 % (ref 12–49)
MCH RBC QN AUTO: 28.2 PG (ref 26–34)
MCHC RBC AUTO-ENTMCNC: 32.3 G/DL (ref 30–36.5)
MCV RBC AUTO: 87.3 FL (ref 80–99)
MONOCYTES # BLD: 0.6 K/UL (ref 0–1)
MONOCYTES NFR BLD: 8 % (ref 5–13)
NEUTS SEG # BLD: 4.8 K/UL (ref 1.8–8)
NEUTS SEG NFR BLD: 63 % (ref 32–75)
NRBC # BLD: 0 K/UL (ref 0–0.01)
NRBC BLD-RTO: 0 PER 100 WBC
PATH REV BLD -IMP: ABNORMAL
PLATELET # BLD AUTO: 133 K/UL (ref 150–400)
PLATELET COMMENTS,PCOM: ABNORMAL
RBC # BLD AUTO: 4.96 M/UL (ref 3.8–5.2)
RBC MORPH BLD: ABNORMAL
WBC # BLD AUTO: 7.7 K/UL (ref 3.6–11)

## 2022-03-03 PROCEDURE — 85652 RBC SED RATE AUTOMATED: CPT

## 2022-03-03 PROCEDURE — 84550 ASSAY OF BLOOD/URIC ACID: CPT

## 2022-03-03 PROCEDURE — 85025 COMPLETE CBC W/AUTO DIFF WBC: CPT

## 2022-03-03 PROCEDURE — 99284 EMERGENCY DEPT VISIT MOD MDM: CPT

## 2022-03-03 PROCEDURE — 36415 COLL VENOUS BLD VENIPUNCTURE: CPT

## 2022-03-03 PROCEDURE — 73630 X-RAY EXAM OF FOOT: CPT

## 2022-03-03 PROCEDURE — 74011250637 HC RX REV CODE- 250/637: Performed by: NURSE PRACTITIONER

## 2022-03-03 PROCEDURE — 82565 ASSAY OF CREATININE: CPT

## 2022-03-03 PROCEDURE — 86140 C-REACTIVE PROTEIN: CPT

## 2022-03-03 RX ORDER — HYDROCODONE BITARTRATE AND ACETAMINOPHEN 5; 325 MG/1; MG/1
1 TABLET ORAL
Qty: 12 TABLET | Refills: 0 | Status: SHIPPED | OUTPATIENT
Start: 2022-03-03 | End: 2022-03-06

## 2022-03-03 RX ORDER — DICLOFENAC SODIUM 10 MG/G
4 GEL TOPICAL
Status: COMPLETED | OUTPATIENT
Start: 2022-03-03 | End: 2022-03-03

## 2022-03-03 RX ORDER — LIDOCAINE 40 MG/G
CREAM TOPICAL
Status: DISCONTINUED | OUTPATIENT
Start: 2022-03-03 | End: 2022-03-03

## 2022-03-03 RX ORDER — METHYLPREDNISOLONE 4 MG/1
TABLET ORAL
Qty: 1 DOSE PACK | Refills: 0 | Status: SHIPPED | OUTPATIENT
Start: 2022-03-03 | End: 2022-04-08 | Stop reason: ALTCHOICE

## 2022-03-03 RX ORDER — HYDROCODONE BITARTRATE AND ACETAMINOPHEN 5; 325 MG/1; MG/1
1 TABLET ORAL
Status: COMPLETED | OUTPATIENT
Start: 2022-03-03 | End: 2022-03-03

## 2022-03-03 RX ADMIN — HYDROCODONE BITARTRATE AND ACETAMINOPHEN 1 TABLET: 5; 325 TABLET ORAL at 19:47

## 2022-03-03 RX ADMIN — DICLOFENAC SODIUM 4 G: 10 GEL TOPICAL at 20:35

## 2022-03-03 NOTE — ED TRIAGE NOTES
She was recently discharged from a rehab facility due to poor ambulation. She has gout. Her family member says they gave her the wrong medication for her gout. She rates her pain 3/10 sitting and 10/10 when she tries to walk.

## 2022-03-03 NOTE — ED PROVIDER NOTES
16-year-old female with past medical history of arthritis, A. fib, diastolic heart failure, GERD, hypertension, TAM, and CKD presents to the ER today for evaluation of ongoing atraumatic left foot pain. Patient states that she was seen at this facility for similar symptoms on 1/30/2022, in which she was told that she most likely has gouty arthritis and that they did nothing for her but started her on \"the wrong medication\" and told her to follow-up with her primary care doctor. Since then, the patient has been having persistent pain that is mild at rest and excruciating with any attempts to bear weight. She articulates her pain as being along the dorsum of her left foot and her great toe. She denies any fever/chills or known injury to the affected extremity. She denies any formal diagnosis of gout.            Past Medical History:   Diagnosis Date    Arthritis     Atrial fibrillation ([facility] [address]     Bronchitis, acute 61/67/3518    Diastolic heart failure (HCC)     WHITING (dyspnea on exertion)     GERD (gastroesophageal reflux disease)     Hypertension     Obesity     TAM (obstructive sleep apnea)     Other ill-defined conditions(799.89)     \"fluid in the lung\"    Pneumonia 6/7/2018    Sleep apnea        Past Surgical History:   Procedure Laterality Date    COLONOSCOPY N/A 3/30/2017    COLONOS[facility] by [facility]ordy [facility]goza [facility] at [facility] ENDOSCOPY    HX CHOLECYSTECTOMY      HX HERNIA REPAIR      HX ORTHOPAEDIC      right and left total knee replacement    HX ORTHOPAEDIC      right shoulder    HX OTHER SURGICAL      kidney stone surgery    HX OTHER SURGICAL      both eyes cataract    HI CARDIOVERSION ELECTIVE ARRHYTHMIA EXTERNAL N/A 10/9/2019    EP CARDIOVERSION performed by Amara Castañeda MD at [facility] [facility]         Family History:   Problem Relation Age of Onset    Tuberculosis Mother     Lung Disease Father        Social History     Socioeconomic History    Marital status: SINGLE Spouse name: Not on file    Number of children: Not on file    Years of education: Not on file    Highest education level: Not on file   Occupational History    Not on file   Tobacco Use    Smoking status: Former Smoker     Packs/day: 1.00    Smokeless tobacco: Never Used    Tobacco comment: quit at age 43,smoked for 16 years   Substance and Sexual Activity    Alcohol use: No    Drug use: No    Sexual activity: Not on file   Other Topics Concern     Service Not Asked    Blood Transfusions Not Asked    Caffeine Concern Not Asked    Occupational Exposure Not Asked    Hobby Hazards Not Asked    Sleep Concern Not Asked    Stress Concern Not Asked    Weight Concern Not Asked    Special Diet Not Asked    Back Care Not Asked    Exercise Not Asked    Bike Helmet Not Asked   [address] Not Asked    Self-Exams Not Asked   Social History Narrative    Lives at home with 2 sons. Manages      Social Determinants of Health     Financial Resource Strain:     Difficulty of Paying Living Expenses: Not on file   Food Insecurity:     Worried About Running Out of Food in the Last Year: Not on file    Astrid of Food in the Last Year: Not on file   Transportation Needs:     Lack of Transportation (Medical): Not on file    Lack of Transportation (Non-Medical):  Not on file   Physical Activity:     Days of Exercise per Week: Not on file    Minutes of Exercise per Session: Not on file   Stress:     Feeling of Stress : Not on file   Social Connections:     Frequency of Communication with Friends and Family: Not on file    Frequency of Social Gatherings with Friends and Family: Not on file    Attends Temple Services: Not on file    Active Member of Clubs or Organizations: Not on file    Attends Club or Organization Meetings: Not on file    Marital Status: Not on file   Intimate Partner Violence:     Fear of Current or Ex-Partner: Not on file    Emotionally Abused: Not on file   Pricila Physically Abused: Not on file    Sexually Abused: Not on file   Housing Stability:     Unable to Pay for Housing in the Last Year: Not on file    Number of Places Lived in the Last Year: Not on file    Unstable Housing in the Last Year: Not on file         ALLERGIES: Penicillins    Review of Systems   Constitutional: Negative for fever. HENT: Negative for sore throat. Eyes: Negative for visual disturbance. Respiratory: Negative for shortness of breath. Cardiovascular: Negative for palpitations. Gastrointestinal: Negative for vomiting. Genitourinary: Negative for dysuria. Musculoskeletal: Positive for arthralgias and gait problem. Skin: Negative for rash. Neurological: Negative for dizziness. Psychiatric/Behavioral: Negative for dysphoric mood. Vitals:    03/03/22 1716   BP: 121/87   Pulse: 84   Resp: 16   Temp: 97.6 °F (36.4 °C)   SpO2: 99%            Physical Exam  Vitals and nursing note reviewed. Constitutional:       General: She is not in acute distress. Appearance: Normal appearance. She is not ill-appearing. HENT:      Head: Normocephalic. Nose: Nose normal.   Eyes:      General: No scleral icterus. Extraocular Movements: Extraocular movements intact. Cardiovascular:      Rate and Rhythm: Normal rate and regular rhythm. Pulses:           Posterior tibial pulses are 2+ on the right side and 2+ on the left side. Pulmonary:      Effort: Pulmonary effort is normal.   Abdominal:      General: There is no distension. Tenderness: There is no guarding. Musculoskeletal:         General: Normal range of motion. Cervical back: Normal range of motion and neck supple. Left foot: Swelling and tenderness present. Comments: Diffuse soft tissue swelling along the dorsal aspect of the left foot with diffuse pain to palpation. No appreciable erythema or increased warmth to touch. Skin:     General: Skin is warm and dry.    Neurological: Mental Status: She is alert and oriented to person, place, and time. Mental status is at baseline. Psychiatric:         Mood and Affect: Mood normal.         Behavior: Behavior is agitated. Thought Content: Thought content normal.         Judgment: Judgment normal.          [facility]      VITAL SIGNS:  Patient Vitals for the past 4 hrs:   Temp Pulse Resp BP SpO2   03/03/22 2032 98.2 °F (36.8 °C) 81 18 135/70 93 %   03/03/22 1716 97.6 °F (36.4 °C) 84 16 121/87 99 %         LABS:  Recent Results (from the past 6 hour(s))   SED RATE (ESR)    Collection Time: 03/03/22  7:03 PM   Result Value Ref Range    Sed rate, automated 53 (H) 0 - 30 mm/hr   C REACTIVE PROTEIN, QT    Collection Time: 03/03/22  7:03 PM   Result Value Ref Range    C-Reactive protein 4.46 (H) 0.00 - 0.60 mg/dL   CBC WITH AUTOMATED DIFF    Collection Time: 03/03/22  7:03 PM   Result Value Ref Range    WBC 7.7 3.6 - 11.0 K/uL    RBC 4.96 3.80 - 5.20 M/uL    HGB 14.0 11.5 - 16.0 g/dL    HCT 43.3 35.0 - 47.0 %    MCV 87.3 80.0 - 99.0 FL    MCH 28.2 26.0 - 34.0 PG    MCHC 32.3 30.0 - 36.5 g/dL    RDW 15.5 (H) 11.5 - 14.5 %    PLATELET 990 (L) 914 - 400 K/uL    NRBC 0.0 0  WBC    ABSOLUTE NRBC 0.00 0.00 - 0.01 K/uL    NEUTROPHILS 63 32 - 75 %    LYMPHOCYTES 27 12 - 49 %    MONOCYTES 8 5 - 13 %    EOSINOPHILS 1 0 - 7 %    BASOPHILS 0 0 - 1 %    IMMATURE GRANULOCYTES 1 (H) 0.0 - 0.5 %    ABS. NEUTROPHILS 4.8 1.8 - 8.0 K/UL    ABS. LYMPHOCYTES 2.1 0.8 - 3.5 K/UL    ABS. MONOCYTES 0.6 0.0 - 1.0 K/UL    ABS. EOSINOPHILS 0.1 0.0 - 0.4 K/UL    ABS. BASOPHILS 0.0 0.0 - 0.1 K/UL    ABS. IMM.  GRANS. 0.1 (H) 0.00 - 0.04 K/UL    DF SMEAR SCANNED      PLATELET COMMENTS Large Platelets      RBC COMMENTS ANISOCYTOSIS  1+        Pathologist review ATYPICAL LYMPHOCYTES PRESENT     CREATININE    Collection Time: 03/03/22  7:03 PM   Result Value Ref Range    Creatinine 1.22 (H) 0.55 - 1.02 MG/DL    GFR est AA 51 (L) >60 ml/min/1.73m2    GFR est non-AA 42 (L) >60 ml/min/1.73m2        IMAGING:  XR FOOT LT MIN 3 V   Final Result   Soft tissue swelling. Medications During Visit:  Medications   HYDROcodone-acetaminophen (NORCO) 5-325 mg per tablet 1 Tablet (1 Tablet Oral Given 3/3/22 1947)   diclofenac (VOLTAREN) 1 % topical gel 4 g (4 g Topical Given 3/3/22 2035)         DECISION MAKING:  Tamra Nichols is a 81 y.o. female who comes in as above. Work-up shows mildly elevated acute phase reactants and soft tissue swelling of the left foot. Pain improving after hydrocodone and Voltaren cream.  Patient will be provided with postop shoe and instructions to offload weight with cane/walker at home until pain improves. Recommended close follow-up with PCP and podiatry for further work-up and management. The clinical decision making for this encounter included ordering and interpreting the above diagnostic tests with comparison to prior studies that are within our EMR. Past medical and surgical histories were reviewed, as were records from recent outpatient and emergency department visits. The above results discussed and reviewed with the patient. Patient verbalized understanding of the care plan, including any changes to current outpatient medication regimen, discussed disease process, symptom control, and follow-up care. Return precautions reviewed. IMPRESSION:  1. Left foot pain        DISPOSITION:  Discharged      Current Discharge Medication List      START taking these medications    Details   HYDROcodone-acetaminophen (Lorcet, HYDROcodone,) 5-325 mg per tablet Take 1 Tablet by mouth every six (6) hours as needed for Pain for up to 3 days. Max Daily Amount: 4 Tablets.   Qty: 12 Tablet, Refills: 0  Start date: 3/3/2022, End date: 3/6/2022    Associated Diagnoses: Left foot pain      methylPREDNISolone (Medrol, Malik,) 4 mg tablet See packaging for dosing instructions  Qty: 1 Dose Pack, Refills: 0  Start date: 3/3/2022              Follow-up Information Follow up With Specialties Details Why Contact Info    Shama Nguyễn MD Internal Medicine Schedule an appointment as soon as possible for a visit   [address]  [phone]      Dheeraj James DPM Foot and Ankle Surgery Schedule an appointment as soon as possible for a visit   [address]  [address]  [facility] [phone]              The patient is asked to follow-up with their primary care provider in the next several days. They are to call tomorrow for an appointment. The patient is asked to return promptly for any increased concerns or worsening of symptoms. They can return to this emergency department or any other emergency department.       Procedures

## 2022-03-04 LAB — URATE SERPL-MCNC: 6.1 MG/DL (ref 2.6–6)

## 2022-03-04 NOTE — ED NOTES
Patient received discharge instructions by MD and RN. Reviewed discharge instructions with patient. Patient verbalized understanding of discharge teaching. Patient left ED with family member. Patient reports relief of most intense pain.

## 2022-03-09 ENCOUNTER — OFFICE VISIT (OUTPATIENT)
Dept: INTERNAL MEDICINE CLINIC | Age: 82
End: 2022-03-09
Payer: MEDICARE

## 2022-03-09 VITALS
RESPIRATION RATE: 14 BRPM | OXYGEN SATURATION: 94 % | DIASTOLIC BLOOD PRESSURE: 74 MMHG | SYSTOLIC BLOOD PRESSURE: 136 MMHG | HEART RATE: 63 BPM | HEIGHT: 69 IN | BODY MASS INDEX: 43.4 KG/M2 | TEMPERATURE: 98.1 F | WEIGHT: 293 LBS

## 2022-03-09 DIAGNOSIS — Z00.00 MEDICARE ANNUAL WELLNESS VISIT, INITIAL: ICD-10-CM

## 2022-03-09 DIAGNOSIS — Z78.0 POST-MENOPAUSAL: ICD-10-CM

## 2022-03-09 DIAGNOSIS — Z71.89 ACP (ADVANCE CARE PLANNING): ICD-10-CM

## 2022-03-09 DIAGNOSIS — M10.9 GOUT, UNSPECIFIED CAUSE, UNSPECIFIED CHRONICITY, UNSPECIFIED SITE: ICD-10-CM

## 2022-03-09 DIAGNOSIS — I48.0 PAROXYSMAL ATRIAL FIBRILLATION (HCC): ICD-10-CM

## 2022-03-09 DIAGNOSIS — I50.32 CHRONIC DIASTOLIC CONGESTIVE HEART FAILURE (HCC): ICD-10-CM

## 2022-03-09 DIAGNOSIS — E66.01 OBESITY, MORBID (HCC): ICD-10-CM

## 2022-03-09 DIAGNOSIS — G47.33 OSA (OBSTRUCTIVE SLEEP APNEA): Primary | ICD-10-CM

## 2022-03-09 PROCEDURE — G8432 DEP SCR NOT DOC, RNG: HCPCS | Performed by: INTERNAL MEDICINE

## 2022-03-09 PROCEDURE — 1101F PT FALLS ASSESS-DOCD LE1/YR: CPT | Performed by: INTERNAL MEDICINE

## 2022-03-09 PROCEDURE — 99214 OFFICE O/P EST MOD 30 MIN: CPT | Performed by: INTERNAL MEDICINE

## 2022-03-09 PROCEDURE — 1090F PRES/ABSN URINE INCON ASSESS: CPT | Performed by: INTERNAL MEDICINE

## 2022-03-09 PROCEDURE — G8417 CALC BMI ABV UP PARAM F/U: HCPCS | Performed by: INTERNAL MEDICINE

## 2022-03-09 PROCEDURE — 99497 ADVNCD CARE PLAN 30 MIN: CPT | Performed by: INTERNAL MEDICINE

## 2022-03-09 PROCEDURE — G8427 DOCREV CUR MEDS BY ELIG CLIN: HCPCS | Performed by: INTERNAL MEDICINE

## 2022-03-09 PROCEDURE — G8536 NO DOC ELDER MAL SCRN: HCPCS | Performed by: INTERNAL MEDICINE

## 2022-03-09 PROCEDURE — G0438 PPPS, INITIAL VISIT: HCPCS | Performed by: INTERNAL MEDICINE

## 2022-03-09 PROCEDURE — G8752 SYS BP LESS 140: HCPCS | Performed by: INTERNAL MEDICINE

## 2022-03-09 PROCEDURE — G8754 DIAS BP LESS 90: HCPCS | Performed by: INTERNAL MEDICINE

## 2022-03-09 PROCEDURE — G8400 PT W/DXA NO RESULTS DOC: HCPCS | Performed by: INTERNAL MEDICINE

## 2022-03-09 RX ORDER — ALBUTEROL SULFATE 90 UG/1
2 AEROSOL, METERED RESPIRATORY (INHALATION)
Qty: 18 G | Refills: 0 | Status: SHIPPED | OUTPATIENT
Start: 2022-03-09 | End: 2022-05-26

## 2022-03-09 RX ORDER — ALLOPURINOL 100 MG/1
100 TABLET ORAL DAILY
COMMUNITY
Start: 2022-02-28 | End: 2022-03-09 | Stop reason: ALTCHOICE

## 2022-03-09 RX ORDER — ALLOPURINOL 300 MG/1
300 TABLET ORAL DAILY
Qty: 90 TABLET | Refills: 1 | Status: SHIPPED | OUTPATIENT
Start: 2022-03-09 | End: 2022-09-02

## 2022-03-09 RX ORDER — OMEPRAZOLE 20 MG/1
20 CAPSULE, DELAYED RELEASE ORAL DAILY
COMMUNITY
End: 2022-06-08 | Stop reason: DRUGHIGH

## 2022-03-09 NOTE — PROGRESS NOTES
Health Maintenance Due   Topic Date Due    COVID-19 Vaccine (1) Never done    DTaP/Tdap/Td series (1 - Tdap) Never done    Bone Densitometry (Dexa) Screening  Never done    Medicare Yearly Exam  Never done    Flu Vaccine (1) Never done       Chief Complaint   Patient presents with   [facility] Annual Wellness Visit       1. Have you been to the ER, urgent care clinic since your last visit? Hospitalized since your last visit? Yes, 2/14/22, 2/19/22, 3/3/22,Abdominal pain , chest pain, foot pain    2. Have you seen or consulted any other health care providers outside of the [address] [place] [address] since your last visit? Include any pap smears or colon screening. No    3) Do you have an Advance Directive on file? no    4) Are you interested in receiving information on Advance Directives? NO      Patient is accompanied by Sons I have received verbal consent from Angy Fleming to discuss any/all medical information while they are present in the room.

## 2022-03-09 NOTE — PROGRESS NOTES
This is an Initial Medicare Annual Wellness Exam (AWV) (Performed 12 months after IPPE or effective date of Medicare Part B enrollment, Once in a lifetime)    I have reviewed the patient's medical history in detail and updated the computerized patient record. Assessment/Plan   Education and counseling provided:  Are appropriate based on today's review and evaluation  End-of-Life planning (with patient's consent)  Pneumococcal Vaccine  Screening Mammography  Bone mass measurement (DEXA)  Screening for glaucoma         Depression Risk Factor Screening     3 most recent PHQ Screens 3/9/2022   Little interest or pleasure in doing things More than half the days   Feeling down, depressed, irritable, or hopeless More than half the days   Total Score PHQ 2 4   Trouble falling or staying asleep, or sleeping too much Several days   Feeling tired or having little energy Several days   Poor appetite, weight loss, or overeating Several days   Feeling bad about yourself - or that you are a failure or have let yourself or your family down Several days   Trouble concentrating on things such as school, work, reading, or watching TV Not at all   Moving or speaking so slowly that other people could have noticed; or the opposite being so fidgety that others notice Not at all   Thoughts of being better off dead, or hurting yourself in some way Not at all   PHQ 9 Score 8   How difficult have these problems made it for you to do your work, take care of your home and get along with others Not difficult at all       Alcohol & Drug Abuse Risk Screen    Do you average more than 1 drink per night or more than 7 drinks a week:  No    On any one occasion in the past three months have you have had more than 3 drinks containing alcohol:  No          Functional Ability and Level of Safety    Hearing: Hearing is good. Activities of Daily Living:   The home contains: walker ,cane  Patient needs help with:  transportation, shopping, preparing meals, laundry and housework     Ambulation: with mild difficulty      Fall Risk:  Fall Risk Assessment, last 12 mths 3/9/2022   Able to walk? Yes   Fall in past 12 months? 0   Do you feel unsteady? 0   Are you worried about falling 0   Is TUG test greater than 12 seconds?  -   Is the gait abnormal? -   Number of falls in past 12 months -      Abuse Screen:  Patient is not abused       Cognitive Screening    Has your family/caregiver stated any concerns about your memory: no     Cognitive Screening: Normal - MMSE (Mini Mental Status Exam)    Health Maintenance Due     Health Maintenance Due   Topic Date Due    COVID-19 Vaccine (1) Never done    DTaP/Tdap/Td series (1 - Tdap) Never done    Bone Densitometry (Dexa) Screening  Never done    Flu Vaccine (1) Never done       Patient Care Team   Patient Care Team:  Unknown, Provider, DPM as PCP - General (Podiatry)  Rianna Snow NP as PCP - [facility] Empaneled Provider  Jennifer Hollis MD (Cardiology)  Carla Robertson, RN as Ambulatory Care Manager  Carla Robertson, RN as Ambulatory Care Manager    History     Patient Active Problem List   Diagnosis Code    A-fib [facility]) I48.91    Hypertension I10    Obesity, morbid ([address] E66.01    Chronic diastolic congestive heart failure ([address] I50.32    At moderate risk for fall Z91.81    Shortness of breath R06.02     Past Medical History:   Diagnosis Date    Arthritis     Atrial fibrillation ([address]     Bronchitis, acute 03/71/5153    Diastolic heart failure ([address]     WHITING (dyspnea on exertion)     GERD (gastroesophageal reflux disease)     Hypertension     Obesity     TAM (obstructive sleep apnea)     Other ill-defined conditions(799.89)     \"fluid in the lung\"    Pneumonia 6/7/2018    Sleep apnea       Past Surgical History:   Procedure Laterality Date    COLONOSCOPY N/A 3/30/2017    COL[facility] performed by [facility]aelle [facility] MD at [facility] ENDOSCOPY    HX CHOLECYSTECTOMY      HX [address]      HX ORTHOPAEDIC      right and left total knee replacement    HX ORTHOPAEDIC      right shoulder    HX OTHER SURGICAL      kidney stone surgery    HX OTHER SURGICAL      both eyes cataract    [place] CARDIOVERSION ELECTIVE ARRHYTHMIA EXTERNAL N/A 10/9/2019    EP CARDIOVERSION performed by Melvin Rowland MD at [address], [facility]/[address] [facility]     Current Outpatient Medications   Medication Sig Dispense Refill    omeprazole (PRILOSEC) 20 mg capsule Take 20 mg by mouth daily.  methylPREDNISolone (Medrol, Malik,) 4 mg tablet See packaging for dosing instructions 1 Dose Pack 0    ondansetron (ZOFRAN ODT) 4 mg disintegrating tablet Take 1 Tablet by mouth every eight (8) hours as needed for Nausea for up to 12 doses. 12 Tablet 0    bumetanide (BUMEX) 1 mg tablet TAKE 1 TABLET BY MOUTH EVERY DAY 60 Tablet 0    potassium chloride (K-DUR, KLOR-CON M20) 20 mEq tablet Take 20 mEq by mouth daily.  spironolactone (ALDACTONE) 25 mg tablet Take 25 mg by mouth daily.  albuterol (PROVENTIL HFA, VENTOLIN HFA, PROAIR HFA) 90 mcg/actuation inhaler Take 2 Puffs by inhalation every six (6) hours as needed for Wheezing or Shortness of Breath. 18 g 0    dilTIAZem ER (Tiadylt ER) 240 mg capsule Tiadylt  mg capsule,extended release   TAKE 1 CAPSULE BY MOUTH EVERY DAY      rivaroxaban (XARELTO) 20 mg tab tablet Take 1 Tab by mouth daily (with lunch). Indications: PREVENT THROMBOEMBOLISM IN CHRONIC ATRIAL FIBRILLATION 30 Tab 1    aspirin delayed-release 81 mg tablet Take 1 Tab by mouth daily. 30 Tab 0    allopurinoL (ZYLOPRIM) 100 mg tablet Take 100 mg by mouth daily.        Allergies   Allergen Reactions    Penicillins Hives       Family History   Problem Relation Age of Onset    Tuberculosis Mother     Lung Disease Father      Social History     Tobacco Use    Smoking status: Former Smoker     Packs/day: 1.00    Smokeless tobacco: Never Used    Tobacco comment: quit at age 42,smoked for 16 years   Substance Use Topics    Alcohol use: No       Christiana Hutton MD

## 2022-03-09 NOTE — PROGRESS NOTES
HISTORY OF PRESENT ILLNESS  Kit Short is a 81 y.o. female here to establish care in my practice. She is accompanied by her 2 adult son. I have seen her in a nursing home in the past.  She is atrial fibrillation. No palpitation. Doing well. On month multiple medication. Has history of diastolic heart failure. Taking Bumex and Aldactone. No leg swelling. No shortness of breath orthopnea. Report both feet redness and tenderness. Also had pain on the heel. She went to urgent care last week, received Medrol Dosepak. Pain has improved. Lab work done recently, showed elevated uric acid level. She was started on allopurinol 100 mg, not enough. Report wheezing on and off. Try to get albuterol inhaler. Has history of sleep apnea, had CPAP machine, she is not using it. According to son's patient snores a lot. Need bone density. Here for Medicare wellness visit. Has no living will. HPI    Review of Systems   Constitutional: Negative. HENT: Negative. Eyes: Negative. Respiratory: Negative. Cardiovascular: Negative. Gastrointestinal: Negative. Genitourinary: Negative. Musculoskeletal: Negative. Skin: Negative. Neurological: Negative. Endo/Heme/Allergies: Negative. Psychiatric/Behavioral: Negative. Physical Exam  Constitutional:       Appearance: Normal appearance. She is obese. HENT:      Head: Normocephalic and atraumatic. Right Ear: Tympanic membrane normal.      Left Ear: Tympanic membrane normal.      Nose: Nose normal.      Mouth/Throat:      Mouth: Mucous membranes are moist.   Eyes:      Extraocular Movements: Extraocular movements intact. Conjunctiva/sclera: Conjunctivae normal.      Pupils: Pupils are equal, round, and reactive to light. Cardiovascular:      Rate and Rhythm: Normal rate and regular rhythm. Pulses: Normal pulses. Heart sounds: Normal heart sounds.    Pulmonary:      Effort: Pulmonary effort is normal.      Breath sounds: Normal breath sounds. Abdominal:      General: Abdomen is flat. Bowel sounds are normal.      Palpations: Abdomen is soft. Musculoskeletal:         General: Tenderness present. Cervical back: Normal range of motion and neck supple. Comments: Tenderness on both great toe area. Warm to touch. Dorsalis pedis pulse normal.  Heel slightly tender. Neurological:      General: No focal deficit present. Mental Status: She is alert and oriented to person, place, and time. Mental status is at baseline. Psychiatric:         Mood and Affect: Mood normal.         Behavior: Behavior normal.         Thought Content: Thought content normal.         ASSESSMENT and PLAN  Diagnoses and all orders for this visit:    1. TAM (obstructive sleep apnea)    She already diagnosed with sleep apnea, still snoring. Not using CPAP machine. Will refer her to sleep specialist for evaluation.  -     SLEEP MEDICINE REFERRAL    2. Chronic diastolic congestive heart failure (HCC)    Compensated. On Bumex and Aldactone. Wheezing a lot. Will give,  -     albuterol (PROVENTIL HFA, VENTOLIN HFA, PROAIR HFA) 90 mcg/actuation inhaler; Take 2 Puffs by inhalation every six (6) hours as needed for Wheezing or Shortness of Breath. 3. Paroxysmal atrial fibrillation (HCC)  Rate and rhythm control. On diltiazem and baby aspirin. Also on Xarelto. Seeing a cardiologist.  4. Gout, unspecified cause, unspecified chronicity, unspecified site    Having aches and pains and both feet, last week she went to urgent care and got prednisone Dosepak which helped her. Had a gout attack. Lab work done, uric acid level 6.1, still elevated. She is on allopurinol 100 mg tablet. Will discontinue it. We will give,  -     allopurinoL (ZYLOPRIM) 300 mg tablet; Take 1 Tablet by mouth daily. DC allopurinol 100 mg    5. Obesity, morbid ([address]  Addressed weight, diet and exercise with patient.  Decrease carbohydrates (white foods, sweet foods, sweet drinks and alcohol), increase green leafy vegetables and protein (lean meats and beans) with each meal. Avoid fried foods. Eat 3-5 small meals daily. Do not skip meals. Increase water intake. Increase physical activity to 30 minutes daily for health benefit or 60 minutes daily to prevent weight regain, as tolerated. Get 7-8 hours uninterrupted sleep nightly. 6. Post-menopausal    We will check,  -     DEXA BONE DENSITY STUDY AXIAL; Future    Discussed expected course/resolution/complications of diagnosis in detail with patient. Medication risks/benefits/costs/interactions/alternatives discussed with patient. Discussed COVID-19 infection precaution with patient. Pt was given an after visit summary which includes diagnoses, current medications & vitals. Pt expressed understanding with the diagnosis and plan.

## 2022-03-09 NOTE — ACP (ADVANCE CARE PLANNING)

## 2022-03-09 NOTE — PATIENT INSTRUCTIONS
Medicare Wellness Visit, Female     The best way to live healthy is to have a lifestyle where you eat a well-balanced diet, exercise regularly, limit alcohol use, and quit all forms of tobacco/nicotine, if applicable. Regular preventive services are another way to keep healthy. Preventive services (vaccines, screening tests, monitoring & exams) can help personalize your care plan, which helps you manage your own care. Screening tests can find health problems at the earliest stages, when they are easiest to treat. [facility] follows the current, evidence-based guidelines published by the [facility] ([facility]) when recommending preventive services for our patients. Because we follow these guidelines, sometimes recommendations change over time as research supports it. (For example, mammograms used to be recommended annually. Even though Medicare will still pay for an annual mammogram, the newer guidelines recommend a mammogram every two years for women of average risk). Of course, you and your doctor may decide to screen more often for some diseases, based on your risk and your co-morbidities (chronic disease you are already diagnosed with). Preventive services for you include:  - Medicare offers their members a free annual wellness visit, which is time for you and your primary care provider to discuss and plan for your preventive service needs. Take advantage of this benefit every year!  -All adults over the age of 72 should receive the recommended pneumonia vaccines. Current USPSTF guidelines recommend a series of two vaccines for the best pneumonia protection.   -All adults should have a flu vaccine yearly and a tetanus vaccine every 10 years.   -All adults age 48 and older should receive the shingles vaccines (series of two vaccines).       -All adults age 38-68 who are overweight should have a diabetes screening test once every three years.   -All adults born between [birth year] and 1964 should be screened once for Hepatitis C.  -Other screening tests and preventive services for persons with diabetes include: an eye exam to screen for diabetic retinopathy, a kidney function test, a foot exam, and stricter control over your cholesterol.   -Cardiovascular screening for adults with routine risk involves an electrocardiogram (ECG) at intervals determined by your doctor.   -Colorectal cancer screenings should be done for adults age 54-65 with no increased risk factors for colorectal cancer. There are a number of acceptable methods of screening for this type of cancer. Each test has its own benefits and drawbacks. Discuss with your doctor what is most appropriate for you during your annual wellness visit. The different tests include: colonoscopy (considered the best screening method), a fecal occult blood test, a fecal DNA test, and sigmoidoscopy.    -A bone mass density test is recommended when a woman turns 65 to screen for osteoporosis. This test is only recommended one time, as a screening. Some providers will use this same test as a disease monitoring tool if you already have osteoporosis. -Breast cancer screenings are recommended every other year for women of normal risk, age 54-69.  -Cervical cancer screenings for women over age 72 are only recommended with certain risk factors.      Here is a list of your current Health Maintenance items (your personalized list of preventive services) with a due date:  Health Maintenance Due   Topic Date Due    COVID-19 Vaccine (1) Never done    DTaP/Tdap/Td  (1 - Tdap) Never done    Bone Mineral Density   Never done    Yearly Flu Vaccine (1) Never done

## 2022-03-11 ENCOUNTER — PATIENT OUTREACH (OUTPATIENT)
Dept: CASE MANAGEMENT | Age: 82
End: 2022-03-11

## 2022-03-11 NOTE — PROGRESS NOTES
Ambulatory Care Management Note      Date/Time:  3/11/2022 3:21 PM    This patient was received as a referral from Daily assignment. Top Challenges reviewed with the provider   - patient reports increased edema legs/ feet and WHITING. No orthopnea and no SOB at rest. Patient reports has taken diuretics as ordered. Advised to call cardiology if swelling and /or SOB worsens    - per son patient unable to stand due to lower ext edema and pain in feet/ likely multifactorial with recent gout flare and fluid build up. Patient is bed bound / incontinent/ advised frequent turning in bed to avoid bed sores per family. Patient requesting [facility] for PT (strengthening) and SN (to evaluate for HF) - patient agrees not to decline visits and to participate    - overall decline in ADL abilities/ family providing more care provided son info on how to apply for Medicaid and have patient screened for long term care aide services in case more assistance is needed for patient in the home          Ambulatory  contacted patient for discussion and case management of debility, HF, foot pain/gout  Summary of patients top problems:   1. Gout- patient recently seen in ER 3/3 for foot pain / gouty arthritis , treated with Prednisone. Saw PCP for follow up Uric acid elevated. On Allopurinol. Patient saw podiatry Dr Olivier Mcclellan today who advised patient may not be on appropriate fluid med per patients son to manage lower ext fluid. Patient still having foot pain/ trouble with standing / weakness . Requesting PT for [facility]  2. Debility- Per son patient is currently unable to stand at all due to edema and pain in feet . Currently bed / chair bound. Incontinent in briefs and family assisting to bath and change briefs. Requesting [facility] PT to help strengthen gait and improve mobility. ACM will ask PCP to order . Patient has had poor compliance in the past with participation with [facility]. Today she is in agreement with full participation if [facility] engages. 3. HF- Patient with LE swelling and WHITING. Discussed with son ensuring she is taking diuretics consistently , in the past she was limiting them due to inability to get to the bathroom regularly. Patient advises she is taking them as ordered. ACM discussed with son Kya Powell the importance of patients consistency with following diet properly and taking diuretics as ordered. Advised when to call cardiology if sx's worsening. Patient scheduled in April for sleep study for sleep apnea. Patient's challenges to self management identified:   depression, functional cognitive ability, functional physical ability, lack of knowledge about disease, level of motivation and support system      Medication Management:  good understanding and poor adherence    Advance Care Planning:   Does patient have an Advance Directive:  currently not on file; education provided     [employer], Referrals, and Durable Medical Equipment: none    PCP/Specialist follow up:   Future Appointments   Date Time Provider Rachna Lugo   4/18/2022  2:20 PM Fiona Griffiths MD [address] BS AMB   6/8/2022 11:15 AM Félix Kothari MD [facility] BS AMB          Goals      Prevent complications post hospitalization. 11/22/21   Daily weights-patient will begin tomorrow. She is instructed to write down and report weight gains.  Activity-  PT/OT to increase mobility and prevent falls.  Low sodium diet/fluid restrictions   Medication compliance-patient stopped taking bumex prior to hospitalization d/t excessive urination.  Patient will monitor  and report following (red flags):  - Weight gain of 2-3 lbs. in one day or 5 lbs. in one week  -increased SOB, feeling more tired or no energy, dizziness  -increase swelling to feet, ankles, legs or stomach  -CP    12/07/21   Patient has not been weighing daily d/t difficulty standing still on scales long enough to get a reading.  She will consider getting larger scale that may allow for her to stand on with walker for balance.  She denies any CP or increased SOB. She has some lower ext edema R worse than L, but this is now new. She reports some dyspnea when she walks a distance this is also not new.  Medication compliance- patient reports she is taking Bumex 1 mg daily now. Per patient, this was changed by cards from twice daily at her follow up.    12/15/21   Patient not able to weigh daily still. She does not note any increase LE swelling or abdominal distention. Feels breathing is at BL.  Diet- low sodium. She does not use table salt and tries to limit sodium. Discussed food choice during the holidays and hidden sodium.  Understand CHF action plan. 3/11/22- reviewed HF diet again with son Lupe Rendon, he states daughter is now cooking for patient and uses Ms dash . Not making prepared or frozen meals or canned soups. Patient is unable to stand for weights due to weakness in legs/ feet. Reports legs/ feet are swollen , saw podiatry today who advised considering a different or additional fluid pill. Advised to call cardiology and provided contact info to son for CAV Dr Clarence Montenegro. Discussed daily she should be taking Bumex and Spironolactone and not missing doses. Discussed if SOB progresses or worsens to seek care for patient at ER . Discussed importance of early identification of HF exacerbation and intervention with increase in diuretic ( only if directed by cardiology due to possible effect on kidneys). Patients son notes understanding. They will monitor patient and manage diet. Advised that due to trouble with mobility she would like  PT , will also ask [facility] nurse to see for HF evaluation . ACM will ask PCP to order [facility]. ACM will follow up next week to see if [facility] has accepted patient. LN    12/20/21- Discussed low NA diet options with patient for carmen meal, patient reports plan is for crab cakes and bbq ribs.  Recommended using low NA sauces and spices where able and pairing with fresh fruits and veggies. Recommend limiting fluids as patient reports drinks large amounts of fluid. Discussed monitoring for increased SOB and swelling and when to call MD. Patient reports has been working with PT/OT and mobility has been a little better. Has been able to get to the Bathroom more and has been more compliantly taking Bumex since. Agrees to watch NA intake and refill all meds needed prior to [address]. ACM will follow up in 1-2 weeks. GLORIA           Heart Failure Education outreach Date/Time: 3/11/2022 3:38 PM    Ambulatory Care Manager (ACM) contacted the family by telephone to perform Ambulatory Care Coordination. Verified name and  with family as identifiers. Provided introduction to self, and explanation of the Ambulatory Care Manager's role. ACM reviewed that a Fresh Start for Spring packet has been mailed to the them. ACM reviewed CHF zones, daily weights, fluid restriction, the importance of low sodium diet with the family. Instructed family to call their Cardiologist if they have a weight gain of 3 lbs in 1 day or 5 lbs in a week. Patient reminded that there is a physician on call 24 hours a day / 7 days a week should the patient have questions or concerns. The family verbalized understanding. Patient verbalized understanding of all information discussed. Patient has this Ambulatory Care Manager's contact information for any further questions, concerns, or needs. 3/14/22   3:55 pm  ACM contacted PCP's office for follow up on [facility] PT order spoke with Salazar Milner. Verified this message  Was received.

## 2022-03-16 ENCOUNTER — OFFICE VISIT (OUTPATIENT)
Dept: INTERNAL MEDICINE CLINIC | Age: 82
End: 2022-03-16
Payer: MEDICARE

## 2022-03-16 VITALS
RESPIRATION RATE: 16 BRPM | TEMPERATURE: 98.1 F | OXYGEN SATURATION: 98 % | HEART RATE: 72 BPM | BODY MASS INDEX: 43.71 KG/M2 | SYSTOLIC BLOOD PRESSURE: 138 MMHG | DIASTOLIC BLOOD PRESSURE: 74 MMHG | HEIGHT: 69 IN

## 2022-03-16 DIAGNOSIS — R30.0 DYSURIA: ICD-10-CM

## 2022-03-16 DIAGNOSIS — G47.33 OSA (OBSTRUCTIVE SLEEP APNEA): ICD-10-CM

## 2022-03-16 DIAGNOSIS — R26.9 GAIT ABNORMALITY: ICD-10-CM

## 2022-03-16 DIAGNOSIS — E66.01 OBESITY, MORBID (HCC): ICD-10-CM

## 2022-03-16 DIAGNOSIS — I50.32 CHRONIC DIASTOLIC CONGESTIVE HEART FAILURE (HCC): ICD-10-CM

## 2022-03-16 DIAGNOSIS — R35.0 URINE FREQUENCY: Primary | ICD-10-CM

## 2022-03-16 DIAGNOSIS — N39.41 URGE INCONTINENCE OF URINE: ICD-10-CM

## 2022-03-16 DIAGNOSIS — I48.0 PAROXYSMAL ATRIAL FIBRILLATION (HCC): ICD-10-CM

## 2022-03-16 LAB
BILIRUB UR QL STRIP: NEGATIVE
GLUCOSE UR-MCNC: NEGATIVE MG/DL
KETONES P FAST UR STRIP-MCNC: NEGATIVE MG/DL
PH UR STRIP: 7 [PH] (ref 4.6–8)
PROT UR QL STRIP: NEGATIVE
SP GR UR STRIP: 1.02 (ref 1–1.03)
UA UROBILINOGEN AMB POC: NORMAL (ref 0.2–1)
URINALYSIS CLARITY POC: NORMAL
URINALYSIS COLOR POC: NORMAL
URINE BLOOD POC: NEGATIVE
URINE LEUKOCYTES POC: NEGATIVE
URINE NITRITES POC: NEGATIVE

## 2022-03-16 PROCEDURE — G8510 SCR DEP NEG, NO PLAN REQD: HCPCS | Performed by: INTERNAL MEDICINE

## 2022-03-16 PROCEDURE — 1090F PRES/ABSN URINE INCON ASSESS: CPT | Performed by: INTERNAL MEDICINE

## 2022-03-16 PROCEDURE — G8752 SYS BP LESS 140: HCPCS | Performed by: INTERNAL MEDICINE

## 2022-03-16 PROCEDURE — 99214 OFFICE O/P EST MOD 30 MIN: CPT | Performed by: INTERNAL MEDICINE

## 2022-03-16 PROCEDURE — G8417 CALC BMI ABV UP PARAM F/U: HCPCS | Performed by: INTERNAL MEDICINE

## 2022-03-16 PROCEDURE — 81003 URINALYSIS AUTO W/O SCOPE: CPT | Performed by: INTERNAL MEDICINE

## 2022-03-16 PROCEDURE — G8400 PT W/DXA NO RESULTS DOC: HCPCS | Performed by: INTERNAL MEDICINE

## 2022-03-16 PROCEDURE — G8536 NO DOC ELDER MAL SCRN: HCPCS | Performed by: INTERNAL MEDICINE

## 2022-03-16 PROCEDURE — G8427 DOCREV CUR MEDS BY ELIG CLIN: HCPCS | Performed by: INTERNAL MEDICINE

## 2022-03-16 PROCEDURE — G8754 DIAS BP LESS 90: HCPCS | Performed by: INTERNAL MEDICINE

## 2022-03-16 PROCEDURE — 1101F PT FALLS ASSESS-DOCD LE1/YR: CPT | Performed by: INTERNAL MEDICINE

## 2022-03-16 NOTE — PROGRESS NOTES
Health Maintenance Due   Topic Date Due    COVID-19 Vaccine (1) Never done    DTaP/Tdap/Td series (1 - Tdap) Never done    Bone Densitometry (Dexa) Screening  Never done    Flu Vaccine (1) Never done       Chief Complaint   Patient presents with    Urinary Frequency    Vaginal Itching    Breathing Problem       1. Have you been to the ER, urgent care clinic since your last visit? Hospitalized since your last visit? Yes When: 03.03.22 Where: [facility] Reason for visit: FOOT PAIN    2. Have you seen or consulted any other health care providers outside of the [address] [place] [address] since your last visit? Include any pap smears or colon screening. No    3) Do you have an Advance Directive on file? no    4) Are you interested in receiving information on Advance Directives? NO      Patient is accompanied by self I have received verbal consent from Mandie Balderas to discuss any/all medical information while they are present in the room.

## 2022-03-17 DIAGNOSIS — R26.9 GAIT ABNORMALITY: Primary | ICD-10-CM

## 2022-03-18 ENCOUNTER — HOME HEALTH ADMISSION (OUTPATIENT)
Dept: HOME HEALTH SERVICES | Facility: HOME HEALTH | Age: 82
End: 2022-03-18
Payer: MEDICARE

## 2022-03-18 DIAGNOSIS — Z91.81 AT MODERATE RISK FOR FALL: Primary | ICD-10-CM

## 2022-03-18 DIAGNOSIS — R26.9 GAIT ABNORMALITY: ICD-10-CM

## 2022-03-18 PROBLEM — I48.91 A-FIB (HCC): Status: ACTIVE | Noted: 2018-03-30

## 2022-03-19 ENCOUNTER — HOME CARE VISIT (OUTPATIENT)
Dept: SCHEDULING | Facility: HOME HEALTH | Age: 82
End: 2022-03-19
Payer: MEDICARE

## 2022-03-19 PROBLEM — E66.01 OBESITY, MORBID (HCC): Status: ACTIVE | Noted: 2018-08-07

## 2022-03-19 PROBLEM — I10 HYPERTENSION: Status: ACTIVE | Noted: 2018-03-30

## 2022-03-19 PROBLEM — I50.32 CHRONIC DIASTOLIC CONGESTIVE HEART FAILURE (HCC): Status: ACTIVE | Noted: 2019-11-04

## 2022-03-19 PROCEDURE — 400013 HH SOC

## 2022-03-19 PROCEDURE — 400018 HH-NO PAY CLAIM PROCEDURE

## 2022-03-19 PROCEDURE — 3331090001 HH PPS REVENUE CREDIT

## 2022-03-19 PROCEDURE — 3331090002 HH PPS REVENUE DEBIT

## 2022-03-19 PROCEDURE — G0151 HHCP-SERV OF PT,EA 15 MIN: HCPCS

## 2022-03-20 PROBLEM — R06.02 SHORTNESS OF BREATH: Status: ACTIVE | Noted: 2021-11-17

## 2022-03-20 PROCEDURE — 3331090002 HH PPS REVENUE DEBIT

## 2022-03-20 PROCEDURE — 3331090001 HH PPS REVENUE CREDIT

## 2022-03-21 ENCOUNTER — HOME CARE VISIT (OUTPATIENT)
Dept: SCHEDULING | Facility: HOME HEALTH | Age: 82
End: 2022-03-21
Payer: MEDICARE

## 2022-03-21 VITALS
RESPIRATION RATE: 16 BRPM | DIASTOLIC BLOOD PRESSURE: 70 MMHG | HEART RATE: 70 BPM | OXYGEN SATURATION: 97 % | TEMPERATURE: 98.1 F | SYSTOLIC BLOOD PRESSURE: 120 MMHG

## 2022-03-21 PROCEDURE — G0152 HHCP-SERV OF OT,EA 15 MIN: HCPCS

## 2022-03-21 PROCEDURE — 3331090002 HH PPS REVENUE DEBIT

## 2022-03-21 PROCEDURE — 3331090001 HH PPS REVENUE CREDIT

## 2022-03-22 ENCOUNTER — HOME CARE VISIT (OUTPATIENT)
Dept: SCHEDULING | Facility: HOME HEALTH | Age: 82
End: 2022-03-22
Payer: MEDICARE

## 2022-03-22 PROCEDURE — 3331090002 HH PPS REVENUE DEBIT

## 2022-03-22 PROCEDURE — G0157 HHC PT ASSISTANT EA 15: HCPCS

## 2022-03-22 PROCEDURE — 3331090001 HH PPS REVENUE CREDIT

## 2022-03-22 NOTE — Clinical Note
Pt would like to receive a visit from [facility] MSW to discuss possible approval for returning to a Rehab center other than [facility]. Pt is requesting [facility] if possible. Pt stated she was progressing with PT in rehab center and feels she could benefit again from services.

## 2022-03-23 ENCOUNTER — HOME CARE VISIT (OUTPATIENT)
Dept: SCHEDULING | Facility: HOME HEALTH | Age: 82
End: 2022-03-23
Payer: MEDICARE

## 2022-03-23 VITALS
DIASTOLIC BLOOD PRESSURE: 72 MMHG | SYSTOLIC BLOOD PRESSURE: 120 MMHG | HEART RATE: 68 BPM | OXYGEN SATURATION: 98 % | RESPIRATION RATE: 18 BRPM | TEMPERATURE: 97.8 F

## 2022-03-23 VITALS
HEART RATE: 73 BPM | TEMPERATURE: 98 F | DIASTOLIC BLOOD PRESSURE: 62 MMHG | OXYGEN SATURATION: 98 % | SYSTOLIC BLOOD PRESSURE: 118 MMHG

## 2022-03-23 PROCEDURE — 3331090002 HH PPS REVENUE DEBIT

## 2022-03-23 PROCEDURE — 3331090001 HH PPS REVENUE CREDIT

## 2022-03-23 PROCEDURE — G0158 HHC OT ASSISTANT EA 15: HCPCS

## 2022-03-23 NOTE — HOME HEALTH
Subjective: Pt stated she would like to be able to walk better. Falls since last visit NO(if yes complete the Fall Tracking Form and include bsrifallreport):  Caregiver involvement changes: none. Home health supplies by type and quantity ordered/delivered this visit include: none. Does the patient have any new or changed medications? NO   If Yes, were medications reconciled? N/A   Was the certifying physician notified of changes in medications? N/A     Clinical assessment (what this visit means for the patient overall and need for ongoing skilled care) and progress or lack of progress towards SPECIFIC goals: Pt was able to perform seated ex B LE with instruction from PTA requiring one rest. Pt will benefit from transfer and gait training. Interdisciplinary communication with: N/A for the purpose of N/A. Discharge planning as follows: When goals are met  Specific plan for next visit: Focus on transfer training.

## 2022-03-24 ENCOUNTER — HOME CARE VISIT (OUTPATIENT)
Dept: SCHEDULING | Facility: HOME HEALTH | Age: 82
End: 2022-03-24
Payer: MEDICARE

## 2022-03-24 PROCEDURE — 3331090002 HH PPS REVENUE DEBIT

## 2022-03-24 PROCEDURE — 3331090001 HH PPS REVENUE CREDIT

## 2022-03-24 PROCEDURE — G0157 HHC PT ASSISTANT EA 15: HCPCS

## 2022-03-24 NOTE — Clinical Note
Pts caretaker/son is requesting a MSW visit to discuss possible potential for a short term rehab facility. PTA and caretaker rearranged room -placing bed in corner of room to provide space for gait training in room .

## 2022-03-24 NOTE — HOME HEALTH
Subjective: Pt and caretaker reported increased B LE strength. Falls since last visit NO(if yes complete the Fall Tracking Form and include bsrifallreport):  Caregiver involvement changes: none. Home health supplies by type and quantity ordered/delivered this visit include: none. Does the patient have any new or changed medications? NO   If Yes, were medications reconciled? N/A   Was the certifying physician notified of changes in medications? N/A     Clinical assessment (what this visit means for the patient overall and need for ongoing skilled care) and progress or lack of progress towards SPECIFIC goals: Pt presented with increased mobility duirng sit to std to transfer from bed to commode. PTA and caretaker rearranged room -placing bed in corner of room to provide space for gait training in room . Interdisciplinary communication with: PCP and  PT for the purpose of MSW visit. Discharge planning as follows: When goals are met  Specific plan for next visit: PT to resume tx to decrease fall risk and begin gait training.

## 2022-03-25 PROCEDURE — 3331090001 HH PPS REVENUE CREDIT

## 2022-03-25 PROCEDURE — 3331090002 HH PPS REVENUE DEBIT

## 2022-03-26 PROCEDURE — 3331090002 HH PPS REVENUE DEBIT

## 2022-03-26 PROCEDURE — 3331090001 HH PPS REVENUE CREDIT

## 2022-03-27 PROCEDURE — 3331090002 HH PPS REVENUE DEBIT

## 2022-03-27 PROCEDURE — 3331090001 HH PPS REVENUE CREDIT

## 2022-03-28 ENCOUNTER — HOME CARE VISIT (OUTPATIENT)
Dept: SCHEDULING | Facility: HOME HEALTH | Age: 82
End: 2022-03-28
Payer: MEDICARE

## 2022-03-28 ENCOUNTER — PATIENT OUTREACH (OUTPATIENT)
Dept: CASE MANAGEMENT | Age: 82
End: 2022-03-28

## 2022-03-28 VITALS
DIASTOLIC BLOOD PRESSURE: 70 MMHG | SYSTOLIC BLOOD PRESSURE: 112 MMHG | OXYGEN SATURATION: 99 % | TEMPERATURE: 98 F | HEART RATE: 77 BPM

## 2022-03-28 PROCEDURE — 3331090002 HH PPS REVENUE DEBIT

## 2022-03-28 PROCEDURE — 3331090001 HH PPS REVENUE CREDIT

## 2022-03-28 PROCEDURE — G0158 HHC OT ASSISTANT EA 15: HCPCS

## 2022-03-28 NOTE — PROGRESS NOTES
Goals Addressed                 This Visit's Progress     Returns to baseline activity level. 3/29/22- Second call to [facility] regarding referral to [facility] SW to discuss patient potential for short term inpt rehab. Spoke with Darby Rivas. Order for SW has not been received. ACM  requested this order to be added on as per PT/OT notes they are requesting a SW be added to services to discuss potential for inpatient rehab. ACM will follow up in the next few days to see if patient has been scheduled / seen and evaluated. LN    3/28/22- Patients cecilia Cordero reports she is progressing slowly with [facility] PT/OT he felt she may benefit from inpatient rehab , noted in [facility] PT/OT notes they have asked for SW referral to consider for this . ACM left message for [facility] to return call . [facility] has no additional questions. Will call ACM with any other questions. ACM will follow up in the next few days. LN       Understand CHF action plan. 3/28/22- Patients cecilia Cordero reports pt is doing well with HF at this time. No known noted increase in swelling or SOB, patient has been following low salt diet \"fairly well\". Family helps with cooking and encourages low NA diet as much as possible. Will continue to encourage low NA in diet . ACM will follow up 1-2 weeks LN    3/11/22- reviewed HF diet again with cecilia Cordero, he states daughter is now cooking for patient and uses Ms dash . Not making prepared or frozen meals or canned soups. Patient is unable to stand for weights due to weakness in legs/ feet. Reports legs/ feet are swollen , saw podiatry today who advised considering a different or additional fluid pill. Advised to call cardiology and provided contact info to son for [facility] Dr Darlyn Hogue. Discussed daily she should be taking Bumex and Spironolactone and not missing doses. Discussed if SOB progresses or worsens to seek care for patient at ER .  Discussed importance of early identification of HF exacerbation and intervention with increase in diuretic ( only if directed by cardiology due to possible effect on kidneys). Patients son notes understanding. They will monitor patient and manage diet. Advised that due to trouble with mobility she would like HH PT , will also ask [facility] nurse to see for HF evaluation . ACM will ask PCP to order [facility]. ACM will follow up next week to see if [facility] has accepted patient. LN    12/20/21- Discussed low NA diet options with patient for carmen meal, patient reports plan is for crab cakes and bbq ribs. Recommended using low NA sauces and spices where able and pairing with fresh fruits and veggies. Recommend limiting fluids as patient reports drinks large amounts of fluid. Discussed monitoring for increased SOB and swelling and when to call MD. Patient reports has been working with PT/OT and mobility has been a little better. Has been able to get to the Bathroom more and has been more compliantly taking Bumex since. Agrees to watch NA intake and refill all meds needed prior to [address]. ACM will follow up in 1-2 weeks.  GLORIA

## 2022-03-28 NOTE — PROGRESS NOTES
EMR reviewed for CM follow up. ACM notes that [facility] PT/OT have advised/ recommended patient has MSW visit to assist with placement into short term inpatient rehab facility to help with strengthening/ mobility. No SW visit has been ordered yet or scheduled. ACM placed call to [facility] to advise on what is needed to move forward with these recommendations for this patient so that ACM may assist with facilitating this. Left message for manager to return call.

## 2022-03-29 ENCOUNTER — TELEPHONE (OUTPATIENT)
Dept: INTERNAL MEDICINE CLINIC | Age: 82
End: 2022-03-29

## 2022-03-29 ENCOUNTER — HOME CARE VISIT (OUTPATIENT)
Dept: SCHEDULING | Facility: HOME HEALTH | Age: 82
End: 2022-03-29
Payer: MEDICARE

## 2022-03-29 VITALS
HEIGHT: 69 IN | DIASTOLIC BLOOD PRESSURE: 68 MMHG | HEART RATE: 82 BPM | BODY MASS INDEX: 43.71 KG/M2 | SYSTOLIC BLOOD PRESSURE: 128 MMHG | RESPIRATION RATE: 18 BRPM | TEMPERATURE: 97.5 F

## 2022-03-29 PROCEDURE — G0151 HHCP-SERV OF PT,EA 15 MIN: HCPCS

## 2022-03-29 PROCEDURE — 3331090002 HH PPS REVENUE DEBIT

## 2022-03-29 PROCEDURE — 3331090001 HH PPS REVENUE CREDIT

## 2022-03-29 NOTE — TELEPHONE ENCOUNTER
Spoke with Raymond from [facility]. @[phone]  She stated the patient has broken skin on her buttock and also requesting (OT) and a  Nurse Evaluation. the patient is also asking for a Social Work for [employer]  lov-3-16-22  Next appt-4/8/22

## 2022-03-29 NOTE — TELEPHONE ENCOUNTER
[facility] [address] [facility] order for treatment of wound on glute , OT and . She will fax over orders.

## 2022-03-30 ENCOUNTER — HOME CARE VISIT (OUTPATIENT)
Dept: SCHEDULING | Facility: HOME HEALTH | Age: 82
End: 2022-03-30
Payer: MEDICARE

## 2022-03-30 VITALS
SYSTOLIC BLOOD PRESSURE: 138 MMHG | DIASTOLIC BLOOD PRESSURE: 78 MMHG | HEART RATE: 70 BPM | TEMPERATURE: 97.8 F | RESPIRATION RATE: 16 BRPM | OXYGEN SATURATION: 99 %

## 2022-03-30 VITALS
OXYGEN SATURATION: 98 % | TEMPERATURE: 97.3 F | SYSTOLIC BLOOD PRESSURE: 110 MMHG | HEART RATE: 75 BPM | DIASTOLIC BLOOD PRESSURE: 78 MMHG

## 2022-03-30 PROCEDURE — G0158 HHC OT ASSISTANT EA 15: HCPCS

## 2022-03-30 PROCEDURE — 3331090001 HH PPS REVENUE CREDIT

## 2022-03-30 PROCEDURE — 3331090002 HH PPS REVENUE DEBIT

## 2022-03-31 ENCOUNTER — HOME CARE VISIT (OUTPATIENT)
Dept: SCHEDULING | Facility: HOME HEALTH | Age: 82
End: 2022-03-31
Payer: MEDICARE

## 2022-03-31 VITALS
RESPIRATION RATE: 16 BRPM | HEART RATE: 55 BPM | OXYGEN SATURATION: 98 % | TEMPERATURE: 97.8 F | SYSTOLIC BLOOD PRESSURE: 130 MMHG | DIASTOLIC BLOOD PRESSURE: 78 MMHG

## 2022-03-31 PROCEDURE — G0299 HHS/HOSPICE OF RN EA 15 MIN: HCPCS

## 2022-03-31 PROCEDURE — G0151 HHCP-SERV OF PT,EA 15 MIN: HCPCS

## 2022-03-31 PROCEDURE — 3331090002 HH PPS REVENUE DEBIT

## 2022-03-31 PROCEDURE — 3331090001 HH PPS REVENUE CREDIT

## 2022-03-31 NOTE — PROGRESS NOTES
HISTORY OF PRESENT ILLNESS  Bismark Lagos is a 81 y.o. female. Pt. comes in for f/u. Has a few chronic medical issues as documented including diastolic CHF, A. fib, CKD, morbid obesity, gait instability, TAM, urine incontinence. Vital signs are stable. BMI 43.7. Reports a couple of days of dysuria and urine frequency. Has chronic incontinence issues. Also has had some vaginal itching but no vaginal discharge. Cardiac status has been stable. Denies chest pain. Has chronic dyspnea and WHITING. Remains on Bumex, spironolactone, potassium, diltiazem, aspirin and Xarelto. No recent gout attacks. On allopurinol. Gait is unsteady with previous falls. Reports pain in legs and knees. Takes Tylenol as needed. Asking to get physical therapy which is helped in the past.    Has had Covid-19 vaccination. Reports taking proper precautions. Denies any related signs or symptoms. PMH/PSH/Allergies/Social History/medication list and most recent studies reviewed with patient. GFR is 36. Other labs are relatively stable. Tobacco use: No  Alcohol use: No  Reports compliance with medications and diet. She has not active physically to control weight. Reports no other new c/o. HPI    Review of Systems   Constitutional: Positive for malaise/fatigue. HENT: Negative. Eyes: Negative. Negative for blurred vision. Respiratory: Positive for shortness of breath (WHITING). Cardiovascular: Positive for leg swelling. Negative for chest pain. Gastrointestinal: Positive for heartburn. Negative for abdominal pain. Genitourinary: Positive for dysuria, frequency and urgency. Negative for flank pain and hematuria. Musculoskeletal: Positive for falls and joint pain. Skin: Negative. Neurological: Negative. Negative for dizziness, sensory change, focal weakness and headaches. Endo/Heme/Allergies: Negative. Negative for polydipsia. Psychiatric/Behavioral: Negative. Negative for depression.  The patient is not nervous/anxious and does not have insomnia. Physical Exam  Vitals and nursing note reviewed. Constitutional:       General: She is not in acute distress. Appearance: She is well-developed. Comments: Morbidly obese elderly lady   HENT:      Head: Normocephalic and atraumatic. Mouth/Throat:      Mouth: Mucous membranes are moist.   Eyes:      General: No scleral icterus. Conjunctiva/sclera: Conjunctivae normal.   Neck:      Thyroid: No thyromegaly. Vascular: No carotid bruit or JVD. Cardiovascular:      Rate and Rhythm: Normal rate and regular rhythm. Heart sounds: Normal heart sounds. No murmur heard. Pulmonary:      Effort: Pulmonary effort is normal. No respiratory distress. Breath sounds: No wheezing. Comments: Diminished sounds  Abdominal:      General: Bowel sounds are normal. There is no distension. Palpations: Abdomen is soft. Tenderness: There is no abdominal tenderness. There is no right CVA tenderness or left CVA tenderness. Comments: Obese   Musculoskeletal:         General: Tenderness (Bilateral knees) present. Cervical back: Normal range of motion and neck supple. Right lower leg: Edema present. Left lower leg: Edema present. Comments: DJD changes   Skin:     General: Skin is warm and dry. Findings: No erythema or rash. Neurological:      Mental Status: She is alert and oriented to person, place, and time. Cranial Nerves: No cranial nerve deficit. Coordination: Coordination abnormal.      Gait: Gait abnormal.   Psychiatric:         Behavior: Behavior normal.         ASSESSMENT and PLAN  Diagnoses and all orders for this visit:    1. Urine frequency  -     AMB POC URINALYSIS DIP STICK MANUAL W/O MICRO  Reassurance that UA is negative  Diuretics are contributing to frequency  2. Urge incontinence of urine  -     AMB POC URINALYSIS DIP STICK MANUAL W/O MICRO    3.  Dysuria  -     AMB POC URINALYSIS DIP STICK MANUAL W/O MICRO    4. Chronic diastolic congestive heart failure (HCC)  Stable chronic condition. Continue current treatment/medications. See cardiologist as scheduled  5. Paroxysmal atrial fibrillation (HCC)  Stable chronic condition. Continue current treatment/medications. 6. Obesity, morbid ([address]  Advised patient to lose weight by watching diet (decreasing sugars/carbs/fat, increasing fruits/vegetables), exercising at least 30 minutes daily, getting 7-8 hours of sleep daily, drinking plenty of water, and decreasing stress    7. Gait abnormality  Fall precautions discussed  Refer to physical therapy for gait training and strengthening  8. TAM (obstructive sleep apnea)      Follow-up and Dispositions    · Return in about 4 weeks (around 4/13/2022). All chronic medical problems are stable  Continue with current medical management and plan  lab results and schedule of future lab studies reviewed with patient  reviewed diet, exercise and weight control  reviewed medications and side effects in detail  F/u with other MD's/ providers as scheduled  COVID-19 precautions discussed with pt  An After Visit Summary was printed and given to the patient.

## 2022-04-01 ENCOUNTER — TELEPHONE (OUTPATIENT)
Dept: INTERNAL MEDICINE CLINIC | Age: 82
End: 2022-04-01

## 2022-04-01 PROCEDURE — 3331090002 HH PPS REVENUE DEBIT

## 2022-04-01 PROCEDURE — 3331090001 HH PPS REVENUE CREDIT

## 2022-04-01 NOTE — TELEPHONE ENCOUNTER
----- Message from Nba Aparicio sent at 3/31/2022  4:28 PM EDT -----  Subject: Message to Provider    QUESTIONS  Information for Provider? patient son would like to be notified by email &   text message's in regard to upcoming appointments and VV   ---------------------------------------------------------------------------  --------------  CALL BACK INFO  What is the best way for the office to contact you? OK to leave message on   voicemail  Preferred Call Back Phone Number?  [phone]  ---------------------------------------------------------------------------  --------------  SCRIPT ANSWERS  undefined

## 2022-04-02 PROCEDURE — 3331090001 HH PPS REVENUE CREDIT

## 2022-04-02 PROCEDURE — 3331090002 HH PPS REVENUE DEBIT

## 2022-04-03 VITALS
TEMPERATURE: 97.8 F | SYSTOLIC BLOOD PRESSURE: 130 MMHG | DIASTOLIC BLOOD PRESSURE: 78 MMHG | HEART RATE: 55 BPM | OXYGEN SATURATION: 98 % | RESPIRATION RATE: 16 BRPM

## 2022-04-03 PROCEDURE — 3331090001 HH PPS REVENUE CREDIT

## 2022-04-03 PROCEDURE — 3331090002 HH PPS REVENUE DEBIT

## 2022-04-04 ENCOUNTER — HOME CARE VISIT (OUTPATIENT)
Dept: SCHEDULING | Facility: HOME HEALTH | Age: 82
End: 2022-04-04
Payer: MEDICARE

## 2022-04-04 VITALS
DIASTOLIC BLOOD PRESSURE: 72 MMHG | SYSTOLIC BLOOD PRESSURE: 110 MMHG | HEART RATE: 69 BPM | TEMPERATURE: 97.7 F | OXYGEN SATURATION: 99 %

## 2022-04-04 PROCEDURE — 3331090002 HH PPS REVENUE DEBIT

## 2022-04-04 PROCEDURE — G0158 HHC OT ASSISTANT EA 15: HCPCS

## 2022-04-04 PROCEDURE — 3331090001 HH PPS REVENUE CREDIT

## 2022-04-05 ENCOUNTER — HOME CARE VISIT (OUTPATIENT)
Dept: SCHEDULING | Facility: HOME HEALTH | Age: 82
End: 2022-04-05
Payer: MEDICARE

## 2022-04-05 PROCEDURE — 3331090001 HH PPS REVENUE CREDIT

## 2022-04-05 PROCEDURE — 3331090002 HH PPS REVENUE DEBIT

## 2022-04-05 PROCEDURE — G0151 HHCP-SERV OF PT,EA 15 MIN: HCPCS

## 2022-04-06 ENCOUNTER — HOME CARE VISIT (OUTPATIENT)
Dept: SCHEDULING | Facility: HOME HEALTH | Age: 82
End: 2022-04-06
Payer: MEDICARE

## 2022-04-06 VITALS
TEMPERATURE: 97.2 F | SYSTOLIC BLOOD PRESSURE: 120 MMHG | HEART RATE: 68 BPM | OXYGEN SATURATION: 100 % | RESPIRATION RATE: 18 BRPM | DIASTOLIC BLOOD PRESSURE: 72 MMHG

## 2022-04-06 VITALS
DIASTOLIC BLOOD PRESSURE: 72 MMHG | HEART RATE: 61 BPM | OXYGEN SATURATION: 99 % | SYSTOLIC BLOOD PRESSURE: 125 MMHG | RESPIRATION RATE: 16 BRPM | TEMPERATURE: 97.5 F

## 2022-04-06 VITALS
OXYGEN SATURATION: 10 % | TEMPERATURE: 97.2 F | HEART RATE: 68 BPM | RESPIRATION RATE: 18 BRPM | DIASTOLIC BLOOD PRESSURE: 72 MMHG | SYSTOLIC BLOOD PRESSURE: 120 MMHG

## 2022-04-06 PROCEDURE — 3331090001 HH PPS REVENUE CREDIT

## 2022-04-06 PROCEDURE — G0158 HHC OT ASSISTANT EA 15: HCPCS

## 2022-04-06 PROCEDURE — G0155 HHCP-SVS OF CSW,EA 15 MIN: HCPCS

## 2022-04-06 PROCEDURE — 3331090002 HH PPS REVENUE DEBIT

## 2022-04-06 PROCEDURE — G0299 HHS/HOSPICE OF RN EA 15 MIN: HCPCS

## 2022-04-07 ENCOUNTER — HOME CARE VISIT (OUTPATIENT)
Dept: SCHEDULING | Facility: HOME HEALTH | Age: 82
End: 2022-04-07
Payer: MEDICARE

## 2022-04-07 PROCEDURE — 3331090001 HH PPS REVENUE CREDIT

## 2022-04-07 PROCEDURE — 3331090002 HH PPS REVENUE DEBIT

## 2022-04-07 PROCEDURE — G0151 HHCP-SERV OF PT,EA 15 MIN: HCPCS

## 2022-04-07 NOTE — HOME HEALTH
MSW MET WITH THIS PATIENT AT [facility]. PATIENT'S SON WAS PRESENT WHO IS HER PRIMARY CAREGIVER. PATIENT IS NON AMBULATORY AND BED BOUND. PATIENT'S FAMILY PROVIDES TRANSPORTATION AND ASSISTS WITH ADLS AND IADLS. PATIENT IS ALREADY RECEIVING MEALS ON WHEELS. MSW COMPLETED NEEDS ASSESSMENT AND REFERRED PATIENT FOR LTC MEDICAID AND PERSONAL CARE AIDE. MSW ALSO GAVE PATIENT INFORMATION FOR ON HOW TO GET A WHEELCHAIR. PATIENT APPEARED GUARDED AND VERY RELUCTANT TO ACCEPT RESOURCES. MSW WROTE DOWN RESOURCES AND ADVICED PATIENT TO CONTACT MSW IF ASSISTANCE WAS NEEDED.  LO GUALLPA

## 2022-04-08 ENCOUNTER — VIRTUAL VISIT (OUTPATIENT)
Dept: INTERNAL MEDICINE CLINIC | Age: 82
End: 2022-04-08
Payer: MEDICARE

## 2022-04-08 VITALS
TEMPERATURE: 99 F | HEART RATE: 88 BPM | DIASTOLIC BLOOD PRESSURE: 68 MMHG | RESPIRATION RATE: 16 BRPM | OXYGEN SATURATION: 99 % | SYSTOLIC BLOOD PRESSURE: 135 MMHG

## 2022-04-08 DIAGNOSIS — Z78.0 POST-MENOPAUSE: ICD-10-CM

## 2022-04-08 DIAGNOSIS — I48.0 PAROXYSMAL ATRIAL FIBRILLATION (HCC): ICD-10-CM

## 2022-04-08 DIAGNOSIS — I50.32 CHRONIC DIASTOLIC CONGESTIVE HEART FAILURE (HCC): ICD-10-CM

## 2022-04-08 DIAGNOSIS — S93.402A SPRAIN OF LEFT ANKLE, UNSPECIFIED LIGAMENT, INITIAL ENCOUNTER: Primary | ICD-10-CM

## 2022-04-08 DIAGNOSIS — I10 PRIMARY HYPERTENSION: ICD-10-CM

## 2022-04-08 PROCEDURE — 1101F PT FALLS ASSESS-DOCD LE1/YR: CPT | Performed by: INTERNAL MEDICINE

## 2022-04-08 PROCEDURE — G8427 DOCREV CUR MEDS BY ELIG CLIN: HCPCS | Performed by: INTERNAL MEDICINE

## 2022-04-08 PROCEDURE — G8536 NO DOC ELDER MAL SCRN: HCPCS | Performed by: INTERNAL MEDICINE

## 2022-04-08 PROCEDURE — 3331090002 HH PPS REVENUE DEBIT

## 2022-04-08 PROCEDURE — 1090F PRES/ABSN URINE INCON ASSESS: CPT | Performed by: INTERNAL MEDICINE

## 2022-04-08 PROCEDURE — 99214 OFFICE O/P EST MOD 30 MIN: CPT | Performed by: INTERNAL MEDICINE

## 2022-04-08 PROCEDURE — G8417 CALC BMI ABV UP PARAM F/U: HCPCS | Performed by: INTERNAL MEDICINE

## 2022-04-08 PROCEDURE — 3331090001 HH PPS REVENUE CREDIT

## 2022-04-08 PROCEDURE — G8400 PT W/DXA NO RESULTS DOC: HCPCS | Performed by: INTERNAL MEDICINE

## 2022-04-08 PROCEDURE — G8756 NO BP MEASURE DOC: HCPCS | Performed by: INTERNAL MEDICINE

## 2022-04-08 PROCEDURE — G8510 SCR DEP NEG, NO PLAN REQD: HCPCS | Performed by: INTERNAL MEDICINE

## 2022-04-08 RX ORDER — TRAMADOL HYDROCHLORIDE 50 MG/1
50 TABLET ORAL
Qty: 14 TABLET | Refills: 0 | Status: SHIPPED | OUTPATIENT
Start: 2022-04-08 | End: 2022-04-27

## 2022-04-08 RX ORDER — DICLOFENAC SODIUM 10 MG/G
GEL TOPICAL
Qty: 100 G | Refills: 1 | Status: SHIPPED | OUTPATIENT
Start: 2022-04-08 | End: 2022-08-16

## 2022-04-08 RX ORDER — SPIRONOLACTONE 25 MG/1
25 TABLET ORAL DAILY
Qty: 90 TABLET | Refills: 1 | Status: SHIPPED | OUTPATIENT
Start: 2022-04-08

## 2022-04-08 NOTE — PROGRESS NOTES
ADVISED PATIENT OF THE FOLLOWING HEALTH MAINTAINCE DUE  Health Maintenance Due   Topic Date Due    COVID-19 Vaccine (1) Never done    DTaP/Tdap/Td series (1 - Tdap) Never done    Bone Densitometry (Dexa) Screening  Never done      Chief Complaint   Patient presents with    Irregular Heart Beat    Hypertension    CHF    Ankle Pain       1. Have you been to the ER, urgent care clinic since your last visit? Hospitalized since your last visit? No    2. Have you seen or consulted any other health care providers outside of the [address] [place] [address] since your last visit? Include any DEXA scan, mammography  or colon screening. No    3. Do you have an Advance Directive on file? no    4. Do you have a DNR on file? no    Patient is accompanied by self I have received verbal consent from Ekaterina Mendiola to discuss any/all medical information while they are present in the room.       Advance Care Planning 2/18/2020   Patient's Healthcare Decision Maker is: -   Primary Decision Maker Name -   Primary Decision Maker Phone Number -   Primary Decision Maker Relationship to Patient -   Confirm Advance Directive None   Patient Would Like to Complete Advance Directive -         [id number] - [place], [address] [address]  Phone: [phone] Fax: [phone] [facility] [address], [place] - [address]  [address]  [facility] [place] [address]  Phone: [phone] Fax: [phone]

## 2022-04-08 NOTE — PROGRESS NOTES
Arlyn Chung is a 81 y.o. female who was seen by synchronous (real-time) audio-video technology on 4/8/2022 for Irregular Heart Beat, Hypertension, CHF, and Ankle Pain        Assessment & Plan:   Diagnoses and all orders for this visit:    1. Sprain of left ankle, unspecified ligament, initial encounter    Advised to use ice pack and Ace bandage to the ankle. Need to elevate ankle. She may take Tylenol 650 mg twice a day. Will use,  -     diclofenac (VOLTAREN) 1 % gel; Apply  to affected area two (2) times daily as needed for Pain. -     traMADoL (ULTRAM) 50 mg tablet; Take 1 Tablet by mouth daily as needed for Pain for up to 14 days. #14, no refill    2. Paroxysmal atrial fibrillation (HCC)  Rate and rhythm control. On Xarelto and diltiazem CD. 3. Primary hypertension  Stable blood pressure. On Cardizem. 4. Chronic diastolic congestive heart failure ([address]    Compensated. On Bumex and spironolactone. Will refill,  -     spironolactone (ALDACTONE) 25 mg tablet; Take 1 Tablet by mouth daily. 5.  Postmenopausal  Advised to take calcium rich diet and also calcium with vitamin D twice a day with food. Need to get bone density. Order already given. I spent at least 30 minutes on this visit with this established patient. Subjective:     Ms. Adore Murcia is here for follow-up. Report left ankle pain on and off for last 1 month. She was going to car and accidentally she twisted her left ankle since the neck was swollen. Pain is 5 out of 10 some days. Regular Tylenol is not helping her with the pain. Therapist is coming to her house for physical therapy. Therapy recommended to have specialized ankle splint. She did not buy it yet. She is on blood thinner, not able to take any NSAID. To a dose of prednisone and month of February. She suffers from gout. Allopurinol dosage was increased. His uric acid level coming down. Kidney function test also improved.   Has atrial fibrillation, PVCs problem palpitation. Taking blood thinner and Cardizem CD. Has congestive heart failure. No shortness of breath or orthopnea. And need refill on spironolactone. Need bone density. Prior to Admission medications    Medication Sig Start Date End Date Taking? Authorizing Provider   diclofenac (VOLTAREN) 1 % gel Apply  to affected area two (2) times daily as needed for Pain. 4/8/22  Yes Alla Montes De Oca MD   traMADoL Vernona Coffin) 50 mg tablet Take 1 Tablet by mouth daily as needed for Pain for up to 14 days. 4/8/22 4/22/22 Yes Alla Montes De Oca MD   spironolactone (ALDACTONE) 25 mg tablet Take 1 Tablet by mouth daily. 4/8/22  Yes Alla Montes De Oca MD   acetaminophen (TYLENOL) 500 mg tablet Take 500 mg by mouth every six (6) hours as needed for Pain. Yes Provider, Historical   omeprazole (PRILOSEC) 20 mg capsule Take 20 mg by mouth daily. Yes Provider, Historical   albuterol (PROVENTIL HFA, VENTOLIN HFA, PROAIR HFA) 90 mcg/actuation inhaler Take 2 Puffs by inhalation every six (6) hours as needed for Wheezing or Shortness of Breath. 3/9/22  Yes Alla Montes De Oca MD   allopurinoL (ZYLOPRIM) 300 mg tablet Take 1 Tablet by mouth daily. DC allopurinol 100 mg 3/9/22  Yes Alla Montes De Oca MD   bumetanide (BUMEX) 1 mg tablet TAKE 1 TABLET BY MOUTH EVERY DAY 2/6/22  Yes Sanaz Suárez, NP   potassium chloride (K-DUR, KLOR-CON M20) 20 mEq tablet Take 20 mEq by mouth daily. 11/23/21  Yes Provider, Historical   dilTIAZem ER (Tiadylt ER) 240 mg capsule Tiadylt  mg capsule,extended release   TAKE 1 CAPSULE BY MOUTH EVERY DAY   Yes Provider, Historical   rivaroxaban (XARELTO) 20 mg tab tablet Take 1 Tab by mouth daily (with lunch). Indications: PREVENT THROMBOEMBOLISM IN CHRONIC ATRIAL FIBRILLATION 3/30/18  Yes Darline Renteria, NP   aspirin delayed-release 81 mg tablet Take 1 Tab by mouth daily.  3/31/18  Yes Darline Renteria, NP   methylPREDNISolone (Medrol, Malik,) 4 mg tablet See packaging for dosing instructions 3/3/22 4/8/22  Ann Kaplan NP spironolactone (ALDACTONE) 25 mg tablet Take 25 mg by mouth daily. 4/8/22  Provider, Historical     Past Medical History:   Diagnosis Date    Arthritis     Atrial fibrillation ([facility]     Bronchitis, acute 12/17/2011    Congestive heart failure (HCC)     Diastolic heart failure (HCC)     WHITING (dyspnea on exertion)     GERD (gastroesophageal reflux disease)     Hypertension     Obesity     TAM (obstructive sleep apnea)     Other ill-defined conditions(799.89)     \"fluid in the lung\"    Pneumonia 6/7/2018    Sleep apnea        ROS significant for ankle pain. Objective:   No flowsheet data found. Constitutional: [x] Appears well-developed and well-nourished [x] No apparent distress      [] Abnormal -     Mental status: [x] Alert and awake  [x] Oriented to person/place/time [x] Able to follow commands    [] Abnormal -    -     HENT: [x] Normocephalic, atraumatic  [] Abnormal -   [x] Mouth/Throat: Mucous membranes are moist    External Ears [x] Normal  [] Abnormal -    Neck: [x] No visualized mass [] Abnormal -     Pulmonary/Chest: [x] Respiratory effort normal   [x] No visualized signs of difficulty breathing or respiratory distress        [] Abnormal -      Musculoskeletal:   [x] Normal gait with no signs of ataxia         [x] Normal range of motion of neck  Left ankle: Slight swelling present. Tenderness on the joint. Range of motion restricted. [] Abnormal -     Neurological:        [x] No Facial Asymmetry (Cranial nerve 7 motor function) (limited exam due to video visit)          [x] No gaze palsy        [] Abnormal -          Skin:        [x] No significant exanthematous lesions or discoloration noted on facial skin         [] Abnormal -            Psychiatric:       [x] Normal Affect [] Abnormal -        [x] No Hallucinations    Other pertinent observable physical exam findings:-        We discussed the expected course, resolution and complications of the diagnosis(es) in detail. Medication risks, benefits, costs, interactions, and alternatives were discussed as indicated. I advised her to contact the office if her condition worsens, changes or fails to improve as anticipated. She expressed understanding with the diagnosis(es) and plan. Jonh Membreno was evaluated through a synchronous (real-time) audio-video encounter. The patient (or guardian if applicable) is aware that this is a billable service, which includes applicable co-pays. Verbal consent to proceed has been obtained. The visit was conducted pursuant to the emergency declaration under the [facility], [address] [place] [address] authority and the [employer] and [place] Act. Patient identification was verified, and a caregiver was present when appropriate. The patient was located at home in a state where the provider was licensed to provide care.       Lexi Stuart MD

## 2022-04-09 PROCEDURE — 3331090001 HH PPS REVENUE CREDIT

## 2022-04-09 PROCEDURE — 3331090002 HH PPS REVENUE DEBIT

## 2022-04-10 PROCEDURE — 3331090002 HH PPS REVENUE DEBIT

## 2022-04-10 PROCEDURE — 3331090001 HH PPS REVENUE CREDIT

## 2022-04-11 ENCOUNTER — HOME CARE VISIT (OUTPATIENT)
Dept: SCHEDULING | Facility: HOME HEALTH | Age: 82
End: 2022-04-11
Payer: MEDICARE

## 2022-04-11 VITALS
OXYGEN SATURATION: 97 % | DIASTOLIC BLOOD PRESSURE: 75 MMHG | SYSTOLIC BLOOD PRESSURE: 130 MMHG | HEART RATE: 76 BPM | TEMPERATURE: 97 F | RESPIRATION RATE: 18 BRPM

## 2022-04-11 VITALS
TEMPERATURE: 97.7 F | DIASTOLIC BLOOD PRESSURE: 78 MMHG | HEART RATE: 80 BPM | OXYGEN SATURATION: 98 % | SYSTOLIC BLOOD PRESSURE: 118 MMHG

## 2022-04-11 PROCEDURE — G0158 HHC OT ASSISTANT EA 15: HCPCS

## 2022-04-11 PROCEDURE — G0299 HHS/HOSPICE OF RN EA 15 MIN: HCPCS

## 2022-04-11 PROCEDURE — 3331090001 HH PPS REVENUE CREDIT

## 2022-04-11 PROCEDURE — 3331090002 HH PPS REVENUE DEBIT

## 2022-04-12 ENCOUNTER — HOME CARE VISIT (OUTPATIENT)
Dept: HOME HEALTH SERVICES | Facility: HOME HEALTH | Age: 82
End: 2022-04-12
Payer: MEDICARE

## 2022-04-12 PROCEDURE — 3331090002 HH PPS REVENUE DEBIT

## 2022-04-12 PROCEDURE — 3331090001 HH PPS REVENUE CREDIT

## 2022-04-13 ENCOUNTER — HOME CARE VISIT (OUTPATIENT)
Dept: SCHEDULING | Facility: HOME HEALTH | Age: 82
End: 2022-04-13
Payer: MEDICARE

## 2022-04-13 ENCOUNTER — HOSPITAL ENCOUNTER (OUTPATIENT)
Dept: MAMMOGRAPHY | Age: 82
Discharge: HOME | End: 2022-04-13
Attending: INTERNAL MEDICINE
Payer: MEDICARE

## 2022-04-13 VITALS
OXYGEN SATURATION: 98 % | TEMPERATURE: 97 F | HEART RATE: 70 BPM | DIASTOLIC BLOOD PRESSURE: 60 MMHG | SYSTOLIC BLOOD PRESSURE: 104 MMHG

## 2022-04-13 DIAGNOSIS — Z78.0 POST-MENOPAUSAL: ICD-10-CM

## 2022-04-13 PROCEDURE — G0158 HHC OT ASSISTANT EA 15: HCPCS

## 2022-04-13 PROCEDURE — 77081 DXA BONE DENSITY APPENDICULR: CPT

## 2022-04-13 PROCEDURE — 3331090002 HH PPS REVENUE DEBIT

## 2022-04-13 PROCEDURE — 3331090001 HH PPS REVENUE CREDIT

## 2022-04-14 ENCOUNTER — HOME CARE VISIT (OUTPATIENT)
Dept: SCHEDULING | Facility: HOME HEALTH | Age: 82
End: 2022-04-14
Payer: MEDICARE

## 2022-04-14 PROCEDURE — G0151 HHCP-SERV OF PT,EA 15 MIN: HCPCS

## 2022-04-14 PROCEDURE — 3331090001 HH PPS REVENUE CREDIT

## 2022-04-14 PROCEDURE — 3331090002 HH PPS REVENUE DEBIT

## 2022-04-15 ENCOUNTER — HOME CARE VISIT (OUTPATIENT)
Dept: SCHEDULING | Facility: HOME HEALTH | Age: 82
End: 2022-04-15
Payer: MEDICARE

## 2022-04-15 VITALS
OXYGEN SATURATION: 94 % | SYSTOLIC BLOOD PRESSURE: 112 MMHG | TEMPERATURE: 98.2 F | HEART RATE: 97 BPM | RESPIRATION RATE: 18 BRPM | DIASTOLIC BLOOD PRESSURE: 75 MMHG

## 2022-04-15 PROCEDURE — 3331090002 HH PPS REVENUE DEBIT

## 2022-04-15 PROCEDURE — G0299 HHS/HOSPICE OF RN EA 15 MIN: HCPCS

## 2022-04-15 PROCEDURE — 3331090001 HH PPS REVENUE CREDIT

## 2022-04-15 NOTE — PROGRESS NOTES
Bone density scan indicates osteopenia. Recommend a calcium- vitamin D supplement, such as Os-deonna or Caltrate, twice a day. Recommend weight-bearing exercise as tolerated.

## 2022-04-16 VITALS
OXYGEN SATURATION: 98 % | SYSTOLIC BLOOD PRESSURE: 135 MMHG | DIASTOLIC BLOOD PRESSURE: 75 MMHG | RESPIRATION RATE: 16 BRPM | HEART RATE: 76 BPM | TEMPERATURE: 97.5 F

## 2022-04-16 PROCEDURE — 3331090002 HH PPS REVENUE DEBIT

## 2022-04-16 PROCEDURE — 3331090001 HH PPS REVENUE CREDIT

## 2022-04-16 NOTE — CASE COMMUNICATION
Falls since last visit (if yes include . bsrifallreport): Yes  Caregiver involvement: Pt lives with her son who assists with all care at this time. Training needed in transfer safety and ADL performance. Please complete form for all falls whether observed or not observed. Date of fall (separate report for each fall occurrence)  4/13/22  Fall observed? No    Describe Event and Document any re-training or treatment plan modificat ion indicated (please include location of fall and may copy and paste from visit note)  Pt was home after MD visit and was transferring from sitting on her RW back to her wheelchair when her legs gave out on her. Primary Caregiver (son) states that his brother who normally is not with Ms. Johann Mcleod was not guarding her very close and was not quick enough to catch her. Rescue squad was called to lift patient back to bed. Response to  re-training or treatment plan modification:  Pt son states that he will be with her from now on during all transfers. Assistive Devices used with patient by homecare staff prior to fall:  RW, Wheelchair     Was this equipment in use at time of fall? Yes       Injury (yes/no) if yes describe:  No, but patient states that she is a little sore      Emergent Care Received (type or none)? None     Patient identified as High Risk prior  to fall? (List risk factors)  Yes, pt is very weak, has pain in bilateral knees and L ankle which limit stading ability. Pt also morbidly obese.      MD Notified: Myriam Price MD

## 2022-04-17 PROCEDURE — 3331090003 HH PPS REVENUE ADJ

## 2022-04-17 PROCEDURE — 3331090001 HH PPS REVENUE CREDIT

## 2022-04-17 PROCEDURE — 3331090002 HH PPS REVENUE DEBIT

## 2022-04-18 ENCOUNTER — OFFICE VISIT (OUTPATIENT)
Dept: SLEEP MEDICINE | Age: 82
End: 2022-04-18
Payer: MEDICARE

## 2022-04-18 VITALS
TEMPERATURE: 97.8 F | DIASTOLIC BLOOD PRESSURE: 65 MMHG | WEIGHT: 293 LBS | BODY MASS INDEX: 43.4 KG/M2 | OXYGEN SATURATION: 94 % | HEIGHT: 69 IN | HEART RATE: 76 BPM | SYSTOLIC BLOOD PRESSURE: 104 MMHG

## 2022-04-18 DIAGNOSIS — I48.91 ATRIAL FIBRILLATION, UNSPECIFIED TYPE (HCC): ICD-10-CM

## 2022-04-18 DIAGNOSIS — G47.33 OSA (OBSTRUCTIVE SLEEP APNEA): Primary | ICD-10-CM

## 2022-04-18 DIAGNOSIS — G47.19 EXCESSIVE DAYTIME SLEEPINESS: ICD-10-CM

## 2022-04-18 DIAGNOSIS — I50.32 CHRONIC DIASTOLIC CONGESTIVE HEART FAILURE (HCC): ICD-10-CM

## 2022-04-18 PROCEDURE — G8417 CALC BMI ABV UP PARAM F/U: HCPCS | Performed by: SPECIALIST

## 2022-04-18 PROCEDURE — G8428 CUR MEDS NOT DOCUMENT: HCPCS | Performed by: SPECIALIST

## 2022-04-18 PROCEDURE — 99204 OFFICE O/P NEW MOD 45 MIN: CPT | Performed by: SPECIALIST

## 2022-04-18 PROCEDURE — 3331090001 HH PPS REVENUE CREDIT

## 2022-04-18 PROCEDURE — G8399 PT W/DXA RESULTS DOCUMENT: HCPCS | Performed by: SPECIALIST

## 2022-04-18 PROCEDURE — 1101F PT FALLS ASSESS-DOCD LE1/YR: CPT | Performed by: SPECIALIST

## 2022-04-18 PROCEDURE — G8752 SYS BP LESS 140: HCPCS | Performed by: SPECIALIST

## 2022-04-18 PROCEDURE — G8754 DIAS BP LESS 90: HCPCS | Performed by: SPECIALIST

## 2022-04-18 PROCEDURE — 1090F PRES/ABSN URINE INCON ASSESS: CPT | Performed by: SPECIALIST

## 2022-04-18 PROCEDURE — G8432 DEP SCR NOT DOC, RNG: HCPCS | Performed by: SPECIALIST

## 2022-04-18 PROCEDURE — 3331090002 HH PPS REVENUE DEBIT

## 2022-04-18 PROCEDURE — G8536 NO DOC ELDER MAL SCRN: HCPCS | Performed by: SPECIALIST

## 2022-04-18 NOTE — PROGRESS NOTES
[address] [place]. [facility], [address]  Tel.  [phone]  Fax. [address] [facility]  [place], [address] [facility]  Tel.  [phone]  Fax. [phone] [address] [facility] Adelso Antunez   Tel.  [phone]  Fax. [phone]       Chief Complaint       Chief Complaint   Patient presents with    Sleep Problem     F2F_NP refd by Alla Montes De Oca MD for TAM consult       HPI      Dalia Milner is 81 y.o. female seen for evaluation of a sleep disorder. She has a history of paroxysmal atrial fibrillation and chronic diastolic congestive heart failure. She is accompanied by her son who assists with the history. Patient reports a history of sleep evaluation at which time she was diagnosed with sleep apnea. She is unable to provide details in this regard. At one point she was on CPAP which she did not use with consistency. She may have had an evaluation at a different sleep center. Son reports prominent snoring, apnea and sleep talking. Potential active dreaming. Notes significant daytime sleepiness. May easily doze if seated and inactive such as when reading, watching TV, in a public place, as a passenger, in conversation. [place] Sleepiness Score: 24       Allergies   Allergen Reactions    Penicillins Hives       Current Outpatient Medications   Medication Sig Dispense Refill    nitrofurantoin (MACRODANTIN) 100 mg capsule Take 100 mg by mouth two (2) times a day. take one capsule by mouth every 12 hours for 8 days      diclofenac (VOLTAREN) 1 % gel Apply  to affected area two (2) times daily as needed for Pain. 100 g 1    traMADoL (ULTRAM) 50 mg tablet Take 1 Tablet by mouth daily as needed for Pain for up to 14 days. 14 Tablet 0    spironolactone (ALDACTONE) 25 mg tablet Take 1 Tablet by mouth daily. 90 Tablet 1    acetaminophen (TYLENOL) 500 mg tablet Take 500 mg by mouth every six (6) hours as needed for Pain.       omeprazole (PRILOSEC) 20 mg capsule Take 20 mg by mouth daily.  albuterol (PROVENTIL HFA, VENTOLIN HFA, PROAIR HFA) 90 mcg/actuation inhaler Take 2 Puffs by inhalation every six (6) hours as needed for Wheezing or Shortness of Breath. 18 g 0    allopurinoL (ZYLOPRIM) 300 mg tablet Take 1 Tablet by mouth daily. DC allopurinol 100 mg 90 Tablet 1    bumetanide (BUMEX) 1 mg tablet TAKE 1 TABLET BY MOUTH EVERY DAY 60 Tablet 0    potassium chloride (K-DUR, KLOR-CON M20) 20 mEq tablet Take 20 mEq by mouth daily.  dilTIAZem ER (Tiadylt ER) 240 mg capsule Tiadylt  mg capsule,extended release   TAKE 1 CAPSULE BY MOUTH EVERY DAY      rivaroxaban (XARELTO) 20 mg tab tablet Take 1 Tab by mouth daily (with lunch). Indications: PREVENT THROMBOEMBOLISM IN CHRONIC ATRIAL FIBRILLATION 30 Tab 1    aspirin delayed-release 81 mg tablet Take 1 Tab by mouth daily. 30 Tab 0        She  has a past medical history of Arthritis, Atrial fibrillation ([address] Bronchitis, acute (12/17/2011), Congestive heart failure ([address] Diastolic heart failure ([address] WHITING (dyspnea on exertion), GERD (gastroesophageal reflux disease), Hypertension, Obesity, TAM (obstructive sleep apnea), Other ill-defined conditions(799.89), Pneumonia (6/7/2018), and Sleep apnea. She has no past medical history of CAD (coronary artery disease), Depression, Diabetes ([address] Stroke ([address] or Thyroid disease. She  has a past surgical history that includes hx hernia repair; hx cholecystectomy; hx other surgical; hx other surgical; hx orthopaedic; hx orthopaedic; colonoscopy (N/A, 3/30/2017); and pr cardioversion elective arrhythmia external (N/A, 10/9/2019). She family history includes Lung Disease in her father; Tuberculosis in her mother. She  reports that she has quit smoking. She smoked 1.00 pack per day. She has never used smokeless tobacco. She reports current alcohol use. She reports that she does not use drugs.      Review of Systems:  ROS      Objective:     Visit Vitals  /65   Pulse 76   Temp 97.8 °F (36.6 °C)   Ht 5' 9\" (1.753 m)   Wt 296 lb (134.3 kg)   SpO2 94%   BMI 43.71 kg/m²     Body mass index is 43.71 kg/m². General:    cooperative   Eyes: , no nystagmus   Oropharynx:   Mallampati score II, tongue normal       Neck:   No carotid bruits; Chest/Lungs:  Clear on auscultation    CVS:   irregular rhythm   Skin:  Warm to touch; no obvious rashes   Neuro:  Speech fluent, face symmetrical, tongue movement normal   Psych:  Normal affect        Assessment:       ICD-10-CM ICD-9-CM    1. TAM (obstructive sleep apnea)  G47.33 327.23 POLYSOMNOGRAPHY 1 NIGHT   2. Excessive daytime sleepiness  G47.19 780.54    3. Atrial fibrillation, unspecified type ([facility]  I48.91 427.31 POLYSOMNOGRAPHY 1 NIGHT   4. Chronic diastolic congestive heart failure (HCC)  I50.32 428.32 POLYSOMNOGRAPHY 1 NIGHT     428.0      History consistent with sleep disordered breathing. Potential impact of untreated sleep apnea on cardiac function discussed at length. Patient unsure that she would restart CPAP. She does agree to a diagnostic sleep study. Plan:     Orders Placed This Encounter    POLYSOMNOGRAPHY 1 NIGHT     Standing Status:   Future     Standing Expiration Date:   10/19/2022     Order Specific Question:   Reason for Exam     Answer:   history of sleep apnea       * Patient has a history and examination consistent with the diagnosis of sleep apnea. * Sleep testing was ordered for initial evaluation. * She was provided information on sleep apnea including corresponding risk factors and the importance of proper treatment. * Treatment options if indicated were reviewed today. Instructions:  o Do not engage in activities requiring a normal degree of alertness if fatigue is present.   o The patient understands that untreated or undertreated sleep apnea could impair judgement and the ability to function normally during the day.  o Call or return if symptoms worsen or persist.          Memo Ty MD, FAASM  Electronically signed 04/18/22       This note was created using voice recognition software. Despite editing, there may be syntax errors. This note will not be viewable in [address]

## 2022-04-18 NOTE — PATIENT INSTRUCTIONS
Learning About Sleep Apnea  What is it? Sleep apnea means that breathing stops for short periods during sleep. When you stop breathing or have reduced airflow into your lungs during sleep, you don't sleep well and you can be very tired during the day. The oxygen levels in your blood may go down, and carbon dioxide levels go up. It may lead to other problems, such as high blood pressure and heart disease. Sleep apnea can range from mild to severe, based on how often breathing stops during sleep. For adults, breathing may stop as few as 5 times an hour (mild apnea) to 30 or more times an hour (severe apnea). Obstructive sleep apnea is the most common type. This most often occurs because your airways are blocked or partly blocked. Central sleep apnea is less common. It happens when the brain has trouble controlling breathing. Some people have both types. That's called complex sleep apnea. What are the symptoms? There are symptoms of sleep apnea that you may notice and symptoms that others may notice when you're asleep. Symptoms you may notice include:  · Feeling extremely sleepy during the day. · Feeling unrefreshed or tired after a night's sleep. · Problems with memory and concentration, or mood changes. · Morning headaches. · Getting up often during the night to urinate. · A dry mouth or sore throat in the morning. If you have a bed partner, they may notice that you:  · Have episodes of not breathing. · Snore loudly. Almost all people who have sleep apnea snore. But not all people who snore have sleep apnea. · Toss and turn during sleep. · Have nighttime choking or gasping spells. How is it diagnosed? Your doctor will probably do a physical exam and ask about your past health. The doctor may also ask you or your bed partner about your snoring and sleep behavior and how tired you feel during the day. Your doctor may suggest a sleep study.  Sleep studies are a series of tests that look at what happens to the body during sleep. They check for how often you stop breathing or have too little air flowing into your lungs during sleep. They also find out how much oxygen you have in your blood during sleep. A sleep study may take place in your home. Or it might take place at a sleep center, where you will spend the night. If your sleep apnea doesn't improve with treatment, you may have more tests to find out what's causing it. How is it treated? You may be able to help treat sleep apnea by making some lifestyle changes. You could try to lose weight, sleep on your side, and avoid alcohol and medicines like sedatives before bed. Sleep apnea is often treated with machines that deliver air through a mask to help keep your airways open. These include:  · Continuous positive airway pressure (CPAP). This increases air pressure in your throat. It keeps your airway open when you breathe in. It's the most common device. · Bilevel positive airway pressure (BPAP). You may also hear this called BiPAP. This uses different air pressures when you breathe in and out. · Adaptive servo ventilation (ASV). It senses pauses in breathing and adjusts air pressure. It's mostly used for central sleep apnea. You can also try oral breathing devices or nasal devices. If your tonsils or other tissues are blocking your airway, your doctor may suggest surgery to open the airway. How can you care for yourself at home? · Lose weight, if needed. · Sleep on your side. It may help mild apnea. · Avoid alcohol and medicines such as sleeping pills, opioids, or sedatives before bed. · Don't smoke. If you need help quitting, talk to your doctor. · Prop up the head of your bed. · Treat breathing problems, such as a stuffy nose, that are caused by a cold or allergies. · Try a continuous positive airway pressure (CPAP) breathing machine if your doctor recommends it.   · If CPAP doesn't work for you, ask your doctor if you can try other masks, settings, or breathing machines. · Try oral breathing devices or other nasal devices. · Talk to your doctor if your nose feels dry or bleeds, or if it gets runny or stuffy when you use a breathing machine. · Tell your doctor if you're sleepy during the day and it affects your daily life. Don't drive or operate machinery when you're drowsy. Where can you learn more? Go to http://www.gray.com/  Enter S121 in the search box to learn more about \"Learning About Sleep Apnea. \"  Current as of: July 6, 2021               Content Version: 13.2  © 5250-1624 [employer]. Care instructions adapted under license by [employer] (which disclaims liability or warranty for this information). If you have questions about a medical condition or this instruction, always ask your healthcare professional. [place] [address] any warranty or liability for your use of this information.

## 2022-04-19 ENCOUNTER — HOME CARE VISIT (OUTPATIENT)
Dept: HOME HEALTH SERVICES | Facility: HOME HEALTH | Age: 82
End: 2022-04-19
Payer: MEDICARE

## 2022-04-19 ENCOUNTER — HOME CARE VISIT (OUTPATIENT)
Dept: SCHEDULING | Facility: HOME HEALTH | Age: 82
End: 2022-04-19
Payer: MEDICARE

## 2022-04-19 VITALS
HEART RATE: 67 BPM | TEMPERATURE: 98.1 F | DIASTOLIC BLOOD PRESSURE: 60 MMHG | SYSTOLIC BLOOD PRESSURE: 105 MMHG | OXYGEN SATURATION: 99 % | RESPIRATION RATE: 16 BRPM

## 2022-04-19 VITALS
DIASTOLIC BLOOD PRESSURE: 78 MMHG | HEART RATE: 75 BPM | RESPIRATION RATE: 16 BRPM | OXYGEN SATURATION: 99 % | SYSTOLIC BLOOD PRESSURE: 133 MMHG | TEMPERATURE: 98.5 F

## 2022-04-19 PROCEDURE — 3331090002 HH PPS REVENUE DEBIT

## 2022-04-19 PROCEDURE — 400013 HH SOC

## 2022-04-19 PROCEDURE — 3331090001 HH PPS REVENUE CREDIT

## 2022-04-19 PROCEDURE — G0151 HHCP-SERV OF PT,EA 15 MIN: HCPCS

## 2022-04-19 PROCEDURE — G0299 HHS/HOSPICE OF RN EA 15 MIN: HCPCS

## 2022-04-19 PROCEDURE — G0152 HHCP-SERV OF OT,EA 15 MIN: HCPCS

## 2022-04-20 ENCOUNTER — HOME CARE VISIT (OUTPATIENT)
Dept: HOME HEALTH SERVICES | Facility: HOME HEALTH | Age: 82
End: 2022-04-20
Payer: MEDICARE

## 2022-04-20 VITALS
SYSTOLIC BLOOD PRESSURE: 133 MMHG | TEMPERATURE: 98.5 F | OXYGEN SATURATION: 99 % | HEART RATE: 75 BPM | RESPIRATION RATE: 16 BRPM | DIASTOLIC BLOOD PRESSURE: 78 MMHG

## 2022-04-20 PROCEDURE — 3331090002 HH PPS REVENUE DEBIT

## 2022-04-20 PROCEDURE — 3331090001 HH PPS REVENUE CREDIT

## 2022-04-21 ENCOUNTER — TELEPHONE (OUTPATIENT)
Dept: INTERNAL MEDICINE CLINIC | Age: 82
End: 2022-04-21

## 2022-04-21 ENCOUNTER — HOME CARE VISIT (OUTPATIENT)
Dept: SCHEDULING | Facility: HOME HEALTH | Age: 82
End: 2022-04-21
Payer: MEDICARE

## 2022-04-21 VITALS
SYSTOLIC BLOOD PRESSURE: 116 MMHG | TEMPERATURE: 98.1 F | DIASTOLIC BLOOD PRESSURE: 62 MMHG | HEART RATE: 67 BPM | OXYGEN SATURATION: 97 %

## 2022-04-21 DIAGNOSIS — S93.402A SPRAIN OF LEFT ANKLE, UNSPECIFIED LIGAMENT, INITIAL ENCOUNTER: Primary | ICD-10-CM

## 2022-04-21 PROCEDURE — 3331090002 HH PPS REVENUE DEBIT

## 2022-04-21 PROCEDURE — G0158 HHC OT ASSISTANT EA 15: HCPCS

## 2022-04-21 PROCEDURE — G0151 HHCP-SERV OF PT,EA 15 MIN: HCPCS

## 2022-04-21 PROCEDURE — 3331090001 HH PPS REVENUE CREDIT

## 2022-04-21 RX ORDER — HYDROCODONE BITARTRATE AND ACETAMINOPHEN 5; 325 MG/1; MG/1
1 TABLET ORAL 2 TIMES WEEKLY
Qty: 14 TABLET | Refills: 0 | OUTPATIENT
Start: 2022-04-22 | End: 2022-05-19

## 2022-04-21 NOTE — TELEPHONE ENCOUNTER
Called and spoke with Hadley , pt has been taking the tramadol and its not helping her. She is asking to have RX for Norco twice weekly prior to her physical therapy.  Advised I would send messge to provider  And call pt back

## 2022-04-21 NOTE — TELEPHONE ENCOUNTER
----- Message from Margaux Laigregg sent at 4/19/2022  3:13 PM EDT -----  Subject: Message to Provider    QUESTIONS  Information for Provider? Carla is complaining of increased pain. Alexandro Abebe   with [address] is requesting for her to get her back on Norco  ---------------------------------------------------------------------------  --------------  [address]  What is the best way for the office to contact you? OK to leave message on   voicemail  Preferred Call Back Phone Number? [phone]  ---------------------------------------------------------------------------  --------------  SCRIPT ANSWERS  Relationship to Patient? Third Party  Third Party Type? Home Health Care? Representative Name?  Alexandro Abebe

## 2022-04-22 PROCEDURE — 3331090002 HH PPS REVENUE DEBIT

## 2022-04-22 PROCEDURE — 3331090001 HH PPS REVENUE CREDIT

## 2022-04-23 VITALS
HEART RATE: 71 BPM | DIASTOLIC BLOOD PRESSURE: 78 MMHG | TEMPERATURE: 98 F | RESPIRATION RATE: 16 BRPM | SYSTOLIC BLOOD PRESSURE: 132 MMHG | OXYGEN SATURATION: 99 %

## 2022-04-23 PROCEDURE — 3331090002 HH PPS REVENUE DEBIT

## 2022-04-23 PROCEDURE — 3331090001 HH PPS REVENUE CREDIT

## 2022-04-24 PROCEDURE — 3331090001 HH PPS REVENUE CREDIT

## 2022-04-24 PROCEDURE — 3331090002 HH PPS REVENUE DEBIT

## 2022-04-25 ENCOUNTER — HOME CARE VISIT (OUTPATIENT)
Dept: SCHEDULING | Facility: HOME HEALTH | Age: 82
End: 2022-04-25
Payer: MEDICARE

## 2022-04-25 VITALS — TEMPERATURE: 97.5 F | OXYGEN SATURATION: 96 % | HEART RATE: 66 BPM

## 2022-04-25 PROCEDURE — 3331090002 HH PPS REVENUE DEBIT

## 2022-04-25 PROCEDURE — G0158 HHC OT ASSISTANT EA 15: HCPCS

## 2022-04-25 PROCEDURE — 3331090001 HH PPS REVENUE CREDIT

## 2022-04-26 ENCOUNTER — HOME CARE VISIT (OUTPATIENT)
Dept: SCHEDULING | Facility: HOME HEALTH | Age: 82
End: 2022-04-26
Payer: MEDICARE

## 2022-04-26 PROCEDURE — 3331090001 HH PPS REVENUE CREDIT

## 2022-04-26 PROCEDURE — 3331090002 HH PPS REVENUE DEBIT

## 2022-04-26 PROCEDURE — G0151 HHCP-SERV OF PT,EA 15 MIN: HCPCS

## 2022-04-27 VITALS
OXYGEN SATURATION: 97 % | TEMPERATURE: 97.8 F | DIASTOLIC BLOOD PRESSURE: 68 MMHG | HEART RATE: 59 BPM | SYSTOLIC BLOOD PRESSURE: 118 MMHG | RESPIRATION RATE: 18 BRPM

## 2022-04-27 DIAGNOSIS — S93.402A SPRAIN OF LEFT ANKLE, UNSPECIFIED LIGAMENT, INITIAL ENCOUNTER: ICD-10-CM

## 2022-04-27 PROCEDURE — 3331090002 HH PPS REVENUE DEBIT

## 2022-04-27 PROCEDURE — 3331090001 HH PPS REVENUE CREDIT

## 2022-04-27 RX ORDER — TRAMADOL HYDROCHLORIDE 50 MG/1
50 TABLET ORAL
Qty: 14 TABLET | Refills: 0 | Status: SHIPPED | OUTPATIENT
Start: 2022-04-27 | End: 2022-05-11

## 2022-04-28 ENCOUNTER — HOME CARE VISIT (OUTPATIENT)
Dept: SCHEDULING | Facility: HOME HEALTH | Age: 82
End: 2022-04-28
Payer: MEDICARE

## 2022-04-28 ENCOUNTER — HOME CARE VISIT (OUTPATIENT)
Dept: HOME HEALTH SERVICES | Facility: HOME HEALTH | Age: 82
End: 2022-04-28
Payer: MEDICARE

## 2022-04-28 VITALS
RESPIRATION RATE: 16 BRPM | HEART RATE: 55 BPM | SYSTOLIC BLOOD PRESSURE: 110 MMHG | OXYGEN SATURATION: 96 % | DIASTOLIC BLOOD PRESSURE: 60 MMHG | TEMPERATURE: 97 F

## 2022-04-28 PROCEDURE — 3331090002 HH PPS REVENUE DEBIT

## 2022-04-28 PROCEDURE — G0151 HHCP-SERV OF PT,EA 15 MIN: HCPCS

## 2022-04-28 PROCEDURE — 3331090001 HH PPS REVENUE CREDIT

## 2022-04-28 PROCEDURE — G0152 HHCP-SERV OF OT,EA 15 MIN: HCPCS

## 2022-04-29 ENCOUNTER — HOME CARE VISIT (OUTPATIENT)
Dept: SCHEDULING | Facility: HOME HEALTH | Age: 82
End: 2022-04-29
Payer: MEDICARE

## 2022-04-29 VITALS
TEMPERATURE: 97.2 F | DIASTOLIC BLOOD PRESSURE: 70 MMHG | SYSTOLIC BLOOD PRESSURE: 125 MMHG | RESPIRATION RATE: 18 BRPM | OXYGEN SATURATION: 96 % | HEART RATE: 83 BPM

## 2022-04-29 PROCEDURE — 3331090001 HH PPS REVENUE CREDIT

## 2022-04-29 PROCEDURE — G0299 HHS/HOSPICE OF RN EA 15 MIN: HCPCS

## 2022-04-29 PROCEDURE — 3331090002 HH PPS REVENUE DEBIT

## 2022-04-30 PROCEDURE — 3331090002 HH PPS REVENUE DEBIT

## 2022-04-30 PROCEDURE — 3331090001 HH PPS REVENUE CREDIT

## 2022-05-01 PROCEDURE — 3331090002 HH PPS REVENUE DEBIT

## 2022-05-01 PROCEDURE — 3331090001 HH PPS REVENUE CREDIT

## 2022-05-02 ENCOUNTER — PATIENT OUTREACH (OUTPATIENT)
Dept: CASE MANAGEMENT | Age: 82
End: 2022-05-02

## 2022-05-02 VITALS
DIASTOLIC BLOOD PRESSURE: 75 MMHG | TEMPERATURE: 97.2 F | HEART RATE: 86 BPM | RESPIRATION RATE: 16 BRPM | OXYGEN SATURATION: 95 % | SYSTOLIC BLOOD PRESSURE: 132 MMHG

## 2022-05-02 NOTE — PROGRESS NOTES
Ambulatory Care Management Note      Date/Time:  5/2/2022 10:21 AM    This patient was received as a referral from son self referred patient . Top Challenges reviewed with the provider   -patient reports ankle pain worse in the last few days took a pain pill but it did not provide much relief - mobility is still very poor , cannot stand longer than 30 secs per PT/OT notes    - has been discharged from [facility] . Patients family is available to assist with ADL's often but not full time/ safety concern due to bed bound status /total care without 24/7 care. Asked patient to have son follow up with [facility] on Medicaid application and applying for personal care screening to get assistance of aides. Ambulatory  contacted patient for discussion and case management of  Debility, left ankle sprain/pain, TAM  Summary of patients top problems:   1. TAM- patient has seen sleep center, scheduled for sleep lab test on 6/10 . Has history of sleep apnea per notes but was previously not consistent with CPAP. 2. Left ankle sprain- patient reports left ankle had been feeling a little better but the last few days has hurt much worse and she has needed to take pain pill to help with the pain but it didn't help much. It has been swollen . Discussed she could ice that area and that bringing the swelling down would help to relieve the pain. She states she did buy a ankle brace but \"It wasn't any good, I need to get another one\". Discharged from [facility] PT/OT the end of last week. 3. Debility- patient just completed a [facility] program and was at max potential so was discharged from [facility] at the end of last week. Patient reports that since her left ankle sprain she has been much less mobile. She has only been able to turn side to side in bed and get in wheelchair to get up. She can stand quickly with standby assist for about 30 secs and pivot to the bedside commode. Discussed turning frequently in bed to avoid pressure sores.  She reports at this time that her daughter told her she didn't have any sores at this time. Patient reports she usually has a family member around to help but sometimes does not. When she is alone she is incontinent of urine into briefs. Discussed being alone and bed bound has safety concerns. Advised she speak with her sons about if they started the Medicaid application as previously discussed with them and discussed that if she got approved she could get assistance from a personal care aide daily . Encouraged her to have one of her children reach out to [facility] for assistance with this process. Patient's challenges to self management identified:   functional physical ability, ineffective coping, level of motivation and medical condition      Medication Management:  good adherence and good understanding    Advance Care Planning:   Does patient have an Advance Directive:  currently not on file; education provided     [employer], Referrals, and Durable Medical Equipment: none    PCP/Specialist follow up:   Future Appointments   Date Time Provider Rachna Lugo   6/8/2022 11:15 AM Florencia Raines MD [facility] BS AMB   6/10/2022  8:30 PM BEDROOM 5 [address] SLEEP LAB [place]          Goals      Returns to baseline activity level. 5/2/22- patients mobility remains poor as she is only able to stand for 30 secs at a time and make short transfers to bed or bsc. Otherwise she is bedbound/ w/c bound. HH therapy has discharged her as she has reached her max potential . Encouraged patient to speak with children in re to applying for Medicaid and personal care as this would help provide care at home for her during the hours family isnt available. Patient will d/w her sons and ACM will follow up in 1-2 weeks. LN    3/29/22- Second call to [facility] regarding referral to [place] SW to discuss patient potential for short term inpt rehab. Spoke with Darby Rivas. Order for TERELL has not been received.  ACM  requested this order to be added on as per PT/OT notes they are requesting a SW be added to services to discuss potential for inpatient rehab. ACM will follow up in the next few days to see if patient has been scheduled / seen and evaluated. LN    3/28/22- Patients son Martina Rosario reports she is progressing slowly with [facility] PT/OT he felt she may benefit from inpatient rehab , noted in [facility] PT/OT notes they have asked for SW referral to consider for this . ACM left message for [facility] to return call . South has no additional questions. Will call ACM with any other questions. ACM will follow up in the next few days. LN             Patient verbalized understanding of all information discussed. Patient has this Ambulatory Care Manager's contact information for any further questions, concerns, or needs.

## 2022-05-12 ENCOUNTER — PATIENT OUTREACH (OUTPATIENT)
Dept: CASE MANAGEMENT | Age: 82
End: 2022-05-12

## 2022-05-12 NOTE — PROGRESS NOTES
Heart Failure Education outreach Date/Time: 2022 12:40 PM    Ambulatory Care Manager (ACM) contacted the patient by telephone to perform Ambulatory Care Coordination. Verified name and  with patient as identifiers. Provided introduction to self, and explanation of the Ambulatory Care Manager's role. ACM reviewed that a Health Healthy tips packet for the Spring has been sent to [place]. ACM reviewed healthy tips packet with the patient. The patient declined any additional HF education. Declined education on healthy snacking with HF. Patient reminded that there is a physician on call 24 hours a day / 7 days a week should the patient have questions or concerns. The patient verbalized understanding. Patient reports that she is sitting up at bedside in the chair. Family and PCP working on possible admission to [facility]. Patient denies any further CM needs at this time and denies any additional HF teaching . Will discharge from CM    Patient has graduated from the Complex Case Management  program on 22. Patient/family has the ability to self-manage at this time. Care management goals have been completed. No further Ambulatory Care Manager follow up scheduled. Goals Addressed                 This Visit's Progress     COMPLETED: Returns to baseline activity level. 22- patient reports sittting in chair mobility is still poor. Working on admission to rehab at [facility] and discussing possible facility with family. LN    22- patients mobility remains poor as she is only able to stand for 30 secs at a time and make short transfers to bed or bsc. Otherwise she is bedbound/ w/c bound. HH therapy has discharged her as she has reached her max potential . Encouraged patient to speak with children in re to applying for Medicaid and personal care as this would help provide care at home for her during the hours family isnt available.  Patient will d/w her sons and ACM will follow up in 1-2 weeks. LN    3/29/22- Second call to [facility] regarding referral to [facility] SW to discuss patient potential for short term inpt rehab. Spoke with Darby Rivas. Order for SW has not been received. ACM  requested this order to be added on as per PT/OT notes they are requesting a SW be added to services to discuss potential for inpatient rehab. ACM will follow up in the next few days to see if patient has been scheduled / seen and evaluated. LN    3/28/22- Patients son Igor Ibarra reports she is progressing slowly with [facility] PT/OT he felt she may benefit from inpatient rehab , noted in [facility] PT/OT notes they have asked for SW referral to consider for this . ACM left message for [facility] to return call . South has no additional questions. Will call ACM with any other questions. ACM will follow up in the next few days. LN            Patient has Ambulatory Care Manager's contact information for any further questions, concerns, or needs.   Patients upcoming visits:    Future Appointments   Date Time Provider Rachna Lugo   6/8/2022 11:15 AM Bertrand Lopez MD [facility] BS AMB   6/10/2022  8:30 PM BEDROOM 5 [address] SLEEP LAB [place]

## 2022-05-19 ENCOUNTER — HOSPITAL ENCOUNTER (EMERGENCY)
Age: 82
Discharge: HOME | End: 2022-05-19
Attending: STUDENT IN AN ORGANIZED HEALTH CARE EDUCATION/TRAINING PROGRAM
Payer: MEDICARE

## 2022-05-19 ENCOUNTER — APPOINTMENT (OUTPATIENT)
Dept: CT IMAGING | Age: 82
End: 2022-05-19
Attending: STUDENT IN AN ORGANIZED HEALTH CARE EDUCATION/TRAINING PROGRAM
Payer: MEDICARE

## 2022-05-19 ENCOUNTER — APPOINTMENT (OUTPATIENT)
Dept: GENERAL RADIOLOGY | Age: 82
End: 2022-05-19
Attending: STUDENT IN AN ORGANIZED HEALTH CARE EDUCATION/TRAINING PROGRAM
Payer: MEDICARE

## 2022-05-19 VITALS
RESPIRATION RATE: 21 BRPM | HEART RATE: 82 BPM | DIASTOLIC BLOOD PRESSURE: 73 MMHG | SYSTOLIC BLOOD PRESSURE: 133 MMHG | OXYGEN SATURATION: 99 % | TEMPERATURE: 98 F

## 2022-05-19 DIAGNOSIS — R07.9 CHEST PAIN WITH LOW RISK OF ACUTE CORONARY SYNDROME: Primary | ICD-10-CM

## 2022-05-19 LAB
ALBUMIN SERPL-MCNC: 3 G/DL (ref 3.5–5)
ALBUMIN/GLOB SERPL: 0.7 {RATIO} (ref 1.1–2.2)
ALP SERPL-CCNC: 99 U/L (ref 45–117)
ALT SERPL-CCNC: 13 U/L (ref 12–78)
ANION GAP SERPL CALC-SCNC: 4 MMOL/L (ref 5–15)
AST SERPL-CCNC: 14 U/L (ref 15–37)
BASOPHILS # BLD: 0 K/UL (ref 0–0.1)
BASOPHILS NFR BLD: 1 % (ref 0–1)
BILIRUB SERPL-MCNC: 0.9 MG/DL (ref 0.2–1)
BUN SERPL-MCNC: 10 MG/DL (ref 6–20)
BUN/CREAT SERPL: 10 (ref 12–20)
CALCIUM SERPL-MCNC: 9.8 MG/DL (ref 8.5–10.1)
CHLORIDE SERPL-SCNC: 104 MMOL/L (ref 97–108)
CO2 SERPL-SCNC: 28 MMOL/L (ref 21–32)
COMMENT, HOLDF: NORMAL
CREAT SERPL-MCNC: 1.05 MG/DL (ref 0.55–1.02)
DIFFERENTIAL METHOD BLD: ABNORMAL
EOSINOPHIL # BLD: 0.1 K/UL (ref 0–0.4)
EOSINOPHIL NFR BLD: 1 % (ref 0–7)
ERYTHROCYTE [DISTWIDTH] IN BLOOD BY AUTOMATED COUNT: 15.1 % (ref 11.5–14.5)
GLOBULIN SER CALC-MCNC: 4.2 G/DL (ref 2–4)
GLUCOSE SERPL-MCNC: 92 MG/DL (ref 65–100)
HCT VFR BLD AUTO: 44.6 % (ref 35–47)
HGB BLD-MCNC: 14.9 G/DL (ref 11.5–16)
IMM GRANULOCYTES # BLD AUTO: 0.1 K/UL (ref 0–0.04)
IMM GRANULOCYTES NFR BLD AUTO: 2 % (ref 0–0.5)
LYMPHOCYTES # BLD: 1.6 K/UL (ref 0.8–3.5)
LYMPHOCYTES NFR BLD: 25 % (ref 12–49)
MCH RBC QN AUTO: 29.4 PG (ref 26–34)
MCHC RBC AUTO-ENTMCNC: 33.4 G/DL (ref 30–36.5)
MCV RBC AUTO: 88.1 FL (ref 80–99)
MONOCYTES # BLD: 0.8 K/UL (ref 0–1)
MONOCYTES NFR BLD: 13 % (ref 5–13)
NEUTS SEG # BLD: 3.8 K/UL (ref 1.8–8)
NEUTS SEG NFR BLD: 58 % (ref 32–75)
NRBC # BLD: 0 K/UL (ref 0–0.01)
NRBC BLD-RTO: 0 PER 100 WBC
PLATELET # BLD AUTO: 226 K/UL (ref 150–400)
PMV BLD AUTO: 9.8 FL (ref 8.9–12.9)
POTASSIUM SERPL-SCNC: 4.5 MMOL/L (ref 3.5–5.1)
PROT SERPL-MCNC: 7.2 G/DL (ref 6.4–8.2)
RBC # BLD AUTO: 5.06 M/UL (ref 3.8–5.2)
SAMPLES BEING HELD,HOLD: NORMAL
SODIUM SERPL-SCNC: 136 MMOL/L (ref 136–145)
TROPONIN-HIGH SENSITIVITY: 5 NG/L (ref 0–51)
TROPONIN-HIGH SENSITIVITY: 5 NG/L (ref 0–51)
WBC # BLD AUTO: 6.4 K/UL (ref 3.6–11)

## 2022-05-19 PROCEDURE — 99285 EMERGENCY DEPT VISIT HI MDM: CPT

## 2022-05-19 PROCEDURE — 71045 X-RAY EXAM CHEST 1 VIEW: CPT

## 2022-05-19 PROCEDURE — 74011000636 HC RX REV CODE- 636: Performed by: STUDENT IN AN ORGANIZED HEALTH CARE EDUCATION/TRAINING PROGRAM

## 2022-05-19 PROCEDURE — 80053 COMPREHEN METABOLIC PANEL: CPT

## 2022-05-19 PROCEDURE — 93005 ELECTROCARDIOGRAM TRACING: CPT

## 2022-05-19 PROCEDURE — 71275 CT ANGIOGRAPHY CHEST: CPT

## 2022-05-19 PROCEDURE — 74011250637 HC RX REV CODE- 250/637: Performed by: STUDENT IN AN ORGANIZED HEALTH CARE EDUCATION/TRAINING PROGRAM

## 2022-05-19 PROCEDURE — 84484 ASSAY OF TROPONIN QUANT: CPT

## 2022-05-19 PROCEDURE — 36415 COLL VENOUS BLD VENIPUNCTURE: CPT

## 2022-05-19 PROCEDURE — 85025 COMPLETE CBC W/AUTO DIFF WBC: CPT

## 2022-05-19 RX ORDER — TRAMADOL HYDROCHLORIDE 50 MG/1
50 TABLET ORAL
Status: COMPLETED | OUTPATIENT
Start: 2022-05-19 | End: 2022-05-19

## 2022-05-19 RX ORDER — TRAMADOL HYDROCHLORIDE 50 MG/1
50 TABLET ORAL
Qty: 20 TABLET | Refills: 0 | Status: SHIPPED | OUTPATIENT
Start: 2022-05-19 | End: 2022-05-24

## 2022-05-19 RX ADMIN — IOPAMIDOL 100 ML: 755 INJECTION, SOLUTION INTRAVENOUS at 13:01

## 2022-05-19 RX ADMIN — TRAMADOL HYDROCHLORIDE 50 MG: 50 TABLET, COATED ORAL at 14:09

## 2022-05-19 NOTE — DISCHARGE INSTRUCTIONS
There is no eviden that your chest pain is constant from your heart. Some features on it make it seem more musculoskeletal such as being reproducible with palpation or pressure. Ce your primary care physician can refer you to a musculoskeletal specialist such as spine specialist for further evaluation. However if you have new or worsening pain please return.

## 2022-05-19 NOTE — ED PROVIDER NOTES
Luis Drummond is a 81 y.o. female with past medical history notable for Zak of ablation, acute bronchitis, diastolic heart failure, GERD, obesity, TAM, pneumonia6 presenting with chest pain. She describes the pain as sharp, ongoing over the past 4 days, intermittent, radiates to her back. Does not radiate to the extremities or the neck. Denies headache, nausea, fever, chills, change in her lower extremity edema. She has not had associated dyspnea. She has not experienced pain similar to this in the past she states. She states that she has maybe missed a few doses of Xarelto but her other medication she is taking consistently.             Past Medical History:   Diagnosis Date    Arthritis     Atrial fibrillation ([facility] [address]     Bronchitis, acute 12/17/2011    Congestive heart failure (HCC)     Diastolic heart failure (HCC)     WHITING (dyspnea on exertion)     GERD (gastroesophageal reflux disease)     Hypertension     Obesity     TAM (obstructive sleep apnea)     Other ill-defined conditions(799.89)     \"fluid in the lung\"    Pneumonia 6/7/2018    Sleep apnea        Past Surgical History:   Procedure Laterality Date    COLONOSCOPY N/A 3/30/2017    COLONOSCOPY performed by Ann Clay MD at [facility] ENDOSCOPY    HX CHOLECYSTECTOMY      HX HERNIA REPAIR      HX ORTHOPAEDIC      right and left total knee replacement    HX ORTHOPAEDIC      right shoulder    HX OTHER SURGICAL      kidney stone surgery    HX OTHER SURGICAL      both eyes cataract    MS CARDIOVERSION ELECTIVE ARRHYTHMIA EXTERNAL N/A 10/9/2019    EP CARDIOVERSION performed by Elin Fink MD at [facility] [address] [facility] LAB         Family History:   Problem Relation Age of Onset    Tuberculosis Mother     Lung Disease Father        Social History     Socioeconomic History    Marital status: SINGLE     Spouse name: Not on file    Number of children: Not on file    Years of education: Not on file    Highest education level: Not on file Occupational History    Not on file   Tobacco Use    Smoking status: Former Smoker     Packs/day: 1.00    Smokeless tobacco: Never Used    Tobacco comment: quit at age 43,smoked for 17 years   Substance and Sexual Activity    Alcohol use: Yes     Comment: socially    Drug use: No    Sexual activity: Not on file     Comment: single,6 children. staying in own place   Other Topics Concern     Service Not Asked    Blood Transfusions Not Asked    Caffeine Concern Not Asked    Occupational Exposure Not Asked   Kimber Major Hazards Not Asked    Sleep Concern Not Asked    Stress Concern Not Asked    Weight Concern Not Asked    Special Diet Not Asked    Back Care Not Asked    Exercise Not Asked    Bike Helmet Not Asked   [address] Not Asked    Self-Exams Not Asked   Social History Narrative    Lives at home with 2 sons. Manages      Social Determinants of Health     Financial Resource Strain:     Difficulty of Paying Living Expenses: Not on file   Food Insecurity:     Worried About Running Out of Food in the Last Year: Not on file    Astrid of Food in the Last Year: Not on file   Transportation Needs:     Lack of Transportation (Medical): Not on file    Lack of Transportation (Non-Medical):  Not on file   Physical Activity:     Days of Exercise per Week: Not on file    Minutes of Exercise per Session: Not on file   Stress:     Feeling of Stress : Not on file   Social Connections:     Frequency of Communication with Friends and Family: Not on file    Frequency of Social Gatherings with Friends and Family: Not on file    Attends [employer] Services: Not on file    Active Member of Clubs or Organizations: Not on file    Attends Club or Organization Meetings: Not on file    Marital Status: Not on file   Intimate Partner Violence:     Fear of Current or Ex-Partner: Not on file    Emotionally Abused: Not on file    Physically Abused: Not on file    Sexually Abused: Not on file Housing Stability:     Unable to Pay for Housing in the Last Year: Not on file    Number of Places Lived in the Last Year: Not on file    Unstable Housing in the Last Year: Not on file         ALLERGIES: Penicillins    Review of Systems   Constitutional: Negative for chills and fever. Eyes: Negative for photophobia. Respiratory: Negative for shortness of breath. Cardiovascular: Negative for chest pain. Gastrointestinal: Negative for abdominal pain. Genitourinary: Negative for dysuria. Musculoskeletal: Negative for back pain. Neurological: Negative for headaches. Psychiatric/Behavioral: Negative for confusion. All other systems reviewed and are negative. Vitals:    22 0930 22 1404 22 1513   BP: (!) 198/140 126/84 133/73   Pulse: 84 84 82   Resp: 18    Temp: 98 °F (36.7 °C)     SpO2: 97% 98% 99%            Physical Exam     [facility]  ED Course as of 22 0616   Thu May 19, 2022   1036 ECHO ADULT COMPLETE (21 1312)(!) [NS]      ED Course User Index  [NS] Tiffanie Grier MD       Procedures    MEDICAL DECISION MAKIN y.o. female presents with Chest Pain    Chest pain is described as sharp and reproducible, radiates to the back. Serial troponins are negative. Requesting discharge for outpatient follow-up. Doubt ACS, PE, dissection. LABORATORY TESTS:  Labs Reviewed   CBC WITH AUTOMATED DIFF - Abnormal; Notable for the following components:       Result Value    RDW 15.1 (*)     IMMATURE GRANULOCYTES 2 (*)     ABS. IMM.  GRANS. 0.1 (*)     All other components within normal limits   METABOLIC PANEL, COMPREHENSIVE - Abnormal; Notable for the following components:    Anion gap 4 (*)     Creatinine 1.05 (*)     BUN/Creatinine ratio 10 (*)     GFR est non-AA 50 (*)     AST (SGOT) 14 (*)     Albumin 3.0 (*)     Globulin 4.2 (*)     A-G Ratio 0.7 (*)     All other components within normal limits   SAMPLES BEING HELD   TROPONIN-HIGH SENSITIVITY   TROPONIN-HIGH SENSITIVITY       IMAGING RESULTS:  CTA CHEST W OR W WO CONT   Final Result   1. No pulmonary embolism. Secondary evidence of chronic pulmonary artery   hypertension. 2. Pulmonary findings are most compatible with mild pulmonary edema. Consider   CHF. 3. 0.7 cm new subpleural nodular opacity in the right upper lobe is nonspecific. Other smaller pulmonary nodules are unchanged. 4. Old granulomatous disease. 5. Small hiatal hernia. Recommendation: Guidelines by the Fleischner society (Radiology 2017 special   report) suggest that patients with a low risk for lung cancer who have solid   nodule(s) greater than 6 mm and less than or equal to 8 mm in diameter should   have follow up in approximately 6 to 12 months for single nodule. Patients with   a known malignancy are at increased risk for metastasis and should receive a   three month follow-up. XR CHEST PORT   Final Result   No acute process. Cardiomegaly. MEDICATIONS GIVEN:  Medications   iopamidoL (ISOVUE-370) 76 % injection 100 mL (100 mL IntraVENous Given 5/19/22 1301)   traMADoL (ULTRAM) tablet 50 mg (50 mg Oral Given 5/19/22 1409)       PROGRESS NOTE:   6:15 AM Patient has remained stable and is ready for discharge  The patient's ED course has been uncomplicated    EKG:  Atrial fibrillation, v rate 70, no stemi       CONSULTS:  Discussed with family at bedside    IMPRESSION:  1. Chest pain with low risk of acute coronary syndrome        PLAN:  -   Discharge  Discharge Medication List as of 5/19/2022  3:22 PM      START taking these medications    Details   traMADoL (Ultram) 50 mg tablet Take 1 Tablet by mouth every six (6) hours as needed for Pain for up to 5 days.  Max Daily Amount: 200 mg., Normal, Disp-20 Tablet, R-0         CONTINUE these medications which have NOT CHANGED    Details   diclofenac (VOLTAREN) 1 % gel Apply  to affected area two (2) times daily as needed for Pain., Normal, Disp-100 g, R-1      spironolactone (ALDACTONE) 25 mg tablet Take 1 Tablet by mouth daily. , Normal, Disp-90 Tablet, R-1      acetaminophen (TYLENOL) 500 mg tablet Take 500 mg by mouth every six (6) hours as needed for Pain., Historical Med      omeprazole (PRILOSEC) 20 mg capsule Take 20 mg by mouth daily. , Historical Med      albuterol (PROVENTIL HFA, VENTOLIN HFA, PROAIR HFA) 90 mcg/actuation inhaler Take 2 Puffs by inhalation every six (6) hours as needed for Wheezing or Shortness of Breath., Normal, Disp-18 g, R-0      allopurinoL (ZYLOPRIM) 300 mg tablet Take 1 Tablet by mouth daily. DC allopurinol 100 mg, Normal, Disp-90 Tablet, R-1      bumetanide (BUMEX) 1 mg tablet TAKE 1 TABLET BY MOUTH EVERY DAY, Normal, Disp-60 Tablet, R-0      potassium chloride (K-DUR, KLOR-CON M20) 20 mEq tablet Take 20 mEq by mouth daily. , Historical Med      dilTIAZem ER (Tiadylt ER) 240 mg capsule Tiadylt  mg capsule,extended release   TAKE 1 CAPSULE BY MOUTH EVERY DAY, Historical Med      rivaroxaban (XARELTO) 20 mg tab tablet Take 1 Tab by mouth daily (with lunch). Indications: PREVENT THROMBOEMBOLISM IN CHRONIC ATRIAL FIBRILLATION, Print, Disp-30 Tab, R-1      aspirin delayed-release 81 mg tablet Take 1 Tab by mouth daily. , Print, Disp-30 Tab, R-0           Follow-up Information     Follow up With Specialties Details Why Contact Info    Mike Samson MD Internal Medicine Physician Schedule an appointment as soon as possible for a visit  Call for a follow up appointment. [address]  [address] [place] [address]  [phone]          Return precautions given      Bel Hernandez MD      Please note that this dictation was completed with [employer], the [employer] voice recognition software. Quite often unanticipated grammatical, syntax, homophones, and other interpretive errors are inadvertently transcribed by the computer software. Please disregard these errors. Please excuse any errors that have escaped final proofreading.

## 2022-05-20 LAB
CALCULATED R AXIS, ECG10: -3 DEGREES
CALCULATED T AXIS, ECG11: 21 DEGREES
DIAGNOSIS, 93000: NORMAL
Q-T INTERVAL, ECG07: 338 MS
QRS DURATION, ECG06: 86 MS
QTC CALCULATION (BEZET), ECG08: 370 MS
VENTRICULAR RATE, ECG03: 72 BPM

## 2022-05-26 DIAGNOSIS — I50.32 CHRONIC DIASTOLIC CONGESTIVE HEART FAILURE (HCC): ICD-10-CM

## 2022-05-26 RX ORDER — ALBUTEROL SULFATE 90 UG/1
AEROSOL, METERED RESPIRATORY (INHALATION)
Qty: 18 EACH | Refills: 3 | Status: SHIPPED | OUTPATIENT
Start: 2022-05-26

## 2022-06-08 ENCOUNTER — OFFICE VISIT (OUTPATIENT)
Dept: INTERNAL MEDICINE CLINIC | Age: 82
End: 2022-06-08
Payer: MEDICARE

## 2022-06-08 VITALS
DIASTOLIC BLOOD PRESSURE: 68 MMHG | RESPIRATION RATE: 18 BRPM | SYSTOLIC BLOOD PRESSURE: 114 MMHG | BODY MASS INDEX: 43.4 KG/M2 | OXYGEN SATURATION: 97 % | HEIGHT: 69 IN | WEIGHT: 293 LBS | HEART RATE: 73 BPM | TEMPERATURE: 98.6 F

## 2022-06-08 DIAGNOSIS — Z23 ENCOUNTER FOR IMMUNIZATION: ICD-10-CM

## 2022-06-08 DIAGNOSIS — K44.9 HIATAL HERNIA: Primary | ICD-10-CM

## 2022-06-08 DIAGNOSIS — Z28.20 VACCINE REFUSED BY PATIENT: ICD-10-CM

## 2022-06-08 DIAGNOSIS — M10.9 GOUT, UNSPECIFIED CAUSE, UNSPECIFIED CHRONICITY, UNSPECIFIED SITE: ICD-10-CM

## 2022-06-08 DIAGNOSIS — I50.32 CHRONIC DIASTOLIC CONGESTIVE HEART FAILURE (HCC): ICD-10-CM

## 2022-06-08 DIAGNOSIS — R07.9 CHEST PAIN, UNSPECIFIED TYPE: ICD-10-CM

## 2022-06-08 DIAGNOSIS — I48.0 PAROXYSMAL ATRIAL FIBRILLATION (HCC): ICD-10-CM

## 2022-06-08 DIAGNOSIS — I10 PRIMARY HYPERTENSION: ICD-10-CM

## 2022-06-08 PROBLEM — N18.30 CHRONIC RENAL DISEASE, STAGE III (HCC): Status: ACTIVE | Noted: 2022-06-08

## 2022-06-08 PROCEDURE — 90677 PCV20 VACCINE IM: CPT | Performed by: INTERNAL MEDICINE

## 2022-06-08 PROCEDURE — G8752 SYS BP LESS 140: HCPCS | Performed by: INTERNAL MEDICINE

## 2022-06-08 PROCEDURE — 1090F PRES/ABSN URINE INCON ASSESS: CPT | Performed by: INTERNAL MEDICINE

## 2022-06-08 PROCEDURE — G8754 DIAS BP LESS 90: HCPCS | Performed by: INTERNAL MEDICINE

## 2022-06-08 PROCEDURE — 99214 OFFICE O/P EST MOD 30 MIN: CPT | Performed by: INTERNAL MEDICINE

## 2022-06-08 PROCEDURE — 1101F PT FALLS ASSESS-DOCD LE1/YR: CPT | Performed by: INTERNAL MEDICINE

## 2022-06-08 PROCEDURE — G8427 DOCREV CUR MEDS BY ELIG CLIN: HCPCS | Performed by: INTERNAL MEDICINE

## 2022-06-08 PROCEDURE — G8417 CALC BMI ABV UP PARAM F/U: HCPCS | Performed by: INTERNAL MEDICINE

## 2022-06-08 PROCEDURE — G8510 SCR DEP NEG, NO PLAN REQD: HCPCS | Performed by: INTERNAL MEDICINE

## 2022-06-08 PROCEDURE — G8399 PT W/DXA RESULTS DOCUMENT: HCPCS | Performed by: INTERNAL MEDICINE

## 2022-06-08 PROCEDURE — G8536 NO DOC ELDER MAL SCRN: HCPCS | Performed by: INTERNAL MEDICINE

## 2022-06-08 PROCEDURE — G0009 ADMIN PNEUMOCOCCAL VACCINE: HCPCS | Performed by: INTERNAL MEDICINE

## 2022-06-08 RX ORDER — OMEPRAZOLE 40 MG/1
40 CAPSULE, DELAYED RELEASE ORAL DAILY
Qty: 90 CAPSULE | Refills: 1 | Status: SHIPPED | OUTPATIENT
Start: 2022-06-08

## 2022-06-08 NOTE — PROGRESS NOTES
Chief Complaint   Patient presents with    Hypertension        Visit Vitals  /68 (BP 1 Location: Left upper arm, BP Patient Position: Sitting, BP Cuff Size: Large adult)   Pulse 73   Temp 98.6 °F (37 °C) (Oral)   Resp 18   Ht 5' 9\" (1.753 m)   Wt 296 lb (134.3 kg)   SpO2 97%   BMI 43.71 kg/m²        1. Have you been to the ER, urgent care clinic since your last visit? Hospitalized since your last visit? Yes When: 5/19/22 [facility] ED    2. Have you seen or consulted any other health care providers outside of the [address] [place] [address] since your last visit? Include any pap smears or colon screening. No     Health Maintenance Due   Topic Date Due    COVID-19 Vaccine (1) Never done    Pneumococcal 65+ years (1 - PCV) Never done    DTaP/Tdap/Td series (1 - Tdap) Never done    Shingrix Vaccine Age 50> (1 of 2) Never done        3 most recent PHQ Screens 6/8/2022   Little interest or pleasure in doing things Not at all   Feeling down, depressed, irritable, or hopeless Not at all   Total Score PHQ 2 0   Trouble falling or staying asleep, or sleeping too much -   Feeling tired or having little energy -   Poor appetite, weight loss, or overeating -   Feeling bad about yourself - or that you are a failure or have let yourself or your family down -   Trouble concentrating on things such as school, work, reading, or watching TV -   Moving or speaking so slowly that other people could have noticed; or the opposite being so fidgety that others notice -   Thoughts of being better off dead, or hurting yourself in some way -   PHQ 9 Score -   How difficult have these problems made it for you to do your work, take care of your home and get along with others -        Fall Risk Assessment, last 12 mths 6/8/2022   Able to walk? No   Fall in past 12 months? 0   Do you feel unsteady? 0   Are you worried about falling 0   Is TUG test greater than 12 seconds?  -   Is the gait abnormal? -   Number of falls in past 12 months -       Learning Assessment 4/28/2021   PRIMARY LEARNER Patient   HIGHEST LEVEL OF EDUCATION - PRIMARY LEARNER  DID NOT GRADUATE HIGH SCHOOL   BARRIERS PRIMARY LEARNER -   CO-LEARNER CAREGIVER -   PRIMARY LANGUAGE ENGLISH    NEED -   LEARNER PREFERENCE PRIMARY LISTENING     -     -     -     -   ANSWERED BY patient   RELATIONSHIP SELF   ASSESSMENT COMMENT -

## 2022-06-08 NOTE — PROGRESS NOTES
HISTORY OF PRESENT ILLNESS  Brenda Meza is a 81 y.o. female here to follow-up. She went to emergency room with chest pain. EKG and cardiac enzymes were negative. Had CT chest done, showed small hiatal hernia and a small nodular opacity in the lung. Also she was found to have congestive heart failure. She was seeing Dr. Gt Verma, cardiologist who has retired. Going to see a new cardiologist in her practice. No shortness of breath or orthopnea. On Bumex and Aldactone. Weight seems stable. Has atrial fibrillation, on Xarelto and Cardizem CD, no palpitation. r. Has history of sleep apnea, had CPAP machine, she is not using it. Refused vaccine initially but later she is agreeable to take vaccine. Bone density shows osteopenia, she is advised to take calcium and vitamin D. HPI      Review of Systems   Constitutional: Negative. HENT: Negative. Eyes: Negative. Respiratory: Negative. Cardiovascular: Negative. Gastrointestinal: Negative. Genitourinary: Negative. Musculoskeletal: Negative. Skin: Negative. Neurological: Negative. Endo/Heme/Allergies: Negative. Psychiatric/Behavioral: Negative. Physical Exam  Constitutional:       Appearance: Normal appearance. She is obese. HENT:      Head: Normocephalic and atraumatic. Cardiovascular:      Rate and Rhythm: Normal rate and regular rhythm. Pulses: Normal pulses. Heart sounds: Normal heart sounds. Pulmonary:      Effort: Pulmonary effort is normal.      Breath sounds: Normal breath sounds. Abdominal:      General: Abdomen is flat. Bowel sounds are normal.      Palpations: Abdomen is soft. Tenderness: There is abdominal tenderness. Comments: Tenderness in epigastrium present. Musculoskeletal:      Cervical back: Normal range of motion and neck supple. Neurological:      General: No focal deficit present. Mental Status: She is alert and oriented to person, place, and time.  Mental status is at baseline. Psychiatric:         Mood and Affect: Mood normal.         Behavior: Behavior normal.         Thought Content: Thought content normal.         ASSESSMENT and PLAN  Diagnoses and all orders for this visit:    1. Hiatal hernia    She is having occasional pain in epigastrium shooting all the way to the back. Is positional.  Already EKG done, cardiac cause ruled out. CT chest showed small hiatal hernia. Advised to eat 2 hours before going to bed and eat less spicy food. Need to elevate at night. We will start,  -     omeprazole (PRILOSEC) 40 mg capsule; Take 1 Capsule by mouth daily. 2. Paroxysmal atrial fibrillation (HCC)  Rate and rhythm control. On diltiazem and Xarelto. Doing well. 3. Chronic diastolic congestive heart failure ([address]  Compensated. On Bumex, spironolactone and potassium. Compensated. 4. Primary hypertension  Stable blood pressure, on Cardizem CD. 5. Gout, unspecified cause, unspecified chronicity, unspecified site  On allopurinol. Need to avoid red meat. 6. Chest pain, unspecified type  Probably noncardiac, probably hiatal hernia. Will start on omeprazole 40 mg.  7. Vaccine refused by patient  Patient is hesitant to take vaccine, finally she agreed to take vaccine. 8. Encounter for immunization  \  We will start,  -     PNEUMOCOCCAL, PCV20, PREVNAR 20, (AGE 18 YRS+), IM, PF    Discussed expected course/resolution/complications of diagnosis in detail with patient. Medication risks/benefits/costs/interactions/alternatives discussed with patient. Discussed COVID-19 infection precaution with patient. Pt was given an after visit summary which includes diagnoses, current medications & vitals. Pt expressed understanding with the diagnosis and plan.

## 2022-06-08 NOTE — PROGRESS NOTES
Molly Quick is a 81 y.o. female  who presents for routine immunization(s). Patient denies any symptoms , reactions or allergies that would exclude them from being immunized today. Risks and adverse reactions were discussed. The patient/caregiver was provided the VIS and allotted time to read and ask questions prior to administration of vaccine. Patient voiced full understanding and signed Adult Immunization Consent form. All questions were addressed. Patient was observed for 10 min post injection. There were no reactions observed.

## 2022-07-15 ENCOUNTER — TELEPHONE (OUTPATIENT)
Dept: INTERNAL MEDICINE CLINIC | Age: 82
End: 2022-07-15

## 2022-07-15 NOTE — TELEPHONE ENCOUNTER
[email or web address], She is getting IM daily injections and will be on case load for the next week. They will also order for home health . They will send over notes and we will f/u with pt after.

## 2022-07-18 ENCOUNTER — HOME HEALTH ADMISSION (OUTPATIENT)
Dept: HOME HEALTH SERVICES | Facility: HOME HEALTH | Age: 82
End: 2022-07-18
Payer: MEDICARE

## 2022-07-18 RX ORDER — BUMETANIDE 1 MG/1
TABLET ORAL
Qty: 60 TABLET | Refills: 0 | Status: SHIPPED | OUTPATIENT
Start: 2022-07-18 | End: 2022-09-10

## 2022-07-20 ENCOUNTER — HOME CARE VISIT (OUTPATIENT)
Dept: SCHEDULING | Facility: HOME HEALTH | Age: 82
End: 2022-07-20
Payer: MEDICARE

## 2022-07-20 ENCOUNTER — TELEPHONE (OUTPATIENT)
Dept: INTERNAL MEDICINE CLINIC | Age: 82
End: 2022-07-20

## 2022-07-20 PROCEDURE — 3331090001 HH PPS REVENUE CREDIT

## 2022-07-20 PROCEDURE — G0151 HHCP-SERV OF PT,EA 15 MIN: HCPCS

## 2022-07-20 PROCEDURE — 400013 HH SOC

## 2022-07-20 PROCEDURE — 3331090002 HH PPS REVENUE DEBIT

## 2022-07-20 PROCEDURE — 400018 HH-NO PAY CLAIM PROCEDURE

## 2022-07-20 NOTE — TELEPHONE ENCOUNTER
[facility] told them to reach out to talk with Dr Silvino Ewing due to they have gone to her residence numerous time for a UTI

## 2022-07-21 VITALS
HEART RATE: 85 BPM | HEIGHT: 69 IN | WEIGHT: 293 LBS | RESPIRATION RATE: 18 BRPM | BODY MASS INDEX: 43.4 KG/M2 | SYSTOLIC BLOOD PRESSURE: 155 MMHG | OXYGEN SATURATION: 95 % | DIASTOLIC BLOOD PRESSURE: 82 MMHG | TEMPERATURE: 97.7 F

## 2022-07-21 PROCEDURE — 3331090001 HH PPS REVENUE CREDIT

## 2022-07-21 PROCEDURE — 3331090002 HH PPS REVENUE DEBIT

## 2022-07-21 NOTE — TELEPHONE ENCOUNTER
Future Appointments   Date Time Provider Rachna Lugo   7/25/2022 11:00 AM [facility] [place] [address]   7/27/2022 To Be Determined [facility] [address]   8/1/2022 To Be Determined [facility] [address]   8/3/2022 To Be Determined [facility] [address]   8/8/2022 To Be Determined [facility]   8/10/2022 [facility]   8/15/2022 To Be Determined [facility]   8/17/2022 To Be Determined [facility]   8/24/2022 11:30 AM Wilfred May MD [facility] BS AMB

## 2022-07-22 PROCEDURE — 3331090001 HH PPS REVENUE CREDIT

## 2022-07-22 PROCEDURE — 3331090002 HH PPS REVENUE DEBIT

## 2022-07-23 PROCEDURE — 3331090001 HH PPS REVENUE CREDIT

## 2022-07-23 PROCEDURE — 3331090002 HH PPS REVENUE DEBIT

## 2022-07-24 PROCEDURE — 3331090002 HH PPS REVENUE DEBIT

## 2022-07-24 PROCEDURE — 3331090001 HH PPS REVENUE CREDIT

## 2022-07-25 ENCOUNTER — HOME CARE VISIT (OUTPATIENT)
Dept: SCHEDULING | Facility: HOME HEALTH | Age: 82
End: 2022-07-25
Payer: MEDICARE

## 2022-07-25 PROCEDURE — 3331090001 HH PPS REVENUE CREDIT

## 2022-07-25 PROCEDURE — G0152 HHCP-SERV OF OT,EA 15 MIN: HCPCS

## 2022-07-25 PROCEDURE — G0151 HHCP-SERV OF PT,EA 15 MIN: HCPCS

## 2022-07-25 PROCEDURE — 3331090002 HH PPS REVENUE DEBIT

## 2022-07-26 VITALS
SYSTOLIC BLOOD PRESSURE: 134 MMHG | OXYGEN SATURATION: 94 % | TEMPERATURE: 97.3 F | HEART RATE: 72 BPM | RESPIRATION RATE: 16 BRPM | DIASTOLIC BLOOD PRESSURE: 68 MMHG

## 2022-07-26 PROCEDURE — 3331090001 HH PPS REVENUE CREDIT

## 2022-07-26 PROCEDURE — 3331090002 HH PPS REVENUE DEBIT

## 2022-07-27 ENCOUNTER — HOME CARE VISIT (OUTPATIENT)
Dept: HOME HEALTH SERVICES | Facility: HOME HEALTH | Age: 82
End: 2022-07-27
Payer: MEDICARE

## 2022-07-27 ENCOUNTER — HOME CARE VISIT (OUTPATIENT)
Dept: SCHEDULING | Facility: HOME HEALTH | Age: 82
End: 2022-07-27
Payer: MEDICARE

## 2022-07-27 PROCEDURE — 3331090001 HH PPS REVENUE CREDIT

## 2022-07-27 PROCEDURE — 3331090002 HH PPS REVENUE DEBIT

## 2022-07-27 PROCEDURE — G0151 HHCP-SERV OF PT,EA 15 MIN: HCPCS

## 2022-07-28 VITALS
DIASTOLIC BLOOD PRESSURE: 78 MMHG | SYSTOLIC BLOOD PRESSURE: 131 MMHG | OXYGEN SATURATION: 97 % | HEART RATE: 76 BPM | BODY MASS INDEX: 43.4 KG/M2 | WEIGHT: 293 LBS | RESPIRATION RATE: 16 BRPM | HEIGHT: 69 IN | TEMPERATURE: 97.2 F

## 2022-07-28 VITALS
SYSTOLIC BLOOD PRESSURE: 96 MMHG | OXYGEN SATURATION: 96 % | TEMPERATURE: 97.3 F | DIASTOLIC BLOOD PRESSURE: 62 MMHG | HEART RATE: 80 BPM

## 2022-07-28 PROCEDURE — 3331090001 HH PPS REVENUE CREDIT

## 2022-07-28 PROCEDURE — 3331090002 HH PPS REVENUE DEBIT

## 2022-07-29 PROCEDURE — 3331090002 HH PPS REVENUE DEBIT

## 2022-07-29 PROCEDURE — 3331090001 HH PPS REVENUE CREDIT

## 2022-07-30 PROCEDURE — 3331090002 HH PPS REVENUE DEBIT

## 2022-07-30 PROCEDURE — 3331090001 HH PPS REVENUE CREDIT

## 2022-07-31 PROCEDURE — 3331090001 HH PPS REVENUE CREDIT

## 2022-07-31 PROCEDURE — 3331090002 HH PPS REVENUE DEBIT

## 2022-08-01 DIAGNOSIS — Z91.81 AT MODERATE RISK FOR FALL: ICD-10-CM

## 2022-08-01 DIAGNOSIS — E66.01 OBESITY, MORBID (HCC): Primary | ICD-10-CM

## 2022-08-01 DIAGNOSIS — R26.9 GAIT ABNORMALITY: ICD-10-CM

## 2022-08-01 PROCEDURE — 3331090001 HH PPS REVENUE CREDIT

## 2022-08-01 PROCEDURE — 3331090002 HH PPS REVENUE DEBIT

## 2022-08-02 ENCOUNTER — TELEPHONE (OUTPATIENT)
Dept: INTERNAL MEDICINE CLINIC | Age: 82
End: 2022-08-02

## 2022-08-02 PROCEDURE — 3331090002 HH PPS REVENUE DEBIT

## 2022-08-02 PROCEDURE — 3331090001 HH PPS REVENUE CREDIT

## 2022-08-02 NOTE — TELEPHONE ENCOUNTER
[facility] needs Demographics insurance, recent visit notes.  Medical necessity form ,jessie list-height and weight of pt   PleaseFax [phone]

## 2022-08-03 ENCOUNTER — HOME CARE VISIT (OUTPATIENT)
Dept: SCHEDULING | Facility: HOME HEALTH | Age: 82
End: 2022-08-03
Payer: MEDICARE

## 2022-08-03 PROCEDURE — 3331090002 HH PPS REVENUE DEBIT

## 2022-08-03 PROCEDURE — 3331090001 HH PPS REVENUE CREDIT

## 2022-08-03 PROCEDURE — G0151 HHCP-SERV OF PT,EA 15 MIN: HCPCS

## 2022-08-04 VITALS
OXYGEN SATURATION: 95 % | RESPIRATION RATE: 16 BRPM | DIASTOLIC BLOOD PRESSURE: 70 MMHG | HEART RATE: 80 BPM | SYSTOLIC BLOOD PRESSURE: 127 MMHG | TEMPERATURE: 97.1 F

## 2022-08-04 PROCEDURE — 3331090001 HH PPS REVENUE CREDIT

## 2022-08-04 PROCEDURE — 3331090002 HH PPS REVENUE DEBIT

## 2022-08-05 ENCOUNTER — HOME CARE VISIT (OUTPATIENT)
Dept: HOME HEALTH SERVICES | Facility: HOME HEALTH | Age: 82
End: 2022-08-05
Payer: MEDICARE

## 2022-08-05 ENCOUNTER — DOCUMENTATION ONLY (OUTPATIENT)
Dept: INTERNAL MEDICINE CLINIC | Age: 82
End: 2022-08-05

## 2022-08-05 ENCOUNTER — NURSE TRIAGE (OUTPATIENT)
Dept: OTHER | Facility: CLINIC | Age: 82
End: 2022-08-05

## 2022-08-05 PROCEDURE — 3331090002 HH PPS REVENUE DEBIT

## 2022-08-05 PROCEDURE — 3331090001 HH PPS REVENUE CREDIT

## 2022-08-05 NOTE — TELEPHONE ENCOUNTER
Received call from Eze Robin at [facility] with [employer] Complaint. Subjective: Caller states \"Hands and feet are numb;\"     Current Symptoms: Hands and feet are numb and cold; Toes and fingertips are cold;  Denies injury;  Pain from head down neck intermittent    Onset: 1 month ago; worsening    Associated Symptoms: reduced activity    Pain Severity: 0/10; tingling; intermittent    Temperature: denies     What has been tried: wears socks    Recommended disposition: See PCP within 3 Days    Care advice provided, patient verbalizes understanding; denies any other questions or concerns; instructed to call back for any new or worsening symptoms. Patient/Caller agrees with recommended disposition; writer provided warm transfer to Issac Hurtado at [facility] for appointment scheduling    Attention Provider: Thank you for allowing me to participate in the care of your patient. The patient was connected to triage in response to information provided to the [facility]. Please do not respond through this encounter as the response is not directed to a shared pool.     Reason for Disposition   Numbness or tingling in one or both hands is a chronic symptom (recurrent or ongoing problem lasting > 4 weeks)    Protocols used: Neurologic Deficit-ADULT-[place]

## 2022-08-05 NOTE — PROGRESS NOTES
Pt triaged for urgent appointment. Complains of numbness in hands and feet mainly toes and fingers. Caller (daughter)declined appointment with the nurse practitioner on 8/8/22 and refused to go to walk in urgent care.  Caller states they will keep their appointment with Dr Clara Freitas on 8/24/22

## 2022-08-06 PROCEDURE — 3331090001 HH PPS REVENUE CREDIT

## 2022-08-06 PROCEDURE — 3331090002 HH PPS REVENUE DEBIT

## 2022-08-07 PROCEDURE — 3331090001 HH PPS REVENUE CREDIT

## 2022-08-07 PROCEDURE — 3331090002 HH PPS REVENUE DEBIT

## 2022-08-08 PROCEDURE — 3331090002 HH PPS REVENUE DEBIT

## 2022-08-08 PROCEDURE — 3331090001 HH PPS REVENUE CREDIT

## 2022-08-09 ENCOUNTER — TELEPHONE (OUTPATIENT)
Dept: INTERNAL MEDICINE CLINIC | Age: 82
End: 2022-08-09

## 2022-08-09 ENCOUNTER — HOME CARE VISIT (OUTPATIENT)
Dept: SCHEDULING | Facility: HOME HEALTH | Age: 82
End: 2022-08-09
Payer: MEDICARE

## 2022-08-09 ENCOUNTER — HOME CARE VISIT (OUTPATIENT)
Dept: HOME HEALTH SERVICES | Facility: HOME HEALTH | Age: 82
End: 2022-08-09
Payer: MEDICARE

## 2022-08-09 DIAGNOSIS — E66.01 OBESITY, MORBID (HCC): Primary | ICD-10-CM

## 2022-08-09 DIAGNOSIS — Z91.81 AT MODERATE RISK FOR FALL: ICD-10-CM

## 2022-08-09 PROCEDURE — 3331090001 HH PPS REVENUE CREDIT

## 2022-08-09 PROCEDURE — 3331090002 HH PPS REVENUE DEBIT

## 2022-08-09 PROCEDURE — G0151 HHCP-SERV OF PT,EA 15 MIN: HCPCS

## 2022-08-10 ENCOUNTER — TRANSCRIBE ORDER (OUTPATIENT)
Dept: SCHEDULING | Age: 82
End: 2022-08-10

## 2022-08-10 VITALS
TEMPERATURE: 97.1 F | RESPIRATION RATE: 16 BRPM | DIASTOLIC BLOOD PRESSURE: 75 MMHG | HEART RATE: 72 BPM | SYSTOLIC BLOOD PRESSURE: 126 MMHG | OXYGEN SATURATION: 98 %

## 2022-08-10 DIAGNOSIS — R07.9 CHEST PAIN: Primary | ICD-10-CM

## 2022-08-10 PROCEDURE — 3331090002 HH PPS REVENUE DEBIT

## 2022-08-10 PROCEDURE — 3331090001 HH PPS REVENUE CREDIT

## 2022-08-11 ENCOUNTER — TELEPHONE (OUTPATIENT)
Dept: INTERNAL MEDICINE CLINIC | Age: 82
End: 2022-08-11

## 2022-08-11 ENCOUNTER — HOME CARE VISIT (OUTPATIENT)
Dept: HOME HEALTH SERVICES | Facility: HOME HEALTH | Age: 82
End: 2022-08-11
Payer: MEDICARE

## 2022-08-11 PROCEDURE — 3331090002 HH PPS REVENUE DEBIT

## 2022-08-11 PROCEDURE — 3331090001 HH PPS REVENUE CREDIT

## 2022-08-11 NOTE — TELEPHONE ENCOUNTER
[facility] states they need recent visit notes, face sheet, insurance info, height and weight of patient as well as a letter of medical necessity for the [facility] lift  Please fax to Rachna Hunt attn: Deon Gregorio [phone]

## 2022-08-12 PROCEDURE — 3331090001 HH PPS REVENUE CREDIT

## 2022-08-12 PROCEDURE — 3331090002 HH PPS REVENUE DEBIT

## 2022-08-13 PROCEDURE — 3331090002 HH PPS REVENUE DEBIT

## 2022-08-13 PROCEDURE — 3331090001 HH PPS REVENUE CREDIT

## 2022-08-14 PROCEDURE — 3331090002 HH PPS REVENUE DEBIT

## 2022-08-14 PROCEDURE — 3331090001 HH PPS REVENUE CREDIT

## 2022-08-15 PROCEDURE — 3331090001 HH PPS REVENUE CREDIT

## 2022-08-15 PROCEDURE — 3331090002 HH PPS REVENUE DEBIT

## 2022-08-16 ENCOUNTER — HOME CARE VISIT (OUTPATIENT)
Dept: SCHEDULING | Facility: HOME HEALTH | Age: 82
End: 2022-08-16
Payer: MEDICARE

## 2022-08-16 DIAGNOSIS — S93.402A SPRAIN OF LEFT ANKLE, UNSPECIFIED LIGAMENT, INITIAL ENCOUNTER: ICD-10-CM

## 2022-08-16 PROCEDURE — G0151 HHCP-SERV OF PT,EA 15 MIN: HCPCS

## 2022-08-16 PROCEDURE — 3331090002 HH PPS REVENUE DEBIT

## 2022-08-16 PROCEDURE — 3331090001 HH PPS REVENUE CREDIT

## 2022-08-16 RX ORDER — DICLOFENAC SODIUM 10 MG/G
GEL TOPICAL
Qty: 100 G | Refills: 1 | Status: SHIPPED | OUTPATIENT
Start: 2022-08-16 | End: 2022-10-31

## 2022-08-17 VITALS
HEART RATE: 80 BPM | DIASTOLIC BLOOD PRESSURE: 70 MMHG | RESPIRATION RATE: 16 BRPM | OXYGEN SATURATION: 97 % | TEMPERATURE: 97.5 F | SYSTOLIC BLOOD PRESSURE: 135 MMHG

## 2022-08-17 PROCEDURE — 3331090002 HH PPS REVENUE DEBIT

## 2022-08-17 PROCEDURE — 3331090001 HH PPS REVENUE CREDIT

## 2022-08-18 ENCOUNTER — HOME CARE VISIT (OUTPATIENT)
Dept: SCHEDULING | Facility: HOME HEALTH | Age: 82
End: 2022-08-18
Payer: MEDICARE

## 2022-08-18 ENCOUNTER — HOSPITAL ENCOUNTER (OUTPATIENT)
Dept: NUCLEAR MEDICINE | Age: 82
End: 2022-08-18
Attending: STUDENT IN AN ORGANIZED HEALTH CARE EDUCATION/TRAINING PROGRAM

## 2022-08-18 PROCEDURE — 3331090002 HH PPS REVENUE DEBIT

## 2022-08-18 PROCEDURE — G0151 HHCP-SERV OF PT,EA 15 MIN: HCPCS

## 2022-08-18 PROCEDURE — 3331090001 HH PPS REVENUE CREDIT

## 2022-08-19 VITALS
RESPIRATION RATE: 16 BRPM | HEART RATE: 80 BPM | OXYGEN SATURATION: 98 % | DIASTOLIC BLOOD PRESSURE: 70 MMHG | SYSTOLIC BLOOD PRESSURE: 135 MMHG | TEMPERATURE: 97.8 F

## 2022-08-19 PROCEDURE — 3331090001 HH PPS REVENUE CREDIT

## 2022-08-19 PROCEDURE — 3331090002 HH PPS REVENUE DEBIT

## 2022-08-20 PROCEDURE — 3331090002 HH PPS REVENUE DEBIT

## 2022-08-20 PROCEDURE — 3331090001 HH PPS REVENUE CREDIT

## 2022-08-21 PROCEDURE — 3331090001 HH PPS REVENUE CREDIT

## 2022-08-21 PROCEDURE — 3331090002 HH PPS REVENUE DEBIT

## 2022-08-22 ENCOUNTER — HOME CARE VISIT (OUTPATIENT)
Dept: SCHEDULING | Facility: HOME HEALTH | Age: 82
End: 2022-08-22
Payer: MEDICARE

## 2022-08-22 PROCEDURE — 3331090001 HH PPS REVENUE CREDIT

## 2022-08-22 PROCEDURE — 400013 HH SOC

## 2022-08-22 PROCEDURE — G0151 HHCP-SERV OF PT,EA 15 MIN: HCPCS

## 2022-08-22 PROCEDURE — 3331090002 HH PPS REVENUE DEBIT

## 2022-08-23 ENCOUNTER — HOME CARE VISIT (OUTPATIENT)
Dept: SCHEDULING | Facility: HOME HEALTH | Age: 82
End: 2022-08-23
Payer: MEDICARE

## 2022-08-23 ENCOUNTER — TELEPHONE (OUTPATIENT)
Dept: INTERNAL MEDICINE CLINIC | Age: 82
End: 2022-08-23

## 2022-08-23 VITALS
SYSTOLIC BLOOD PRESSURE: 135 MMHG | TEMPERATURE: 98.1 F | DIASTOLIC BLOOD PRESSURE: 78 MMHG | OXYGEN SATURATION: 98 % | RESPIRATION RATE: 16 BRPM | HEART RATE: 78 BPM

## 2022-08-23 VITALS
TEMPERATURE: 97.6 F | SYSTOLIC BLOOD PRESSURE: 122 MMHG | HEART RATE: 86 BPM | OXYGEN SATURATION: 98 % | RESPIRATION RATE: 16 BRPM | DIASTOLIC BLOOD PRESSURE: 70 MMHG

## 2022-08-23 PROCEDURE — 3331090001 HH PPS REVENUE CREDIT

## 2022-08-23 PROCEDURE — G0152 HHCP-SERV OF OT,EA 15 MIN: HCPCS

## 2022-08-23 PROCEDURE — 3331090002 HH PPS REVENUE DEBIT

## 2022-08-23 NOTE — TELEPHONE ENCOUNTER
Future Appointments   Date Time Provider Rachna Lugo   8/24/2022  7:45 AM CARDIAC NUCLEAR STRESS Russell Bullard [address] [facility]   8/24/2022  9:00 AM [facility] [place] RM [facility]   8/24/2022 11:30 AM Joy Mcduffie MD [facility] AMB   8/25/2022  7:30 AM [facility] NM INJ RM [facility]   8/26/2022 10:00 AM [facility] Wyatt [facility]   8/29/2022 To Be Determined Les, [address]   8/29/2022 To Be Determined [place] [id number]   8/31/2022 To Be Determined [place] [address]   9/5/2022 To Be Determined Momo Brown

## 2022-08-24 ENCOUNTER — HOSPITAL ENCOUNTER (OUTPATIENT)
Dept: NON INVASIVE DIAGNOSTICS | Age: 82
Discharge: HOME | End: 2022-08-24
Attending: STUDENT IN AN ORGANIZED HEALTH CARE EDUCATION/TRAINING PROGRAM
Payer: MEDICARE

## 2022-08-24 ENCOUNTER — HOSPITAL ENCOUNTER (OUTPATIENT)
Dept: NUCLEAR MEDICINE | Age: 82
Discharge: HOME | End: 2022-08-24
Attending: STUDENT IN AN ORGANIZED HEALTH CARE EDUCATION/TRAINING PROGRAM
Payer: MEDICARE

## 2022-08-24 ENCOUNTER — OFFICE VISIT (OUTPATIENT)
Dept: INTERNAL MEDICINE CLINIC | Age: 82
End: 2022-08-24
Payer: MEDICARE

## 2022-08-24 VITALS
WEIGHT: 293 LBS | TEMPERATURE: 97.3 F | SYSTOLIC BLOOD PRESSURE: 114 MMHG | RESPIRATION RATE: 18 BRPM | HEIGHT: 69 IN | OXYGEN SATURATION: 97 % | HEART RATE: 78 BPM | BODY MASS INDEX: 43.4 KG/M2 | DIASTOLIC BLOOD PRESSURE: 66 MMHG

## 2022-08-24 VITALS — BODY MASS INDEX: 43.4 KG/M2 | HEIGHT: 69 IN | WEIGHT: 293 LBS

## 2022-08-24 DIAGNOSIS — G47.33 OSA (OBSTRUCTIVE SLEEP APNEA): ICD-10-CM

## 2022-08-24 DIAGNOSIS — I50.32 CHRONIC DIASTOLIC CONGESTIVE HEART FAILURE (HCC): ICD-10-CM

## 2022-08-24 DIAGNOSIS — N18.31 STAGE 3A CHRONIC KIDNEY DISEASE (HCC): ICD-10-CM

## 2022-08-24 DIAGNOSIS — R07.9 CHEST PAIN: ICD-10-CM

## 2022-08-24 DIAGNOSIS — K44.9 HIATAL HERNIA: ICD-10-CM

## 2022-08-24 DIAGNOSIS — K59.00 CONSTIPATION, UNSPECIFIED CONSTIPATION TYPE: ICD-10-CM

## 2022-08-24 DIAGNOSIS — I10 PRIMARY HYPERTENSION: ICD-10-CM

## 2022-08-24 DIAGNOSIS — I48.0 PAROXYSMAL ATRIAL FIBRILLATION (HCC): ICD-10-CM

## 2022-08-24 DIAGNOSIS — Z23 ENCOUNTER FOR IMMUNIZATION: ICD-10-CM

## 2022-08-24 DIAGNOSIS — M54.12 CERVICAL RADICULOPATHY: Primary | ICD-10-CM

## 2022-08-24 LAB
STRESS BASELINE DIAS BP: 62 MMHG
STRESS BASELINE HR: 85 BPM
STRESS BASELINE SYS BP: 114 MMHG
STRESS ESTIMATED WORKLOAD: 1 METS
STRESS EXERCISE DUR MIN: 3 MIN
STRESS EXERCISE DUR SEC: 0 SEC
STRESS PEAK DIAS BP: 79 MMHG
STRESS PEAK SYS BP: 130 MMHG
STRESS PERCENT HR ACHIEVED: 73 %
STRESS POST PEAK HR: 102 BPM
STRESS RATE PRESSURE PRODUCT: NORMAL BPM*MMHG
STRESS STAGE 1 BP: NORMAL MMHG
STRESS STAGE 1 HR: 89 BPM
STRESS STAGE RECOVERY 1 BP: NORMAL MMHG
STRESS STAGE RECOVERY 1 HR: 90 BPM
STRESS TARGET HR: 139 BPM

## 2022-08-24 PROCEDURE — 74011250636 HC RX REV CODE- 250/636: Performed by: STUDENT IN AN ORGANIZED HEALTH CARE EDUCATION/TRAINING PROGRAM

## 2022-08-24 PROCEDURE — 74011000250 HC RX REV CODE- 250: Performed by: STUDENT IN AN ORGANIZED HEALTH CARE EDUCATION/TRAINING PROGRAM

## 2022-08-24 PROCEDURE — 93017 CV STRESS TEST TRACING ONLY: CPT

## 2022-08-24 PROCEDURE — 1090F PRES/ABSN URINE INCON ASSESS: CPT | Performed by: INTERNAL MEDICINE

## 2022-08-24 PROCEDURE — G8399 PT W/DXA RESULTS DOCUMENT: HCPCS | Performed by: INTERNAL MEDICINE

## 2022-08-24 PROCEDURE — 3331090001 HH PPS REVENUE CREDIT

## 2022-08-24 PROCEDURE — G8752 SYS BP LESS 140: HCPCS | Performed by: INTERNAL MEDICINE

## 2022-08-24 PROCEDURE — G8754 DIAS BP LESS 90: HCPCS | Performed by: INTERNAL MEDICINE

## 2022-08-24 PROCEDURE — A9500 TC99M SESTAMIBI: HCPCS

## 2022-08-24 PROCEDURE — 1101F PT FALLS ASSESS-DOCD LE1/YR: CPT | Performed by: INTERNAL MEDICINE

## 2022-08-24 PROCEDURE — G8432 DEP SCR NOT DOC, RNG: HCPCS | Performed by: INTERNAL MEDICINE

## 2022-08-24 PROCEDURE — G8427 DOCREV CUR MEDS BY ELIG CLIN: HCPCS | Performed by: INTERNAL MEDICINE

## 2022-08-24 PROCEDURE — 99214 OFFICE O/P EST MOD 30 MIN: CPT | Performed by: INTERNAL MEDICINE

## 2022-08-24 PROCEDURE — G8536 NO DOC ELDER MAL SCRN: HCPCS | Performed by: INTERNAL MEDICINE

## 2022-08-24 PROCEDURE — 3331090002 HH PPS REVENUE DEBIT

## 2022-08-24 PROCEDURE — G8417 CALC BMI ABV UP PARAM F/U: HCPCS | Performed by: INTERNAL MEDICINE

## 2022-08-24 RX ORDER — TETRAKIS(2-METHOXYISOBUTYLISOCYANIDE)COPPER(I) TETRAFLUOROBORATE 1 MG/ML
33 INJECTION, POWDER, LYOPHILIZED, FOR SOLUTION INTRAVENOUS
Status: COMPLETED | OUTPATIENT
Start: 2022-08-24 | End: 2022-08-24

## 2022-08-24 RX ORDER — AMOXICILLIN 250 MG
1 CAPSULE ORAL
Qty: 30 TABLET | Refills: 2 | Status: SHIPPED | OUTPATIENT
Start: 2022-08-24

## 2022-08-24 RX ORDER — POLYETHYLENE GLYCOL 3350 17 G/17G
17 POWDER, FOR SOLUTION ORAL DAILY
Qty: 100 EACH | Refills: 3 | Status: SHIPPED | OUTPATIENT
Start: 2022-08-24

## 2022-08-24 RX ORDER — SODIUM CHLORIDE 0.9 % (FLUSH) 0.9 %
20 SYRINGE (ML) INJECTION
Status: COMPLETED | OUTPATIENT
Start: 2022-08-24 | End: 2022-08-24

## 2022-08-24 RX ADMIN — TETRAKIS(2-METHOXYISOBUTYLISOCYANIDE)COPPER(I) TETRAFLUOROBORATE 33 MILLICURIE: 1 INJECTION, POWDER, LYOPHILIZED, FOR SOLUTION INTRAVENOUS at 10:40

## 2022-08-24 RX ADMIN — REGADENOSON 0.4 MG: 0.08 INJECTION, SOLUTION INTRAVENOUS at 10:33

## 2022-08-24 RX ADMIN — SODIUM CHLORIDE, PRESERVATIVE FREE 20 ML: 5 INJECTION INTRAVENOUS at 10:33

## 2022-08-24 NOTE — PROGRESS NOTES
HISTORY OF PRESENT ILLNESS  Navi Dozier is a 81 y.o. female here to follow-up. She just had nuclear stress test done this morning. She will have another test tomorrow. Denies any chest pain. Has congestive heart failure, on diuretics. No shortness of breath orthopnea. Seeing Dr. Kim Lamb new cardiologist.  Has urinary incontinence, using diaper. Has home health aide which is helping her. Also getting physical therapy for lower back. Has atrial fibrillation, on Xarelto and Cardizem CD, no palpitation. r. Has history of sleep apnea, had CPAP machine. Reported neck pain and numbness on right arm. Right wrist and all 5 fingers get numb sometimes along with forearm. No fall or injury. Bone density shows osteopenia, she is advised to take calcium and vitamin D. Need shingles vaccine. Hypertension   Associated symptoms include neck pain. Morbid Obesity    Irregular Heart Beat   Associated symptoms include numbness. Her past medical history is significant for hypertension. Numbness    Urinary Hesitancy      Review of Systems   Constitutional: Negative. HENT: Negative. Eyes: Negative. Respiratory: Negative. Gastrointestinal: Negative. Genitourinary:  Positive for hesitancy. Musculoskeletal:  Positive for joint pain and neck pain. Skin: Negative. Neurological:  Positive for numbness. Endo/Heme/Allergies: Negative. Psychiatric/Behavioral: Negative. Physical Exam  Constitutional:       Appearance: Normal appearance. She is obese. HENT:      Head: Normocephalic and atraumatic. Cardiovascular:      Rate and Rhythm: Normal rate and regular rhythm. Pulses: Normal pulses. Heart sounds: Normal heart sounds. Pulmonary:      Effort: Pulmonary effort is normal.      Breath sounds: Normal breath sounds. Abdominal:      General: Abdomen is flat. Bowel sounds are normal.      Palpations: Abdomen is soft. Musculoskeletal:         General: No tenderness.       Cervical back: Normal range of motion and neck supple. Comments: Neck: Spine nontender. Mild paravertebral facet tenderness present. Range of motion mildly restricted. Right hand nontender wrist numbness in all 5 fingers. Tinel sign and Phalen sign negative. Neurological:      General: No focal deficit present. Mental Status: She is alert and oriented to person, place, and time. Mental status is at baseline. Psychiatric:         Mood and Affect: Mood normal.         Behavior: Behavior normal.         Thought Content: Thought content normal.       ASSESSMENT and PLAN    Diagnoses and all orders for this visit:    1. Cervical radiculopathy    Need to use memory foam pillow. Try not to sleep on right side. No lifting weight. Need to do physical therapy. Will order,  -     XR SPINE CERV 4 OR 5 V; Future  -     REFERRAL TO HOME HEALTH    2. Paroxysmal atrial fibrillation (HCC)  Rate and rhythm control. On diltiazem and Xarelto. 3. Stage 3a chronic kidney disease (HCC)  Stable kidney function. 4. Hiatal hernia  Using PPI. Doing well. 5. Chronic diastolic congestive heart failure ([address]  Compensated. On spironolactone and Bumex. Seeing cardiologist.  Echocardiogram stable. Stress test done today. 6. Primary hypertension  Stable blood pressure. On diltiazem. 7. TAM (obstructive sleep apnea)  Using CPAP machine. 8. Constipation, unspecified constipation type    Need to drink more fluid. Need to have more vegetables. We will give,  -     polyethylene glycol (MIRALAX) 17 gram packet; Take 1 Packet by mouth daily. -     senna-docusate (PERICOLACE) 8.6-50 mg per tablet; Take 1 Tablet by mouth daily as needed for Constipation. 9. Encounter for immunization  -     varicella-zoster recombinant, PF, (SHINGRIX) 50 mcg/0.5 mL susr injection; 0.5 mL by IntraMUSCular route once for 1 dose. Discussed expected course/resolution/complications of diagnosis in detail with patient.    Medication risks/benefits/costs/interactions/alternatives discussed with patient. Discussed COVID-19 infection precaution with patient. Pt was given an after visit summary which includes diagnoses, current medications & vitals. Pt expressed understanding with the diagnosis and plan.

## 2022-08-25 PROCEDURE — 3331090001 HH PPS REVENUE CREDIT

## 2022-08-25 PROCEDURE — 3331090002 HH PPS REVENUE DEBIT

## 2022-08-26 ENCOUNTER — HOME CARE VISIT (OUTPATIENT)
Dept: SCHEDULING | Facility: HOME HEALTH | Age: 82
End: 2022-08-26
Payer: MEDICARE

## 2022-08-26 PROCEDURE — 3331090002 HH PPS REVENUE DEBIT

## 2022-08-26 PROCEDURE — G0151 HHCP-SERV OF PT,EA 15 MIN: HCPCS

## 2022-08-26 PROCEDURE — 3331090001 HH PPS REVENUE CREDIT

## 2022-08-27 PROCEDURE — 3331090002 HH PPS REVENUE DEBIT

## 2022-08-27 PROCEDURE — 3331090001 HH PPS REVENUE CREDIT

## 2022-08-28 PROCEDURE — 3331090001 HH PPS REVENUE CREDIT

## 2022-08-28 PROCEDURE — 3331090002 HH PPS REVENUE DEBIT

## 2022-08-29 VITALS
DIASTOLIC BLOOD PRESSURE: 65 MMHG | SYSTOLIC BLOOD PRESSURE: 115 MMHG | HEART RATE: 75 BPM | RESPIRATION RATE: 16 BRPM | OXYGEN SATURATION: 97 % | TEMPERATURE: 97.8 F

## 2022-08-29 PROCEDURE — 3331090002 HH PPS REVENUE DEBIT

## 2022-08-29 PROCEDURE — 3331090001 HH PPS REVENUE CREDIT

## 2022-08-30 PROCEDURE — 3331090001 HH PPS REVENUE CREDIT

## 2022-08-30 PROCEDURE — 3331090002 HH PPS REVENUE DEBIT

## 2022-08-31 PROCEDURE — 3331090002 HH PPS REVENUE DEBIT

## 2022-08-31 PROCEDURE — 3331090001 HH PPS REVENUE CREDIT

## 2022-09-01 PROCEDURE — 3331090002 HH PPS REVENUE DEBIT

## 2022-09-01 PROCEDURE — 3331090001 HH PPS REVENUE CREDIT

## 2022-09-02 ENCOUNTER — HOME CARE VISIT (OUTPATIENT)
Dept: SCHEDULING | Facility: HOME HEALTH | Age: 82
End: 2022-09-02
Payer: MEDICARE

## 2022-09-02 VITALS
TEMPERATURE: 97.8 F | HEART RATE: 82 BPM | DIASTOLIC BLOOD PRESSURE: 80 MMHG | RESPIRATION RATE: 16 BRPM | OXYGEN SATURATION: 95 % | SYSTOLIC BLOOD PRESSURE: 122 MMHG

## 2022-09-02 DIAGNOSIS — M10.9 GOUT, UNSPECIFIED CAUSE, UNSPECIFIED CHRONICITY, UNSPECIFIED SITE: ICD-10-CM

## 2022-09-02 PROCEDURE — G0152 HHCP-SERV OF OT,EA 15 MIN: HCPCS

## 2022-09-02 PROCEDURE — 3331090002 HH PPS REVENUE DEBIT

## 2022-09-02 PROCEDURE — 3331090001 HH PPS REVENUE CREDIT

## 2022-09-02 RX ORDER — ALLOPURINOL 300 MG/1
300 TABLET ORAL DAILY
Qty: 90 TABLET | Refills: 1 | Status: SHIPPED | OUTPATIENT
Start: 2022-09-02

## 2022-09-03 PROCEDURE — 3331090001 HH PPS REVENUE CREDIT

## 2022-09-03 PROCEDURE — 3331090002 HH PPS REVENUE DEBIT

## 2022-09-04 PROCEDURE — 3331090001 HH PPS REVENUE CREDIT

## 2022-09-04 PROCEDURE — 3331090002 HH PPS REVENUE DEBIT

## 2022-09-05 PROCEDURE — 3331090001 HH PPS REVENUE CREDIT

## 2022-09-05 PROCEDURE — 3331090002 HH PPS REVENUE DEBIT

## 2022-09-06 PROCEDURE — 3331090002 HH PPS REVENUE DEBIT

## 2022-09-06 PROCEDURE — 3331090001 HH PPS REVENUE CREDIT

## 2022-09-07 PROCEDURE — 3331090002 HH PPS REVENUE DEBIT

## 2022-09-07 PROCEDURE — 3331090001 HH PPS REVENUE CREDIT

## 2022-09-08 PROCEDURE — 3331090002 HH PPS REVENUE DEBIT

## 2022-09-08 PROCEDURE — 3331090001 HH PPS REVENUE CREDIT

## 2022-09-09 ENCOUNTER — HOME CARE VISIT (OUTPATIENT)
Dept: SCHEDULING | Facility: HOME HEALTH | Age: 82
End: 2022-09-09
Payer: MEDICARE

## 2022-09-09 PROCEDURE — 3331090002 HH PPS REVENUE DEBIT

## 2022-09-09 PROCEDURE — 3331090001 HH PPS REVENUE CREDIT

## 2022-09-10 PROCEDURE — 3331090002 HH PPS REVENUE DEBIT

## 2022-09-10 PROCEDURE — 3331090001 HH PPS REVENUE CREDIT

## 2022-09-10 RX ORDER — BUMETANIDE 1 MG/1
TABLET ORAL
Qty: 60 TABLET | Refills: 0 | Status: SHIPPED | OUTPATIENT
Start: 2022-09-10

## 2022-09-11 PROCEDURE — 3331090001 HH PPS REVENUE CREDIT

## 2022-09-11 PROCEDURE — 3331090002 HH PPS REVENUE DEBIT

## 2022-09-12 PROCEDURE — 3331090002 HH PPS REVENUE DEBIT

## 2022-09-12 PROCEDURE — 3331090001 HH PPS REVENUE CREDIT

## 2022-09-13 PROCEDURE — 3331090001 HH PPS REVENUE CREDIT

## 2022-09-13 PROCEDURE — 3331090002 HH PPS REVENUE DEBIT

## 2022-09-14 PROCEDURE — 3331090001 HH PPS REVENUE CREDIT

## 2022-09-14 PROCEDURE — 3331090002 HH PPS REVENUE DEBIT

## 2022-09-15 PROCEDURE — 3331090001 HH PPS REVENUE CREDIT

## 2022-09-15 PROCEDURE — 3331090002 HH PPS REVENUE DEBIT

## 2022-09-16 ENCOUNTER — HOME CARE VISIT (OUTPATIENT)
Dept: HOME HEALTH SERVICES | Facility: HOME HEALTH | Age: 82
End: 2022-09-16
Payer: MEDICARE

## 2022-09-16 PROCEDURE — 3331090002 HH PPS REVENUE DEBIT

## 2022-09-16 PROCEDURE — 3331090001 HH PPS REVENUE CREDIT

## 2022-09-17 PROCEDURE — 3331090002 HH PPS REVENUE DEBIT

## 2022-09-17 PROCEDURE — 3331090001 HH PPS REVENUE CREDIT

## 2022-10-18 LAB — SARS-COV-2: NEGATIVE

## 2022-10-31 DIAGNOSIS — S93.402A SPRAIN OF LEFT ANKLE, UNSPECIFIED LIGAMENT, INITIAL ENCOUNTER: ICD-10-CM

## 2022-10-31 RX ORDER — DICLOFENAC SODIUM 10 MG/G
GEL TOPICAL
Qty: 100 G | Refills: 1 | Status: SHIPPED | OUTPATIENT
Start: 2022-10-31

## 2022-11-05 RX ORDER — BUMETANIDE 1 MG/1
TABLET ORAL
Qty: 60 TABLET | Refills: 0 | Status: SHIPPED | OUTPATIENT
Start: 2022-11-05

## 2022-11-07 LAB — SARS-COV-2: NOT DETECTED

## 2022-11-28 ENCOUNTER — NURSE TRIAGE (OUTPATIENT)
Dept: OTHER | Facility: CLINIC | Age: 82
End: 2022-11-28

## 2022-11-28 LAB — SARS-COV-2: NEGATIVE

## 2022-11-28 NOTE — TELEPHONE ENCOUNTER
[facility] [place] [facility]    Received call from Frederick Gramajo at [facility] with [employer] Complaint. Subjective: Caller states \"have headaches for the past month, right and left leg sciatica. Both her feet are numb and cold. Her right leg is swollen as well\"     Current Symptoms: headaches, right leg swelling, bilateral hand and feet numbness and tingling. Dizziness with headache. Intermittent chest pain and breathing difficulty. Daughter reports that she cannot get up and walk like she used too    Onset: 1 month ago; unchanged    Associated Symptoms: reduced activity, increased wakefulness    Pain Severity: severe generalized pain    Temperature: no     What has been tried: nothing    LMP: NA Pregnant: NA    Recommended disposition: Go to ED Now    Care advice provided, patient verbalizes understanding; denies any other questions or concerns; instructed to call back for any new or worsening symptoms. Patient/caller agrees to proceed to the Emergency Department    Attention Provider: Thank you for allowing me to participate in the care of your patient. The patient was connected to triage in response to information provided to the [facility]. Please do not respond through this encounter as the response is not directed to a shared pool.     Reason for Disposition   Difficulty breathing at rest    Protocols used: Leg Swelling and Edema-ADULT-[place]

## 2022-12-09 DIAGNOSIS — K44.9 HIATAL HERNIA: ICD-10-CM

## 2022-12-09 RX ORDER — OMEPRAZOLE 40 MG/1
CAPSULE, DELAYED RELEASE ORAL
Qty: 90 CAPSULE | Refills: 1 | Status: SHIPPED | OUTPATIENT
Start: 2022-12-09

## 2023-02-21 ENCOUNTER — TELEPHONE (OUTPATIENT)
Dept: INTERNAL MEDICINE CLINIC | Age: 83
End: 2023-02-21

## 2023-02-21 NOTE — TELEPHONE ENCOUNTER
Debra Temple from [employer] called saying they got the patient wheelchair but are missing face to face chart notes and cant bill without chart notes the notes they need must discuss the need for the power wheel her weight and all her diagnosis codes she did receive some notes on the 7th just need the nother for the power wheel chair     Debra Temple from [facility] [phone]

## 2023-02-21 NOTE — TELEPHONE ENCOUNTER
Dhara Werner, let her know that patient is very overdue for appointment to cover this, tried to call patient and patient hung up. Kemi Noyola stated the same thing happened to her several times and will try to contact patient again.

## 2023-03-02 ENCOUNTER — TELEPHONE (OUTPATIENT)
Dept: INTERNAL MEDICINE CLINIC | Age: 83
End: 2023-03-02

## 2023-03-02 NOTE — TELEPHONE ENCOUNTER
MALCOLM Childress [facility] Shanice was called and verbalized understanding on note below.

## 2023-03-02 NOTE — TELEPHONE ENCOUNTER
Per Will Dent MD patient has not been seen since August 2022, needs visit with Carla before she can sign long-term

## 2023-03-02 NOTE — TELEPHONE ENCOUNTER
Will Dr. Jose Sandra be following patient for home health services?    Please return call at [phone]

## 2023-03-14 ENCOUNTER — TELEPHONE (OUTPATIENT)
Dept: INTERNAL MEDICINE CLINIC | Age: 83
End: 2023-03-14

## 2023-03-14 DIAGNOSIS — B37.9 YEAST INFECTION: Primary | ICD-10-CM

## 2023-03-14 RX ORDER — NYSTATIN 100000 U/G
CREAM TOPICAL 2 TIMES DAILY
Qty: 15 G | Refills: 0 | Status: SHIPPED | OUTPATIENT
Start: 2023-03-14 | End: 2023-03-17 | Stop reason: ALTCHOICE

## 2023-03-14 NOTE — TELEPHONE ENCOUNTER
Patient has a rash on her bottom that could possibly be yeast. Please take a look at the area during patients next visit on 03/17/2023.    Larry Burgos will also be faxing over a request for Nystatin cream.

## 2023-03-14 NOTE — TELEPHONE ENCOUNTER
Spoke with Jeffery Brennan, informed her that the Friday visit is virtual and will not be in-person.      Sent request for nystatin cream to Mckinley Mccollum MD

## 2023-03-14 NOTE — TELEPHONE ENCOUNTER
Home health called, patient most likely has a yeast infection on legs and thighs, son would like nystatin cream to be called in, nursing will educate after      Requested Prescriptions     Pending Prescriptions Disp Refills    nystatin (MYCOSTATIN) topical cream 15 g 0     Sig: Apply  to affected area two (2) times a day.          [id number] [place] [address]  Phone: [phone] Fax: [phone]       8/24/2022 is LAST OFFICE VISIT     Future Appointments   Date Time Provider Rachna Lugo   3/17/2023 10:15 AM Michelle Lainez MD [facility] AMB

## 2023-03-17 ENCOUNTER — VIRTUAL VISIT (OUTPATIENT)
Dept: INTERNAL MEDICINE CLINIC | Age: 83
End: 2023-03-17

## 2023-03-17 DIAGNOSIS — G89.4 CHRONIC PAIN SYNDROME: ICD-10-CM

## 2023-03-17 DIAGNOSIS — E79.0 HYPERURICEMIA: ICD-10-CM

## 2023-03-17 DIAGNOSIS — S06.5XAA SUBDURAL HEMATOMA: ICD-10-CM

## 2023-03-17 DIAGNOSIS — I48.0 PAROXYSMAL ATRIAL FIBRILLATION (HCC): Primary | ICD-10-CM

## 2023-03-17 DIAGNOSIS — I10 PRIMARY HYPERTENSION: ICD-10-CM

## 2023-03-17 DIAGNOSIS — N18.31 STAGE 3A CHRONIC KIDNEY DISEASE (HCC): ICD-10-CM

## 2023-03-17 DIAGNOSIS — I50.32 CHRONIC DIASTOLIC CONGESTIVE HEART FAILURE (HCC): ICD-10-CM

## 2023-03-17 DIAGNOSIS — Z00.00 MEDICARE ANNUAL WELLNESS VISIT, SUBSEQUENT: ICD-10-CM

## 2023-03-17 DIAGNOSIS — Z71.89 ACP (ADVANCE CARE PLANNING): ICD-10-CM

## 2023-03-17 DIAGNOSIS — S93.402A SPRAIN OF LEFT ANKLE, UNSPECIFIED LIGAMENT, INITIAL ENCOUNTER: ICD-10-CM

## 2023-03-17 RX ORDER — GABAPENTIN 300 MG/1
300 CAPSULE ORAL 2 TIMES DAILY
COMMUNITY

## 2023-03-17 RX ORDER — LEVETIRACETAM 500 MG/1
1000 TABLET ORAL 2 TIMES DAILY
COMMUNITY

## 2023-03-17 RX ORDER — TRAMADOL HYDROCHLORIDE 50 MG/1
50 TABLET ORAL 2 TIMES DAILY
COMMUNITY

## 2023-03-17 RX ORDER — FUROSEMIDE 20 MG/1
1 TABLET ORAL DAILY
COMMUNITY

## 2023-03-17 RX ORDER — SPIRONOLACTONE 25 MG/1
25 TABLET ORAL DAILY
Qty: 90 TABLET | Refills: 1 | Status: SHIPPED | OUTPATIENT
Start: 2023-03-17

## 2023-03-17 RX ORDER — BACLOFEN 5 MG/1
10 TABLET ORAL 3 TIMES DAILY
COMMUNITY

## 2023-03-17 RX ORDER — FUROSEMIDE 10 MG/ML
SOLUTION ORAL DAILY
COMMUNITY
End: 2023-03-17 | Stop reason: ALTCHOICE

## 2023-03-17 RX ORDER — OXYCODONE HYDROCHLORIDE 5 MG/1
5 TABLET ORAL
COMMUNITY

## 2023-03-17 RX ORDER — ALBUTEROL SULFATE 90 UG/1
2 AEROSOL, METERED RESPIRATORY (INHALATION)
Qty: 18 EACH | Refills: 3 | Status: SHIPPED | OUTPATIENT
Start: 2023-03-17

## 2023-03-17 RX ORDER — TRAMADOL HYDROCHLORIDE 50 MG/1
50 TABLET ORAL
COMMUNITY

## 2023-03-17 RX ORDER — DICLOFENAC SODIUM 10 MG/G
GEL TOPICAL
Qty: 100 G | Refills: 1 | Status: SHIPPED | OUTPATIENT
Start: 2023-03-17

## 2023-03-17 NOTE — ACP (ADVANCE CARE PLANNING)

## 2023-03-17 NOTE — PROGRESS NOTES
Chief Complaint   Patient presents with    CHF    Chronic Kidney Disease    Rash    Irregular Heart Beat

## 2023-03-17 NOTE — PROGRESS NOTES
This is the Subsequent Medicare Annual Wellness Exam, performed 12 months or more after the Initial AWV or the last Subsequent AWV    I have reviewed the patient's medical history in detail and updated the computerized patient record. Assessment/Plan   Education and counseling provided:  Are appropriate based on today's review and evaluation  End-of-Life planning (with patient's consent)  Pneumococcal Vaccine  Influenza Vaccine  Bone mass measurement (DEXA)  Screening for glaucoma         Depression Risk Factor Screening     3 most recent PHQ Screens 8/24/2022   Little interest or pleasure in doing things More than half the days   Feeling down, depressed, irritable, or hopeless More than half the days   Total Score PHQ 2 4   Trouble falling or staying asleep, or sleeping too much Not at all   Feeling tired or having little energy More than half the days   Poor appetite, weight loss, or overeating Not at all   Feeling bad about yourself - or that you are a failure or have let yourself or your family down Several days   Trouble concentrating on things such as school, work, reading, or watching TV Several days   Moving or speaking so slowly that other people could have noticed; or the opposite being so fidgety that others notice Several days   Thoughts of being better off dead, or hurting yourself in some way Not at all   PHQ 9 Score 9   How difficult have these problems made it for you to do your work, take care of your home and get along with others Somewhat difficult       Alcohol & Drug Abuse Risk Screen    Do you average more than 1 drink per night or more than 7 drinks a week:  No    On any one occasion in the past three months have you have had more than 3 drinks containing alcohol:  No          Functional Ability and Level of Safety    Hearing: Hearing is good. Activities of Daily Living: The home contains: She is wheelchair-bound and bedbound.   Patient needs help with:  phone, transportation, shopping, preparing meals, laundry, housework, managing medications, managing money, dressing, bathroom needs, and walking      Ambulation: wheelchair bound and home bound     Fall Risk:  Fall Risk Assessment, last 12 mths 8/24/2022   Able to walk? No   Fall in past 12 months? 0   Do you feel unsteady? 1   Are you worried about falling 1   Is TUG test greater than 12 seconds?  1   Is the gait abnormal? 1   Number of falls in past 12 months 0      Abuse Screen:  Patient is not abused       Cognitive Screening    Has your family/caregiver stated any concerns about your memory: no     Cognitive Screening: Normal - MMSE (Mini Mental Status Exam)    Health Maintenance Due     Health Maintenance Due   Topic Date Due    COVID-19 Vaccine (1) Never done    DTaP/Tdap/Td series (1 - Tdap) Never done    Shingles Vaccine (1 of 2) Never done    Flu Vaccine (1) Never done       Patient Care Team   Patient Care Team:  Dylan Zamudio MD as PCP - General (Internal Medicine Physician)  Dylan Zamudio MD as PCP [facility]  Jorge Luis Li MD (Cardiovascular Disease Physician)    History     Patient Active Problem List   Diagnosis Code    A-fib [facility]) I48.91    Hypertension I10    Obesity, morbid ([facility] E66.01    Chronic diastolic congestive heart failure (HCC) I50.32    At moderate risk for fall Z91.81    Shortness of breath R06.02    Chronic renal disease, stage III N18.30     Past Medical History:   Diagnosis Date    Arthritis     Atrial fibrillation ([facility]     Bronchitis, acute 12/17/2011    Congestive heart failure (HCC)     Diastolic heart failure (HCC)     WHITING (dyspnea on exertion)     GERD (gastroesophageal reflux disease)     Hypertension     Obesity     TAM (obstructive sleep apnea)     Other ill-defined conditions(799.89)     \"fluid in the lung\"    Pneumonia 6/7/2018    Sleep apnea       Past Surgical History:   Procedure Laterality Date    COLONOSCOPY N/A 3/30/2017    COLONOSCOPY performed by Joy Kanner, MD at [address] [place] [address] Sw ENDOSCOPY    HX CHOLECYSTECTOMY      HX HERNIA REPAIR      HX ORTHOPAEDIC      right and left total knee replacement    HX ORTHOPAEDIC      right shoulder    HX OTHER SURGICAL      kidney stone surgery    HX OTHER SURGICAL      both eyes cataract    MO CARDIOVERSION ELECTIVE ARRHYTHMIA EXTERNAL N/A 10/9/2019    EP CARDIOVERSION performed by Jennifer Day MD at [address] [facility]     Current Outpatient Medications   Medication Sig Dispense Refill    nystatin (MYCOSTATIN) topical cream Apply  to affected area two (2) times a day. 15 g 0    omeprazole (PRILOSEC) 40 mg capsule TAKE 1 CAPSULE BY MOUTH EVERY DAY 90 Capsule 1    bumetanide (BUMEX) 1 mg tablet TAKE 1 TABLET BY MOUTH EVERY DAY 60 Tablet 0    diclofenac (VOLTAREN) 1 % gel APPLY TO AFFECTED AREA TWO (2) TIMES DAILY AS NEEDED FOR PAIN. 100 g 1    polyethylene glycol (MIRALAX) 17 gram packet Take 1 Packet by mouth daily. 100 Each 3    senna-docusate (PERICOLACE) 8.6-50 mg per tablet Take 1 Tablet by mouth daily as needed for Constipation. 30 Tablet 2    OTHER 1 BRANDAN LIFT (patient is 301. Lbs) for mobility 1 Device 0    OTHER Patient requires brandan lift in her home 1 Device 0    albuterol (PROVENTIL HFA, VENTOLIN HFA, PROAIR HFA) 90 mcg/actuation inhaler TAKE 2 PUFFS BY INHALATION EVERY SIX HOURS AS NEEDED FOR WHEEZING OR SHORTNESS OF BREATH. 18 Each 3    spironolactone (ALDACTONE) 25 mg tablet Take 1 Tablet by mouth daily. 90 Tablet 1    acetaminophen (TYLENOL) 500 mg tablet Take 500 mg by mouth every six (6) hours as needed for Pain.      potassium chloride (K-DUR, KLOR-CON M20) 20 mEq tablet Take 20 mEq by mouth two (2) times a day. dilTIAZem ER (TIAZAC) 240 mg capsule Tiadylt  mg capsule,extended release   TAKE 1 CAPSULE BY MOUTH EVERY DAY      rivaroxaban (XARELTO) 20 mg tab tablet Take 1 Tab by mouth daily (with lunch).  Indications: PREVENT THROMBOEMBOLISM IN CHRONIC ATRIAL FIBRILLATION 30 Tab 1    aspirin delayed-release 81 mg tablet Take 1 Tab by mouth daily. 30 Tab 0    allopurinoL (ZYLOPRIM) 300 mg tablet TAKE 1 TABLET BY MOUTH DAILY.  DC ALLOPURINOL 100 MG (Patient not taking: Reported on 3/17/2023) 90 Tablet 1     Allergies   Allergen Reactions    Penicillins Hives       Family History   Problem Relation Age of Onset    Tuberculosis Mother     Lung Disease Father      Social History     Tobacco Use    Smoking status: Former     Packs/day: 1.00     Types: Cigarettes    Smokeless tobacco: Never    Tobacco comments:     quit at age 43,smoked for 17 years   Substance Use Topics    Alcohol use: Yes     Comment: fiona Arcos MD

## 2023-03-17 NOTE — PROGRESS NOTES
Kwabena Piper is a 81 y.o. female who was seen by synchronous (real-time) audio-video technology on 3/17/2023 for CHF, Chronic Kidney Disease, Rash, and Irregular Heart Beat        Assessment & Plan:   Diagnoses and all orders for this visit:    1. Paroxysmal atrial fibrillation (HCC)    Rate rhythm control. Not able to take any Cardizem since the patient very low. .  Because of subdural hematoma. -     METABOLIC PANEL, COMPREHENSIVE    2. Chronic diastolic congestive heart failure (HCC)    Not compensated. Has a moderate leg edema present. As she is most bedbound. Because of low blood pressure not able to take diuretics higher doses. She was on Bumex which was discontinued. She is taking Lasix 1 mg a day. We will try to add on spironolactone 25 mg a day. Monitor blood pressure and watch her closely. No potassium for now. If repeat lab work shows low potassium we will add on potassium. -     spironolactone (ALDACTONE) 25 mg tablet; Take 1 Tablet by mouth daily. -     albuterol (PROVENTIL HFA, VENTOLIN HFA, PROAIR HFA) 90 mcg/actuation inhaler; Take 2 Puffs by inhalation every six (6) hours as needed for Wheezing.  -     CBC WITH AUTOMATED DIFF  -     METABOLIC PANEL, COMPREHENSIVE    3. Stage 3a chronic kidney disease (HCC)    We will recheck,  -     METABOLIC PANEL, COMPREHENSIVE    4. Primary hypertension    Low normal blood pressure. Not on any medicine.  -     METABOLIC PANEL, COMPREHENSIVE    5. Medicare annual wellness visit, subsequent    6. ACP (advance care planning)      8. Subdural hematoma  Was admitted to hospitals and followed by she was in rehab for couple of months. Doing well but mostly bedbound and wheelchair-bound. Taking high dose of Keppra, Xarelto and aspirin discontinued. 9. Hyperuricemia    Was on allopurinol, continue. We will recheck,  -     URIC ACID    10. Chronic pain syndrome    Lower extremities all of her body.   She is prescribed a Medrol twice a day and also oxycodone for severe pain. Advised her to take Tylenol if pain is not too bad. Try to avoid oxycodone as much as possible. She can use diclofenac gel on the joints. -     diclofenac (VOLTAREN) 1 % gel; Apply  to affected area two (2) times daily as needed for Pain. I spent at least 30 minutes on this visit with this established patient. Subjective:   Ms. Barbara Griffith is here for follow-up. She is bedbound and wheelchair-bound. Had a history of recent subdural hematoma for which she was admitted hospitals followed by she was in rehab. Xarelto has been discontinued. Keppra dosage was increased renal seizure and noted. She has not been congestive heart failure initially she was on Bumex 1 mg a day which she could not continue because of her blood pressure. She was sent home with Lasix 20 mg a day. Potassium was not continued either. She is Cardizem for A-fib, due to low blood pressure that was not continued. She denies any complication. Had hyperuricemia, was on allopurinol which she is not taking right now. Suffer from chronic pain. She mention pain is all over her body. She is prescribed tramadol 50 g twice a day and additional 1 if as she has too much pain. Also she was prescribed with oxycodone as needed. Has history of seizure, Keppra dosage was increased with subdural hematoma. She is doing well, she is seizure-free. Recent labs done, previous labs reviewed. Here for Medicare wellness visit. Has living will. Prior to Admission medications    Medication Sig Start Date End Date Taking? Authorizing Provider   gabapentin (NEURONTIN) 300 mg capsule Take 300 mg by mouth two (2) times a day. Yes Provider, Historical   levETIRAcetam (KEPPRA) 500 mg tablet Take 1,000 mg by mouth two (2) times a day. Yes Provider, Historical   traMADoL (ULTRAM) 50 mg tablet Take 50 mg by mouth every six (6) hours as needed for Pain.    Yes Provider, Historical   oxyCODONE IR (ROXICODONE) 5 mg immediate release tablet Take 5 mg by mouth every four (4) hours as needed for Pain. Yes Provider, Historical   baclofen 5 mg tab Take 10 mg by mouth three (3) times daily. Yes Provider, Historical   traMADoL (ULTRAM) 50 mg tablet Take 50 mg by mouth two (2) times a day. Yes Provider, Historical   furosemide (LASIX) 20 mg tablet Take 1 Tablet by mouth daily. Yes Provider, Historical   spironolactone (ALDACTONE) 25 mg tablet Take 1 Tablet by mouth daily. 3/17/23  Yes Emerson Chen MD   albuterol (PROVENTIL HFA, VENTOLIN HFA, PROAIR HFA) 90 mcg/actuation inhaler Take 2 Puffs by inhalation every six (6) hours as needed for Wheezing. 3/17/23  Yes Emerson Chen MD   diclofenac (VOLTAREN) 1 % gel Apply  to affected area two (2) times daily as needed for Pain. 3/17/23  Yes mEerson Chen MD   OTHER 1 BRANDAN LIFT (patient is 301. Lbs) for mobility 8/10/22  Yes Emerson Chen MD   OTHER Patient requires brandan lift in her home 8/1/22  Yes Emerson Chen MD   acetaminophen (TYLENOL) 500 mg tablet Take 500 mg by mouth every six (6) hours as needed for Pain. Yes Provider, Historical   furosemide (LASIX) 10 mg/mL oral solution Take  by mouth daily. 3/17/23  Provider, Historical   nystatin (MYCOSTATIN) topical cream Apply  to affected area two (2) times a day. Patient not taking: Reported on 3/17/2023 3/14/23 3/17/23  Emerson Chen MD   omeprazole (PRILOSEC) 40 mg capsule TAKE 1 CAPSULE BY MOUTH EVERY DAY  Patient not taking: Reported on 3/17/2023 12/9/22 3/17/23  Emerson Chen MD   bumetanide (BUMEX) 1 mg tablet TAKE 1 TABLET BY MOUTH EVERY DAY  Patient not taking: Reported on 3/17/2023 11/5/22 3/17/23  Pablo Epps NP   diclofenac (VOLTAREN) 1 % gel APPLY TO AFFECTED AREA TWO (2) TIMES DAILY AS NEEDED FOR PAIN. Patient not taking: Reported on 3/17/2023 10/31/22 3/17/23  Emerson Chen MD   allopurinoL (ZYLOPRIM) 300 mg tablet TAKE 1 TABLET BY MOUTH DAILY.  DC ALLOPURINOL 100 MG  Patient not taking: Reported on 3/17/2023 9/2/22   Emerson Chen MD polyethylene glycol (MIRALAX) 17 gram packet Take 1 Packet by mouth daily. 8/24/22   Shekhar Kim MD   senna-docusate (PERICOLACE) 8.6-50 mg per tablet Take 1 Tablet by mouth daily as needed for Constipation. Patient not taking: Reported on 3/17/2023 8/24/22   Shekhar Kim MD   albuterol (PROVENTIL HFA, VENTOLIN HFA, PROAIR HFA) 90 mcg/actuation inhaler TAKE 2 PUFFS BY INHALATION EVERY SIX HOURS AS NEEDED FOR WHEEZING OR SHORTNESS OF BREATH. 5/26/22 3/17/23  Shekhar Kim MD   spironolactone (ALDACTONE) 25 mg tablet Take 1 Tablet by mouth daily. Patient not taking: Reported on 3/17/2023 4/8/22 3/17/23  Shekhar Kim MD   potassium chloride (K-DUR, KLOR-CON M20) 20 mEq tablet Take 20 mEq by mouth two (2) times a day. Patient not taking: Reported on 3/17/2023 11/23/21 3/17/23  Provider, Historical   dilTIAZem ER (TIAZAC) 240 mg capsule Tiadylt  mg capsule,extended release   TAKE 1 CAPSULE BY MOUTH EVERY DAY  Patient not taking: Reported on 3/17/2023  3/17/23  Provider, Historical   rivaroxaban (XARELTO) 20 mg tab tablet Take 1 Tab by mouth daily (with lunch). Indications: PREVENT THROMBOEMBOLISM IN CHRONIC ATRIAL FIBRILLATION  Patient not taking: Reported on 3/17/2023 3/30/18 3/17/23  Jonna Renteria NP   aspirin delayed-release 81 mg tablet Take 1 Tab by mouth daily. 3/31/18 3/17/23  Jonna Renteria NP     Past Medical History:   Diagnosis Date    Arthritis     Atrial fibrillation ([facility] [address]     Bronchitis, acute 12/17/2011    Congestive heart failure (HCC)     Diastolic heart failure (HCC)     WHITING (dyspnea on exertion)     GERD (gastroesophageal reflux disease)     Hypertension     Obesity     TAM (obstructive sleep apnea)     Other ill-defined conditions(799.89)     \"fluid in the lung\"    Pneumonia 6/7/2018    Sleep apnea        ROS significant for aches and pains all over body and leg edema.     Objective:     Patient-Reported Vitals 3/17/2023   Patient-Reported Weight 334lb   Patient-Reported Systolic  88 Patient-Reported Diastolic 66       Constitutional: [x] Appears well-developed and well-nourished [x] No apparent distress      [] Abnormal -     Mental status: [x] Alert and awake  [x] Oriented to person/place/time [x] Able to follow commands    [] Abnormal -     Eyes:   EOM    [x]  Normal    [] Abnormal -   Sclera  [x]  Normal    [] Abnormal -          Discharge [x]  None visible   [] Abnormal -     HENT: [x] Normocephalic, atraumatic  [] Abnormal -   [x] Mouth/Throat: Mucous membranes are moist    External Ears [x] Normal  [] Abnormal -    Neck: [x] No visualized mass [] Abnormal -     Pulmonary/Chest: [x] Respiratory effort normal   [x] No visualized signs of difficulty breathing or respiratory distress        [] Abnormal -      Musculoskeletal:   [x] Normal gait with no signs of ataxia         [x] Normal range of motion of neck        [] Abnormal -   1+ leg edema present. Neurological:        [x] No Facial Asymmetry (Cranial nerve 7 motor function) (limited exam due to video visit)          [x] No gaze palsy        [] Abnormal -          Skin:        [x] No significant exanthematous lesions or discoloration noted on facial skin         [] Abnormal -            Psychiatric:       [x] Normal Affect [] Abnormal -        [x] No Hallucinations    Other pertinent observable physical exam findings:-        We discussed the expected course, resolution and complications of the diagnosis(es) in detail. Medication risks, benefits, costs, interactions, and alternatives were discussed as indicated. I advised her to contact the office if her condition worsens, changes or fails to improve as anticipated. She expressed understanding with the diagnosis(es) and plan. Saúl Terry was evaluated through a synchronous (real-time) audio-video encounter. The patient (or guardian if applicable) is aware that this is a billable service, which includes applicable co-pays.  This Virtual Visit was conducted with patient's (and/or legal guardian's) consent. The visit was conducted pursuant to the emergency declaration under the [facility] [address] and the [employer] General Act. Patient identification was verified, and a caregiver was present when appropriate. The patient was located at: Home: [phone]  The provider was located at:  Facility (Appt Department): [address] [place] [address]  Sergey Gómez MD

## 2023-03-17 NOTE — PATIENT INSTRUCTIONS
Medicare Wellness Visit, Female     The best way to live healthy is to have a lifestyle where you eat a well-balanced diet, exercise regularly, limit alcohol use, and quit all forms of tobacco/nicotine, if applicable. Regular preventive services are another way to keep healthy. Preventive services (vaccines, screening tests, monitoring & exams) can help personalize your care plan, which helps you manage your own care. Screening tests can find health problems at the earliest stages, when they are easiest to treat. [facility] follows the current, evidence-based guidelines published by the [facility] ([facility]) when recommending preventive services for our patients. Because we follow these guidelines, sometimes recommendations change over time as research supports it. (For example, mammograms used to be recommended annually. Even though Medicare will still pay for an annual mammogram, the newer guidelines recommend a mammogram every two years for women of average risk). Of course, you and your doctor may decide to screen more often for some diseases, based on your risk and your co-morbidities (chronic disease you are already diagnosed with). Preventive services for you include:  - Medicare offers their members a free annual wellness visit, which is time for you and your primary care provider to discuss and plan for your preventive service needs.  Take advantage of this benefit every year!    -Over the age of 72 should receive the recommended pneumonia vaccines.    -All adults should have a flu vaccine yearly.  -All adults should have a tetanus vaccine every 10 years.   -Over the age 48 should receive the shingles vaccines.        -All adults should be screened once for Hepatitis C.  -All adults age 38-68 who are overweight should have a diabetes screening test once every three years.   -Other screening tests and preventive services for persons with diabetes include: an eye exam to screen for diabetic retinopathy, a kidney function test, a foot exam, and stricter control over your cholesterol.   -Cardiovascular screening for adults with routine risk involves an electrocardiogram (ECG) at intervals determined by your doctor.     -Colorectal cancer screenings should be done for adults age 39-70 with no increased risk factors for colorectal cancer. There are a number of acceptable methods of screening for this type of cancer. Each test has its own benefits and drawbacks. Discuss with your doctor what is most appropriate for you during your annual wellness visit. The different tests include: colonoscopy (considered the best screening method), a fecal occult blood test, a fecal DNA test, and sigmoidoscopy.    -Lung cancer screening is recommended annually with a low dose CT scan for adults between age 54 and 68, who have smoked at least 30 pack years (equivalent of 1 pack per day for 30 days), and who is a current smoker or quit less than 15 years ago.    -A bone mass density test is recommended when a woman turns 65 to screen for osteoporosis. This test is only recommended one time, as a screening. Some providers will use this same test as a disease monitoring tool if you already have osteoporosis. -Breast cancer screenings are recommended every other year for women of normal risk, age 54-69.    -Cervical cancer screenings for women over age 72 are only recommended with certain risk factors.      Here is a list of your current Health Maintenance items (your personalized list of preventive services) with a due date:  Health Maintenance Due   Topic Date Due    COVID-19 Vaccine (1) Never done    DTaP/Tdap/Td  (1 - Tdap) Never done    Shingles Vaccine (1 of 2) Never done    Yearly Flu Vaccine (1) Never done

## 2023-03-20 ENCOUNTER — TELEPHONE (OUTPATIENT)
Dept: INTERNAL MEDICINE CLINIC | Age: 83
End: 2023-03-20

## 2023-03-23 ENCOUNTER — TELEPHONE (OUTPATIENT)
Dept: INTERNAL MEDICINE CLINIC | Age: 83
End: 2023-03-23

## 2023-03-23 NOTE — TELEPHONE ENCOUNTER
Marcel (son) is requesting a call back for info about how patient should get her labs drawn if she's bed ridden.   Please return call at [phone]

## 2023-03-24 NOTE — TELEPHONE ENCOUNTER
Me     BB    1:00 PM  Note   [facility] sol Bates Done was called and verbalized understanding on note below.       Orders faxed

## 2023-03-31 ENCOUNTER — VIRTUAL VISIT (OUTPATIENT)
Dept: INTERNAL MEDICINE CLINIC | Age: 83
End: 2023-03-31

## 2023-03-31 DIAGNOSIS — I50.32 CHRONIC DIASTOLIC CONGESTIVE HEART FAILURE (HCC): ICD-10-CM

## 2023-03-31 DIAGNOSIS — F32.A DEPRESSION, UNSPECIFIED DEPRESSION TYPE: ICD-10-CM

## 2023-03-31 DIAGNOSIS — S06.5XAA SUBDURAL HEMATOMA (HCC): ICD-10-CM

## 2023-03-31 DIAGNOSIS — G89.4 CHRONIC PAIN SYNDROME: ICD-10-CM

## 2023-03-31 DIAGNOSIS — I95.9 HYPOTENSION, UNSPECIFIED HYPOTENSION TYPE: Primary | ICD-10-CM

## 2023-03-31 NOTE — PROGRESS NOTES
Chief Complaint   Patient presents with    Chronic Kidney Disease    Obesity    Hypertension    Irregular Heart Beat

## 2023-03-31 NOTE — PROGRESS NOTES
Pablo Messina is a 81 y.o. female who was seen by synchronous (real-time) audio-video technology on 3/31/2023 for Chronic Kidney Disease, Obesity, Hypertension, and Irregular Heart Beat        Assessment & Plan:   Diagnoses and all orders for this visit:    1. Hypotension, unspecified hypotension type    Blood pressure still running low. She is placed on has been like 25 g a day, she is already on Lasix 20 mg a day. Still having leg swelling. Advised her not to give any diuretics since her blood pressure below 85. Her son notified. 2. Chronic diastolic congestive heart failure ([address]  Not quite compensated. Having moderate leg swelling. Taking Lasix and for spironolactone added. Potassium was discontinued. Need to be on low-salt diet. Not able to increase the diuretic dose because of low blood pressure. 3. Depression, unspecified depression type  Depression, no medication needed for now. We will monitor her. She is taking some that she feels she should not be here. Family aware of her comment and they think she is depressed at all. 4. Subdural hematoma (HCC)  Mostly bedbound and wheelchair-bound. Not able to come to office now. Family is planning to put her on nursing home. I have advised him to apply for Medicaid. 5. Chronic pain syndrome  Taking tramadol or oxycodone as needed. She is too groggy. I have discontinued her baclofen. I spent at least 31 minutes on this visit with this established patient. Subjective:   Ms. Renard Orozco is here for follow-up. She is at home, mostly bedbound. Her son is her caregiver. She is suffering from hypertension history of subdural hematoma. She is taking low-dose of diuretics for her congestive heart failure and hepatosplenomegaly. Leg swelling still there. She is not short of breath. She suffers from chronic back pain, on tramadol and oxycodone along with gabapentin. She seems groggy. I also found that she is on baclofen.   Will discontinue baclofen. With depression score is slightly high with suicidal thought, family thinks that she is not depressed at all. She is ambulating. Home health nurse coming to care for her. Not able to do any blood, she is hard stick. Labs reviewed. Prior to Admission medications    Medication Sig Start Date End Date Taking? Authorizing Provider   gabapentin (NEURONTIN) 300 mg capsule Take 300 mg by mouth two (2) times a day. Yes Provider, Historical   levETIRAcetam (KEPPRA) 500 mg tablet Take 1,000 mg by mouth two (2) times a day. Yes Provider, Historical   traMADoL (ULTRAM) 50 mg tablet Take 50 mg by mouth every six (6) hours as needed for Pain. Yes Provider, Historical   oxyCODONE IR (ROXICODONE) 5 mg immediate release tablet Take 5 mg by mouth every four (4) hours as needed for Pain. Yes Provider, Historical   furosemide (LASIX) 20 mg tablet Take 1 Tablet by mouth daily. Yes Provider, Historical   spironolactone (ALDACTONE) 25 mg tablet Take 1 Tablet by mouth daily. 3/17/23  Yes Shilo Lund MD   albuterol (PROVENTIL HFA, VENTOLIN HFA, PROAIR HFA) 90 mcg/actuation inhaler Take 2 Puffs by inhalation every six (6) hours as needed for Wheezing. 3/17/23  Yes Shilo Lund MD   diclofenac (VOLTAREN) 1 % gel Apply  to affected area two (2) times daily as needed for Pain. 3/17/23  Yes Shilo Lund MD   polyethylene glycol [facility]) 17 gram packet Take 1 Packet by mouth daily. 8/24/22  Yes Shilo Lund MD   OTHER 1 BRANDAN LIFT (patient is 301. Lbs) for mobility 8/10/22  Yes Shilo Lund MD   OTHER Patient requires brandan lift in her home 8/1/22  Yes Shilo Lund MD   acetaminophen (TYLENOL) 500 mg tablet Take 500 mg by mouth every six (6) hours as needed for Pain. Yes Provider, Historical   baclofen 5 mg tab Take 10 mg by mouth three (3) times daily. 3/31/23  Provider, Historical   traMADoL (ULTRAM) 50 mg tablet Take 50 mg by mouth two (2) times a day.   3/31/23  Provider, Historical   allopurinoL (ZYLOPRIM) 300 mg tablet TAKE 1 TABLET BY MOUTH DAILY. DC ALLOPURINOL 100 MG  Patient not taking: No sig reported 9/2/22 3/31/23  Apollo Gillespie MD   senna-docusate (PERICOLACE) 8.6-50 mg per tablet Take 1 Tablet by mouth daily as needed for Constipation. 8/24/22 3/31/23  Apollo Gillespie MD     Past Medical History:   Diagnosis Date    Arthritis     Atrial fibrillation ([facility] [address]     Bronchitis, acute 12/17/2011    Congestive heart failure (HCC)     Diastolic heart failure (HCC)     WHITING (dyspnea on exertion)     GERD (gastroesophageal reflux disease)     Hypertension     Obesity     TAM (obstructive sleep apnea)     Other ill-defined conditions(799.89)     \"fluid in the lung\"    Pneumonia 6/7/2018    Sleep apnea        ROS significant for low blood pressure, pain and leg edema. Objective:     Patient-Reported Vitals 3/31/2023   Patient-Reported Weight -   Patient-Reported Pulse 90   Patient-Reported Systolic  95   Patient-Reported Diastolic 68        Constitutional: [x] Appears well-developed and well-nourished [x] No apparent distress      [] Abnormal -     Mental status: [x] Alert and awake  [x] Oriented to person/place/time [x] Able to follow commands    [] Abnormal -     Eyes:   EOM    [x]  Normal    [] Abnormal -   Sclera  [x]  Normal    [] Abnormal -          Discharge [x]  None visible   [] Abnormal -     HENT: [x] Normocephalic, atraumatic  [] Abnormal -   [x] Mouth/Throat: Mucous membranes are moist    External Ears [x] Normal  [] Abnormal -    Neck: [x] No visualized mass [] Abnormal -     Pulmonary/Chest: [x] Respiratory effort normal   [x] No visualized signs of difficulty breathing or respiratory distress        [] Abnormal -      Musculoskeletal: Lower back: Pain and stiffness present range of motion restricted.     Neurological:        [x] No Facial Asymmetry (Cranial nerve 7 motor function) (limited exam due to video visit)          [x] No gaze palsy   She is bedbound,     [] Abnormal -          Skin:        [x] No significant exanthematous lesions or discoloration noted on facial skin    Moderate amount of edema present. [] Abnormal -            Psychiatric:       [x] Normal Affect [] Abnormal -   Mild depression, PHQ score 5. [x] No Hallucinations    Other pertinent observable physical exam findings:-        We discussed the expected course, resolution and complications of the diagnosis(es) in detail. Medication risks, benefits, costs, interactions, and alternatives were discussed as indicated. I advised her to contact the office if her condition worsens, changes or fails to improve as anticipated. She expressed understanding with the diagnosis(es) and plan. Jimbo Singh, was evaluated through a synchronous (real-time) audio-video encounter. The patient (or guardian if applicable) is aware that this is a billable service, which includes applicable co-pays. This Virtual Visit was conducted with patient's (and/or legal guardian's) consent. The visit was conducted pursuant to the emergency declaration under the [address] [place] [id number] and the [email or web address] General Act. Patient identification was verified, and a caregiver was present when appropriate. The patient was located at: Home: [phone]  The provider was located at:  Facility (Appt Department): [address] [place] [address]  Sergey Martinez MD

## 2023-04-18 DIAGNOSIS — I10 HYPERTENSION, UNSPECIFIED TYPE: Primary | ICD-10-CM

## 2023-04-18 RX ORDER — FUROSEMIDE 20 MG/1
20 TABLET ORAL DAILY
Qty: 90 TABLET | Refills: 1 | Status: SHIPPED | OUTPATIENT
Start: 2023-04-18

## 2023-04-18 RX ORDER — LEVETIRACETAM 500 MG/1
1000 TABLET ORAL 2 TIMES DAILY
Qty: 360 TABLET | Refills: 0 | Status: SHIPPED | OUTPATIENT
Start: 2023-04-18

## 2023-04-18 NOTE — TELEPHONE ENCOUNTER
Requested Prescriptions     Pending Prescriptions Disp Refills    levETIRAcetam (KEPPRA) 500 mg tablet 360 Tablet 0     Sig: Take 2 Tablets by mouth two (2) times a day. furosemide (LASIX) 20 mg tablet 90 Tablet 1     Sig: Take 1 Tablet by mouth daily. [facility]/pharmacy #[id number] [place] [address]  Phone: [phone] Fax: [phone]       3/31/2023 is LAST OFFICE VISIT     No future appointments.

## 2023-05-04 ENCOUNTER — TELEPHONE (OUTPATIENT)
Dept: INTERNAL MEDICINE CLINIC | Age: 83
End: 2023-05-04

## 2023-05-09 ENCOUNTER — TELEPHONE (OUTPATIENT)
Age: 83
End: 2023-05-09

## 2023-05-18 DIAGNOSIS — S31.000D WOUND OF SACRAL REGION, SUBSEQUENT ENCOUNTER: Primary | ICD-10-CM

## 2023-05-18 RX ORDER — CASTOR OIL AND BALSAM, PERU 788; 87 MG/G; MG/G
OINTMENT TOPICAL 2 TIMES DAILY
Qty: 56.7 G | Refills: 1 | Status: SHIPPED | OUTPATIENT
Start: 2023-05-18

## 2023-05-18 NOTE — TELEPHONE ENCOUNTER
Requested Prescriptions     Pending Prescriptions Disp Refills    balsum peru-castor oil (VENELEX) OINT ointment 56.7 g 1     Sig: Apply topically 2 times daily         [facility]/pharmacy #[id number] -  [phone] Vaishali [address]err[address] [id number]  Phone: [phone] Fax: [phone]       Last appt 3/31/2023      No future appointments.

## 2023-05-18 NOTE — TELEPHONE ENCOUNTER
----- Message from Shahana Ward sent at 5/18/2023  1:54 PM EDT -----  Subject: Message to Provider    QUESTIONS  Information for Provider? Sarah Dean from [facility] called to   see if Dr. Roxane Iglesias would prescribe patient with Venelex Cream for partial   thickness wounds on her sacrum area. Please call Sarah Dean back to discuss   and verify the orders been placed. Pharmacy used is [facility].   ---------------------------------------------------------------------------  --------------  [employer]  [phone]; OK to leave message on voicemail  ---------------------------------------------------------------------------  --------------  SCRIPT ANSWERS  Relationship to Patient? Covered Entity  Covered Entity Type? Home Health Care? Representative Name?  [address]

## 2023-05-19 ENCOUNTER — CLINICAL DOCUMENTATION (OUTPATIENT)
Age: 83
End: 2023-05-19

## 2023-05-19 NOTE — PROGRESS NOTES
Outcome    Drug  Venelex ointment  Form  [employer] Electronic PA Form      Denied today  The Medicare rule in the Prescription Drug Benefit Manual (Chapter 6, Section 10.1) says for coverage under Medicare Part D, the item must be a drug that has been approved by the U.S. Food and Drug Administration (FDA). The FDA considers the requested item a device rather than a drug and per Medicare rules is not covered. show

## 2023-06-16 PROBLEM — F33.0 MAJOR DEPRESSIVE DISORDER, RECURRENT, MILD (HCC): Status: ACTIVE | Noted: 2023-06-16

## 2023-06-18 ENCOUNTER — HOSPITAL ENCOUNTER (EMERGENCY)
Facility: HOSPITAL | Age: 83
Discharge: HOME | End: 2023-06-19
Attending: EMERGENCY MEDICINE
Payer: MEDICARE

## 2023-06-18 ENCOUNTER — APPOINTMENT (OUTPATIENT)
Facility: HOSPITAL | Age: 83
End: 2023-06-18
Payer: MEDICARE

## 2023-06-18 DIAGNOSIS — K59.00 CONSTIPATION, UNSPECIFIED CONSTIPATION TYPE: Primary | ICD-10-CM

## 2023-06-18 LAB
BASOPHILS # BLD: 0 K/UL (ref 0–0.1)
BASOPHILS NFR BLD: 0 % (ref 0–1)
DIFFERENTIAL METHOD BLD: ABNORMAL
EOSINOPHIL # BLD: 0.1 K/UL (ref 0–0.4)
EOSINOPHIL NFR BLD: 1 % (ref 0–7)
ERYTHROCYTE [DISTWIDTH] IN BLOOD BY AUTOMATED COUNT: 16.6 % (ref 11.5–14.5)
HCT VFR BLD AUTO: 40.9 % (ref 35–47)
HGB BLD-MCNC: 13.3 G/DL (ref 11.5–16)
IMM GRANULOCYTES # BLD AUTO: 0.1 K/UL (ref 0–0.04)
IMM GRANULOCYTES NFR BLD AUTO: 1 % (ref 0–0.5)
LACTATE SERPL-SCNC: 1.8 MMOL/L (ref 0.4–2)
LYMPHOCYTES # BLD: 2.1 K/UL (ref 0.8–3.5)
LYMPHOCYTES NFR BLD: 29 % (ref 12–49)
MCH RBC QN AUTO: 27.3 PG (ref 26–34)
MCHC RBC AUTO-ENTMCNC: 32.5 G/DL (ref 30–36.5)
MCV RBC AUTO: 83.8 FL (ref 80–99)
MONOCYTES # BLD: 1.1 K/UL (ref 0–1)
MONOCYTES NFR BLD: 15 % (ref 5–13)
NEUTS SEG # BLD: 4 K/UL (ref 1.8–8)
NEUTS SEG NFR BLD: 54 % (ref 32–75)
NRBC # BLD: 0 K/UL (ref 0–0.01)
NRBC BLD-RTO: 0 PER 100 WBC
PLATELET # BLD AUTO: 217 K/UL (ref 150–400)
PMV BLD AUTO: 10.2 FL (ref 8.9–12.9)
RBC # BLD AUTO: 4.88 M/UL (ref 3.8–5.2)
WBC # BLD AUTO: 7.4 K/UL (ref 3.6–11)

## 2023-06-18 PROCEDURE — 36415 COLL VENOUS BLD VENIPUNCTURE: CPT

## 2023-06-18 PROCEDURE — 2580000003 HC RX 258: Performed by: EMERGENCY MEDICINE

## 2023-06-18 PROCEDURE — 87040 BLOOD CULTURE FOR BACTERIA: CPT

## 2023-06-18 PROCEDURE — 85025 COMPLETE CBC W/AUTO DIFF WBC: CPT

## 2023-06-18 PROCEDURE — 99284 EMERGENCY DEPT VISIT MOD MDM: CPT

## 2023-06-18 PROCEDURE — 74176 CT ABD & PELVIS W/O CONTRAST: CPT

## 2023-06-18 PROCEDURE — 83605 ASSAY OF LACTIC ACID: CPT

## 2023-06-18 RX ORDER — POLYETHYLENE GLYCOL 3350 17 G/17G
17 POWDER, FOR SOLUTION ORAL DAILY PRN
Qty: 30 EACH | Refills: 0 | Status: SHIPPED | OUTPATIENT
Start: 2023-06-18 | End: 2023-07-18

## 2023-06-18 RX ORDER — 0.9 % SODIUM CHLORIDE 0.9 %
1000 INTRAVENOUS SOLUTION INTRAVENOUS ONCE
Status: COMPLETED | OUTPATIENT
Start: 2023-06-18 | End: 2023-06-18

## 2023-06-18 RX ADMIN — SODIUM CHLORIDE 1000 ML: 9 INJECTION, SOLUTION INTRAVENOUS at 16:03

## 2023-06-18 ASSESSMENT — ENCOUNTER SYMPTOMS: CONSTIPATION: 1

## 2023-06-19 VITALS
HEIGHT: 69 IN | OXYGEN SATURATION: 96 % | WEIGHT: 282.41 LBS | HEART RATE: 95 BPM | DIASTOLIC BLOOD PRESSURE: 65 MMHG | SYSTOLIC BLOOD PRESSURE: 101 MMHG | RESPIRATION RATE: 17 BRPM | TEMPERATURE: 98.5 F | BODY MASS INDEX: 41.83 KG/M2

## 2023-06-23 LAB
BACTERIA SPEC CULT: NORMAL
SERVICE CMNT-IMP: NORMAL

## 2023-07-03 ENCOUNTER — TELEPHONE (OUTPATIENT)
Age: 83
End: 2023-07-03

## 2023-07-03 NOTE — TELEPHONE ENCOUNTER
[address] [facility] is Requesting a verbal order for skilled nursing to see pt 1x per week for next 9 wks-  uti heart failure and fib  Please call Dipti back

## 2023-07-03 NOTE — TELEPHONE ENCOUNTER
----- Message from Flavio Mccray sent at 7/3/2023  2:43 PM EDT -----  Subject: Message to Provider    QUESTIONS  Information for Provider? Yemi Correa would like verbal order for continuing OT   for 2xfor 6 weeks then 1for 2 weeks please call [phone]. Please leave   detaild message is secured line  ---------------------------------------------------------------------------  --------------  CALL BACK INFO  [phone]; OK to leave message on voicemail  ---------------------------------------------------------------------------  --------------  SCRIPT ANSWERS  Relationship to Patient? Covered Entity  Covered Entity Type? Occupational Therapy Office? Representative Name?  Yemi Correa

## 2023-07-07 ENCOUNTER — TELEPHONE (OUTPATIENT)
Age: 83
End: 2023-07-07

## 2023-07-07 NOTE — TELEPHONE ENCOUNTER
[address] [facility] is requesting a New verbal order to see pt   Wk of 7/10 and 7/17 - 1x per week

## 2023-07-14 ENCOUNTER — TELEMEDICINE (OUTPATIENT)
Age: 83
End: 2023-07-14
Payer: MEDICARE

## 2023-07-14 DIAGNOSIS — S06.5XAA SUBDURAL HEMATOMA (HCC): ICD-10-CM

## 2023-07-14 DIAGNOSIS — E66.01 OBESITY, CLASS III, BMI 40-49.9 (MORBID OBESITY) (HCC): ICD-10-CM

## 2023-07-14 DIAGNOSIS — I10 ESSENTIAL (PRIMARY) HYPERTENSION: ICD-10-CM

## 2023-07-14 DIAGNOSIS — I48.0 PAROXYSMAL ATRIAL FIBRILLATION (HCC): Primary | ICD-10-CM

## 2023-07-14 DIAGNOSIS — R51.9 DAILY HEADACHE: ICD-10-CM

## 2023-07-14 PROCEDURE — 99214 OFFICE O/P EST MOD 30 MIN: CPT | Performed by: INTERNAL MEDICINE

## 2023-07-14 RX ORDER — AMITRIPTYLINE HYDROCHLORIDE 10 MG/1
10 TABLET, FILM COATED ORAL NIGHTLY
Qty: 90 TABLET | Refills: 1 | Status: SHIPPED | OUTPATIENT
Start: 2023-07-14

## 2023-07-14 ASSESSMENT — PATIENT HEALTH QUESTIONNAIRE - PHQ9
SUM OF ALL RESPONSES TO PHQ QUESTIONS 1-9: 0
10. IF YOU CHECKED OFF ANY PROBLEMS, HOW DIFFICULT HAVE THESE PROBLEMS MADE IT FOR YOU TO DO YOUR WORK, TAKE CARE OF THINGS AT HOME, OR GET ALONG WITH OTHER PEOPLE: 0
9. THOUGHTS THAT YOU WOULD BE BETTER OFF DEAD, OR OF HURTING YOURSELF: 0
4. FEELING TIRED OR HAVING LITTLE ENERGY: 0
SUM OF ALL RESPONSES TO PHQ QUESTIONS 1-9: 0
SUM OF ALL RESPONSES TO PHQ QUESTIONS 1-9: 0
5. POOR APPETITE OR OVEREATING: 0
7. TROUBLE CONCENTRATING ON THINGS, SUCH AS READING THE NEWSPAPER OR WATCHING TELEVISION: 0
SUM OF ALL RESPONSES TO PHQ9 QUESTIONS 1 & 2: 0
6. FEELING BAD ABOUT YOURSELF - OR THAT YOU ARE A FAILURE OR HAVE LET YOURSELF OR YOUR FAMILY DOWN: 0
SUM OF ALL RESPONSES TO PHQ QUESTIONS 1-9: 0
8. MOVING OR SPEAKING SO SLOWLY THAT OTHER PEOPLE COULD HAVE NOTICED. OR THE OPPOSITE, BEING SO FIGETY OR RESTLESS THAT YOU HAVE BEEN MOVING AROUND A LOT MORE THAN USUAL: 0
1. LITTLE INTEREST OR PLEASURE IN DOING THINGS: 0
2. FEELING DOWN, DEPRESSED OR HOPELESS: 0
3. TROUBLE FALLING OR STAYING ASLEEP: 0

## 2023-07-14 NOTE — PROGRESS NOTES
Shelley Doe (:  1940) is a Established patient, presenting virtually for evaluation of the following:    Assessment & Plan   Below is the assessment and plan developed based on review of pertinent history, physical exam, labs, studies, and medications. 1. Paroxysmal atrial fibrillation (HCC)  No palpitation, doing well. 2. Daily headache    Significant improvement since she is amitriptyline. Continue same. We will  -     amitriptyline (ELAVIL) 10 MG tablet; Take 1 tablet by mouth nightly, Disp-90 tablet, R-1Normal  3. Obesity, Class III, BMI 40-49.9 (morbid obesity) ([address])    She is bed bound, not able to do any exercise. 4. Essential (primary) hypertension    Stable blood pressure. On spironolactone. 5. Subdural hematoma (HCC)  Bedbound, not able to walk. .  Taking Keppra for seizure prophylaxis. Having headache concerns. Take amitriptyline which is helping her with headache. No follow-ups on file. Subjective     Ms. Prateek Robles is here for follow-up. She is resting at home. She is completely bedbound. On home health nurse, and help her out. She has no bedsore. She sometimes is placed on wheelchair. Has history of subdural hematoma which made her hospitalized. Having constant headache. Have I have started her on amitriptyline which is helping her with headache. She is on Keppra for seizure prophylaxis. Has hypertension, selective. Doing well. Had history of A-fib. No palpitation. Not on any medication. Not doing better.   HPI  Review of Systems     Negative  Objective   Patient-Reported Vitals  No data recorded     Physical Exam      Constitutional: [x] Appears well-developed and well-nourished [x] No apparent distress      [] Abnormal -     Mental status: [x] Alert and awake  [x] Oriented to person/place/time [x] Able to follow commands    [] Abnormal -     Eyes:   EOM    [x]  Normal    [] Abnormal -   Sclera  [x]  Normal    [] Abnormal -          Discharge [x]  None visible

## 2023-08-11 ENCOUNTER — TELEPHONE (OUTPATIENT)
Age: 83
End: 2023-08-11

## 2023-08-11 NOTE — TELEPHONE ENCOUNTER
Emerald with [address] [facility] called to get a verbal order for patient to have a physical therapy evaluation next week.  Please call her back at [phone]

## 2023-08-17 ENCOUNTER — TELEPHONE (OUTPATIENT)
Age: 83
End: 2023-08-17

## 2023-08-17 NOTE — TELEPHONE ENCOUNTER
----- Message from [address] [place] [address] sent at 8/17/2023 12:08 PM EDT -----  Subject: Message to Provider    QUESTIONS  Information for Provider? Cornell Noguera with [facility] OT called today wanting   verbal order for  to come out to help pt find some caregiver   options.   ---------------------------------------------------------------------------  --------------  Jeanna ALVAREZ  [phone]; OK to leave message on voicemail  ---------------------------------------------------------------------------  --------------  SCRIPT ANSWERS  Relationship to Patient? Covered Entity  Covered Entity Type? Home Health Care? Representative Name?  Cornell Noguera

## 2023-08-17 NOTE — TELEPHONE ENCOUNTER
Lance Benson of [facility] was called and verbalized understanding on note below.  Verbal given

## 2023-08-21 ENCOUNTER — TELEPHONE (OUTPATIENT)
Age: 83
End: 2023-08-21

## 2023-08-21 NOTE — TELEPHONE ENCOUNTER
Spoke with [address] [place] [address]. Per [employer], APS report has been filled out regarding her home health care of the primary caregiver at home Ed Dawkins, son, as Ed Dawkins cannot take care of her anymore). Joon Rios is willing to take over care, Nataly will \"think about this\". Iman Hess is concerned, Ed Dawkins is no longer qualified to take care of patient, patient may be unwilling to go with other son. Eva is referring this to the PCP Mary Osei MD per protocol.

## 2023-08-22 NOTE — TELEPHONE ENCOUNTER
Son Joon of Ruben Cover was called with no answer, message on  left to call back regarding note below.      PPI message sent     Future Appointments   Date Time Provider [address]   8/25/2023 11:00 AM Mumtaz Siddiqui MD [facility] BS AMB   11/10/2023 10:00 AM Mumtaz Siddiqui MD [facility] BS AMB

## 2023-08-25 ENCOUNTER — TELEPHONE (OUTPATIENT)
Age: 83
End: 2023-08-25

## 2023-08-25 ENCOUNTER — TELEMEDICINE (OUTPATIENT)
Age: 83
End: 2023-08-25
Payer: MEDICARE

## 2023-08-25 DIAGNOSIS — L03.115 CELLULITIS OF RIGHT LOWER EXTREMITY: ICD-10-CM

## 2023-08-25 DIAGNOSIS — I10 ESSENTIAL (PRIMARY) HYPERTENSION: ICD-10-CM

## 2023-08-25 DIAGNOSIS — I48.0 PAROXYSMAL ATRIAL FIBRILLATION (HCC): ICD-10-CM

## 2023-08-25 DIAGNOSIS — N18.31 CHRONIC KIDNEY DISEASE, STAGE 3A (HCC): ICD-10-CM

## 2023-08-25 DIAGNOSIS — I50.32 CHRONIC DIASTOLIC CONGESTIVE HEART FAILURE (HCC): ICD-10-CM

## 2023-08-25 DIAGNOSIS — E55.9 VITAMIN D DEFICIENCY: ICD-10-CM

## 2023-08-25 DIAGNOSIS — E79.0 HYPERURICEMIA: ICD-10-CM

## 2023-08-25 DIAGNOSIS — E66.01 OBESITY, CLASS III, BMI 40-49.9 (MORBID OBESITY) (HCC): Primary | ICD-10-CM

## 2023-08-25 PROCEDURE — 99214 OFFICE O/P EST MOD 30 MIN: CPT | Performed by: INTERNAL MEDICINE

## 2023-08-25 RX ORDER — FUROSEMIDE 20 MG/1
20 TABLET ORAL DAILY
Qty: 80 TABLET | Refills: 1 | Status: SHIPPED | OUTPATIENT
Start: 2023-08-25

## 2023-08-25 RX ORDER — DILTIAZEM HYDROCHLORIDE 240 MG/1
1 CAPSULE, EXTENDED RELEASE ORAL DAILY
COMMUNITY
Start: 2022-12-30

## 2023-08-25 RX ORDER — AZITHROMYCIN 500 MG/1
500 TABLET, FILM COATED ORAL DAILY
Qty: 3 TABLET | Refills: 0 | Status: SHIPPED | OUTPATIENT
Start: 2023-08-25 | End: 2023-08-28

## 2023-08-25 NOTE — TELEPHONE ENCOUNTER
Spoke with Chun Gruber, son of Nataly. Chun Gruber is concerned with the virtual visits because \"I want to make my point clear that I take care of my mama and whatever Shirleyyoselyn Bowers tells her isn't right because he ain't ever around my mother\". Chun Gruber states that he thinks Nataly should come into office or that Mell Mederos MD can also call him after to get a better report.

## 2023-08-25 NOTE — PROGRESS NOTES
Thiago Winters (:  1940) is a Established patient, presenting virtually for evaluation of the following:    Assessment & Plan   Below is the assessment and plan developed based on review of pertinent history, physical exam, labs, studies, and medications. 1. Obesity, Class III, BMI 40-49.9 (morbid obesity) ([address])    Addressed weight, diet and exercise with patient. Decrease carbohydrates (white foods, sweet foods, sweet drinks and alcohol), increase green leafy vegetables and protein (lean meats and beans) with each meal. Avoid fried foods. Eat 3-5 small meals daily. Do not skip meals. Increase water intake. Increase physical activity to 30 minutes daily for health benefit or 60 minutes daily to prevent weight regain, as tolerated. Get 7-8 hours uninterrupted sleep nightly. -     Uric Acid  2. Essential (primary) hypertension    Stable blood pressure. On diltiazem. Will check,  -     Comprehensive Metabolic Panel  3. Paroxysmal atrial fibrillation (HCC)    No palpitation. On diltiazem CD and Xarelto. Will check,  -     Comprehensive Metabolic Panel  4. Vitamin D deficiency    We will check,  -     Vitamin D 25 Hydroxy  5. Chronic kidney disease, stage 3a ([address])    Last kidney function test done in last October. Advised to do lab work. -     Comprehensive Metabolic Panel  6. Hyperuricemia  7. Chronic diastolic congestive heart failure (HCC)    Compensated. Taking Lasix and spironolactone. We will refill,  -     furosemide (LASIX) 20 MG tablet; Take 1 tablet by mouth daily, Disp-80 tablet, R-1Normal  8. Cellulitis of right lower extremity    Has dark discoloration and tenderness on the right shin area. Patient mentions getting better. We will give,  -     azithromycin (ZITHROMAX) 500 MG tablet; Take 1 tablet by mouth daily for 3 days, Disp-3 tablet, R-0Normal    No follow-ups on file. Subjective     Ms. Coral Mederos is here for follow-up. She is with her son Ana Laura Sun today.   She had home health

## 2023-08-25 NOTE — TELEPHONE ENCOUNTER
Natalya Andrade MD  You Just now (1:29 PM)     SA  I am not sure how can I help him. I think he should talk to APS. If his mom not willing to moved out with Matthew Landeros, that is her choice. He can bring Shakira Arambula in office.

## 2023-08-25 NOTE — TELEPHONE ENCOUNTER
Ed Dawkins was called back. When nurse began to relay message to [place] Tony became verbally agitated and began voice frustration about the care his brother Jasen Ward provided for his mother stating that he didn't feel Jasen Ward was as involved in Nataly's care or provided enough assistance. Ed Dawkins repeatedly interrupted nursing staff and voice continued to escalate in volume as he continued to voice frustration, not allowing nursing staff to explain notes below from visit. Once Ed Dawkins finished, nursing staff explained that if he had concerns with communication between mother, brother, and himself regarding mother's care, it would be best to have mother come in-clinic with family to discuss further. Went over the following:    -     Uric Acid  2. Essential (primary) hypertension     Stable blood pressure. On diltiazem. Will check,  -     Comprehensive Metabolic Panel  3. Paroxysmal atrial fibrillation (HCC)     No palpitation. On diltiazem CD and Xarelto. Will check,  -     Comprehensive Metabolic Panel  4. Vitamin D deficiency     We will check,  -     Vitamin D 25 Hydroxy  5. Chronic kidney disease, stage 3a ([address])     Last kidney function test done in last October. Advised to do lab work. -     Comprehensive Metabolic Panel  6. Hyperuricemia  7. Chronic diastolic congestive heart failure (HCC)     Compensated. Taking Lasix and spironolactone. We will refill,  -     furosemide (LASIX) 20 MG tablet; Take 1 tablet by mouth daily, Disp-80 tablet, R-1Normal  8. Cellulitis of right lower extremity     Has dark discoloration and tenderness on the right shin area. Patient mentions getting better. We will give,  -     azithromycin (ZITHROMAX) 500 MG tablet;  Take 1 tablet by mouth daily for 3 days, Disp-3 tablet, R-0Normal

## 2023-09-13 DIAGNOSIS — I50.32 CHRONIC DIASTOLIC (CONGESTIVE) HEART FAILURE (HCC): ICD-10-CM

## 2023-09-13 RX ORDER — SPIRONOLACTONE 25 MG/1
25 TABLET ORAL DAILY
Qty: 90 TABLET | Refills: 1 | Status: SHIPPED | OUTPATIENT
Start: 2023-09-13

## 2023-11-10 ENCOUNTER — TELEMEDICINE (OUTPATIENT)
Age: 83
End: 2023-11-10
Payer: MEDICARE

## 2023-11-10 DIAGNOSIS — E79.0 HYPERURICEMIA: ICD-10-CM

## 2023-11-10 DIAGNOSIS — R51.9 DAILY HEADACHE: ICD-10-CM

## 2023-11-10 DIAGNOSIS — G89.4 CHRONIC PAIN SYNDROME: ICD-10-CM

## 2023-11-10 DIAGNOSIS — E55.9 VITAMIN D DEFICIENCY: ICD-10-CM

## 2023-11-10 DIAGNOSIS — R54 FRAIL ELDERLY: ICD-10-CM

## 2023-11-10 DIAGNOSIS — I50.32 CHRONIC DIASTOLIC CONGESTIVE HEART FAILURE (HCC): Primary | ICD-10-CM

## 2023-11-10 DIAGNOSIS — R48.2 GAIT APRAXIA OF ELDERLY: ICD-10-CM

## 2023-11-10 PROCEDURE — 4004F PT TOBACCO SCREEN RCVD TLK: CPT | Performed by: INTERNAL MEDICINE

## 2023-11-10 PROCEDURE — G8399 PT W/DXA RESULTS DOCUMENT: HCPCS | Performed by: INTERNAL MEDICINE

## 2023-11-10 PROCEDURE — G8417 CALC BMI ABV UP PARAM F/U: HCPCS | Performed by: INTERNAL MEDICINE

## 2023-11-10 PROCEDURE — 1123F ACP DISCUSS/DSCN MKR DOCD: CPT | Performed by: INTERNAL MEDICINE

## 2023-11-10 PROCEDURE — G8484 FLU IMMUNIZE NO ADMIN: HCPCS | Performed by: INTERNAL MEDICINE

## 2023-11-10 PROCEDURE — 1090F PRES/ABSN URINE INCON ASSESS: CPT | Performed by: INTERNAL MEDICINE

## 2023-11-10 PROCEDURE — G8428 CUR MEDS NOT DOCUMENT: HCPCS | Performed by: INTERNAL MEDICINE

## 2023-11-10 PROCEDURE — 99214 OFFICE O/P EST MOD 30 MIN: CPT | Performed by: INTERNAL MEDICINE

## 2023-11-10 RX ORDER — AMITRIPTYLINE HYDROCHLORIDE 10 MG/1
10 TABLET, FILM COATED ORAL NIGHTLY
Qty: 90 TABLET | Refills: 1 | Status: SHIPPED | OUTPATIENT
Start: 2023-11-10

## 2023-11-10 RX ORDER — POTASSIUM CHLORIDE 20 MEQ/1
20 TABLET, EXTENDED RELEASE ORAL DAILY
Qty: 90 TABLET | Refills: 1 | Status: SHIPPED | OUTPATIENT
Start: 2023-11-10

## 2023-11-10 RX ORDER — FUROSEMIDE 20 MG/1
20 TABLET ORAL DAILY
Qty: 90 TABLET | Refills: 1 | Status: SHIPPED | OUTPATIENT
Start: 2023-11-10

## 2023-11-10 RX ORDER — SPIRONOLACTONE 25 MG/1
25 TABLET ORAL DAILY
Qty: 90 TABLET | Refills: 1 | Status: SHIPPED | OUTPATIENT
Start: 2023-11-10

## 2023-11-10 NOTE — PROGRESS NOTES
visualized mass [] Abnormal -     Pulmonary/Chest: [x] Respiratory effort normal   [x] No visualized signs of difficulty breathing or respiratory distress        [] Abnormal -      Musculoskeletal:   [x] Normal gait with no signs of ataxia         [x] Normal range of motion of neck        [] Abnormal -lower back pain and stiffness present. Range of motion restricted. Neurological:        [x] No Facial Asymmetry (Cranial nerve 7 motor function) (limited exam due to video visit)          [x] No gaze palsy        [] Abnormal -          Skin:        [x] No significant exanthematous lesions or discoloration noted on facial skin         [] Abnormal -bilateral trace leg edema present. Psychiatric:       [x] Normal Affect [] Abnormal -        [x] No Hallucinations  Is alert and oriented x3.     Other pertinent observable physical exam findings:-             --Nicola Gutierrez MD

## 2023-11-13 ENCOUNTER — CLINICAL DOCUMENTATION (OUTPATIENT)
Age: 83
End: 2023-11-13

## 2023-11-14 ENCOUNTER — TELEPHONE (OUTPATIENT)
Age: 83
End: 2023-11-14

## 2023-11-14 NOTE — TELEPHONE ENCOUNTER
Referral received:     Please return call to clarify Home Health order. One page just states Skilled Nursing. The other half is showing Skilled Nursing, Physical Therapy and Social Work.      Phone: [phone]  Fax: [phone]

## 2023-11-20 DIAGNOSIS — I50.32 CHRONIC DIASTOLIC CONGESTIVE HEART FAILURE (HCC): ICD-10-CM

## 2023-11-20 RX ORDER — FUROSEMIDE 20 MG/1
20 TABLET ORAL DAILY
Qty: 90 TABLET | Refills: 1 | OUTPATIENT
Start: 2023-11-20

## 2023-11-28 LAB — LDL CHOLESTEROL, EXTERNAL: 96

## 2023-12-26 NOTE — PROGRESS NOTES
----- Message from Cheryl Mahan DO sent at 12/21/2023  7:53 PM CST -----  Note to clinic nurse: Neg with HPV+ for 2 years in a row. Recommend colpo. Please let patient know. Thank you, Cheryl Mahan   Problem: Falls - Risk of  Goal: *Absence of Falls  Description  Document Shabnam Fisher Fall Risk and appropriate interventions in the flowsheet.   Outcome: Progressing Towards Goal  Note:   Fall Risk Interventions:                                Problem: Heart Failure: Day 1  Goal: Consults, if ordered  Outcome: Progressing Towards Goal  Goal: Diagnostic Test/Procedures  Outcome: Progressing Towards Goal  Goal: Nutrition/Diet  Outcome: Progressing Towards Goal  Goal: Medications  Outcome: Progressing Towards Goal  Goal: Respiratory  Outcome: Not Met  Goal: Treatments/Interventions/Procedures  Outcome: Progressing Towards Goal  Goal: *Oxygen saturation within defined limits  Outcome: Progressing Towards Goal  Goal: *Hemodynamically stable  Outcome: Progressing Towards Goal  Goal: *Optimal pain control at patient's stated goal  Outcome: Progressing Towards Goal  Goal: *Anxiety reduced or absent  Outcome: Progressing Towards Goal     Problem: Pain  Goal: *Control of Pain  Outcome: Progressing Towards Goal     Problem: Breathing Pattern - Ineffective  Goal: *Absence of hypoxia  Outcome: Not Met  Goal: *Use of effective breathing techniques  Outcome: Progressing Towards Goal

## 2024-01-12 DIAGNOSIS — G89.4 CHRONIC PAIN SYNDROME: ICD-10-CM

## 2024-01-15 RX ORDER — GABAPENTIN 300 MG/1
300 CAPSULE ORAL 2 TIMES DAILY
Qty: 60 CAPSULE | Refills: 3 | Status: SHIPPED | OUTPATIENT
Start: 2024-01-15 | End: 2024-05-14

## 2024-02-23 ENCOUNTER — TELEMEDICINE (OUTPATIENT)
Age: 84
End: 2024-02-23
Payer: MEDICARE

## 2024-02-23 DIAGNOSIS — G89.4 CHRONIC PAIN SYNDROME: ICD-10-CM

## 2024-02-23 DIAGNOSIS — I48.0 PAROXYSMAL ATRIAL FIBRILLATION (HCC): ICD-10-CM

## 2024-02-23 DIAGNOSIS — E55.9 VITAMIN D DEFICIENCY: ICD-10-CM

## 2024-02-23 DIAGNOSIS — B37.2 CANDIDIASIS, INTERTRIGO: Primary | ICD-10-CM

## 2024-02-23 DIAGNOSIS — F33.0 MAJOR DEPRESSIVE DISORDER, RECURRENT, MILD (HCC): ICD-10-CM

## 2024-02-23 DIAGNOSIS — B37.2 CANDIDIASIS, INTERTRIGO: ICD-10-CM

## 2024-02-23 DIAGNOSIS — I50.32 CHRONIC DIASTOLIC CONGESTIVE HEART FAILURE (HCC): ICD-10-CM

## 2024-02-23 DIAGNOSIS — N18.31 CHRONIC KIDNEY DISEASE, STAGE 3A (HCC): ICD-10-CM

## 2024-02-23 DIAGNOSIS — L30.9 DERMATITIS: ICD-10-CM

## 2024-02-23 PROCEDURE — G8427 DOCREV CUR MEDS BY ELIG CLIN: HCPCS | Performed by: INTERNAL MEDICINE

## 2024-02-23 PROCEDURE — 99214 OFFICE O/P EST MOD 30 MIN: CPT | Performed by: INTERNAL MEDICINE

## 2024-02-23 PROCEDURE — G8484 FLU IMMUNIZE NO ADMIN: HCPCS | Performed by: INTERNAL MEDICINE

## 2024-02-23 PROCEDURE — 1090F PRES/ABSN URINE INCON ASSESS: CPT | Performed by: INTERNAL MEDICINE

## 2024-02-23 PROCEDURE — G8399 PT W/DXA RESULTS DOCUMENT: HCPCS | Performed by: INTERNAL MEDICINE

## 2024-02-23 PROCEDURE — 4004F PT TOBACCO SCREEN RCVD TLK: CPT | Performed by: INTERNAL MEDICINE

## 2024-02-23 PROCEDURE — 1123F ACP DISCUSS/DSCN MKR DOCD: CPT | Performed by: INTERNAL MEDICINE

## 2024-02-23 PROCEDURE — G8417 CALC BMI ABV UP PARAM F/U: HCPCS | Performed by: INTERNAL MEDICINE

## 2024-02-23 RX ORDER — ERGOCALCIFEROL 1.25 MG/1
50000 CAPSULE ORAL WEEKLY
Qty: 4 CAPSULE | Refills: 3 | Status: SHIPPED | OUTPATIENT
Start: 2024-02-23 | End: 2024-02-23 | Stop reason: SDUPTHER

## 2024-02-23 RX ORDER — GABAPENTIN 300 MG/1
300 CAPSULE ORAL 2 TIMES DAILY
Qty: 60 CAPSULE | Refills: 3 | Status: SHIPPED | OUTPATIENT
Start: 2024-02-23 | End: 2024-02-23 | Stop reason: SDUPTHER

## 2024-02-23 RX ORDER — ERGOCALCIFEROL 1.25 MG/1
50000 CAPSULE ORAL WEEKLY
Qty: 4 CAPSULE | Refills: 3 | Status: SHIPPED | OUTPATIENT
Start: 2024-02-23

## 2024-02-23 RX ORDER — NYSTATIN 100000 [USP'U]/G
POWDER TOPICAL
Qty: 1 EACH | Refills: 1 | Status: SHIPPED | OUTPATIENT
Start: 2024-02-23

## 2024-02-23 RX ORDER — HYDROCORTISONE 25 MG/ML
LOTION TOPICAL
Qty: 60 ML | Refills: 0 | Status: SHIPPED | OUTPATIENT
Start: 2024-02-23 | End: 2024-02-23 | Stop reason: SDUPTHER

## 2024-02-23 RX ORDER — HYDROCORTISONE 25 MG/ML
LOTION TOPICAL
Qty: 60 ML | Refills: 0 | Status: SHIPPED | OUTPATIENT
Start: 2024-02-23

## 2024-02-23 RX ORDER — GABAPENTIN 300 MG/1
300 CAPSULE ORAL 2 TIMES DAILY
Qty: 60 CAPSULE | Refills: 3 | Status: SHIPPED | OUTPATIENT
Start: 2024-02-23 | End: 2024-06-22

## 2024-02-23 RX ORDER — NYSTATIN 100000 [USP'U]/G
POWDER TOPICAL
Qty: 1 EACH | Refills: 1 | Status: SHIPPED | OUTPATIENT
Start: 2024-02-23 | End: 2024-02-23 | Stop reason: SDUPTHER

## 2024-02-23 ASSESSMENT — PATIENT HEALTH QUESTIONNAIRE - PHQ9
6. FEELING BAD ABOUT YOURSELF - OR THAT YOU ARE A FAILURE OR HAVE LET YOURSELF OR YOUR FAMILY DOWN: 0
SUM OF ALL RESPONSES TO PHQ QUESTIONS 1-9: 2
2. FEELING DOWN, DEPRESSED OR HOPELESS: 1
7. TROUBLE CONCENTRATING ON THINGS, SUCH AS READING THE NEWSPAPER OR WATCHING TELEVISION: 0
8. MOVING OR SPEAKING SO SLOWLY THAT OTHER PEOPLE COULD HAVE NOTICED. OR THE OPPOSITE, BEING SO FIGETY OR RESTLESS THAT YOU HAVE BEEN MOVING AROUND A LOT MORE THAN USUAL: 0
SUM OF ALL RESPONSES TO PHQ QUESTIONS 1-9: 2
1. LITTLE INTEREST OR PLEASURE IN DOING THINGS: 1
3. TROUBLE FALLING OR STAYING ASLEEP: 0
5. POOR APPETITE OR OVEREATING: 0
SUM OF ALL RESPONSES TO PHQ9 QUESTIONS 1 & 2: 2
SUM OF ALL RESPONSES TO PHQ QUESTIONS 1-9: 2
4. FEELING TIRED OR HAVING LITTLE ENERGY: 0
9. THOUGHTS THAT YOU WOULD BE BETTER OFF DEAD, OR OF HURTING YOURSELF: 0
10. IF YOU CHECKED OFF ANY PROBLEMS, HOW DIFFICULT HAVE THESE PROBLEMS MADE IT FOR YOU TO DO YOUR WORK, TAKE CARE OF THINGS AT HOME, OR GET ALONG WITH OTHER PEOPLE: 1
SUM OF ALL RESPONSES TO PHQ QUESTIONS 1-9: 2

## 2024-02-23 NOTE — TELEPHONE ENCOUNTER
Cristo called stating that scripts were sent to the wrong [facility]. It should have been sent to [facility]/pharmacy #[id number] [facility] [address] - P [phone] - F [phone]    He wants a call back to find out why it was sent to the wrong pharmacy. His call back number is [phone]    nystatin (MYCOSTATIN) 255537 UNIT/GM powder   hydrocortisone (HYTONE) 2.5 % lotion   vitamin D (ERGOCALCIFEROL) 1.25 MG (64072 UT) CAPS capsule   gabapentin (NEURONTIN) 300 MG capsule

## 2024-02-23 NOTE — TELEPHONE ENCOUNTER
Last appt 2/23/2024      Next Apt:     Future Appointments   Date Time Provider Department Center   6/21/2024 10:00 AM Gina Barone MD [facility] BS AMB         [facility]/pharmacy #[id number] - [place], [place] - [address] - P [phone] - F [phone] [address] [facility] [address]  Phone: [phone] Fax: [phone]

## 2024-02-23 NOTE — PROGRESS NOTES
Nataly Fuentes, was evaluated through a synchronous (real-time) audio-video encounter. The patient (or guardian if applicable) is aware that this is a billable service, which includes applicable co-pays. This Virtual Visit was conducted with patient's (and/or legal guardian's) consent. Patient identification was verified, and a caregiver was present when appropriate.   The patient was located at Home: [address] [place] [address]  Provider was located at Facility (Appt Dept): [address] [facility] [address]  [place] [address]      Nataly Fuentes (:  1940) is a Established patient, presenting virtually for evaluation of the following:    Assessment & Plan   Below is the assessment and plan developed based on review of pertinent history, physical exam, labs, studies, and medications.  1. Candidiasis, intertrigo      Need to air out breast fold area and groin.  Will give,  -     nystatin (MYCOSTATIN) 440503 UNIT/GM powder; Apply 3 times daily., Disp-1 each, R-1, Normal  2. Dermatitis    On extremities.  Need to drink more fluid.  Will give,  -     hydrocortisone (HYTONE) 2.5 % lotion; Apply topically 2 times daily., Disp-60 mL, R-0, Normal  3. Chronic diastolic congestive heart failure (HCC)  Compensated.  Spironolactone, Lasix and potassium.  CMP shows potassium level normal.  Will continue same.  4. Major depressive disorder, recurrent, mild (HCC)  Doing well.  5. Paroxysmal atrial fibrillation (HCC)  Stable.  Not on any medication.  6. Chronic kidney disease, stage 3a (HCC)  Kidney function test stable.  7. Vitamin D deficiency    Recent lab work shows vitamin D level very low.  Will give,  -     vitamin D (ERGOCALCIFEROL) 1.25 MG (71883 UT) CAPS capsule; Take 1 capsule by mouth once a week, Disp-4 capsule, R-3Normal  8. Chronic pain syndrome  Doing well.  Will refill,  -     gabapentin (NEURONTIN) 300 MG capsule; Take 1 capsule by mouth 2 times daily for 120 days. Max Daily Amount: 600

## 2024-02-26 RX ORDER — GABAPENTIN 300 MG/1
300 CAPSULE ORAL 2 TIMES DAILY
Qty: 60 CAPSULE | Refills: 3 | Status: SHIPPED | OUTPATIENT
Start: 2024-02-26 | End: 2024-06-25

## 2024-03-19 DIAGNOSIS — S31.000D WOUND OF SACRAL REGION, SUBSEQUENT ENCOUNTER: ICD-10-CM

## 2024-03-19 RX ORDER — CASTOR OIL AND BALSAM, PERU 788; 87 MG/G; MG/G
OINTMENT TOPICAL 2 TIMES DAILY
Qty: 56.7 G | Refills: 1 | Status: SHIPPED | OUTPATIENT
Start: 2024-03-19

## 2024-03-19 NOTE — TELEPHONE ENCOUNTER
Lov: 2/23/24  Nov: 6/21/24    [facility] [address]    Pt asking if she can get a refill on the medication for burning on her bottom    Please advise

## 2024-03-29 NOTE — TELEPHONE ENCOUNTER
Last appt 2/23/2024      Next Apt:     Future Appointments   Date Time Provider Department Center   6/21/2024 10:00 AM Gina Barone MD [facility] BS AMB         [facility]/pharmacy #[id number] - [place], [place] - [address] - P [phone] - F [phone] [address] [facility] [address]  Phone: [phone] Fax: [phone]

## 2024-04-03 RX ORDER — LEVETIRACETAM 500 MG/1
1000 TABLET ORAL 2 TIMES DAILY
Qty: 120 TABLET | Refills: 2 | OUTPATIENT
Start: 2024-04-03

## 2024-04-04 NOTE — TELEPHONE ENCOUNTER
Requested Prescriptions     Refused Prescriptions Disp Refills    levETIRAcetam (KEPPRA) 500 MG tablet 120 tablet 2     Sig: Take 2 tablets by mouth 2 times daily     Refused By: WILI ODELL     Reason for Refusal: Request already responded to by other means (e.g. phone or fax)

## 2024-05-06 DIAGNOSIS — L30.9 DERMATITIS: ICD-10-CM

## 2024-05-06 DIAGNOSIS — I50.32 CHRONIC DIASTOLIC CONGESTIVE HEART FAILURE (HCC): ICD-10-CM

## 2024-05-07 RX ORDER — POTASSIUM CHLORIDE 1500 MG/1
20 TABLET, EXTENDED RELEASE ORAL DAILY
Qty: 90 TABLET | Refills: 1 | Status: SHIPPED | OUTPATIENT
Start: 2024-05-07

## 2024-05-07 RX ORDER — HYDROCORTISONE 25 MG/ML
LOTION TOPICAL
Qty: 60 ML | Refills: 0 | Status: SHIPPED | OUTPATIENT
Start: 2024-05-07

## 2024-05-15 ENCOUNTER — TELEMEDICINE (OUTPATIENT)
Age: 84
End: 2024-05-15
Payer: MEDICARE

## 2024-05-15 DIAGNOSIS — R15.9 INCONTINENCE OF FECES, UNSPECIFIED FECAL INCONTINENCE TYPE: ICD-10-CM

## 2024-05-15 DIAGNOSIS — N39.46 MIXED STRESS AND URGE URINARY INCONTINENCE: ICD-10-CM

## 2024-05-15 DIAGNOSIS — I48.91 ATRIAL FIBRILLATION, UNSPECIFIED TYPE (HCC): ICD-10-CM

## 2024-05-15 DIAGNOSIS — S06.5XAA SUBDURAL HEMATOMA (HCC): ICD-10-CM

## 2024-05-15 DIAGNOSIS — I50.32 CHRONIC DIASTOLIC CONGESTIVE HEART FAILURE (HCC): Primary | ICD-10-CM

## 2024-05-15 DIAGNOSIS — R35.0 URINE FREQUENCY: ICD-10-CM

## 2024-05-15 DIAGNOSIS — E79.0 HYPERURICEMIA: ICD-10-CM

## 2024-05-15 PROCEDURE — 1123F ACP DISCUSS/DSCN MKR DOCD: CPT | Performed by: INTERNAL MEDICINE

## 2024-05-15 PROCEDURE — 99214 OFFICE O/P EST MOD 30 MIN: CPT | Performed by: INTERNAL MEDICINE

## 2024-05-15 RX ORDER — POLYETHYLENE GLYCOL 3350 17 G/17G
17 POWDER, FOR SOLUTION ORAL DAILY
Qty: 100 EACH | Refills: 4 | Status: SHIPPED | OUTPATIENT
Start: 2024-05-15 | End: 2024-06-14

## 2024-05-15 ASSESSMENT — ENCOUNTER SYMPTOMS
GASTROINTESTINAL NEGATIVE: 1
RESPIRATORY NEGATIVE: 1

## 2024-05-15 NOTE — PROGRESS NOTES
\"Have you been to the ER, urgent care clinic since your last visit?  Hospitalized since your last visit?\"    NO    “Have you seen or consulted any other health care providers outside of [facility] since your last visit?”    NO      Chief Complaint   Patient presents with    Incontinence           Click Here for Release of Records Request  
Laboratory tests will be ordered through a home health nurse.    No follow-ups on file.    Nataly Fuentes, was evaluated through a synchronous (real-time) audio-video encounter. The patient (or guardian if applicable) is aware that this is a billable service, which includes applicable co-pays. This Virtual Visit was conducted with patient's (and/or legal guardian's) consent. Patient identification was verified, and a caregiver was present when appropriate.   The patient was located at Home: [address] [place], [place] [address]  Provider was located at Facility (Appt Dept): [address] [facility]  [address]  [place] [address]  Confirm you are appropriately licensed, registered, or certified to deliver care in the state where the patient is located as indicated above. If you are not or unsure, please re-schedule the visit: Yes, I confirm.         --Gina Barone MD on 5/15/2024 at 12:54 PM    An electronic signature was used to authenticate this note.    The patient (or guardian, if applicable) and other individuals in attendance with the patient were advised that Artificial Intelligence will be utilized during this visit to record and process the conversation to generate a clinical note. The patient (or guardian, if applicable) and other individuals in attendance at the appointment consented to the use of AI, including the recording.

## 2024-06-21 ENCOUNTER — TELEMEDICINE (OUTPATIENT)
Age: 84
End: 2024-06-21
Payer: MEDICARE

## 2024-06-21 DIAGNOSIS — S06.5XAA SUBDURAL HEMATOMA (HCC): ICD-10-CM

## 2024-06-21 DIAGNOSIS — N39.46 MIXED STRESS AND URGE URINARY INCONTINENCE: ICD-10-CM

## 2024-06-21 DIAGNOSIS — Z00.00 MEDICARE ANNUAL WELLNESS VISIT, SUBSEQUENT: ICD-10-CM

## 2024-06-21 DIAGNOSIS — I48.91 ATRIAL FIBRILLATION, UNSPECIFIED TYPE (HCC): Primary | ICD-10-CM

## 2024-06-21 DIAGNOSIS — I50.32 CHRONIC DIASTOLIC CONGESTIVE HEART FAILURE (HCC): ICD-10-CM

## 2024-06-21 DIAGNOSIS — M25.551 RIGHT HIP PAIN: ICD-10-CM

## 2024-06-21 DIAGNOSIS — Z71.89 ACP (ADVANCE CARE PLANNING): ICD-10-CM

## 2024-06-21 PROCEDURE — 1123F ACP DISCUSS/DSCN MKR DOCD: CPT | Performed by: INTERNAL MEDICINE

## 2024-06-21 PROCEDURE — G0439 PPPS, SUBSEQ VISIT: HCPCS | Performed by: INTERNAL MEDICINE

## 2024-06-21 PROCEDURE — 99397 PER PM REEVAL EST PAT 65+ YR: CPT | Performed by: INTERNAL MEDICINE

## 2024-06-21 PROCEDURE — 99214 OFFICE O/P EST MOD 30 MIN: CPT | Performed by: INTERNAL MEDICINE

## 2024-06-21 RX ORDER — ACETAMINOPHEN 325 MG/1
650 TABLET ORAL EVERY MORNING
Qty: 60 TABLET | Refills: 3 | Status: SHIPPED | OUTPATIENT
Start: 2024-06-21

## 2024-06-21 SDOH — ECONOMIC STABILITY: FOOD INSECURITY: WITHIN THE PAST 12 MONTHS, YOU WORRIED THAT YOUR FOOD WOULD RUN OUT BEFORE YOU GOT MONEY TO BUY MORE.: NEVER TRUE

## 2024-06-21 SDOH — ECONOMIC STABILITY: INCOME INSECURITY: HOW HARD IS IT FOR YOU TO PAY FOR THE VERY BASICS LIKE FOOD, HOUSING, MEDICAL CARE, AND HEATING?: NOT HARD AT ALL

## 2024-06-21 SDOH — ECONOMIC STABILITY: HOUSING INSECURITY
IN THE LAST 12 MONTHS, WAS THERE A TIME WHEN YOU DID NOT HAVE A STEADY PLACE TO SLEEP OR SLEPT IN A SHELTER (INCLUDING NOW)?: NO

## 2024-06-21 SDOH — ECONOMIC STABILITY: FOOD INSECURITY: WITHIN THE PAST 12 MONTHS, THE FOOD YOU BOUGHT JUST DIDN'T LAST AND YOU DIDN'T HAVE MONEY TO GET MORE.: NEVER TRUE

## 2024-06-21 ASSESSMENT — LIFESTYLE VARIABLES
HOW OFTEN DO YOU HAVE A DRINK CONTAINING ALCOHOL: NEVER
HOW MANY STANDARD DRINKS CONTAINING ALCOHOL DO YOU HAVE ON A TYPICAL DAY: PATIENT DOES NOT DRINK

## 2024-06-21 ASSESSMENT — PATIENT HEALTH QUESTIONNAIRE - PHQ9
3. TROUBLE FALLING OR STAYING ASLEEP: NOT AT ALL
1. LITTLE INTEREST OR PLEASURE IN DOING THINGS: NOT AT ALL
2. FEELING DOWN, DEPRESSED OR HOPELESS: NOT AT ALL
7. TROUBLE CONCENTRATING ON THINGS, SUCH AS READING THE NEWSPAPER OR WATCHING TELEVISION: NOT AT ALL
10. IF YOU CHECKED OFF ANY PROBLEMS, HOW DIFFICULT HAVE THESE PROBLEMS MADE IT FOR YOU TO DO YOUR WORK, TAKE CARE OF THINGS AT HOME, OR GET ALONG WITH OTHER PEOPLE: NOT DIFFICULT AT ALL
SUM OF ALL RESPONSES TO PHQ QUESTIONS 1-9: 0
SUM OF ALL RESPONSES TO PHQ QUESTIONS 1-9: 0
8. MOVING OR SPEAKING SO SLOWLY THAT OTHER PEOPLE COULD HAVE NOTICED. OR THE OPPOSITE, BEING SO FIGETY OR RESTLESS THAT YOU HAVE BEEN MOVING AROUND A LOT MORE THAN USUAL: NOT AT ALL
SUM OF ALL RESPONSES TO PHQ9 QUESTIONS 1 & 2: 0
9. THOUGHTS THAT YOU WOULD BE BETTER OFF DEAD, OR OF HURTING YOURSELF: NOT AT ALL
SUM OF ALL RESPONSES TO PHQ QUESTIONS 1-9: 0
5. POOR APPETITE OR OVEREATING: NOT AT ALL
4. FEELING TIRED OR HAVING LITTLE ENERGY: NOT AT ALL
SUM OF ALL RESPONSES TO PHQ QUESTIONS 1-9: 0
6. FEELING BAD ABOUT YOURSELF - OR THAT YOU ARE A FAILURE OR HAVE LET YOURSELF OR YOUR FAMILY DOWN: NOT AT ALL

## 2024-06-21 ASSESSMENT — ENCOUNTER SYMPTOMS
RESPIRATORY NEGATIVE: 1
GASTROINTESTINAL NEGATIVE: 1
EYES NEGATIVE: 1

## 2024-06-21 NOTE — ACP (ADVANCE CARE PLANNING)

## 2024-06-21 NOTE — PROGRESS NOTES
Medicare Annual Wellness Visit    Nataly Fuentes is here for Medicare AWV, Atrial Fibrillation, Chronic Kidney Disease, Congestive Heart Failure, Chronic diastolic congestive heart failure (HCC), and Nursing Home Admit     Assessment & Plan   Atrial fibrillation, unspecified type (HCC)  Mixed stress and urge urinary incontinence  Chronic diastolic congestive heart failure (HCC)  Subdural hematoma (HCC)  Medicare annual wellness visit, subsequent  ACP (advance care planning)  Right hip pain  -     acetaminophen (AMINOFEN) 325 MG tablet; Take 2 tablets by mouth every morning, Disp-60 tablet, R-3Normal    Recommendations for Preventive Services Due: see orders and patient instructions/AVS.  Recommended screening schedule for the next 5-10 years is provided to the patient in written form: see Patient Instructions/AVS.     No follow-ups on file.     Subjective   The following acute and/or chronic problems were also addressed today:    Patient's complete Health Risk Assessment and screening values have been reviewed and are found in Flowsheets. The following problems were reviewed today and where indicated follow up appointments were made and/or referrals ordered.    Positive Risk Factor Screenings with Interventions:    Fall Risk:  Do you feel unsteady or are you worried about falling? : (!) yes  2 or more falls in past year?: no  Fall with injury in past year?: no       Interventions:    Reviewed medications, home hazards, visual acuity, and co-morbidities that can increase risk for falls           Self-assessment of health:  In general, how would you say your health is?: (!) Poor    Interventions:  Patient declines any further evaluation or treatment    General HRA Questions:  Select all that apply: (!) New or Increased Pain    Pain Interventions:  Patient declined any further interventions or treatment      Activity, Diet, and Weight:  On average, how many days per week do you engage in moderate to strenuous exercise 
  Musculoskeletal:   [x] Normal gait with no signs of ataxia         [x] Normal range of motion of neck        [] Abnormal -     Neurological:        [x] No Facial Asymmetry (Cranial nerve 7 motor function) (limited exam due to video visit)          [x] No gaze palsy        [] Abnormal -          Skin:        [x] No significant exanthematous lesions or discoloration noted on facial skin         [] Abnormal -            Psychiatric:       [x] Normal Affect [] Abnormal -        [x] No Hallucinations    Other pertinent observable physical exam findings:-         Discussed expected course/resolution/complications of diagnosis in detail with patient.   Medication risks/benefits/costs/interactions/alternatives discussed with patient.   Pt was given an after visit summary which includes diagnoses, current medications & vitals.   Pt expressed understanding with the diagnosis and plan.     --Gina Barone MD

## 2024-07-06 DIAGNOSIS — I50.32 CHRONIC DIASTOLIC CONGESTIVE HEART FAILURE (HCC): ICD-10-CM

## 2024-07-06 DIAGNOSIS — R51.9 DAILY HEADACHE: ICD-10-CM

## 2024-07-08 RX ORDER — FUROSEMIDE 20 MG/1
20 TABLET ORAL DAILY
Qty: 90 TABLET | Refills: 1 | Status: SHIPPED | OUTPATIENT
Start: 2024-07-08

## 2024-07-08 RX ORDER — AMITRIPTYLINE HYDROCHLORIDE 10 MG/1
10 TABLET, FILM COATED ORAL NIGHTLY
Qty: 90 TABLET | Refills: 1 | Status: SHIPPED | OUTPATIENT
Start: 2024-07-08

## 2024-07-08 RX ORDER — SPIRONOLACTONE 25 MG/1
25 TABLET ORAL DAILY
Qty: 90 TABLET | Refills: 1 | Status: SHIPPED | OUTPATIENT
Start: 2024-07-08

## 2024-07-29 ENCOUNTER — TELEPHONE (OUTPATIENT)
Age: 84
End: 2024-07-29

## 2024-07-29 NOTE — TELEPHONE ENCOUNTER
Spoke with Minal, gave her verbal ok to start PT and OT if patient agrees, explained that quantiferon gold is needed, HH cannot draw this, informed son by VICKY.

## 2024-07-29 NOTE — TELEPHONE ENCOUNTER
Minal with [facility] health called requesting a call back at [phone]. She has some questions about labs,pain medications and therapy.

## 2024-07-30 ENCOUNTER — TELEPHONE (OUTPATIENT)
Age: 84
End: 2024-07-30

## 2024-07-30 NOTE — TELEPHONE ENCOUNTER
Chair a physical therapist from [facility] Would like a verbal order for patient have physical therapy twice a week for 8 weeks. Her call back number is [phone]

## 2024-08-03 DIAGNOSIS — L30.9 DERMATITIS: ICD-10-CM

## 2024-08-05 RX ORDER — HYDROCORTISONE 25 MG/ML
LOTION TOPICAL
Qty: 59 ML | Refills: 0 | Status: SHIPPED | OUTPATIENT
Start: 2024-08-05

## 2024-08-08 DIAGNOSIS — G89.4 CHRONIC PAIN SYNDROME: Primary | ICD-10-CM

## 2024-08-08 RX ORDER — GABAPENTIN 300 MG/1
CAPSULE ORAL
Qty: 75 CAPSULE | Refills: 2 | Status: SHIPPED | OUTPATIENT
Start: 2024-08-08 | End: 2024-11-08

## 2024-08-08 NOTE — TELEPHONE ENCOUNTER
Continue gabapentin 300 twice a day and then give extra 300 mg to use in midday if her pain is worse.  #75 capsules/month     Last appt 6/21/2024      Next Apt:     Future Appointments   Date Time Provider Department Center   8/23/2024 10:15 AM Gina Barone MD [facility] DEP         [id number] [place], [place] - [address] [place] [address] - P [phone] - F [phone] [address] [facility] [address]  Phone: [phone] Fax: [phone]

## 2024-08-13 ENCOUNTER — APPOINTMENT (OUTPATIENT)
Facility: HOSPITAL | Age: 84
End: 2024-08-13
Payer: MEDICARE

## 2024-08-13 ENCOUNTER — HOSPITAL ENCOUNTER (EMERGENCY)
Facility: HOSPITAL | Age: 84
Discharge: HOME | End: 2024-08-13
Attending: EMERGENCY MEDICINE
Payer: MEDICARE

## 2024-08-13 VITALS
WEIGHT: 293 LBS | HEART RATE: 98 BPM | RESPIRATION RATE: 16 BRPM | DIASTOLIC BLOOD PRESSURE: 68 MMHG | HEIGHT: 68 IN | SYSTOLIC BLOOD PRESSURE: 93 MMHG | OXYGEN SATURATION: 93 % | BODY MASS INDEX: 44.41 KG/M2

## 2024-08-13 DIAGNOSIS — R07.89 CHEST WALL PAIN: Primary | ICD-10-CM

## 2024-08-13 LAB
ALBUMIN SERPL-MCNC: 3 G/DL (ref 3.5–5.2)
ALBUMIN/GLOB SERPL: 0.7 (ref 1.1–2.2)
ALP SERPL-CCNC: 93 U/L (ref 35–104)
ALT SERPL-CCNC: <5 U/L (ref 10–35)
ANION GAP SERPL CALC-SCNC: 11 MMOL/L (ref 5–15)
AST SERPL-CCNC: 14 U/L (ref 10–35)
BASOPHILS # BLD: 0 K/UL (ref 0–1)
BASOPHILS NFR BLD: 1 % (ref 0–1)
BILIRUB SERPL-MCNC: 0.8 MG/DL (ref 0.2–1)
BUN SERPL-MCNC: 14 MG/DL (ref 8–23)
BUN/CREAT SERPL: 14 (ref 12–20)
CALCIUM SERPL-MCNC: 9.3 MG/DL (ref 8.8–10.2)
CHLORIDE SERPL-SCNC: 99 MMOL/L (ref 98–107)
CO2 SERPL-SCNC: 28 MMOL/L (ref 22–29)
COMMENT:: NORMAL
CREAT SERPL-MCNC: 0.99 MG/DL (ref 0.5–0.9)
DIFFERENTIAL METHOD BLD: ABNORMAL
EOSINOPHIL # BLD: 0.1 K/UL (ref 0–0.4)
EOSINOPHIL NFR BLD: 1 %
ERYTHROCYTE [DISTWIDTH] IN BLOOD BY AUTOMATED COUNT: 14.6 % (ref 11.5–14.5)
GLOBULIN SER CALC-MCNC: 4.4 G/DL (ref 2–4)
GLUCOSE SERPL-MCNC: 93 MG/DL (ref 65–100)
HCT VFR BLD AUTO: 36.9 % (ref 35–47)
HGB BLD-MCNC: 11.8 G/DL (ref 11.5–16)
IMM GRANULOCYTES # BLD AUTO: 0.1 K/UL (ref 0–0.04)
IMM GRANULOCYTES NFR BLD AUTO: 1 % (ref 0–0.5)
LIPASE SERPL-CCNC: 23 U/L (ref 13–60)
LYMPHOCYTES # BLD: 1.9 K/UL (ref 0.8–3.5)
LYMPHOCYTES NFR BLD: 27 % (ref 12–49)
MCH RBC QN AUTO: 28.2 PG (ref 26–34)
MCHC RBC AUTO-ENTMCNC: 32 G/DL (ref 30–36.5)
MCV RBC AUTO: 88.3 FL (ref 80–99)
MONOCYTES # BLD: 1 K/UL (ref 0–1)
MONOCYTES NFR BLD: 14 % (ref 5–13)
NEUTS SEG # BLD: 4.2 K/UL (ref 1.8–8)
NEUTS SEG NFR BLD: 56 % (ref 32–75)
NRBC # BLD: 0 K/UL (ref 0–0.01)
NRBC BLD-RTO: 0 PER 100 WBC
NT PRO BNP: 728 PG/ML
PLATELET # BLD AUTO: 288 K/UL (ref 150–400)
PMV BLD AUTO: 9.7 FL (ref 8.9–12.9)
POTASSIUM SERPL-SCNC: 4.3 MMOL/L (ref 3.5–5.1)
PROT SERPL-MCNC: 7.4 G/DL (ref 6.4–8.3)
RBC # BLD AUTO: 4.18 M/UL (ref 3.8–5.2)
SODIUM SERPL-SCNC: 138 MMOL/L (ref 136–145)
SPECIMEN HOLD: NORMAL
TROPONIN T SERPL HS-MCNC: 37.5 NG/L (ref 0–14)
TROPONIN T SERPL HS-MCNC: 38.3 NG/L (ref 0–14)
WBC # BLD AUTO: 7.3 K/UL (ref 3.6–11)

## 2024-08-13 PROCEDURE — 99285 EMERGENCY DEPT VISIT HI MDM: CPT

## 2024-08-13 PROCEDURE — 71045 X-RAY EXAM CHEST 1 VIEW: CPT

## 2024-08-13 PROCEDURE — 84484 ASSAY OF TROPONIN QUANT: CPT

## 2024-08-13 PROCEDURE — 83880 ASSAY OF NATRIURETIC PEPTIDE: CPT

## 2024-08-13 PROCEDURE — 6370000000 HC RX 637 (ALT 250 FOR IP): Performed by: EMERGENCY MEDICINE

## 2024-08-13 PROCEDURE — 80053 COMPREHEN METABOLIC PANEL: CPT

## 2024-08-13 PROCEDURE — 85025 COMPLETE CBC W/AUTO DIFF WBC: CPT

## 2024-08-13 PROCEDURE — 36415 COLL VENOUS BLD VENIPUNCTURE: CPT

## 2024-08-13 PROCEDURE — 93005 ELECTROCARDIOGRAM TRACING: CPT | Performed by: STUDENT IN AN ORGANIZED HEALTH CARE EDUCATION/TRAINING PROGRAM

## 2024-08-13 PROCEDURE — 83690 ASSAY OF LIPASE: CPT

## 2024-08-13 RX ORDER — TRAMADOL HYDROCHLORIDE 50 MG/1
25 TABLET ORAL
Status: COMPLETED | OUTPATIENT
Start: 2024-08-13 | End: 2024-08-13

## 2024-08-13 RX ORDER — METHOCARBAMOL 750 MG/1
750 TABLET, FILM COATED ORAL 3 TIMES DAILY PRN
Qty: 30 TABLET | Refills: 0 | Status: SHIPPED | OUTPATIENT
Start: 2024-08-13 | End: 2024-08-23

## 2024-08-13 RX ORDER — METHOCARBAMOL 500 MG/1
1000 TABLET, FILM COATED ORAL
Status: COMPLETED | OUTPATIENT
Start: 2024-08-13 | End: 2024-08-13

## 2024-08-13 RX ADMIN — METHOCARBAMOL TABLETS 1000 MG: 500 TABLET, COATED ORAL at 20:38

## 2024-08-13 RX ADMIN — TRAMADOL HYDROCHLORIDE 25 MG: 50 TABLET ORAL at 16:06

## 2024-08-13 ASSESSMENT — PAIN DESCRIPTION - FREQUENCY: FREQUENCY: CONTINUOUS

## 2024-08-13 ASSESSMENT — PAIN SCALES - GENERAL: PAINLEVEL_OUTOF10: 10

## 2024-08-13 ASSESSMENT — PAIN DESCRIPTION - DESCRIPTORS
DESCRIPTORS: ACHING
DESCRIPTORS: ACHING

## 2024-08-13 ASSESSMENT — PAIN - FUNCTIONAL ASSESSMENT: PAIN_FUNCTIONAL_ASSESSMENT: ACTIVITIES ARE NOT PREVENTED

## 2024-08-13 ASSESSMENT — PAIN DESCRIPTION - ORIENTATION
ORIENTATION: MID
ORIENTATION: ANTERIOR

## 2024-08-13 ASSESSMENT — PAIN DESCRIPTION - LOCATION
LOCATION: CHEST
LOCATION: CHEST

## 2024-08-13 ASSESSMENT — PAIN DESCRIPTION - PAIN TYPE: TYPE: ACUTE PAIN

## 2024-08-13 NOTE — ED NOTES
Multiple Rn's attempted PIV. Mandy RN at bedside w/ ultrasound machine. Pt states that she does not want anymore IV attempts at this time. MD aware and will attempt.

## 2024-08-13 NOTE — ED PROVIDER NOTES
[facility] EMERGENCY DEPT  EMERGENCY DEPARTMENT ENCOUNTER      Pt Name: Nataly Fuentes  MRN: [id number]  Birthdate 1940  Date of evaluation: 8/13/2024  Provider: Tj Colbert MD      HISTORY OF PRESENT ILLNESS      HPI  84-year-old with a past medical history of atrial fibrillation diastolic heart failure, hypertension, intracranial hemorrhage who has some mild right-sided weakness and has been bedbound for the last several years presenting to the emergency department due to chest pain.  Patient says she has had chest pain that started either yesterday or the day before.  She says it is a sore feeling across her chest.  She says she has noticed it sore when she presses on it.  She denies any cough.  She endorses some mild shortness of breath but also states that this is not atypical for her.  She also says she hurts all over.  She says she is tired of being in pain.  She is questioning whether her chest pain is from reflux but she is not sure.  Denies any abdominal pain.  No vomiting.  No fevers or chills.      Nursing Notes were reviewed.    REVIEW OF SYSTEMS         Review of Systems  A complete review of systems was performed and all systems reviewed were negative less otherwise document in the HPI      PAST MEDICAL HISTORY     Past Medical History:   Diagnosis Date    Arthritis     Atrial fibrillation (HCC)     Bronchitis, acute 12/17/2011    Congestive heart failure (HCC)     Diastolic heart failure (HCC)     TELLEZ (dyspnea on exertion)     GERD (gastroesophageal reflux disease)     Hypertension     Major depressive disorder, recurrent, mild 6/16/2023    Obesity     ZAIRE (obstructive sleep apnea)     Other ill-defined conditions(799.89)     \"fluid in the lung\"    Pneumonia 6/7/2018    Sleep apnea          SURGICAL HISTORY       Past Surgical History:   Procedure Laterality Date    CARDIOVERSION ELECTIVE ARRHYTHMIA EXTERNAL N/A 10/9/2019    EP CARDIOVERSION performed by Kaity Mark MD at [facility] CARDIAC CATH  motion.      Cervical back: Normal range of motion.      Right lower leg: No edema.      Left lower leg: No edema.   Skin:     General: Skin is warm and dry.   Neurological:      Comments: Awake and alert.  GCS 15             EMERGENCY DEPARTMENT COURSE and DIFFERENTIAL DIAGNOSIS/MDM:   Vitals:    Vitals:    08/13/24 1521   Weight: (!) 137.9 kg (304 lb)   Height: 1.727 m (5' 8\")         Medical Decision Making  Amount and/or Complexity of Data Reviewed  Labs: ordered.  Radiology: ordered.    Risk  Prescription drug management.      Patient presenting with the above chief complaint.  Vital signs are stable.  Patient chronically ill-appearing but not acutely distressed.  EKG shows A-fib but no obvious signs of acute ischemia.  Not overtly volume overloaded on exam.  No asymmetric swelling of the lower extremities.  ACS workup will be initiated in addition to other basic labs and workup for CHF.  Patient be treated symptomatically in the meantime.  No vital sign abnormalities to indicate patient's symptoms are secondary to PE      REASSESSMENT     ED Course as of 08/13/24 1542   Tue Aug 13, 2024   1530 EKG shows atrial fibrillation.  Normal rate of 99.  No ischemic change.  QTc 418 [MM]      ED Course User Index  [MM] Tj Colbert MD     Overall patient's workup is reassuring.  Initial troponin of 38.3 was repeated and is actually downtrending.  Minimal BNP elevation but no signs of volume overload or pulmonary edema on chest x-ray to suggest CHF.  Given the patient's reproducible chest pain made worse with movement I suspect is musculoskeletal in nature.  Counseled patient and her family on results and return precautions.  She was discharged in stable condition    CONSULTS:  None    PROCEDURES:     Procedures        (Please note that portions of this note were completed with a voice recognition program.  Efforts were made to edit the dictations but occasionally words are mis-transcribed.)    Tj Colbert MD

## 2024-08-13 NOTE — ED NOTES
Bedside and Verbal shift change report given to Lindsay RN (oncoming nurse) by Zoë Trujillo RN (offgoing nurse). Report included the following information Nurse Handoff Report, ED SBAR, Recent Results, and Med Rec Status Cardiac rhythm Afib.

## 2024-08-13 NOTE — ED TRIAGE NOTES
Pt arrives via [facility] from private residence with cc of acid reflux and intermittent, anterior CP since yesterday. . Hx of afib. Per EMS, EKG afib. Per EMS, patient tender to the touch during EKG. Pt non-ambulatory at baseline and has a hospital bed at home. Pt also reports mild SOB.     Pt resides with her son and also receives nursing care at home.     Pt A&Ox4 upon arrival.

## 2024-08-14 LAB
EKG DIAGNOSIS: NORMAL
EKG Q-T INTERVAL: 326 MS
EKG QRS DURATION: 86 MS
EKG QTC CALCULATION (BAZETT): 418 MS
EKG R AXIS: 6 DEGREES
EKG T AXIS: 69 DEGREES
EKG VENTRICULAR RATE: 99 BPM

## 2024-08-14 PROCEDURE — 93010 ELECTROCARDIOGRAM REPORT: CPT | Performed by: SPECIALIST

## 2024-08-14 NOTE — ED NOTES
Brief placed on pt. IV removed.     Discharge reviewed with patients son/caregiver. All questions answered and patient and son verbalized understanding. Report given to [facility] transport.

## 2024-08-14 NOTE — ED NOTES
Stretcher ride home initiated via [facility]. Awaiting ETA    2111: [facility] ETA 2130 2133:  [facility] arrived

## 2024-08-14 NOTE — DISCHARGE INSTRUCTIONS
Return with any new or worsening symptoms.  In the meantime please follow-up with your primary care doctor

## 2024-08-15 NOTE — ANESTHESIA POSTPROCEDURE EVALUATION
Post-Anesthesia Evaluation and Assessment    Patient: Marylou Roa MRN: [id number]  SSN: [id number]    YOB: 1940  Age: 69 y.o. Sex: female       Cardiovascular Function/Vital Signs  Visit Vitals    /63    Pulse 93    Temp 36.8 °C (98.2 °F)    Resp 22    Ht 5' 9\" (1.753 m)    Wt 133.8 kg (295 lb)    SpO2 93%    Breastfeeding No    BMI 43.56 kg/m2       Patient is status post MAC anesthesia for Procedure(s):  ESOPHAGOGASTRODUODENOSCOPY (EGD). Nausea/Vomiting: None    Postoperative hydration reviewed and adequate. Pain:  Pain Scale 1: Visual (07/19/18 1450)  Pain Intensity 1: 0 (07/19/18 1450)   Managed    Neurological Status: At baseline    Mental Status and Level of Consciousness: Arousable    Pulmonary Status:   O2 Device: Room air (07/19/18 1450)   Adequate oxygenation and airway patent    Complications related to anesthesia: None    Post-anesthesia assessment completed.  No concerns    Signed By: Cedric Tang MD     July 19, 2018
The patient is a 33y Female complaining of heat cramps/exhaustion/stroke.

## 2024-08-23 ENCOUNTER — TELEMEDICINE (OUTPATIENT)
Age: 84
End: 2024-08-23
Payer: MEDICARE

## 2024-08-23 DIAGNOSIS — I48.91 ATRIAL FIBRILLATION, UNSPECIFIED TYPE (HCC): Primary | ICD-10-CM

## 2024-08-23 DIAGNOSIS — I50.32 CHRONIC DIASTOLIC CONGESTIVE HEART FAILURE (HCC): ICD-10-CM

## 2024-08-23 DIAGNOSIS — R07.89 CHEST WALL PAIN: ICD-10-CM

## 2024-08-23 DIAGNOSIS — I10 PRIMARY HYPERTENSION: ICD-10-CM

## 2024-08-23 PROCEDURE — 1123F ACP DISCUSS/DSCN MKR DOCD: CPT | Performed by: INTERNAL MEDICINE

## 2024-08-23 PROCEDURE — 99214 OFFICE O/P EST MOD 30 MIN: CPT | Performed by: INTERNAL MEDICINE

## 2024-08-23 RX ORDER — ASPIRIN 81 MG/1
81 TABLET ORAL DAILY
Qty: 90 TABLET | Refills: 3 | Status: SHIPPED | OUTPATIENT
Start: 2024-08-23

## 2024-08-23 RX ORDER — CARVEDILOL 3.12 MG/1
3.12 TABLET ORAL 2 TIMES DAILY
Qty: 180 TABLET | Refills: 1 | Status: SHIPPED | OUTPATIENT
Start: 2024-08-23

## 2024-08-23 ASSESSMENT — ENCOUNTER SYMPTOMS
BACK PAIN: 1
GASTROINTESTINAL NEGATIVE: 1
RESPIRATORY NEGATIVE: 1
EYES NEGATIVE: 1

## 2024-08-23 NOTE — ASSESSMENT & PLAN NOTE
Slightly improving physical.  She is on Lasix.  Watching salt.  Will not change medication dosage.  Orders:    Misc. Devices MISC; Geriatric hospital bed to use everyday.DX:CHF,Atrial fibrilation  Also spironolactone.

## 2024-08-23 NOTE — PROGRESS NOTES
Nataly Fuentes, was evaluated through a synchronous (real-time) audio-video encounter. The patient (or guardian if applicable) is aware that this is a billable service, which includes applicable co-pays. This Virtual Visit was conducted with patient's (and/or legal guardian's) consent. Patient identification was verified, and a caregiver was present when appropriate.   The patient was located at Home: [address] [place] [id number]  Provider was located at Facility (Appt Dept): [address] [facility]  [address]  [place] [address]  Confirm you are appropriately licensed, registered, or certified to deliver care in the state where the patient is located as indicated above. If you are not or unsure, please re-schedule the visit: Yes, I confirm.     Nataly Fuentes (:  1940) is a Established patient, presenting virtually for evaluation of the following:      Below is the assessment and plan developed based on review of pertinent history, physical exam, labs, studies, and medications.     Assessment & Plan  Atrial fibrillation, unspecified type (HCC)  Her rate is going up now.  She is not on any medication for atrial rhythm.  Will give her small dose of beta-blocker twice a day along with baby aspirin at night.    Orders:    carvedilol (COREG) 3.125 MG tablet; Take 1 tablet by mouth 2 times daily    aspirin 81 MG EC tablet; Take 1 tablet by mouth daily    Misc. Devices MISC; Geriatric hospital bed to use everyday.DX:CHF,Atrial fibrilation    Chronic diastolic congestive heart failure (HCC)  Slightly improving physical.  She is on Lasix.  Watching salt.  Will not change medication dosage.  Orders:    Misc. Devices MISC; Geriatric hospital bed to use everyday.DX:CHF,Atrial fibrilation  Also spironolactone.  Primary hypertension  Stable blood pressure.  Will monitor blood pressure.    Orders:    Misc. Devices MISC; Blood pressure machine check BP everyday Atrial fibrillation, unspecified type

## 2024-08-23 NOTE — ASSESSMENT & PLAN NOTE
Stable blood pressure.  Will monitor blood pressure.    Orders:    Misc. Devices MISC; Blood pressure machine check BP everyday Atrial fibrillation, unspecified type (hcc)  (primary encounter diagnosis)  Chronic diastolic congestive heart failure (hcc)  Primary hypertension  Chest wall pain:

## 2024-08-23 NOTE — ASSESSMENT & PLAN NOTE
Her rate is going up now.  She is not on any medication for atrial rhythm.  Will give her small dose of beta-blocker twice a day along with baby aspirin at night.    Orders:    carvedilol (COREG) 3.125 MG tablet; Take 1 tablet by mouth 2 times daily    aspirin 81 MG EC tablet; Take 1 tablet by mouth daily    Misc. Devices MISC; [facility] bed to use everyday.DX:CHF,Atrial fibrilation

## 2024-09-03 ENCOUNTER — APPOINTMENT (OUTPATIENT)
Facility: HOSPITAL | Age: 84
End: 2024-09-03
Payer: MEDICARE

## 2024-09-03 ENCOUNTER — HOSPITAL ENCOUNTER (INPATIENT)
Facility: HOSPITAL | Age: 84
LOS: 10 days | Discharge: HOME HEALTH | End: 2024-09-13
Attending: EMERGENCY MEDICINE | Admitting: INTERNAL MEDICINE
Payer: MEDICARE

## 2024-09-03 ENCOUNTER — TELEPHONE (OUTPATIENT)
Age: 84
End: 2024-09-03

## 2024-09-03 DIAGNOSIS — I42.1 HYPERTROPHIC OBSTRUCTIVE CARDIOMYOPATHY (HCC): ICD-10-CM

## 2024-09-03 DIAGNOSIS — I95.9 HYPOTENSION, UNSPECIFIED HYPOTENSION TYPE: ICD-10-CM

## 2024-09-03 DIAGNOSIS — N30.00 ACUTE CYSTITIS WITHOUT HEMATURIA: ICD-10-CM

## 2024-09-03 DIAGNOSIS — R41.82 ALTERED MENTAL STATUS, UNSPECIFIED ALTERED MENTAL STATUS TYPE: Primary | ICD-10-CM

## 2024-09-03 PROBLEM — A41.9 SEPTIC SHOCK (HCC): Status: ACTIVE | Noted: 2024-09-03

## 2024-09-03 PROBLEM — R65.21 SEPTIC SHOCK (HCC): Status: ACTIVE | Noted: 2024-09-03

## 2024-09-03 LAB
ALBUMIN SERPL-MCNC: 2.2 G/DL (ref 3.5–5.2)
ALBUMIN/GLOB SERPL: 0.7 (ref 1.1–2.2)
ALP SERPL-CCNC: 77 U/L (ref 35–104)
ALT SERPL-CCNC: <5 U/L (ref 10–35)
ANION GAP SERPL CALC-SCNC: 9 MMOL/L (ref 5–15)
APPEARANCE UR: ABNORMAL
AST SERPL-CCNC: 12 U/L (ref 10–35)
BACTERIA URNS QL MICRO: ABNORMAL /HPF
BASOPHILS # BLD: 0 K/UL (ref 0–1)
BASOPHILS NFR BLD: 1 % (ref 0–1)
BILIRUB SERPL-MCNC: 1 MG/DL (ref 0.2–1)
BILIRUB UR QL: NEGATIVE
BUN SERPL-MCNC: 24 MG/DL (ref 8–23)
BUN/CREAT SERPL: 31 (ref 12–20)
CALCIUM SERPL-MCNC: 7 MG/DL (ref 8.8–10.2)
CHLORIDE SERPL-SCNC: 111 MMOL/L (ref 98–107)
CO2 SERPL-SCNC: 21 MMOL/L (ref 22–29)
COLOR UR: ABNORMAL
CREAT SERPL-MCNC: 0.78 MG/DL (ref 0.5–0.9)
DIFFERENTIAL METHOD BLD: ABNORMAL
EOSINOPHIL # BLD: 0.1 K/UL (ref 0–0.4)
EOSINOPHIL NFR BLD: 1 %
EPITH CASTS URNS QL MICRO: ABNORMAL /LPF
ERYTHROCYTE [DISTWIDTH] IN BLOOD BY AUTOMATED COUNT: 16.4 % (ref 11.5–14.5)
FLUAV RNA SPEC QL NAA+PROBE: NOT DETECTED
FLUBV RNA SPEC QL NAA+PROBE: NOT DETECTED
GLOBULIN SER CALC-MCNC: 3.2 G/DL (ref 2–4)
GLUCOSE SERPL-MCNC: 72 MG/DL (ref 65–100)
GLUCOSE UR STRIP.AUTO-MCNC: NEGATIVE MG/DL
HCT VFR BLD AUTO: 29.1 % (ref 35–47)
HGB BLD-MCNC: 9.3 G/DL (ref 11.5–16)
HGB UR QL STRIP: ABNORMAL
IMM GRANULOCYTES # BLD AUTO: 0.1 K/UL (ref 0–0.04)
IMM GRANULOCYTES NFR BLD AUTO: 2 % (ref 0–0.5)
IRON SATN MFR SERPL: 36 % (ref 20–50)
IRON SERPL-MCNC: 60 UG/DL (ref 35–150)
KETONES UR QL STRIP.AUTO: ABNORMAL MG/DL
LACTATE SERPL-SCNC: 1.1 MMOL/L (ref 0.4–2)
LEUKOCYTE ESTERASE UR QL STRIP.AUTO: ABNORMAL
LYMPHOCYTES # BLD: 1.8 K/UL (ref 0.8–3.5)
LYMPHOCYTES NFR BLD: 27 % (ref 12–49)
MCH RBC QN AUTO: 28.4 PG (ref 26–34)
MCHC RBC AUTO-ENTMCNC: 32 G/DL (ref 30–36.5)
MCV RBC AUTO: 89 FL (ref 80–99)
MONOCYTES # BLD: 0.9 K/UL (ref 0–1)
MONOCYTES NFR BLD: 14 % (ref 5–13)
NEUTS SEG # BLD: 3.6 K/UL (ref 1.8–8)
NEUTS SEG NFR BLD: 55 % (ref 32–75)
NITRITE UR QL STRIP.AUTO: NEGATIVE
NRBC # BLD: 0.02 K/UL (ref 0–0.01)
NRBC BLD-RTO: 0.3 PER 100 WBC
PH UR STRIP: 7.5 (ref 5–8)
PLATELET # BLD AUTO: 262 K/UL (ref 150–400)
PMV BLD AUTO: 9.5 FL (ref 8.9–12.9)
POTASSIUM SERPL-SCNC: 3.7 MMOL/L (ref 3.5–5.1)
PROT SERPL-MCNC: 5.4 G/DL (ref 6.4–8.3)
PROT UR STRIP-MCNC: >300 MG/DL
RBC # BLD AUTO: 3.27 M/UL (ref 3.8–5.2)
RBC #/AREA URNS HPF: ABNORMAL /HPF
SARS-COV-2 RNA RESP QL NAA+PROBE: NOT DETECTED
SODIUM SERPL-SCNC: 141 MMOL/L (ref 136–145)
SOURCE: NORMAL
SP GR UR REFRACTOMETRY: 1.02 (ref 1–1.03)
SPECIMEN HOLD: NORMAL
TIBC SERPL-MCNC: 166 UG/DL (ref 250–450)
UROBILINOGEN UR QL STRIP.AUTO: 0.2 EU/DL (ref 0.2–1)
WBC # BLD AUTO: 6.6 K/UL (ref 3.6–11)
WBC URNS QL MICRO: >100 /HPF (ref 0–4)

## 2024-09-03 PROCEDURE — 71045 X-RAY EXAM CHEST 1 VIEW: CPT

## 2024-09-03 PROCEDURE — 02HV33Z INSERTION OF INFUSION DEVICE INTO SUPERIOR VENA CAVA, PERCUTANEOUS APPROACH: ICD-10-PCS | Performed by: FAMILY MEDICINE

## 2024-09-03 PROCEDURE — 36415 COLL VENOUS BLD VENIPUNCTURE: CPT

## 2024-09-03 PROCEDURE — 81001 URINALYSIS AUTO W/SCOPE: CPT

## 2024-09-03 PROCEDURE — 87636 SARSCOV2 & INF A&B AMP PRB: CPT

## 2024-09-03 PROCEDURE — 80053 COMPREHEN METABOLIC PANEL: CPT

## 2024-09-03 PROCEDURE — 6370000000 HC RX 637 (ALT 250 FOR IP): Performed by: INTERNAL MEDICINE

## 2024-09-03 PROCEDURE — 93005 ELECTROCARDIOGRAM TRACING: CPT | Performed by: EMERGENCY MEDICINE

## 2024-09-03 PROCEDURE — 6360000002 HC RX W HCPCS: Performed by: EMERGENCY MEDICINE

## 2024-09-03 PROCEDURE — 83550 IRON BINDING TEST: CPT

## 2024-09-03 PROCEDURE — 82746 ASSAY OF FOLIC ACID SERUM: CPT

## 2024-09-03 PROCEDURE — 82728 ASSAY OF FERRITIN: CPT

## 2024-09-03 PROCEDURE — 6360000002 HC RX W HCPCS: Performed by: INTERNAL MEDICINE

## 2024-09-03 PROCEDURE — 96374 THER/PROPH/DIAG INJ IV PUSH: CPT

## 2024-09-03 PROCEDURE — 82607 VITAMIN B-12: CPT

## 2024-09-03 PROCEDURE — 83540 ASSAY OF IRON: CPT

## 2024-09-03 PROCEDURE — 6370000000 HC RX 637 (ALT 250 FOR IP): Performed by: EMERGENCY MEDICINE

## 2024-09-03 PROCEDURE — 36556 INSERT NON-TUNNEL CV CATH: CPT

## 2024-09-03 PROCEDURE — 87086 URINE CULTURE/COLONY COUNT: CPT

## 2024-09-03 PROCEDURE — 2500000003 HC RX 250 WO HCPCS: Performed by: EMERGENCY MEDICINE

## 2024-09-03 PROCEDURE — 96361 HYDRATE IV INFUSION ADD-ON: CPT

## 2024-09-03 PROCEDURE — 83605 ASSAY OF LACTIC ACID: CPT

## 2024-09-03 PROCEDURE — 2580000003 HC RX 258: Performed by: EMERGENCY MEDICINE

## 2024-09-03 PROCEDURE — 99285 EMERGENCY DEPT VISIT HI MDM: CPT

## 2024-09-03 PROCEDURE — 87040 BLOOD CULTURE FOR BACTERIA: CPT

## 2024-09-03 PROCEDURE — 2580000003 HC RX 258: Performed by: INTERNAL MEDICINE

## 2024-09-03 PROCEDURE — 85025 COMPLETE CBC W/AUTO DIFF WBC: CPT

## 2024-09-03 PROCEDURE — 2000000000 HC ICU R&B

## 2024-09-03 RX ORDER — AMITRIPTYLINE HYDROCHLORIDE 10 MG/1
10 TABLET ORAL NIGHTLY
Status: DISCONTINUED | OUTPATIENT
Start: 2024-09-03 | End: 2024-09-13 | Stop reason: HOSPADM

## 2024-09-03 RX ORDER — 0.9 % SODIUM CHLORIDE 0.9 %
1000 INTRAVENOUS SOLUTION INTRAVENOUS ONCE
Status: COMPLETED | OUTPATIENT
Start: 2024-09-03 | End: 2024-09-03

## 2024-09-03 RX ORDER — ACETAMINOPHEN 500 MG
1000 TABLET ORAL
Status: COMPLETED | OUTPATIENT
Start: 2024-09-03 | End: 2024-09-03

## 2024-09-03 RX ORDER — SODIUM CHLORIDE 9 MG/ML
INJECTION, SOLUTION INTRAVENOUS CONTINUOUS
Status: DISCONTINUED | OUTPATIENT
Start: 2024-09-03 | End: 2024-09-08

## 2024-09-03 RX ORDER — NOREPINEPHRINE BITARTRATE 0.06 MG/ML
1-100 INJECTION, SOLUTION INTRAVENOUS CONTINUOUS
Status: DISCONTINUED | OUTPATIENT
Start: 2024-09-03 | End: 2024-09-03

## 2024-09-03 RX ORDER — GABAPENTIN 300 MG/1
300 CAPSULE ORAL 2 TIMES DAILY
Status: DISCONTINUED | OUTPATIENT
Start: 2024-09-04 | End: 2024-09-13 | Stop reason: HOSPADM

## 2024-09-03 RX ORDER — ACETAMINOPHEN 325 MG/1
650 TABLET ORAL EVERY 6 HOURS PRN
Status: DISCONTINUED | OUTPATIENT
Start: 2024-09-03 | End: 2024-09-13 | Stop reason: HOSPADM

## 2024-09-03 RX ORDER — SODIUM CHLORIDE 0.9 % (FLUSH) 0.9 %
5-40 SYRINGE (ML) INJECTION PRN
Status: DISCONTINUED | OUTPATIENT
Start: 2024-09-03 | End: 2024-09-13 | Stop reason: HOSPADM

## 2024-09-03 RX ORDER — ONDANSETRON 2 MG/ML
4 INJECTION INTRAMUSCULAR; INTRAVENOUS EVERY 6 HOURS PRN
Status: DISCONTINUED | OUTPATIENT
Start: 2024-09-03 | End: 2024-09-13 | Stop reason: HOSPADM

## 2024-09-03 RX ORDER — POTASSIUM CHLORIDE 7.45 MG/ML
10 INJECTION INTRAVENOUS PRN
Status: DISCONTINUED | OUTPATIENT
Start: 2024-09-03 | End: 2024-09-13 | Stop reason: HOSPADM

## 2024-09-03 RX ORDER — SODIUM CHLORIDE 0.9 % (FLUSH) 0.9 %
5-40 SYRINGE (ML) INJECTION EVERY 12 HOURS SCHEDULED
Status: DISCONTINUED | OUTPATIENT
Start: 2024-09-03 | End: 2024-09-13 | Stop reason: HOSPADM

## 2024-09-03 RX ORDER — SODIUM CHLORIDE 9 MG/ML
INJECTION, SOLUTION INTRAVENOUS PRN
Status: DISCONTINUED | OUTPATIENT
Start: 2024-09-03 | End: 2024-09-13 | Stop reason: HOSPADM

## 2024-09-03 RX ORDER — FENTANYL CITRATE 50 UG/ML
50 INJECTION, SOLUTION INTRAMUSCULAR; INTRAVENOUS ONCE
Status: COMPLETED | OUTPATIENT
Start: 2024-09-03 | End: 2024-09-03

## 2024-09-03 RX ORDER — ENOXAPARIN SODIUM 100 MG/ML
30 INJECTION SUBCUTANEOUS 2 TIMES DAILY
Status: DISCONTINUED | OUTPATIENT
Start: 2024-09-03 | End: 2024-09-09

## 2024-09-03 RX ORDER — ASPIRIN 81 MG/1
81 TABLET ORAL DAILY
Status: DISCONTINUED | OUTPATIENT
Start: 2024-09-04 | End: 2024-09-11

## 2024-09-03 RX ORDER — MAGNESIUM SULFATE IN WATER 40 MG/ML
2000 INJECTION, SOLUTION INTRAVENOUS PRN
Status: DISCONTINUED | OUTPATIENT
Start: 2024-09-03 | End: 2024-09-13 | Stop reason: HOSPADM

## 2024-09-03 RX ORDER — GABAPENTIN 300 MG/1
300 CAPSULE ORAL ONCE
Status: DISCONTINUED | OUTPATIENT
Start: 2024-09-03 | End: 2024-09-09 | Stop reason: SDUPTHER

## 2024-09-03 RX ORDER — POLYETHYLENE GLYCOL 3350 17 G/17G
17 POWDER, FOR SOLUTION ORAL DAILY PRN
Status: DISCONTINUED | OUTPATIENT
Start: 2024-09-03 | End: 2024-09-13 | Stop reason: HOSPADM

## 2024-09-03 RX ORDER — SODIUM CHLORIDE, SODIUM LACTATE, POTASSIUM CHLORIDE, AND CALCIUM CHLORIDE .6; .31; .03; .02 G/100ML; G/100ML; G/100ML; G/100ML
1000 INJECTION, SOLUTION INTRAVENOUS ONCE
Status: COMPLETED | OUTPATIENT
Start: 2024-09-03 | End: 2024-09-03

## 2024-09-03 RX ORDER — POTASSIUM CHLORIDE 750 MG/1
40 TABLET, EXTENDED RELEASE ORAL PRN
Status: DISCONTINUED | OUTPATIENT
Start: 2024-09-03 | End: 2024-09-13 | Stop reason: HOSPADM

## 2024-09-03 RX ORDER — ACETAMINOPHEN 650 MG/1
650 SUPPOSITORY RECTAL EVERY 6 HOURS PRN
Status: DISCONTINUED | OUTPATIENT
Start: 2024-09-03 | End: 2024-09-13 | Stop reason: HOSPADM

## 2024-09-03 RX ORDER — CASTOR OIL AND BALSAM, PERU 788; 87 MG/G; MG/G
OINTMENT TOPICAL 2 TIMES DAILY
Status: DISCONTINUED | OUTPATIENT
Start: 2024-09-03 | End: 2024-09-13 | Stop reason: HOSPADM

## 2024-09-03 RX ORDER — ONDANSETRON 4 MG/1
4 TABLET, ORALLY DISINTEGRATING ORAL EVERY 8 HOURS PRN
Status: DISCONTINUED | OUTPATIENT
Start: 2024-09-03 | End: 2024-09-13 | Stop reason: HOSPADM

## 2024-09-03 RX ADMIN — WATER 2000 MG: 1 INJECTION INTRAMUSCULAR; INTRAVENOUS; SUBCUTANEOUS at 17:47

## 2024-09-03 RX ADMIN — NOREPINEPHRINE BITARTRATE 5 MCG/MIN: 1 INJECTION, SOLUTION, CONCENTRATE INTRAVENOUS at 18:44

## 2024-09-03 RX ADMIN — ENOXAPARIN SODIUM 30 MG: 100 INJECTION SUBCUTANEOUS at 22:10

## 2024-09-03 RX ADMIN — SODIUM CHLORIDE: 9 INJECTION, SOLUTION INTRAVENOUS at 21:43

## 2024-09-03 RX ADMIN — SODIUM CHLORIDE 1000 ML: 9 INJECTION, SOLUTION INTRAVENOUS at 17:46

## 2024-09-03 RX ADMIN — ACETAMINOPHEN 1000 MG: 500 TABLET ORAL at 18:15

## 2024-09-03 RX ADMIN — FENTANYL CITRATE 50 MCG: 50 INJECTION, SOLUTION INTRAMUSCULAR; INTRAVENOUS at 18:45

## 2024-09-03 RX ADMIN — SODIUM CHLORIDE, POTASSIUM CHLORIDE, SODIUM LACTATE AND CALCIUM CHLORIDE 1000 ML: 600; 310; 30; 20 INJECTION, SOLUTION INTRAVENOUS at 18:11

## 2024-09-03 RX ADMIN — CASTOR OIL AND BALSAM, PERU: 788; 87 OINTMENT TOPICAL at 22:39

## 2024-09-03 RX ADMIN — AMITRIPTYLINE HYDROCHLORIDE 10 MG: 10 TABLET, FILM COATED ORAL at 22:17

## 2024-09-03 RX ADMIN — SODIUM CHLORIDE, PRESERVATIVE FREE 10 ML: 5 INJECTION INTRAVENOUS at 22:12

## 2024-09-03 ASSESSMENT — PAIN SCALES - GENERAL
PAINLEVEL_OUTOF10: 10
PAINLEVEL_OUTOF10: 8

## 2024-09-03 ASSESSMENT — PAIN DESCRIPTION - LOCATION: LOCATION: BACK;FOOT;BUTTOCKS

## 2024-09-03 NOTE — TELEPHONE ENCOUNTER
Minal Henderson called in..    Patient's BP is 86/50 and heart rates is 50    Would like to know if they and the family should admit patient to the hospital      [phone]

## 2024-09-04 ENCOUNTER — APPOINTMENT (OUTPATIENT)
Facility: HOSPITAL | Age: 84
End: 2024-09-04
Payer: MEDICARE

## 2024-09-04 PROBLEM — I50.32 CHRONIC HEART FAILURE WITH PRESERVED EJECTION FRACTION (HFPEF) (HCC): Status: ACTIVE | Noted: 2019-11-04

## 2024-09-04 PROBLEM — I48.0 PAF (PAROXYSMAL ATRIAL FIBRILLATION) (HCC): Status: ACTIVE | Noted: 2018-03-30

## 2024-09-04 PROBLEM — R53.81 PHYSICAL DEBILITY: Status: ACTIVE | Noted: 2024-09-04

## 2024-09-04 PROBLEM — R53.1 GENERALIZED WEAKNESS: Status: ACTIVE | Noted: 2024-09-04

## 2024-09-04 PROBLEM — N39.0 UTI (URINARY TRACT INFECTION): Status: ACTIVE | Noted: 2024-09-04

## 2024-09-04 PROBLEM — Z51.5 PALLIATIVE CARE BY SPECIALIST: Status: ACTIVE | Noted: 2024-09-04

## 2024-09-04 PROBLEM — Z71.89 COUNSELING REGARDING ADVANCE CARE PLANNING AND GOALS OF CARE: Status: ACTIVE | Noted: 2024-09-04

## 2024-09-04 PROBLEM — G93.41 ACUTE METABOLIC ENCEPHALOPATHY: Status: ACTIVE | Noted: 2024-09-04

## 2024-09-04 PROBLEM — D64.9 ANEMIA: Status: ACTIVE | Noted: 2024-09-04

## 2024-09-04 LAB
ALBUMIN SERPL-MCNC: 2.2 G/DL (ref 3.5–5)
ALBUMIN/GLOB SERPL: 0.5 (ref 1.1–2.2)
ALP SERPL-CCNC: 92 U/L (ref 45–117)
ALT SERPL-CCNC: 9 U/L (ref 12–78)
ANION GAP SERPL CALC-SCNC: 5 MMOL/L (ref 5–15)
AST SERPL-CCNC: 16 U/L (ref 15–37)
BASOPHILS # BLD: 0.1 K/UL (ref 0–0.1)
BASOPHILS NFR BLD: 1 % (ref 0–1)
BILIRUB SERPL-MCNC: 1.7 MG/DL (ref 0.2–1)
BUN SERPL-MCNC: 27 MG/DL (ref 6–20)
BUN/CREAT SERPL: 27 (ref 12–20)
CALCIUM SERPL-MCNC: 9.1 MG/DL (ref 8.5–10.1)
CHLORIDE SERPL-SCNC: 111 MMOL/L (ref 97–108)
CO2 SERPL-SCNC: 24 MMOL/L (ref 21–32)
CREAT SERPL-MCNC: 1 MG/DL (ref 0.55–1.02)
DIFFERENTIAL METHOD BLD: ABNORMAL
EKG DIAGNOSIS: NORMAL
EKG Q-T INTERVAL: 356 MS
EKG QRS DURATION: 86 MS
EKG QTC CALCULATION (BAZETT): 400 MS
EKG R AXIS: 13 DEGREES
EKG T AXIS: 23 DEGREES
EKG VENTRICULAR RATE: 76 BPM
EOSINOPHIL # BLD: 0.1 K/UL (ref 0–0.4)
EOSINOPHIL NFR BLD: 1 % (ref 0–7)
ERYTHROCYTE [DISTWIDTH] IN BLOOD BY AUTOMATED COUNT: 16.3 % (ref 11.5–14.5)
FERRITIN SERPL-MCNC: 816 NG/ML (ref 26–388)
FOLATE SERPL-MCNC: 9 NG/ML (ref 5–21)
GLOBULIN SER CALC-MCNC: 4.8 G/DL (ref 2–4)
GLUCOSE SERPL-MCNC: 100 MG/DL (ref 65–100)
HCT VFR BLD AUTO: 27.9 % (ref 35–47)
HGB BLD-MCNC: 8.7 G/DL (ref 11.5–16)
IMM GRANULOCYTES # BLD AUTO: 0.1 K/UL (ref 0–0.04)
IMM GRANULOCYTES NFR BLD AUTO: 2 % (ref 0–0.5)
LYMPHOCYTES # BLD: 2 K/UL (ref 0.8–3.5)
LYMPHOCYTES NFR BLD: 31 % (ref 12–49)
MCH RBC QN AUTO: 28.2 PG (ref 26–34)
MCHC RBC AUTO-ENTMCNC: 31.2 G/DL (ref 30–36.5)
MCV RBC AUTO: 90.6 FL (ref 80–99)
MONOCYTES # BLD: 0.8 K/UL (ref 0–1)
MONOCYTES NFR BLD: 12 % (ref 5–13)
NEUTS SEG # BLD: 3.2 K/UL (ref 1.8–8)
NEUTS SEG NFR BLD: 53 % (ref 32–75)
NRBC # BLD: 0 K/UL (ref 0–0.01)
NRBC BLD-RTO: 0 PER 100 WBC
PLATELET # BLD AUTO: 258 K/UL (ref 150–400)
PMV BLD AUTO: 9.3 FL (ref 8.9–12.9)
POTASSIUM SERPL-SCNC: 4.2 MMOL/L (ref 3.5–5.1)
PROT SERPL-MCNC: 7 G/DL (ref 6.4–8.2)
RBC # BLD AUTO: 3.08 M/UL (ref 3.8–5.2)
RBC MORPH BLD: ABNORMAL
SODIUM SERPL-SCNC: 140 MMOL/L (ref 136–145)
VIT B12 SERPL-MCNC: 504 PG/ML (ref 193–986)
WBC # BLD AUTO: 6.3 K/UL (ref 3.6–11)

## 2024-09-04 PROCEDURE — 94761 N-INVAS EAR/PLS OXIMETRY MLT: CPT

## 2024-09-04 PROCEDURE — 2500000003 HC RX 250 WO HCPCS: Performed by: EMERGENCY MEDICINE

## 2024-09-04 PROCEDURE — 6370000000 HC RX 637 (ALT 250 FOR IP): Performed by: INTERNAL MEDICINE

## 2024-09-04 PROCEDURE — 2580000003 HC RX 258: Performed by: EMERGENCY MEDICINE

## 2024-09-04 PROCEDURE — 70450 CT HEAD/BRAIN W/O DYE: CPT

## 2024-09-04 PROCEDURE — 85025 COMPLETE CBC W/AUTO DIFF WBC: CPT

## 2024-09-04 PROCEDURE — 6360000002 HC RX W HCPCS: Performed by: INTERNAL MEDICINE

## 2024-09-04 PROCEDURE — 80053 COMPREHEN METABOLIC PANEL: CPT

## 2024-09-04 PROCEDURE — 99223 1ST HOSP IP/OBS HIGH 75: CPT | Performed by: FAMILY MEDICINE

## 2024-09-04 PROCEDURE — 2000000000 HC ICU R&B

## 2024-09-04 PROCEDURE — 2580000003 HC RX 258: Performed by: INTERNAL MEDICINE

## 2024-09-04 PROCEDURE — 93010 ELECTROCARDIOGRAM REPORT: CPT | Performed by: SPECIALIST

## 2024-09-04 PROCEDURE — 36415 COLL VENOUS BLD VENIPUNCTURE: CPT

## 2024-09-04 RX ORDER — NOREPINEPHRINE BITARTRATE 0.06 MG/ML
1-100 INJECTION, SOLUTION INTRAVENOUS CONTINUOUS
Status: DISCONTINUED | OUTPATIENT
Start: 2024-09-04 | End: 2024-09-07

## 2024-09-04 RX ADMIN — SODIUM CHLORIDE, PRESERVATIVE FREE 10 ML: 5 INJECTION INTRAVENOUS at 21:18

## 2024-09-04 RX ADMIN — ENOXAPARIN SODIUM 30 MG: 100 INJECTION SUBCUTANEOUS at 21:18

## 2024-09-04 RX ADMIN — CEFEPIME 2000 MG: 2 INJECTION, POWDER, FOR SOLUTION INTRAVENOUS at 17:49

## 2024-09-04 RX ADMIN — SODIUM CHLORIDE 3 MCG/MIN: 9 INJECTION, SOLUTION INTRAVENOUS at 13:55

## 2024-09-04 RX ADMIN — ASPIRIN 81 MG: 81 TABLET, COATED ORAL at 08:35

## 2024-09-04 RX ADMIN — GABAPENTIN 300 MG: 300 CAPSULE ORAL at 08:35

## 2024-09-04 RX ADMIN — AMITRIPTYLINE HYDROCHLORIDE 10 MG: 10 TABLET, FILM COATED ORAL at 21:18

## 2024-09-04 RX ADMIN — GABAPENTIN 300 MG: 300 CAPSULE ORAL at 21:18

## 2024-09-04 RX ADMIN — CASTOR OIL AND BALSAM, PERU: 788; 87 OINTMENT TOPICAL at 08:39

## 2024-09-04 RX ADMIN — CEFEPIME 2000 MG: 2 INJECTION, POWDER, FOR SOLUTION INTRAVENOUS at 02:16

## 2024-09-04 RX ADMIN — ENOXAPARIN SODIUM 30 MG: 100 INJECTION SUBCUTANEOUS at 08:35

## 2024-09-04 RX ADMIN — SODIUM CHLORIDE: 9 INJECTION, SOLUTION INTRAVENOUS at 19:56

## 2024-09-04 RX ADMIN — SODIUM CHLORIDE, PRESERVATIVE FREE 10 ML: 5 INJECTION INTRAVENOUS at 08:58

## 2024-09-04 RX ADMIN — ACETAMINOPHEN 650 MG: 325 TABLET ORAL at 08:34

## 2024-09-04 RX ADMIN — CASTOR OIL AND BALSAM, PERU: 788; 87 OINTMENT TOPICAL at 21:19

## 2024-09-04 ASSESSMENT — PAIN DESCRIPTION - DESCRIPTORS: DESCRIPTORS: ACHING

## 2024-09-04 ASSESSMENT — PAIN SCALES - GENERAL
PAINLEVEL_OUTOF10: 8
PAINLEVEL_OUTOF10: 0

## 2024-09-04 ASSESSMENT — PAIN DESCRIPTION - LOCATION: LOCATION: HEAD

## 2024-09-04 ASSESSMENT — PAIN DESCRIPTION - ORIENTATION: ORIENTATION: ANTERIOR

## 2024-09-05 ENCOUNTER — APPOINTMENT (OUTPATIENT)
Facility: HOSPITAL | Age: 84
End: 2024-09-05
Payer: MEDICARE

## 2024-09-05 LAB
ALBUMIN SERPL-MCNC: 2.1 G/DL (ref 3.5–5)
ALBUMIN/GLOB SERPL: 0.5 (ref 1.1–2.2)
ALP SERPL-CCNC: 85 U/L (ref 45–117)
ALT SERPL-CCNC: 10 U/L (ref 12–78)
ANION GAP SERPL CALC-SCNC: 3 MMOL/L (ref 2–12)
AST SERPL-CCNC: 13 U/L (ref 15–37)
BACTERIA SPEC CULT: NORMAL
BASOPHILS # BLD: 0.1 K/UL (ref 0–0.1)
BASOPHILS NFR BLD: 2 % (ref 0–1)
BILIRUB SERPL-MCNC: 1.3 MG/DL (ref 0.2–1)
BUN SERPL-MCNC: 19 MG/DL (ref 6–20)
BUN/CREAT SERPL: 22 (ref 12–20)
CALCIUM SERPL-MCNC: 8.4 MG/DL (ref 8.5–10.1)
CHLORIDE SERPL-SCNC: 112 MMOL/L (ref 97–108)
CO2 SERPL-SCNC: 25 MMOL/L (ref 21–32)
CREAT SERPL-MCNC: 0.88 MG/DL (ref 0.55–1.02)
DIFFERENTIAL METHOD BLD: ABNORMAL
EOSINOPHIL # BLD: 0 K/UL (ref 0–0.4)
EOSINOPHIL NFR BLD: 0 % (ref 0–7)
ERYTHROCYTE [DISTWIDTH] IN BLOOD BY AUTOMATED COUNT: 16.3 % (ref 11.5–14.5)
GLOBULIN SER CALC-MCNC: 4.5 G/DL (ref 2–4)
GLUCOSE SERPL-MCNC: 97 MG/DL (ref 65–100)
HCT VFR BLD AUTO: 25.7 % (ref 35–47)
HGB BLD-MCNC: 8.1 G/DL (ref 11.5–16)
IMM GRANULOCYTES # BLD AUTO: 0 K/UL
IMM GRANULOCYTES NFR BLD AUTO: 0 %
LYMPHOCYTES # BLD: 1.8 K/UL (ref 0.8–3.5)
LYMPHOCYTES NFR BLD: 31 % (ref 12–49)
MCH RBC QN AUTO: 28.5 PG (ref 26–34)
MCHC RBC AUTO-ENTMCNC: 31.5 G/DL (ref 30–36.5)
MCV RBC AUTO: 90.5 FL (ref 80–99)
METAMYELOCYTES NFR BLD MANUAL: 1 %
MONOCYTES # BLD: 0.7 K/UL (ref 0–1)
MONOCYTES NFR BLD: 12 % (ref 5–13)
MYELOCYTES NFR BLD MANUAL: 2 %
NEUTS SEG # BLD: 3.1 K/UL (ref 1.8–8)
NEUTS SEG NFR BLD: 52 % (ref 32–75)
NRBC # BLD: 0.02 K/UL (ref 0–0.01)
NRBC BLD-RTO: 0.3 PER 100 WBC
PLATELET # BLD AUTO: 255 K/UL (ref 150–400)
PMV BLD AUTO: 9.2 FL (ref 8.9–12.9)
POTASSIUM SERPL-SCNC: 3.8 MMOL/L (ref 3.5–5.1)
PROT SERPL-MCNC: 6.6 G/DL (ref 6.4–8.2)
RBC # BLD AUTO: 2.84 M/UL (ref 3.8–5.2)
RBC MORPH BLD: ABNORMAL
SERVICE CMNT-IMP: NORMAL
SODIUM SERPL-SCNC: 140 MMOL/L (ref 136–145)
WBC # BLD AUTO: 5.9 K/UL (ref 3.6–11)

## 2024-09-05 PROCEDURE — 6360000002 HC RX W HCPCS: Performed by: HOSPITALIST

## 2024-09-05 PROCEDURE — 2580000003 HC RX 258: Performed by: INTERNAL MEDICINE

## 2024-09-05 PROCEDURE — 6370000000 HC RX 637 (ALT 250 FOR IP): Performed by: HOSPITALIST

## 2024-09-05 PROCEDURE — P9045 ALBUMIN (HUMAN), 5%, 250 ML: HCPCS | Performed by: HOSPITALIST

## 2024-09-05 PROCEDURE — 2000000000 HC ICU R&B

## 2024-09-05 PROCEDURE — 85025 COMPLETE CBC W/AUTO DIFF WBC: CPT

## 2024-09-05 PROCEDURE — 80053 COMPREHEN METABOLIC PANEL: CPT

## 2024-09-05 PROCEDURE — 6360000002 HC RX W HCPCS: Performed by: INTERNAL MEDICINE

## 2024-09-05 PROCEDURE — 3E043XZ INTRODUCTION OF VASOPRESSOR INTO CENTRAL VEIN, PERCUTANEOUS APPROACH: ICD-10-PCS | Performed by: FAMILY MEDICINE

## 2024-09-05 PROCEDURE — 6370000000 HC RX 637 (ALT 250 FOR IP): Performed by: INTERNAL MEDICINE

## 2024-09-05 PROCEDURE — 36415 COLL VENOUS BLD VENIPUNCTURE: CPT

## 2024-09-05 PROCEDURE — 2580000003 HC RX 258: Performed by: HOSPITALIST

## 2024-09-05 PROCEDURE — 76705 ECHO EXAM OF ABDOMEN: CPT

## 2024-09-05 RX ORDER — MIDODRINE HYDROCHLORIDE 5 MG/1
5 TABLET ORAL
Status: DISCONTINUED | OUTPATIENT
Start: 2024-09-05 | End: 2024-09-05

## 2024-09-05 RX ORDER — MIDODRINE HYDROCHLORIDE 5 MG/1
10 TABLET ORAL
Status: DISCONTINUED | OUTPATIENT
Start: 2024-09-05 | End: 2024-09-13 | Stop reason: HOSPADM

## 2024-09-05 RX ORDER — ALBUMIN, HUMAN INJ 5% 5 %
12.5 SOLUTION INTRAVENOUS ONCE
Status: COMPLETED | OUTPATIENT
Start: 2024-09-05 | End: 2024-09-05

## 2024-09-05 RX ADMIN — GABAPENTIN 300 MG: 300 CAPSULE ORAL at 21:10

## 2024-09-05 RX ADMIN — ENOXAPARIN SODIUM 30 MG: 100 INJECTION SUBCUTANEOUS at 21:10

## 2024-09-05 RX ADMIN — SODIUM CHLORIDE: 9 INJECTION, SOLUTION INTRAVENOUS at 06:30

## 2024-09-05 RX ADMIN — GABAPENTIN 300 MG: 300 CAPSULE ORAL at 08:03

## 2024-09-05 RX ADMIN — AMITRIPTYLINE HYDROCHLORIDE 10 MG: 10 TABLET, FILM COATED ORAL at 21:10

## 2024-09-05 RX ADMIN — VASOPRESSIN 0.04 UNITS/MIN: 20 INJECTION INTRAVENOUS at 11:13

## 2024-09-05 RX ADMIN — ASPIRIN 81 MG: 81 TABLET, COATED ORAL at 07:58

## 2024-09-05 RX ADMIN — ENOXAPARIN SODIUM 30 MG: 100 INJECTION SUBCUTANEOUS at 08:03

## 2024-09-05 RX ADMIN — CASTOR OIL AND BALSAM, PERU: 788; 87 OINTMENT TOPICAL at 21:11

## 2024-09-05 RX ADMIN — MIDODRINE HYDROCHLORIDE 10 MG: 5 TABLET ORAL at 11:15

## 2024-09-05 RX ADMIN — ONDANSETRON 4 MG: 2 INJECTION INTRAMUSCULAR; INTRAVENOUS at 07:57

## 2024-09-05 RX ADMIN — CEFEPIME 2000 MG: 2 INJECTION, POWDER, FOR SOLUTION INTRAVENOUS at 17:45

## 2024-09-05 RX ADMIN — CASTOR OIL AND BALSAM, PERU: 788; 87 OINTMENT TOPICAL at 02:58

## 2024-09-05 RX ADMIN — SODIUM CHLORIDE, PRESERVATIVE FREE 10 ML: 5 INJECTION INTRAVENOUS at 08:02

## 2024-09-05 RX ADMIN — ALBUMIN (HUMAN) 12.5 G: 12.5 INJECTION, SOLUTION INTRAVENOUS at 11:09

## 2024-09-05 RX ADMIN — SODIUM CHLORIDE, PRESERVATIVE FREE 10 ML: 5 INJECTION INTRAVENOUS at 21:11

## 2024-09-05 RX ADMIN — VASOPRESSIN 0.04 UNITS/MIN: 20 INJECTION INTRAVENOUS at 18:31

## 2024-09-05 RX ADMIN — MIDODRINE HYDROCHLORIDE 10 MG: 5 TABLET ORAL at 17:45

## 2024-09-05 RX ADMIN — CEFEPIME 2000 MG: 2 INJECTION, POWDER, FOR SOLUTION INTRAVENOUS at 11:08

## 2024-09-05 RX ADMIN — CEFEPIME 2000 MG: 2 INJECTION, POWDER, FOR SOLUTION INTRAVENOUS at 02:13

## 2024-09-05 ASSESSMENT — PAIN SCALES - GENERAL
PAINLEVEL_OUTOF10: 0

## 2024-09-06 ENCOUNTER — APPOINTMENT (OUTPATIENT)
Facility: HOSPITAL | Age: 84
End: 2024-09-06
Attending: HOSPITALIST
Payer: MEDICARE

## 2024-09-06 LAB
ALBUMIN SERPL-MCNC: 2.2 G/DL (ref 3.5–5)
ALBUMIN/GLOB SERPL: 0.5 (ref 1.1–2.2)
ALP SERPL-CCNC: 89 U/L (ref 45–117)
ALT SERPL-CCNC: 10 U/L (ref 12–78)
ANION GAP SERPL CALC-SCNC: 5 MMOL/L (ref 2–12)
AST SERPL-CCNC: 18 U/L (ref 15–37)
BASOPHILS # BLD: 0.1 K/UL (ref 0–0.1)
BASOPHILS NFR BLD: 1 % (ref 0–1)
BILIRUB SERPL-MCNC: 1.3 MG/DL (ref 0.2–1)
BUN SERPL-MCNC: 14 MG/DL (ref 6–20)
BUN/CREAT SERPL: 15 (ref 12–20)
CALCIUM SERPL-MCNC: 8.3 MG/DL (ref 8.5–10.1)
CHLORIDE SERPL-SCNC: 112 MMOL/L (ref 97–108)
CO2 SERPL-SCNC: 22 MMOL/L (ref 21–32)
CORTIS SERPL-MCNC: 18 UG/DL
CREAT SERPL-MCNC: 0.92 MG/DL (ref 0.55–1.02)
DIFFERENTIAL METHOD BLD: ABNORMAL
ECHO AO ARCH DIAM: 3 CM
ECHO AO ASC DIAM: 3.5 CM
ECHO AO ASCENDING AORTA INDEX: 1.45 CM/M2
ECHO AO ROOT DIAM: 3.4 CM
ECHO AO ROOT INDEX: 1.4 CM/M2
ECHO AR MAX VEL PISA: 2.6 M/S
ECHO AV AREA PEAK VELOCITY: 1.7 CM2
ECHO AV AREA VTI: 1.3 CM2
ECHO AV AREA/BSA PEAK VELOCITY: 0.7 CM2/M2
ECHO AV AREA/BSA VTI: 0.5 CM2/M2
ECHO AV MEAN GRADIENT: 6 MMHG
ECHO AV MEAN VELOCITY: 1.1 M/S
ECHO AV PEAK GRADIENT: 9 MMHG
ECHO AV PEAK VELOCITY: 1.5 M/S
ECHO AV REGURGITANT PHT: 560.2 MILLISECOND
ECHO AV VELOCITY RATIO: 0.47
ECHO AV VTI: 27.7 CM
ECHO BSA: 2.55 M2
ECHO EST RA PRESSURE: 8 MMHG
ECHO LA DIAMETER INDEX: 1.49 CM/M2
ECHO LA DIAMETER: 3.6 CM
ECHO LA TO AORTIC ROOT RATIO: 1.06
ECHO LA VOL A-L A2C: 69 ML (ref 22–52)
ECHO LA VOL A-L A4C: 60 ML (ref 22–52)
ECHO LA VOL BP: 62 ML (ref 22–52)
ECHO LA VOL MOD A2C: 65 ML (ref 22–52)
ECHO LA VOL MOD A4C: 54 ML (ref 22–52)
ECHO LA VOL/BSA BIPLANE: 26 ML/M2 (ref 16–34)
ECHO LA VOLUME AREA LENGTH: 67 ML
ECHO LA VOLUME INDEX A-L A2C: 29 ML/M2 (ref 16–34)
ECHO LA VOLUME INDEX A-L A4C: 25 ML/M2 (ref 16–34)
ECHO LA VOLUME INDEX AREA LENGTH: 28 ML/M2 (ref 16–34)
ECHO LA VOLUME INDEX MOD A2C: 27 ML/M2 (ref 16–34)
ECHO LA VOLUME INDEX MOD A4C: 22 ML/M2 (ref 16–34)
ECHO LV E' LATERAL VELOCITY: 8 CM/S
ECHO LV E' SEPTAL VELOCITY: 8 CM/S
ECHO LV EDV A2C: 57 ML
ECHO LV EDV A4C: 110 ML
ECHO LV EDV BP: 96 ML (ref 56–104)
ECHO LV EDV INDEX A4C: 45 ML/M2
ECHO LV EDV INDEX BP: 40 ML/M2
ECHO LV EDV NDEX A2C: 24 ML/M2
ECHO LV EF PHYSICIAN: 45 %
ECHO LV EJECTION FRACTION A2C: 54 %
ECHO LV EJECTION FRACTION A4C: 47 %
ECHO LV ESV A2C: 26 ML
ECHO LV ESV A4C: 58 ML
ECHO LV ESV BP: 46 ML (ref 19–49)
ECHO LV ESV INDEX A2C: 11 ML/M2
ECHO LV ESV INDEX A4C: 24 ML/M2
ECHO LV ESV INDEX BP: 19 ML/M2
ECHO LV FRACTIONAL SHORTENING: 31 % (ref 28–44)
ECHO LV INTERNAL DIMENSION DIASTOLE INDEX: 1.74 CM/M2
ECHO LV INTERNAL DIMENSION DIASTOLIC: 4.2 CM (ref 3.9–5.3)
ECHO LV INTERNAL DIMENSION SYSTOLIC INDEX: 1.2 CM/M2
ECHO LV INTERNAL DIMENSION SYSTOLIC: 2.9 CM
ECHO LV IVSD: 1 CM (ref 0.6–0.9)
ECHO LV MASS 2D: 137.2 G (ref 67–162)
ECHO LV MASS INDEX 2D: 56.7 G/M2 (ref 43–95)
ECHO LV POSTERIOR WALL DIASTOLIC: 1 CM (ref 0.6–0.9)
ECHO LV RELATIVE WALL THICKNESS RATIO: 0.48
ECHO LVOT AREA: 3.8 CM2
ECHO LVOT AV VTI INDEX: 0.35
ECHO LVOT DIAM: 2.2 CM
ECHO LVOT MEAN GRADIENT: 1 MMHG
ECHO LVOT PEAK GRADIENT: 2 MMHG
ECHO LVOT PEAK VELOCITY: 0.7 M/S
ECHO LVOT STROKE VOLUME INDEX: 15.2 ML/M2
ECHO LVOT SV: 36.9 ML
ECHO LVOT VTI: 9.7 CM
ECHO MV A VELOCITY: 0.25 M/S
ECHO MV E DECELERATION TIME (DT): 256.6 MS
ECHO MV E VELOCITY: 0.6 M/S
ECHO MV E/A RATIO: 2.4
ECHO MV E/E' LATERAL: 7.5
ECHO MV E/E' RATIO (AVERAGED): 7.5
ECHO MV E/E' SEPTAL: 7.5
ECHO MV REGURGITANT PEAK GRADIENT: 96 MMHG
ECHO MV REGURGITANT PEAK VELOCITY: 4.9 M/S
ECHO PV MAX VELOCITY: 1.1 M/S
ECHO PV PEAK GRADIENT: 5 MMHG
ECHO RIGHT VENTRICULAR SYSTOLIC PRESSURE (RVSP): 61 MMHG
ECHO RV INTERNAL DIMENSION: 3.7 CM
ECHO RV TAPSE: 1.5 CM (ref 1.7–?)
ECHO TV REGURGITANT MAX VELOCITY: 3.65 M/S
ECHO TV REGURGITANT PEAK GRADIENT: 54 MMHG
EOSINOPHIL # BLD: 0 K/UL (ref 0–0.4)
EOSINOPHIL NFR BLD: 0 % (ref 0–7)
ERYTHROCYTE [DISTWIDTH] IN BLOOD BY AUTOMATED COUNT: 17 % (ref 11.5–14.5)
GLOBULIN SER CALC-MCNC: 4.3 G/DL (ref 2–4)
GLUCOSE SERPL-MCNC: 140 MG/DL (ref 65–100)
HCT VFR BLD AUTO: 24.5 % (ref 35–47)
HEMOCCULT STL QL: NEGATIVE
HGB BLD-MCNC: 7.6 G/DL (ref 11.5–16)
IMM GRANULOCYTES # BLD AUTO: 0 K/UL
IMM GRANULOCYTES NFR BLD AUTO: 0 %
IRON SATN MFR SERPL: 74 % (ref 20–50)
IRON SERPL-MCNC: 131 UG/DL (ref 35–150)
LYMPHOCYTES # BLD: 2 K/UL (ref 0.8–3.5)
LYMPHOCYTES NFR BLD: 30 % (ref 12–49)
MCH RBC QN AUTO: 28.6 PG (ref 26–34)
MCHC RBC AUTO-ENTMCNC: 31 G/DL (ref 30–36.5)
MCV RBC AUTO: 92.1 FL (ref 80–99)
MONOCYTES # BLD: 0.9 K/UL (ref 0–1)
MONOCYTES NFR BLD: 14 % (ref 5–13)
MYELOCYTES NFR BLD MANUAL: 3 %
NEUTS BAND NFR BLD MANUAL: 6 % (ref 0–6)
NEUTS SEG # BLD: 3.4 K/UL (ref 1.8–8)
NEUTS SEG NFR BLD: 46 % (ref 32–75)
NRBC # BLD: 0.04 K/UL (ref 0–0.01)
NRBC BLD-RTO: 0.6 PER 100 WBC
PLATELET # BLD AUTO: 243 K/UL (ref 150–400)
PMV BLD AUTO: 9.4 FL (ref 8.9–12.9)
POTASSIUM SERPL-SCNC: 3.8 MMOL/L (ref 3.5–5.1)
PROCALCITONIN SERPL-MCNC: 0.42 NG/ML
PROT SERPL-MCNC: 6.5 G/DL (ref 6.4–8.2)
RBC # BLD AUTO: 2.66 M/UL (ref 3.8–5.2)
RBC MORPH BLD: ABNORMAL
SODIUM SERPL-SCNC: 139 MMOL/L (ref 136–145)
TIBC SERPL-MCNC: 178 UG/DL (ref 250–450)
WBC # BLD AUTO: 6.5 K/UL (ref 3.6–11)

## 2024-09-06 PROCEDURE — 93306 TTE W/DOPPLER COMPLETE: CPT

## 2024-09-06 PROCEDURE — 83540 ASSAY OF IRON: CPT

## 2024-09-06 PROCEDURE — 51798 US URINE CAPACITY MEASURE: CPT

## 2024-09-06 PROCEDURE — 6360000002 HC RX W HCPCS: Performed by: INTERNAL MEDICINE

## 2024-09-06 PROCEDURE — 85025 COMPLETE CBC W/AUTO DIFF WBC: CPT

## 2024-09-06 PROCEDURE — 2580000003 HC RX 258: Performed by: HOSPITALIST

## 2024-09-06 PROCEDURE — 36415 COLL VENOUS BLD VENIPUNCTURE: CPT

## 2024-09-06 PROCEDURE — 83550 IRON BINDING TEST: CPT

## 2024-09-06 PROCEDURE — 6370000000 HC RX 637 (ALT 250 FOR IP): Performed by: INTERNAL MEDICINE

## 2024-09-06 PROCEDURE — 80053 COMPREHEN METABOLIC PANEL: CPT

## 2024-09-06 PROCEDURE — 6370000000 HC RX 637 (ALT 250 FOR IP): Performed by: HOSPITALIST

## 2024-09-06 PROCEDURE — 84145 PROCALCITONIN (PCT): CPT

## 2024-09-06 PROCEDURE — 2000000000 HC ICU R&B

## 2024-09-06 PROCEDURE — 93306 TTE W/DOPPLER COMPLETE: CPT | Performed by: SPECIALIST

## 2024-09-06 PROCEDURE — 82272 OCCULT BLD FECES 1-3 TESTS: CPT

## 2024-09-06 PROCEDURE — 2580000003 HC RX 258: Performed by: INTERNAL MEDICINE

## 2024-09-06 PROCEDURE — 82533 TOTAL CORTISOL: CPT

## 2024-09-06 PROCEDURE — 6360000002 HC RX W HCPCS: Performed by: HOSPITALIST

## 2024-09-06 RX ADMIN — GABAPENTIN 300 MG: 300 CAPSULE ORAL at 20:30

## 2024-09-06 RX ADMIN — ONDANSETRON 4 MG: 4 TABLET, ORALLY DISINTEGRATING ORAL at 20:22

## 2024-09-06 RX ADMIN — VASOPRESSIN 0.04 UNITS/MIN: 20 INJECTION INTRAVENOUS at 02:35

## 2024-09-06 RX ADMIN — CASTOR OIL AND BALSAM, PERU: 788; 87 OINTMENT TOPICAL at 20:24

## 2024-09-06 RX ADMIN — SODIUM CHLORIDE, PRESERVATIVE FREE 10 ML: 5 INJECTION INTRAVENOUS at 20:22

## 2024-09-06 RX ADMIN — MIDODRINE HYDROCHLORIDE 10 MG: 5 TABLET ORAL at 17:41

## 2024-09-06 RX ADMIN — ALTEPLASE 1 MG: 2.2 INJECTION, POWDER, LYOPHILIZED, FOR SOLUTION INTRAVENOUS at 12:23

## 2024-09-06 RX ADMIN — ENOXAPARIN SODIUM 30 MG: 100 INJECTION SUBCUTANEOUS at 09:33

## 2024-09-06 RX ADMIN — MIDODRINE HYDROCHLORIDE 10 MG: 5 TABLET ORAL at 09:33

## 2024-09-06 RX ADMIN — ASPIRIN 81 MG: 81 TABLET, COATED ORAL at 09:32

## 2024-09-06 RX ADMIN — CEFEPIME 2000 MG: 2 INJECTION, POWDER, FOR SOLUTION INTRAVENOUS at 02:30

## 2024-09-06 RX ADMIN — POTASSIUM BICARBONATE 40 MEQ: 782 TABLET, EFFERVESCENT ORAL at 14:44

## 2024-09-06 RX ADMIN — MIDODRINE HYDROCHLORIDE 10 MG: 5 TABLET ORAL at 14:47

## 2024-09-06 RX ADMIN — AMITRIPTYLINE HYDROCHLORIDE 10 MG: 10 TABLET, FILM COATED ORAL at 20:22

## 2024-09-06 RX ADMIN — WATER 1000 MG: 1 INJECTION INTRAMUSCULAR; INTRAVENOUS; SUBCUTANEOUS at 14:45

## 2024-09-06 RX ADMIN — CEFEPIME 2000 MG: 2 INJECTION, POWDER, FOR SOLUTION INTRAVENOUS at 09:32

## 2024-09-06 RX ADMIN — ENOXAPARIN SODIUM 30 MG: 100 INJECTION SUBCUTANEOUS at 20:30

## 2024-09-06 RX ADMIN — CASTOR OIL AND BALSAM, PERU: 788; 87 OINTMENT TOPICAL at 16:00

## 2024-09-06 RX ADMIN — GABAPENTIN 300 MG: 300 CAPSULE ORAL at 09:33

## 2024-09-06 RX ADMIN — SODIUM CHLORIDE, PRESERVATIVE FREE 10 ML: 5 INJECTION INTRAVENOUS at 09:34

## 2024-09-06 ASSESSMENT — PAIN SCALES - GENERAL
PAINLEVEL_OUTOF10: 0

## 2024-09-07 LAB
COMMENT:: NORMAL
SPECIMEN HOLD: NORMAL

## 2024-09-07 PROCEDURE — 94761 N-INVAS EAR/PLS OXIMETRY MLT: CPT

## 2024-09-07 PROCEDURE — 2580000003 HC RX 258: Performed by: HOSPITALIST

## 2024-09-07 PROCEDURE — 2000000000 HC ICU R&B

## 2024-09-07 PROCEDURE — 6360000002 HC RX W HCPCS: Performed by: HOSPITALIST

## 2024-09-07 PROCEDURE — 6370000000 HC RX 637 (ALT 250 FOR IP): Performed by: HOSPITALIST

## 2024-09-07 PROCEDURE — 36415 COLL VENOUS BLD VENIPUNCTURE: CPT

## 2024-09-07 PROCEDURE — 2580000003 HC RX 258: Performed by: INTERNAL MEDICINE

## 2024-09-07 PROCEDURE — 6370000000 HC RX 637 (ALT 250 FOR IP): Performed by: INTERNAL MEDICINE

## 2024-09-07 PROCEDURE — 6360000002 HC RX W HCPCS: Performed by: INTERNAL MEDICINE

## 2024-09-07 PROCEDURE — P9045 ALBUMIN (HUMAN), 5%, 250 ML: HCPCS | Performed by: HOSPITALIST

## 2024-09-07 RX ORDER — GABAPENTIN 300 MG/1
300 CAPSULE ORAL AS NEEDED
Status: DISCONTINUED | OUTPATIENT
Start: 2024-09-07 | End: 2024-09-07

## 2024-09-07 RX ORDER — GABAPENTIN 300 MG/1
300 CAPSULE ORAL DAILY PRN
Status: DISCONTINUED | OUTPATIENT
Start: 2024-09-07 | End: 2024-09-13 | Stop reason: HOSPADM

## 2024-09-07 RX ORDER — GABAPENTIN 300 MG/1
300 CAPSULE ORAL AS NEEDED
Status: CANCELLED | OUTPATIENT
Start: 2024-09-07

## 2024-09-07 RX ORDER — ALBUMIN, HUMAN INJ 5% 5 %
25 SOLUTION INTRAVENOUS ONCE
Status: COMPLETED | OUTPATIENT
Start: 2024-09-07 | End: 2024-09-07

## 2024-09-07 RX ADMIN — GABAPENTIN 300 MG: 300 CAPSULE ORAL at 09:01

## 2024-09-07 RX ADMIN — GABAPENTIN 300 MG: 300 CAPSULE ORAL at 17:19

## 2024-09-07 RX ADMIN — SODIUM CHLORIDE, PRESERVATIVE FREE 10 ML: 5 INJECTION INTRAVENOUS at 21:17

## 2024-09-07 RX ADMIN — AMITRIPTYLINE HYDROCHLORIDE 10 MG: 10 TABLET, FILM COATED ORAL at 21:18

## 2024-09-07 RX ADMIN — MIDODRINE HYDROCHLORIDE 10 MG: 5 TABLET ORAL at 11:58

## 2024-09-07 RX ADMIN — CASTOR OIL AND BALSAM, PERU: 788; 87 OINTMENT TOPICAL at 21:18

## 2024-09-07 RX ADMIN — SODIUM CHLORIDE, PRESERVATIVE FREE 10 ML: 5 INJECTION INTRAVENOUS at 09:04

## 2024-09-07 RX ADMIN — CASTOR OIL AND BALSAM, PERU: 788; 87 OINTMENT TOPICAL at 09:01

## 2024-09-07 RX ADMIN — GABAPENTIN 300 MG: 300 CAPSULE ORAL at 21:18

## 2024-09-07 RX ADMIN — ENOXAPARIN SODIUM 30 MG: 100 INJECTION SUBCUTANEOUS at 21:18

## 2024-09-07 RX ADMIN — ASPIRIN 81 MG: 81 TABLET, COATED ORAL at 09:00

## 2024-09-07 RX ADMIN — ALBUMIN (HUMAN) 25 G: 12.5 INJECTION, SOLUTION INTRAVENOUS at 11:16

## 2024-09-07 RX ADMIN — MIDODRINE HYDROCHLORIDE 10 MG: 5 TABLET ORAL at 17:19

## 2024-09-07 RX ADMIN — MIDODRINE HYDROCHLORIDE 10 MG: 5 TABLET ORAL at 07:44

## 2024-09-07 RX ADMIN — WATER 1000 MG: 1 INJECTION INTRAMUSCULAR; INTRAVENOUS; SUBCUTANEOUS at 14:27

## 2024-09-07 RX ADMIN — ENOXAPARIN SODIUM 30 MG: 100 INJECTION SUBCUTANEOUS at 09:01

## 2024-09-07 ASSESSMENT — PAIN DESCRIPTION - LOCATION: LOCATION: LEG

## 2024-09-07 ASSESSMENT — PAIN SCALES - GENERAL
PAINLEVEL_OUTOF10: 6
PAINLEVEL_OUTOF10: 0

## 2024-09-07 ASSESSMENT — PAIN DESCRIPTION - DESCRIPTORS: DESCRIPTORS: OTHER (COMMENT)

## 2024-09-08 LAB
BACTERIA SPEC CULT: NORMAL
SERVICE CMNT-IMP: NORMAL

## 2024-09-08 PROCEDURE — 6370000000 HC RX 637 (ALT 250 FOR IP): Performed by: HOSPITALIST

## 2024-09-08 PROCEDURE — 2580000003 HC RX 258: Performed by: INTERNAL MEDICINE

## 2024-09-08 PROCEDURE — 6370000000 HC RX 637 (ALT 250 FOR IP): Performed by: INTERNAL MEDICINE

## 2024-09-08 PROCEDURE — 94761 N-INVAS EAR/PLS OXIMETRY MLT: CPT

## 2024-09-08 PROCEDURE — 6360000002 HC RX W HCPCS: Performed by: STUDENT IN AN ORGANIZED HEALTH CARE EDUCATION/TRAINING PROGRAM

## 2024-09-08 PROCEDURE — 2580000003 HC RX 258: Performed by: STUDENT IN AN ORGANIZED HEALTH CARE EDUCATION/TRAINING PROGRAM

## 2024-09-08 PROCEDURE — 2060000000 HC ICU INTERMEDIATE R&B

## 2024-09-08 PROCEDURE — 6360000002 HC RX W HCPCS: Performed by: INTERNAL MEDICINE

## 2024-09-08 RX ORDER — SODIUM CHLORIDE, SODIUM LACTATE, POTASSIUM CHLORIDE, CALCIUM CHLORIDE 600; 310; 30; 20 MG/100ML; MG/100ML; MG/100ML; MG/100ML
INJECTION, SOLUTION INTRAVENOUS CONTINUOUS
Status: DISCONTINUED | OUTPATIENT
Start: 2024-09-08 | End: 2024-09-11

## 2024-09-08 RX ADMIN — CASTOR OIL AND BALSAM, PERU: 788; 87 OINTMENT TOPICAL at 20:37

## 2024-09-08 RX ADMIN — ASPIRIN 81 MG: 81 TABLET, COATED ORAL at 08:54

## 2024-09-08 RX ADMIN — AMITRIPTYLINE HYDROCHLORIDE 10 MG: 10 TABLET, FILM COATED ORAL at 20:36

## 2024-09-08 RX ADMIN — SODIUM CHLORIDE, POTASSIUM CHLORIDE, SODIUM LACTATE AND CALCIUM CHLORIDE: 600; 310; 30; 20 INJECTION, SOLUTION INTRAVENOUS at 12:48

## 2024-09-08 RX ADMIN — CASTOR OIL AND BALSAM, PERU: 788; 87 OINTMENT TOPICAL at 08:54

## 2024-09-08 RX ADMIN — MIDODRINE HYDROCHLORIDE 10 MG: 5 TABLET ORAL at 12:48

## 2024-09-08 RX ADMIN — GABAPENTIN 300 MG: 300 CAPSULE ORAL at 08:54

## 2024-09-08 RX ADMIN — GABAPENTIN 300 MG: 300 CAPSULE ORAL at 20:36

## 2024-09-08 RX ADMIN — MIDODRINE HYDROCHLORIDE 10 MG: 5 TABLET ORAL at 08:55

## 2024-09-08 RX ADMIN — ENOXAPARIN SODIUM 30 MG: 100 INJECTION SUBCUTANEOUS at 08:54

## 2024-09-08 RX ADMIN — ENOXAPARIN SODIUM 30 MG: 100 INJECTION SUBCUTANEOUS at 20:35

## 2024-09-08 RX ADMIN — SODIUM CHLORIDE, PRESERVATIVE FREE 10 ML: 5 INJECTION INTRAVENOUS at 08:59

## 2024-09-08 RX ADMIN — MIDODRINE HYDROCHLORIDE 10 MG: 5 TABLET ORAL at 16:59

## 2024-09-08 RX ADMIN — WATER 2000 MG: 1 INJECTION INTRAMUSCULAR; INTRAVENOUS; SUBCUTANEOUS at 13:59

## 2024-09-09 LAB
ALBUMIN SERPL-MCNC: 2.3 G/DL (ref 3.5–5)
ALBUMIN/GLOB SERPL: 0.6 (ref 1.1–2.2)
ALP SERPL-CCNC: 100 U/L (ref 45–117)
ALT SERPL-CCNC: 15 U/L (ref 12–78)
ANION GAP SERPL CALC-SCNC: 5 MMOL/L (ref 2–12)
AST SERPL-CCNC: 30 U/L (ref 15–37)
BASOPHILS # BLD: 0 K/UL (ref 0–0.1)
BASOPHILS NFR BLD: 0 % (ref 0–1)
BILIRUB SERPL-MCNC: 0.9 MG/DL (ref 0.2–1)
BUN SERPL-MCNC: 13 MG/DL (ref 6–20)
BUN/CREAT SERPL: 15 (ref 12–20)
CALCIUM SERPL-MCNC: 8.3 MG/DL (ref 8.5–10.1)
CHLORIDE SERPL-SCNC: 107 MMOL/L (ref 97–108)
CO2 SERPL-SCNC: 25 MMOL/L (ref 21–32)
CREAT SERPL-MCNC: 0.87 MG/DL (ref 0.55–1.02)
DIFFERENTIAL METHOD BLD: ABNORMAL
EOSINOPHIL # BLD: 0 K/UL (ref 0–0.4)
EOSINOPHIL NFR BLD: 0 % (ref 0–7)
ERYTHROCYTE [DISTWIDTH] IN BLOOD BY AUTOMATED COUNT: 18.1 % (ref 11.5–14.5)
GLOBULIN SER CALC-MCNC: 3.6 G/DL (ref 2–4)
GLUCOSE BLD STRIP.AUTO-MCNC: 87 MG/DL (ref 65–117)
GLUCOSE SERPL-MCNC: 78 MG/DL (ref 65–100)
HCT VFR BLD AUTO: 21.4 % (ref 35–47)
HCT VFR BLD AUTO: 21.9 % (ref 35–47)
HCT VFR BLD AUTO: 28 % (ref 35–47)
HGB BLD-MCNC: 6.7 G/DL (ref 11.5–16)
HGB BLD-MCNC: 6.9 G/DL (ref 11.5–16)
HGB BLD-MCNC: 9.1 G/DL (ref 11.5–16)
HISTORY CHECK: NORMAL
HISTORY CHECK: NORMAL
IMM GRANULOCYTES # BLD AUTO: 0 K/UL
IMM GRANULOCYTES NFR BLD AUTO: 0 %
LYMPHOCYTES # BLD: 2.2 K/UL (ref 0.8–3.5)
LYMPHOCYTES NFR BLD: 31 % (ref 12–49)
MAGNESIUM SERPL-MCNC: 1.6 MG/DL (ref 1.6–2.4)
MCH RBC QN AUTO: 28.8 PG (ref 26–34)
MCHC RBC AUTO-ENTMCNC: 31.5 G/DL (ref 30–36.5)
MCV RBC AUTO: 91.3 FL (ref 80–99)
METAMYELOCYTES NFR BLD MANUAL: 4 %
MONOCYTES # BLD: 0.9 K/UL (ref 0–1)
MONOCYTES NFR BLD: 13 % (ref 5–13)
MYELOCYTES NFR BLD MANUAL: 3 %
NEUTS BAND NFR BLD MANUAL: 4 % (ref 0–6)
NEUTS SEG # BLD: 3.5 K/UL (ref 1.8–8)
NEUTS SEG NFR BLD: 45 % (ref 32–75)
NRBC # BLD: 0.08 K/UL (ref 0–0.01)
NRBC BLD-RTO: 1.1 PER 100 WBC
PLATELET # BLD AUTO: 210 K/UL (ref 150–400)
PMV BLD AUTO: 9.5 FL (ref 8.9–12.9)
POTASSIUM SERPL-SCNC: 3.9 MMOL/L (ref 3.5–5.1)
PROT SERPL-MCNC: 5.9 G/DL (ref 6.4–8.2)
RBC # BLD AUTO: 2.4 M/UL (ref 3.8–5.2)
RBC MORPH BLD: ABNORMAL
SERVICE CMNT-IMP: NORMAL
SODIUM SERPL-SCNC: 137 MMOL/L (ref 136–145)
WBC # BLD AUTO: 7.2 K/UL (ref 3.6–11)

## 2024-09-09 PROCEDURE — 6370000000 HC RX 637 (ALT 250 FOR IP): Performed by: INTERNAL MEDICINE

## 2024-09-09 PROCEDURE — 82962 GLUCOSE BLOOD TEST: CPT

## 2024-09-09 PROCEDURE — P9016 RBC LEUKOCYTES REDUCED: HCPCS

## 2024-09-09 PROCEDURE — 36415 COLL VENOUS BLD VENIPUNCTURE: CPT

## 2024-09-09 PROCEDURE — 83735 ASSAY OF MAGNESIUM: CPT

## 2024-09-09 PROCEDURE — 2580000003 HC RX 258: Performed by: INTERNAL MEDICINE

## 2024-09-09 PROCEDURE — 85018 HEMOGLOBIN: CPT

## 2024-09-09 PROCEDURE — 94761 N-INVAS EAR/PLS OXIMETRY MLT: CPT

## 2024-09-09 PROCEDURE — 80053 COMPREHEN METABOLIC PANEL: CPT

## 2024-09-09 PROCEDURE — 6370000000 HC RX 637 (ALT 250 FOR IP): Performed by: HOSPITALIST

## 2024-09-09 PROCEDURE — 86850 RBC ANTIBODY SCREEN: CPT

## 2024-09-09 PROCEDURE — 2580000003 HC RX 258: Performed by: STUDENT IN AN ORGANIZED HEALTH CARE EDUCATION/TRAINING PROGRAM

## 2024-09-09 PROCEDURE — 86900 BLOOD TYPING SEROLOGIC ABO: CPT

## 2024-09-09 PROCEDURE — 86923 COMPATIBILITY TEST ELECTRIC: CPT

## 2024-09-09 PROCEDURE — 2060000000 HC ICU INTERMEDIATE R&B

## 2024-09-09 PROCEDURE — 30233N1 TRANSFUSION OF NONAUTOLOGOUS RED BLOOD CELLS INTO PERIPHERAL VEIN, PERCUTANEOUS APPROACH: ICD-10-PCS | Performed by: FAMILY MEDICINE

## 2024-09-09 PROCEDURE — 86901 BLOOD TYPING SEROLOGIC RH(D): CPT

## 2024-09-09 PROCEDURE — 85025 COMPLETE CBC W/AUTO DIFF WBC: CPT

## 2024-09-09 PROCEDURE — 6360000002 HC RX W HCPCS: Performed by: STUDENT IN AN ORGANIZED HEALTH CARE EDUCATION/TRAINING PROGRAM

## 2024-09-09 PROCEDURE — 36430 TRANSFUSION BLD/BLD COMPNT: CPT

## 2024-09-09 PROCEDURE — 85014 HEMATOCRIT: CPT

## 2024-09-09 RX ORDER — SODIUM CHLORIDE 9 MG/ML
INJECTION, SOLUTION INTRAVENOUS PRN
Status: DISCONTINUED | OUTPATIENT
Start: 2024-09-09 | End: 2024-09-13 | Stop reason: HOSPADM

## 2024-09-09 RX ORDER — NOREPINEPHRINE BITARTRATE 0.06 MG/ML
1-100 INJECTION, SOLUTION INTRAVENOUS CONTINUOUS
Status: DISCONTINUED | OUTPATIENT
Start: 2024-09-09 | End: 2024-09-09

## 2024-09-09 RX ADMIN — MIDODRINE HYDROCHLORIDE 10 MG: 5 TABLET ORAL at 11:39

## 2024-09-09 RX ADMIN — CASTOR OIL AND BALSAM, PERU: 788; 87 OINTMENT TOPICAL at 20:30

## 2024-09-09 RX ADMIN — SODIUM CHLORIDE, PRESERVATIVE FREE 10 ML: 5 INJECTION INTRAVENOUS at 08:16

## 2024-09-09 RX ADMIN — WATER 2000 MG: 1 INJECTION INTRAMUSCULAR; INTRAVENOUS; SUBCUTANEOUS at 14:11

## 2024-09-09 RX ADMIN — SODIUM CHLORIDE, PRESERVATIVE FREE 10 ML: 5 INJECTION INTRAVENOUS at 20:30

## 2024-09-09 RX ADMIN — CASTOR OIL AND BALSAM, PERU: 788; 87 OINTMENT TOPICAL at 08:15

## 2024-09-09 RX ADMIN — IRON SUCROSE 100 MG: 20 INJECTION, SOLUTION INTRAVENOUS at 08:15

## 2024-09-09 RX ADMIN — MIDODRINE HYDROCHLORIDE 10 MG: 5 TABLET ORAL at 16:28

## 2024-09-09 RX ADMIN — MIDODRINE HYDROCHLORIDE 10 MG: 5 TABLET ORAL at 08:14

## 2024-09-09 RX ADMIN — AMITRIPTYLINE HYDROCHLORIDE 10 MG: 10 TABLET, FILM COATED ORAL at 20:29

## 2024-09-09 RX ADMIN — GABAPENTIN 300 MG: 300 CAPSULE ORAL at 08:14

## 2024-09-09 RX ADMIN — SODIUM CHLORIDE, POTASSIUM CHLORIDE, SODIUM LACTATE AND CALCIUM CHLORIDE: 600; 310; 30; 20 INJECTION, SOLUTION INTRAVENOUS at 14:19

## 2024-09-09 RX ADMIN — GABAPENTIN 300 MG: 300 CAPSULE ORAL at 20:29

## 2024-09-10 LAB
ABO + RH BLD: NORMAL
ALBUMIN SERPL-MCNC: 2.2 G/DL (ref 3.5–5)
ALBUMIN/GLOB SERPL: 0.6 (ref 1.1–2.2)
ALP SERPL-CCNC: 102 U/L (ref 45–117)
ALT SERPL-CCNC: 14 U/L (ref 12–78)
ANION GAP SERPL CALC-SCNC: 6 MMOL/L (ref 2–12)
AST SERPL-CCNC: 29 U/L (ref 15–37)
BASOPHILS # BLD: 0 K/UL (ref 0–0.1)
BASOPHILS NFR BLD: 0 % (ref 0–1)
BILIRUB SERPL-MCNC: 1 MG/DL (ref 0.2–1)
BLD PROD TYP BPU: NORMAL
BLD PROD TYP BPU: NORMAL
BLOOD BANK BLOOD PRODUCT EXPIRATION DATE: NORMAL
BLOOD BANK BLOOD PRODUCT EXPIRATION DATE: NORMAL
BLOOD BANK DISPENSE STATUS: NORMAL
BLOOD BANK DISPENSE STATUS: NORMAL
BLOOD BANK ISBT PRODUCT BLOOD TYPE: 600
BLOOD BANK ISBT PRODUCT BLOOD TYPE: 6200
BLOOD BANK PRODUCT CODE: NORMAL
BLOOD BANK PRODUCT CODE: NORMAL
BLOOD BANK UNIT TYPE AND RH: NORMAL
BLOOD BANK UNIT TYPE AND RH: NORMAL
BLOOD GROUP ANTIBODIES SERPL: NORMAL
BPU ID: NORMAL
BPU ID: NORMAL
BUN SERPL-MCNC: 12 MG/DL (ref 6–20)
BUN/CREAT SERPL: 15 (ref 12–20)
CALCIUM SERPL-MCNC: 7.8 MG/DL (ref 8.5–10.1)
CHLORIDE SERPL-SCNC: 108 MMOL/L (ref 97–108)
CO2 SERPL-SCNC: 25 MMOL/L (ref 21–32)
CREAT SERPL-MCNC: 0.8 MG/DL (ref 0.55–1.02)
CROSSMATCH RESULT: NORMAL
CROSSMATCH RESULT: NORMAL
DIFFERENTIAL METHOD BLD: ABNORMAL
EOSINOPHIL # BLD: 0.2 K/UL (ref 0–0.4)
EOSINOPHIL NFR BLD: 3 % (ref 0–7)
ERYTHROCYTE [DISTWIDTH] IN BLOOD BY AUTOMATED COUNT: 17.7 % (ref 11.5–14.5)
GLOBULIN SER CALC-MCNC: 3.5 G/DL (ref 2–4)
GLUCOSE SERPL-MCNC: 90 MG/DL (ref 65–100)
HCT VFR BLD AUTO: 28 % (ref 35–47)
HGB BLD-MCNC: 9 G/DL (ref 11.5–16)
IMM GRANULOCYTES # BLD AUTO: 0 K/UL
IMM GRANULOCYTES NFR BLD AUTO: 0 %
LYMPHOCYTES # BLD: 2.5 K/UL (ref 0.8–3.5)
LYMPHOCYTES NFR BLD: 33 % (ref 12–49)
MAGNESIUM SERPL-MCNC: 1.6 MG/DL (ref 1.6–2.4)
MCH RBC QN AUTO: 28.5 PG (ref 26–34)
MCHC RBC AUTO-ENTMCNC: 32.1 G/DL (ref 30–36.5)
MCV RBC AUTO: 88.6 FL (ref 80–99)
METAMYELOCYTES NFR BLD MANUAL: 6 %
MONOCYTES # BLD: 0.5 K/UL (ref 0–1)
MONOCYTES NFR BLD: 7 % (ref 5–13)
MYELOCYTES NFR BLD MANUAL: 2 %
NEUTS BAND NFR BLD MANUAL: 2 % (ref 0–6)
NEUTS SEG # BLD: 3.7 K/UL (ref 1.8–8)
NEUTS SEG NFR BLD: 47 % (ref 32–75)
NRBC # BLD: 0.08 K/UL (ref 0–0.01)
NRBC BLD-RTO: 1.1 PER 100 WBC
PLATELET # BLD AUTO: 161 K/UL (ref 150–400)
PMV BLD AUTO: 9.7 FL (ref 8.9–12.9)
POTASSIUM SERPL-SCNC: 4 MMOL/L (ref 3.5–5.1)
PROT SERPL-MCNC: 5.7 G/DL (ref 6.4–8.2)
RBC # BLD AUTO: 3.16 M/UL (ref 3.8–5.2)
RBC MORPH BLD: ABNORMAL
SODIUM SERPL-SCNC: 139 MMOL/L (ref 136–145)
SPECIMEN EXP DATE BLD: NORMAL
UNIT DIVISION: 0
UNIT DIVISION: 0
UNIT ISSUE DATE/TIME: NORMAL
UNIT ISSUE DATE/TIME: NORMAL
WBC # BLD AUTO: 7.5 K/UL (ref 3.6–11)

## 2024-09-10 PROCEDURE — 6360000002 HC RX W HCPCS: Performed by: STUDENT IN AN ORGANIZED HEALTH CARE EDUCATION/TRAINING PROGRAM

## 2024-09-10 PROCEDURE — 6370000000 HC RX 637 (ALT 250 FOR IP): Performed by: HOSPITALIST

## 2024-09-10 PROCEDURE — 80053 COMPREHEN METABOLIC PANEL: CPT

## 2024-09-10 PROCEDURE — 94761 N-INVAS EAR/PLS OXIMETRY MLT: CPT

## 2024-09-10 PROCEDURE — 2580000003 HC RX 258: Performed by: INTERNAL MEDICINE

## 2024-09-10 PROCEDURE — 6370000000 HC RX 637 (ALT 250 FOR IP): Performed by: INTERNAL MEDICINE

## 2024-09-10 PROCEDURE — P9047 ALBUMIN (HUMAN), 25%, 50ML: HCPCS | Performed by: STUDENT IN AN ORGANIZED HEALTH CARE EDUCATION/TRAINING PROGRAM

## 2024-09-10 PROCEDURE — 2060000000 HC ICU INTERMEDIATE R&B

## 2024-09-10 PROCEDURE — 2580000003 HC RX 258: Performed by: STUDENT IN AN ORGANIZED HEALTH CARE EDUCATION/TRAINING PROGRAM

## 2024-09-10 PROCEDURE — 36415 COLL VENOUS BLD VENIPUNCTURE: CPT

## 2024-09-10 PROCEDURE — 85025 COMPLETE CBC W/AUTO DIFF WBC: CPT

## 2024-09-10 PROCEDURE — 83735 ASSAY OF MAGNESIUM: CPT

## 2024-09-10 RX ORDER — ALBUMIN (HUMAN) 12.5 G/50ML
25 SOLUTION INTRAVENOUS EVERY 12 HOURS
Status: DISCONTINUED | OUTPATIENT
Start: 2024-09-10 | End: 2024-09-11

## 2024-09-10 RX ADMIN — ALBUMIN (HUMAN) 25 G: 0.25 INJECTION, SOLUTION INTRAVENOUS at 11:51

## 2024-09-10 RX ADMIN — MIDODRINE HYDROCHLORIDE 10 MG: 5 TABLET ORAL at 15:54

## 2024-09-10 RX ADMIN — WATER 2000 MG: 1 INJECTION INTRAMUSCULAR; INTRAVENOUS; SUBCUTANEOUS at 15:52

## 2024-09-10 RX ADMIN — ALBUMIN (HUMAN) 25 G: 0.25 INJECTION, SOLUTION INTRAVENOUS at 23:45

## 2024-09-10 RX ADMIN — MIDODRINE HYDROCHLORIDE 10 MG: 5 TABLET ORAL at 18:59

## 2024-09-10 RX ADMIN — MIDODRINE HYDROCHLORIDE 10 MG: 5 TABLET ORAL at 10:17

## 2024-09-10 RX ADMIN — ACETAMINOPHEN 650 MG: 325 TABLET ORAL at 15:53

## 2024-09-10 RX ADMIN — IRON SUCROSE 100 MG: 20 INJECTION, SOLUTION INTRAVENOUS at 10:35

## 2024-09-10 RX ADMIN — SODIUM CHLORIDE, PRESERVATIVE FREE 10 ML: 5 INJECTION INTRAVENOUS at 22:07

## 2024-09-10 RX ADMIN — SODIUM CHLORIDE, POTASSIUM CHLORIDE, SODIUM LACTATE AND CALCIUM CHLORIDE: 600; 310; 30; 20 INJECTION, SOLUTION INTRAVENOUS at 20:26

## 2024-09-10 RX ADMIN — CASTOR OIL AND BALSAM, PERU: 788; 87 OINTMENT TOPICAL at 10:17

## 2024-09-10 RX ADMIN — AMITRIPTYLINE HYDROCHLORIDE 10 MG: 10 TABLET, FILM COATED ORAL at 22:05

## 2024-09-10 RX ADMIN — SODIUM CHLORIDE, PRESERVATIVE FREE 10 ML: 5 INJECTION INTRAVENOUS at 09:00

## 2024-09-10 RX ADMIN — GABAPENTIN 300 MG: 300 CAPSULE ORAL at 22:06

## 2024-09-10 RX ADMIN — GABAPENTIN 300 MG: 300 CAPSULE ORAL at 10:17

## 2024-09-10 RX ADMIN — CASTOR OIL AND BALSAM, PERU: 788; 87 OINTMENT TOPICAL at 22:09

## 2024-09-10 ASSESSMENT — PAIN DESCRIPTION - LOCATION: LOCATION: KNEE

## 2024-09-10 ASSESSMENT — PAIN SCALES - GENERAL: PAINLEVEL_OUTOF10: 3

## 2024-09-10 ASSESSMENT — PAIN DESCRIPTION - ORIENTATION: ORIENTATION: RIGHT

## 2024-09-10 ASSESSMENT — PAIN DESCRIPTION - DESCRIPTORS: DESCRIPTORS: ACHING

## 2024-09-11 PROBLEM — I95.9 HYPOTENSION: Status: ACTIVE | Noted: 2024-09-11

## 2024-09-11 PROBLEM — R41.82 ALTERED MENTAL STATUS: Status: ACTIVE | Noted: 2024-09-11

## 2024-09-11 LAB
ALBUMIN SERPL-MCNC: 2.7 G/DL (ref 3.5–5)
ALBUMIN/GLOB SERPL: 0.8 (ref 1.1–2.2)
ALP SERPL-CCNC: 84 U/L (ref 45–117)
ALT SERPL-CCNC: 13 U/L (ref 12–78)
ANION GAP SERPL CALC-SCNC: 3 MMOL/L (ref 2–12)
AST SERPL-CCNC: 21 U/L (ref 15–37)
BASOPHILS # BLD: 0.1 K/UL (ref 0–0.1)
BASOPHILS NFR BLD: 1 % (ref 0–1)
BILIRUB SERPL-MCNC: 1.1 MG/DL (ref 0.2–1)
BUN SERPL-MCNC: 10 MG/DL (ref 6–20)
BUN/CREAT SERPL: 13 (ref 12–20)
CALCIUM SERPL-MCNC: 8.2 MG/DL (ref 8.5–10.1)
CHLORIDE SERPL-SCNC: 109 MMOL/L (ref 97–108)
CO2 SERPL-SCNC: 27 MMOL/L (ref 21–32)
CREAT SERPL-MCNC: 0.77 MG/DL (ref 0.55–1.02)
DIFFERENTIAL METHOD BLD: ABNORMAL
EOSINOPHIL # BLD: 0.1 K/UL (ref 0–0.4)
EOSINOPHIL NFR BLD: 1 % (ref 0–7)
ERYTHROCYTE [DISTWIDTH] IN BLOOD BY AUTOMATED COUNT: 18.3 % (ref 11.5–14.5)
GLOBULIN SER CALC-MCNC: 3.3 G/DL (ref 2–4)
GLUCOSE SERPL-MCNC: 83 MG/DL (ref 65–100)
HCT VFR BLD AUTO: 24.1 % (ref 35–47)
HGB BLD-MCNC: 7.8 G/DL (ref 11.5–16)
IMM GRANULOCYTES # BLD AUTO: 0 K/UL
IMM GRANULOCYTES NFR BLD AUTO: 0 %
LYMPHOCYTES # BLD: 2.1 K/UL (ref 0.8–3.5)
LYMPHOCYTES NFR BLD: 35 % (ref 12–49)
MAGNESIUM SERPL-MCNC: 1.7 MG/DL (ref 1.6–2.4)
MCH RBC QN AUTO: 29 PG (ref 26–34)
MCHC RBC AUTO-ENTMCNC: 32.4 G/DL (ref 30–36.5)
MCV RBC AUTO: 89.6 FL (ref 80–99)
MONOCYTES # BLD: 0.6 K/UL (ref 0–1)
MONOCYTES NFR BLD: 10 % (ref 5–13)
MYELOCYTES NFR BLD MANUAL: 2 %
NEUTS BAND NFR BLD MANUAL: 6 % (ref 0–6)
NEUTS SEG # BLD: 3 K/UL (ref 1.8–8)
NEUTS SEG NFR BLD: 45 % (ref 32–75)
NRBC # BLD: 0.03 K/UL (ref 0–0.01)
NRBC BLD-RTO: 0.5 PER 100 WBC
PLATELET # BLD AUTO: 154 K/UL (ref 150–400)
PMV BLD AUTO: 9.7 FL (ref 8.9–12.9)
POTASSIUM SERPL-SCNC: 3.6 MMOL/L (ref 3.5–5.1)
PROT SERPL-MCNC: 6 G/DL (ref 6.4–8.2)
RBC # BLD AUTO: 2.69 M/UL (ref 3.8–5.2)
RBC MORPH BLD: ABNORMAL
SODIUM SERPL-SCNC: 139 MMOL/L (ref 136–145)
WBC # BLD AUTO: 5.9 K/UL (ref 3.6–11)
WBC MORPH BLD: ABNORMAL

## 2024-09-11 PROCEDURE — P9047 ALBUMIN (HUMAN), 25%, 50ML: HCPCS | Performed by: STUDENT IN AN ORGANIZED HEALTH CARE EDUCATION/TRAINING PROGRAM

## 2024-09-11 PROCEDURE — 85025 COMPLETE CBC W/AUTO DIFF WBC: CPT

## 2024-09-11 PROCEDURE — 2060000000 HC ICU INTERMEDIATE R&B

## 2024-09-11 PROCEDURE — 6370000000 HC RX 637 (ALT 250 FOR IP): Performed by: INTERNAL MEDICINE

## 2024-09-11 PROCEDURE — 99223 1ST HOSP IP/OBS HIGH 75: CPT | Performed by: INTERNAL MEDICINE

## 2024-09-11 PROCEDURE — 80053 COMPREHEN METABOLIC PANEL: CPT

## 2024-09-11 PROCEDURE — 6360000002 HC RX W HCPCS: Performed by: STUDENT IN AN ORGANIZED HEALTH CARE EDUCATION/TRAINING PROGRAM

## 2024-09-11 PROCEDURE — 83735 ASSAY OF MAGNESIUM: CPT

## 2024-09-11 PROCEDURE — 2580000003 HC RX 258: Performed by: INTERNAL MEDICINE

## 2024-09-11 PROCEDURE — 36415 COLL VENOUS BLD VENIPUNCTURE: CPT

## 2024-09-11 PROCEDURE — 6370000000 HC RX 637 (ALT 250 FOR IP): Performed by: FAMILY MEDICINE

## 2024-09-11 PROCEDURE — 2580000003 HC RX 258: Performed by: STUDENT IN AN ORGANIZED HEALTH CARE EDUCATION/TRAINING PROGRAM

## 2024-09-11 PROCEDURE — 6370000000 HC RX 637 (ALT 250 FOR IP): Performed by: HOSPITALIST

## 2024-09-11 RX ORDER — ASPIRIN 81 MG/1
81 TABLET ORAL DAILY
Status: DISCONTINUED | OUTPATIENT
Start: 2024-09-11 | End: 2024-09-13 | Stop reason: HOSPADM

## 2024-09-11 RX ADMIN — SODIUM CHLORIDE, PRESERVATIVE FREE 10 ML: 5 INJECTION INTRAVENOUS at 20:13

## 2024-09-11 RX ADMIN — SODIUM CHLORIDE, POTASSIUM CHLORIDE, SODIUM LACTATE AND CALCIUM CHLORIDE: 600; 310; 30; 20 INJECTION, SOLUTION INTRAVENOUS at 09:34

## 2024-09-11 RX ADMIN — CASTOR OIL AND BALSAM, PERU: 788; 87 OINTMENT TOPICAL at 08:31

## 2024-09-11 RX ADMIN — ASPIRIN 81 MG: 81 TABLET, COATED ORAL at 17:43

## 2024-09-11 RX ADMIN — MIDODRINE HYDROCHLORIDE 10 MG: 5 TABLET ORAL at 12:16

## 2024-09-11 RX ADMIN — GABAPENTIN 300 MG: 300 CAPSULE ORAL at 20:13

## 2024-09-11 RX ADMIN — SODIUM CHLORIDE, PRESERVATIVE FREE 10 ML: 5 INJECTION INTRAVENOUS at 08:29

## 2024-09-11 RX ADMIN — WATER 2000 MG: 1 INJECTION INTRAMUSCULAR; INTRAVENOUS; SUBCUTANEOUS at 14:53

## 2024-09-11 RX ADMIN — ALBUMIN (HUMAN) 25 G: 0.25 INJECTION, SOLUTION INTRAVENOUS at 12:23

## 2024-09-11 RX ADMIN — GABAPENTIN 300 MG: 300 CAPSULE ORAL at 08:31

## 2024-09-11 RX ADMIN — IRON SUCROSE 100 MG: 20 INJECTION, SOLUTION INTRAVENOUS at 08:28

## 2024-09-11 RX ADMIN — MIDODRINE HYDROCHLORIDE 10 MG: 5 TABLET ORAL at 17:43

## 2024-09-11 RX ADMIN — MIDODRINE HYDROCHLORIDE 10 MG: 5 TABLET ORAL at 08:26

## 2024-09-11 RX ADMIN — AMITRIPTYLINE HYDROCHLORIDE 10 MG: 10 TABLET, FILM COATED ORAL at 20:13

## 2024-09-11 RX ADMIN — CASTOR OIL AND BALSAM, PERU: 788; 87 OINTMENT TOPICAL at 20:13

## 2024-09-11 RX ADMIN — ACETAMINOPHEN 650 MG: 325 TABLET ORAL at 10:44

## 2024-09-11 RX ADMIN — ACETAMINOPHEN 650 MG: 325 TABLET ORAL at 20:12

## 2024-09-11 ASSESSMENT — PAIN DESCRIPTION - LOCATION
LOCATION: BUTTOCKS;SACRUM
LOCATION: SACRUM;BUTTOCKS
LOCATION: BUTTOCKS;SACRUM

## 2024-09-11 ASSESSMENT — PAIN DESCRIPTION - ORIENTATION
ORIENTATION: POSTERIOR
ORIENTATION: PROXIMAL
ORIENTATION: POSTERIOR

## 2024-09-11 ASSESSMENT — PAIN SCALES - GENERAL
PAINLEVEL_OUTOF10: 6
PAINLEVEL_OUTOF10: 0
PAINLEVEL_OUTOF10: 9
PAINLEVEL_OUTOF10: 6
PAINLEVEL_OUTOF10: 0
PAINLEVEL_OUTOF10: 0

## 2024-09-11 ASSESSMENT — PAIN DESCRIPTION - PAIN TYPE
TYPE: ACUTE PAIN
TYPE: ACUTE PAIN

## 2024-09-11 ASSESSMENT — PAIN DESCRIPTION - DESCRIPTORS
DESCRIPTORS: ACHING
DESCRIPTORS: ACHING;DISCOMFORT
DESCRIPTORS: ACHING;DISCOMFORT

## 2024-09-12 LAB
ALBUMIN SERPL-MCNC: 2.9 G/DL (ref 3.5–5)
ALBUMIN/GLOB SERPL: 0.9 (ref 1.1–2.2)
ALP SERPL-CCNC: 83 U/L (ref 45–117)
ALT SERPL-CCNC: 12 U/L (ref 12–78)
ANION GAP SERPL CALC-SCNC: 5 MMOL/L (ref 2–12)
AST SERPL-CCNC: 20 U/L (ref 15–37)
BASOPHILS # BLD: 0.1 K/UL (ref 0–0.1)
BASOPHILS NFR BLD: 1 % (ref 0–1)
BILIRUB SERPL-MCNC: 1.1 MG/DL (ref 0.2–1)
BUN SERPL-MCNC: 9 MG/DL (ref 6–20)
BUN/CREAT SERPL: 13 (ref 12–20)
CALCIUM SERPL-MCNC: 8.3 MG/DL (ref 8.5–10.1)
CHLORIDE SERPL-SCNC: 111 MMOL/L (ref 97–108)
CO2 SERPL-SCNC: 24 MMOL/L (ref 21–32)
CREAT SERPL-MCNC: 0.7 MG/DL (ref 0.55–1.02)
DIFFERENTIAL METHOD BLD: ABNORMAL
EOSINOPHIL # BLD: 0.1 K/UL (ref 0–0.4)
EOSINOPHIL NFR BLD: 2 % (ref 0–7)
ERYTHROCYTE [DISTWIDTH] IN BLOOD BY AUTOMATED COUNT: 18.8 % (ref 11.5–14.5)
GLOBULIN SER CALC-MCNC: 3.2 G/DL (ref 2–4)
GLUCOSE SERPL-MCNC: 88 MG/DL (ref 65–100)
HCT VFR BLD AUTO: 26 % (ref 35–47)
HGB BLD-MCNC: 8.5 G/DL (ref 11.5–16)
IMM GRANULOCYTES # BLD AUTO: 0.2 K/UL (ref 0–0.04)
IMM GRANULOCYTES NFR BLD AUTO: 3 % (ref 0–0.5)
LYMPHOCYTES # BLD: 2.1 K/UL (ref 0.8–3.5)
LYMPHOCYTES NFR BLD: 37 % (ref 12–49)
MAGNESIUM SERPL-MCNC: 1.7 MG/DL (ref 1.6–2.4)
MCH RBC QN AUTO: 29.4 PG (ref 26–34)
MCHC RBC AUTO-ENTMCNC: 32.7 G/DL (ref 30–36.5)
MCV RBC AUTO: 90 FL (ref 80–99)
MONOCYTES # BLD: 0.6 K/UL (ref 0–1)
MONOCYTES NFR BLD: 10 % (ref 5–13)
NEUTS SEG # BLD: 2.5 K/UL (ref 1.8–8)
NEUTS SEG NFR BLD: 47 % (ref 32–75)
NRBC # BLD: 0.02 K/UL (ref 0–0.01)
NRBC BLD-RTO: 0.4 PER 100 WBC
PLATELET # BLD AUTO: 166 K/UL (ref 150–400)
PMV BLD AUTO: 9.5 FL (ref 8.9–12.9)
POTASSIUM SERPL-SCNC: 3.7 MMOL/L (ref 3.5–5.1)
PROT SERPL-MCNC: 6.1 G/DL (ref 6.4–8.2)
RBC # BLD AUTO: 2.89 M/UL (ref 3.8–5.2)
RBC MORPH BLD: ABNORMAL
SODIUM SERPL-SCNC: 140 MMOL/L (ref 136–145)
WBC # BLD AUTO: 5.6 K/UL (ref 3.6–11)

## 2024-09-12 PROCEDURE — 6370000000 HC RX 637 (ALT 250 FOR IP): Performed by: FAMILY MEDICINE

## 2024-09-12 PROCEDURE — 99232 SBSQ HOSP IP/OBS MODERATE 35: CPT | Performed by: INTERNAL MEDICINE

## 2024-09-12 PROCEDURE — 6370000000 HC RX 637 (ALT 250 FOR IP): Performed by: HOSPITALIST

## 2024-09-12 PROCEDURE — 94761 N-INVAS EAR/PLS OXIMETRY MLT: CPT

## 2024-09-12 PROCEDURE — 36415 COLL VENOUS BLD VENIPUNCTURE: CPT

## 2024-09-12 PROCEDURE — 2580000003 HC RX 258: Performed by: INTERNAL MEDICINE

## 2024-09-12 PROCEDURE — 80053 COMPREHEN METABOLIC PANEL: CPT

## 2024-09-12 PROCEDURE — 83735 ASSAY OF MAGNESIUM: CPT

## 2024-09-12 PROCEDURE — 85025 COMPLETE CBC W/AUTO DIFF WBC: CPT

## 2024-09-12 PROCEDURE — 99223 1ST HOSP IP/OBS HIGH 75: CPT | Performed by: INTERNAL MEDICINE

## 2024-09-12 PROCEDURE — 2060000000 HC ICU INTERMEDIATE R&B

## 2024-09-12 PROCEDURE — 6370000000 HC RX 637 (ALT 250 FOR IP): Performed by: INTERNAL MEDICINE

## 2024-09-12 RX ORDER — MIDODRINE HYDROCHLORIDE 10 MG/1
10 TABLET ORAL
Qty: 90 TABLET | Refills: 0 | Status: SHIPPED | OUTPATIENT
Start: 2024-09-12 | End: 2024-09-13

## 2024-09-12 RX ADMIN — ASPIRIN 81 MG: 81 TABLET, COATED ORAL at 08:26

## 2024-09-12 RX ADMIN — MIDODRINE HYDROCHLORIDE 10 MG: 5 TABLET ORAL at 17:52

## 2024-09-12 RX ADMIN — CASTOR OIL AND BALSAM, PERU: 788; 87 OINTMENT TOPICAL at 08:26

## 2024-09-12 RX ADMIN — AMITRIPTYLINE HYDROCHLORIDE 10 MG: 10 TABLET, FILM COATED ORAL at 21:02

## 2024-09-12 RX ADMIN — GABAPENTIN 300 MG: 300 CAPSULE ORAL at 21:01

## 2024-09-12 RX ADMIN — CASTOR OIL AND BALSAM, PERU: 788; 87 OINTMENT TOPICAL at 21:00

## 2024-09-12 RX ADMIN — MIDODRINE HYDROCHLORIDE 10 MG: 5 TABLET ORAL at 13:29

## 2024-09-12 RX ADMIN — MIDODRINE HYDROCHLORIDE 10 MG: 5 TABLET ORAL at 08:26

## 2024-09-12 RX ADMIN — SODIUM CHLORIDE, PRESERVATIVE FREE 10 ML: 5 INJECTION INTRAVENOUS at 21:02

## 2024-09-12 RX ADMIN — GABAPENTIN 300 MG: 300 CAPSULE ORAL at 08:25

## 2024-09-12 RX ADMIN — SODIUM CHLORIDE, PRESERVATIVE FREE 10 ML: 5 INJECTION INTRAVENOUS at 10:11

## 2024-09-13 VITALS
TEMPERATURE: 97.7 F | OXYGEN SATURATION: 97 % | BODY MASS INDEX: 44.41 KG/M2 | HEIGHT: 68 IN | HEART RATE: 91 BPM | WEIGHT: 293 LBS | DIASTOLIC BLOOD PRESSURE: 59 MMHG | RESPIRATION RATE: 18 BRPM | SYSTOLIC BLOOD PRESSURE: 115 MMHG

## 2024-09-13 LAB
ALBUMIN SERPL-MCNC: 2.7 G/DL (ref 3.5–5)
ALBUMIN/GLOB SERPL: 0.8 (ref 1.1–2.2)
ALP SERPL-CCNC: 86 U/L (ref 45–117)
ALT SERPL-CCNC: 12 U/L (ref 12–78)
ANION GAP SERPL CALC-SCNC: 5 MMOL/L (ref 2–12)
AST SERPL-CCNC: 24 U/L (ref 15–37)
BASOPHILS # BLD: 0 K/UL (ref 0–0.1)
BASOPHILS NFR BLD: 0 % (ref 0–1)
BILIRUB SERPL-MCNC: 1.2 MG/DL (ref 0.2–1)
BUN SERPL-MCNC: 8 MG/DL (ref 6–20)
BUN/CREAT SERPL: 12 (ref 12–20)
CALCIUM SERPL-MCNC: 8.5 MG/DL (ref 8.5–10.1)
CHLORIDE SERPL-SCNC: 109 MMOL/L (ref 97–108)
CO2 SERPL-SCNC: 24 MMOL/L (ref 21–32)
CREAT SERPL-MCNC: 0.65 MG/DL (ref 0.55–1.02)
DIFFERENTIAL METHOD BLD: ABNORMAL
EOSINOPHIL # BLD: 0.1 K/UL (ref 0–0.4)
EOSINOPHIL NFR BLD: 2 % (ref 0–7)
ERYTHROCYTE [DISTWIDTH] IN BLOOD BY AUTOMATED COUNT: 19.2 % (ref 11.5–14.5)
GLOBULIN SER CALC-MCNC: 3.3 G/DL (ref 2–4)
GLUCOSE SERPL-MCNC: 83 MG/DL (ref 65–100)
HCT VFR BLD AUTO: 27.3 % (ref 35–47)
HGB BLD-MCNC: 8.8 G/DL (ref 11.5–16)
IMM GRANULOCYTES # BLD AUTO: 0.1 K/UL (ref 0–0.04)
IMM GRANULOCYTES NFR BLD AUTO: 2 % (ref 0–0.5)
LYMPHOCYTES # BLD: 2.1 K/UL (ref 0.8–3.5)
LYMPHOCYTES NFR BLD: 37 % (ref 12–49)
MAGNESIUM SERPL-MCNC: 1.9 MG/DL (ref 1.6–2.4)
MCH RBC QN AUTO: 29.2 PG (ref 26–34)
MCHC RBC AUTO-ENTMCNC: 32.2 G/DL (ref 30–36.5)
MCV RBC AUTO: 90.7 FL (ref 80–99)
MONOCYTES # BLD: 0.6 K/UL (ref 0–1)
MONOCYTES NFR BLD: 10 % (ref 5–13)
NEUTS SEG # BLD: 2.9 K/UL (ref 1.8–8)
NEUTS SEG NFR BLD: 49 % (ref 32–75)
NRBC # BLD: 0.02 K/UL (ref 0–0.01)
NRBC BLD-RTO: 0.3 PER 100 WBC
PLATELET # BLD AUTO: 171 K/UL (ref 150–400)
PMV BLD AUTO: 9.5 FL (ref 8.9–12.9)
POTASSIUM SERPL-SCNC: 4 MMOL/L (ref 3.5–5.1)
PROT SERPL-MCNC: 6 G/DL (ref 6.4–8.2)
RBC # BLD AUTO: 3.01 M/UL (ref 3.8–5.2)
SODIUM SERPL-SCNC: 138 MMOL/L (ref 136–145)
WBC # BLD AUTO: 5.7 K/UL (ref 3.6–11)

## 2024-09-13 PROCEDURE — 6370000000 HC RX 637 (ALT 250 FOR IP): Performed by: INTERNAL MEDICINE

## 2024-09-13 PROCEDURE — 6370000000 HC RX 637 (ALT 250 FOR IP): Performed by: HOSPITALIST

## 2024-09-13 PROCEDURE — 80053 COMPREHEN METABOLIC PANEL: CPT

## 2024-09-13 PROCEDURE — 2580000003 HC RX 258: Performed by: NURSE PRACTITIONER

## 2024-09-13 PROCEDURE — 94761 N-INVAS EAR/PLS OXIMETRY MLT: CPT

## 2024-09-13 PROCEDURE — APPSS30 APP SPLIT SHARED TIME 16-30 MINUTES: Performed by: NURSE PRACTITIONER

## 2024-09-13 PROCEDURE — 2580000003 HC RX 258: Performed by: INTERNAL MEDICINE

## 2024-09-13 PROCEDURE — 6370000000 HC RX 637 (ALT 250 FOR IP): Performed by: FAMILY MEDICINE

## 2024-09-13 PROCEDURE — 99232 SBSQ HOSP IP/OBS MODERATE 35: CPT | Performed by: INTERNAL MEDICINE

## 2024-09-13 PROCEDURE — 83735 ASSAY OF MAGNESIUM: CPT

## 2024-09-13 PROCEDURE — 36415 COLL VENOUS BLD VENIPUNCTURE: CPT

## 2024-09-13 PROCEDURE — 85025 COMPLETE CBC W/AUTO DIFF WBC: CPT

## 2024-09-13 RX ORDER — MIDODRINE HYDROCHLORIDE 10 MG/1
10 TABLET ORAL
Qty: 270 TABLET | Refills: 3 | Status: SHIPPED | OUTPATIENT
Start: 2024-09-13

## 2024-09-13 RX ORDER — 0.9 % SODIUM CHLORIDE 0.9 %
250 INTRAVENOUS SOLUTION INTRAVENOUS ONCE
Status: COMPLETED | OUTPATIENT
Start: 2024-09-13 | End: 2024-09-13

## 2024-09-13 RX ADMIN — MIDODRINE HYDROCHLORIDE 10 MG: 5 TABLET ORAL at 08:50

## 2024-09-13 RX ADMIN — ASPIRIN 81 MG: 81 TABLET, COATED ORAL at 08:50

## 2024-09-13 RX ADMIN — SODIUM CHLORIDE, PRESERVATIVE FREE 10 ML: 5 INJECTION INTRAVENOUS at 08:58

## 2024-09-13 RX ADMIN — CASTOR OIL AND BALSAM, PERU: 788; 87 OINTMENT TOPICAL at 08:51

## 2024-09-13 RX ADMIN — SODIUM CHLORIDE 250 ML: 9 INJECTION, SOLUTION INTRAVENOUS at 05:00

## 2024-09-13 RX ADMIN — GABAPENTIN 300 MG: 300 CAPSULE ORAL at 08:50

## 2024-09-16 ENCOUNTER — TELEPHONE (OUTPATIENT)
Age: 84
End: 2024-09-16

## 2024-09-20 ENCOUNTER — TELEMEDICINE (OUTPATIENT)
Age: 84
End: 2024-09-20
Payer: MEDICARE

## 2024-09-20 DIAGNOSIS — R79.89 ABNORMAL CBC: ICD-10-CM

## 2024-09-20 DIAGNOSIS — D68.69 SECONDARY HYPERCOAGULABLE STATE (HCC): ICD-10-CM

## 2024-09-20 DIAGNOSIS — M79.89 LEG SWELLING: ICD-10-CM

## 2024-09-20 DIAGNOSIS — R65.21 SEPTIC SHOCK (HCC): Primary | ICD-10-CM

## 2024-09-20 DIAGNOSIS — I50.32 CHRONIC DIASTOLIC CONGESTIVE HEART FAILURE (HCC): ICD-10-CM

## 2024-09-20 DIAGNOSIS — I48.91 ATRIAL FIBRILLATION, UNSPECIFIED TYPE (HCC): ICD-10-CM

## 2024-09-20 DIAGNOSIS — L30.9 DERMATITIS: ICD-10-CM

## 2024-09-20 DIAGNOSIS — A41.9 SEPTIC SHOCK (HCC): Primary | ICD-10-CM

## 2024-09-20 DIAGNOSIS — I95.9 HYPOTENSION, UNSPECIFIED HYPOTENSION TYPE: ICD-10-CM

## 2024-09-20 PROCEDURE — 99214 OFFICE O/P EST MOD 30 MIN: CPT | Performed by: INTERNAL MEDICINE

## 2024-09-20 PROCEDURE — 1123F ACP DISCUSS/DSCN MKR DOCD: CPT | Performed by: INTERNAL MEDICINE

## 2024-09-20 RX ORDER — HYDROCORTISONE 25 MG/ML
LOTION TOPICAL
Qty: 59 ML | Refills: 0 | Status: SHIPPED | OUTPATIENT
Start: 2024-09-20

## 2024-09-20 RX ORDER — FUROSEMIDE 20 MG
TABLET ORAL
Qty: 30 TABLET | Refills: 1 | Status: SHIPPED | OUTPATIENT
Start: 2024-09-20

## 2024-09-20 ASSESSMENT — ENCOUNTER SYMPTOMS
GASTROINTESTINAL NEGATIVE: 1
RESPIRATORY NEGATIVE: 1
EYES NEGATIVE: 1

## 2024-09-25 ENCOUNTER — TELEPHONE (OUTPATIENT)
Facility: CLINIC | Age: 84
End: 2024-09-25

## 2024-10-02 RX ORDER — NYSTATIN 100000 [USP'U]/G
POWDER TOPICAL
Qty: 60 G | Refills: 2 | Status: SHIPPED | OUTPATIENT
Start: 2024-10-02

## 2024-10-02 NOTE — TELEPHONE ENCOUNTER
Leyla finney home health nurse called requesting a script for Nystatin powder be sent to patient's pharmacy,[facility]/pharmacy #[id number] [facility] [address] - P [phone] - F [phone]     She would like to be called back at [phone]    09/20/2024  10/25/2024

## 2024-10-04 ENCOUNTER — TELEPHONE (OUTPATIENT)
Facility: CLINIC | Age: 84
End: 2024-10-04

## 2024-10-04 PROBLEM — N39.0 UTI (URINARY TRACT INFECTION): Status: RESOLVED | Noted: 2024-09-04 | Resolved: 2024-10-04

## 2024-10-04 NOTE — TELEPHONE ENCOUNTER
[facility] Primary Care at Home   (formerly Home Based Primary Care & Supportive Services)   Phone:  [phone]      Fax:  [phone]  [facility], [address], [address]  [place] [address]    Name:  Nataly Fuentes  YOB: 1940    The Primary Care at Home team discussed Nataly Fuentes's referral in the weekly IDG meeting on 10/1/24 and patient was approved for Primary Care at Home services.      This nurse called and spoke with patient's son Cristo and informed him that the start of care date is scheduled for  10/18/24 with Lucille Nguyen NP.  Marianne Ghosh LCSW will be contacting him to set up an appointment for initial SW assessment and to get new patient paperwork signed prior to Lucille Wendy's start of care visit.      Nurse informed Cristo that the Primary Care at Home providers may make scheduled home visits between the hours of 8:30am and 5:00pm.   Individual appt time requests are not able to be honored due to the fact that visits are scheduled to optimize the travel route of the provider.  Patient appointments are scheduled for a window of time (morning or afternoon) rather than an exact appointment time.  He voices understanding.    Fina Mark, RN, Gerontological Nursing-BC, [facility]

## 2024-10-08 ENCOUNTER — TELEPHONE (OUTPATIENT)
Facility: CLINIC | Age: 84
End: 2024-10-08

## 2024-10-08 NOTE — TELEPHONE ENCOUNTER
LCSW called and spoke with pt's son, Cristo. LCSW scheduled initial in-home appointment for completion of H intake paperwork and social work assessment. LCSW to visit on Monday 10/14/24 @ 1:00pm.     MAG Galvan, [facility]     [facility]  Primary Care at [facility]   [address], [address]   [place] [address]  (C) [phone]  (O) [phone]  [email or web address]

## 2024-10-09 ENCOUNTER — PATIENT MESSAGE (OUTPATIENT)
Age: 84
End: 2024-10-09

## 2024-10-09 DIAGNOSIS — I50.32 CHRONIC HEART FAILURE WITH PRESERVED EJECTION FRACTION (HFPEF) (HCC): Primary | ICD-10-CM

## 2024-10-09 RX ORDER — ALBUTEROL SULFATE 90 UG/1
2 INHALANT RESPIRATORY (INHALATION) EVERY 6 HOURS PRN
Qty: 18 G | Refills: 2 | Status: SHIPPED | OUTPATIENT
Start: 2024-10-09

## 2024-10-09 NOTE — TELEPHONE ENCOUNTER
Last appt 9/20/2024      Next Apt:     Future Appointments   Date Time Provider Department Center   10/18/2024  9:30 AM Lucille Nguyen APRN - NP [facility]   10/25/2024  9:30 AM Gina Barone MD [facility] DEP         [facility]/pharmacy #[id number] [place], [place] - [address] - P [phone] - F [phone] [address] [address]  Phone: [phone] Fax: [phone]

## 2024-10-11 ENCOUNTER — TELEPHONE (OUTPATIENT)
Facility: CLINIC | Age: 84
End: 2024-10-11

## 2024-10-11 NOTE — TELEPHONE ENCOUNTER
Called patient to confirm their in home visit with Lucille Nguyen on 10/18/2024, arrival between 9-1.  Spoke with Cristo Fuentes, they confirmed the appointment and answered the covid screen questions. Does anyone in the household have covid No Have there been any changes to insurance No or location No

## 2024-10-14 ENCOUNTER — OFFICE VISIT (OUTPATIENT)
Facility: CLINIC | Age: 84
End: 2024-10-14

## 2024-10-14 DIAGNOSIS — Z76.89 ENCOUNTER FOR SOCIAL WORK INTERVENTION: Primary | ICD-10-CM

## 2024-10-14 SDOH — SOCIAL STABILITY: SOCIAL INSECURITY
WITHIN THE LAST YEAR, HAVE TO BEEN RAPED OR FORCED TO HAVE ANY KIND OF SEXUAL ACTIVITY BY YOUR PARTNER OR EX-PARTNER?: NO

## 2024-10-14 SDOH — SOCIAL STABILITY: SOCIAL NETWORK
DO YOU BELONG TO ANY CLUBS OR ORGANIZATIONS SUCH AS CHURCH GROUPS UNIONS, FRATERNAL OR ATHLETIC GROUPS, OR SCHOOL GROUPS?: YES

## 2024-10-14 SDOH — SOCIAL STABILITY: SOCIAL NETWORK: HOW OFTEN DO YOU ATTENT MEETINGS OF THE CLUB OR ORGANIZATION YOU BELONG TO?: NEVER

## 2024-10-14 SDOH — HEALTH STABILITY: PHYSICAL HEALTH: ON AVERAGE, HOW MANY MINUTES DO YOU ENGAGE IN EXERCISE AT THIS LEVEL?: 0 MIN

## 2024-10-14 SDOH — SOCIAL STABILITY: SOCIAL INSECURITY
WITHIN THE LAST YEAR, HAVE YOU BEEN KICKED, HIT, SLAPPED, OR OTHERWISE PHYSICALLY HURT BY YOUR PARTNER OR EX-PARTNER?: NO

## 2024-10-14 SDOH — HEALTH STABILITY: PHYSICAL HEALTH: ON AVERAGE, HOW MANY DAYS PER WEEK DO YOU ENGAGE IN MODERATE TO STRENUOUS EXERCISE (LIKE A BRISK WALK)?: 0 DAYS

## 2024-10-14 SDOH — SOCIAL STABILITY: SOCIAL INSECURITY: WITHIN THE LAST YEAR, HAVE YOU BEEN AFRAID OF YOUR PARTNER OR EX-PARTNER?: NO

## 2024-10-14 SDOH — ECONOMIC STABILITY: INCOME INSECURITY: HOW HARD IS IT FOR YOU TO PAY FOR THE VERY BASICS LIKE FOOD, HOUSING, MEDICAL CARE, AND HEATING?: NOT HARD AT ALL

## 2024-10-14 SDOH — SOCIAL STABILITY: SOCIAL NETWORK: HOW OFTEN DO YOU ATTEND CHURCH OR RELIGIOUS SERVICES?: 1 TO 4 TIMES PER YEAR

## 2024-10-14 SDOH — SOCIAL STABILITY: SOCIAL NETWORK
IN A TYPICAL WEEK, HOW MANY TIMES DO YOU TALK ON THE PHONE WITH FAMILY, FRIENDS, OR NEIGHBORS?: MORE THAN THREE TIMES A WEEK

## 2024-10-14 SDOH — SOCIAL STABILITY: SOCIAL NETWORK: ARE YOU MARRIED, WIDOWED, DIVORCED, SEPARATED, NEVER MARRIED, OR LIVING WITH A PARTNER?: NEVER MARRIED

## 2024-10-14 SDOH — ECONOMIC STABILITY: FOOD INSECURITY: WITHIN THE PAST 12 MONTHS, YOU WORRIED THAT YOUR FOOD WOULD RUN OUT BEFORE YOU GOT MONEY TO BUY MORE.: NEVER TRUE

## 2024-10-14 SDOH — ECONOMIC STABILITY: FOOD INSECURITY: WITHIN THE PAST 12 MONTHS, THE FOOD YOU BOUGHT JUST DIDN'T LAST AND YOU DIDN'T HAVE MONEY TO GET MORE.: NEVER TRUE

## 2024-10-14 SDOH — ECONOMIC STABILITY: INCOME INSECURITY: IN THE LAST 12 MONTHS, WAS THERE A TIME WHEN YOU WERE NOT ABLE TO PAY THE MORTGAGE OR RENT ON TIME?: NO

## 2024-10-14 SDOH — HEALTH STABILITY: MENTAL HEALTH: HOW OFTEN DO YOU HAVE A DRINK CONTAINING ALCOHOL?: NEVER

## 2024-10-14 SDOH — SOCIAL STABILITY: SOCIAL INSECURITY: WITHIN THE LAST YEAR, HAVE YOU BEEN HUMILIATED OR EMOTIONALLY ABUSED IN OTHER WAYS BY YOUR PARTNER OR EX-PARTNER?: NO

## 2024-10-14 SDOH — HEALTH STABILITY: MENTAL HEALTH
STRESS IS WHEN SOMEONE FEELS TENSE, NERVOUS, ANXIOUS, OR CAN'T SLEEP AT NIGHT BECAUSE THEIR MIND IS TROUBLED. HOW STRESSED ARE YOU?: NOT AT ALL

## 2024-10-14 SDOH — ECONOMIC STABILITY: TRANSPORTATION INSECURITY
IN THE PAST 12 MONTHS, HAS LACK OF TRANSPORTATION KEPT YOU FROM MEETINGS, WORK, OR FROM GETTING THINGS NEEDED FOR DAILY LIVING?: NO

## 2024-10-14 SDOH — ECONOMIC STABILITY: TRANSPORTATION INSECURITY
IN THE PAST 12 MONTHS, HAS THE LACK OF TRANSPORTATION KEPT YOU FROM MEDICAL APPOINTMENTS OR FROM GETTING MEDICATIONS?: NO

## 2024-10-14 SDOH — HEALTH STABILITY: MENTAL HEALTH: HOW MANY STANDARD DRINKS CONTAINING ALCOHOL DO YOU HAVE ON A TYPICAL DAY?: PATIENT DOES NOT DRINK

## 2024-10-14 SDOH — SOCIAL STABILITY: SOCIAL NETWORK: HOW OFTEN DO YOU GET TOGETHER WITH FRIENDS OR RELATIVES?: MORE THAN THREE TIMES A WEEK

## 2024-10-14 NOTE — PROGRESS NOTES
[facility]  Initial Social Work Assessment    Date and Time of Initial In-Home Visit: 10/14/24, 1:00pm    Reason for Assessment: Completion of [facility] intake paperwork, initial social work assessment    Sources of Information: Patient    SOCIAL HISTORY  Relationship Status:   [x] Single  []   [] Significant Other  []   [] /  [] Other:     Living Circumstances: Ms. Fuentes lives in home with 2 of her 6 sons. Son, Cristo, is mPOA and manages pt's healthcare and finances. Her other son, Ceasar, live in the home. He has a TBI, and Cristo also manages his brother's healthcare.     Current/Type of Housing:  [] Public/subsidized housing  [] Apartment, Is there an elevator:   [] Assisted Living  [x] Private House, How many floors: 2    FINANCIAL/INSURANCE  Source of Income:    [x] Social Security   [] SSI   [] Pension   [] Employment Income   [] Spouse income   [] Other:    Insurance Information:   [x] Medicare   [x] Medicaid   [] Private Insurance   [] Other:     Are you having trouble paying for things like rent, food, medications? Pt denied financial instability    Is there an agency or person who pays your bills? If yes, who? Yes, pt's son Cristo manages finances     ENVIRONMENT CHECKLIST  Food Security: Pt denied concerns. She has access to food and prepared meals    Problem with bed bugs, odors, pests, or pets? None observed or reported    Working smoke alarm? [x] Yes [] No    Difficulty getting to exits from the home? Yes, pt unable to navigate stairs and there are steps to front door and steps from backdoor to the ground level    SOCIAL SUPPORT ENVIRONMENT  Support System: Pt's family is primary support, as well as [employer] community members who come to visit    How often do social supports visit? Daily    How do you socialize? When people call on the phone or come to visit    Are you involved with any community agencies? Name/Contact: Not at this time    Is or has

## 2024-10-15 DIAGNOSIS — L30.9 DERMATITIS: ICD-10-CM

## 2024-10-16 RX ORDER — HYDROCORTISONE 25 MG/ML
LOTION TOPICAL
Qty: 59 ML | Refills: 0 | Status: SHIPPED | OUTPATIENT
Start: 2024-10-16

## 2024-10-17 DIAGNOSIS — D64.9 ANEMIA, UNSPECIFIED TYPE: ICD-10-CM

## 2024-10-17 DIAGNOSIS — I50.32 CHRONIC HEART FAILURE WITH PRESERVED EJECTION FRACTION (HFPEF) (HCC): ICD-10-CM

## 2024-10-17 DIAGNOSIS — N18.30 STAGE 3 CHRONIC KIDNEY DISEASE, UNSPECIFIED WHETHER STAGE 3A OR 3B CKD (HCC): ICD-10-CM

## 2024-10-17 DIAGNOSIS — E55.9 VITAMIN D DEFICIENCY: ICD-10-CM

## 2024-10-17 PROBLEM — Z51.5 PALLIATIVE CARE BY SPECIALIST: Status: RESOLVED | Noted: 2024-09-04 | Resolved: 2024-10-17

## 2024-10-17 PROBLEM — R41.82 ALTERED MENTAL STATUS: Status: RESOLVED | Noted: 2024-09-11 | Resolved: 2024-10-17

## 2024-10-18 ENCOUNTER — OFFICE VISIT (OUTPATIENT)
Facility: CLINIC | Age: 84
End: 2024-10-18

## 2024-10-18 VITALS
OXYGEN SATURATION: 97 % | TEMPERATURE: 97.9 F | DIASTOLIC BLOOD PRESSURE: 82 MMHG | SYSTOLIC BLOOD PRESSURE: 128 MMHG | HEART RATE: 52 BPM

## 2024-10-18 DIAGNOSIS — R60.0 BILATERAL LEG EDEMA: ICD-10-CM

## 2024-10-18 DIAGNOSIS — I50.32 CHRONIC HEART FAILURE WITH PRESERVED EJECTION FRACTION (HFPEF) (HCC): ICD-10-CM

## 2024-10-18 DIAGNOSIS — G89.29 OTHER CHRONIC PAIN: ICD-10-CM

## 2024-10-18 DIAGNOSIS — I95.9 HYPOTENSION, UNSPECIFIED HYPOTENSION TYPE: ICD-10-CM

## 2024-10-18 DIAGNOSIS — D64.9 ANEMIA, UNSPECIFIED TYPE: ICD-10-CM

## 2024-10-18 DIAGNOSIS — N18.30 STAGE 3 CHRONIC KIDNEY DISEASE, UNSPECIFIED WHETHER STAGE 3A OR 3B CKD (HCC): ICD-10-CM

## 2024-10-18 DIAGNOSIS — M25.552 LEFT HIP PAIN: ICD-10-CM

## 2024-10-18 DIAGNOSIS — Z71.89 GOALS OF CARE, COUNSELING/DISCUSSION: ICD-10-CM

## 2024-10-18 DIAGNOSIS — I10 HYPERTENSION, UNSPECIFIED TYPE: ICD-10-CM

## 2024-10-18 DIAGNOSIS — B37.2 CANDIDIASIS OF SKIN: ICD-10-CM

## 2024-10-18 DIAGNOSIS — I48.0 PAF (PAROXYSMAL ATRIAL FIBRILLATION) (HCC): ICD-10-CM

## 2024-10-18 DIAGNOSIS — Z76.89 ENCOUNTER TO ESTABLISH CARE: Primary | ICD-10-CM

## 2024-10-18 DIAGNOSIS — E55.9 VITAMIN D DEFICIENCY: ICD-10-CM

## 2024-10-18 DIAGNOSIS — K59.09 OTHER CONSTIPATION: ICD-10-CM

## 2024-10-18 PROBLEM — S06.5XAA SUBDURAL HEMATOMA: Status: RESOLVED | Noted: 2024-05-15 | Resolved: 2024-10-18

## 2024-10-18 PROBLEM — D68.69 SECONDARY HYPERCOAGULABLE STATE (HCC): Status: RESOLVED | Noted: 2024-09-20 | Resolved: 2024-10-18

## 2024-10-18 PROBLEM — G93.41 ACUTE METABOLIC ENCEPHALOPATHY: Status: RESOLVED | Noted: 2024-09-04 | Resolved: 2024-10-18

## 2024-10-18 PROBLEM — Z91.81 AT MODERATE RISK FOR FALL: Status: RESOLVED | Noted: 2021-04-28 | Resolved: 2024-10-18

## 2024-10-18 RX ORDER — ACETAMINOPHEN 325 MG/1
650 TABLET ORAL EVERY 6 HOURS PRN
COMMUNITY

## 2024-10-18 RX ORDER — METHOCARBAMOL 750 MG/1
750 TABLET, FILM COATED ORAL 3 TIMES DAILY PRN
COMMUNITY

## 2024-10-18 RX ORDER — NYSTATIN 100000 U/G
CREAM TOPICAL
Qty: 3 EACH | Refills: 1 | Status: SHIPPED | OUTPATIENT
Start: 2024-10-18

## 2024-10-18 ASSESSMENT — ENCOUNTER SYMPTOMS
EYE DISCHARGE: 0
TROUBLE SWALLOWING: 0
NAUSEA: 0
COUGH: 0
ABDOMINAL PAIN: 0
SORE THROAT: 0
VOMITING: 0
CHOKING: 0
EYE PAIN: 0
SHORTNESS OF BREATH: 0
DIARRHEA: 0
CONSTIPATION: 1
WHEEZING: 0

## 2024-10-18 NOTE — PROGRESS NOTES
Advance Care Planning     Advance Care Planning (ACP) Physician/NP/PA Conversation    Date of Conversation: 10/18/2024  Conducted with: Patient with Decision Making Capacity, Healthcare Decision Maker, and Legal next of kin  Other persons present: Artur Fuentes    Healthcare Decision Maker:   Primary Decision Maker: Cristo Fuentes - Sumit - [phone]    Secondary Decision Maker: Nabor Fuentes - Child - [phone]     Today we documented Decision Maker(s) consistent with Legal Next of Kin hierarchy.    Care Preferences:    Hospitalization:  \"If your health worsens and it becomes clear that your chance of recovery is unlikely, what would be your preference regarding hospitalization?\"  The patient would prefer comfort-focused treatment without hospitalization.    Ventilation:  \"If you were unable to breath on your own and your chance of recovery was unlikely, what would be your preference about the use of a ventilator (breathing machine) if it was available to you?\"  The patient would NOT desire the use of a ventilator.    Resuscitation:  \"In the event your heart stopped as a result of an underlying serious health condition, would you want attempts made to restart your heart, or would you prefer a natural death?\"  No, do NOT attempt to resuscitate.    treatment goals, benefit/burden of treatment options, artificial nutrition, ventilation preferences, hospitalization preferences, resuscitation preferences, and end of life care preferences (vegetative state/imminent death)    Conversation Outcomes / Follow-Up Plan:  ACP complete - no further action today  Reviewed DNR/DNI and patient confirms current DNR status - completed forms on file (place new order if needed)    Length of Voluntary ACP Conversation in minutes:  16 minutes    DANIA Guerrero - NP                
SN joint visit with Lucille Nguyen NP     Patient alert, oriented x4, follows commands. During the visit patient reported several body locations that she has intermittent pain, but she denies pain during the visit. Pain is exacerbated by hygiene care routine. Patient is morbidly obese. Pain education provided by NP and this nurse to patient and son, Cristo (present during the visit).    Vital signs obtained.   Venipuncture attempted x2 - unsuccessful.   Med rec completed.    
on the left side.      Heart sounds: Normal heart sounds.   Musculoskeletal:      Cervical back: No rigidity or tenderness.      Right lower le+ Edema present.      Left lower le+ Edema present.   Lymphadenopathy:      Cervical: No cervical adenopathy.   Skin:     General: Skin is warm and dry.      Findings: Rash present.      Comments: Bilateral breast fold rash, R>L.   Neurological:      General: No focal deficit present.      Mental Status: She is alert and oriented to person, place, and time. Mental status is at baseline.      Motor: Weakness present.   Psychiatric:         Mood and Affect: Mood normal.         Behavior: Behavior normal.         Thought Content: Thought content normal.         Advanced Care Planning  Code Status:   Code Status: DNR-CC  Advanced Medical Directive:   Cristo Fuentes (son, POA, primary healthcare decision maker)  POST form completed 2023 DNR and comfort focused care.  AMD and DNR completed 10/18/24    On this date 10/18/24 I have spent 46 minutes reviewing previous notes, test results and face to face with the patient discussing the diagnosis and importance of compliance with the treatment plan as well as documenting on the day of the visit.      An electronic signature was used to authenticate this note.  -- DANIA Guerrero - NP

## 2024-10-18 NOTE — PATIENT INSTRUCTIONS

## 2024-10-22 ENCOUNTER — TELEPHONE (OUTPATIENT)
Facility: CLINIC | Age: 84
End: 2024-10-22

## 2024-10-22 ENCOUNTER — CLINICAL DOCUMENTATION (OUTPATIENT)
Facility: CLINIC | Age: 84
End: 2024-10-22

## 2024-10-22 NOTE — PROGRESS NOTES
Phone call returned to patient's son Cristo, patient c/o increased SOB, R breast swelling and increased swelling to ankles and feet, appears to have developed suddenly yesterday. Advised at this time patient would not need imaging and labs completed and to call [facility] or send to ED/hospital for further evaluation. Patient's so Cristo reports understanding and will follow up with either  or ED.

## 2024-10-22 NOTE — TELEPHONE ENCOUNTER
VM - 12:20pm Brando Fuentes called to update that the patient is having pain and swelling in her right breast.  She is having pain her left hip.  Her legs and feet are swollen.  Brando's CB# [phone].

## 2024-10-24 ENCOUNTER — TELEPHONE (OUTPATIENT)
Facility: CLINIC | Age: 84
End: 2024-10-24

## 2024-10-24 NOTE — TELEPHONE ENCOUNTER
Minal, nurse with [facility] called to report that patient has +3 pitting edema to BLE. Patient takes Lasix 20mg once a week. Nurse asked if provider would consider increasing the frequency or dose of diuretic.  She also stated that she was able to obtain labs on the patient back in September.     Call back # [phone]

## 2024-11-05 ENCOUNTER — TELEPHONE (OUTPATIENT)
Facility: CLINIC | Age: 84
End: 2024-11-05

## 2024-11-05 DIAGNOSIS — I50.9 CONGESTIVE HEART FAILURE, UNSPECIFIED HF CHRONICITY, UNSPECIFIED HEART FAILURE TYPE (HCC): ICD-10-CM

## 2024-11-05 DIAGNOSIS — R06.02 SOB (SHORTNESS OF BREATH): Primary | ICD-10-CM

## 2024-11-05 NOTE — TELEPHONE ENCOUNTER
Telephone call from nurse Fontaine with [facility] at [phone] reporting that patient has bilat lung wheezes, she has 5- 6+ pitting edema feet and lower legs and edema up to her thighs.  He O2 sat is normally 97 - 98 and is now down to 90%. Resp rate is 20 with her asleep and is normally 16.  She is not visibly SOB and has no chest pain.  She takes Lasix 20 mg once a week on Fridays.    Call returned to nurse Fontaine, advised that Lucille Nguyen NP had already advised family to increase lasix to 20 mg daily.  Minal confirms that patient has only been taking 20 mg Lasix on Fridays.  Advised that Lucille recommends that patient go to the hospital as she likely has pulmonary edema, if patient refuses to go then she recommends Lasix 40 mg daily x 3 days then return to 20 mg daily.  Verbal order provided for nurse to draw CBC, CMP and BMP.  Minal has already left the home but states son is on board with pt going to hospital, she states he also mentioned hospice.  Advised that we are happy to provide hospice order if pt desires this.  Minal will return on Thurs to attempt venipuncture if pt does not go to the hospital.

## 2024-11-05 NOTE — TELEPHONE ENCOUNTER
Telephone call from Minal HUBER with [facility] at [phone].  She called son and advised that pt go to ED, she told son to discuss with pt and she would call him back in one hour for her decision.  When she called back son advised that pt refused to go to ED and he had already given Lasix.  He gave 4 tabs of Lasix 20 mg thinking that each tab was 10 mg so patient received 80 mg of Lasix.  Second call to nurse Minal to advise that Lucille Nguyen NP feels that patient will be fine with 80 mg dose as her kidney function was good when she was discharged from recent hospital stay.  Lucille will order a chest x-ray from [facility] and will plan to visit pt tomorrow afternoon.  Advised that Lucille would like for patient to receive lasix 40 mg for next 2 days as previously recommended then down to 20 mg daily. Encouraged Minal to discuss that patient's high sodium food choices will ultimately result in causing her passing.

## 2024-11-06 ENCOUNTER — OFFICE VISIT (OUTPATIENT)
Facility: CLINIC | Age: 84
End: 2024-11-06

## 2024-11-06 ENCOUNTER — TELEPHONE (OUTPATIENT)
Facility: CLINIC | Age: 84
End: 2024-11-06

## 2024-11-06 VITALS
DIASTOLIC BLOOD PRESSURE: 60 MMHG | TEMPERATURE: 98.7 F | RESPIRATION RATE: 19 BRPM | HEART RATE: 62 BPM | OXYGEN SATURATION: 93 % | SYSTOLIC BLOOD PRESSURE: 104 MMHG

## 2024-11-06 DIAGNOSIS — R60.0 BILATERAL LEG EDEMA: ICD-10-CM

## 2024-11-06 DIAGNOSIS — I50.9 CONGESTIVE HEART FAILURE, UNSPECIFIED HF CHRONICITY, UNSPECIFIED HEART FAILURE TYPE (HCC): ICD-10-CM

## 2024-11-06 DIAGNOSIS — Z71.89 GOALS OF CARE, COUNSELING/DISCUSSION: ICD-10-CM

## 2024-11-06 DIAGNOSIS — R06.02 SOB (SHORTNESS OF BREATH): ICD-10-CM

## 2024-11-06 DIAGNOSIS — I50.32 CHRONIC DIASTOLIC CONGESTIVE HEART FAILURE (HCC): Primary | ICD-10-CM

## 2024-11-06 RX ORDER — FUROSEMIDE 20 MG/1
20 TABLET ORAL DAILY
Qty: 60 TABLET | Refills: 3 | Status: SHIPPED | OUTPATIENT
Start: 2024-11-06

## 2024-11-06 ASSESSMENT — ENCOUNTER SYMPTOMS
NAUSEA: 0
VOMITING: 0
SHORTNESS OF BREATH: 0
SORE THROAT: 0
WHEEZING: 0
ABDOMINAL PAIN: 0
CHOKING: 0
COUGH: 0
DIARRHEA: 0
EYE PAIN: 0
EYE DISCHARGE: 0
CONSTIPATION: 1
TROUBLE SWALLOWING: 0

## 2024-11-06 NOTE — TELEPHONE ENCOUNTER
Called patient to confirm their in home visit with Lucille Nguyen on 11/11/24, arrival between 9-1.  Got voicemail. Left message.

## 2024-11-06 NOTE — PROGRESS NOTES
Advance Care Planning     Advance Care Planning (ACP) Physician/NP/PA Conversation    Date of Conversation: 11/6/2024  Conducted with: Patient with Decision Making Capacity, Healthcare Decision Maker, and Legal next of kin  Other persons present: Artur Lemons    Healthcare Decision Maker:   Primary Decision Maker: Cristo Fuentes - Sumit - [phone]    Secondary Decision Maker: Nabor Fuentes - Child - [phone]     Today we documented Decision Maker(s) consistent with ACP documents on file.    Care Preferences:    Hospitalization:  \"If your health worsens and it becomes clear that your chance of recovery is unlikely, what would be your preference regarding hospitalization?\"  The patient would prefer comfort-focused treatment without hospitalization.    Ventilation:  \"If you were unable to breath on your own and your chance of recovery was unlikely, what would be your preference about the use of a ventilator (breathing machine) if it was available to you?\"  The patient would NOT desire the use of a ventilator.    Resuscitation:  \"In the event your heart stopped as a result of an underlying serious health condition, would you want attempts made to restart your heart, or would you prefer a natural death?\"  No, do NOT attempt to resuscitate.    treatment goals, benefit/burden of treatment options, end of life care preferences (vegetative state/imminent death), and hospice care    Conversation Outcomes / Follow-Up Plan:  ACP complete - no further action today  Reviewed DNR/DNI and patient confirms current DNR status - completed forms on file (place new order if needed)    Length of Voluntary ACP Conversation in minutes:  20 minutes    DANIA Guerrero - NP                
daily) 75 capsule 2    amitriptyline (ELAVIL) 10 MG tablet TAKE 1 TABLET BY MOUTH EVERY DAY AT NIGHT 90 tablet 1    balsum peru-castor oil (VENELEX) OINT ointment Apply topically 2 times daily 56.7 g 1    diclofenac sodium (VOLTAREN) 1 % GEL Apply topically 2 times daily as needed      senna-docusate (PERICOLACE) 8.6-50 MG per tablet Take 1 tablet by mouth daily as needed       No current facility-administered medications on file prior to visit.       Review of Systems   Constitutional:  Negative for appetite change, chills, diaphoresis, fever and unexpected weight change.   HENT:  Positive for dental problem (Wears dentures). Negative for sore throat and trouble swallowing.    Eyes:  Negative for pain and discharge.   Respiratory:  Negative for cough, choking, shortness of breath and wheezing.    Cardiovascular:  Positive for leg swelling. Negative for chest pain and palpitations.   Gastrointestinal:  Positive for constipation. Negative for abdominal pain, diarrhea, nausea and vomiting.   Genitourinary:  Negative for difficulty urinating, dysuria and hematuria.        Patient is incontinent of urine.    Musculoskeletal:  Positive for arthralgias.        Patient is bed bound with generalized weakness.    Skin:  Positive for rash (Fungal skin infection - chronic issue.). Negative for wound.   Neurological:  Positive for weakness (Generalized weakness.). Negative for dizziness, tremors, facial asymmetry and speech difficulty.   Psychiatric/Behavioral:  Negative for agitation, behavioral problems and confusion.        Physical Exam  Constitutional:       General: She is not in acute distress.     Appearance: She is obese. She is not toxic-appearing.   HENT:      Head: Normocephalic.      Right Ear: External ear normal.      Left Ear: External ear normal.      Nose: Nose normal.      Mouth/Throat:      Mouth: Mucous membranes are moist.      Pharynx: Oropharynx is clear.   Eyes:      General:         Right eye: No

## 2024-11-08 ENCOUNTER — TELEPHONE (OUTPATIENT)
Facility: CLINIC | Age: 84
End: 2024-11-08

## 2024-11-08 NOTE — TELEPHONE ENCOUNTER
Called patient to confirm their in home visit with Lucille Nguyen on 11/14/24, arrival between 9-1.  Spoke with Cristo Fuentes, they confirmed the appointment and answered the covid screen questions. Does anyone in the household have covid No Have there been any changes to insurance No or location No

## 2024-11-09 DIAGNOSIS — G89.4 CHRONIC PAIN SYNDROME: ICD-10-CM

## 2024-11-11 ENCOUNTER — TELEPHONE (OUTPATIENT)
Age: 84
End: 2024-11-11

## 2024-11-11 DIAGNOSIS — R60.0 BILATERAL LEG EDEMA: ICD-10-CM

## 2024-11-11 DIAGNOSIS — I48.0 PAF (PAROXYSMAL ATRIAL FIBRILLATION) (HCC): ICD-10-CM

## 2024-11-11 DIAGNOSIS — I50.32 CHRONIC DIASTOLIC CONGESTIVE HEART FAILURE (HCC): Primary | ICD-10-CM

## 2024-11-11 DIAGNOSIS — N18.30 STAGE 3 CHRONIC KIDNEY DISEASE, UNSPECIFIED WHETHER STAGE 3A OR 3B CKD (HCC): ICD-10-CM

## 2024-11-11 NOTE — TELEPHONE ENCOUNTER
Sravan Jason from [facility] is requesting an Hospice Order to be fax over. Family would like pt to be switched from [facility] to Hospice. Please fax over to [phone] Attn: Sravan

## 2024-11-12 ENCOUNTER — TELEPHONE (OUTPATIENT)
Facility: CLINIC | Age: 84
End: 2024-11-12

## 2024-11-12 DIAGNOSIS — G89.29 OTHER CHRONIC PAIN: ICD-10-CM

## 2024-11-12 DIAGNOSIS — R60.0 BILATERAL LEG EDEMA: Primary | ICD-10-CM

## 2024-11-12 RX ORDER — GABAPENTIN 300 MG/1
CAPSULE ORAL
Qty: 75 CAPSULE | Refills: 2 | OUTPATIENT
Start: 2024-11-12

## 2024-11-12 RX ORDER — GABAPENTIN 300 MG/1
300 CAPSULE ORAL 3 TIMES DAILY
Qty: 15 CAPSULE | Refills: 0 | Status: SHIPPED | OUTPATIENT
Start: 2024-11-12 | End: 2024-11-17

## 2024-11-12 RX ORDER — GABAPENTIN 300 MG/1
CAPSULE ORAL
Qty: 15 CAPSULE | Refills: 0 | Status: SHIPPED | OUTPATIENT
Start: 2024-11-12 | End: 2024-11-12 | Stop reason: CLARIF

## 2024-11-12 NOTE — TELEPHONE ENCOUNTER
Sravan Jaosn returned Shay's call.  He states that Urmila is assessing the patient today and they were seen at 10am.  He reports that he should know within the next 30 minutes if the patient was admitted.  He asks for a callback after 12 pm.  Sravan's CB# [phone]

## 2024-11-12 NOTE — TELEPHONE ENCOUNTER
Sravan with [facility] is calling to request a few Gabapentin be called in. His cb # is [phone]

## 2024-11-12 NOTE — TELEPHONE ENCOUNTER
Call returned to [facility] (Sravan Jason) - Confirmation received that patient was admitted to hospice today, 11/12/24.

## 2024-11-12 NOTE — TELEPHONE ENCOUNTER
Rx Verbal order with read back from Lucille Nguyen NP received for short-term refill of Gabapentin.

## 2024-11-12 NOTE — TELEPHONE ENCOUNTER
Call received from patient's son (spoke with Fina Mark) requesting Rx refill of gabapentin.     Patient was admitted to Hospice today.      Call placed to Sravan ([facility]) to verify that hospice would prescribe her medications. Sravan states that he will speak with their admitting nurse and order the medication.  I explained that I spoke with Lucille Nguyen, NP and NP agreed to send a few days of the medication to the local pharmacy if needed. Sravan responded that he would call back if that was the case, but they should be able to order the medication.

## 2024-11-12 NOTE — TELEPHONE ENCOUNTER
VM-11/11/24 at 10:38 am- Sravan Jackson called from [facility] to request an Hospice order for the patient.  He states that the family wants to switch over to hospice and that the patient's son, Cristo is on board with this.  Sravan states that he has receive a lot of paperwork from [facility]  His CB#[phone].  Fax is [phone].

## 2025-01-04 DIAGNOSIS — R51.9 DAILY HEADACHE: ICD-10-CM

## 2025-01-06 RX ORDER — AMITRIPTYLINE HYDROCHLORIDE 10 MG/1
10 TABLET ORAL NIGHTLY
Qty: 90 TABLET | Refills: 1 | Status: SHIPPED | OUTPATIENT
Start: 2025-01-06

## 2025-01-06 NOTE — TELEPHONE ENCOUNTER
Last appt 9/20/2024      Next Apt:     No future appointments.      [facility]/pharmacy #[id number] - [place], [place] - [address] - P [phone] - F [phone] [address] [address]  Phone: [phone] Fax: [phone]

## (undated) DEVICE — Device

## (undated) DEVICE — KIT IV STRT W CHLORAPREP PD 1ML

## (undated) DEVICE — SET ADMIN 16ML TBNG L100IN 2 Y INJ SITE IV PIGGY BK DISP

## (undated) DEVICE — WRISTBAND ID AD W1XL11.5IN RED POLY ALRG PREPRINTED PERM

## (undated) DEVICE — QUILTED PREMIUM COMFORT UNDERPAD,EXTRA HEAVY: Brand: WINGS

## (undated) DEVICE — BW-412T DISP COMBO CLEANING BRUSH: Brand: SINGLE USE COMBINATION CLEANING BRUSH

## (undated) DEVICE — CONNECTOR TBNG AUX H2O JET DISP FOR OLY 160/180 SER

## (undated) DEVICE — 1200 GUARD II KIT W/5MM TUBE W/O VAC TUBE: Brand: GUARDIAN

## (undated) DEVICE — ENDO CARRY-ON PROCEDURE KIT INCLUDES ENZYMATIC SPONGE, GAUZE, BIOHAZARD LABEL, TRAY, LUBRICANT, DIRTY SCOPE LABEL, WATER LABEL, TRAY, DRAWSTRING PAD, AND DEFENDO 4-PIECE KIT.: Brand: ENDO CARRY-ON PROCEDURE KIT

## (undated) DEVICE — KENDALL RADIOLUCENT FOAM MONITORING ELECTRODE -RECTANGULAR SHAPE: Brand: KENDALL

## (undated) DEVICE — Device: Brand: PADPRO

## (undated) DEVICE — NEONATAL-ADULT SPO2 SENSOR: Brand: NELLCOR

## (undated) DEVICE — BITE BLK ENDOSCP AD 54FR GRN POLYETH ENDOSCP W STRP SLD

## (undated) DEVICE — CATH IV AUTOGRD BC BLU 22GA 25 -- INSYTE

## (undated) DEVICE — BAG BELONG PT PERS CLEAR HANDL

## (undated) DEVICE — SYRINGE MED 20ML STD CLR PLAS LUERLOCK TIP N CTRL DISP

## (undated) DEVICE — NEEDLE HYPO 18GA L1.5IN PNK S STL HUB POLYPR SHLD REG BVL

## (undated) DEVICE — Z DISCONTINUED NO SUB IDED SET EXTN W/ 4 W STPCOCK M SPIN LOK 36IN

## (undated) DEVICE — SET EXTN TBNG L BOR 4 W STPCOCK ST 32IN PRIMING VOL 6ML

## (undated) DEVICE — CANN NASAL O2 CAPNOGRAPHY AD -- FILTERLINE

## (undated) DEVICE — Z CONVERTED USE 2274299 CUFF BLD PRESSURE LNG MED AD 25-35 CM ARM FLEXIPORT DISP

## (undated) DEVICE — AIRLIFE™ U/CONNECT-IT OXYGEN TUBING 7 FEET (2.1 M) CRUSH-RESISTANT OXYGEN TUBING, VINYL TIPPED: Brand: AIRLIFE™

## (undated) DEVICE — Z DISCONTINUED USE 2751540 TUBING IRRIG L10IN DISP PMP ENDOGATOR

## (undated) DEVICE — SOLIDIFIER FLUID 3000 CC ABSORB